# Patient Record
Sex: MALE | Race: WHITE | NOT HISPANIC OR LATINO | Employment: FULL TIME | URBAN - METROPOLITAN AREA
[De-identification: names, ages, dates, MRNs, and addresses within clinical notes are randomized per-mention and may not be internally consistent; named-entity substitution may affect disease eponyms.]

---

## 2017-01-26 ENCOUNTER — HISTORICAL (OUTPATIENT)
Dept: ADMINISTRATIVE | Facility: HOSPITAL | Age: 67
End: 2017-01-26

## 2017-01-27 ENCOUNTER — OFFICE VISIT (OUTPATIENT)
Dept: INTERNAL MEDICINE CLINIC | Age: 67
End: 2017-01-27

## 2017-01-27 VITALS
WEIGHT: 244 LBS | HEART RATE: 60 BPM | HEIGHT: 71 IN | DIASTOLIC BLOOD PRESSURE: 82 MMHG | BODY MASS INDEX: 34.16 KG/M2 | SYSTOLIC BLOOD PRESSURE: 124 MMHG

## 2017-01-27 DIAGNOSIS — Z01.818 PREOP EXAMINATION: Primary | ICD-10-CM

## 2017-01-27 DIAGNOSIS — T86.8419 CORNEAL TRANSPLANT FAILURE: ICD-10-CM

## 2017-01-27 PROCEDURE — 1123F ACP DISCUSS/DSCN MKR DOCD: CPT | Performed by: NURSE PRACTITIONER

## 2017-01-27 PROCEDURE — 3017F COLORECTAL CA SCREEN DOC REV: CPT | Performed by: NURSE PRACTITIONER

## 2017-01-27 PROCEDURE — 1036F TOBACCO NON-USER: CPT | Performed by: NURSE PRACTITIONER

## 2017-01-27 PROCEDURE — G8427 DOCREV CUR MEDS BY ELIG CLIN: HCPCS | Performed by: NURSE PRACTITIONER

## 2017-01-27 PROCEDURE — G8484 FLU IMMUNIZE NO ADMIN: HCPCS | Performed by: NURSE PRACTITIONER

## 2017-01-27 PROCEDURE — G8599 NO ASA/ANTIPLAT THER USE RNG: HCPCS | Performed by: NURSE PRACTITIONER

## 2017-01-27 PROCEDURE — G8419 CALC BMI OUT NRM PARAM NOF/U: HCPCS | Performed by: NURSE PRACTITIONER

## 2017-01-27 PROCEDURE — 4040F PNEUMOC VAC/ADMIN/RCVD: CPT | Performed by: NURSE PRACTITIONER

## 2017-01-27 PROCEDURE — 99214 OFFICE O/P EST MOD 30 MIN: CPT | Performed by: NURSE PRACTITIONER

## 2017-06-07 RX ORDER — HYDROCHLOROTHIAZIDE 12.5 MG/1
CAPSULE ORAL
Qty: 30 CAPSULE | Refills: 2 | Status: SHIPPED | OUTPATIENT
Start: 2017-06-07 | End: 2017-07-10 | Stop reason: SDUPTHER

## 2017-06-07 RX ORDER — OMEPRAZOLE 20 MG/1
CAPSULE, DELAYED RELEASE ORAL
Qty: 30 CAPSULE | Refills: 2 | Status: SHIPPED | OUTPATIENT
Start: 2017-06-07 | End: 2017-07-10 | Stop reason: SDUPTHER

## 2017-06-07 RX ORDER — LISINOPRIL 5 MG/1
TABLET ORAL
Qty: 30 TABLET | Refills: 2 | Status: SHIPPED | OUTPATIENT
Start: 2017-06-07 | End: 2017-07-10 | Stop reason: SDUPTHER

## 2017-06-15 DIAGNOSIS — I10 ESSENTIAL HYPERTENSION: Chronic | ICD-10-CM

## 2017-06-15 RX ORDER — DOXAZOSIN 2 MG/1
TABLET ORAL
Qty: 30 TABLET | Refills: 0 | Status: SHIPPED | OUTPATIENT
Start: 2017-06-15 | End: 2017-07-09 | Stop reason: SDUPTHER

## 2017-06-19 RX ORDER — ROSUVASTATIN CALCIUM 10 MG/1
5 TABLET, COATED ORAL DAILY
Qty: 30 TABLET | Refills: 5 | Status: SHIPPED | OUTPATIENT
Start: 2017-06-19 | End: 2018-06-28 | Stop reason: SDUPTHER

## 2017-06-19 RX ORDER — ROSUVASTATIN CALCIUM 10 MG/1
TABLET, COATED ORAL
COMMUNITY
Start: 2017-03-27 | End: 2017-06-19 | Stop reason: SDUPTHER

## 2017-06-22 DIAGNOSIS — R93.89 ABNORMAL CT OF THE CHEST: Primary | ICD-10-CM

## 2017-06-26 ENCOUNTER — TELEPHONE (OUTPATIENT)
Dept: PULMONOLOGY | Facility: CLINIC | Age: 67
End: 2017-06-26

## 2017-06-26 DIAGNOSIS — R93.89 ABNORMAL CXR: Primary | ICD-10-CM

## 2017-07-06 ENCOUNTER — TELEPHONE (OUTPATIENT)
Dept: INTERNAL MEDICINE CLINIC | Age: 67
End: 2017-07-06

## 2017-07-07 RX ORDER — LISINOPRIL 5 MG/1
5 TABLET ORAL DAILY
COMMUNITY

## 2017-07-07 RX ORDER — OMEPRAZOLE 20 MG/1
20 CAPSULE, DELAYED RELEASE ORAL DAILY
COMMUNITY

## 2017-07-07 RX ORDER — ROSUVASTATIN CALCIUM 10 MG/1
5 TABLET, COATED ORAL DAILY
COMMUNITY

## 2017-07-07 RX ORDER — ASPIRIN 81 MG/1
81 TABLET ORAL DAILY
COMMUNITY

## 2017-07-07 RX ORDER — TIOTROPIUM BROMIDE 18 UG/1
18 CAPSULE ORAL; RESPIRATORY (INHALATION) DAILY
COMMUNITY

## 2017-07-07 RX ORDER — FERROUS SULFATE 325(65) MG
325 TABLET ORAL EVERY OTHER DAY
COMMUNITY

## 2017-07-07 RX ORDER — AMLODIPINE BESYLATE 5 MG/1
5 TABLET ORAL DAILY
COMMUNITY

## 2017-07-07 RX ORDER — HYDROCHLOROTHIAZIDE 12.5 MG/1
12.5 TABLET ORAL DAILY
COMMUNITY

## 2017-07-07 RX ORDER — SILDENAFIL 50 MG/1
25 TABLET, FILM COATED ORAL DAILY PRN
COMMUNITY

## 2017-07-10 ENCOUNTER — OFFICE VISIT (OUTPATIENT)
Dept: FAMILY MEDICINE | Facility: CLINIC | Age: 67
End: 2017-07-10
Payer: COMMERCIAL

## 2017-07-10 VITALS
HEIGHT: 70 IN | SYSTOLIC BLOOD PRESSURE: 128 MMHG | BODY MASS INDEX: 23.91 KG/M2 | WEIGHT: 167 LBS | OXYGEN SATURATION: 95 % | HEART RATE: 59 BPM | DIASTOLIC BLOOD PRESSURE: 72 MMHG

## 2017-07-10 DIAGNOSIS — N52.9 ED (ERECTILE DYSFUNCTION) OF ORGANIC ORIGIN: ICD-10-CM

## 2017-07-10 DIAGNOSIS — K21.9 GASTROESOPHAGEAL REFLUX DISEASE WITHOUT ESOPHAGITIS: ICD-10-CM

## 2017-07-10 DIAGNOSIS — R91.8 MULTIPLE PULMONARY NODULES: ICD-10-CM

## 2017-07-10 DIAGNOSIS — R06.02 SOB (SHORTNESS OF BREATH) ON EXERTION: ICD-10-CM

## 2017-07-10 DIAGNOSIS — I10 BENIGN HYPERTENSION: Primary | ICD-10-CM

## 2017-07-10 PROBLEM — E78.5 HYPERLIPIDEMIA: Status: ACTIVE | Noted: 2017-07-10

## 2017-07-10 PROBLEM — M75.122 COMPLETE TEAR OF LEFT ROTATOR CUFF: Status: ACTIVE | Noted: 2017-07-10

## 2017-07-10 PROBLEM — R07.9 CHEST PAIN: Status: ACTIVE | Noted: 2017-07-10

## 2017-07-10 PROBLEM — N40.0 BENIGN PROSTATIC HYPERTROPHY WITHOUT URINARY OBSTRUCTION: Status: ACTIVE | Noted: 2017-07-10

## 2017-07-10 PROBLEM — R79.9 BLOOD UREA ABNORMAL: Status: ACTIVE | Noted: 2017-07-10

## 2017-07-10 PROBLEM — D64.9 ANEMIA: Status: ACTIVE | Noted: 2017-07-10

## 2017-07-10 PROBLEM — K63.5 POLYP OF COLON: Status: ACTIVE | Noted: 2017-07-10

## 2017-07-10 PROBLEM — M25.519 SHOULDER PAIN: Status: ACTIVE | Noted: 2017-07-10

## 2017-07-10 PROBLEM — I34.0 MITRAL VALVE INSUFFICIENCY: Status: ACTIVE | Noted: 2017-07-10

## 2017-07-10 PROBLEM — K20.90 ESOPHAGITIS: Status: ACTIVE | Noted: 2017-07-10

## 2017-07-10 PROCEDURE — 99214 OFFICE O/P EST MOD 30 MIN: CPT | Mod: ,,, | Performed by: FAMILY MEDICINE

## 2017-07-10 PROCEDURE — 1159F MED LIST DOCD IN RCRD: CPT | Mod: ,,, | Performed by: FAMILY MEDICINE

## 2017-07-10 RX ORDER — SILDENAFIL CITRATE 20 MG/1
20 TABLET ORAL ONCE AS NEEDED
Qty: 30 TABLET | Refills: 2 | Status: SHIPPED | OUTPATIENT
Start: 2017-07-10 | End: 2018-03-14

## 2017-07-10 RX ORDER — OMEPRAZOLE 20 MG/1
20 CAPSULE, DELAYED RELEASE ORAL DAILY
Qty: 30 CAPSULE | Refills: 2 | Status: SHIPPED | OUTPATIENT
Start: 2017-07-10 | End: 2017-12-13 | Stop reason: SDUPTHER

## 2017-07-10 RX ORDER — LISINOPRIL 5 MG/1
5 TABLET ORAL DAILY
Qty: 30 TABLET | Refills: 2 | Status: SHIPPED | OUTPATIENT
Start: 2017-07-10 | End: 2017-10-23 | Stop reason: SDUPTHER

## 2017-07-10 RX ORDER — HYDROCHLOROTHIAZIDE 12.5 MG/1
12.5 CAPSULE ORAL DAILY
Qty: 30 CAPSULE | Refills: 2 | Status: SHIPPED | OUTPATIENT
Start: 2017-07-10 | End: 2017-12-13 | Stop reason: SDUPTHER

## 2017-07-10 NOTE — PROGRESS NOTES
Subjective:       Patient ID:  Onesimo Paula is a 66 y.o. male with multiple medical diagnoses as listed in the medical history and problem list that presents for Hypertension (f/u) and Shortness of Breath (f/u)  .      Chief Complaint: Hypertension (f/u) and Shortness of Breath (f/u)    Hypertension   This is a chronic problem. The current episode started more than 1 year ago. The problem has been resolved since onset. The problem is controlled. Associated symptoms include shortness of breath. Pertinent negatives include no anxiety, chest pain, headaches, malaise/fatigue or palpitations. There are no associated agents to hypertension. Risk factors for coronary artery disease include dyslipidemia and male gender. Past treatments include ACE inhibitors and diuretics. The current treatment provides mild improvement. There are no compliance problems.  There is no history of angina, kidney disease, CVA, heart failure or retinopathy.   Shortness of Breath   This is a chronic problem. The current episode started more than 1 year ago. The problem occurs intermittently. The problem has been waxing and waning. Pertinent negatives include no abdominal pain, chest pain, ear pain, fever, headaches, leg swelling, rash, sore throat or wheezing. The symptoms are aggravated by exercise. The patient has no known risk factors for DVT/PE. He has tried ipratropium inhalers for the symptoms. The treatment provided mild relief. There is no history of COPD or a heart failure.     Review of Systems   Constitutional: Negative for activity change, appetite change, chills, fatigue, fever and malaise/fatigue.   HENT: Negative for congestion, ear discharge, ear pain, hearing loss and sore throat.    Eyes: Negative for discharge, redness and itching.   Respiratory: Positive for shortness of breath. Negative for apnea, chest tightness and wheezing.    Cardiovascular: Negative for chest pain, palpitations and leg swelling.   Gastrointestinal:  Negative for abdominal distention and abdominal pain.   Endocrine: Negative for cold intolerance and polydipsia.   Genitourinary: Negative for dysuria.   Musculoskeletal: Negative for arthralgias and gait problem.   Skin: Negative for color change, pallor and rash.   Neurological: Negative for dizziness and headaches.   Hematological: Negative for adenopathy.   Psychiatric/Behavioral: Negative for agitation.       No past medical history on file.  No past surgical history on file.  No family history on file.  Social History     Social History    Marital status:      Spouse name: N/A    Number of children: N/A    Years of education: N/A     Social History Main Topics    Smoking status: None    Smokeless tobacco: None    Alcohol use None    Drug use: Unknown    Sexual activity: Not Asked     Other Topics Concern    None     Social History Narrative    None     Current Outpatient Prescriptions   Medication Sig Dispense Refill    hydrochlorothiazide (MICROZIDE) 12.5 mg capsule Take 1 capsule (12.5 mg total) by mouth once daily. 30 capsule 2    lisinopril (PRINIVIL,ZESTRIL) 5 MG tablet Take 1 tablet (5 mg total) by mouth once daily. 30 tablet 2    omeprazole (PRILOSEC) 20 MG capsule Take 1 capsule (20 mg total) by mouth once daily. 30 capsule 2    rosuvastatin (CRESTOR) 10 MG tablet Take 0.5 tablets (5 mg total) by mouth once daily. 30 tablet 5    sildenafil (REVATIO) 20 mg Tab Take 1 tablet (20 mg total) by mouth once as needed. 30 tablet 2     No current facility-administered medications for this visit.      Review of patient's allergies indicates:  No Known Allergies  Objective:      Vitals:    07/10/17 0955   BP: 128/72   Pulse: (!) 59     Physical Exam   Constitutional: He appears well-developed and well-nourished.   HENT:   Head: Normocephalic and atraumatic.   Right Ear: External ear normal.   Left Ear: External ear normal.   Eyes: EOM are normal. Pupils are equal, round, and reactive to  light.   Neck: Normal range of motion. No tracheal deviation present. No thyromegaly present.   Cardiovascular: Exam reveals no gallop and no friction rub.    No murmur heard.  Pulmonary/Chest: No respiratory distress. He has no wheezes.   Abdominal: Soft. Bowel sounds are normal. He exhibits no distension. There is no tenderness.   Musculoskeletal: He exhibits no edema or tenderness.   Neurological: No cranial nerve deficit.   Skin: No rash noted. No erythema.   Psychiatric: He has a normal mood and affect.       Assessment:       1. Benign hypertension    2. Multiple pulmonary nodules    3. Gastroesophageal reflux disease without esophagitis    4. SOB (shortness of breath) on exertion    5. ED (erectile dysfunction) of organic origin        Plan:       Benign hypertension  -     lisinopril (PRINIVIL,ZESTRIL) 5 MG tablet; Take 1 tablet (5 mg total) by mouth once daily.  Dispense: 30 tablet; Refill: 2  -     hydrochlorothiazide (MICROZIDE) 12.5 mg capsule; Take 1 capsule (12.5 mg total) by mouth once daily.  Dispense: 30 capsule; Refill: 2    Multiple pulmonary nodules    Gastroesophageal reflux disease without esophagitis  -     omeprazole (PRILOSEC) 20 MG capsule; Take 1 capsule (20 mg total) by mouth once daily.  Dispense: 30 capsule; Refill: 2    SOB (shortness of breath) on exertion  Comments:  advise pt followup with pulmonary. tried jinny. repeat CT this am.     ED (erectile dysfunction) of organic origin  -     sildenafil (REVATIO) 20 mg Tab; Take 1 tablet (20 mg total) by mouth once as needed.  Dispense: 30 tablet; Refill: 2      Return in about 3 months (around 10/10/2017) for annual physical.      7/10/2017 Alyssa Nicholson M.D.

## 2017-07-13 ENCOUNTER — OFFICE VISIT (OUTPATIENT)
Dept: PULMONOLOGY | Facility: CLINIC | Age: 67
End: 2017-07-13
Payer: COMMERCIAL

## 2017-07-13 VITALS
OXYGEN SATURATION: 98 % | BODY MASS INDEX: 23.68 KG/M2 | HEART RATE: 74 BPM | SYSTOLIC BLOOD PRESSURE: 120 MMHG | DIASTOLIC BLOOD PRESSURE: 74 MMHG | WEIGHT: 165 LBS

## 2017-07-13 DIAGNOSIS — R91.8 MULTIPLE PULMONARY NODULES: ICD-10-CM

## 2017-07-13 DIAGNOSIS — R06.09 DYSPNEA ON EXERTION: Primary | ICD-10-CM

## 2017-07-13 PROCEDURE — 99214 OFFICE O/P EST MOD 30 MIN: CPT | Mod: ,,, | Performed by: INTERNAL MEDICINE

## 2017-07-13 PROCEDURE — 1159F MED LIST DOCD IN RCRD: CPT | Mod: ,,, | Performed by: INTERNAL MEDICINE

## 2017-07-13 RX ORDER — AMLODIPINE BESYLATE 5 MG/1
TABLET ORAL
COMMUNITY
Start: 2017-07-08 | End: 2017-08-22 | Stop reason: SDUPTHER

## 2017-07-13 RX ORDER — ALBUTEROL SULFATE 90 UG/1
2 AEROSOL, METERED RESPIRATORY (INHALATION) EVERY 6 HOURS PRN
Qty: 1 INHALER | Refills: 5 | Status: SHIPPED | OUTPATIENT
Start: 2017-07-13 | End: 2018-03-14

## 2017-07-13 NOTE — PROGRESS NOTES
HPI:    Here for follow up, had repeat CT chest showing stable nodules (2-4 mm) since 1/2017, will repeat CT in 1 year.  Only complaint is some mild BURT (PFT 9/2016 - mild decrease DLCO o/w ok, methacholine challenge 12/2016 +, will try prn beta agonist.  No other new complaints.    Medications    Current Outpatient Prescriptions:     amlodipine (NORVASC) 5 MG tablet, , Disp: , Rfl:     hydrochlorothiazide (MICROZIDE) 12.5 mg capsule, Take 1 capsule (12.5 mg total) by mouth once daily., Disp: 30 capsule, Rfl: 2    lisinopril (PRINIVIL,ZESTRIL) 5 MG tablet, Take 1 tablet (5 mg total) by mouth once daily., Disp: 30 tablet, Rfl: 2    omeprazole (PRILOSEC) 20 MG capsule, Take 1 capsule (20 mg total) by mouth once daily., Disp: 30 capsule, Rfl: 2    rosuvastatin (CRESTOR) 10 MG tablet, Take 0.5 tablets (5 mg total) by mouth once daily., Disp: 30 tablet, Rfl: 5    albuterol 90 mcg/actuation inhaler, Inhale 2 puffs into the lungs every 6 (six) hours as needed for Shortness of Breath. Rescue, Disp: 1 Inhaler, Rfl: 5    sildenafil (REVATIO) 20 mg Tab, Take 1 tablet (20 mg total) by mouth once as needed., Disp: 30 tablet, Rfl: 2    Allergies  Review of patient's allergies indicates:  No Known Allergies    Social History    History   Smoking Status    Former Smoker    Quit date: 1997   Smokeless Tobacco    Never Used     ETOH - 3 drinks per week.  History   Drug Use No     Occupation - works as a     Family History  Family History   Problem Relation Age of Onset    Cancer Mother      skin    Stroke Mother     Heart failure Father     Kidney disease Father        ROS  Review of Systems   Constitutional: Negative for chills, diaphoresis, fever, malaise/fatigue and weight loss.   HENT: Negative for congestion.    Eyes: Negative for pain.   Respiratory: Positive for shortness of breath. Negative for cough, hemoptysis, sputum production, wheezing and stridor.    Cardiovascular: Negative for chest pain,  palpitations, orthopnea, claudication, leg swelling and PND.   Gastrointestinal: Negative for abdominal pain, constipation, diarrhea, heartburn, nausea and vomiting.   Genitourinary: Negative for dysuria, frequency and urgency.   Musculoskeletal: Negative for falls and myalgias.   Neurological: Negative for sensory change, focal weakness and weakness.   Psychiatric/Behavioral: Negative for depression. The patient is not nervous/anxious.        Physical Exam    Vitals:    07/13/17 0946   BP: 120/74   Pulse: 74   SpO2: 98%   Weight: 74.8 kg (165 lb)       Physical Exam   Constitutional: He is oriented to person, place, and time. He appears well-developed and well-nourished. No distress.   HENT:   Head: Normocephalic and atraumatic.   Right Ear: External ear normal.   Left Ear: External ear normal.   Nose: Nose normal.   Mouth/Throat: Oropharynx is clear and moist.   Eyes: EOM are normal. Pupils are equal, round, and reactive to light.   Neck: Normal range of motion. Neck supple. No JVD present. No tracheal deviation present. No thyromegaly present.   Cardiovascular: Normal rate, regular rhythm, normal heart sounds and intact distal pulses.  Exam reveals no gallop and no friction rub.    No murmur heard.  Pulmonary/Chest: Effort normal and breath sounds normal. No stridor. No respiratory distress. He has no wheezes. He has no rales. He exhibits no tenderness.   Abdominal: Soft. Bowel sounds are normal. He exhibits no distension.   Musculoskeletal: Normal range of motion. He exhibits no edema or tenderness.   Lymphadenopathy:     He has no cervical adenopathy.   Neurological: He is alert and oriented to person, place, and time. He has normal reflexes. No cranial nerve deficit.   Skin: He is not diaphoretic.   Psychiatric: He has a normal mood and affect. His behavior is normal.   Nursing note and vitals reviewed.      Lab        Xray  Imaging Results    None         Impression/Plan  Problem List Items Addressed This  Visit        Pulmonary    Multiple pulmonary nodules  - stable  - needs repeat in 1 year    Dyspnea on exertion - Primary  - trial prn beta agonist  - RTC 6 months    Relevant Medications    albuterol 90 mcg/actuation inhaler      Other Visit Diagnoses    None.

## 2017-08-22 RX ORDER — AMLODIPINE BESYLATE 5 MG/1
TABLET ORAL
Qty: 30 TABLET | Refills: 2 | Status: SHIPPED | OUTPATIENT
Start: 2017-08-22 | End: 2017-12-13 | Stop reason: SDUPTHER

## 2017-08-28 ENCOUNTER — OFFICE VISIT (OUTPATIENT)
Dept: INTERNAL MEDICINE CLINIC | Age: 67
End: 2017-08-28

## 2017-08-28 VITALS
SYSTOLIC BLOOD PRESSURE: 128 MMHG | HEIGHT: 71 IN | DIASTOLIC BLOOD PRESSURE: 78 MMHG | WEIGHT: 218 LBS | HEART RATE: 72 BPM | BODY MASS INDEX: 30.52 KG/M2

## 2017-08-28 DIAGNOSIS — I13.2: Primary | Chronic | ICD-10-CM

## 2017-08-28 DIAGNOSIS — I10 ESSENTIAL HYPERTENSION: Chronic | ICD-10-CM

## 2017-08-28 DIAGNOSIS — Z23 NEED FOR PROPHYLACTIC VACCINATION AGAINST STREPTOCOCCUS PNEUMONIAE (PNEUMOCOCCUS): ICD-10-CM

## 2017-08-28 DIAGNOSIS — Z12.5 PROSTATE CANCER SCREENING: ICD-10-CM

## 2017-08-28 DIAGNOSIS — Z11.59 NEED FOR HEPATITIS C SCREENING TEST: ICD-10-CM

## 2017-08-28 DIAGNOSIS — N18.5: Primary | Chronic | ICD-10-CM

## 2017-08-28 DIAGNOSIS — Z23 NEED FOR PROPHYLACTIC VACCINATION AND INOCULATION AGAINST VARICELLA: ICD-10-CM

## 2017-08-28 DIAGNOSIS — E78.5 OTHER AND UNSPECIFIED HYPERLIPIDEMIA: Chronic | ICD-10-CM

## 2017-08-28 DIAGNOSIS — I50.40: Primary | Chronic | ICD-10-CM

## 2017-08-28 DIAGNOSIS — N18.3 CKD (CHRONIC KIDNEY DISEASE), STAGE 3 (MODERATE): ICD-10-CM

## 2017-08-28 DIAGNOSIS — N40.0 BENIGN NON-NODULAR PROSTATIC HYPERPLASIA WITHOUT LOWER URINARY TRACT SYMPTOMS: Chronic | ICD-10-CM

## 2017-08-28 DIAGNOSIS — Z13.1 ENCOUNTER FOR SCREENING FOR DIABETES MELLITUS: ICD-10-CM

## 2017-08-28 PROCEDURE — G0009 ADMIN PNEUMOCOCCAL VACCINE: HCPCS | Performed by: INTERNAL MEDICINE

## 2017-08-28 PROCEDURE — G8599 NO ASA/ANTIPLAT THER USE RNG: HCPCS | Performed by: INTERNAL MEDICINE

## 2017-08-28 PROCEDURE — 90732 PPSV23 VACC 2 YRS+ SUBQ/IM: CPT | Performed by: INTERNAL MEDICINE

## 2017-08-28 PROCEDURE — G0008 ADMIN INFLUENZA VIRUS VAC: HCPCS | Performed by: INTERNAL MEDICINE

## 2017-08-28 PROCEDURE — 90662 IIV NO PRSV INCREASED AG IM: CPT | Performed by: INTERNAL MEDICINE

## 2017-08-28 PROCEDURE — 99214 OFFICE O/P EST MOD 30 MIN: CPT | Performed by: INTERNAL MEDICINE

## 2017-08-28 ASSESSMENT — PATIENT HEALTH QUESTIONNAIRE - PHQ9
SUM OF ALL RESPONSES TO PHQ QUESTIONS 1-9: 0
2. FEELING DOWN, DEPRESSED OR HOPELESS: 0
1. LITTLE INTEREST OR PLEASURE IN DOING THINGS: 0
SUM OF ALL RESPONSES TO PHQ9 QUESTIONS 1 & 2: 0

## 2017-08-29 LAB
A/G RATIO: 1.5 (ref 1.1–2.2)
ALBUMIN SERPL-MCNC: 4.6 G/DL (ref 3.4–5)
ALP BLD-CCNC: 63 U/L (ref 40–129)
ALT SERPL-CCNC: 52 U/L (ref 10–40)
ANION GAP SERPL CALCULATED.3IONS-SCNC: 17 MMOL/L (ref 3–16)
AST SERPL-CCNC: 37 U/L (ref 15–37)
BASOPHILS ABSOLUTE: 0 K/UL (ref 0–0.2)
BASOPHILS RELATIVE PERCENT: 0.8 %
BILIRUB SERPL-MCNC: 0.5 MG/DL (ref 0–1)
BUN BLDV-MCNC: 29 MG/DL (ref 7–20)
CALCIUM SERPL-MCNC: 10.6 MG/DL (ref 8.3–10.6)
CHLORIDE BLD-SCNC: 103 MMOL/L (ref 99–110)
CHOLESTEROL, TOTAL: 142 MG/DL (ref 0–199)
CO2: 23 MMOL/L (ref 21–32)
CREAT SERPL-MCNC: 1.5 MG/DL (ref 0.8–1.3)
EOSINOPHILS ABSOLUTE: 0.1 K/UL (ref 0–0.6)
EOSINOPHILS RELATIVE PERCENT: 2.5 %
GFR AFRICAN AMERICAN: 56
GFR NON-AFRICAN AMERICAN: 47
GLOBULIN: 3 G/DL
GLUCOSE BLD-MCNC: 86 MG/DL (ref 70–99)
GLUCOSE FASTING: 84 MG/DL (ref 70–99)
HCT VFR BLD CALC: 41.9 % (ref 40.5–52.5)
HDLC SERPL-MCNC: 41 MG/DL (ref 40–60)
HEMOGLOBIN: 14 G/DL (ref 13.5–17.5)
HEPATITIS C ANTIBODY INTERPRETATION: NORMAL
LDL CHOLESTEROL CALCULATED: 82 MG/DL
LYMPHOCYTES ABSOLUTE: 1.5 K/UL (ref 1–5.1)
LYMPHOCYTES RELATIVE PERCENT: 29.9 %
MCH RBC QN AUTO: 31.4 PG (ref 26–34)
MCHC RBC AUTO-ENTMCNC: 33.5 G/DL (ref 31–36)
MCV RBC AUTO: 93.9 FL (ref 80–100)
MONOCYTES ABSOLUTE: 0.4 K/UL (ref 0–1.3)
MONOCYTES RELATIVE PERCENT: 8.4 %
NEUTROPHILS ABSOLUTE: 2.9 K/UL (ref 1.7–7.7)
NEUTROPHILS RELATIVE PERCENT: 58.4 %
PDW BLD-RTO: 13.9 % (ref 12.4–15.4)
PLATELET # BLD: 164 K/UL (ref 135–450)
PMV BLD AUTO: 10.6 FL (ref 5–10.5)
POTASSIUM SERPL-SCNC: 4.5 MMOL/L (ref 3.5–5.1)
PROSTATE SPECIFIC ANTIGEN: 0.35 NG/ML (ref 0–4)
RBC # BLD: 4.46 M/UL (ref 4.2–5.9)
SODIUM BLD-SCNC: 143 MMOL/L (ref 136–145)
T4 FREE: 1.4 NG/DL (ref 0.9–1.8)
TOTAL PROTEIN: 7.6 G/DL (ref 6.4–8.2)
TRIGL SERPL-MCNC: 94 MG/DL (ref 0–150)
TSH SERPL DL<=0.05 MIU/L-ACNC: 1.62 UIU/ML (ref 0.27–4.2)
VLDLC SERPL CALC-MCNC: 19 MG/DL
WBC # BLD: 4.9 K/UL (ref 4–11)

## 2017-09-01 ENCOUNTER — TELEPHONE (OUTPATIENT)
Dept: INTERNAL MEDICINE CLINIC | Age: 67
End: 2017-09-01

## 2017-09-05 ENCOUNTER — TELEPHONE (OUTPATIENT)
Dept: INTERNAL MEDICINE CLINIC | Age: 67
End: 2017-09-05

## 2017-10-23 ENCOUNTER — OFFICE VISIT (OUTPATIENT)
Dept: FAMILY MEDICINE | Facility: CLINIC | Age: 67
End: 2017-10-23
Payer: COMMERCIAL

## 2017-10-23 VITALS
OXYGEN SATURATION: 98 % | HEIGHT: 70 IN | HEART RATE: 54 BPM | DIASTOLIC BLOOD PRESSURE: 80 MMHG | WEIGHT: 162 LBS | BODY MASS INDEX: 23.19 KG/M2 | RESPIRATION RATE: 16 BRPM | SYSTOLIC BLOOD PRESSURE: 134 MMHG

## 2017-10-23 DIAGNOSIS — I10 BENIGN HYPERTENSION: Primary | ICD-10-CM

## 2017-10-23 DIAGNOSIS — M79.602 LEFT ARM PAIN: ICD-10-CM

## 2017-10-23 DIAGNOSIS — I34.0 NON-RHEUMATIC MITRAL REGURGITATION: ICD-10-CM

## 2017-10-23 DIAGNOSIS — N18.2 CHRONIC KIDNEY DISEASE (CKD), ACTIVE MEDICAL MANAGEMENT WITHOUT DIALYSIS, STAGE 2 (MILD): ICD-10-CM

## 2017-10-23 DIAGNOSIS — R06.09 DYSPNEA ON EXERTION: ICD-10-CM

## 2017-10-23 DIAGNOSIS — E78.01 FAMILIAL HYPERCHOLESTEROLEMIA: ICD-10-CM

## 2017-10-23 DIAGNOSIS — R74.01 ELEVATED TRANSAMINASE LEVEL: ICD-10-CM

## 2017-10-23 PROCEDURE — 90732 PPSV23 VACC 2 YRS+ SUBQ/IM: CPT | Mod: ,,, | Performed by: INTERNAL MEDICINE

## 2017-10-23 PROCEDURE — 90471 IMMUNIZATION ADMIN: CPT | Mod: ,,, | Performed by: INTERNAL MEDICINE

## 2017-10-23 PROCEDURE — 99214 OFFICE O/P EST MOD 30 MIN: CPT | Mod: 25,,, | Performed by: INTERNAL MEDICINE

## 2017-10-23 RX ORDER — LISINOPRIL 5 MG/1
5 TABLET ORAL DAILY
Qty: 90 TABLET | Refills: 3 | Status: SHIPPED | OUTPATIENT
Start: 2017-10-23 | End: 2018-02-26

## 2017-10-23 NOTE — PATIENT INSTRUCTIONS
What Is Acute Bronchitis?  Acute bronchitis is when the airways in your lungs (bronchial tubes) become red and swollen (inflamed). It is usually caused by a viral infection. But it can also occur because of a bacteria or allergen. Symptoms include a cough that produces yellow or greenish mucus and can last for days or sometimes weeks.  Inside healthy lungs    What Is Chronic Bronchitis?  Chronic bronchitis is when damaged lungs make more mucus than they should. If you cough up mucus and feel short of breath for at least three months each year, two or more years in a row, without another diagnosis to explain the cough, you may have chronic bronchitis.  Healthy lungs  This is what happens when your lungs are healthy:  · Inside the lungs are branching airways of stretchy tissue. Each airway is wrapped with bands of muscle that help keep it open. Air travels in and out of the lungs through these airways.  · The cells in the lining of the airways produce a sticky fluid called mucus. This traps dust, smoke, and other particles in the air you breathe and helps protect the lungs.  · Tiny hairs, called cilia, then sweep the mucus up the airways to the throat, where it is swallowed or coughed up, again to protect the lungs.         When you have chronic bronchitis  This is what happens when you have chronic bronchitis:  · Cells in the airways make more mucus than normal. The mucus builds up, narrowing the airways. This means less air travels into and out of the lungs.  · The lining of the airways may also become inflamed (swollen). And, the muscle surrounding the airways may constrict (tighten). These problems cause the airways to narrow even more.  · The cilia may also be damaged. This means they cant sweep mucus and particles away. This damage makes the problems described above even worse.         Date Last Reviewed: 5/1/2016  © 3988-5501 [employer]. [address] [place] [address], [place] [address]. All rights  reserved. This information is not intended as a substitute for professional medical care. Always follow your healthcare professional's instructions.      Air travels in and out of the lungs through the airways. The linings of these airways produce sticky mucus. This mucus traps particles that enter the lungs. Tiny structures called cilia then sweep the particles out of the airways.     Healthy airway: Airways are normally open. Air moves in and out easily.      Healthy cilia: Tiny, hairlike cilia sweep mucus and particles up and out of the airways.   Lungs with bronchitis  Bronchitis often occurs with a cold or the flu virus. The airways become inflamed (red and swollen). There is a deep hacking cough from the extra mucus. Other symptoms may include:  · Wheezing  · Chest discomfort  · Shortness of breath  · Mild fever  A second infection, this time due to bacteria, may then occur. And airways irritated by allergens or smoke are more likely to get infected.        Inflamed airway: Inflammation and extra mucus narrow the airway, causing shortness of breath.      Impaired cilia: Extra mucus impairs cilia, causing congestion and wheezing. Smoking makes the problem worse.   Making a diagnosis  A physical exam, health history, and certain tests help your healthcare provider make the diagnosis.  Health history  Your healthcare provider will ask you about your symptoms.  The exam  Your provider listens to your chest for signs of congestion. He or she may also check your ears, nose, and throat.  Possible tests  · A sputum test for bacteria. This requires a sample of mucus from your lungs.  · A nasal or throat swab. This tests to see if you have a bacterial infection.  · A chest X-ray. This is done if your healthcare provider thinks you have pneumonia.  · Tests to check for an underlying condition. Other tests may be done to check for things such as allergies, asthma, or COPD (chronic obstructive pulmonary disease). You may  need to see a specialist for more lung function testing.  Treating a cough  The main treatment for bronchitis is easing symptoms. Avoiding smoke, allergens, and other things that trigger coughing can often help. If the infection is bacterial, you may be given antibiotics. During the illness, it's important to get plenty of sleep. To ease symptoms:  · Dont smoke. Also avoid secondhand smoke.  · Use a humidifier. Or try breathing in steam from a hot shower. This may help loosen mucus.  · Drink a lot of water and juice. They can soothe the throat and may help thin mucus.  · Sit up or use extra pillows when in bed. This helps to lessen coughing and congestion.  · Ask your provider about using medicine. Ask about using cough medicine, pain and fever medicine, or a decongestant.  Antibiotics  Most cases of bronchitis are caused by cold or flu viruses. They dont need antibiotics to treat them, even if your mucus is thick and green or yellow. Antibiotics dont treat viral illness and antibiotics have not been shown to have any benefit in cases of acute bronchitis. Taking antibiotics when they are not needed increases your risk of getting an infection later that is antibiotic-resistant. Antibiotics can also cause severe cases of diarrhea that require other antibiotics to treat.  It is important that you accept your healthcare provider's opinion to not use antibiotics. Your provider will prescribe antibiotics if the infection is caused by bacteria. If they are prescribed:  · Take all of the medicine. Take the medicine until it is used up, even if symptoms have improved. If you dont, the bronchitis may come back.  · Take the medicines as directed. For instance, some medicines should be taken with food.  · Ask about side effects. Ask your provider or pharmacist what side effects are common, and what to do about them.  Follow-up care  You should see your provider again in 2 to 3 weeks. By this time, symptoms should have  improved. An infection that lasts longer may mean you have a more serious problem.  Prevention  · Avoid tobacco smoke. If you smoke, quit. Stay away from smoky places. Ask friends and family not to smoke around you, or in your home or car.  · Get checked for allergies.  · Ask your provider about getting a yearly flu shot. Also ask about pneumococcal or pneumonia shots.  · Wash your hands often. This helps reduce the chance of picking up viruses that cause colds and flu.  Call your healthcare provider if:  · Symptoms worsen, or you have new symptoms  · Breathing problems worsen or  become severe  · Symptoms dont get better within a week, or within 3 days of taking antibiotics   Date Last Reviewed: 2/1/2017  © 1999-1479 [employer]. [address] [place] [address], [place] [address]. All rights reserved. This information is not intended as a substitute for professional medical care. Always follow your healthcare professional's instructions.

## 2017-10-23 NOTE — PROGRESS NOTES
Subjective:       Patient ID: Onesimo Paula is a 66 y.o. male.    Chief Complaint: Establish Care (annual physical)    The patient is a 66-year-old gentleman here to establish with a new physician for chronic hypertension, GERD and mild dyslipidemia. He also has mitral regurgitation quantity of unknown but he does follow with . He also quit smoking in 1997 but does have a significant smoking history and by CT scan has some pulmonary nodules that the pulmonologist is following. He had some pulmonary function tests done and the pulmonologist wanted to start an as needed bronchodilator short acting to see if this helped his shortness of breath while he was at work. 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 He does not hear himself wheeze and does not get any substernal chest pressure. He does not have a chronic cough. He recently had a coronary catheterization by serum was negative.    On review of his last lab work done by his cardiologist as well as his nephrologist for whom he sees for a very minimally elevated creatinine or stage II chronic kidney disease.      Review of Systems   Constitutional: Negative for activity change, diaphoresis, fatigue and fever.   HENT: Negative for congestion, ear pain, postnasal drip, sinus pressure, sore throat and trouble swallowing.    Eyes: Negative for pain.   Respiratory: Positive for shortness of breath. Negative for cough, chest tightness and wheezing.    Cardiovascular: Negative for chest pain, palpitations and leg swelling.   Gastrointestinal: Negative for abdominal distention, abdominal pain, blood in stool, constipation and diarrhea.   Endocrine: Negative for cold intolerance and heat intolerance.   Genitourinary: Negative for decreased urine volume, difficulty urinating, dysuria, flank pain, frequency and hematuria.   Musculoskeletal: Negative for arthralgias, back  pain, joint swelling, myalgias and neck pain.        Left biceps tenderness that comes and goes over the past few weeks   Skin: Negative for pallor, rash and wound.   Neurological: Negative for tremors, syncope, weakness, light-headedness, numbness and headaches.   Hematological: Negative for adenopathy.   Psychiatric/Behavioral: Negative for confusion, decreased concentration, hallucinations, self-injury, sleep disturbance and suicidal ideas. The patient is not nervous/anxious.        Past Medical History:   Diagnosis Date    Acid reflux     Acquired tricuspid valve insufficiency     Anemia     Borderline diabetes     BPH (benign prostatic hyperplasia)     Coronary artery disease     Hypertension     Pulmonary nodules     Skin cancer        Past Surgical History:   Procedure Laterality Date    HERNIA REPAIR      INNER EAR SURGERY      SKIN LESION EXCISION      STAPEDECTOMY         Family History   Problem Relation Age of Onset    Cancer Mother      skin    Stroke Mother     Heart failure Father     Kidney disease Father        Social History     Social History    Marital status:      Spouse name: N/A    Number of children: N/A    Years of education: N/A     Social History Main Topics    Smoking status: Former Smoker     Quit date: 1997    Smokeless tobacco: Never Used    Alcohol use Yes      Comment: occasional    Drug use: No    Sexual activity: Not Asked     Other Topics Concern    None     Social History Narrative    None       Current Outpatient Prescriptions   Medication Sig Dispense Refill    amlodipine (NORVASC) 5 MG tablet TAKE ONE TABLET BY MOUTH ONCE DAILY 30 tablet 2    hydrochlorothiazide (MICROZIDE) 12.5 mg capsule Take 1 capsule (12.5 mg total) by mouth once daily. 30 capsule 2    lisinopril (PRINIVIL,ZESTRIL) 5 MG tablet Take 1 tablet (5 mg total) by mouth once daily. 90 tablet 3    omeprazole (PRILOSEC) 20 MG capsule Take 1 capsule (20 mg total) by mouth once  daily. 30 capsule 2    albuterol 90 mcg/actuation inhaler Inhale 2 puffs into the lungs every 6 (six) hours as needed for Shortness of Breath. Rescue 1 Inhaler 5    rosuvastatin (CRESTOR) 10 MG tablet Take 0.5 tablets (5 mg total) by mouth once daily. 30 tablet 5    sildenafil (REVATIO) 20 mg Tab Take 1 tablet (20 mg total) by mouth once as needed. 30 tablet 2     No current facility-administered medications for this visit.        Review of patient's allergies indicates:  No Known Allergies  Objective:      Physical Exam   Constitutional: He is oriented to person, place, and time. He appears well-developed and well-nourished. No distress.   HENT:   Head: Normocephalic and atraumatic.   Right Ear: External ear normal.   Left Ear: External ear normal.   Nose: Nose normal.   Mouth/Throat: Oropharynx is clear and moist.   Eyes: Conjunctivae and EOM are normal. Right eye exhibits no discharge. Left eye exhibits no discharge. No scleral icterus.   Neck: Normal range of motion. Neck supple. No JVD present. No tracheal deviation present. No thyromegaly present.   Cardiovascular: Normal rate, regular rhythm, normal heart sounds and intact distal pulses.  Exam reveals no gallop.    No murmur heard.  Pulmonary/Chest: Effort normal. No respiratory distress. He has no wheezes. He has no rales.   Prolonged expiration   Abdominal: Soft. Bowel sounds are normal. He exhibits no distension and no mass. There is no tenderness.   Musculoskeletal: Normal range of motion. He exhibits no edema or tenderness.   No pain elicited with passive rom of left shoulder or arm   Lymphadenopathy:     He has no cervical adenopathy.   Neurological: He is alert and oriented to person, place, and time.   Skin: Skin is warm and dry. No rash noted. He is not diaphoretic. No erythema.   Psychiatric: He has a normal mood and affect. His behavior is normal. Judgment and thought content normal.       Assessment:       1. Benign hypertension    2. Familial  hypercholesterolemia    3. Non-rheumatic mitral regurgitation    4. Chronic kidney disease (CKD), active medical management without dialysis, stage 2 (mild)    5. Elevated transaminase level    6. Left arm pain    7. Dyspnea on exertion        Plan:       Benign hypertension  -     TSH; Future; Expected date: 10/23/2017  -     T4, free; Future; Expected date: 10/23/2017  -     lisinopril (PRINIVIL,ZESTRIL) 5 MG tablet; Take 1 tablet (5 mg total) by mouth once daily.  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

## 2017-10-24 RX ORDER — FLUTICASONE FUROATE AND VILANTEROL 100; 25 UG/1; UG/1
1 POWDER RESPIRATORY (INHALATION) DAILY
Qty: 1 EACH | Refills: 4 | Status: SHIPPED | OUTPATIENT
Start: 2017-10-24 | End: 2018-02-26

## 2017-11-13 PROBLEM — Z95.5 S/P PRIMARY ANGIOPLASTY WITH CORONARY STENT: Status: ACTIVE | Noted: 2017-11-13

## 2017-11-13 NOTE — PROGRESS NOTES
[facility]   Cardiac Followup    Referring Provider:  Fabio Yuan MD     Chief Complaint   Patient presents with    Follow-up     No cardiac complaints    Coronary Artery Disease    Hypertension    Hyperlipidemia    Congestive Heart Failure    Chronic Kidney Disease     History of Present Illness:   Mr. Yelitza Ohara presents for follow-up of CAD (PCI 1999 Dr. Kala Favre), HTN, HLD, CHF (Class III); he has stage V CKD. Today, he states he feels well overall. He denies exertional chest pain, MAN/PND, palpitations, light-headedness, edema. Stays active, exercises very regularly almost every day, performs Karate, does not smoke. He also owns a photography studio. Past Medical History:   has a past medical history of Acute appendicitis with localized peritonitis; BPH (benign prostatic hyperplasia); CAD (coronary artery disease); CKD (chronic kidney disease) stage 3, GFR 30-59 ml/min; Coronary artery disease; Fragile X syndrome; Ganglion cyst of left foot; Glaucoma; HTN (hypertension); Hyperlipidemia; Hypogonadism male; Plantar fasciitis; Ruptured globe of right eye; Umbilical hernia without obstruction and without gangrene; and Unspecified sleep apnea. Surgical History:   has a past surgical history that includes eye surgery; Colonoscopy; Colonoscopy (4-222-2013); laparoscopic appendectomy (07/21/2016); Eye surgery (Right); and hernia repair (2016). Social History:   reports that he has never smoked. He has never used smokeless tobacco. He reports that he does not drink alcohol or use drugs. Family History:  family history includes Diabetes in his mother; High Blood Pressure in his father and mother. Home Medications:  Prior to Admission medications    Medication Sig Start Date End Date Taking?  Authorizing Provider   doxazosin (CARDURA) 2 MG tablet TAKE 1 TABLET BY MOUTH DAILY 11/8/17  Yes Fabio Yuan MD   simvastatin (ZOCOR) 40 MG tablet TAKE 1 TABLET BY MOUTH DAILY 8/7/17  Yes Dipika Carlson concentric left ventricular hypertrophy.   -E/e\"= 12.   -Mild mitral regurgitation.   -The left atrium is at the upper limits of normal in size.   -Mild tricuspid regurgitation with RVSP estimated at 22 mmHg.   -Mild pulmonic regurgitation. Assessment:     1. Hypertensive heart and kidney disease with combined systolic and diastolic CHF, NYHA class 3 and CKD stage 5 ([address]    2. Coronary artery disease due to lipid rich plaque    3. S/P primary angioplasty with coronary stent    4. Essential hypertension    5. Dyslipidemia      Plan:  Ivon Colin has a stable cardiac status. 1. No med changes. 2. Needs ECHO to assess cardiac/valvular function. 3. Will continue with risk factor modifications. 4. Return for regular follow up in 12 months if ECHO stable. Thank you for allowing me to participate in the care of this individual.      Mercy Coffman.  Mera Ahmadi M.D., [facility]

## 2017-11-14 ENCOUNTER — OFFICE VISIT (OUTPATIENT)
Dept: CARDIOLOGY CLINIC | Age: 67
End: 2017-11-14

## 2017-11-14 VITALS
HEIGHT: 71 IN | WEIGHT: 202 LBS | BODY MASS INDEX: 28.28 KG/M2 | SYSTOLIC BLOOD PRESSURE: 128 MMHG | OXYGEN SATURATION: 97 % | DIASTOLIC BLOOD PRESSURE: 88 MMHG | HEART RATE: 75 BPM

## 2017-11-14 DIAGNOSIS — I50.40: Primary | Chronic | ICD-10-CM

## 2017-11-14 DIAGNOSIS — I25.83 CORONARY ARTERY DISEASE DUE TO LIPID RICH PLAQUE: ICD-10-CM

## 2017-11-14 DIAGNOSIS — E78.5 DYSLIPIDEMIA: Chronic | ICD-10-CM

## 2017-11-14 DIAGNOSIS — I10 ESSENTIAL HYPERTENSION: Chronic | ICD-10-CM

## 2017-11-14 DIAGNOSIS — I25.10 CORONARY ARTERY DISEASE DUE TO LIPID RICH PLAQUE: ICD-10-CM

## 2017-11-14 DIAGNOSIS — I13.2: Primary | Chronic | ICD-10-CM

## 2017-11-14 DIAGNOSIS — N18.5: Primary | Chronic | ICD-10-CM

## 2017-11-14 DIAGNOSIS — Z95.5 S/P PRIMARY ANGIOPLASTY WITH CORONARY STENT: ICD-10-CM

## 2017-11-14 PROCEDURE — G8417 CALC BMI ABV UP PARAM F/U: HCPCS | Performed by: INTERNAL MEDICINE

## 2017-11-14 PROCEDURE — 1036F TOBACCO NON-USER: CPT | Performed by: INTERNAL MEDICINE

## 2017-11-14 PROCEDURE — 1123F ACP DISCUSS/DSCN MKR DOCD: CPT | Performed by: INTERNAL MEDICINE

## 2017-11-14 PROCEDURE — G8427 DOCREV CUR MEDS BY ELIG CLIN: HCPCS | Performed by: INTERNAL MEDICINE

## 2017-11-14 PROCEDURE — 99214 OFFICE O/P EST MOD 30 MIN: CPT | Performed by: INTERNAL MEDICINE

## 2017-11-14 PROCEDURE — 4040F PNEUMOC VAC/ADMIN/RCVD: CPT | Performed by: INTERNAL MEDICINE

## 2017-11-14 PROCEDURE — G8599 NO ASA/ANTIPLAT THER USE RNG: HCPCS | Performed by: INTERNAL MEDICINE

## 2017-11-14 PROCEDURE — G8484 FLU IMMUNIZE NO ADMIN: HCPCS | Performed by: INTERNAL MEDICINE

## 2017-11-14 PROCEDURE — 3017F COLORECTAL CA SCREEN DOC REV: CPT | Performed by: INTERNAL MEDICINE

## 2017-11-21 ENCOUNTER — HOSPITAL ENCOUNTER (OUTPATIENT)
Dept: NON INVASIVE DIAGNOSTICS | Age: 67
Discharge: HOME | End: 2017-11-21
Attending: INTERNAL MEDICINE | Admitting: INTERNAL MEDICINE

## 2017-11-21 DIAGNOSIS — I25.10 ATHEROSCLEROTIC HEART DISEASE OF NATIVE CORONARY ARTERY WITHOUT ANGINA PECTORIS: ICD-10-CM

## 2017-11-21 LAB
LV EF: 60 %
LVEF MODALITY: NORMAL

## 2017-12-12 DIAGNOSIS — I10 BENIGN HYPERTENSION: ICD-10-CM

## 2017-12-12 DIAGNOSIS — K21.9 GASTROESOPHAGEAL REFLUX DISEASE WITHOUT ESOPHAGITIS: ICD-10-CM

## 2017-12-13 RX ORDER — AMLODIPINE BESYLATE 5 MG/1
TABLET ORAL
Qty: 30 TABLET | Refills: 2 | OUTPATIENT
Start: 2017-12-13

## 2017-12-13 RX ORDER — HYDROCHLOROTHIAZIDE 12.5 MG/1
12.5 CAPSULE ORAL DAILY
Qty: 30 CAPSULE | Refills: 5 | Status: SHIPPED | OUTPATIENT
Start: 2017-12-13 | End: 2018-02-26

## 2017-12-13 RX ORDER — OMEPRAZOLE 20 MG/1
20 CAPSULE, DELAYED RELEASE ORAL DAILY
Qty: 30 CAPSULE | Refills: 5 | Status: SHIPPED | OUTPATIENT
Start: 2017-12-13 | End: 2018-08-05 | Stop reason: SDUPTHER

## 2017-12-14 RX ORDER — AMLODIPINE BESYLATE 5 MG/1
5 TABLET ORAL DAILY
Qty: 30 TABLET | Refills: 2 | Status: SHIPPED | OUTPATIENT
Start: 2017-12-14 | End: 2018-02-26

## 2017-12-26 RX ORDER — AMLODIPINE BESYLATE 5 MG/1
5 TABLET ORAL DAILY
Status: CANCELLED | OUTPATIENT
Start: 2017-12-26

## 2018-01-23 ENCOUNTER — OFFICE VISIT (OUTPATIENT)
Dept: PULMONOLOGY | Facility: CLINIC | Age: 68
End: 2018-01-23
Payer: COMMERCIAL

## 2018-01-23 VITALS
HEART RATE: 62 BPM | BODY MASS INDEX: 22.9 KG/M2 | OXYGEN SATURATION: 98 % | WEIGHT: 160 LBS | SYSTOLIC BLOOD PRESSURE: 144 MMHG | DIASTOLIC BLOOD PRESSURE: 94 MMHG | HEIGHT: 70 IN

## 2018-01-23 DIAGNOSIS — R06.09 DYSPNEA ON EXERTION: Primary | ICD-10-CM

## 2018-01-23 DIAGNOSIS — R91.8 MULTIPLE PULMONARY NODULES: ICD-10-CM

## 2018-01-23 PROCEDURE — 99213 OFFICE O/P EST LOW 20 MIN: CPT | Mod: ,,, | Performed by: INTERNAL MEDICINE

## 2018-01-23 NOTE — PROGRESS NOTES
HPI:    7/13/2017 - Here for follow up, had repeat CT chest showing stable nodules (2-4 mm) since 1/2017, will repeat CT in 1 year.  Only complaint is some mild BURT (PFT 9/2016 - mild decrease DLCO o/w ok, methacholine challenge 12/2016 +, will try prn beta agonist.  No other new complaints.    1/23/2018 - Here for follow up, felt breathing didn't respond to inhalers.  He has stopped all of his BP meds and reports SBP to 190 at times (d/w him). No new respiratory symptoms.    Medications    Current Outpatient Prescriptions:     omeprazole (PRILOSEC) 20 MG capsule, Take 1 capsule (20 mg total) by mouth once daily., Disp: 30 capsule, Rfl: 5    rosuvastatin (CRESTOR) 10 MG tablet, Take 0.5 tablets (5 mg total) by mouth once daily., Disp: 30 tablet, Rfl: 5    albuterol 90 mcg/actuation inhaler, Inhale 2 puffs into the lungs every 6 (six) hours as needed for Shortness of Breath. Rescue, Disp: 1 Inhaler, Rfl: 5    amLODIPine (NORVASC) 5 MG tablet, Take 1 tablet (5 mg total) by mouth once daily., Disp: 30 tablet, Rfl: 2    fluticasone-vilanterol (BREO ELLIPTA) 100-25 mcg/dose diskus inhaler, Inhale 1 puff into the lungs once daily. Controller, Disp: 1 each, Rfl: 4    hydroCHLOROthiazide (MICROZIDE) 12.5 mg capsule, Take 1 capsule (12.5 mg total) by mouth once daily., Disp: 30 capsule, Rfl: 5    lisinopril (PRINIVIL,ZESTRIL) 5 MG tablet, Take 1 tablet (5 mg total) by mouth once daily., Disp: 90 tablet, Rfl: 3    sildenafil (REVATIO) 20 mg Tab, Take 1 tablet (20 mg total) by mouth once as needed., Disp: 30 tablet, Rfl: 2    Allergies  Review of patient's allergies indicates:  No Known Allergies    Social History    History   Smoking Status    Former Smoker    Quit date: 1997   Smokeless Tobacco    Never Used     ETOH - 3 drinks per week.  History   Drug Use No     Occupation - works as a     Family History  Family History   Problem Relation Age of Onset    Cancer Mother      skin    Stroke Mother   "   Heart failure Father     Kidney disease Father        ROS  Review of Systems   Constitutional: Negative for chills, diaphoresis, fever, malaise/fatigue and weight loss.   HENT: Negative for congestion.    Eyes: Negative for pain.   Respiratory: Positive for shortness of breath. Negative for cough, hemoptysis, sputum production, wheezing and stridor.    Cardiovascular: Negative for chest pain, palpitations, orthopnea, claudication, leg swelling and PND.   Gastrointestinal: Negative for abdominal pain, constipation, diarrhea, heartburn, nausea and vomiting.   Genitourinary: Negative for dysuria, frequency and urgency.   Musculoskeletal: Negative for falls and myalgias.   Neurological: Negative for sensory change, focal weakness and weakness.   Psychiatric/Behavioral: Negative for depression. The patient is not nervous/anxious.        Physical Exam    Vitals:    01/23/18 0949   BP: (!) 144/94   Pulse: 62   SpO2: 98%   Weight: 72.6 kg (160 lb)   Height: 5' 10" (1.778 m)       Physical Exam   Constitutional: He is oriented to person, place, and time. He appears well-developed and well-nourished. No distress.   HENT:   Head: Normocephalic and atraumatic.   Right Ear: External ear normal.   Left Ear: External ear normal.   Nose: Nose normal.   Mouth/Throat: Oropharynx is clear and moist.   Eyes: EOM are normal. Pupils are equal, round, and reactive to light.   Neck: Normal range of motion. Neck supple. No JVD present. No tracheal deviation present. No thyromegaly present.   Cardiovascular: Normal rate, regular rhythm, normal heart sounds and intact distal pulses.  Exam reveals no gallop and no friction rub.    No murmur heard.  Pulmonary/Chest: Effort normal and breath sounds normal. No stridor. No respiratory distress. He has no wheezes. He has no rales. He exhibits no tenderness.   Abdominal: Soft. Bowel sounds are normal. He exhibits no distension.   Musculoskeletal: Normal range of motion. He exhibits no edema or " tenderness.   Lymphadenopathy:     He has no cervical adenopathy.   Neurological: He is alert and oriented to person, place, and time. He has normal reflexes. No cranial nerve deficit.   Skin: He is not diaphoretic.   Psychiatric: He has a normal mood and affect. His behavior is normal.   Nursing note and vitals reviewed.      Lab        Xray  Imaging Results    None         Impression/Plan  Problem List Items Addressed This Visit        Pulmonary    Multiple pulmonary nodules  - stable  - needs repeat in 1 year (7/2018)    Dyspnea on exertion - Primary  - continue prn beta agonist  - RTC 6 months  HTN  -  Told pt to restart lisinopril and to contact Dr Merida for follow up              Other Visit Diagnoses    None.

## 2018-02-06 ENCOUNTER — TELEPHONE (OUTPATIENT)
Dept: FAMILY MEDICINE | Facility: CLINIC | Age: 68
End: 2018-02-06

## 2018-02-06 NOTE — TELEPHONE ENCOUNTER
----- Message from Micah Curtis MD sent at 1/23/2018  5:33 PM CST -----  Please see my note he had stopped ALL of his BP meds.  I encouraged him to restart  lisinopril and contact your office.

## 2018-02-06 NOTE — TELEPHONE ENCOUNTER
Vianca, call patient and make sure he is back on his three blood pressure meds, dr fletcher (pul) said he stopped them all when he saw him last month

## 2018-02-16 LAB
ALBUMIN SERPL-MCNC: 4.2 G/DL (ref 3.1–4.7)
ALP SERPL-CCNC: 57 IU/L (ref 40–104)
ALT (SGPT): 38 IU/L (ref 3–33)
AST SERPL-CCNC: 33 IU/L (ref 10–40)
BASOPHILS NFR BLD: 0.1 K/UL (ref 0–0.2)
BASOPHILS NFR BLD: 1.4 %
BILIRUB SERPL-MCNC: 0.9 MG/DL (ref 0.3–1)
BUN SERPL-MCNC: 28 MG/DL (ref 8–20)
CALCIUM SERPL-MCNC: 9.6 MG/DL (ref 7.7–10.4)
CHLORIDE: 108 MMOL/L (ref 98–110)
CO2 SERPL-SCNC: 29.4 MMOL/L (ref 22.8–31.6)
CREATININE: 1.2 MG/DL (ref 0.6–1.4)
EOSINOPHIL NFR BLD: 0.2 K/UL (ref 0–0.7)
EOSINOPHIL NFR BLD: 2.6 %
ERYTHROCYTE [DISTWIDTH] IN BLOOD BY AUTOMATED COUNT: 12.5 % (ref 11.7–14.9)
GLUCOSE: 115 MG/DL (ref 70–99)
GRAN #: 2.9 K/UL (ref 1.4–6.5)
GRAN%: 51.1 %
HCT VFR BLD AUTO: 43.7 % (ref 39–55)
HGB BLD-MCNC: 14.8 G/DL (ref 14–16)
IMMATURE GRANS (ABS): 0 K/UL (ref 0–1)
IMMATURE GRANULOCYTES: 0.2 %
LYMPH #: 1.9 K/UL (ref 1.2–3.4)
LYMPH%: 33.2 %
MCH RBC QN AUTO: 31.9 PG (ref 25–35)
MCHC RBC AUTO-ENTMCNC: 33.9 G/DL (ref 31–36)
MCV RBC AUTO: 94.2 FL (ref 80–100)
MONO #: 0.7 K/UL (ref 0.1–0.6)
MONO%: 11.5 %
NUCLEATED RBCS: 0 %
PLATELET # BLD AUTO: 230 K/UL (ref 140–440)
PMV BLD AUTO: 10.6 FL (ref 8.8–12.7)
POTASSIUM SERPL-SCNC: 4.1 MMOL/L (ref 3.5–5)
PROT SERPL-MCNC: 7.3 G/DL (ref 6–8.2)
RBC # BLD AUTO: 4.64 M/UL (ref 4.3–5.9)
SODIUM: 144 MMOL/L (ref 134–144)
T4 FREE SP9 P CHAL SERPL-SCNC: 0.78 NG/DL (ref 0.45–1.27)
TSH SERPL DL<=0.005 MIU/L-ACNC: 2.15 ULU/ML (ref 0.3–5.6)
WBC # BLD AUTO: 5.8 K/UL (ref 5–10)

## 2018-02-17 ENCOUNTER — TELEPHONE (OUTPATIENT)
Dept: FAMILY MEDICINE | Facility: CLINIC | Age: 68
End: 2018-02-17

## 2018-02-20 ENCOUNTER — TELEPHONE (OUTPATIENT)
Dept: FAMILY MEDICINE | Facility: CLINIC | Age: 68
End: 2018-02-20

## 2018-02-20 NOTE — TELEPHONE ENCOUNTER
----- Message from Stephanie Merida MD sent at 2/17/2018  4:46 PM CST -----  xrays and lab work back - will discuss this week with patient - nothing alarming

## 2018-02-26 ENCOUNTER — OFFICE VISIT (OUTPATIENT)
Dept: FAMILY MEDICINE | Facility: CLINIC | Age: 68
End: 2018-02-26
Payer: COMMERCIAL

## 2018-02-26 ENCOUNTER — TELEPHONE (OUTPATIENT)
Dept: FAMILY MEDICINE | Facility: CLINIC | Age: 68
End: 2018-02-26

## 2018-02-26 VITALS
SYSTOLIC BLOOD PRESSURE: 140 MMHG | HEART RATE: 78 BPM | HEIGHT: 70 IN | WEIGHT: 160.31 LBS | OXYGEN SATURATION: 96 % | BODY MASS INDEX: 22.95 KG/M2 | RESPIRATION RATE: 16 BRPM | DIASTOLIC BLOOD PRESSURE: 88 MMHG

## 2018-02-26 DIAGNOSIS — M25.462 EFFUSION OF BURSA OF LEFT KNEE: ICD-10-CM

## 2018-02-26 DIAGNOSIS — I07.1 ACQUIRED TRICUSPID VALVE INSUFFICIENCY: ICD-10-CM

## 2018-02-26 DIAGNOSIS — R79.89 PRERENAL AZOTEMIA: ICD-10-CM

## 2018-02-26 DIAGNOSIS — Z12.5 SCREENING FOR PROSTATE CANCER: ICD-10-CM

## 2018-02-26 DIAGNOSIS — M25.551 HIP PAIN, ACUTE, RIGHT: ICD-10-CM

## 2018-02-26 DIAGNOSIS — I10 ESSENTIAL HYPERTENSION: Primary | ICD-10-CM

## 2018-02-26 PROBLEM — I25.10 CORONARY ARTERY DISEASE: Status: ACTIVE | Noted: 2018-02-26

## 2018-02-26 PROBLEM — I25.10 CORONARY ARTERY DISEASE: Status: RESOLVED | Noted: 2018-02-26 | Resolved: 2018-02-26

## 2018-02-26 PROCEDURE — 1159F MED LIST DOCD IN RCRD: CPT | Mod: ,,, | Performed by: INTERNAL MEDICINE

## 2018-02-26 PROCEDURE — 99215 OFFICE O/P EST HI 40 MIN: CPT | Mod: ,,, | Performed by: INTERNAL MEDICINE

## 2018-02-26 PROCEDURE — 3008F BODY MASS INDEX DOCD: CPT | Mod: ,,, | Performed by: INTERNAL MEDICINE

## 2018-02-26 PROCEDURE — 1125F AMNT PAIN NOTED PAIN PRSNT: CPT | Mod: ,,, | Performed by: INTERNAL MEDICINE

## 2018-02-26 RX ORDER — MELOXICAM 7.5 MG/1
7.5 TABLET ORAL DAILY
Qty: 30 TABLET | Refills: 2 | Status: SHIPPED | OUTPATIENT
Start: 2018-02-26 | End: 2018-03-28

## 2018-02-26 RX ORDER — LISINOPRIL 40 MG/1
40 TABLET ORAL DAILY
Qty: 90 TABLET | Refills: 3 | Status: SHIPPED | OUTPATIENT
Start: 2018-02-26 | End: 2019-04-04 | Stop reason: SDUPTHER

## 2018-02-26 NOTE — PATIENT INSTRUCTIONS
Water on the Knee    Water on the knee is also known as knee effusion. The knee joint normally has less than 1 ounce of fluid. Injury or inflammation of the knee joint causes extra fluid to collect there. When this happens, the knee joint looks swollen and is usually painful. It may be hard to fully bend the knee.  The most common cause of water on the knee is osteoarthritis due to wear and tear on the joint cartilage. Other causes include injury to the cartilage, inflammatory arthritis such as gout or rheumatoid arthritis, and infection of the joint.  You may need a needle aspiration, if the cause of your water on the knee is not certain. This procedure removes a sample of joint fluid from the knee for testing. This is done with a local anesthetic. Removing excess fluid may also relieve swelling and pain.  Home care  · Limit your activities. Stay off the injured leg as much as possible until pain improves.  · Keep your leg elevated to reduce pain and swelling. When sleeping, place a pillow under the injured leg. When sitting, support the injured leg so it is level with your waist. This is very important during the first 48 hours.  · Apply an ice pack over the injured area for 15 to 20 minutes every 3 to 6 hours. You should do this for the first 24 to 48 hours. You can make an ice pack by filling a plastic bag that seals at the top with ice cubes and then wrapping it with a thin towel. Continue to use ice packs for relief of pain and swelling as needed. As the ice melts, be careful to avoid getting your wrap, splint, or cast wet. After 48 hours, apply heat(warm shower or warm bath) for 15 to 20 minutes several times a day, or alternate ice and heat. If you have to wear a hook-and-loop knee brace, you can open it to apply the ice pack, or heat, directly to the knee. Never put ice directly on the skin. Always wrap the ice in a towel or other type of cloth.  · You may use over-the-counter pain medicine to control  pain, unless another pain medicine was prescribed. If you have chronic liver or kidney disease or have ever had a stomach ulcer or GI bleeding, talk with your healthcare provider before using these medicines.  · If crutches or a walker have been recommended, do not put weight on the injured leg until you can do so without pain. Check with your healthcare provider before returning to sports or full work duties.  · If you have a hook-and-loop knee brace, you can remove it to bathe and sleep, unless told otherwise.  Follow-up care  Follow up with your healthcare provider as advised.  If you are overweight, talk to your healthcare provider about a weight loss program. The excess weight puts extra strain on your knees.  When to seek medical advice  Call your healthcare provider right away if any of these occur:  · Increasing pain, redness, or swelling of the knee  · Fever of 100.4°F (38°C) or above lasting for 24 to 48 hours  Date Last Reviewed: 11/23/2015  © 4765-5171 [employer]. [address] [place] [address], [place] [address]. All rights reserved. This information is not intended as a substitute for professional medical care. Always follow your healthcare professional's instructions.

## 2018-02-26 NOTE — PROGRESS NOTES
Subjective:       Patient ID: Onesimo Paula is a 67 y.o. male.    Chief Complaint: Hip Pain (right hip pain)    59-year-old gentleman who came in for 2 relatively acute care complaints. The first being right hip pain that began without any trauma induced and was along the lateral hip not necessarily radiating from the back although he does get low back pain after working all day. The hip did not bother him while laying on it at night. He did not see any visible signs of edema or when he palpated the area and noticed discomfort. This pain is resolving and began about 3 months ago.  Section apply to his left knee pain and fluid. The fluid is gone down and he has taken a few ibuprofen 800 mg a few times a week and this swelling began about 3 weeks ago. Swelling mostly was in the medial aspect of the knee.  His blood pressure is also elevated today and he had stopped both of his blood pressure medications a few weeks ago as he wanted to see how his blood pressure be without. Fortunately he went to see the pulmonologist and his blood pressure was elevated and a message was transferred to us and we started his low-dose ACE inhibitor with fluid pill. He does have mild renal insufficiency and sees a nephrologist as well. Kidney function on recent labs looked good as far as his creatinine but his BUN was slightly elevated. Again appointment with his cardiologist as well does not have coronary artery disease but does have a tricuspid regurgitant valve.      Review of Systems   Constitutional: Negative for activity change, diaphoresis, fatigue and fever.   HENT: Negative for congestion, ear pain, postnasal drip, sinus pressure, sore throat and trouble swallowing.    Eyes: Negative for pain.   Respiratory: Negative for cough, chest tightness, shortness of breath and wheezing.    Cardiovascular: Negative for chest pain, palpitations and leg swelling.   Gastrointestinal: Negative for abdominal distention, abdominal pain, blood  in stool, constipation and diarrhea.   Endocrine: Negative for cold intolerance and heat intolerance.   Genitourinary: Negative for decreased urine volume, difficulty urinating, dysuria, flank pain, frequency and hematuria.   Musculoskeletal: Positive for arthralgias, back pain and joint swelling. Negative for myalgias and neck pain.        As per history of present illness   Skin: Negative for pallor, rash and wound.   Neurological: Negative for tremors, syncope, weakness, light-headedness, numbness and headaches.   Hematological: Negative for adenopathy.   Psychiatric/Behavioral: Negative for confusion, decreased concentration, hallucinations, self-injury, sleep disturbance and suicidal ideas. The patient is not nervous/anxious.        Past Medical History:   Diagnosis Date    Acid reflux     Acquired tricuspid valve insufficiency     Anemia     Borderline diabetes     BPH (benign prostatic hyperplasia)     Coronary artery disease     Hypertension     Pulmonary nodules     Skin cancer        Past Surgical History:   Procedure Laterality Date    HERNIA REPAIR      INNER EAR SURGERY      SKIN LESION EXCISION      STAPEDECTOMY         Family History   Problem Relation Age of Onset    Cancer Mother      skin    Stroke Mother     Heart failure Father     Kidney disease Father        Social History     Social History    Marital status:      Spouse name: N/A    Number of children: N/A    Years of education: N/A     Social History Main Topics    Smoking status: Former Smoker     Quit date: 1997    Smokeless tobacco: Never Used    Alcohol use Yes      Comment: occasional    Drug use: No    Sexual activity: Not Asked     Other Topics Concern    None     Social History Narrative    None       Current Outpatient Prescriptions   Medication Sig Dispense Refill    omeprazole (PRILOSEC) 20 MG capsule Take 1 capsule (20 mg total) by mouth once daily. 30 capsule 5    rosuvastatin (CRESTOR) 10 MG  tablet Take 0.5 tablets (5 mg total) by mouth once daily. 30 tablet 5    albuterol 90 mcg/actuation inhaler Inhale 2 puffs into the lungs every 6 (six) hours as needed for Shortness of Breath. Rescue 1 Inhaler 5    lisinopril (PRINIVIL,ZESTRIL) 40 MG tablet Take 1 tablet (40 mg total) by mouth once daily. 90 tablet 3    meloxicam (MOBIC) 7.5 MG tablet Take 1 tablet (7.5 mg total) by mouth once daily. 30 tablet 2    sildenafil (REVATIO) 20 mg Tab Take 1 tablet (20 mg total) by mouth once as needed. 30 tablet 2     No current facility-administered medications for this visit.        Review of patient's allergies indicates:  No Known Allergies  Objective:      Physical Exam   Constitutional: He is oriented to person, place, and time. He appears well-developed and well-nourished. No distress.   HENT:   Head: Normocephalic and atraumatic.   Right Ear: External ear normal.   Left Ear: External ear normal.   Nose: Nose normal.   Mouth/Throat: Oropharynx is clear and moist.   Eyes: Conjunctivae and EOM are normal. Right eye exhibits no discharge. Left eye exhibits no discharge. No scleral icterus.   Neck: Normal range of motion. Neck supple. No JVD present. No tracheal deviation present. No thyromegaly present.   Cardiovascular: Normal rate, regular rhythm, normal heart sounds and intact distal pulses.  Exam reveals no gallop.    No murmur heard.  Pulmonary/Chest: Effort normal and breath sounds normal. No respiratory distress. He has no wheezes. He has no rales.   Abdominal: Soft. Bowel sounds are normal. He exhibits no distension and no mass. There is no tenderness.   Musculoskeletal: Normal range of motion. He exhibits no edema or tenderness.   No real discomfort elicited on palpation of the trochanteric bursa and some mild discomfort with extension at the hip level at an angle of 45°.    Left knee still has an effusion medially and he does have crepitus but there is no erythema surrounding effusion. He is tender to  palpation and there is mild increase in warmth of the knee.   Lymphadenopathy:     He has no cervical adenopathy.   Neurological: He is alert and oriented to person, place, and time.   Skin: Skin is warm and dry. No rash noted. He is not diaphoretic. No erythema.   Psychiatric: He has a normal mood and affect. His behavior is normal. Judgment and thought content normal.       Assessment:       1. Essential hypertension    2. Hip pain, acute, right    3. Effusion of bursa of left knee    4. Prerenal azotemia    5. Acquired tricuspid valve insufficiency    6. Screening for prostate cancer        Plan:       Essential hypertension-We'll discontinue amlodipine and HCTZ and recheck kidney function in a few weeks but will start lisinopril at 40 mg and replacement of 5 mg  -     lisinopril (PRINIVIL,ZESTRIL) 40 MG tablet; Take 1 tablet (40 mg total) by mouth once daily.  Dispense: 90 tablet; Refill: 3    Hip pain, acute, right-Mostly resolved.  -     X-Ray Hip 2 or 3 views Right    Effusion of bursa of left knee  -     X-Ray Knee Complete 4 or More Views Left  -     Uric acid; Future; Expected date: 02/26/2018  -     Ambulatory referral to Orthopedics  -     meloxicam (MOBIC) 7.5 MG tablet; Take 1 tablet (7.5 mg total) by mouth once daily.  Dispense: 30 tablet; Refill: 2    Prerenal azotemia  -     Basic metabolic panel; Future; Expected date: 02/26/2018    Acquired tricuspid valve insufficiency-Lian cardiologist and I would like to get a copy of the echo as well.    Screening for prostate cancer  -     PSA, Screening; Future; Expected date: 02/26/2018  Time spent with the patient was 40 min and 50 percent of that was in face to face contact

## 2018-02-28 ENCOUNTER — OFFICE VISIT (OUTPATIENT)
Dept: INTERNAL MEDICINE CLINIC | Age: 68
End: 2018-02-28

## 2018-02-28 VITALS
BODY MASS INDEX: 28.84 KG/M2 | HEART RATE: 64 BPM | WEIGHT: 206 LBS | HEIGHT: 71 IN | DIASTOLIC BLOOD PRESSURE: 64 MMHG | SYSTOLIC BLOOD PRESSURE: 132 MMHG

## 2018-02-28 DIAGNOSIS — N18.5: Chronic | ICD-10-CM

## 2018-02-28 DIAGNOSIS — E78.5 DYSLIPIDEMIA: Chronic | ICD-10-CM

## 2018-02-28 DIAGNOSIS — I10 ESSENTIAL HYPERTENSION: Primary | Chronic | ICD-10-CM

## 2018-02-28 DIAGNOSIS — I13.2: Chronic | ICD-10-CM

## 2018-02-28 DIAGNOSIS — I50.40: Chronic | ICD-10-CM

## 2018-02-28 PROBLEM — Z95.5 S/P PRIMARY ANGIOPLASTY WITH CORONARY STENT: Status: RESOLVED | Noted: 2017-11-13 | Resolved: 2018-02-28

## 2018-02-28 PROCEDURE — 4040F PNEUMOC VAC/ADMIN/RCVD: CPT | Performed by: INTERNAL MEDICINE

## 2018-02-28 PROCEDURE — 3017F COLORECTAL CA SCREEN DOC REV: CPT | Performed by: INTERNAL MEDICINE

## 2018-02-28 PROCEDURE — 1036F TOBACCO NON-USER: CPT | Performed by: INTERNAL MEDICINE

## 2018-02-28 PROCEDURE — 99214 OFFICE O/P EST MOD 30 MIN: CPT | Performed by: INTERNAL MEDICINE

## 2018-02-28 PROCEDURE — G8484 FLU IMMUNIZE NO ADMIN: HCPCS | Performed by: INTERNAL MEDICINE

## 2018-02-28 PROCEDURE — G8417 CALC BMI ABV UP PARAM F/U: HCPCS | Performed by: INTERNAL MEDICINE

## 2018-02-28 PROCEDURE — G8599 NO ASA/ANTIPLAT THER USE RNG: HCPCS | Performed by: INTERNAL MEDICINE

## 2018-02-28 PROCEDURE — G8427 DOCREV CUR MEDS BY ELIG CLIN: HCPCS | Performed by: INTERNAL MEDICINE

## 2018-02-28 PROCEDURE — 1123F ACP DISCUSS/DSCN MKR DOCD: CPT | Performed by: INTERNAL MEDICINE

## 2018-02-28 RX ORDER — TIMOLOL MALEATE 5 MG/ML
SOLUTION/ DROPS OPHTHALMIC
COMMUNITY
Start: 2018-02-10

## 2018-02-28 RX ORDER — VALACYCLOVIR HYDROCHLORIDE 500 MG/1
TABLET, FILM COATED ORAL
COMMUNITY
Start: 2018-02-04

## 2018-02-28 RX ORDER — TRIPROLIDINE/PSEUDOEPHEDRINE 2.5MG-60MG
TABLET ORAL
COMMUNITY
Start: 2018-02-08

## 2018-02-28 ASSESSMENT — ENCOUNTER SYMPTOMS: SHORTNESS OF BREATH: 0
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
## 2018-03-01 ENCOUNTER — TELEPHONE (OUTPATIENT)
Dept: FAMILY MEDICINE | Facility: CLINIC | Age: 68
End: 2018-03-01

## 2018-03-01 NOTE — TELEPHONE ENCOUNTER
----- Message from Stephanie Merida MD sent at 2/28/2018 10:00 AM CST -----  No arthritis in the hip (or knee) -get ov with ortho if swelling does not improve on mobic in two weeks or so

## 2018-03-14 ENCOUNTER — OFFICE VISIT (OUTPATIENT)
Dept: ORTHOPEDICS | Facility: CLINIC | Age: 68
End: 2018-03-14
Payer: COMMERCIAL

## 2018-03-14 VITALS — WEIGHT: 160 LBS | HEIGHT: 70 IN | BODY MASS INDEX: 22.9 KG/M2

## 2018-03-14 DIAGNOSIS — M25.562 CHRONIC PAIN OF LEFT KNEE: Primary | ICD-10-CM

## 2018-03-14 DIAGNOSIS — G89.29 CHRONIC PAIN OF LEFT KNEE: Primary | ICD-10-CM

## 2018-03-14 LAB
BUN SERPL-MCNC: 28 MG/DL (ref 8–20)
CALCIUM SERPL-MCNC: 9.1 MG/DL (ref 7.7–10.4)
CHLORIDE: 105 MMOL/L (ref 98–110)
CO2 SERPL-SCNC: 27.3 MMOL/L (ref 22.8–31.6)
COMPLEXED PSA SERPL-MCNC: 1 NG/ML (ref 0–3)
CREATININE: 1.29 MG/DL (ref 0.6–1.4)
GLUCOSE: 121 MG/DL (ref 70–99)
POTASSIUM SERPL-SCNC: 4.1 MMOL/L (ref 3.5–5)
SODIUM: 139 MMOL/L (ref 134–144)
URATE SERPL-MCNC: 6.2 MG/DL (ref 2.6–7.8)

## 2018-03-14 PROCEDURE — 99203 OFFICE O/P NEW LOW 30 MIN: CPT | Mod: ,,, | Performed by: ORTHOPAEDIC SURGERY

## 2018-03-14 NOTE — PROGRESS NOTES
Past Medical History:   Diagnosis Date    Acid reflux     Acquired tricuspid valve insufficiency     Anemia     Borderline diabetes     BPH (benign prostatic hyperplasia)     Coronary artery disease     Hypertension     Pulmonary nodules     Skin cancer        Past Surgical History:   Procedure Laterality Date    HERNIA REPAIR      INNER EAR SURGERY      SKIN LESION EXCISION      STAPEDECTOMY         Current Outpatient Prescriptions   Medication Sig    lisinopril (PRINIVIL,ZESTRIL) 40 MG tablet Take 1 tablet (40 mg total) by mouth once daily.    meloxicam (MOBIC) 7.5 MG tablet Take 1 tablet (7.5 mg total) by mouth once daily.    omeprazole (PRILOSEC) 20 MG capsule Take 1 capsule (20 mg total) by mouth once daily.    rosuvastatin (CRESTOR) 10 MG tablet Take 0.5 tablets (5 mg total) by mouth once daily.     No current facility-administered medications for this visit.        Review of patient's allergies indicates:  No Known Allergies    Family History   Problem Relation Age of Onset    Cancer Mother      skin    Stroke Mother     Heart failure Father     Kidney disease Father        Social History     Social History    Marital status:      Spouse name: N/A    Number of children: N/A    Years of education: N/A     Occupational History    Not on file.     Social History Main Topics    Smoking status: Former Smoker     Quit date: 1997    Smokeless tobacco: Never Used    Alcohol use Yes      Comment: occasional    Drug use: No    Sexual activity: Not on file     Other Topics Concern    Not on file     Social History Narrative    No narrative on file       Chief Complaint:   Chief Complaint   Patient presents with    Knee Pain     Patient has had left knee pain and swelling for about 1 month. He states it seems to be going away. Referred by Dr. Merida. Patient had xrays done on 02/26/2018 and Labs for Uric Acid today showed Uric Acid of 6.2       Consulting Physician: Fuad  "Stephanie BRICENO MD    History of Present Illness:    This is a 67 y.o. year old male who complains of patient has a history of one month of left knee pain is not given any history of any definite injury      ROS    Examination:    Vital Signs:    Vitals:    03/14/18 1357   Weight: 72.6 kg (160 lb)   Height: 5' 10" (1.778 m)       This a well-developed, well nourished patient in no acute distress.    Alert and oriented and cooperative to examination.       Physical Exam: Left Knee Exam    Gait   antalgic    Skin  Rash:   None  Scars:   None    Inspection  Erythema:  None  Bruising:  None  Effusion:  ild to moderate  Masses:  None  Lymphadenopathy: None    Range of Motion: 0 to 130°    Medial Joint : positive  Lateral Joint : None    Patellofemoral Tenderness: None  Patellofemoral Crepitus: None    Lachman:  Normal  Anterior Drawer: Normal  Posterior Drawer: Normal    Donte's:  ositive  Apley's:  Negative    Varus Stress:  Stable  Valgus Stress:  Stable    Strength:  5/5    Pulses:  Palpable distal    Sensation:  Intact          Imaging: X-rays ordered and reviewed today x-ray examination of the left knee does not show any evidence of any arthritis or any acute lesions     Assessment: effusion left knee with possible medial meniscal tear    Plan:  e'll schedule an MRI of the left knee      DISCLAIMER: This note may have been dictated using voice recognition software and may contain grammatical errors.     NOTE: Consult report sent to referring provider via EPIC EMR.  "

## 2018-03-14 NOTE — LETTER
March 14, 2018      Stephanie Merida MD  [address]  [address]  [place] [address]           [facility]  [address]  [address]  [place] [address]  Phone: [phone]  Fax: [phone]          Patient: Onesimo Paula   MR Number: [id number]   YOB: 1950   Date of Visit: 3/14/2018       Dear Dr. Stephanie Merida:    Thank you for referring Onesimo Paula to me for evaluation. Attached you will find relevant portions of my assessment and plan of care.    If you have questions, please do not hesitate to call me. I look forward to following Onesimo Paula along with you.    Sincerely,    Last Cha MD    Enclosure  CC:  No Recipients    If you would like to receive this communication electronically, please contact [email or web address] or [phone] to request more information on [employer] access.    For providers and/or their staff who would like to refer a patient to [facility], please contact us through our one-stop-shop provider referral line, [facility], at [phone].    If you feel you have received this communication in error or would no longer like to receive these types of communications, please e-mail [email or web address]

## 2018-03-26 ENCOUNTER — TELEPHONE (OUTPATIENT)
Dept: FAMILY MEDICINE | Facility: CLINIC | Age: 68
End: 2018-03-26

## 2018-03-26 ENCOUNTER — OFFICE VISIT (OUTPATIENT)
Dept: ORTHOPEDICS | Facility: CLINIC | Age: 68
End: 2018-03-26
Payer: COMMERCIAL

## 2018-03-26 VITALS — WEIGHT: 160 LBS | HEIGHT: 70 IN | BODY MASS INDEX: 22.9 KG/M2

## 2018-03-26 DIAGNOSIS — S83.242A TEAR OF MEDIAL MENISCUS OF LEFT KNEE, CURRENT, INITIAL ENCOUNTER: Primary | ICD-10-CM

## 2018-03-26 DIAGNOSIS — M25.562 CHRONIC PAIN OF LEFT KNEE: ICD-10-CM

## 2018-03-26 DIAGNOSIS — G89.29 CHRONIC PAIN OF LEFT KNEE: ICD-10-CM

## 2018-03-26 PROCEDURE — 99213 OFFICE O/P EST LOW 20 MIN: CPT | Mod: ,,, | Performed by: ORTHOPAEDIC SURGERY

## 2018-03-26 RX ORDER — HYDROCHLOROTHIAZIDE 12.5 MG/1
1 TABLET ORAL DAILY
COMMUNITY
Start: 2018-03-14 | End: 2018-07-18

## 2018-03-26 NOTE — PROGRESS NOTES
Past Medical History:   Diagnosis Date    Acid reflux     Acquired tricuspid valve insufficiency     Anemia     Borderline diabetes     BPH (benign prostatic hyperplasia)     Coronary artery disease     Hypertension     Pulmonary nodules     Skin cancer        Past Surgical History:   Procedure Laterality Date    HERNIA REPAIR      INNER EAR SURGERY      SKIN LESION EXCISION      STAPEDECTOMY         Current Outpatient Prescriptions   Medication Sig    hydroCHLOROthiazide (HYDRODIURIL) 12.5 MG Tab Take 1 tablet by mouth once daily.    lisinopril (PRINIVIL,ZESTRIL) 40 MG tablet Take 1 tablet (40 mg total) by mouth once daily.    meloxicam (MOBIC) 7.5 MG tablet Take 1 tablet (7.5 mg total) by mouth once daily.    omeprazole (PRILOSEC) 20 MG capsule Take 1 capsule (20 mg total) by mouth once daily.    rosuvastatin (CRESTOR) 10 MG tablet Take 0.5 tablets (5 mg total) by mouth once daily.     No current facility-administered medications for this visit.        Review of patient's allergies indicates:  No Known Allergies    Family History   Problem Relation Age of Onset    Cancer Mother      skin    Stroke Mother     Heart failure Father     Kidney disease Father        Social History     Social History    Marital status:      Spouse name: N/A    Number of children: N/A    Years of education: N/A     Occupational History    Not on file.     Social History Main Topics    Smoking status: Former Smoker     Quit date: 1997    Smokeless tobacco: Never Used    Alcohol use Yes      Comment: occasional    Drug use: No    Sexual activity: Not on file     Other Topics Concern    Not on file     Social History Narrative    No narrative on file       Chief Complaint:   Chief Complaint   Patient presents with    Results     Patient is here to review his MRI of the left knee w/o contrast performed on 3/22/18 @ [facility].       Consulting Physician: No ref. provider found    History of Present  "Illness:    This is a 67 y.o. year old male who complains of patient returns today with an MRI of eft knee he continues to have chronic left knee pain      ROS    Examination:    Vital Signs:    Vitals:    03/26/18 1530   Weight: 72.6 kg (160 lb)   Height: 5' 10" (1.778 m)       This a well-developed, well nourished patient in no acute distress.    Alert and oriented and cooperative to examination.       Physical Exam: left knee-patient has a moderate effusions the leftknee patient's symptoms are mostly along the medial joint line    Imaging: X-rays ordered and reviewed today MRI of the left knee shows a medial meniscal tear with some mild degenerative changes in the l lateral compartment     Assessment: medial meniscal tear left knee    Plan:  Recommend arthroscopy of the left knee      DISCLAIMER: This note may have been dictated using voice recognition software and may contain grammatical errors.     NOTE: Consult report sent to referring provider via EPIC EMR.  "

## 2018-03-26 NOTE — TELEPHONE ENCOUNTER
----- Message from Stephanie Merida MD sent at 3/25/2018  8:00 PM CDT -----  Prostate antigen and uric acid normal - kidney function stable - no nsaids - ibuprofen/naprosyn etc

## 2018-04-09 ENCOUNTER — OFFICE VISIT (OUTPATIENT)
Dept: ORTHOPEDICS | Facility: CLINIC | Age: 68
End: 2018-04-09
Payer: COMMERCIAL

## 2018-04-09 LAB
ALBUMIN SERPL-MCNC: 4.1 G/DL (ref 3.1–4.7)
ALP SERPL-CCNC: 56 IU/L (ref 40–104)
ALT (SGPT): 34 IU/L (ref 3–33)
AST SERPL-CCNC: 31 IU/L (ref 10–40)
BILIRUB SERPL-MCNC: 0.6 MG/DL (ref 0.3–1)
BUN SERPL-MCNC: 26 MG/DL (ref 8–20)
CALCIUM SERPL-MCNC: 9.2 MG/DL (ref 7.7–10.4)
CHLORIDE: 102 MMOL/L (ref 98–110)
CO2 SERPL-SCNC: 28 MMOL/L (ref 22.8–31.6)
CREATININE: 1.24 MG/DL (ref 0.6–1.4)
GLUCOSE: 121 MG/DL (ref 70–99)
HCT VFR BLD AUTO: 44 % (ref 39–55)
HGB BLD-MCNC: 15 G/DL (ref 14–16)
MCH RBC QN AUTO: 32.3 PG (ref 25–35)
MCHC RBC AUTO-ENTMCNC: 34.1 G/DL (ref 31–36)
MCV RBC AUTO: 94.6 FL (ref 80–100)
NUCLEATED RBCS: 0 %
PLATELET # BLD AUTO: 235 K/UL (ref 140–440)
POTASSIUM SERPL-SCNC: 3.6 MMOL/L (ref 3.5–5)
PROT SERPL-MCNC: 7 G/DL (ref 6–8.2)
RBC # BLD AUTO: 4.65 M/UL (ref 4.3–5.9)
SODIUM: 139 MMOL/L (ref 134–144)
WBC # BLD AUTO: 6.1 K/UL (ref 5–10)

## 2018-04-18 ENCOUNTER — OFFICE VISIT (OUTPATIENT)
Dept: ORTHOPEDICS | Facility: CLINIC | Age: 68
End: 2018-04-18
Payer: COMMERCIAL

## 2018-04-18 VITALS — HEIGHT: 70 IN | BODY MASS INDEX: 22.9 KG/M2 | WEIGHT: 160 LBS

## 2018-04-18 DIAGNOSIS — S83.242D ACUTE MEDIAL MENISCUS TEAR OF LEFT KNEE, SUBSEQUENT ENCOUNTER: Primary | ICD-10-CM

## 2018-04-18 PROCEDURE — 99024 POSTOP FOLLOW-UP VISIT: CPT | Mod: ,,, | Performed by: ORTHOPAEDIC SURGERY

## 2018-04-18 RX ORDER — OXYCODONE AND ACETAMINOPHEN 10; 325 MG/1; MG/1
TABLET ORAL
COMMUNITY
Start: 2018-04-12 | End: 2018-07-18

## 2018-04-18 RX ORDER — MELOXICAM 7.5 MG/1
TABLET ORAL
COMMUNITY
Start: 2018-04-07 | End: 2018-07-26

## 2018-04-18 NOTE — PROGRESS NOTES
Past Medical History:   Diagnosis Date    Acid reflux     Acquired tricuspid valve insufficiency     Anemia     Borderline diabetes     BPH (benign prostatic hyperplasia)     Coronary artery disease     Hypertension     Pulmonary nodules     Skin cancer        Past Surgical History:   Procedure Laterality Date    HERNIA REPAIR      INNER EAR SURGERY      KNEE ARTHROSCOPY Right 04/12/2018    SKIN LESION EXCISION      STAPEDECTOMY         Current Outpatient Prescriptions   Medication Sig    hydroCHLOROthiazide (HYDRODIURIL) 12.5 MG Tab Take 1 tablet by mouth once daily.    lisinopril (PRINIVIL,ZESTRIL) 40 MG tablet Take 1 tablet (40 mg total) by mouth once daily.    meloxicam (MOBIC) 7.5 MG tablet     omeprazole (PRILOSEC) 20 MG capsule Take 1 capsule (20 mg total) by mouth once daily.    oxyCODONE-acetaminophen (PERCOCET)  mg per tablet     rosuvastatin (CRESTOR) 10 MG tablet Take 0.5 tablets (5 mg total) by mouth once daily.     No current facility-administered medications for this visit.        Review of patient's allergies indicates:  No Known Allergies    Family History   Problem Relation Age of Onset    Cancer Mother      skin    Stroke Mother     Heart failure Father     Kidney disease Father        Social History     Social History    Marital status:      Spouse name: N/A    Number of children: N/A    Years of education: N/A     Occupational History    Not on file.     Social History Main Topics    Smoking status: Former Smoker     Quit date: 1997    Smokeless tobacco: Never Used    Alcohol use Yes      Comment: occasional    Drug use: No    Sexual activity: Not on file     Other Topics Concern    Not on file     Social History Narrative    No narrative on file       Chief Complaint:   Chief Complaint   Patient presents with    Post-op Evaluation     DOS: 4-12-18, 6 days S/P Right Knee Arthroscopy. Patient is still in bandage and has sutures in place. He is  "doing well. Ambulates without assistance.        Consulting Physician: No ref. provider found    History of Present Illness:    This is a 67 y.o. year old male who complains of Patient's 1 week status post arthroscopy left knee was found have some arthritic changes in the knee and a medial meniscal tear he underwent a partial medial meniscectomy and chondroplasty of the medial compartment and patellofemoral compartment      ROS    Examination:    Vital Signs:    Vitals:    04/18/18 0933   Weight: 72.6 kg (160 lb)   Height: 5' 10" (1.778 m)   PainSc:   1   PainLoc: Knee       This a well-developed, well nourished patient in no acute distress.    Alert and oriented and cooperative to examination.       Physical Exam: left knee-his incisions are dry is a moderate effusion    Imaging: X-rays ordered and reviewed today o x-rays done today     Assessment: status post arthroscopy to the left knee oing doing well    Plan:   Patient is managed correct on straight leg raises and quadriceps exercise he is to stay off of any work activitiesand see us back in 1 weekfor suture removal       DISCLAIMER: This note may have been dictated using voice recognition software and may contain grammatical errors.     NOTE: Consult report sent to referring provider via EPIC EMR.  "

## 2018-04-25 ENCOUNTER — OFFICE VISIT (OUTPATIENT)
Dept: ORTHOPEDICS | Facility: CLINIC | Age: 68
End: 2018-04-25
Payer: COMMERCIAL

## 2018-04-25 VITALS — WEIGHT: 160 LBS | BODY MASS INDEX: 22.9 KG/M2 | HEIGHT: 70 IN

## 2018-04-25 DIAGNOSIS — S83.242D ACUTE MEDIAL MENISCUS TEAR OF LEFT KNEE, SUBSEQUENT ENCOUNTER: Primary | ICD-10-CM

## 2018-04-25 PROCEDURE — 99024 POSTOP FOLLOW-UP VISIT: CPT | Mod: ,,, | Performed by: ORTHOPAEDIC SURGERY

## 2018-04-25 NOTE — PROGRESS NOTES
Past Medical History:   Diagnosis Date    Acid reflux     Acquired tricuspid valve insufficiency     Anemia     Borderline diabetes     BPH (benign prostatic hyperplasia)     Coronary artery disease     Hypertension     Pulmonary nodules     Skin cancer        Past Surgical History:   Procedure Laterality Date    HERNIA REPAIR  12/1999    HERNIA REPAIR  05/2004    KNEE ARTHROSCOPY Left 04/12/2018    SKIN LESION EXCISION  10/2009    Neck    SKIN LESION EXCISION  2015    STAPEDECTOMY Right 03/2001       Current Outpatient Prescriptions   Medication Sig    hydroCHLOROthiazide (HYDRODIURIL) 12.5 MG Tab Take 1 tablet by mouth once daily.    lisinopril (PRINIVIL,ZESTRIL) 40 MG tablet Take 1 tablet (40 mg total) by mouth once daily.    meloxicam (MOBIC) 7.5 MG tablet     omeprazole (PRILOSEC) 20 MG capsule Take 1 capsule (20 mg total) by mouth once daily.    oxyCODONE-acetaminophen (PERCOCET)  mg per tablet     rosuvastatin (CRESTOR) 10 MG tablet Take 0.5 tablets (5 mg total) by mouth once daily.     No current facility-administered medications for this visit.        Review of patient's allergies indicates:  No Known Allergies    Family History   Problem Relation Age of Onset    Cancer Mother      skin    Stroke Mother     Heart failure Father     Kidney disease Father        Social History     Social History    Marital status:      Spouse name: N/A    Number of children: N/A    Years of education: N/A     Occupational History    Not on file.     Social History Main Topics    Smoking status: Former Smoker     Quit date: 1997    Smokeless tobacco: Never Used    Alcohol use Yes      Comment: occasional    Drug use: No    Sexual activity: Not on file     Other Topics Concern    Not on file     Social History Narrative    No narrative on file       Chief Complaint:   Chief Complaint   Patient presents with    Post-op Evaluation     DOS: 4-12-18, 13 days S/P Left Knee  "Arthroscopy. Patient is doing well. Sutures removed today.        Consulting Physician: No ref. provider found    History of Present Illness:    This is a 67 y.o. year old male who complains of Patient is two-week status post arthroscopy to the left knee in a large medial meniscal tear along with some arthritis in the knee he states he is doing      ROS    Examination:    Vital Signs:    Vitals:    04/25/18 1017   Weight: 72.6 kg (160 lb)   Height: 5' 10" (1.778 m)       This a well-developed, well nourished patient in no acute distress.    Alert and oriented and cooperative to examination.       Physical Exam: left knee-mild effusion present good range of motion    Imaging: X-rays ordered and reviewed today o x-rays done today     Assessment:  Weeks status post arthroscopy to left kneepatient doing well    Plan:  Patient wants he has been instructed on straight leg raises and quadriceps exercises      DISCLAIMER: This note may have been dictated using voice recognition software and may contain grammatical errors.     NOTE: Consult report sent to referring provider via EPIC EMR.  "

## 2018-05-21 ENCOUNTER — OFFICE VISIT (OUTPATIENT)
Dept: ORTHOPEDICS | Facility: CLINIC | Age: 68
End: 2018-05-21
Payer: COMMERCIAL

## 2018-05-21 VITALS — BODY MASS INDEX: 22.9 KG/M2 | HEIGHT: 70 IN | WEIGHT: 160 LBS

## 2018-05-21 DIAGNOSIS — S83.242D ACUTE MEDIAL MENISCUS TEAR OF LEFT KNEE, SUBSEQUENT ENCOUNTER: Primary | ICD-10-CM

## 2018-05-21 PROCEDURE — 99024 POSTOP FOLLOW-UP VISIT: CPT | Mod: ,,, | Performed by: ORTHOPAEDIC SURGERY

## 2018-05-21 PROCEDURE — 20610 DRAIN/INJ JOINT/BURSA W/O US: CPT | Mod: 58,LT,, | Performed by: ORTHOPAEDIC SURGERY

## 2018-05-21 RX ORDER — TRIAMCINOLONE ACETONIDE 40 MG/ML
40 INJECTION, SUSPENSION INTRA-ARTICULAR; INTRAMUSCULAR
Status: DISCONTINUED | OUTPATIENT
Start: 2018-05-21 | End: 2018-05-21 | Stop reason: HOSPADM

## 2018-05-21 RX ADMIN — TRIAMCINOLONE ACETONIDE 40 MG: 40 INJECTION, SUSPENSION INTRA-ARTICULAR; INTRAMUSCULAR at 09:05

## 2018-05-21 NOTE — PROGRESS NOTES
Past Medical History:   Diagnosis Date    Acid reflux     Acquired tricuspid valve insufficiency     Anemia     Borderline diabetes     BPH (benign prostatic hyperplasia)     Coronary artery disease     Hypertension     Pulmonary nodules     Skin cancer        Past Surgical History:   Procedure Laterality Date    HERNIA REPAIR  12/1999    HERNIA REPAIR  05/2004    KNEE ARTHROSCOPY Left 04/12/2018    SKIN LESION EXCISION  10/2009    Neck    SKIN LESION EXCISION  2015    STAPEDECTOMY Right 03/2001       Current Outpatient Prescriptions   Medication Sig    hydroCHLOROthiazide (HYDRODIURIL) 12.5 MG Tab Take 1 tablet by mouth once daily.    lisinopril (PRINIVIL,ZESTRIL) 40 MG tablet Take 1 tablet (40 mg total) by mouth once daily.    meloxicam (MOBIC) 7.5 MG tablet     omeprazole (PRILOSEC) 20 MG capsule Take 1 capsule (20 mg total) by mouth once daily.    rosuvastatin (CRESTOR) 10 MG tablet Take 0.5 tablets (5 mg total) by mouth once daily.    oxyCODONE-acetaminophen (PERCOCET)  mg per tablet      No current facility-administered medications for this visit.        Review of patient's allergies indicates:  No Known Allergies    Family History   Problem Relation Age of Onset    Cancer Mother         skin    Stroke Mother     Heart failure Father     Kidney disease Father        Social History     Social History    Marital status:      Spouse name: N/A    Number of children: N/A    Years of education: N/A     Occupational History    Not on file.     Social History Main Topics    Smoking status: Former Smoker     Quit date: 1997    Smokeless tobacco: Never Used    Alcohol use Yes      Comment: occasional    Drug use: No    Sexual activity: Not on file     Other Topics Concern    Not on file     Social History Narrative    No narrative on file       Chief Complaint:   Chief Complaint   Patient presents with    Post-op Evaluation     DOS: 4-12-18, 5 weeks and 4 days S/P Left  "Knee Arthroscopy. Sometimes he still has a limp and other days he is fine. 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 Pain is minimal.        Consulting Physician: No ref. provider found    History of Present Illness:    This is a 67 y.o. year old male who complains of patient is 5-1/2 weeks status post arthroscopy to the left knee he underwent a partial medial meniscectomy he was found to have bucket-handle tear of the medial meniscus moderate arthritic changes in the medial compartment early arthritic changes in the lateral compartment and moderate changes in the patellofemoral compartment      ROS    Examination:    Vital Signs:    Vitals:    05/21/18 0846   Weight: 72.6 kg (160 lb)   Height: 5' 10" (1.778 m)   PainSc:   1   PainLoc: Knee       This a well-developed, well nourished patient in no acute distress.    Alert and oriented and cooperative to examination.       Physical Exam: left knee-slight effusion to the left knee good range of motion minimal discomfort on range of motion    Imaging: X-rays ordered and reviewed today o x-rays done today     Assessment: status post arthroscopy of the left knee with partial medial meniscectomy patient has moderate arthritic changes in the medial and patellofemoral compartment    Plan:  Aspirate the left knee and inject with Kenalog      DISCLAIMER: This note may have been dictated using voice recognition software and may contain grammatical errors.     NOTE: Consult report sent to referring provider via EPIC EMR.  "

## 2018-05-21 NOTE — PROCEDURES
Large Joint Aspiration/Injection  Date/Time: 5/21/2018 9:02 AM  Performed by: HAYDEN MEDRANO  Authorized by: HAYDEN MEDRANO     Consent Done?:  Yes (Verbal)  Indications:  Pain and joint swelling  Procedure site marked: Yes    Timeout: Prior to procedure the correct patient, procedure, and site was verified      Location:  Knee  Site:  L knee  Prep: Patient was prepped and draped in usual sterile fashion    Needle size:  22 G  Approach:  Lateral  Medications:  40 mg triamcinolone acetonide 40 mg/mL  Patient tolerance:  Patient tolerated the procedure well with no immediate complications

## 2018-07-02 RX ORDER — ROSUVASTATIN CALCIUM 10 MG/1
5 TABLET, COATED ORAL DAILY
Qty: 30 TABLET | Refills: 5 | Status: SHIPPED | OUTPATIENT
Start: 2018-07-02 | End: 2019-04-23 | Stop reason: SDUPTHER

## 2018-07-18 ENCOUNTER — OFFICE VISIT (OUTPATIENT)
Dept: ORTHOPEDICS | Facility: CLINIC | Age: 68
End: 2018-07-18
Payer: COMMERCIAL

## 2018-07-18 VITALS — WEIGHT: 160 LBS | HEIGHT: 70 IN | BODY MASS INDEX: 22.9 KG/M2

## 2018-07-18 DIAGNOSIS — S83.242D ACUTE MEDIAL MENISCUS TEAR OF LEFT KNEE, SUBSEQUENT ENCOUNTER: Primary | ICD-10-CM

## 2018-07-18 PROCEDURE — 99213 OFFICE O/P EST LOW 20 MIN: CPT | Mod: ,,, | Performed by: ORTHOPAEDIC SURGERY

## 2018-07-18 RX ORDER — ASPIRIN 81 MG/1
81 TABLET ORAL DAILY
COMMUNITY

## 2018-07-18 NOTE — PROGRESS NOTES
Past Medical History:   Diagnosis Date    Acid reflux     Acquired tricuspid valve insufficiency     Anemia     Borderline diabetes     BPH (benign prostatic hyperplasia)     Coronary artery disease     Hypertension     Pulmonary nodules     Skin cancer        Past Surgical History:   Procedure Laterality Date    HERNIA REPAIR  12/1999    HERNIA REPAIR  05/2004    KNEE ARTHROSCOPY Left 04/12/2018    SKIN LESION EXCISION  10/2009    Neck    SKIN LESION EXCISION  2015    STAPEDECTOMY Right 03/2001       Current Outpatient Prescriptions   Medication Sig    aspirin (ECOTRIN) 81 MG EC tablet aspirin 81 mg tablet,delayed release   Take 1 tablet every day by oral route.    hydrochlorothiazide (MICROZIDE ORAL) Take 12.5 mg by mouth once daily.    lisinopril (PRINIVIL,ZESTRIL) 40 MG tablet Take 1 tablet (40 mg total) by mouth once daily.    omeprazole (PRILOSEC) 20 MG capsule Take 1 capsule (20 mg total) by mouth once daily.    rosuvastatin (CRESTOR) 10 MG tablet Take 0.5 tablets (5 mg total) by mouth once daily.    meloxicam (MOBIC) 7.5 MG tablet      No current facility-administered medications for this visit.        Review of patient's allergies indicates:  No Known Allergies    Family History   Problem Relation Age of Onset    Cancer Mother         skin    Stroke Mother     Heart failure Father     Kidney disease Father        Social History     Social History    Marital status:      Spouse name: N/A    Number of children: N/A    Years of education: N/A     Occupational History    Not on file.     Social History Main Topics    Smoking status: Former Smoker     Quit date: 1997    Smokeless tobacco: Never Used    Alcohol use Yes      Comment: occasional    Drug use: No    Sexual activity: Not on file     Other Topics Concern    Not on file     Social History Narrative    No narrative on file       Chief Complaint:   Chief Complaint   Patient presents with    Left Knee - Follow-up  "    DOS: 4-12-18, 3 months S/P Left Knee Arthroscopy. Sometimes he still has some occaisional Medial swelling and stiffness.  For the most part he is doing well.        Consulting Physician: No ref. provider found    History of Present Illness:    This is a 67 y.o. year old male who complains of patient is 3 months status post arthroscopy to the left knee patient complaint some intermittent pain overall is doing well      ROS    Examination:    Vital Signs:    Vitals:    07/18/18 0843   Weight: 72.6 kg (160 lb)   Height: 5' 10" (1.778 m)   PainSc: 0-No pain   PainLoc: Knee       This a well-developed, well nourished patient in no acute distress.    Alert and oriented and cooperative to examination.       Physical Exam: left knee-patient has a mild effusion to the left knee ambulates well with very little discomfort    Imaging:  No x-rays today     Assessment: moderate arthritisto the left knee    Plan:  Patient is taking Aleve and Tylenol as needed the patientwill see him back in 3 months and reevaluate him we mnsider injecting with Supartz of his pcreases      DISCLAIMER: This note may have been dictated using voice recognition software and may contain grammatical errors.     NOTE: Consult report sent to referring provider via EPIC EMR.  "

## 2018-07-26 ENCOUNTER — OFFICE VISIT (OUTPATIENT)
Dept: PULMONOLOGY | Facility: CLINIC | Age: 68
End: 2018-07-26
Payer: COMMERCIAL

## 2018-07-26 VITALS
WEIGHT: 164 LBS | OXYGEN SATURATION: 97 % | DIASTOLIC BLOOD PRESSURE: 70 MMHG | HEIGHT: 70 IN | HEART RATE: 63 BPM | BODY MASS INDEX: 23.48 KG/M2 | SYSTOLIC BLOOD PRESSURE: 120 MMHG

## 2018-07-26 DIAGNOSIS — R06.09 DYSPNEA ON EXERTION: ICD-10-CM

## 2018-07-26 DIAGNOSIS — R91.8 MULTIPLE PULMONARY NODULES: Primary | ICD-10-CM

## 2018-07-26 PROCEDURE — 99214 OFFICE O/P EST MOD 30 MIN: CPT | Mod: ,,, | Performed by: INTERNAL MEDICINE

## 2018-07-26 PROCEDURE — 3078F DIAST BP <80 MM HG: CPT | Mod: ,,, | Performed by: INTERNAL MEDICINE

## 2018-07-26 PROCEDURE — 3074F SYST BP LT 130 MM HG: CPT | Mod: ,,, | Performed by: INTERNAL MEDICINE

## 2018-07-26 RX ORDER — NAPROXEN SODIUM 220 MG
220 TABLET ORAL 2 TIMES DAILY WITH MEALS
COMMUNITY
End: 2018-08-29

## 2018-07-26 NOTE — PROGRESS NOTES
HPI:    7/13/2017 - Here for follow up, had repeat CT chest showing stable nodules (2-4 mm) since 1/2017, will repeat CT in 1 year.  Only complaint is some mild BURT (PFT 9/2016 - mild decrease DLCO o/w ok, methacholine challenge 12/2016 +, will try prn beta agonist.  No other new complaints.    1/23/2018 - Here for follow up, felt breathing didn't respond to inhalers.  He has stopped all of his BP meds and reports SBP to 190 at times (d/w him). No new respiratory symptoms.    7/26/2018 - Here for follow up, no new issues still with some dyspnea with exertion but not any worse (has not really responded to inhalers ( had previous PFT and methacholine challenge were ok).  BP remains an issues (has anywhere from elevated to low BP).  No chest pain or CHF symptoms.    Abnormal Chest CT scan    Date of last scan - 6/2017    Findings - stable multiple nodules    Followup - 7/2018      Medications    Current Outpatient Prescriptions:     aspirin (ECOTRIN) 81 MG EC tablet, aspirin 81 mg tablet,delayed release  Take 1 tablet every day by oral route., Disp: , Rfl:     hydrochlorothiazide (MICROZIDE ORAL), Take 12.5 mg by mouth once daily., Disp: , Rfl:     lisinopril (PRINIVIL,ZESTRIL) 40 MG tablet, Take 1 tablet (40 mg total) by mouth once daily., Disp: 90 tablet, Rfl: 3    naproxen sodium (ALEVE) 220 MG tablet, Take 220 mg by mouth 2 (two) times daily with meals., Disp: , Rfl:     omeprazole (PRILOSEC) 20 MG capsule, Take 1 capsule (20 mg total) by mouth once daily., Disp: 30 capsule, Rfl: 5    rosuvastatin (CRESTOR) 10 MG tablet, Take 0.5 tablets (5 mg total) by mouth once daily., Disp: 30 tablet, Rfl: 5    Allergies  Review of patient's allergies indicates:  No Known Allergies    Social History    History   Smoking Status    Former Smoker    Quit date: 1997   Smokeless Tobacco    Never Used     ETOH - 3 drinks per week.  History   Drug Use No     Occupation - works as a     Family History  Family  "History   Problem Relation Age of Onset    Cancer Mother         skin    Stroke Mother     Heart failure Father     Kidney disease Father        ROS  Review of Systems   Constitutional: Negative for chills, diaphoresis, fever, malaise/fatigue and weight loss.   HENT: Negative for congestion.    Eyes: Negative for pain.   Respiratory: Positive for shortness of breath. Negative for cough, hemoptysis, sputum production, wheezing and stridor.    Cardiovascular: Negative for chest pain, palpitations, orthopnea, claudication, leg swelling and PND.   Gastrointestinal: Negative for abdominal pain, constipation, diarrhea, heartburn, nausea and vomiting.   Genitourinary: Negative for dysuria, frequency and urgency.   Musculoskeletal: Negative for falls and myalgias.   Neurological: Negative for sensory change, focal weakness and weakness.   Psychiatric/Behavioral: Negative for depression. The patient is not nervous/anxious.        Physical Exam    Vitals:    07/26/18 0829   BP: 120/70   Pulse: 63   SpO2: 97%   Weight: 74.4 kg (164 lb)   Height: 5' 10" (1.778 m)       Physical Exam   Constitutional: He is oriented to person, place, and time. He appears well-developed and well-nourished. No distress.   HENT:   Head: Normocephalic and atraumatic.   Right Ear: External ear normal.   Left Ear: External ear normal.   Nose: Nose normal.   Mouth/Throat: Oropharynx is clear and moist.   Eyes: EOM are normal. Pupils are equal, round, and reactive to light.   Neck: Normal range of motion. Neck supple. No JVD present. No tracheal deviation present. No thyromegaly present.   Cardiovascular: Normal rate, regular rhythm, normal heart sounds and intact distal pulses.  Exam reveals no gallop and no friction rub.    No murmur heard.  Pulmonary/Chest: Effort normal and breath sounds normal. No stridor. No respiratory distress. He has no wheezes. He has no rales. He exhibits no tenderness.   Abdominal: Soft. Bowel sounds are normal. He " exhibits no distension.   Musculoskeletal: Normal range of motion. He exhibits no edema or tenderness.   Lymphadenopathy:     He has no cervical adenopathy.   Neurological: He is alert and oriented to person, place, and time. He has normal reflexes. No cranial nerve deficit.   Skin: He is not diaphoretic.   Psychiatric: He has a normal mood and affect. His behavior is normal.   Nursing note and vitals reviewed.      Lab        Xray  Imaging Results    None         Impression/Plan  Problem List Items Addressed This Visit        Pulmonary    Multiple pulmonary nodules  - stable  - needs repeat now      Dyspnea on exertion - Primary  - stable  - continue prn beta agonist  - RTC 12 months    HTN  -  Much better than last visit              Other Visit Diagnoses    None.

## 2018-07-30 ENCOUNTER — TELEPHONE (OUTPATIENT)
Dept: NEUROLOGY | Facility: CLINIC | Age: 68
End: 2018-07-30

## 2018-07-30 NOTE — TELEPHONE ENCOUNTER
----- Message from Tana Banerjee LPN sent at 7/30/2018  1:11 PM CDT -----    Same day appt request sent to the staff of Dr. Cook:  [id number]- Anand Olivia- ambar wife  Mary Coriwn called to schedule appointment with Dr. Rito Cook for the same day of her appointment on 09/18/1/ for 2pm  Please contact and schedule [phone]

## 2018-08-05 DIAGNOSIS — K21.9 GASTROESOPHAGEAL REFLUX DISEASE WITHOUT ESOPHAGITIS: ICD-10-CM

## 2018-08-05 RX ORDER — OMEPRAZOLE 20 MG/1
CAPSULE, DELAYED RELEASE ORAL
Qty: 30 CAPSULE | Refills: 5 | Status: SHIPPED | OUTPATIENT
Start: 2018-08-05 | End: 2019-01-24 | Stop reason: SDUPTHER

## 2018-08-23 ENCOUNTER — INITIAL CONSULT (OUTPATIENT)
Dept: SURGERY | Age: 68
End: 2018-08-23

## 2018-08-23 VITALS — SYSTOLIC BLOOD PRESSURE: 110 MMHG | WEIGHT: 204 LBS | BODY MASS INDEX: 28.45 KG/M2 | DIASTOLIC BLOOD PRESSURE: 72 MMHG

## 2018-08-23 DIAGNOSIS — K40.90 NON-RECURRENT UNILATERAL INGUINAL HERNIA WITHOUT OBSTRUCTION OR GANGRENE: Primary | ICD-10-CM

## 2018-08-23 PROCEDURE — 1101F PT FALLS ASSESS-DOCD LE1/YR: CPT | Performed by: SURGERY

## 2018-08-23 PROCEDURE — 1123F ACP DISCUSS/DSCN MKR DOCD: CPT | Performed by: SURGERY

## 2018-08-23 PROCEDURE — 3017F COLORECTAL CA SCREEN DOC REV: CPT | Performed by: SURGERY

## 2018-08-23 PROCEDURE — G8599 NO ASA/ANTIPLAT THER USE RNG: HCPCS | Performed by: SURGERY

## 2018-08-23 PROCEDURE — 4040F PNEUMOC VAC/ADMIN/RCVD: CPT | Performed by: SURGERY

## 2018-08-23 PROCEDURE — G8417 CALC BMI ABV UP PARAM F/U: HCPCS | Performed by: SURGERY

## 2018-08-23 PROCEDURE — 99213 OFFICE O/P EST LOW 20 MIN: CPT | Performed by: SURGERY

## 2018-08-23 PROCEDURE — G8427 DOCREV CUR MEDS BY ELIG CLIN: HCPCS | Performed by: SURGERY

## 2018-08-23 PROCEDURE — 1036F TOBACCO NON-USER: CPT | Performed by: SURGERY

## 2018-08-23 NOTE — COMMUNICATION BODY
Assessment:     79-year-old male known to me from a laparoscopic appendectomy approximately 2 years ago who presents for evaluation of a bulge in the right groin which was noted about 6 weeks ago. He reports localized discomfort with physical exertion. Physical examination reveals a small to moderate sized reducible right inguinal hernia. There is no evidence of a left inguinal hernia. Plan:     Laparoscopic right inguinal hernia repair with mesh. Patient explained risks, benefits and complications of hernia repair including hernia recurrence, infection requiring mesh removal, bowel injury or erosion of mesh into bowel and nerve entrapment.

## 2018-08-23 NOTE — LETTER
[address]  [address] [address]  Phone: [phone]  Fax: [phone]    Donell Brantley MD        August 23, 2018     Abad Cox, [address] [place] [id number]    Patient: Tammi Soto  MR Number: [id number]  YOB: 1950  Date of Visit: 8/23/2018    Dear Dr. Abad Cox: Thank you for the request for consultation for Sonja Leal. Below are the relevant portions of my assessment and plan of care. Assessment:     67-year-old male known to me from a laparoscopic appendectomy approximately 2 years ago who presents for evaluation of a bulge in the right groin which was noted about 6 weeks ago. He reports localized discomfort with physical exertion. Physical examination reveals a small to moderate sized reducible right inguinal hernia. There is no evidence of a left inguinal hernia. Plan:     Laparoscopic right inguinal hernia repair with mesh. If you have questions, please do not hesitate to call me. I look forward to following Damon Butts along with you.     Sincerely,        Donell Brantley MD

## 2018-08-23 NOTE — PROGRESS NOTES
Subjective:      Chief complaintbulge in the right groin region    Patient ID: Dee Dee Tucker is a 75 y.o. male who presents for evaluation of a bulge in the right groin region which was first noted about 6 weeks ago. HPI    Review of Systems    Objective:   Physical Exam   Constitutional: He is oriented to person, place, and time. He appears well-developed and well-nourished. HENT:   Head: Normocephalic and atraumatic. Right Ear: External ear normal.   Left Ear: External ear normal.   Eyes: Conjunctivae and EOM are normal.   Neck: Normal range of motion. Neck supple. Cardiovascular: Normal rate and regular rhythm. Pulmonary/Chest: Effort normal and breath sounds normal.   Abdominal: Soft. Small to moderate sized reducible right inguinal hernia. Is no evidence of left inguinal hernia. Musculoskeletal: Normal range of motion. He exhibits no edema. Neurological: He is alert and oriented to person, place, and time. Skin: Skin is warm and dry. Psychiatric: He has a normal mood and affect. His behavior is normal.       Assessment:      70-year-old male known to me from a laparoscopic appendectomy approximately 2 years ago who presents for evaluation of a bulge in the right groin which was noted about 6 weeks ago. He reports localized discomfort with physical exertion. Physical examination reveals a small to moderate sized reducible right inguinal hernia. There is no evidence of a left inguinal hernia. Plan:      Laparoscopic right inguinal hernia repair with mesh. Patient explained risks, benefits and complications of hernia repair including hernia recurrence, infection requiring mesh removal, bowel injury or erosion of mesh into bowel and nerve entrapment.           Cas Felder MD

## 2018-08-29 ENCOUNTER — OFFICE VISIT (OUTPATIENT)
Dept: FAMILY MEDICINE | Facility: CLINIC | Age: 68
End: 2018-08-29
Payer: COMMERCIAL

## 2018-08-29 VITALS
WEIGHT: 161.63 LBS | OXYGEN SATURATION: 96 % | BODY MASS INDEX: 23.14 KG/M2 | RESPIRATION RATE: 16 BRPM | HEART RATE: 72 BPM | HEIGHT: 70 IN

## 2018-08-29 DIAGNOSIS — I34.0 NON-RHEUMATIC MITRAL REGURGITATION: ICD-10-CM

## 2018-08-29 DIAGNOSIS — N18.2 CHRONIC KIDNEY DISEASE (CKD), ACTIVE MEDICAL MANAGEMENT WITHOUT DIALYSIS, STAGE 2 (MILD): ICD-10-CM

## 2018-08-29 DIAGNOSIS — I49.8 BRADYARRHYTHMIA: ICD-10-CM

## 2018-08-29 DIAGNOSIS — I10 ESSENTIAL HYPERTENSION: Primary | ICD-10-CM

## 2018-08-29 DIAGNOSIS — E78.01 FAMILIAL HYPERCHOLESTEROLEMIA: ICD-10-CM

## 2018-08-29 DIAGNOSIS — K21.9 GASTROESOPHAGEAL REFLUX DISEASE WITHOUT ESOPHAGITIS: ICD-10-CM

## 2018-08-29 DIAGNOSIS — M23.204 DEGENERATIVE TEAR OF LEFT MEDIAL MENISCUS: ICD-10-CM

## 2018-08-29 DIAGNOSIS — Z85.820 HISTORY OF MELANOMA: ICD-10-CM

## 2018-08-29 DIAGNOSIS — Z85.828 HISTORY OF SKIN CANCER: ICD-10-CM

## 2018-08-29 PROCEDURE — 99215 OFFICE O/P EST HI 40 MIN: CPT | Mod: 25,,, | Performed by: INTERNAL MEDICINE

## 2018-08-29 PROCEDURE — 93000 ELECTROCARDIOGRAM COMPLETE: CPT | Mod: ,,, | Performed by: INTERNAL MEDICINE

## 2018-08-29 RX ORDER — TRAMADOL HYDROCHLORIDE 50 MG/1
50 TABLET ORAL EVERY 12 HOURS PRN
Qty: 45 TABLET | Refills: 4 | Status: SHIPPED | OUTPATIENT
Start: 2018-08-29 | End: 2018-09-08

## 2018-08-29 NOTE — PROGRESS NOTES
Subjective:       Patient ID: Onesimo Paula is a 67 y.o. male.    Chief Complaint: Follow-up (6 month f/u) and Hypertension    67-year-old gentleman here for a six-month follow-up for hypertension, dyslipidemia, chronic kidney disease very early stage II, GERD and he also has a history of mild BPH, colon polyps and meniscal tear in the left knee. In reviewing patient's medications his orthopedic surgeon months ago had recommended he take Aleve 2 twice a day and we discussed that with his chronic kidney disease albeit an early stage she needs to avoid NSAIDs in their entirety. The etiology for his chronic kidney disease is likely from NSAID use in the past. Patient has no complaints. He had complained of shortness of breath exertion fatigue to his primary care doctor a few years ago and that prompted an echocardiogram, stress test and EKG which showed sinus bradycardia, mild mitral and tricuspid regurgitation with some LVH. Patient's blood pressure has been under good control. He had some lab work done this morning by both his nephrologist and cardiologist but is not yet charted. Otherwise he is up-to-date on his health maintenance block. She does still have knee pain and reviewing the x-rays again it is a complete tear of the meniscus dating back in March 2018 without any osteoarthritic changes. He occasionally gets effusion that needs to be drained      Review of Systems   Constitutional: Positive for fatigue. Negative for activity change, diaphoresis and fever.   HENT: Negative for congestion, ear pain, postnasal drip, sinus pressure, sore throat and trouble swallowing.    Eyes: Negative for pain.   Respiratory: Negative for cough, chest tightness and wheezing.    Cardiovascular: Negative for leg swelling.   Gastrointestinal: Negative for abdominal distention, abdominal pain, blood in stool, constipation and diarrhea.   Endocrine: Negative for cold intolerance and heat intolerance.   Genitourinary: Negative for  decreased urine volume, difficulty urinating, dysuria, flank pain, frequency and hematuria.   Musculoskeletal: Negative for arthralgias, back pain, joint swelling and myalgias.        Per HPI   Skin: Negative for pallor, rash and wound.   Neurological: Negative for tremors, syncope, weakness, light-headedness and numbness.   Hematological: Negative for adenopathy.   Psychiatric/Behavioral: Negative for confusion, decreased concentration, hallucinations, self-injury, sleep disturbance and suicidal ideas. The patient is not nervous/anxious.        Past Medical History:   Diagnosis Date    Acid reflux     Acquired tricuspid valve insufficiency     Anemia     Borderline diabetes     BPH (benign prostatic hyperplasia)     Coronary artery disease     Hypertension     Pulmonary nodules     Skin cancer        Past Surgical History:   Procedure Laterality Date    HERNIA REPAIR  1999    HERNIA REPAIR  2004    KNEE ARTHROSCOPY Left 2018    SKIN LESION EXCISION  10/2009    Neck    SKIN LESION EXCISION  2015    STAPEDECTOMY Right 2001       Family History   Problem Relation Age of Onset    Cancer Mother         skin    Stroke Mother     Heart failure Father     Kidney disease Father        Social History     Socioeconomic History    Marital status:      Spouse name: None    Number of children: None    Years of education: None    Highest education level: None   Social Needs    Financial resource strain: None    Food insecurity - worry: None    Food insecurity - inability: None    Transportation needs - medical: None    Transportation needs - non-medical: None   Occupational History    None   Tobacco Use    Smoking status: Former Smoker     Last attempt to quit:      Years since quittin.6    Smokeless tobacco: Never Used   Substance and Sexual Activity    Alcohol use: Yes     Comment: occasional    Drug use: No    Sexual activity: None   Other Topics Concern    None    Social History Narrative    None       Current Outpatient Medications   Medication Sig Dispense Refill    aspirin (ECOTRIN) 81 MG EC tablet aspirin 81 mg tablet,delayed release   Take 1 tablet every day by oral route.      hydrochlorothiazide (MICROZIDE ORAL) Take 12.5 mg by mouth once daily.      lisinopril (PRINIVIL,ZESTRIL) 40 MG tablet Take 1 tablet (40 mg total) by mouth once daily. 90 tablet 3    omeprazole (PRILOSEC) 20 MG capsule TAKE ONE CAPSULE BY MOUTH ONCE DAILY 30 capsule 5    rosuvastatin (CRESTOR) 10 MG tablet Take 0.5 tablets (5 mg total) by mouth once daily. 30 tablet 5    traMADol (ULTRAM) 50 mg tablet Take 1 tablet (50 mg total) by mouth every 12 (twelve) hours as needed for Pain. 45 tablet 4     No current facility-administered medications for this visit.        Review of patient's allergies indicates:  No Known Allergies  Objective:      Physical Exam   Constitutional: He is oriented to person, place, and time. He appears well-developed and well-nourished. No distress.   HENT:   Head: Normocephalic and atraumatic.   Right Ear: External ear normal.   Left Ear: External ear normal.   Nose: Nose normal.   Mouth/Throat: Oropharynx is clear and moist.   Eyes: Conjunctivae and EOM are normal. Right eye exhibits no discharge. Left eye exhibits no discharge. No scleral icterus.   Neck: Normal range of motion. Neck supple. No JVD present. No tracheal deviation present. No thyromegaly present.   Cardiovascular: Intact distal pulses. An irregular rhythm present. Bradycardia present. Exam reveals distant heart sounds. Exam reveals no gallop.   No murmur heard.  Pulmonary/Chest: Effort normal and breath sounds normal. No respiratory distress. He has no wheezes. He has no rales.   Decreased heart sounds   Abdominal: Soft. Bowel sounds are normal. He exhibits no distension and no mass. There is no tenderness.   Musculoskeletal: Normal range of motion. He exhibits no edema or tenderness.    Lymphadenopathy:     He has no cervical adenopathy.   Neurological: He is alert and oriented to person, place, and time.   Skin: Skin is warm and dry. No rash noted. He is not diaphoretic. No erythema.   Psychiatric: He has a normal mood and affect. His behavior is normal. Judgment and thought content normal.       EKG - sinus bradycardia - rate of 53-55 ; rare PVC, borderline first degree AV block   Lab Results   Component Value Date    WBC 6.1 04/09/2018    HGB 15.0 04/09/2018    HCT 44.0 04/09/2018     04/09/2018    AST 31 04/09/2018     04/09/2018    K 3.6 04/09/2018     04/09/2018    CREATININE 1.24 04/09/2018    BUN 26 (H) 04/09/2018    CO2 28.0 04/09/2018    TSH 2.15 02/16/2018    PSA 1.0 03/14/2018     Assessment:       1. Essential hypertension    2. Non-rheumatic mitral regurgitation-mild 2017 echo - mild LVH    3. Chronic kidney disease (CKD), active medical management without dialysis, stage 2 (mild)    4. Degenerative tear of left medial meniscus    5. Gastroesophageal reflux disease without esophagitis    6. History of melanoma- approx 2015 right forearm - resection only    7. History of skin cancer- dr rose - yearly checks - [place], SCC    8. Familial hypercholesterolemia    9. Bradyarrhythmia        Plan:       Essential hypertension-Controlled on above med regimen to include an ARB/ACE    Non-rheumatic mitral regurgitation-mild 2017 echo - mild LVH-tightly control blood pressure- ov with cardiology in two weeks  -     Holter monitor - 48 hour; Future    Chronic kidney disease (CKD), active medical management without dialysis, stage 2 (mild)-stable and follows with nephrology. AVOID NSAIDs   -     traMADol (ULTRAM) 50 mg tablet; Take 1 tablet (50 mg total) by mouth every 12 (twelve) hours as needed for Pain.  Dispense: 45 tablet; Refill: 4    Degenerative tear of left medial meniscus  -     traMADol (ULTRAM) 50 mg tablet; Take 1 tablet (50 mg total) by mouth every 12 (twelve)  hours as needed for Pain.  Dispense: 45 tablet; Refill: 4    Gastroesophageal reflux disease without esophagitis-continue PPI    History of melanoma- approx 2015 right forearm - resection only- yearly follow up with jordy with skin checks  And   History of skin cancer- dr rose - yearly checks - [place], SCC    Familial hypercholesterolemia-continue statin    Bradyarrhythmia  -     IN OFFICE EKG 12-LEAD (to Muse)  -     Holter monitor - 48 hour; Future

## 2018-08-29 NOTE — PATIENT INSTRUCTIONS
Chronic Kidney Disease (CKD)     The role of the kidneys is to remove waste products and extra water from the blood.  When the kidneys do not work as they should, waste products begin to build up in the blood. This is called chronic kidney disease (CKD). CKD means that you have kidney damage or a decrease in kidney function lasting at least 3 months. CKD allows extra water, waste, and toxins to build up in the body. This can eventually become life-threatening. You might need dialysis or a kidney transplant to stay alive. This most severe form is called end stage renal disease.  Diabetes is the leading causes of chronic renal failure. Other causes include high blood pressure, hardening of the arteries (atherosclerosis), lupus, inflammation of the blood vessels (vasculitis), and past viral or bacterial infections. Certain over-the-counter pain medicines can cause renal failure when taken often over a long period of time. These include aspirin, ibuprofen, and related anti-inflammatory medicines called NSAIDs (nonsteroidal anti-inflammatory drugs).  Home care  The following guidelines will help you care for yourself at home:  · If you have diabetes, talk with your healthcare provider about keeping your blood sugar under control. Ask if you need to make and changes to your diet, lifestyle, or medicines.  · If you have high blood pressure:  ¨ Take prescribed medicine to lower your blood pressure to the recommended goal of less than 130/80.  ¨ Start a regular exercise program that you enjoy. Check with your healthcare provider to be sure your planned exercise program is right for you.  ¨ Eat less salt (sodium). Your healthcare provider can tell you how much salt per day is safe for you.  · If you are overweight, talk with your healthcare provider about a weight loss plan.  · If you smoke, you must quit. Smoking makes kidney disease worse. Talk with your healthcare provider about ways to help you quit.  For more  information, visit the following links:  ¨ www.smokefree.gov/sites/default/files/pdf/clearing-the-air-accessible.pdf  ¨ www.smokefree.gov  ¨ www.cancer.org/healthy/stayawayfromtobacco/guidetoquittingsmoking/  · Most people with CKD need to follow a special diet.  Be sure you understand yours. In general, you will need to limit protein, salt, potassium, and phosphorus. You also need to limit how much fluid you drink.   · CKD is a risk factor for heart disease. Talk with your healthcare provider about any other risk factors you might have and what you can do to lessen them.  · Talk with your healthcare provider about any medicines you are taking to find out if they need to be reduced or stopped.  · Don't use the following over-the-counter medicines, or consult your healthcare provider before using:  ¨ Aspirin and NSAIDs such as ibuprofen or naproxen. Using acetaminophen for fever or pain is OK.  ¨ Laxatives and antacids containing magnesium or aluminum  ¨ Fleet or phospho soda enemas containing phosphorus  ¨ Certain stomach acid-blocking medicine such as cimetidine or ranitidine   ¨ Decongestants containing pseudoephedrine   ¨ Herbal supplements  Follow-up care  Follow up with your healthcare provider, or as advised. Contact one of the following for more information:  · American Association of Kidney Patients 527-805-8190 www.aakp.org  · National Kidney Foundation 418-508-4188 www.kidney.org  · American Kid[employer] Fund 009-845-5906 www.kidneyfund.org  · National Kidney Disease Education Program 866-4KIDNEY www.nkdep.nih.gov  If an X-ray, ECG (cardiogram), or other diagnostic test was taken, you will be told of any new findings that may affect your care.  Call 911  Call 911 if you have any of the following:  · Severe weakness, dizziness, fainting, drowsiness, or confusion  · Chest pain or shortness of breath  · Heart beating fast, slow, or irregularly  When to seek medical advice  Call your healthcare provider right away  if any of these occur:  · Nausea or vomiting  · Fever of 100.4°F (38°C) or higher, or as directed by your healthcare provider  · Unexpected weight gain or swelling in the legs, ankles, or around the eyes  · Decrease or absent urine output  Date Last Reviewed: 9/1/2016  © 3551-9978 [employer]. [address] [place] [address], [place] [address]. All rights reserved. This information is not intended as a substitute for professional medical care. Always follow your healthcare professional's instructions.

## 2018-09-11 ENCOUNTER — OFFICE VISIT (OUTPATIENT)
Dept: INTERNAL MEDICINE CLINIC | Age: 68
End: 2018-09-11

## 2018-09-11 VITALS
SYSTOLIC BLOOD PRESSURE: 136 MMHG | BODY MASS INDEX: 28.98 KG/M2 | WEIGHT: 207 LBS | HEART RATE: 56 BPM | DIASTOLIC BLOOD PRESSURE: 80 MMHG | RESPIRATION RATE: 16 BRPM | HEIGHT: 71 IN

## 2018-09-11 DIAGNOSIS — I13.2: Chronic | ICD-10-CM

## 2018-09-11 DIAGNOSIS — E78.5 DYSLIPIDEMIA: Chronic | ICD-10-CM

## 2018-09-11 DIAGNOSIS — I10 ESSENTIAL HYPERTENSION: Chronic | ICD-10-CM

## 2018-09-11 DIAGNOSIS — I50.40: Chronic | ICD-10-CM

## 2018-09-11 DIAGNOSIS — N18.5: Chronic | ICD-10-CM

## 2018-09-11 DIAGNOSIS — Z01.818 PRE-OP EXAMINATION: Primary | ICD-10-CM

## 2018-09-11 DIAGNOSIS — G47.33 OBSTRUCTIVE SLEEP APNEA: Chronic | ICD-10-CM

## 2018-09-11 LAB
A/G RATIO: 2 (ref 1.1–2.2)
ALBUMIN SERPL-MCNC: 4.8 G/DL (ref 3.4–5)
ALP BLD-CCNC: 66 U/L (ref 40–129)
ALT SERPL-CCNC: 45 U/L (ref 10–40)
ANION GAP SERPL CALCULATED.3IONS-SCNC: 14 MMOL/L (ref 3–16)
AST SERPL-CCNC: 28 U/L (ref 15–37)
BASOPHILS ABSOLUTE: 0.1 K/UL (ref 0–0.2)
BASOPHILS RELATIVE PERCENT: 1.3 %
BILIRUB SERPL-MCNC: 0.3 MG/DL (ref 0–1)
BUN BLDV-MCNC: 23 MG/DL (ref 7–20)
CALCIUM SERPL-MCNC: 10.7 MG/DL (ref 8.3–10.6)
CHLORIDE BLD-SCNC: 103 MMOL/L (ref 99–110)
CHOLESTEROL, TOTAL: 147 MG/DL (ref 0–199)
CO2: 26 MMOL/L (ref 21–32)
CREAT SERPL-MCNC: 1.4 MG/DL (ref 0.8–1.3)
EOSINOPHILS ABSOLUTE: 0.2 K/UL (ref 0–0.6)
EOSINOPHILS RELATIVE PERCENT: 4.8 %
GFR AFRICAN AMERICAN: >60
GFR NON-AFRICAN AMERICAN: 50
GLOBULIN: 2.4 G/DL
GLUCOSE BLD-MCNC: 87 MG/DL (ref 70–99)
HCT VFR BLD CALC: 40.4 % (ref 40.5–52.5)
HDLC SERPL-MCNC: 47 MG/DL (ref 40–60)
HEMOGLOBIN: 13.5 G/DL (ref 13.5–17.5)
LDL CHOLESTEROL CALCULATED: 87 MG/DL
LYMPHOCYTES ABSOLUTE: 1.5 K/UL (ref 1–5.1)
LYMPHOCYTES RELATIVE PERCENT: 31.4 %
MCH RBC QN AUTO: 31.3 PG (ref 26–34)
MCHC RBC AUTO-ENTMCNC: 33.5 G/DL (ref 31–36)
MCV RBC AUTO: 93.6 FL (ref 80–100)
MONOCYTES ABSOLUTE: 0.5 K/UL (ref 0–1.3)
MONOCYTES RELATIVE PERCENT: 9.9 %
NEUTROPHILS ABSOLUTE: 2.6 K/UL (ref 1.7–7.7)
NEUTROPHILS RELATIVE PERCENT: 52.6 %
PDW BLD-RTO: 14.1 % (ref 12.4–15.4)
PLATELET # BLD: 175 K/UL (ref 135–450)
PMV BLD AUTO: 9.9 FL (ref 5–10.5)
POTASSIUM SERPL-SCNC: 4.7 MMOL/L (ref 3.5–5.1)
RBC # BLD: 4.32 M/UL (ref 4.2–5.9)
SODIUM BLD-SCNC: 143 MMOL/L (ref 136–145)
T4 FREE: 1.2 NG/DL (ref 0.9–1.8)
TOTAL PROTEIN: 7.2 G/DL (ref 6.4–8.2)
TRIGL SERPL-MCNC: 67 MG/DL (ref 0–150)
TSH SERPL DL<=0.05 MIU/L-ACNC: 2.04 UIU/ML (ref 0.27–4.2)
VLDLC SERPL CALC-MCNC: 13 MG/DL
WBC # BLD: 4.9 K/UL (ref 4–11)

## 2018-09-11 PROCEDURE — 3017F COLORECTAL CA SCREEN DOC REV: CPT | Performed by: INTERNAL MEDICINE

## 2018-09-11 PROCEDURE — 1101F PT FALLS ASSESS-DOCD LE1/YR: CPT | Performed by: INTERNAL MEDICINE

## 2018-09-11 PROCEDURE — 1123F ACP DISCUSS/DSCN MKR DOCD: CPT | Performed by: INTERNAL MEDICINE

## 2018-09-11 PROCEDURE — G8599 NO ASA/ANTIPLAT THER USE RNG: HCPCS | Performed by: INTERNAL MEDICINE

## 2018-09-11 PROCEDURE — 99214 OFFICE O/P EST MOD 30 MIN: CPT | Performed by: INTERNAL MEDICINE

## 2018-09-11 PROCEDURE — 4040F PNEUMOC VAC/ADMIN/RCVD: CPT | Performed by: INTERNAL MEDICINE

## 2018-09-11 PROCEDURE — 1036F TOBACCO NON-USER: CPT | Performed by: INTERNAL MEDICINE

## 2018-09-11 PROCEDURE — 93000 ELECTROCARDIOGRAM COMPLETE: CPT | Performed by: INTERNAL MEDICINE

## 2018-09-11 PROCEDURE — G8427 DOCREV CUR MEDS BY ELIG CLIN: HCPCS | Performed by: INTERNAL MEDICINE

## 2018-09-11 PROCEDURE — G8417 CALC BMI ABV UP PARAM F/U: HCPCS | Performed by: INTERNAL MEDICINE

## 2018-09-11 ASSESSMENT — ENCOUNTER SYMPTOMS
ORTHOPNEA: 0
DIARRHEA: 0
BLOOD IN STOOL: 0
VOMITING: 0
HEARTBURN: 0
WHEEZING: 0
COUGH: 0
ABDOMINAL PAIN: 0
SHORTNESS OF BREATH: 0
CONSTIPATION: 0
NAUSEA: 0

## 2018-09-11 NOTE — PROGRESS NOTES
Smoking status: Never Smoker    Smokeless tobacco: Never Used    Alcohol use No    Drug use: No    Sexual activity: Yes     Partners: Female     Other Topics Concern    Not on file     Social History Narrative    No narrative on file       Current Outpatient Prescriptions:     simvastatin (ZOCOR) 40 MG tablet, TAKE 1 TABLET BY MOUTH DAILY, Disp: 30 tablet, Rfl: 5    metoprolol tartrate (LOPRESSOR) 25 MG tablet, TAKE 1 TABLET BY MOUTH TWICE DAILY, Disp: 60 tablet, Rfl: 5    doxazosin (CARDURA) 2 MG tablet, TAKE 1 TABLET BY MOUTH DAILY, Disp: 30 tablet, Rfl: 2    fenofibrate (TRICOR) 48 MG tablet, TAKE 1 TABLET BY MOUTH DAILY, Disp: 30 tablet, Rfl: 5    valACYclovir (VALTREX) 500 MG tablet, , Disp: , Rfl:     timolol (TIMOPTIC) 0.5 % ophthalmic solution, , Disp: , Rfl:     DUREZOL 0.05 % EMUL, , Disp: , Rfl:     aspirin EC 81 MG EC tablet, Take 1 tablet by mouth daily. , Disp: 30 tablet, Rfl: 3    multivitamin (THERAGRAN) per tablet, Take 1 tablet by mouth daily. , Disp: , Rfl:     PrednisoLONE Acetate (PRED FORTE OP), Apply 1 drop to eye 3 times daily. RIGHT EYE, Disp: , Rfl:     cetirizine (ZYRTEC) 10 MG tablet, Take 10 mg by mouth daily. , Disp: , Rfl:   Allergies   Allergen Reactions    Diamox [Acetazolamide] Other (See Comments)     Review of Systems   Constitutional: Negative for chills, diaphoresis, fever, malaise/fatigue and weight loss. Respiratory: Negative for cough, shortness of breath and wheezing. Cardiovascular: Negative for chest pain, palpitations, orthopnea and leg swelling. Gastrointestinal: Negative for abdominal pain, blood in stool, constipation, diarrhea, heartburn, melena, nausea and vomiting. Genitourinary: Negative for dysuria, frequency and hematuria. Skin: Negative for itching and rash. Neurological: Negative for dizziness, seizures, loss of consciousness and weakness. Psychiatric/Behavioral: Negative for hallucinations, memory loss and suicidal ideas.  The patient does not have insomnia. Physical Exam   Constitutional: He is oriented to person, place, and time. He appears well-developed and well-nourished. No distress. HENT:   Head: Normocephalic and atraumatic. Right Ear: External ear normal.   Left Ear: External ear normal.   Nose: Nose normal. No rhinorrhea or nasal deformity. Mouth/Throat: Oropharynx is clear and moist. He does not have dentures. No oral lesions. No uvula swelling or lacerations. No oropharyngeal exudate or posterior oropharyngeal edema. Eyes: Pupils are equal, round, and reactive to light. Conjunctivae and EOM are normal. Right eye exhibits no discharge. Left eye exhibits no discharge. No scleral icterus. Neck: Trachea normal, normal range of motion and full passive range of motion without pain. Neck supple. Normal carotid pulses, no hepatojugular reflux and no JVD present. No tracheal tenderness present. Carotid bruit is not present. No neck rigidity. No tracheal deviation and no edema present. No thyroid mass and no thyromegaly present. Cardiovascular: Normal rate, regular rhythm, S1 normal, S2 normal, normal heart sounds and normal pulses. PMI is not displaced. Exam reveals no gallop, no S3, no S4, no distant heart sounds and no friction rub. No murmur heard. No systolic murmur is present    No diastolic murmur is present   Pulmonary/Chest: Effort normal and breath sounds normal. No stridor. No respiratory distress. He has no wheezes. He has no rales. He exhibits no tenderness. Abdominal: Soft. Normal appearance and bowel sounds are normal. He exhibits no shifting dullness, no distension, no abdominal bruit, no ascites and no mass. There is no tenderness. There is no rebound and no guarding. No hernia. Musculoskeletal: He exhibits no edema, tenderness or deformity. Lymphadenopathy:     He has no cervical adenopathy. He has no axillary adenopathy.    Neurological: He is alert and oriented to person, place, and time. He has normal strength. No cranial nerve deficit. Coordination normal.   Skin: Skin is warm, dry and intact. No rash noted. He is not diaphoretic. No pallor. Psychiatric: He has a normal mood and affect. His speech is normal and behavior is normal. Judgment and thought content normal. His mood appears not anxious. He is not agitated. Vitals reviewed. Assessment/Plan:  Rxoi Hernandez was seen today for pre-op exam.    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 [address]  Comments:  His blood pressure is well-controlled on current medications. Dyslipidemia  -     Lipid Panel; Future    Obstructive sleep apnea  Comments:  He uses a CPAP machine      Perioperative risk related to the patient's upcoming surgery is considered moderate. His risk is primarily related to the patient's sleep apnea and coronary artery disease. Both of these disease states are stable however and he is not having any symptoms related to them. Pre-op exam was completed on September 11, 2018 at 1000.           Yanna Palacios MD

## 2018-09-18 ENCOUNTER — OFFICE VISIT (OUTPATIENT)
Dept: NEUROLOGY | Facility: CLINIC | Age: 68
End: 2018-09-18
Payer: MEDICARE

## 2018-09-18 ENCOUNTER — HOSPITAL ENCOUNTER (OUTPATIENT)
Dept: SURGERY | Age: 68
Discharge: HOME | End: 2018-09-18
Attending: SURGERY | Admitting: SURGERY

## 2018-09-18 VITALS
HEART RATE: 65 BPM | HEIGHT: 71 IN | WEIGHT: 202.8 LBS | DIASTOLIC BLOOD PRESSURE: 84 MMHG | SYSTOLIC BLOOD PRESSURE: 143 MMHG | RESPIRATION RATE: 20 BRPM | OXYGEN SATURATION: 94 % | TEMPERATURE: 97 F | BODY MASS INDEX: 28.39 KG/M2

## 2018-09-18 VITALS
WEIGHT: 255.06 LBS | DIASTOLIC BLOOD PRESSURE: 76 MMHG | HEART RATE: 60 BPM | BODY MASS INDEX: 36.51 KG/M2 | SYSTOLIC BLOOD PRESSURE: 120 MMHG | HEIGHT: 70 IN

## 2018-09-18 DIAGNOSIS — R41.3 MEMORY LOSS: ICD-10-CM

## 2018-09-18 DIAGNOSIS — R25.1 TREMOR OF BOTH HANDS: ICD-10-CM

## 2018-09-18 DIAGNOSIS — K40.90 NON-RECURRENT UNILATERAL INGUINAL HERNIA WITHOUT OBSTRUCTION OR GANGRENE: Primary | ICD-10-CM

## 2018-09-18 DIAGNOSIS — M51.36 DEGENERATIVE DISC DISEASE, LUMBAR: ICD-10-CM

## 2018-09-18 PROCEDURE — 99204 OFFICE O/P NEW MOD 45 MIN: CPT | Mod: S$PBB,,, | Performed by: PSYCHIATRY & NEUROLOGY

## 2018-09-18 PROCEDURE — 49650 LAP ING HERNIA REPAIR INIT: CPT | Performed by: SURGERY

## 2018-09-18 PROCEDURE — 99213 OFFICE O/P EST LOW 20 MIN: CPT | Mod: PBBFAC | Performed by: PSYCHIATRY & NEUROLOGY

## 2018-09-18 PROCEDURE — 99999 PR PBB SHADOW E&M-EST. PATIENT-LVL III: CPT | Mod: PBBFAC,,, | Performed by: PSYCHIATRY & NEUROLOGY

## 2018-09-18 RX ORDER — LIDOCAINE HYDROCHLORIDE 10 MG/ML
1 INJECTION, SOLUTION EPIDURAL; INFILTRATION; INTRACAUDAL; PERINEURAL
Status: CANCELLED | OUTPATIENT
Start: 2018-09-18 | End: 2018-09-18

## 2018-09-18 RX ORDER — SODIUM CHLORIDE 0.9 % (FLUSH) 0.9 %
10 SYRINGE (ML) INJECTION EVERY 12 HOURS SCHEDULED
Status: CANCELLED | OUTPATIENT
Start: 2018-09-18

## 2018-09-18 RX ORDER — TESTOSTERONE CYPIONATE 1000 MG/10ML
200 INJECTION, SOLUTION INTRAMUSCULAR
COMMUNITY

## 2018-09-18 RX ORDER — METOPROLOL TARTRATE 25 MG/1
25 TABLET, FILM COATED ORAL 2 TIMES DAILY
COMMUNITY

## 2018-09-18 RX ORDER — HYDROCODONE BITARTRATE AND ACETAMINOPHEN 5; 325 MG/1; MG/1
1-2 TABLET ORAL
Qty: 30 TABLET | Refills: 0 | Status: SHIPPED | OUTPATIENT
Start: 2018-09-18 | End: 2018-09-21

## 2018-09-18 RX ORDER — FLUTICASONE PROPIONATE 50 MCG
SPRAY, SUSPENSION (ML) NASAL
COMMUNITY
Start: 2018-06-05

## 2018-09-18 RX ORDER — SODIUM CHLORIDE, SODIUM LACTATE, POTASSIUM CHLORIDE, CALCIUM CHLORIDE 600; 310; 30; 20 MG/100ML; MG/100ML; MG/100ML; MG/100ML
INJECTION, SOLUTION INTRAVENOUS CONTINUOUS
Status: CANCELLED | OUTPATIENT
Start: 2018-09-18

## 2018-09-18 RX ORDER — ATORVASTATIN CALCIUM 80 MG/1
80 TABLET, FILM COATED ORAL
COMMUNITY

## 2018-09-18 RX ORDER — CLOPIDOGREL BISULFATE 75 MG/1
TABLET ORAL
COMMUNITY
Start: 2016-10-31

## 2018-09-18 RX ORDER — LORAZEPAM 0.5 MG/1
0.5 TABLET ORAL EVERY 6 HOURS PRN
COMMUNITY

## 2018-09-18 RX ORDER — LABETALOL HYDROCHLORIDE 5 MG/ML
5 INJECTION, SOLUTION INTRAVENOUS EVERY 10 MIN PRN
Status: DISCONTINUED | OUTPATIENT
Start: 2018-09-18 | End: 2018-09-19 | Stop reason: HOSPADM

## 2018-09-18 RX ORDER — MEPERIDINE HYDROCHLORIDE 25 MG/ML
12.5 INJECTION INTRAMUSCULAR; INTRAVENOUS; SUBCUTANEOUS EVERY 5 MIN PRN
Status: DISCONTINUED | OUTPATIENT
Start: 2018-09-18 | End: 2018-09-19 | Stop reason: HOSPADM

## 2018-09-18 RX ORDER — NITROGLYCERIN 0.4 MG/1
0.4 TABLET SUBLINGUAL EVERY 5 MIN PRN
COMMUNITY

## 2018-09-18 RX ORDER — DIAZEPAM 2 MG/1
2 TABLET ORAL
COMMUNITY

## 2018-09-18 RX ORDER — OXYCODONE HYDROCHLORIDE AND ACETAMINOPHEN 5; 325 MG/1; MG/1
1 TABLET ORAL
Status: ACTIVE | OUTPATIENT
Start: 2018-09-18 | End: 2018-09-18

## 2018-09-18 RX ORDER — CEFAZOLIN SODIUM 2 G/100ML
2 INJECTION, SOLUTION INTRAVENOUS ONCE
Status: COMPLETED | OUTPATIENT
Start: 2018-09-18 | End: 2018-09-18

## 2018-09-18 RX ORDER — SODIUM CHLORIDE 0.9 % (FLUSH) 0.9 %
10 SYRINGE (ML) INJECTION PRN
Status: CANCELLED | OUTPATIENT
Start: 2018-09-18

## 2018-09-18 RX ORDER — FOLIC ACID 0.4 MG
400 TABLET ORAL
COMMUNITY

## 2018-09-18 RX ORDER — PANTOPRAZOLE SODIUM 40 MG/1
40 TABLET, DELAYED RELEASE ORAL DAILY
COMMUNITY

## 2018-09-18 RX ORDER — HYDRALAZINE HYDROCHLORIDE 20 MG/ML
5 INJECTION INTRAMUSCULAR; INTRAVENOUS EVERY 10 MIN PRN
Status: DISCONTINUED | OUTPATIENT
Start: 2018-09-18 | End: 2018-09-19 | Stop reason: HOSPADM

## 2018-09-18 RX ORDER — BACLOFEN 10 MG/1
TABLET ORAL
COMMUNITY
Start: 2017-05-03

## 2018-09-18 RX ORDER — HYDROMORPHONE HCL 110MG/55ML
0.5 PATIENT CONTROLLED ANALGESIA SYRINGE INTRAVENOUS EVERY 5 MIN PRN
Status: DISCONTINUED | OUTPATIENT
Start: 2018-09-18 | End: 2018-09-19 | Stop reason: HOSPADM

## 2018-09-18 RX ORDER — ONDANSETRON 2 MG/ML
4 INJECTION INTRAMUSCULAR; INTRAVENOUS
Status: ACTIVE | OUTPATIENT
Start: 2018-09-18 | End: 2018-09-18

## 2018-09-18 RX ADMIN — CEFAZOLIN SODIUM 2 G: 2 INJECTION, SOLUTION INTRAVENOUS at 12:50

## 2018-09-18 ASSESSMENT — PAIN - FUNCTIONAL ASSESSMENT: PAIN_FUNCTIONAL_ASSESSMENT: 0-10

## 2018-09-18 NOTE — PROGRESS NOTES
Subjective:      Patient ID: Anand Olivia is a 67 y.o. male.    Chief Complaint: I have tremor in my hands and have difficulty with my memory    This 67-year-old right-handed patient indicates that he has noted the development of tremor that he feels in both hands with the tremor being most noticeable when he is attempting to perform fine coordinated movement.  As an example, he states that while working on engines or other activities, he notices tremor when handling his tools and utensils.  Occasionally this does interfere with eating utensils.  The tremor is not present at rest.  So far, the tremor has not had significant impact upon his day-to-day life however.  He denies any tremor in his voice or head.  The patient's wife indicates that the tremor is noticeable to her when he is attempting fine coordinated movement also.  The patient does not know of any family history of tremor but does indicate that he is constantly drinking either Coca Cola or Dr. Pepper.  He estimates that he drinks at least a 6 pack of 1 or the other of those beverages every day.    A 2nd complaint offered by the patient is that of short-term memory loss.  He states that he will occasionally forget words when attempting conversation also.  The patient however states that this does not interfere with his activities at home.  Although he may forget briefly that he is cooking, he has never burned any food.  The patient states that he can remember locations throughout his home as well as in [place] without any difficulty.  He does not seem to have difficulty with names of family members.  The patient states that occasionally, he will forget what he is going after as he moves for 1 room to the next.  However after thinking briefly, he is able to complete his task.  Word-finding is also difficult problem for the patient intermittently.  He states that this will sometimes interfere with conversation.    ROS:  GENERAL: NO FEVER, CHILLS,  FATIGABILITY OR WEIGHT LOSS.  SKIN: NO RASHES, ITCHING OR CHANGES IN COLOR OR TEXTURE OF SKIN.  HEAD: NO HEADACHES OR RECENT HEAD TRAUMA.  EYES: VISUAL ACUITY FINE. NO PHOTOPHOBIA, OCULAR PAIN OR DIPLOPIA.  EARS: DENIES EAR PAIN, DISCHARGE OR VERTIGO.  NOSE: NO LOSS OF SMELL, NO EPISTAXIS OR POSTNASAL DRIP.  MOUTH & THROAT: NO HOARSENESS OR CHANGE IN VOICE. NO EXCESSIVE GUM BLEEDING.  NODES: DENIES SWOLLEN GLANDS.  CHEST: DENIES RAMIREZ, CYANOSIS, WHEEZING, COUGH AND SPUTUM PRODUCTION.  CARDIOVASCULAR: DENIES CHEST PAIN, PND, ORTHOPNEA OR REDUCED EXERCISE TOLERANCE.  ABDOMEN: APPETITE FINE. NO WEIGHT LOSS. DENIES DIARRHEA, ABDOMINAL PAIN, HEMATEMESIS OR BLOOD IN STOOL.  URINARY: NO FLANK PAIN, DYSURIA OR HEMATURIA.  PERIPHERAL VASCULAR: NO CLAUDICATION OR CYANOSIS.  MUSCULOSKELETAL: NO JOINT STIFFNESS OR SWELLING.   THE PATIENT HAS CHRONIC LOW BACK PAIN.  HE IS MANAGED WITH A PAIN PUMP AND BY PAIN MANAGEMENT.  NEUROLOGIC: NO HISTORY OF SEIZURES, PARALYSIS,   OR UNEXPLAINED LOSS OF CONSCIOUSNESS    Past Medical History:   Diagnosis Date    Anemia     BPH w/o urinary obs/LUTS     Erectile dysfunction     GERD (gastroesophageal reflux disease)     Hyperlipidemia     Hypertension      Past Surgical History:   Procedure Laterality Date    HERNIA REPAIR  2004, 2001, 1999    SKIN LESION EXCISION      skin CA removed from back    STAPEDECTOMY  03/2001     Family History   Problem Relation Age of Onset    Stroke Mother     Cancer Mother         Skin CA    Heart attack Father     Kidney disease Father     Heart disease Father      Social History     Socioeconomic History    Marital status:      Spouse name: Not on file    Number of children: Not on file    Years of education: Not on file    Highest education level: Not on file   Social Needs    Financial resource strain: Not on file    Food insecurity - worry: Not on file    Food insecurity - inability: Not on file    Transportation needs - medical:  Not on file    Transportation needs - non-medical: Not on file   Occupational History    Occupation:    Tobacco Use    Smoking status: Former Smoker     Last attempt to quit: 1997     Years since quittin.7    Smokeless tobacco: Never Used   Substance and Sexual Activity    Alcohol use: Yes     Comment: occasional    Drug use: No    Sexual activity: Yes     Partners: Female   Other Topics Concern    Not on file   Social History Narrative    Not on file         Objective:   PE:   VITAL SIGNS:   Vitals:    18 1303   BP: 120/76   Pulse: 60     APPEARANCE: WELL NOURISHED, WELL DEVELOPED, IN NO ACUTE DISTRESS.    HEAD: NORMOCEPHALIC, ATRAUMATIC.  EYES: PERRL. EOMI.  NON-ICTERIC SCLERAE.    EARS: TM'S INTACT. LIGHT REFLEX NORMAL. NO RETRACTION OR PERFORATION.    NOSE: MUCOSA PINK. AIRWAY CLEAR.  MOUTH & THROAT: NO TONSILLAR ENLARGEMENT. NO PHARYNGEAL ERYTHEMA OR EXUDATE. NO STRIDOR.  NECK: SUPPLE. NO BRUITS.  CHEST: LUNGS CLEAR TO AUSCULTATION.  CARDIOVASCULAR: REGULAR RHYTHM WITHOUT SIGNIFICANT MURMURS.  ABDOMEN: BOWEL SOUNDS NORMAL. NOT DISTENDED. SOFT. NO TENDERNESS OR MASSES.  MUSCULOSKELETAL:  NO BONY DEFORMITY SEEN.  MUSCLE TONE IS NORMAL.  MUSCLE MASS IS NORMAL.    NEUROLOGIC:   MENTAL STATUS:  THE PATIENT IS WELL ORIENTED TO PERSON, TIME, PLACE, AND SITUATION.  THE PATIENT SCORED 28/30 ON THE BENTON COGNITIVE ASSESSMENT.  HE LOST 1 POINT IN ATTENTION ATTEMPTING TO DO SERIAL 7 SUBTRACTIONS AND 1 POINT ON DELAYED RECALL.  IF THE PATIENT IS ATTENTIVE TO THE ENVIRONMENT AND COOPERATIVE FOR THE EXAM.  CRANIAL NERVES: II-XII GROSSLY INTACT. FUNDOSCOPIC EXAM IS NORMAL.  NO HEMORRHAGE, EXUDATE OR PAPILLEDEMA IS PRESENT. THE EXTRAOCULAR MUSCLES ARE INTACT IN THE CARDINAL DIRECTIONS OF GAZE.  NO PTOSIS IS PRESENT. FACIAL FEATURES ARE SYMMETRICAL.  SPEECH IS NORMAL IN FLUENCY, DICTION, AND PHRASING.  TONGUE PROTRUDES IN THE MIDLINE.    GAIT AND STATION:  ROMBERG IS NEGATIVE.  GOOD  ALTERNATE ARMSWING WITH NORMAL GAIT.  MOTOR:  NO DOWNDRIFT OF EITHER ARM WHEN HELD AT SHOULDER LEVEL.  MANUAL MUSCLE TESTING OF PROXIMAL AND DISTAL MUSCLES OF BOTH UPPER AND LOWER EXTREMITIES IS NORMAL. MUSCLE MASS IS NORMAL.  MUSCLE TONE IS NORMAL.  SENSORY:  INTACT BOTH UPPER AND LOWER EXTREMITIES TO PIN PRICK, TOUCH, AND VIBRATION.  CEREBELLAR:  FINGER TO NOSE DONE WELL.  ALTERNATING MOVEMENTS INTACT.  NO INVOLUNTARY MOVEMENTS OR TREMOR SEEN.  REFLEXES:  STRETCH REFLEXES ARE  1+ BOTH UPPER AND LOWER EXTREMITIES.  PLANTAR STIMULATION IS FLEXOR BILATERALLY AND NO PATHOLOGICAL REFLEXES ARE SEEN              Assessment:     Encounter Diagnoses   Name Primary?    Memory loss     Degenerative disc disease, lumbar                    Plan:    1.  The patient was reassured that at this point in time, he does not exhibit any overt signs of a dementia.  He does exhibit benign age related memory loss without cognitive impairment otherwise.  2. The benign tremor that he has is not necessary to be treated.  I suspect this may be related to excessive caffeine intake and he was advised to reduce caffeine intake as much as possible.  3. Return to Neurology in 1 year.    This was a 55 min visit with the patient with over 50% time spent counseling the patient regarding the significance of memory loss and discussion of etiology of his tremor.
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
## 2018-09-18 NOTE — H&P
[facility] and Laparoscopic Surgery      I have reviewed the history and physical and examined the patient and find no relevant changes. I have reviewed with the patient and/or family the risks, benefits, and alternatives to the procedure.     Rena Escobar MD  9/18/2018

## 2018-09-18 NOTE — BRIEF OP NOTE
Brief Postoperative Note    Manoj Phan  YOB: 1950  [id number]    Pre-operative Diagnosis: RIH    Post-operative Diagnosis: Same    Procedure: lap RIH repair with mesh    Anesthesia: General and Local    Surgeons/Assistants: Delma Yepez    Estimated Blood Loss: less than 50     Complications: None    Specimens: Was Not Obtained    Findings: indirect RIH    Electronically signed by Asha Garvin MD on 9/18/2018 at 2:36 PM

## 2018-09-18 NOTE — ANESTHESIA POST-OP
Anesthesia Post-op Note    Patient: Kiersten Gutiérrez  MRN: [id number]  YOB: 1950  Date of evaluation: 9/18/2018  Time:  6:25 PM     Procedure(s) Performed:     Last Vitals: BP (!) 143/84   Pulse 65   Temp 97 °F (36.1 °C) (Temporal)   Resp 20   Ht 5' 11\" (1.803 m)   Wt 202 lb 12.8 oz (92 kg)   SpO2 94%   BMI 28.28 kg/m²     Dawn Phase I: Dawn Score: 10    Dawn Phase II: Dawn Score: 10    Anesthesia Post Evaluation    Final anesthesia type: general  Patient location during evaluation: PACU  Patient participation: complete - patient participated  Level of consciousness: awake and alert  Airway patency: patent  Nausea & Vomiting: no nausea and no vomiting  Complications: no  Cardiovascular status: hemodynamically stable  Respiratory status: acceptable  Hydration status: stable        Prashant Rodriguez MD  6:25 PM

## 2018-09-18 NOTE — PROGRESS NOTES
Patient transferred from OR to PACU, responds to voice, VSS, abdomen soft, flat, 3 laparoscopic incisions well approximated, surgical glue CDI, will monitor.

## 2018-09-19 NOTE — OP NOTE
[facility] [address]                      [address] [facility], [address] [facility]                                 OPERATIVE REPORT    PATIENT NAME: Roberto Galvan                    :        1950  MED REC NO:   [id number]                          ROOM:  ACCOUNT NO:   [de-identified]                          ADMIT DATE: 2018  PROVIDER:     Sheri Rios MD    DATE OF PROCEDURE:  2018    PREOPERATIVE DIAGNOSIS:  Right inguinal hernia. POSTOPERATIVE DIAGNOSIS:  Right inguinal hernia. PROCEDURE:  Laparoscopic right inguinal hernia repair with mesh. SURGEON:  Sheri Rios MD    ANESTHESIA:  General endotracheal.    ESTIMATED BLOOD LOSS:  Minimal.    COMPLICATIONS:  None. SPECIMEN:  None. OPERATIVE INDICATION AND CONSENT:  A 76-year-old male with a symptomatic  right inguinal hernia. He is brought in today for repair. He was  explained the risks, benefits and possible complications including risk of  hernia recurrence, infection requiring mesh removal or nerve entrapment. DETAILS OF THE PROCEDURE:  The patient was brought to the operative suite  and placed in a supine position on the operative table. After general  endotracheal anesthesia, he was prepped and draped in the usual sterile  fashion. He had a curvilinear incision below the umbilicus from a previous  appendectomy. We made an incision at the same incision. There was some  fibrotic scar tissue, which was dissected sharply. We then cleared the  anterior surface of the external abdominal oblique overlying the right  rectus sheath. This was opened vertically. The rectus muscles was then  reflected laterally and we created a space posterior to the rectus muscle. An insufflating balloon was directed down toward the pubic symphysis and  insufflated under direct visualization. The insufflating balloon was then  removed and a 12-mm trocar was placed at this site.   Two 5-mm trocars were  placed in the midline just below the 12-mm trocar site. The lateral space  was cleared with blunt dissection as well as cautery. The pubic tubercle  as well as Jose's ligament was cleared laterally to the level of the  obturator vein. The patient had a moderately large indirect inguinal  hernia. The hernia sac was dissected down to its distal end. It was then  dissected proximally to below the anticipated inferior margin of the mesh. We selected a large right-sided 3DMax mesh. The mesh was positioned. One  tack was placed to the pubic tubercle. Two tacks were placed in the  Jose's ligament. We then placed tacks medial and superior to the direct  inguinal hernia space. We did place one tack lateral to the inferior  epigastric vessels. This was not placed in the region of the lateral  femoral cutaneous nerve. At this point in time, we did have excellent  overlap of all hernia spaces and also an excellent hemostasis. The trocars  were removed under direct visualization. There was some intra-abdominal  gas. We did place a 5-mm trocar through the posterior fascia and  peritoneum of the periumbilical incision. This allowed us to vent the gas. Trocars were then removed. The fascia of the periumbilical trocar site was  closed with figure-of-eight 0 Vicryl suture. All areas were injected with  0.5% Marcaine with epinephrine for the skin and the incisions were closed  with running 4-0 subcuticular sutures. Dermabond was then applied. The  patient tolerated the procedure without difficulty and was transferred to  recovery room in stable condition.         Carroll Pickard MD    D: 09/18/2018 14:53:02       T: 09/18/2018 14:55:34     [email or web address]  Job#: [id number]     Doc#: [id number]    CC:  Musa Verdugo MD

## 2018-10-03 ENCOUNTER — OFFICE VISIT (OUTPATIENT)
Dept: SURGERY | Age: 68
End: 2018-10-03

## 2018-10-03 VITALS — SYSTOLIC BLOOD PRESSURE: 122 MMHG | DIASTOLIC BLOOD PRESSURE: 82 MMHG

## 2018-10-03 DIAGNOSIS — K40.90 NON-RECURRENT UNILATERAL INGUINAL HERNIA WITHOUT OBSTRUCTION OR GANGRENE: Primary | ICD-10-CM

## 2018-10-03 PROCEDURE — 99024 POSTOP FOLLOW-UP VISIT: CPT | Performed by: SURGERY

## 2018-10-10 ENCOUNTER — OFFICE VISIT (OUTPATIENT)
Dept: ORTHOPEDICS | Facility: CLINIC | Age: 68
End: 2018-10-10
Payer: COMMERCIAL

## 2018-10-10 VITALS — HEIGHT: 70 IN | BODY MASS INDEX: 23.05 KG/M2 | WEIGHT: 161 LBS

## 2018-10-10 DIAGNOSIS — M17.12 PRIMARY OSTEOARTHRITIS OF LEFT KNEE: ICD-10-CM

## 2018-10-10 DIAGNOSIS — S83.242D ACUTE MEDIAL MENISCUS TEAR OF LEFT KNEE, SUBSEQUENT ENCOUNTER: Primary | ICD-10-CM

## 2018-10-10 PROCEDURE — 99213 OFFICE O/P EST LOW 20 MIN: CPT | Mod: ,,, | Performed by: ORTHOPAEDIC SURGERY

## 2018-10-10 PROCEDURE — 1101F PT FALLS ASSESS-DOCD LE1/YR: CPT | Mod: ,,, | Performed by: ORTHOPAEDIC SURGERY

## 2018-10-10 RX ORDER — HYDROCHLOROTHIAZIDE 12.5 MG/1
12.5 TABLET ORAL DAILY
COMMUNITY
Start: 2018-10-05 | End: 2020-08-12 | Stop reason: SDUPTHER

## 2018-10-10 NOTE — PROGRESS NOTES
Past Medical History:   Diagnosis Date    Acid reflux     Acquired tricuspid valve insufficiency     Anemia     Borderline diabetes     BPH (benign prostatic hyperplasia)     Coronary artery disease     Hypertension     Pulmonary nodules     Skin cancer        Past Surgical History:   Procedure Laterality Date    HERNIA REPAIR  12/1999    HERNIA REPAIR  05/2004    KNEE ARTHROSCOPY Left 04/12/2018    SKIN LESION EXCISION  10/2009    Neck    SKIN LESION EXCISION  2015    STAPEDECTOMY Right 03/2001       Current Outpatient Medications   Medication Sig    aspirin (ECOTRIN) 81 MG EC tablet aspirin 81 mg tablet,delayed release   Take 1 tablet every day by oral route.    FLUZONE HIGH-DOSE 2018-19, PF, 180 mcg/0.5 mL vaccine ADM 0.5ML IM UTD    hydroCHLOROthiazide (HYDRODIURIL) 12.5 MG Tab     lisinopril (PRINIVIL,ZESTRIL) 40 MG tablet Take 1 tablet (40 mg total) by mouth once daily.    omeprazole (PRILOSEC) 20 MG capsule TAKE ONE CAPSULE BY MOUTH ONCE DAILY    rosuvastatin (CRESTOR) 10 MG tablet Take 0.5 tablets (5 mg total) by mouth once daily.     No current facility-administered medications for this visit.        Review of patient's allergies indicates:  No Known Allergies    Family History   Problem Relation Age of Onset    Cancer Mother         skin    Stroke Mother     Heart failure Father     Kidney disease Father        Social History     Socioeconomic History    Marital status:      Spouse name: Not on file    Number of children: Not on file    Years of education: Not on file    Highest education level: Not on file   Social Needs    Financial resource strain: Not on file    Food insecurity - worry: Not on file    Food insecurity - inability: Not on file    Transportation needs - medical: Not on file    Transportation needs - non-medical: Not on file   Occupational History    Not on file   Tobacco Use    Smoking status: Former Smoker     Last attempt to quit: 1997      "Years since quittin.7    Smokeless tobacco: Never Used   Substance and Sexual Activity    Alcohol use: Yes     Comment: occasional    Drug use: No    Sexual activity: Not on file   Other Topics Concern    Not on file   Social History Narrative    Not on file       Chief Complaint:   Chief Complaint   Patient presents with    Left Knee - Pain, Follow-up     DOS: 17, 6 months S/P Left Knee Arthroscopy. Sometimes he still has some occaisional stiffness. Patient has trouble going down steps at times. Pl is  0/10 at the moment. He does experience increased sharp pains on pivots that go away quickly.        Consulting Physician: No ref. provider found    History of Present Illness:    This is a 67 y.o. year old male who complains of Patient is 6 months status post arthroscopy to the left knee      ROS    Examination:    Vital Signs:    Vitals:    10/10/18 1446   Weight: 73 kg (161 lb)   Height: 5' 10" (1.778 m)   PainSc: 0-No pain   PainLoc: Knee       This a well-developed, well nourished patient in no acute distress.    Alert and oriented and cooperative to examination.       Physical Exam: eft knee-patient has a slight effusion is good range of motion all little bit of crepitance or range of motion with minimal complaints of any pain    Imaging:  o x-rays done today     Assessment: status post arthroscopy to the left knee for patient was found have a bucket-handle tear of the medial meniscus and moderate arthritic changes of the knee and the medial compartment and moderate arthritic changes of the patellofemoral compartment    Plan:  Patient increase his activity as tolerated and return as needed      DISCLAIMER: This note may have been dictated using voice recognition software and may contain grammatical errors.     NOTE: Consult report sent to referring provider via [facility] EMR.  "

## 2018-10-15 ENCOUNTER — TELEPHONE (OUTPATIENT)
Dept: ADMINISTRATIVE | Facility: CLINIC | Age: 68
End: 2018-10-15

## 2018-10-18 ENCOUNTER — OFFICE VISIT (OUTPATIENT)
Dept: FAMILY MEDICINE | Facility: CLINIC | Age: 68
End: 2018-10-18
Payer: COMMERCIAL

## 2018-10-18 VITALS
BODY MASS INDEX: 22.79 KG/M2 | WEIGHT: 159.19 LBS | HEIGHT: 70 IN | HEART RATE: 48 BPM | SYSTOLIC BLOOD PRESSURE: 132 MMHG | DIASTOLIC BLOOD PRESSURE: 86 MMHG | OXYGEN SATURATION: 98 %

## 2018-10-18 DIAGNOSIS — I10 ESSENTIAL HYPERTENSION: Primary | ICD-10-CM

## 2018-10-18 DIAGNOSIS — K21.9 GASTROESOPHAGEAL REFLUX DISEASE WITHOUT ESOPHAGITIS: ICD-10-CM

## 2018-10-18 DIAGNOSIS — E78.01 FAMILIAL HYPERCHOLESTEROLEMIA: ICD-10-CM

## 2018-10-18 PROCEDURE — 99213 OFFICE O/P EST LOW 20 MIN: CPT | Mod: ,,, | Performed by: NURSE PRACTITIONER

## 2018-10-18 PROCEDURE — 1101F PT FALLS ASSESS-DOCD LE1/YR: CPT | Mod: ,,, | Performed by: NURSE PRACTITIONER

## 2018-10-18 PROCEDURE — 3079F DIAST BP 80-89 MM HG: CPT | Mod: ,,, | Performed by: NURSE PRACTITIONER

## 2018-10-18 PROCEDURE — 3075F SYST BP GE 130 - 139MM HG: CPT | Mod: ,,, | Performed by: NURSE PRACTITIONER

## 2018-10-18 NOTE — PATIENT INSTRUCTIONS
Eating Heart-Healthy Foods  Eating has a big impact on your heart health. In fact, eating healthier can improve several of your heart risks at once. For instance, it helps you manage weight, cholesterol, and blood pressure. Here are ideas to help you make heart-healthy changes without giving up all the foods and flavors you love.  Getting started  · Talk with your health care provider about eating plans, such as the DASH or Mediterranean diet. You may also be referred to a dietitian.  · Change a few things at a time. Give yourself time to get used to a few eating changes before adding more.  · Work to create a tasty, healthy eating plan that you can stick to for the rest of your life.    Goals for healthy eating  Below are some tips to improve your eating habits:  · Limit saturated fats and trans fats. Saturated fats raise your levels of cholesterol, so keep these fats to a minimum. They are found in foods such as fatty meats, whole milk, cheese, and palm and coconut oils. Avoid trans fats because they lower good cholesterol as well as raise bad cholesterol. Trans fats are most often found in processed foods.  · Reduce sodium (salt) intake. Eating too much salt may increase your blood pressure. Limit your sodium intake to 2,300 milligrams (mg) per day, or less if your health care provider recommends it. Dining out less often and eating fewer processed foods are two great ways to decrease the amount of salt you consume.  · Managing calories. A calorie is a unit of energy. Your body burns calories for fuel, but if you eat more calories than your body burns, the extras are stored as fat. Your health care provider can help you create a diet plan to manage your calories. This will likely include eating healthier foods as well as exercising regularly. To help you track your progress, keep a diary to record what you eat and how often you exercise.  Choose the right foods  Aim to make these foods staples of your diet. If  you have diabetes, you may have different recommendations than what is listed here:  · Fruits and vegetable provide plenty of nutrients without a lot of calories. At meals, fill half your plate with these foods. Split the other half of your plate between whole grains and lean protein.  · Whole grains are high in fiber and rich in vitamins and nutrients. Good choices include whole-wheat bread, pasta, and brown rice.  · Lean proteins give you nutrition with less fat. Good choices include fish, skinless chicken, and beans.  · Low-fat or nonfat dairy provides nutrients without a lot of fat. Try low-fat or nonfat milk, cheese, or yogurt.  · Healthy fats can be good for you in small amounts. These are unsaturated fats, such as olive oil, nuts, and fish. Try to have at least 2 servings per week of fatty fish such as salmon, sardines, mackerel, rainbow trout, and albacore tuna. These contain omega-3 fatty acids, which are good for your heart. Flaxseed is another source of a heart-healthy fat.  More on heart healthy eating    Read food labels  Healthy eating starts at the grocery store. Be sure to pay attention to food labels on packaged foods. Look for products that are high in fiber and protein, and low in saturated fat, cholesterol, and sodium. Avoid products that contain trans fat. And pay close attention to serving size. For instance, if you plan to eat two servings, double all the numbers on the label.  Prepare food right  A key part of healthy cooking is cutting down on added fat and salt. Look on the internet for lower-fat, lower-sodium recipes. Also, try these tips:  · Remove fat from meat and skin from poultry before cooking.  · Skim fat from the surface of soups and sauces.  · Broil, boil, bake, steam, grill, and microwave food without added fats.  · Choose ingredients that spice up your food without adding calories, fat, or sodium. Try these items: horseradish, hot sauce, lemon, mustard, nonfat salad dressings,  and vinegar. For salt-free herbs and spices, try basil, cilantro, cinnamon, pepper, and rosemary.  Date Last Reviewed: 6/25/2015  © 3807-1458 [employer]. [address] [place] [address], [place] [address]. All rights reserved. This information is not intended as a substitute for professional medical care. Always follow your healthcare professional's instructions.        Controlling High Blood Pressure  High blood pressure (hypertension) is often called the silent killer. This is because many people who have it dont know it. High blood pressure is defined as 140/90 mm Hg or higher. Know your blood pressure and remember to check it regularly. Doing so can save your life. Here are some things you can do to help control your blood pressure.    Choose heart-healthy foods  · Select low-salt, low-fat foods. Limit sodium intake to 2,400 mg per day or the amount suggested by your healthcare provider.  · Limit canned, dried, cured, packaged, and fast foods. These can contain a lot of salt.  · Eat 8 to 10 servings of fruits and vegetables every day.  · Choose lean meats, fish, or chicken.  · Eat whole-grain pasta, brown rice, and beans.  · Eat 2 to 3 servings of low-fat or fat-free dairy products.  · Ask your doctor about the DASH eating plan. This plan helps reduce blood pressure.  · When you go to a restaurant, ask that your meal be prepared with no added salt.  Maintain a healthy weight  · Ask your healthcare provider how many calories to eat a day. Then stick to that number.  · Ask your healthcare provider what weight range is healthiest for you. If you are overweight, a weight loss of only 3% to 5% of your body weight can help lower blood pressure. Generally, a good weight loss goal is to lose 10% of your body weight in a year.  · Limit snacks and sweets.  · Get regular exercise.  Get up and get active  · Choose activities you enjoy. Find ones you can do with friends or family. This includes bicycling, dancing,  walking, and jogging.  · Park farther away from building entrances.  · Use stairs instead of the elevator.  · When you can, walk or bike instead of driving.  · [place] leaves, garden, or do household repairs.  · Be active at a moderate to vigorous level of physical activity for at least 40 minutes for a minimum of 3 to 4 days a week.   Manage stress  · Make time to relax and enjoy life. Find time to laugh.  · Communicate your concerns with your loved ones and your healthcare provider.  · Visit with family and friends, and keep up with hobbies.  Limit alcohol and quit smoking  · Men should have no more than 2 drinks per day.  · Women should have no more than 1 drink per day.  · Talk with your healthcare provider about quitting smoking. Smoking significantly increases your risk for heart disease and stroke. Ask your healthcare provider about community smoking cessation programs and other options.  Medicines  If lifestyle changes arent enough, your healthcare provider may prescribe high blood pressure medicine. Take all medicines as prescribed. If you have any questions about your medicines, ask your healthcare provider before stopping or changing them.   Date Last Reviewed: 4/27/2016  © 7946-0749 [employer]. [address] [place] [address], [place] [address]. All rights reserved. This information is not intended as a substitute for professional medical care. Always follow your healthcare professional's instructions.

## 2018-10-18 NOTE — PROGRESS NOTES
Subjective:       Patient ID: Onesimo Paula is a 67 y.o. male.    Chief Complaint: Hypertension (QBPC f/u)    Patient presents today per [facility] hypertension evaluation. Patient also has chronic GERD and chronic hyperlipidemia. Patient states that he normally does not check his blood pressure very often at home unless he is feeling bad.      Hypertension   This is a chronic problem. The current episode started more than 1 year ago. The problem has been waxing and waning since onset. The problem is controlled. Associated symptoms include malaise/fatigue. Pertinent negatives include no anxiety, blurred vision, chest pain, headaches, neck pain, orthopnea, palpitations, peripheral edema, PND, shortness of breath or sweats. There are no associated agents to hypertension. Risk factors for coronary artery disease include male gender, dyslipidemia and stress. Past treatments include diuretics and ACE inhibitors. The current treatment provides significant improvement. There are no compliance problems.  There is no history of angina. There is no history of a hypertension causing med.     Review of Systems   Constitutional: Positive for malaise/fatigue. Negative for activity change, appetite change, chills, diaphoresis, fatigue, fever and unexpected weight change.   HENT: Negative for congestion, ear discharge, ear pain, hearing loss, rhinorrhea, sore throat, trouble swallowing and voice change.    Eyes: Negative for blurred vision, photophobia, pain, discharge and visual disturbance.   Respiratory: Negative for cough, chest tightness, shortness of breath and wheezing.    Cardiovascular: Negative for chest pain, palpitations, orthopnea, leg swelling and PND.        Hypertension, hyperlipidemia   Gastrointestinal: Negative for abdominal pain, blood in stool, constipation, diarrhea, nausea and vomiting.        Gerd   Endocrine: Negative for cold intolerance, heat intolerance, polydipsia and polyuria.   Genitourinary: Negative for  difficulty urinating, dysuria, flank pain, hematuria and urgency.   Musculoskeletal: Positive for arthralgias. Negative for gait problem, joint swelling, myalgias and neck pain.   Skin: Negative for rash.   Allergic/Immunologic: Negative for immunocompromised state.   Neurological: Negative for dizziness, weakness and headaches.   Hematological: Negative for adenopathy.   Psychiatric/Behavioral: Negative for agitation, confusion, dysphoric mood, self-injury and suicidal ideas.       Past Medical History:   Diagnosis Date    Acid reflux     Acquired tricuspid valve insufficiency     Anemia     Borderline diabetes     BPH (benign prostatic hyperplasia)     Coronary artery disease     Hypertension     Pulmonary nodules     Skin cancer       Past Surgical History:   Procedure Laterality Date    HERNIA REPAIR  1999    HERNIA REPAIR  2004    KNEE ARTHROSCOPY Left 2018    SKIN LESION EXCISION  10/2009    Neck    SKIN LESION EXCISION  2015    STAPEDECTOMY Right 2001       Family History   Problem Relation Age of Onset    Cancer Mother         skin    Stroke Mother     Heart failure Father     Kidney disease Father        Social History     Socioeconomic History    Marital status:      Spouse name: None    Number of children: None    Years of education: None    Highest education level: None   Social Needs    Financial resource strain: None    Food insecurity - worry: None    Food insecurity - inability: None    Transportation needs - medical: None    Transportation needs - non-medical: None   Occupational History    None   Tobacco Use    Smoking status: Former Smoker     Last attempt to quit:      Years since quittin.8    Smokeless tobacco: Never Used   Substance and Sexual Activity    Alcohol use: Yes     Comment: occasional    Drug use: No    Sexual activity: None   Other Topics Concern    None   Social History Narrative    None       Current Outpatient  "Medications   Medication Sig Dispense Refill    aspirin (ECOTRIN) 81 MG EC tablet aspirin 81 mg tablet,delayed release   Take 1 tablet every day by oral route.      hydroCHLOROthiazide (HYDRODIURIL) 12.5 MG Tab Take 12.5 mg by mouth once daily.       lisinopril (PRINIVIL,ZESTRIL) 40 MG tablet Take 1 tablet (40 mg total) by mouth once daily. 90 tablet 3    omeprazole (PRILOSEC) 20 MG capsule TAKE ONE CAPSULE BY MOUTH ONCE DAILY 30 capsule 5    rosuvastatin (CRESTOR) 10 MG tablet Take 0.5 tablets (5 mg total) by mouth once daily. 30 tablet 5     No current facility-administered medications for this visit.        Review of patient's allergies indicates:  No Known Allergies  Objective:    HPI     Hypertension      Additional comments: QBPC f/u          Last edited by Bessie Mae LPN on 10/18/2018  8:24 AM. (History)      Blood pressure 132/86, pulse (!) 48, height 5' 10" (1.778 m), weight 72.2 kg (159 lb 3.2 oz), SpO2 98 %. Body mass index is 22.84 kg/m².   Physical Exam   Constitutional: He is oriented to person, place, and time. He appears well-developed and well-nourished.   HENT:   Head: Normocephalic and atraumatic.   Nose: Nose normal.   Mouth/Throat: Uvula is midline and oropharynx is clear and moist.   Eyes: Conjunctivae, EOM and lids are normal. Pupils are equal, round, and reactive to light.   Neck: Normal range of motion. Neck supple.   Cardiovascular: Normal rate, regular rhythm, S1 normal, S2 normal, normal heart sounds and normal pulses.   No murmur heard.  Pulmonary/Chest: Effort normal and breath sounds normal. No respiratory distress. He has no wheezes.   Abdominal: Soft. Bowel sounds are normal. There is no tenderness.   Musculoskeletal: Normal range of motion.   Lymphadenopathy:     He has no cervical adenopathy.   Neurological: He is alert and oriented to person, place, and time.   Skin: Skin is warm and dry. No rash noted.   Psychiatric: He has a normal mood and affect. His speech is normal " and behavior is normal. Judgment and thought content normal.   Nursing note and vitals reviewed.          Assessment:       1. Essential hypertension    2. Familial hypercholesterolemia    3. Gastroesophageal reflux disease without esophagitis        Plan:       Onesimo was seen today for hypertension.    Diagnoses and all orders for this visit:    Essential hypertension  -Lifestyle changes: Reduce the amount of salt in your diet; Lose weight; Avoid drinking too much alcohol; Exercise at least 30 minutes per day most days of the week.  Continue current medications and home BP monitoring.   -Patient's blood pressure is controlled at today's visit.    Familial hypercholesterolemia  -Limit red meat, butter, fried foods, cheese, and other foods that have a lot of saturated fat. Consume more: lean meats, fish, fruits, vegetables, whole grains, beans, lentils, and nuts.  Weight loss, and 30-45 min of cardiovascular exercise daily.     Gastroesophageal reflux disease without esophagitis  -Stable.   Continue current medication regimen.         Continue current medication.  Follow up as scheduled with Dr. Merida.

## 2018-10-28 RX ORDER — SIMVASTATIN 40 MG
40 TABLET ORAL NIGHTLY
Qty: 30 TABLET | Refills: 5 | Status: SHIPPED | OUTPATIENT
Start: 2018-10-28 | End: 2019-04-12 | Stop reason: SDUPTHER

## 2018-10-29 NOTE — TELEPHONE ENCOUNTER
From: Larry Henao  To: Terra Garibay MD  Sent: 10/28/2018 1:51 PM EDT  Subject: Medication Renewal Request    Tylerkevin Schlatter.  Kay Thomas would like a refill of the following medications:     simvastatin (ZOCOR) 40 MG tablet [Abad Hernandez MD]     metoprolol tartrate (LOPRESSOR) 25 MG tablet Terra Garibay MD]    Preferred pharmacy: [facility] [address] [place] [address] [phone]

## 2018-11-11 DIAGNOSIS — I10 ESSENTIAL HYPERTENSION: Chronic | ICD-10-CM

## 2018-11-12 RX ORDER — DOXAZOSIN 2 MG/1
TABLET ORAL
Qty: 30 TABLET | Refills: 5 | Status: SHIPPED | OUTPATIENT
Start: 2018-11-12 | End: 2019-01-13 | Stop reason: SDUPTHER

## 2018-12-12 ENCOUNTER — OFFICE VISIT (OUTPATIENT)
Dept: INTERNAL MEDICINE CLINIC | Age: 68
End: 2018-12-12
Payer: MEDICARE

## 2018-12-12 VITALS
DIASTOLIC BLOOD PRESSURE: 80 MMHG | HEIGHT: 71 IN | WEIGHT: 217 LBS | SYSTOLIC BLOOD PRESSURE: 138 MMHG | HEART RATE: 60 BPM | BODY MASS INDEX: 30.38 KG/M2

## 2018-12-12 DIAGNOSIS — G47.33 OBSTRUCTIVE SLEEP APNEA: Chronic | ICD-10-CM

## 2018-12-12 DIAGNOSIS — E78.5 DYSLIPIDEMIA: Chronic | ICD-10-CM

## 2018-12-12 DIAGNOSIS — I10 ESSENTIAL HYPERTENSION: Primary | Chronic | ICD-10-CM

## 2018-12-12 DIAGNOSIS — I13.0 HYPERTENSIVE HEART AND KIDNEY DISEASE WITH HF AND CKD (HCC): ICD-10-CM

## 2018-12-12 PROCEDURE — 1101F PT FALLS ASSESS-DOCD LE1/YR: CPT | Performed by: INTERNAL MEDICINE

## 2018-12-12 PROCEDURE — G8599 NO ASA/ANTIPLAT THER USE RNG: HCPCS | Performed by: INTERNAL MEDICINE

## 2018-12-12 PROCEDURE — 3017F COLORECTAL CA SCREEN DOC REV: CPT | Performed by: INTERNAL MEDICINE

## 2018-12-12 PROCEDURE — 1123F ACP DISCUSS/DSCN MKR DOCD: CPT | Performed by: INTERNAL MEDICINE

## 2018-12-12 PROCEDURE — G8427 DOCREV CUR MEDS BY ELIG CLIN: HCPCS | Performed by: INTERNAL MEDICINE

## 2018-12-12 PROCEDURE — G8482 FLU IMMUNIZE ORDER/ADMIN: HCPCS | Performed by: INTERNAL MEDICINE

## 2018-12-12 PROCEDURE — 99214 OFFICE O/P EST MOD 30 MIN: CPT | Performed by: INTERNAL MEDICINE

## 2018-12-12 PROCEDURE — 1036F TOBACCO NON-USER: CPT | Performed by: INTERNAL MEDICINE

## 2018-12-12 PROCEDURE — 4040F PNEUMOC VAC/ADMIN/RCVD: CPT | Performed by: INTERNAL MEDICINE

## 2018-12-12 PROCEDURE — G8417 CALC BMI ABV UP PARAM F/U: HCPCS | Performed by: INTERNAL MEDICINE

## 2018-12-12 ASSESSMENT — PATIENT HEALTH QUESTIONNAIRE - PHQ9
SUM OF ALL RESPONSES TO PHQ QUESTIONS 1-9: 0
2. FEELING DOWN, DEPRESSED OR HOPELESS: 0
SUM OF ALL RESPONSES TO PHQ QUESTIONS 1-9: 0
1. LITTLE INTEREST OR PLEASURE IN DOING THINGS: 0
SUM OF ALL RESPONSES TO PHQ9 QUESTIONS 1 & 2: 0

## 2018-12-12 NOTE — PROGRESS NOTES
SUBJECTIVE:  Patient Honey Rodrigez is a 70 y.o. y.o. male     HPI    Peg Longest returns for follow up of hypertension. Patient has been taking His medications as prescribed. Patient's blood pressureis  controlled. Side effects related to taking the medications include no medication side effects noted    Patient returns for follow up of hyperlipidemia. Patient has been taking His medications as prescribed. Patient's lipids are controlled. Side effects related to taking the medications include none. He has not had clinical consequences related to hyperlipidemia including, but not limited to acute coronary syndrome, stroke or chronic kidney disease. Review of Systems    OBJECTIVE:    /80   Pulse 60   Ht 5' 11\" (1.803 m)   Wt 217 lb (98.4 kg)   BMI 30.27 kg/m²      Physical Exam   Constitutional: He is oriented to person, place, and time. He appears well-developed and well-nourished. No distress. HENT:   Head: Normocephalic and atraumatic. Eyes:   Clouded right eye   Cardiovascular: Normal rate, regular rhythm, normal heart sounds and intact distal pulses. Exam reveals no friction rub. No murmur heard. Pulmonary/Chest: Effort normal and breath sounds normal. No respiratory distress. He has no wheezes. He has no rales. He exhibits no tenderness. Neurological: He is alert and oriented to person, place, and time. No cranial nerve deficit. Skin: He is not diaphoretic. Psychiatric: He has a normal mood and affect. His behavior is normal. Judgment and thought content normal.   Vitals reviewed.        Current Outpatient Prescriptions:     doxazosin (CARDURA) 2 MG tablet, TAKE 1 TABLET BY MOUTH DAILY, Disp: 30 tablet, Rfl: 5    simvastatin (ZOCOR) 40 MG tablet, Take 1 tablet by mouth nightly, Disp: 30 tablet, Rfl: 5    metoprolol tartrate (LOPRESSOR) 25 MG tablet, Take 1 tablet by mouth 2 times daily, Disp: 60 tablet, Rfl: 5    CycloSPORINE (RESTASIS MULTIDOSE OP), Apply to eye

## 2019-01-09 ENCOUNTER — PATIENT MESSAGE (OUTPATIENT)
Dept: NEUROLOGY | Facility: CLINIC | Age: 69
End: 2019-01-09

## 2019-01-09 RX ORDER — FENOFIBRATE 48 MG/1
TABLET, COATED ORAL
Qty: 30 TABLET | Refills: 0 | Status: SHIPPED | OUTPATIENT
Start: 2019-01-09 | End: 2019-02-20 | Stop reason: SDUPTHER

## 2019-01-10 NOTE — TELEPHONE ENCOUNTER
Left message on recorder stating she cannot request refills on her spouses chart, she will need to resubmit a new message with her information and her medications that she is needing refilled.

## 2019-01-13 DIAGNOSIS — I10 ESSENTIAL HYPERTENSION: Chronic | ICD-10-CM

## 2019-01-14 RX ORDER — DOXAZOSIN 2 MG/1
TABLET ORAL
Qty: 90 TABLET | Refills: 0 | Status: SHIPPED | OUTPATIENT
Start: 2019-01-14 | End: 2019-07-05 | Stop reason: SDUPTHER

## 2019-01-24 DIAGNOSIS — K21.9 GASTROESOPHAGEAL REFLUX DISEASE WITHOUT ESOPHAGITIS: ICD-10-CM

## 2019-01-27 RX ORDER — OMEPRAZOLE 20 MG/1
CAPSULE, DELAYED RELEASE ORAL
Qty: 30 CAPSULE | Refills: 5 | Status: SHIPPED | OUTPATIENT
Start: 2019-01-27 | End: 2019-05-20 | Stop reason: SDUPTHER

## 2019-02-18 ENCOUNTER — OFFICE VISIT (OUTPATIENT)
Dept: FAMILY MEDICINE | Facility: CLINIC | Age: 69
End: 2019-02-18
Payer: COMMERCIAL

## 2019-02-18 VITALS
HEIGHT: 70 IN | RESPIRATION RATE: 17 BRPM | SYSTOLIC BLOOD PRESSURE: 130 MMHG | OXYGEN SATURATION: 94 % | DIASTOLIC BLOOD PRESSURE: 84 MMHG | HEART RATE: 66 BPM | BODY MASS INDEX: 22.76 KG/M2 | TEMPERATURE: 98 F | WEIGHT: 159 LBS

## 2019-02-18 DIAGNOSIS — E78.01 FAMILIAL HYPERCHOLESTEROLEMIA: Primary | ICD-10-CM

## 2019-02-18 DIAGNOSIS — K21.9 GASTROESOPHAGEAL REFLUX DISEASE WITHOUT ESOPHAGITIS: ICD-10-CM

## 2019-02-18 DIAGNOSIS — I10 ESSENTIAL HYPERTENSION: ICD-10-CM

## 2019-02-18 DIAGNOSIS — R73.9 HYPERGLYCEMIA: ICD-10-CM

## 2019-02-18 DIAGNOSIS — R79.89 PRERENAL AZOTEMIA: ICD-10-CM

## 2019-02-18 DIAGNOSIS — G89.29 CHRONIC HIP PAIN, RIGHT: ICD-10-CM

## 2019-02-18 DIAGNOSIS — M25.551 CHRONIC HIP PAIN, RIGHT: ICD-10-CM

## 2019-02-18 DIAGNOSIS — Z12.5 SCREENING FOR PROSTATE CANCER: ICD-10-CM

## 2019-02-18 PROCEDURE — 1101F PR PT FALLS ASSESS DOC 0-1 FALLS W/OUT INJ PAST YR: ICD-10-PCS | Mod: ,,, | Performed by: INTERNAL MEDICINE

## 2019-02-18 PROCEDURE — 3079F PR MOST RECENT DIASTOLIC BLOOD PRESSURE 80-89 MM HG: ICD-10-PCS | Mod: ,,, | Performed by: INTERNAL MEDICINE

## 2019-02-18 PROCEDURE — 1101F PT FALLS ASSESS-DOCD LE1/YR: CPT | Mod: ,,, | Performed by: INTERNAL MEDICINE

## 2019-02-18 PROCEDURE — 99214 PR OFFICE/OUTPT VISIT, EST, LEVL IV, 30-39 MIN: ICD-10-PCS | Mod: ,,, | Performed by: INTERNAL MEDICINE

## 2019-02-18 PROCEDURE — 3075F PR MOST RECENT SYSTOLIC BLOOD PRESS GE 130-139MM HG: ICD-10-PCS | Mod: ,,, | Performed by: INTERNAL MEDICINE

## 2019-02-18 PROCEDURE — 99214 OFFICE O/P EST MOD 30 MIN: CPT | Mod: ,,, | Performed by: INTERNAL MEDICINE

## 2019-02-18 PROCEDURE — 3075F SYST BP GE 130 - 139MM HG: CPT | Mod: ,,, | Performed by: INTERNAL MEDICINE

## 2019-02-18 PROCEDURE — 3079F DIAST BP 80-89 MM HG: CPT | Mod: ,,, | Performed by: INTERNAL MEDICINE

## 2019-02-18 RX ORDER — TRAMADOL HYDROCHLORIDE 50 MG/1
TABLET ORAL
COMMUNITY
Start: 2019-01-25 | End: 2019-11-18

## 2019-02-18 NOTE — PATIENT INSTRUCTIONS
Understanding Trochanteric Bursitis    A bursa is a thin, slippery, sac-like film. It contains a small amount of fluid. This structure is found between bones and soft tissues in and around joints. A bursa cushions and protects a joint. It keeps parts of a joint from rubbing against each other. If a bursa becomes inflamed and irritated, it is known as bursitis.  The trochanteric bursa is found on the hip joint. It lies on top of the bump at the top of the thighbone called the greater trochanter. Inflammation of this bursa is called trochanteric bursitis.     How to say it  [id number]   Causes of trochanteric bursitis  Causes may include:  · Overuse of the hip during running or other sports, dance, or work  · Falling on or irritation to the side of the hip  This condition may occur along with other problems, such as osteoarthritis of the hip or knee, or low back problems. In rare cases, it may occur after hip surgery.  Symptoms of trochanteric bursitis  · Pain or aching on the side of the hip. The pain may travel down the leg.  · Swelling, tenderness, or warmth on the side of the hip at the bony bump at the top of the thigh  Treatment for trochanteric bursitis  These may include:  · Resting the hip. This allows the bursa to heal.  · Prescription or over-the-counter pain medicines. These help reduce inflammation, swelling, and pain.  · Cold packs and heat packs. These help reduce pain and swelling.  · Stretching and strengthening exercises. These improve flexibility and strength around the hip.  · Physical therapy. This includes exercises or other treatments.  · Injections of medicine into the bursa. This may help reduce inflammation and relieve symptoms.  Possible complications  If you dont give your hip time to heal, the problem may not go away, may return, or may get worse. Rest and treat your hip as directed.     When to call your healthcare provider  Call your healthcare provider right away if you have  any of these:  · Fever of 100.4°F (38°C) or higher, or as directed  · Redness, swelling, or warmth that gets worse  · Symptoms that dont get better with prescribed medicines, or get worse  · New symptoms   Date Last Reviewed: 3/29/2016  © 9457-9932 [employer]. [address] [place] [address], [place] [address]. All rights reserved. This information is not intended as a substitute for professional medical care. Always follow your healthcare professional's instructions.

## 2019-02-18 NOTE — PROGRESS NOTES
Subjective:       Patient ID: Onesimo Paula is a 68 y.o. male.    Chief Complaint: Follow-up; Hypertension; and Hyperlipidemia    68-year-old gentleman who is here for six-month follow-up for hypertension, mild prerenal azotemia in the past, GERD as well as dyslipidemia. He has not done blood work before this office visit. His last BUN and creatinine are as listed below. On last office visit we recommended no NSAIDs. Unfortunately the patient does have some osteoarthritis most recently had arthroscopic knee surgery with fairly good results on the left knee. He had 2 tears. He also was complaining at that time hip pain that bothers him when ambulating for a period of time or even first thing in the morning when he gets up. The pain is deep within the right hip and is made worse with leaning forward. He had a negative hip x-ray and he is not tender on the bursa. He can sleep on that side at night. He works for the [employer] so this discomfort in his hip can be a problem.      Review of Systems   Constitutional: Negative for activity change and unexpected weight change.   HENT: Negative for hearing loss, rhinorrhea and trouble swallowing.    Eyes: Positive for visual disturbance. Negative for discharge.   Respiratory: Negative for chest tightness and wheezing.    Cardiovascular: Negative for chest pain and palpitations.   Gastrointestinal: Negative for blood in stool, constipation, diarrhea and vomiting.   Endocrine: Negative for polydipsia and polyuria.   Genitourinary: Positive for urgency. Negative for difficulty urinating and hematuria.   Musculoskeletal: Positive for arthralgias. Negative for joint swelling and neck pain.   Neurological: Negative for weakness and headaches.   Psychiatric/Behavioral: Negative for confusion and dysphoric mood.       Past Medical History:   Diagnosis Date    Acid reflux     Acquired tricuspid valve insufficiency     Anemia     Borderline diabetes     BPH (benign prostatic  hyperplasia)     Coronary artery disease     Hypertension     Pulmonary nodules     Skin cancer        Past Surgical History:   Procedure Laterality Date    HERNIA REPAIR  1999    HERNIA REPAIR  2004    KNEE ARTHROSCOPY Left 2018    SKIN LESION EXCISION  10/2009    Neck    SKIN LESION EXCISION  2015    STAPEDECTOMY Right 2001       Family History   Problem Relation Age of Onset    Cancer Mother         skin    Stroke Mother     Heart failure Father     Kidney disease Father        Social History     Socioeconomic History    Marital status:      Spouse name: None    Number of children: None    Years of education: None    Highest education level: None   Social Needs    Financial resource strain: None    Food insecurity - worry: None    Food insecurity - inability: None    Transportation needs - medical: None    Transportation needs - non-medical: None   Occupational History    None   Tobacco Use    Smoking status: Former Smoker     Last attempt to quit:      Years since quittin.1    Smokeless tobacco: Never Used   Substance and Sexual Activity    Alcohol use: Yes     Comment: occasional    Drug use: No    Sexual activity: None   Other Topics Concern    None   Social History Narrative    None       Current Outpatient Medications   Medication Sig Dispense Refill    aspirin (ECOTRIN) 81 MG EC tablet aspirin 81 mg tablet,delayed release   Take 1 tablet every day by oral route.      hydroCHLOROthiazide (HYDRODIURIL) 12.5 MG Tab Take 12.5 mg by mouth once daily.       lisinopril (PRINIVIL,ZESTRIL) 40 MG tablet Take 1 tablet (40 mg total) by mouth once daily. 90 tablet 3    omeprazole (PRILOSEC) 20 MG capsule TAKE 1 CAPSULE BY MOUTH ONCE DAILY 30 capsule 5    rosuvastatin (CRESTOR) 10 MG tablet Take 0.5 tablets (5 mg total) by mouth once daily. 30 tablet 5    traMADol (ULTRAM) 50 mg tablet        No current facility-administered medications for this visit.         Review of patient's allergies indicates:  No Known Allergies  Objective:      Physical Exam   Constitutional: He is oriented to person, place, and time. He appears well-developed and well-nourished. No distress.   HENT:   Head: Normocephalic and atraumatic.   Right Ear: External ear normal.   Left Ear: External ear normal.   Nose: Nose normal.   Mouth/Throat: Oropharynx is clear and moist.   Eyes: Conjunctivae and EOM are normal. Right eye exhibits no discharge. Left eye exhibits no discharge. No scleral icterus.   Neck: Normal range of motion. Neck supple. No JVD present. No tracheal deviation present. No thyromegaly present.   Cardiovascular: Normal rate, regular rhythm, normal heart sounds and intact distal pulses. Exam reveals no gallop.   No murmur heard.  Pulmonary/Chest: Effort normal and breath sounds normal. No respiratory distress. He has no wheezes. He has no rales.   Abdominal: Soft. Bowel sounds are normal. He exhibits no distension and no mass. There is no tenderness.   Musculoskeletal: Normal range of motion. He exhibits no edema or tenderness.   Lymphadenopathy:     He has no cervical adenopathy.   Neurological: He is alert and oriented to person, place, and time.   Skin: Skin is warm and dry. No rash noted. He is not diaphoretic. No erythema.   Psychiatric: He has a normal mood and affect. His behavior is normal. Judgment and thought content normal.       Lab Results   Component Value Date    WBC 6.1 04/09/2018    HGB 15.0 04/09/2018    HCT 44.0 04/09/2018     04/09/2018    AST 31 04/09/2018     04/09/2018    K 3.6 04/09/2018     04/09/2018    CREATININE 1.24 04/09/2018    BUN 26 (H) 04/09/2018    CO2 28.0 04/09/2018    TSH 2.15 02/16/2018    PSA 1.0 03/14/2018     Assessment:       1. Familial hypercholesterolemia    2. Essential hypertension    3. Prerenal azotemia    4. Chronic hip pain, right    5. Gastroesophageal reflux disease without esophagitis    6. Screening for  prostate cancer    7. Hyperglycemia- ? FH in mom/dad        Plan:       Familial hypercholesterolemia- on statin  -     Lipid panel; Future; Expected date: 02/18/2019  -     Comprehensive metabolic panel; Future; Expected date: 02/18/2019    Essential hypertension  -     TSH; Future; Expected date: 02/18/2019  -     T4, free; Future; Expected date: 02/18/2019  -     Microalbumin/creatinine urine ratio; Future; Expected date: 02/18/2019  -     Urinalysis w/reflex to Microscopic    Prerenal azotemia-persists and patient on HCTZ 12.5mg ;await next set of labs to determine if it should be discontinued    Chronic hip pain, right- ortho did not think trochanteric bursitis and hip xray without arthritis.   -     Ambulatory referral to Physical Medicine Rehab    Gastroesophageal reflux disease without esophagitis- continue ppi  -     CBC auto differential; Future; Expected date: 02/18/2019    Screening for prostate cancer  -     PSA, Screening; Future; Expected date: 02/18/2019    Hyperglycemia- ? FH in mom/dad  -     Hemoglobin A1c; Future; Expected date: 02/18/2019

## 2019-02-20 RX ORDER — FENOFIBRATE 48 MG/1
48 TABLET, COATED ORAL DAILY
Qty: 90 TABLET | Refills: 1 | Status: SHIPPED | OUTPATIENT
Start: 2019-02-20 | End: 2019-07-05 | Stop reason: SDUPTHER

## 2019-02-20 RX ORDER — FENOFIBRATE 48 MG/1
48 TABLET, COATED ORAL DAILY
Qty: 30 TABLET | Refills: 5 | Status: SHIPPED | OUTPATIENT
Start: 2019-02-20 | End: 2019-02-20 | Stop reason: SDUPTHER

## 2019-02-21 LAB
ALBUMIN SERPL-MCNC: 4.2 G/DL (ref 3.1–4.7)
ALP SERPL-CCNC: 60 IU/L (ref 40–104)
ALT (SGPT): 35 IU/L (ref 3–33)
AST SERPL-CCNC: 34 IU/L (ref 10–40)
BASOPHILS NFR BLD: 0.1 K/UL (ref 0–0.2)
BASOPHILS NFR BLD: 1.3 %
BILIRUB SERPL-MCNC: 0.7 MG/DL (ref 0.3–1)
BUN SERPL-MCNC: 24 MG/DL (ref 8–20)
CALCIUM SERPL-MCNC: 9.5 MG/DL (ref 7.7–10.4)
CHLORIDE: 102 MMOL/L (ref 98–110)
CO2 SERPL-SCNC: 29.4 MMOL/L (ref 22.8–31.6)
COMPLEXED PSA SERPL-MCNC: 1.2 NG/ML (ref 0–3)
CREATININE RANDOM URINE: 156 MG/DL
CREATININE: 1.11 MG/DL (ref 0.6–1.4)
EOSINOPHIL NFR BLD: 0.4 K/UL (ref 0–0.7)
EOSINOPHIL NFR BLD: 5.8 %
ERYTHROCYTE [DISTWIDTH] IN BLOOD BY AUTOMATED COUNT: 13 % (ref 11.7–14.9)
GLUCOSE: 103 MG/DL (ref 70–99)
GRAN #: 3.2 K/UL (ref 1.4–6.5)
GRAN%: 51.4 %
HBA1C MFR BLD: 5.8 % (ref 3.1–6.5)
HCT VFR BLD AUTO: 46.5 % (ref 39–55)
HGB BLD-MCNC: 15.2 G/DL (ref 14–16)
IMMATURE GRANS (ABS): 0 K/UL (ref 0–1)
IMMATURE GRANULOCYTES: 0.2 %
LYMPH #: 1.8 K/UL (ref 1.2–3.4)
LYMPH%: 28.2 %
MCH RBC QN AUTO: 31.1 PG (ref 25–35)
MCHC RBC AUTO-ENTMCNC: 32.7 G/DL (ref 31–36)
MCV RBC AUTO: 95.1 FL (ref 80–100)
MICROALBUM.,U,RANDOM: 21.6 MCG/ML (ref 0–19.9)
MICROALBUMIN/CREATININE RATIO: 14 (ref 0–30)
MONO #: 0.8 K/UL (ref 0.1–0.6)
MONO%: 13.1 %
NUCLEATED RBCS: 0 %
PLATELET # BLD AUTO: 209 K/UL (ref 140–440)
PMV BLD AUTO: 11.6 FL (ref 8.8–12.7)
POTASSIUM SERPL-SCNC: 4.3 MMOL/L (ref 3.5–5)
PROT SERPL-MCNC: 7.1 G/DL (ref 6–8.2)
RBC # BLD AUTO: 4.89 M/UL (ref 4.3–5.9)
SODIUM: 141 MMOL/L (ref 134–144)
T4 FREE SP9 P CHAL SERPL-SCNC: 0.84 NG/DL (ref 0.45–1.27)
TSH SERPL DL<=0.005 MIU/L-ACNC: 1.83 ULU/ML (ref 0.3–5.6)
WBC # BLD AUTO: 6.2 K/UL (ref 5–10)

## 2019-03-27 ENCOUNTER — INITIAL CONSULT (OUTPATIENT)
Dept: PHYSICAL MEDICINE AND REHAB | Facility: CLINIC | Age: 69
End: 2019-03-27
Payer: COMMERCIAL

## 2019-03-27 ENCOUNTER — HOSPITAL ENCOUNTER (OUTPATIENT)
Dept: RADIOLOGY | Facility: HOSPITAL | Age: 69
Discharge: HOME | End: 2019-03-27
Attending: PHYSICAL MEDICINE & REHABILITATION
Payer: COMMERCIAL

## 2019-03-27 VITALS
HEART RATE: 54 BPM | DIASTOLIC BLOOD PRESSURE: 72 MMHG | RESPIRATION RATE: 12 BRPM | BODY MASS INDEX: 23.07 KG/M2 | HEIGHT: 70 IN | SYSTOLIC BLOOD PRESSURE: 144 MMHG | WEIGHT: 161.19 LBS

## 2019-03-27 DIAGNOSIS — M54.16 LUMBAR RADICULITIS: ICD-10-CM

## 2019-03-27 DIAGNOSIS — M54.16 LUMBAR RADICULITIS: Primary | ICD-10-CM

## 2019-03-27 DIAGNOSIS — R89.9 ABNORMAL LABORATORY TEST: ICD-10-CM

## 2019-03-27 PROCEDURE — 99999 PR PBB SHADOW E&M-EST. PATIENT-LVL IV: CPT | Mod: PBBFAC,,, | Performed by: PHYSICAL MEDICINE & REHABILITATION

## 2019-03-27 PROCEDURE — 72100 X-RAY EXAM L-S SPINE 2/3 VWS: CPT | Mod: TC,FY

## 2019-03-27 PROCEDURE — 99204 PR OFFICE/OUTPT VISIT, NEW, LEVL IV, 45-59 MIN: ICD-10-PCS | Mod: S$GLB,,, | Performed by: PHYSICAL MEDICINE & REHABILITATION

## 2019-03-27 PROCEDURE — 1101F PT FALLS ASSESS-DOCD LE1/YR: CPT | Mod: CPTII,S$GLB,, | Performed by: PHYSICAL MEDICINE & REHABILITATION

## 2019-03-27 PROCEDURE — 3078F PR MOST RECENT DIASTOLIC BLOOD PRESSURE < 80 MM HG: ICD-10-PCS | Mod: CPTII,S$GLB,, | Performed by: PHYSICAL MEDICINE & REHABILITATION

## 2019-03-27 PROCEDURE — 3077F PR MOST RECENT SYSTOLIC BLOOD PRESSURE >= 140 MM HG: ICD-10-PCS | Mod: CPTII,S$GLB,, | Performed by: PHYSICAL MEDICINE & REHABILITATION

## 2019-03-27 PROCEDURE — 72100 X-RAY EXAM L-S SPINE 2/3 VWS: CPT | Mod: 26,,, | Performed by: RADIOLOGY

## 2019-03-27 PROCEDURE — 3077F SYST BP >= 140 MM HG: CPT | Mod: CPTII,S$GLB,, | Performed by: PHYSICAL MEDICINE & REHABILITATION

## 2019-03-27 PROCEDURE — 99204 OFFICE O/P NEW MOD 45 MIN: CPT | Mod: S$GLB,,, | Performed by: PHYSICAL MEDICINE & REHABILITATION

## 2019-03-27 PROCEDURE — 72100 XR LUMBAR SPINE AP AND LATERAL: ICD-10-PCS | Mod: 26,,, | Performed by: RADIOLOGY

## 2019-03-27 PROCEDURE — 1101F PR PT FALLS ASSESS DOC 0-1 FALLS W/OUT INJ PAST YR: ICD-10-PCS | Mod: CPTII,S$GLB,, | Performed by: PHYSICAL MEDICINE & REHABILITATION

## 2019-03-27 PROCEDURE — 99999 PR PBB SHADOW E&M-EST. PATIENT-LVL IV: ICD-10-PCS | Mod: PBBFAC,,, | Performed by: PHYSICAL MEDICINE & REHABILITATION

## 2019-03-27 PROCEDURE — 3078F DIAST BP <80 MM HG: CPT | Mod: CPTII,S$GLB,, | Performed by: PHYSICAL MEDICINE & REHABILITATION

## 2019-03-27 NOTE — LETTER
March 27, 2019      Stephanie Merida MD  [address] [facility]  [address]  [place] [address]           [place] [facility]  [address] [place] [address] [address]  [place] [address]  Phone: [phone]  Fax: [phone]          Patient: Onesimo Paula   MR Number: [id number]   YOB: 1950   Date of Visit: 3/27/2019       Dear Dr. Stephanie Merida:    Thank you for referring Onesimo Paula to me for evaluation. Attached you will find relevant portions of my assessment and plan of care.    If you have questions, please do not hesitate to call me. I look forward to following Onesimo Paula along with you.    Sincerely,    Antoinette Rojo,     Enclosure  CC:  No Recipients    If you would like to receive this communication electronically, please contact [email or web address] or [phone] to request more information on "[employer]" Link access.    For providers and/or their staff who would like to refer a patient to [facility], please contact us through our one-stop-shop provider referral line, [facility], at [phone].    If you feel you have received this communication in error or would no longer like to receive these types of communications, please e-mail [email or web address]

## 2019-04-04 DIAGNOSIS — I10 ESSENTIAL HYPERTENSION: ICD-10-CM

## 2019-04-04 RX ORDER — LISINOPRIL 40 MG/1
TABLET ORAL
Qty: 90 TABLET | Refills: 3 | Status: SHIPPED | OUTPATIENT
Start: 2019-04-04 | End: 2020-03-30 | Stop reason: SDUPTHER

## 2019-04-18 ENCOUNTER — PATIENT MESSAGE (OUTPATIENT)
Dept: PHYSICAL MEDICINE AND REHAB | Facility: CLINIC | Age: 69
End: 2019-04-18

## 2019-04-22 ENCOUNTER — PATIENT MESSAGE (OUTPATIENT)
Dept: PHYSICAL MEDICINE AND REHAB | Facility: CLINIC | Age: 69
End: 2019-04-22

## 2019-04-23 ENCOUNTER — PATIENT MESSAGE (OUTPATIENT)
Dept: PHYSICAL MEDICINE AND REHAB | Facility: CLINIC | Age: 69
End: 2019-04-23

## 2019-04-23 RX ORDER — ROSUVASTATIN CALCIUM 10 MG/1
TABLET, COATED ORAL
Qty: 30 TABLET | Refills: 5 | Status: SHIPPED | OUTPATIENT
Start: 2019-04-23 | End: 2019-08-08

## 2019-04-30 ENCOUNTER — LAB VISIT (OUTPATIENT)
Dept: LAB | Facility: HOSPITAL | Age: 69
End: 2019-04-30
Attending: THORACIC SURGERY (CARDIOTHORACIC VASCULAR SURGERY)
Payer: COMMERCIAL

## 2019-04-30 DIAGNOSIS — R89.9 ABNORMAL LABORATORY TEST: ICD-10-CM

## 2019-04-30 LAB
BASOPHILS # BLD AUTO: 0 K/UL (ref 0–0.2)
BASOPHILS NFR BLD: 0.8 % (ref 0–1.9)
DIFFERENTIAL METHOD: ABNORMAL
EOSINOPHIL # BLD AUTO: 0.1 K/UL (ref 0–0.5)
EOSINOPHIL NFR BLD: 2.1 % (ref 0–8)
ERYTHROCYTE [DISTWIDTH] IN BLOOD BY AUTOMATED COUNT: 13.9 % (ref 11.5–14.5)
HCT VFR BLD AUTO: 44.7 % (ref 40–54)
HGB BLD-MCNC: 14.8 G/DL (ref 14–18)
LYMPHOCYTES # BLD AUTO: 1.6 K/UL (ref 1–4.8)
LYMPHOCYTES NFR BLD: 30.1 % (ref 18–48)
MCH RBC QN AUTO: 30.9 PG (ref 27–31)
MCHC RBC AUTO-ENTMCNC: 33 G/DL (ref 32–36)
MCV RBC AUTO: 94 FL (ref 82–98)
MONOCYTES # BLD AUTO: 0.5 K/UL (ref 0.3–1)
MONOCYTES NFR BLD: 9 % (ref 4–15)
NEUTROPHILS # BLD AUTO: 3 K/UL (ref 1.8–7.7)
NEUTROPHILS NFR BLD: 58 % (ref 38–73)
PLATELET # BLD AUTO: 224 K/UL (ref 150–350)
PMV BLD AUTO: 8.9 FL (ref 9.2–12.9)
RBC # BLD AUTO: 4.78 M/UL (ref 4.6–6.2)
WBC # BLD AUTO: 5.2 K/UL (ref 3.9–12.7)

## 2019-04-30 PROCEDURE — 36415 COLL VENOUS BLD VENIPUNCTURE: CPT

## 2019-04-30 PROCEDURE — 85025 COMPLETE CBC W/AUTO DIFF WBC: CPT

## 2019-05-08 ENCOUNTER — CLINICAL SUPPORT (OUTPATIENT)
Dept: PHYSICAL MEDICINE AND REHAB | Facility: CLINIC | Age: 69
End: 2019-05-08
Payer: COMMERCIAL

## 2019-05-08 VITALS
DIASTOLIC BLOOD PRESSURE: 80 MMHG | WEIGHT: 161 LBS | SYSTOLIC BLOOD PRESSURE: 150 MMHG | BODY MASS INDEX: 23.05 KG/M2 | HEIGHT: 70 IN | HEART RATE: 51 BPM

## 2019-05-08 DIAGNOSIS — M54.50 CHRONIC RIGHT-SIDED LOW BACK PAIN WITHOUT SCIATICA: ICD-10-CM

## 2019-05-08 DIAGNOSIS — G89.29 CHRONIC RIGHT-SIDED LOW BACK PAIN WITHOUT SCIATICA: ICD-10-CM

## 2019-05-08 PROCEDURE — 76942 ECHO GUIDE FOR BIOPSY: CPT | Mod: 26,S$GLB,, | Performed by: PHYSICAL MEDICINE & REHABILITATION

## 2019-05-08 PROCEDURE — 64450 NJX AA&/STRD OTHER PN/BRANCH: CPT | Mod: RT,S$GLB,, | Performed by: PHYSICAL MEDICINE & REHABILITATION

## 2019-05-08 PROCEDURE — 64450 PR NERVE BLOCK INJ, ANES/STEROID, OTHER PERIPHERAL: ICD-10-PCS | Mod: RT,S$GLB,, | Performed by: PHYSICAL MEDICINE & REHABILITATION

## 2019-05-08 PROCEDURE — 99999 PR PBB SHADOW E&M-EST. PATIENT-LVL III: CPT | Mod: PBBFAC,,, | Performed by: PHYSICAL MEDICINE & REHABILITATION

## 2019-05-08 PROCEDURE — 99999 PR PBB SHADOW E&M-EST. PATIENT-LVL III: ICD-10-PCS | Mod: PBBFAC,,, | Performed by: PHYSICAL MEDICINE & REHABILITATION

## 2019-05-08 PROCEDURE — 99499 NO LOS: ICD-10-PCS | Mod: S$GLB,,, | Performed by: PHYSICAL MEDICINE & REHABILITATION

## 2019-05-08 PROCEDURE — 76942 PR U/S GUIDANCE FOR NEEDLE GUIDANCE: ICD-10-PCS | Mod: 26,S$GLB,, | Performed by: PHYSICAL MEDICINE & REHABILITATION

## 2019-05-08 PROCEDURE — 99499 UNLISTED E&M SERVICE: CPT | Mod: S$GLB,,, | Performed by: PHYSICAL MEDICINE & REHABILITATION

## 2019-05-08 RX ORDER — LIDOCAINE HYDROCHLORIDE 10 MG/ML
10 INJECTION INFILTRATION; PERINEURAL ONCE
Status: COMPLETED | OUTPATIENT
Start: 2019-05-08 | End: 2019-05-08

## 2019-05-08 RX ADMIN — LIDOCAINE HYDROCHLORIDE 10 ML: 10 INJECTION INFILTRATION; PERINEURAL at 10:05

## 2019-05-08 NOTE — PROGRESS NOTES
PROCEDURE NOTE:   risk and benefit of right CLUNEAL NERVE BLOCK AND HYDRODISECTION injection reviewed with. patient. Verbal consent was obtained. Injection was performed after sterile prep w.chrolorohexedine. Short  and long axis APPROACH UTILIZED. First, VISUALIZATION OF THE PSIS  , superior gluteal artery, fascial line between GMAX and GMED was obtained, subsequently  BLOCK + HYDRODISSECTION OF CLUNEAL NERVE PERFORMED. PT. WAS LAYING PRONE. INJECTION WAS PERFORMED WITH A  22g  spinal needle  needle  After a numbing wheal w. Lidocaine performed.   Ultrasound guidance was utilized for needle visualization. A TOTAL OF 10ML OF LIDOCAINE 1% AND 10ML OF STERILE NORMAL SALINE WAS UTILIZED. No complications. iMMEDIATE IMPROVEMENT OF  PAIN POST PROCEDURE.    Ultrasound interpretation was performed prior to the procedure to identify the target and any adjacent neurovascular structures.  Subsequently, interpretation was performed during real- time needle guidance confirming placement. Post- intervention interpretation was also performed confirming appropriate injectate  flow and hemostasis.  Images indicating needle placement have been saved in [employer].

## 2019-05-20 DIAGNOSIS — K21.9 GASTROESOPHAGEAL REFLUX DISEASE WITHOUT ESOPHAGITIS: ICD-10-CM

## 2019-05-20 RX ORDER — OMEPRAZOLE 20 MG/1
20 CAPSULE, DELAYED RELEASE ORAL DAILY
Qty: 30 CAPSULE | Refills: 5 | Status: SHIPPED | OUTPATIENT
Start: 2019-05-20 | End: 2020-01-06

## 2019-05-24 ENCOUNTER — OFFICE VISIT (OUTPATIENT)
Dept: PHYSICAL MEDICINE AND REHAB | Facility: CLINIC | Age: 69
End: 2019-05-24
Payer: COMMERCIAL

## 2019-05-24 VITALS
SYSTOLIC BLOOD PRESSURE: 135 MMHG | WEIGHT: 160.94 LBS | HEIGHT: 70 IN | HEART RATE: 54 BPM | BODY MASS INDEX: 23.04 KG/M2 | DIASTOLIC BLOOD PRESSURE: 79 MMHG

## 2019-05-24 DIAGNOSIS — M25.551 PAIN OF RIGHT HIP JOINT: Primary | ICD-10-CM

## 2019-05-24 PROCEDURE — 99214 PR OFFICE/OUTPT VISIT, EST, LEVL IV, 30-39 MIN: ICD-10-PCS | Mod: S$GLB,,, | Performed by: PHYSICAL MEDICINE & REHABILITATION

## 2019-05-24 PROCEDURE — 3078F PR MOST RECENT DIASTOLIC BLOOD PRESSURE < 80 MM HG: ICD-10-PCS | Mod: CPTII,S$GLB,, | Performed by: PHYSICAL MEDICINE & REHABILITATION

## 2019-05-24 PROCEDURE — 3075F SYST BP GE 130 - 139MM HG: CPT | Mod: CPTII,S$GLB,, | Performed by: PHYSICAL MEDICINE & REHABILITATION

## 2019-05-24 PROCEDURE — 3078F DIAST BP <80 MM HG: CPT | Mod: CPTII,S$GLB,, | Performed by: PHYSICAL MEDICINE & REHABILITATION

## 2019-05-24 PROCEDURE — 99999 PR PBB SHADOW E&M-EST. PATIENT-LVL III: CPT | Mod: PBBFAC,,, | Performed by: PHYSICAL MEDICINE & REHABILITATION

## 2019-05-24 PROCEDURE — 1101F PT FALLS ASSESS-DOCD LE1/YR: CPT | Mod: CPTII,S$GLB,, | Performed by: PHYSICAL MEDICINE & REHABILITATION

## 2019-05-24 PROCEDURE — 3075F PR MOST RECENT SYSTOLIC BLOOD PRESS GE 130-139MM HG: ICD-10-PCS | Mod: CPTII,S$GLB,, | Performed by: PHYSICAL MEDICINE & REHABILITATION

## 2019-05-24 PROCEDURE — 99214 OFFICE O/P EST MOD 30 MIN: CPT | Mod: S$GLB,,, | Performed by: PHYSICAL MEDICINE & REHABILITATION

## 2019-05-24 PROCEDURE — 99999 PR PBB SHADOW E&M-EST. PATIENT-LVL III: ICD-10-PCS | Mod: PBBFAC,,, | Performed by: PHYSICAL MEDICINE & REHABILITATION

## 2019-05-24 PROCEDURE — 1101F PR PT FALLS ASSESS DOC 0-1 FALLS W/OUT INJ PAST YR: ICD-10-PCS | Mod: CPTII,S$GLB,, | Performed by: PHYSICAL MEDICINE & REHABILITATION

## 2019-05-24 NOTE — PROGRESS NOTES
HPI:  Patient is a 68 y.o. year old male . Right hip pain. It has been going on for a year. It started after he injured his left knee in 2018. He tore his meniscus and had to limp. Since than he developed a dull pain in his hip. He is able to sleep on it. It occurs intermittently. At times it is a stabbing pain. He works a a [employer] man and when he lifts something heavy or does a particular movement he gets the pain. She denies any weakness, falls or trauma.  He is worried as his brother  of lymphoma and it presented the same way, w. Hip pain.  He is s/p right cluneal nerve block+hydrodissection w. About 50% improvement.  Imaging  X-Ray Pelvis 3 view inc Hip 2 view Right   Order: [id number]   Status:  Final result   Visible to patient:  Yes (Patient Portal) Next appt:  Today at 11:00 AM in Radiology ([facility])   Details            Reading Physician Reading Date Result Priority   Nicole Navarro MD 2018         Narrative       AP pelvis and 2 views of the right hip    Clinical history is pain    There are no fractures, dislocations or acute osseous abnormalities. Hip joints  are symmetric in size. There is no soft tissue abnormality.    IMPRESSION: Negative right hip            Labs  egfr cr nl (however has been elevated in past CKD mild)  Alt borderline  ast nl  bordeline elev monocytes, remainder of cbc n        Past Medical History:   Diagnosis Date    Acid reflux     Acquired tricuspid valve insufficiency     Anemia     Borderline diabetes     BPH (benign prostatic hyperplasia)     Coronary artery disease     Hypertension     Pulmonary nodules     Skin cancer      Past Surgical History:   Procedure Laterality Date    HERNIA REPAIR  1999    HERNIA REPAIR  2004    KNEE ARTHROSCOPY Left 2018    SKIN LESION EXCISION  10/2009    Neck    SKIN LESION EXCISION  2015    STAPEDECTOMY Right 2001     Family History   Problem Relation Age of Onset    Cancer Mother         skin     Stroke Mother     Heart failure Father     Kidney disease Father      Social History     Socioeconomic History    Marital status:      Spouse name: Not on file    Number of children: Not on file    Years of education: Not on file    Highest education level: Not on file   Occupational History    Not on file   Social Needs    Financial resource strain: Not on file    Food insecurity:     Worry: Not on file     Inability: Not on file    Transportation needs:     Medical: Not on file     Non-medical: Not on file   Tobacco Use    Smoking status: Former Smoker     Last attempt to quit:      Years since quittin.4    Smokeless tobacco: Never Used   Substance and Sexual Activity    Alcohol use: Yes     Comment: occasional    Drug use: No    Sexual activity: Not on file   Lifestyle    Physical activity:     Days per week: Not on file     Minutes per session: Not on file    Stress: Not on file   Relationships    Social connections:     Talks on phone: Not on file     Gets together: Not on file     Attends Islam service: Not on file     Active member of club or organization: Not on file     Attends meetings of clubs or organizations: Not on file     Relationship status: Not on file   Other Topics Concern    Not on file   Social History Narrative    Not on file       Review of patient's allergies indicates:  No Known Allergies    Current Outpatient Medications:     aspirin (ECOTRIN) 81 MG EC tablet, aspirin 81 mg tablet,delayed release  Take 1 tablet every day by oral route., Disp: , Rfl:     hydroCHLOROthiazide (HYDRODIURIL) 12.5 MG Tab, Take 12.5 mg by mouth once daily. , Disp: , Rfl:     lisinopril (PRINIVIL,ZESTRIL) 40 MG tablet, TAKE 1 TABLET BY MOUTH ONCE DAILY, Disp: 90 tablet, Rfl: 3    omeprazole (PRILOSEC) 20 MG capsule, Take 1 capsule (20 mg total) by mouth once daily., Disp: 30 capsule, Rfl: 5    rosuvastatin (CRESTOR) 10 MG tablet, TAKE 1/2 (ONE-HALF) TABLET BY MOUTH ONCE  DAILY, Disp: 30 tablet, Rfl: 5    traMADol (ULTRAM) 50 mg tablet, , Disp: , Rfl:     Review of Systems  No nausea, vomiting, fevers, Chills , contipation, diarrhea or sweats    Physical Exam:      Vitals:    05/24/19 0903   BP: 135/79   Pulse: (!) 54     alert and oriented ×4 follows commands answers all questions appropriately,affect wnl  Manual muscle test 5 out of 5 sensation to light touch grossly intact  - tenderness right iliac crest  -standing flexion test  Leveled iliac crests  Nl gait  -slR  -DOMENICA  Full hip ROM  babinsky down  No clonus  DTR's symmetric 2+  No C/C/E        Assessment:  Right hip pain  ckd   Brother w. lymphoma  Plan:  Repeat right cluneal nerve block+hydrodissection as the first one helped but did not resolve pain   MRI of right hip

## 2019-06-03 ENCOUNTER — HOSPITAL ENCOUNTER (OUTPATIENT)
Dept: RADIOLOGY | Facility: HOSPITAL | Age: 69
Discharge: HOME | End: 2019-06-03
Attending: PHYSICAL MEDICINE & REHABILITATION
Payer: COMMERCIAL

## 2019-06-03 ENCOUNTER — TELEPHONE (OUTPATIENT)
Dept: PHYSICAL MEDICINE AND REHAB | Facility: CLINIC | Age: 69
End: 2019-06-03

## 2019-06-03 DIAGNOSIS — M25.551 PAIN OF RIGHT HIP JOINT: ICD-10-CM

## 2019-06-03 PROCEDURE — 73721 MRI JNT OF LWR EXTRE W/O DYE: CPT | Mod: TC,RT

## 2019-06-03 PROCEDURE — 73721 MRI JNT OF LWR EXTRE W/O DYE: CPT | Mod: 26,RT,, | Performed by: RADIOLOGY

## 2019-06-03 PROCEDURE — 73721 MRI HIP WITHOUT CONTRAST RIGHT: ICD-10-PCS | Mod: 26,RT,, | Performed by: RADIOLOGY

## 2019-06-04 NOTE — TELEPHONE ENCOUNTER
Patient has been notified of results.  Patient states he has been following up with his pcp regarding the enlarge prostate.

## 2019-06-04 NOTE — TELEPHONE ENCOUNTER
Mri indicated hip OA mild+ Incidental finding of enlarged prostate- should follow up w pcp regarding this-pls notify

## 2019-06-19 ENCOUNTER — OFFICE VISIT (OUTPATIENT)
Dept: PHYSICAL MEDICINE AND REHAB | Facility: CLINIC | Age: 69
End: 2019-06-19
Payer: COMMERCIAL

## 2019-06-19 VITALS
HEART RATE: 52 BPM | BODY MASS INDEX: 22.96 KG/M2 | SYSTOLIC BLOOD PRESSURE: 151 MMHG | WEIGHT: 160 LBS | DIASTOLIC BLOOD PRESSURE: 88 MMHG

## 2019-06-19 DIAGNOSIS — M25.551 PAIN OF RIGHT HIP JOINT: Primary | ICD-10-CM

## 2019-06-19 PROCEDURE — 1101F PR PT FALLS ASSESS DOC 0-1 FALLS W/OUT INJ PAST YR: ICD-10-PCS | Mod: CPTII,S$GLB,, | Performed by: PHYSICAL MEDICINE & REHABILITATION

## 2019-06-19 PROCEDURE — 99999 PR PBB SHADOW E&M-EST. PATIENT-LVL III: CPT | Mod: PBBFAC,,, | Performed by: PHYSICAL MEDICINE & REHABILITATION

## 2019-06-19 PROCEDURE — 99999 PR PBB SHADOW E&M-EST. PATIENT-LVL III: ICD-10-PCS | Mod: PBBFAC,,, | Performed by: PHYSICAL MEDICINE & REHABILITATION

## 2019-06-19 PROCEDURE — 3079F PR MOST RECENT DIASTOLIC BLOOD PRESSURE 80-89 MM HG: ICD-10-PCS | Mod: CPTII,S$GLB,, | Performed by: PHYSICAL MEDICINE & REHABILITATION

## 2019-06-19 PROCEDURE — 3077F SYST BP >= 140 MM HG: CPT | Mod: CPTII,S$GLB,, | Performed by: PHYSICAL MEDICINE & REHABILITATION

## 2019-06-19 PROCEDURE — 3079F DIAST BP 80-89 MM HG: CPT | Mod: CPTII,S$GLB,, | Performed by: PHYSICAL MEDICINE & REHABILITATION

## 2019-06-19 PROCEDURE — 3077F PR MOST RECENT SYSTOLIC BLOOD PRESSURE >= 140 MM HG: ICD-10-PCS | Mod: CPTII,S$GLB,, | Performed by: PHYSICAL MEDICINE & REHABILITATION

## 2019-06-19 PROCEDURE — 99212 PR OFFICE/OUTPT VISIT, EST, LEVL II, 10-19 MIN: ICD-10-PCS | Mod: S$GLB,,, | Performed by: PHYSICAL MEDICINE & REHABILITATION

## 2019-06-19 PROCEDURE — 99212 OFFICE O/P EST SF 10 MIN: CPT | Mod: S$GLB,,, | Performed by: PHYSICAL MEDICINE & REHABILITATION

## 2019-06-19 PROCEDURE — 1101F PT FALLS ASSESS-DOCD LE1/YR: CPT | Mod: CPTII,S$GLB,, | Performed by: PHYSICAL MEDICINE & REHABILITATION

## 2019-06-19 NOTE — PROGRESS NOTES
HPI:  Patient is a 68 y.o. year old male w. Hip pain. He is s/p right cluneal nerve block+hydrodissection. His pain has resolved. I did order an MRI of hip as his brother  from lymphoma and it was discovered after he started having similar pain. Results of mri are below    Imaging    MRI Hip Without Contrast Right   Order: [id number]   Status:  Final result   Visible to patient:  Yes (Patient Portal) Next appt:  2019 at 09:15 AM in Pulmonology (Micah Curtis MD) Dx:  Pain of right hip joint   Details     Reading Physician Reading Date Result Priority   Micah Magana MD 6/3/2019 Routine      Narrative     EXAMINATION:  MRI HIP WITHOUT CONTRAST RIGHT    CLINICAL HISTORY:  Hip pain, xray neg / oa, referred pain suspected;brother  of lymphoma, started same pain;  Pain in right hip    TECHNIQUE:  Multiplanar, multisequence images were performed through the right hip.    COMPARISON:  None    FINDINGS:  Bone: No acute fracture, avascular necrosis or marrow replacement.    Hip Joints:No malalignment.  Small right hip joint effusion.  No full-thickness chondral defect.  Areas of partial thickness chondral loss along the medial right hip joint with small inferomedial femoral head spurs.    Labrum: Assessment limited on this nonarthrogram study.  There is intrasubstance signal within the anterosuperior labrum at the 10:00 o'clock position, best seen series 4 images 12-15.    SI joints: Unremarkable.    Muscles: Normal volume and signal.    Tendons: Unremarkable.    Bursae: Iliopsoas and greater trochanteric bursae are unremarkable.    Soft tissues: No mass or focal fluid.    Intraabdominal/intrapelvic: Small bilateral hydroceles.  Mild diffuse urinary bladder wall thickening.  Prostate is enlarged although exam is not tailored for this purpose.    Miscellaneous:      Impression       1. Suspect a tear in the right hip labrum, anterosuperior quadrant.  Arthrogram could provide additional  characterization.  2. Mild right hip degenerative change with a small effusion.  3.  Diffuse bladder wall thickening with differential to include cystitis and chronic outlet obstruction.  4. Prostatomegaly.  Correlate with PSA.               Past Medical History:   Diagnosis Date    Acid reflux     Acquired tricuspid valve insufficiency     Anemia     Borderline diabetes     BPH (benign prostatic hyperplasia)     Coronary artery disease     Hypertension     Pulmonary nodules     Skin cancer      Past Surgical History:   Procedure Laterality Date    HERNIA REPAIR  1999    HERNIA REPAIR  2004    KNEE ARTHROSCOPY Left 2018    SKIN LESION EXCISION  10/2009    Neck    SKIN LESION EXCISION  2015    STAPEDECTOMY Right 2001     Family History   Problem Relation Age of Onset    Cancer Mother         skin    Stroke Mother     Heart failure Father     Kidney disease Father      Social History     Socioeconomic History    Marital status:      Spouse name: Not on file    Number of children: Not on file    Years of education: Not on file    Highest education level: Not on file   Occupational History    Not on file   Social Needs    Financial resource strain: Not on file    Food insecurity:     Worry: Not on file     Inability: Not on file    Transportation needs:     Medical: Not on file     Non-medical: Not on file   Tobacco Use    Smoking status: Former Smoker     Last attempt to quit:      Years since quittin.4    Smokeless tobacco: Never Used   Substance and Sexual Activity    Alcohol use: Yes     Comment: occasional    Drug use: No    Sexual activity: Not on file   Lifestyle    Physical activity:     Days per week: Not on file     Minutes per session: Not on file    Stress: Not on file   Relationships    Social connections:     Talks on phone: Not on file     Gets together: Not on file     Attends [employer] service: Not on file     Active member of club or  organization: Not on file     Attends meetings of clubs or organizations: Not on file     Relationship status: Not on file   Other Topics Concern    Not on file   Social History Narrative    Not on file       Review of patient's allergies indicates:  No Known Allergies    Current Outpatient Medications:     aspirin (ECOTRIN) 81 MG EC tablet, aspirin 81 mg tablet,delayed release  Take 1 tablet every day by oral route., Disp: , Rfl:     hydroCHLOROthiazide (HYDRODIURIL) 12.5 MG Tab, Take 12.5 mg by mouth once daily. , Disp: , Rfl:     lisinopril (PRINIVIL,ZESTRIL) 40 MG tablet, TAKE 1 TABLET BY MOUTH ONCE DAILY, Disp: 90 tablet, Rfl: 3    omeprazole (PRILOSEC) 20 MG capsule, Take 1 capsule (20 mg total) by mouth once daily., Disp: 30 capsule, Rfl: 5    rosuvastatin (CRESTOR) 10 MG tablet, TAKE 1/2 (ONE-HALF) TABLET BY MOUTH ONCE DAILY, Disp: 30 tablet, Rfl: 5    traMADol (ULTRAM) 50 mg tablet, , Disp: , Rfl:         Review of Systems  No nausea, vomiting, fevers, Chills , contipation, diarrhea or sweats      Physical Exam:      Vitals:    06/19/19 0834   BP: (!) 151/88   Pulse: (!) 52          alert and oriented ×4 follows commands answers all questions appropriately,affect wnl  Manual muscle test 5 out of 5 sensation to light touch grossly intact  - tenderness right iliac crest  Leveled iliac crests  Nl gait  -DOMENICA  Full hip ROM  No C/C/E        Assessment:  Right hip pain - resolved  Mild hip oa  Probable labral tear of hip  ckd   Brother w. lymphoma  Plan:      F/u prn

## 2019-07-05 ENCOUNTER — OFFICE VISIT (OUTPATIENT)
Dept: INTERNAL MEDICINE CLINIC | Age: 69
End: 2019-07-05
Payer: MEDICARE

## 2019-07-05 VITALS
WEIGHT: 217 LBS | DIASTOLIC BLOOD PRESSURE: 76 MMHG | BODY MASS INDEX: 30.27 KG/M2 | HEART RATE: 72 BPM | SYSTOLIC BLOOD PRESSURE: 128 MMHG

## 2019-07-05 DIAGNOSIS — I13.0 HYPERTENSIVE HEART AND KIDNEY DISEASE WITH HF AND CKD (HCC): Primary | ICD-10-CM

## 2019-07-05 DIAGNOSIS — E78.5 DYSLIPIDEMIA: ICD-10-CM

## 2019-07-05 DIAGNOSIS — L12.1 PEMPHIGOID OF GINGIVAL MUCOSA: ICD-10-CM

## 2019-07-05 DIAGNOSIS — I10 ESSENTIAL HYPERTENSION: Chronic | ICD-10-CM

## 2019-07-05 PROCEDURE — 99214 OFFICE O/P EST MOD 30 MIN: CPT | Performed by: INTERNAL MEDICINE

## 2019-07-05 PROCEDURE — 4040F PNEUMOC VAC/ADMIN/RCVD: CPT | Performed by: INTERNAL MEDICINE

## 2019-07-05 PROCEDURE — G8417 CALC BMI ABV UP PARAM F/U: HCPCS | Performed by: INTERNAL MEDICINE

## 2019-07-05 PROCEDURE — 1123F ACP DISCUSS/DSCN MKR DOCD: CPT | Performed by: INTERNAL MEDICINE

## 2019-07-05 PROCEDURE — 1036F TOBACCO NON-USER: CPT | Performed by: INTERNAL MEDICINE

## 2019-07-05 PROCEDURE — G8427 DOCREV CUR MEDS BY ELIG CLIN: HCPCS | Performed by: INTERNAL MEDICINE

## 2019-07-05 PROCEDURE — 3017F COLORECTAL CA SCREEN DOC REV: CPT | Performed by: INTERNAL MEDICINE

## 2019-07-05 RX ORDER — SIMVASTATIN 40 MG
TABLET ORAL
Qty: 90 TABLET | Refills: 3 | Status: SHIPPED | OUTPATIENT
Start: 2019-07-05 | End: 2020-08-24 | Stop reason: SDUPTHER

## 2019-07-05 RX ORDER — FENOFIBRATE 48 MG/1
48 TABLET, COATED ORAL DAILY
Qty: 90 TABLET | Refills: 3 | Status: SHIPPED | OUTPATIENT
Start: 2019-07-05 | End: 2020-08-13 | Stop reason: SDUPTHER

## 2019-07-05 RX ORDER — DOXAZOSIN 2 MG/1
TABLET ORAL
Qty: 90 TABLET | Refills: 3 | Status: SHIPPED | OUTPATIENT
Start: 2019-07-05 | End: 2019-11-08

## 2019-07-05 ASSESSMENT — PATIENT HEALTH QUESTIONNAIRE - PHQ9
2. FEELING DOWN, DEPRESSED OR HOPELESS: 0
SUM OF ALL RESPONSES TO PHQ QUESTIONS 1-9: 0
SUM OF ALL RESPONSES TO PHQ9 QUESTIONS 1 & 2: 0
1. LITTLE INTEREST OR PLEASURE IN DOING THINGS: 0
SUM OF ALL RESPONSES TO PHQ QUESTIONS 1-9: 0

## 2019-07-23 ENCOUNTER — OFFICE VISIT (OUTPATIENT)
Dept: PULMONOLOGY | Facility: CLINIC | Age: 69
End: 2019-07-23
Payer: COMMERCIAL

## 2019-07-23 VITALS
HEART RATE: 67 BPM | BODY MASS INDEX: 23.19 KG/M2 | WEIGHT: 162 LBS | SYSTOLIC BLOOD PRESSURE: 130 MMHG | OXYGEN SATURATION: 97 % | DIASTOLIC BLOOD PRESSURE: 80 MMHG | HEIGHT: 70 IN

## 2019-07-23 DIAGNOSIS — R91.8 PULMONARY NODULES: Primary | ICD-10-CM

## 2019-07-23 DIAGNOSIS — R91.8 MULTIPLE PULMONARY NODULES: ICD-10-CM

## 2019-07-23 DIAGNOSIS — R06.09 DYSPNEA ON EXERTION: ICD-10-CM

## 2019-07-23 PROCEDURE — 99214 PR OFFICE/OUTPT VISIT, EST, LEVL IV, 30-39 MIN: ICD-10-PCS | Mod: ,,, | Performed by: INTERNAL MEDICINE

## 2019-07-23 PROCEDURE — 1101F PT FALLS ASSESS-DOCD LE1/YR: CPT | Mod: ,,, | Performed by: INTERNAL MEDICINE

## 2019-07-23 PROCEDURE — 1101F PR PT FALLS ASSESS DOC 0-1 FALLS W/OUT INJ PAST YR: ICD-10-PCS | Mod: ,,, | Performed by: INTERNAL MEDICINE

## 2019-07-23 PROCEDURE — 3075F PR MOST RECENT SYSTOLIC BLOOD PRESS GE 130-139MM HG: ICD-10-PCS | Mod: ,,, | Performed by: INTERNAL MEDICINE

## 2019-07-23 PROCEDURE — 3079F PR MOST RECENT DIASTOLIC BLOOD PRESSURE 80-89 MM HG: ICD-10-PCS | Mod: ,,, | Performed by: INTERNAL MEDICINE

## 2019-07-23 PROCEDURE — 3079F DIAST BP 80-89 MM HG: CPT | Mod: ,,, | Performed by: INTERNAL MEDICINE

## 2019-07-23 PROCEDURE — 99214 OFFICE O/P EST MOD 30 MIN: CPT | Mod: ,,, | Performed by: INTERNAL MEDICINE

## 2019-07-23 PROCEDURE — 3075F SYST BP GE 130 - 139MM HG: CPT | Mod: ,,, | Performed by: INTERNAL MEDICINE

## 2019-07-23 RX ORDER — KETOCONAZOLE 20 MG/G
CREAM TOPICAL
Refills: 1 | COMMUNITY
Start: 2019-06-07 | End: 2020-01-07

## 2019-07-23 NOTE — PROGRESS NOTES
HPI:    7/13/2017 - Here for follow up, had repeat CT chest showing stable nodules (2-4 mm) since 1/2017, will repeat CT in 1 year.  Only complaint is some mild BURT (PFT 9/2016 - mild decrease DLCO o/w ok, methacholine challenge 12/2016 +, will try prn beta agonist.  No other new complaints.    1/23/2018 - Here for follow up, felt breathing didn't respond to inhalers.  He has stopped all of his BP meds and reports SBP to 190 at times (d/w him). No new respiratory symptoms.    7/26/2018 - Here for follow up, no new issues still with some dyspnea with exertion but not any worse (has not really responded to inhalers ( had previous PFT and methacholine challenge were ok).  BP remains an issues (has anywhere from elevated to low BP).  No chest pain or CHF symptoms.    7/23/2019 - Here for follow up, doing well, still with intermittent dyspnea.  BP remains variable and he feels better when it is lower.  Has some blurred vision at times (to see Ophthalmologist for cataracts)    Abnormal Chest CT scan    Date of last scan - 7/2018    Findings - stable multiple nodules    Followup - 7/2019      Medications    Current Outpatient Medications:     aspirin (ECOTRIN) 81 MG EC tablet, aspirin 81 mg tablet,delayed release  Take 1 tablet every day by oral route., Disp: , Rfl:     hydroCHLOROthiazide (HYDRODIURIL) 12.5 MG Tab, Take 12.5 mg by mouth once daily. , Disp: , Rfl:     ketoconazole (NIZORAL) 2 % cream, APPLY CREAM TOPICALLY ONCE DAILY FOR 14 DAYS, Disp: , Rfl: 1    lisinopril (PRINIVIL,ZESTRIL) 40 MG tablet, TAKE 1 TABLET BY MOUTH ONCE DAILY, Disp: 90 tablet, Rfl: 3    omeprazole (PRILOSEC) 20 MG capsule, Take 1 capsule (20 mg total) by mouth once daily., Disp: 30 capsule, Rfl: 5    rosuvastatin (CRESTOR) 10 MG tablet, TAKE 1/2 (ONE-HALF) TABLET BY MOUTH ONCE DAILY, Disp: 30 tablet, Rfl: 5    traMADol (ULTRAM) 50 mg tablet, , Disp: , Rfl:     Allergies  Review of patient's allergies indicates:  No Known  "Allergies    Social History    Social History     Tobacco Use   Smoking Status Former Smoker    Last attempt to quit:     Years since quittin.5   Smokeless Tobacco Never Used     ETOH - 3 drinks per week.  Social History     Substance and Sexual Activity   Drug Use No     Occupation - works as a     Family History  Family History   Problem Relation Age of Onset    Cancer Mother         skin    Stroke Mother     Heart failure Father     Kidney disease Father        ROS  Review of Systems   Constitutional: Negative for chills, diaphoresis, fever, malaise/fatigue and weight loss.   HENT: Negative for congestion.    Eyes: Negative for pain.   Respiratory: Positive for shortness of breath. Negative for cough, hemoptysis, sputum production, wheezing and stridor.    Cardiovascular: Negative for chest pain, palpitations, orthopnea, claudication, leg swelling and PND.   Gastrointestinal: Negative for abdominal pain, constipation, diarrhea, heartburn, nausea and vomiting.   Genitourinary: Negative for dysuria, frequency and urgency.   Musculoskeletal: Negative for falls and myalgias.   Neurological: Negative for sensory change, focal weakness and weakness.   Psychiatric/Behavioral: Negative for depression. The patient is not nervous/anxious.        Physical Exam    Vitals:    19 0856   BP: 130/80   Pulse: 67   SpO2: 97%   Weight: 73.5 kg (162 lb)   Height: 5' 10" (1.778 m)       Physical Exam   Constitutional: He is oriented to person, place, and time. He appears well-developed and well-nourished. No distress.   HENT:   Head: Normocephalic and atraumatic.   Right Ear: External ear normal.   Left Ear: External ear normal.   Nose: Nose normal.   Mouth/Throat: Oropharynx is clear and moist.   Eyes: Pupils are equal, round, and reactive to light. EOM are normal.   Neck: Normal range of motion. Neck supple. No JVD present. No tracheal deviation present. No thyromegaly present.   Cardiovascular: " Normal rate, regular rhythm, normal heart sounds and intact distal pulses. Exam reveals no gallop and no friction rub.   No murmur heard.  Pulmonary/Chest: Effort normal and breath sounds normal. No stridor. No respiratory distress. He has no wheezes. He has no rales. He exhibits no tenderness.   Abdominal: Soft. Bowel sounds are normal. He exhibits no distension.   Musculoskeletal: Normal range of motion. He exhibits no edema or tenderness.   Lymphadenopathy:     He has no cervical adenopathy.   Neurological: He is alert and oriented to person, place, and time. He has normal reflexes. No cranial nerve deficit.   Skin: He is not diaphoretic.   Psychiatric: He has a normal mood and affect. His behavior is normal.   Nursing note and vitals reviewed.      Lab        Xray   Reason: Follow-up lung nodules    Technique: CT thorax without contrast    Comparison: 07/10/2017, 01/26/2017  CT THORAX:  There are stable bilateral subcentimeter noncalcified pulmonary nodules the  largest in the right lung base measuring 4 mm. There are no new nodules,  infiltrates or pleural effusions. There are mild emphysematous changes. There  is no hilar, mediastinal or axillary adenopathy.    The visualized portion of the upper abdomen demonstrates hepatic cyst. The  adrenal glands are normal. There are degenerative changes of the spine.      IMPRESSION:  Stable bilateral tiny noncalcified pulmonary nodules    Mild emphysematous changes    Read and electronically signed by: Nicole Black MD on 7/31/2018 10:12 AM CDT      NICOLE BLACK MD    Impression/Plan  Problem List Items Addressed This Visit        Pulmonary    Multiple pulmonary nodules  - repeat now      Dyspnea on exertion - Primary  - stable, told to check pulse ox and heart rate during these episodes  - continue prn beta agonist  - RTC 12 months    HTN  - good control              Other Visit Diagnoses    None.         Micah Curtis MD

## 2019-07-31 ENCOUNTER — HOSPITAL ENCOUNTER (OUTPATIENT)
Dept: RADIOLOGY | Facility: HOSPITAL | Age: 69
Discharge: HOME | End: 2019-07-31
Attending: INTERNAL MEDICINE
Payer: COMMERCIAL

## 2019-07-31 DIAGNOSIS — R91.8 PULMONARY NODULES: ICD-10-CM

## 2019-07-31 PROCEDURE — 71250 CT THORAX DX C-: CPT | Mod: TC

## 2019-08-08 ENCOUNTER — LAB VISIT (OUTPATIENT)
Dept: LAB | Facility: HOSPITAL | Age: 69
End: 2019-08-08
Attending: INTERNAL MEDICINE
Payer: COMMERCIAL

## 2019-08-08 ENCOUNTER — OFFICE VISIT (OUTPATIENT)
Dept: FAMILY MEDICINE | Facility: CLINIC | Age: 69
End: 2019-08-08
Payer: COMMERCIAL

## 2019-08-08 VITALS
TEMPERATURE: 98 F | HEART RATE: 59 BPM | OXYGEN SATURATION: 96 % | HEIGHT: 70 IN | SYSTOLIC BLOOD PRESSURE: 110 MMHG | BODY MASS INDEX: 22.48 KG/M2 | WEIGHT: 157 LBS | RESPIRATION RATE: 17 BRPM | DIASTOLIC BLOOD PRESSURE: 60 MMHG

## 2019-08-08 DIAGNOSIS — E78.01 FAMILIAL HYPERCHOLESTEROLEMIA: ICD-10-CM

## 2019-08-08 DIAGNOSIS — I10 ESSENTIAL HYPERTENSION: ICD-10-CM

## 2019-08-08 DIAGNOSIS — R74.01 ELEVATED TRANSAMINASE LEVEL: ICD-10-CM

## 2019-08-08 DIAGNOSIS — N40.0 ENLARGED PROSTATE: ICD-10-CM

## 2019-08-08 DIAGNOSIS — N32.89 BLADDER WALL THICKENING: Primary | ICD-10-CM

## 2019-08-08 DIAGNOSIS — R73.03 PREDIABETES: ICD-10-CM

## 2019-08-08 DIAGNOSIS — N32.89 BLADDER WALL THICKENING: ICD-10-CM

## 2019-08-08 LAB
ALBUMIN SERPL BCP-MCNC: 4.1 G/DL (ref 3.5–5.2)
ALP SERPL-CCNC: 54 U/L (ref 55–135)
ALT SERPL W/O P-5'-P-CCNC: 39 U/L (ref 10–44)
ANION GAP SERPL CALC-SCNC: 7 MMOL/L (ref 8–16)
AST SERPL-CCNC: 33 U/L (ref 10–40)
BACTERIA #/AREA URNS HPF: NEGATIVE /HPF
BILIRUB SERPL-MCNC: 0.5 MG/DL (ref 0.1–1)
BUN SERPL-MCNC: 27 MG/DL (ref 8–23)
CALCIUM SERPL-MCNC: 9.6 MG/DL (ref 8.7–10.5)
CHLORIDE SERPL-SCNC: 107 MMOL/L (ref 95–110)
CO2 SERPL-SCNC: 29 MMOL/L (ref 23–29)
CREAT SERPL-MCNC: 1.2 MG/DL (ref 0.5–1.4)
EST. GFR  (AFRICAN AMERICAN): >60 ML/MIN/1.73 M^2
EST. GFR  (NON AFRICAN AMERICAN): >60 ML/MIN/1.73 M^2
ESTIMATED AVG GLUCOSE: 126 MG/DL (ref 68–131)
GLUCOSE SERPL-MCNC: 111 MG/DL (ref 70–110)
HBA1C MFR BLD HPLC: 6 % (ref 4.5–6.2)
HYALINE CASTS #/AREA URNS LPF: 1 /LPF
MICROSCOPIC COMMENT: NORMAL
POTASSIUM SERPL-SCNC: 3.9 MMOL/L (ref 3.5–5.1)
PROT SERPL-MCNC: 7.3 G/DL (ref 6–8.4)
RBC #/AREA URNS HPF: 0 /HPF (ref 0–4)
SODIUM SERPL-SCNC: 143 MMOL/L (ref 136–145)
SQUAMOUS #/AREA URNS HPF: 0 /HPF
WBC #/AREA URNS HPF: 0 /HPF (ref 0–5)

## 2019-08-08 PROCEDURE — 3078F DIAST BP <80 MM HG: CPT | Mod: S$GLB,,, | Performed by: INTERNAL MEDICINE

## 2019-08-08 PROCEDURE — 99999 PR PBB SHADOW E&M-EST. PATIENT-LVL IV: CPT | Mod: PBBFAC,,, | Performed by: INTERNAL MEDICINE

## 2019-08-08 PROCEDURE — 81001 URINALYSIS AUTO W/SCOPE: CPT

## 2019-08-08 PROCEDURE — 3074F PR MOST RECENT SYSTOLIC BLOOD PRESSURE < 130 MM HG: ICD-10-PCS | Mod: S$GLB,,, | Performed by: INTERNAL MEDICINE

## 2019-08-08 PROCEDURE — 3074F SYST BP LT 130 MM HG: CPT | Mod: S$GLB,,, | Performed by: INTERNAL MEDICINE

## 2019-08-08 PROCEDURE — 1101F PR PT FALLS ASSESS DOC 0-1 FALLS W/OUT INJ PAST YR: ICD-10-PCS | Mod: S$GLB,,, | Performed by: INTERNAL MEDICINE

## 2019-08-08 PROCEDURE — 36415 COLL VENOUS BLD VENIPUNCTURE: CPT

## 2019-08-08 PROCEDURE — 3078F PR MOST RECENT DIASTOLIC BLOOD PRESSURE < 80 MM HG: ICD-10-PCS | Mod: S$GLB,,, | Performed by: INTERNAL MEDICINE

## 2019-08-08 PROCEDURE — 1101F PT FALLS ASSESS-DOCD LE1/YR: CPT | Mod: S$GLB,,, | Performed by: INTERNAL MEDICINE

## 2019-08-08 PROCEDURE — 99999 PR PBB SHADOW E&M-EST. PATIENT-LVL IV: ICD-10-PCS | Mod: PBBFAC,,, | Performed by: INTERNAL MEDICINE

## 2019-08-08 PROCEDURE — 83036 HEMOGLOBIN GLYCOSYLATED A1C: CPT

## 2019-08-08 PROCEDURE — 80053 COMPREHEN METABOLIC PANEL: CPT

## 2019-08-08 PROCEDURE — 99214 PR OFFICE/OUTPT VISIT, EST, LEVL IV, 30-39 MIN: ICD-10-PCS | Mod: S$GLB,,, | Performed by: INTERNAL MEDICINE

## 2019-08-08 PROCEDURE — 99214 OFFICE O/P EST MOD 30 MIN: CPT | Mod: S$GLB,,, | Performed by: INTERNAL MEDICINE

## 2019-08-08 NOTE — PROGRESS NOTES
Subjective:       Patient ID: Onesimo Paula is a 68 y.o. male.    Chief Complaint: Follow-up and Hypertension    68-year-old gentleman 68-year-old gentleman who is here for 6 month follow-up for hypertension, dyslipidemia, GERD, osteoarthritis and prediabetes.  He also has a history of pulmonary nodules that are followed by Pulmonary and had a CT scan a few weeks ago that showed the pulmonary nodules to be stable and they are small.  He also has been seeing Physical Medicine and rehab for hip pain and had an injection as well as an MRI back in June.  The MRI the side showing some hip pathology also showed an enlarged prostate and thickening of the bladder wall possibly due to obstruction.  His last urinalysis in February did show a but will be to red cells but no other real abnormalities.  The patient does not really have any symptoms of BPH except for an occasional weak stream but he is not a big water drinker.  He also has a very minimal elevation in transaminases.  A CT scan of the lungs done for the pulmonary nodule he did have some atherosclerotic plaque and he does have some mild intolerance of statins specifically atorvastatin and now he takes 5 mg of Crestor and his LDL in February was 90.  His A1c in February was 5.8.    Review of Systems   Constitutional: Negative for activity change, diaphoresis, fatigue and fever.   HENT: Negative for congestion, ear pain, postnasal drip, sinus pressure, sore throat and trouble swallowing.    Eyes: Negative for pain.   Respiratory: Negative for cough, chest tightness, shortness of breath and wheezing.    Cardiovascular: Negative for chest pain, palpitations and leg swelling.   Gastrointestinal: Negative for abdominal distention, abdominal pain, blood in stool, constipation and diarrhea.   Endocrine: Negative for cold intolerance and heat intolerance.   Genitourinary: Negative for decreased urine volume, difficulty urinating, dysuria, flank pain, frequency and  hematuria.   Musculoskeletal: Negative for arthralgias, back pain, joint swelling, myalgias and neck pain.   Skin: Negative for pallor, rash and wound.   Neurological: Negative for tremors, syncope, weakness, light-headedness, numbness and headaches.   Hematological: Negative for adenopathy.   Psychiatric/Behavioral: Negative for confusion, decreased concentration, hallucinations, self-injury, sleep disturbance and suicidal ideas. The patient is not nervous/anxious.        Past Medical History:   Diagnosis Date    Acid reflux     Acquired tricuspid valve insufficiency     Anemia     Borderline diabetes     BPH (benign prostatic hyperplasia)     Coronary artery disease     Hypertension     Pulmonary nodules     Skin cancer        Past Surgical History:   Procedure Laterality Date    HERNIA REPAIR  1999    HERNIA REPAIR  2004    KNEE ARTHROSCOPY Left 2018    SKIN LESION EXCISION  10/2009    Neck    SKIN LESION EXCISION  2015    STAPEDECTOMY Right 2001       Family History   Problem Relation Age of Onset    Cancer Mother         skin    Stroke Mother     Heart failure Father     Kidney disease Father        Social History     Socioeconomic History    Marital status:      Spouse name: Not on file    Number of children: Not on file    Years of education: Not on file    Highest education level: Not on file   Occupational History    Not on file   Social Needs    Financial resource strain: Not on file    Food insecurity:     Worry: Not on file     Inability: Not on file    Transportation needs:     Medical: Not on file     Non-medical: Not on file   Tobacco Use    Smoking status: Former Smoker     Last attempt to quit:      Years since quittin.6    Smokeless tobacco: Never Used   Substance and Sexual Activity    Alcohol use: Yes     Comment: occasional    Drug use: No    Sexual activity: Yes     Partners: Female   Lifestyle    Physical activity:     Days per  week: Not on file     Minutes per session: Not on file    Stress: Not at all   Relationships    Social connections:     Talks on phone: Not on file     Gets together: Not on file     Attends [employer] service: Not on file     Active member of club or organization: Not on file     Attends meetings of clubs or organizations: Not on file     Relationship status: Not on file   Other Topics Concern    Not on file   Social History Narrative    Not on file       Current Outpatient Medications   Medication Sig Dispense Refill    aspirin (ECOTRIN) 81 MG EC tablet aspirin 81 mg tablet,delayed release   Take 1 tablet every day by oral route.      hydroCHLOROthiazide (HYDRODIURIL) 12.5 MG Tab Take 12.5 mg by mouth once daily.       ketoconazole (NIZORAL) 2 % cream APPLY CREAM TOPICALLY ONCE DAILY FOR 14 DAYS  1    lisinopril (PRINIVIL,ZESTRIL) 40 MG tablet TAKE 1 TABLET BY MOUTH ONCE DAILY 90 tablet 3    omeprazole (PRILOSEC) 20 MG capsule Take 1 capsule (20 mg total) by mouth once daily. 30 capsule 5    traMADol (ULTRAM) 50 mg tablet        No current facility-administered medications for this visit.        Review of patient's allergies indicates:  No Known Allergies  Objective:      Physical Exam   Constitutional: He is oriented to person, place, and time. He appears well-developed and well-nourished. No distress.   HENT:   Head: Normocephalic and atraumatic.   Right Ear: External ear normal.   Left Ear: External ear normal.   Nose: Nose normal.   Mouth/Throat: Oropharynx is clear and moist.   Eyes: Conjunctivae and EOM are normal. Right eye exhibits no discharge. Left eye exhibits no discharge. No scleral icterus.   Neck: Normal range of motion. Neck supple. No JVD present. No tracheal deviation present. No thyromegaly present.   Cardiovascular: Normal rate, regular rhythm, normal heart sounds and intact distal pulses. Exam reveals no gallop.   No murmur heard.  Pulmonary/Chest: Effort normal and breath sounds  normal. No respiratory distress. He has no wheezes. He has no rales.   Abdominal: Soft. Bowel sounds are normal. He exhibits no distension and no mass. There is no tenderness.   Musculoskeletal: Normal range of motion. He exhibits no edema or tenderness.   Lymphadenopathy:     He has no cervical adenopathy.   Neurological: He is alert and oriented to person, place, and time.   Skin: Skin is warm and dry. No rash noted. He is not diaphoretic. No erythema.   Psychiatric: He has a normal mood and affect. His behavior is normal. Judgment and thought content normal.       Lab Results   Component Value Date    WBC 5.20 04/30/2019    HGB 14.8 04/30/2019    HCT 44.7 04/30/2019     04/30/2019    ALT 39 08/08/2019    AST 33 08/08/2019     08/08/2019    K 3.9 08/08/2019     08/08/2019    CREATININE 1.2 08/08/2019    BUN 27 (H) 08/08/2019    CO2 29 08/08/2019    TSH 1.83 02/21/2019    PSA 1.2 02/21/2019    HGBA1C 6.0 08/08/2019       Assessment:       1. Bladder wall thickening- seen on MRI -6/19    2. Familial hypercholesterolemia    3. Essential hypertension    4. Elevated transaminase level    5. Prediabetes    6. Enlarged prostate        Plan:       Bladder wall thickening- seen on MRI -6/19  -     Urinalysis Microscopic; Future; Expected date: 08/08/2019  -     Ambulatory Referral to Urology    Familial hypercholesterolemia  -     Comprehensive metabolic panel; Future; Expected date: 08/08/2019    Essential hypertension   Controlled on above med regimen to include an ARB/ACE  Elevated transaminase level  -     Comprehensive metabolic panel; Future; Expected date: 08/08/2019    Prediabetes  -     Hemoglobin A1c; Future; Expected date: 08/08/2019    Enlarged prostate  -     Ambulatory Referral to Urology

## 2019-08-29 ENCOUNTER — PATIENT MESSAGE (OUTPATIENT)
Dept: FAMILY MEDICINE | Facility: CLINIC | Age: 69
End: 2019-08-29

## 2019-09-03 ENCOUNTER — OFFICE VISIT (OUTPATIENT)
Dept: UROLOGY | Facility: CLINIC | Age: 69
End: 2019-09-03
Payer: COMMERCIAL

## 2019-09-03 VITALS
HEIGHT: 70 IN | BODY MASS INDEX: 22.65 KG/M2 | WEIGHT: 158.19 LBS | SYSTOLIC BLOOD PRESSURE: 137 MMHG | HEART RATE: 62 BPM | DIASTOLIC BLOOD PRESSURE: 79 MMHG

## 2019-09-03 DIAGNOSIS — N40.0 BENIGN PROSTATIC HYPERPLASIA WITHOUT LOWER URINARY TRACT SYMPTOMS: Primary | ICD-10-CM

## 2019-09-03 DIAGNOSIS — R93.41 ABNORMAL CT SCAN, BLADDER: ICD-10-CM

## 2019-09-03 DIAGNOSIS — N28.1 RENAL CYST: ICD-10-CM

## 2019-09-03 DIAGNOSIS — N52.1 ERECTILE DYSFUNCTION DUE TO DISEASES CLASSIFIED ELSEWHERE: ICD-10-CM

## 2019-09-03 DIAGNOSIS — N48.6 PEYRONIE DISEASE: ICD-10-CM

## 2019-09-03 PROCEDURE — 3078F DIAST BP <80 MM HG: CPT | Mod: CPTII,S$GLB,, | Performed by: UROLOGY

## 2019-09-03 PROCEDURE — 3075F SYST BP GE 130 - 139MM HG: CPT | Mod: CPTII,S$GLB,, | Performed by: UROLOGY

## 2019-09-03 PROCEDURE — 99999 PR PBB SHADOW E&M-EST. PATIENT-LVL III: CPT | Mod: PBBFAC,,, | Performed by: UROLOGY

## 2019-09-03 PROCEDURE — 99205 PR OFFICE/OUTPT VISIT, NEW, LEVL V, 60-74 MIN: ICD-10-PCS | Mod: S$GLB,,, | Performed by: UROLOGY

## 2019-09-03 PROCEDURE — 1101F PT FALLS ASSESS-DOCD LE1/YR: CPT | Mod: CPTII,S$GLB,, | Performed by: UROLOGY

## 2019-09-03 PROCEDURE — 99999 PR PBB SHADOW E&M-EST. PATIENT-LVL III: ICD-10-PCS | Mod: PBBFAC,,, | Performed by: UROLOGY

## 2019-09-03 PROCEDURE — 99205 OFFICE O/P NEW HI 60 MIN: CPT | Mod: S$GLB,,, | Performed by: UROLOGY

## 2019-09-03 PROCEDURE — 3075F PR MOST RECENT SYSTOLIC BLOOD PRESS GE 130-139MM HG: ICD-10-PCS | Mod: CPTII,S$GLB,, | Performed by: UROLOGY

## 2019-09-03 PROCEDURE — 1101F PR PT FALLS ASSESS DOC 0-1 FALLS W/OUT INJ PAST YR: ICD-10-PCS | Mod: CPTII,S$GLB,, | Performed by: UROLOGY

## 2019-09-03 PROCEDURE — 3078F PR MOST RECENT DIASTOLIC BLOOD PRESSURE < 80 MM HG: ICD-10-PCS | Mod: CPTII,S$GLB,, | Performed by: UROLOGY

## 2019-09-03 RX ORDER — ROSUVASTATIN CALCIUM 10 MG/1
10 TABLET, COATED ORAL DAILY
COMMUNITY
End: 2019-12-19 | Stop reason: SDUPTHER

## 2019-09-03 RX ORDER — TADALAFIL 5 MG/1
5 TABLET ORAL DAILY
Qty: 30 TABLET | Refills: 11 | Status: SHIPPED | OUTPATIENT
Start: 2019-09-03 | End: 2020-07-27 | Stop reason: SDUPTHER

## 2019-09-03 NOTE — PATIENT INSTRUCTIONS
[facility]  [facility] [place] [address], [address]  [place] [address]  Call [phone] with credit card and they should ship for free    Tadalafil 5mg, 30 tablets $29.50  Tadalafil 20mg, 10 tablets $23.00  Sidlenafil 25mg/50mg/100mg, 6 tablets for $16.27, 10 tablets for $19.53, 35 tablets for 35.80      · BPH not that bothersome but with thick-walled bladder will start BPH med since he does have occasional urgency.  Went over reasoning    · Tadalafil (like cialis) 5mg once a daily for enlarged prostate and ED - sent to [facility]  · Has Peyronie's disease- if he is able to have erections with the tadalafil and has pain with intercourse then we can discuss this at another time    · Went over bilateral renal cyst- no further follow-up needed    · Thick-walled bladder - with history of smoking drop off another urine and if urine shows no blood no further workup needed.  However if he ever develops microscopic hematuria would need a cystoscopy to ensure no bladder tumors    · So follow-up in 2 months with urology nurse practitione to see how he is doing on BPH med    PSA reviewed, normal, do not recommend checking after 69 or can check every 2 years of healthy

## 2019-09-03 NOTE — PROGRESS NOTES
[facility] Note - [place]  Staff: MD Erika    Referring provider and please cc:   Stephanie Holcomb MD  [address]  [address]  [place] [address]     PCP: Stephanie Holcomb MD    [facility] Utilization: active     Chief Complaint:   Chief Complaint   Patient presents with    Establish Care    Benign Prostatic Hypertrophy    other     thick bladder wall         Subjective:        HPI: Onesimo Paula is a 68 y.o. male     Patient had MRI for right hip pain and was found have a thick-walled bladder with enlarged prostate.  He states that he saw urologist over 40 years ago for burning with urination.  He had a renal bladder ultrasound in 2016 which showed enlarged prostate and bilateral renal cyst done for renal insufficiency whom he sees  for.  Denies any gross hematuria.  Review of for previous urine show no microscopic hematuria.  Twenty-five pack-year history of smoking but quit in 1997.     He also has AUA symptom score 6 and occasional weak stream and urgency but voids every 3-5 hours when he is working and a little more frequent when he is not.  Denies any urge incontinence.  Okay stream with occasional weak stream.  PVR today 09/03/2019 is 15 cc.  He may have tried Flomax a couple years ago but does not recall it helping her changing any was symptoms.  Overall really not that bothered by his urinary symptoms.    Also states he has ED.  Previously tried sildenafil unsure dose and it helped but wife does not want him to take     Urine history  Urinalysis void: will provide at lab   Urine history:   8/8/19  No cx, void: n/a 0 rbc  2/21/19 Ng, void: neg, 0 rbc  4/9/18  No cx, void: neg  9/2017  No blood      PSA history: no family hx of prostate cancer  9/3/19  WINSOME:40+g   Lab Results   Component Value Date    PSA 1.2 02/21/2019    PSA 1.0 03/14/2018       AUA ssx:(1 incomplete emptying, 1 frequency, 1 intermittency, 2 urgency, 1 weak stream, 0 straining, 0 sleeping). 6.  QOL: mostly satisfied      REVIEW OF SYSTEMS:  General ROS: no fevers, no chills  Psychological ROS: no depression  Endocrine ROS: no heat or cold  Respiratory ROS: no SOB  Cardiovascular ROS: no CP  Gastrointestinal ROS: no abdominal pain, no constipation, no diarrhea, noBRBPR  Musculoskeletal ROS: no muscle pain  Neurological ROS: no headaches  Dermatological ROS: no rashes  HEENT: noglasses, no sinus   ROS: per HPI     PMHx:  Past Medical History:   Diagnosis Date    Acid reflux     Acquired tricuspid valve insufficiency     Anemia     Borderline diabetes     BPH (benign prostatic hyperplasia)     Coronary artery disease     Hypertension     Pulmonary nodules     Skin cancer            PSHx:  Past Surgical History:   Procedure Laterality Date    HERNIA REPAIR  1999    HERNIA REPAIR  2004    KNEE ARTHROSCOPY Left 2018    SKIN LESION EXCISION  10/2009    Neck    SKIN LESION EXCISION      STAPEDECTOMY Right 2001       Stents/Valves/Foreign Bodies/Cardiac Evaluation/Cardiologist:   GI: , last colonoscopy:    Family History   Problem Relation Age of Onset    Cancer Mother         skin    Stroke Mother     Heart failure Father     Kidney disease Father       malignancies: none.   kidney stones: none      Soc Hx:  Social History     Tobacco Use    Smoking status: Former Smoker     Last attempt to quit:      Years since quittin.6    Smokeless tobacco: Never Used   Substance Use Topics    Alcohol use: Yes     Comment: occasional    Drug use: No     1 ppd x 26 years  Lives in : [place]  :   3 children  Patient's occupation:      Allergies:  Patient has no known allergies.    Anticoagulation/Aspirin: asa 81mg       Objective:     Vitals:    19 0935   BP: 137/79   Pulse: 62       General:WDWN in NAD  Eyes: PERRLA, normal conjunctiva  Respiratory: no increased work on breathing. No wheezing.   Cardiovascular: No  obvious extremity edema. Warm and well perfused.   GI: no palpation of masses. No tenderness. No hepatosplenomegaly to palpation.  Musculoskeletal: normal range of motion of bilateral upper extremities. Normal muscle strength and tone.  Skin: no obvious rashes or lesions. No tightening of skin noted.  Neurologic: CN grossly normal. Normal sensation.   Psychiatric: awake, alert and oriented x 3. Mood and affect normal. Cooperative.     exam 9/3/19  Inspection of anus normal  No scrotal rashes, cysts or lesions  Epididymis normal in size, no tenderness  Testes normal and size, equal size bilaterally, no masses  Urethral meatus normal without discharge  Penis is circumcised, leans to left. Peyronie's plaque midline on left.    WINSOME: 40+g gland without masses, tenderness. SV not palpable. Normal sphincter tone. ishaan hemhorroids.  No bilateral inguinal hernias noted     LABS REVIEW:  Recent Labs   Lab 04/09/18  1021 02/21/19  0726 04/30/19  0823   WBC 6.1 6.2 5.20   Hemoglobin 15.0 15.2 14.8   Hematocrit 44.0 46.5 44.7   Platelets 235 209 224   ]  Recent Labs   Lab 04/09/18  1021 02/21/19  0726 08/08/19  1023   Sodium 139 141 143   Potassium 3.6 4.3 3.9   Chloride 102 102 107   CO2 28.0 29.4 29   BUN, Bld 26 H 24 H 27 H   Creatinine 1.24 1.11 1.2   Glucose 121 H 103 H 111 H   Calcium 9.2 9.5 9.6   Alkaline Phosphatase 56 60 54 L   Total Protein 7.0 7.1 7.3   Albumin 4.1 4.2 4.1   Total Bilirubin 0.6 0.7 0.5   AST 31 34 33   ALT  --   --  39   ]    Lab Results   Component Value Date    HGBA1C 6.0 08/08/2019         Recent Pertinent urologic PATHOLOGY REVIEW:  none    Recent Pertinent Urologic RADIOGRAPHIC REVIEW: previous relevant images reviewed  Mri prostate 6/3/19  1. Suspect a tear in the right hip labrum, anterosuperior quadrant.  Arthrogram could provide additional characterization.  2. Mild right hip degenerative change with a small effusion.  3.  Diffuse bladder wall thickening with differential to include  cystitis and chronic outlet obstruction.  4. Prostatomegaly.  Correlate with PSA.    rbus 08/10/2016  Left parapelvic cyst  Right 10 mm  No thick-walled bladder  Enlarged prostate        Assessment:       1. Benign prostatic hyperplasia without lower urinary tract symptoms    2. Renal cyst    3. Abnormal CT scan, bladder    4. Erectile dysfunction due to diseases classified elsewhere    5. Peyronie disease          Plan:     Benign prostatic hyperplasia without lower urinary tract symptoms  -     Urinalysis; Future; Expected date: 09/10/2019  -     Urinalysis Microscopic; Future; Expected date: 09/10/2019    Renal cyst    Abnormal CT scan, bladder    Erectile dysfunction due to diseases classified elsewhere    Peyronie disease    Other orders  -     tadalafil (CIALIS) 5 MG tablet; Take 1 tablet (5 mg total) by mouth once daily.  Dispense: 30 tablet; Refill: 11        · BPH not that bothersome but with thick-walled bladder will start BPH med since he does have occasional urgency.  Went over reasoning    · Tadalafil 5mg once a daily for enlarged prostate and ED - sent to [facility]  · Has Peyronie's disease- if he is able to have erections with the tadalafil and has pain with intercourse then we can discuss this at another time    · Went over bilateral renal cyst- no further follow-up needed    · Thick-walled bladder - with history of smoking drop off another urine and if urine shows no blood no further workup needed.  However if he ever develops microscopic hematuria would need a cystoscopy to ensure no bladder tumors    · So follow-up in 2 months with urology nurse practitione to see how he is doing on BPH med    PSA reviewed, normal, do not recommend checking after 69 or can check every 2 years of healthy          La Nena James MD

## 2019-09-03 NOTE — LETTER
September 3, 2019      Stephanie Merida MD  [address] [facility]  [address]  [facility] [address]           [place] [facility]  [address] [place] [address] [address]  [place] [address]  Phone: [phone]  Fax: [phone]          Patient: Onesimo Paula   MR Number: [id number]   YOB: 1950   Date of Visit: 9/3/2019       Dear Dr. Stephanie Merida:    Thank you for referring Onesimo Paula to me for evaluation. Attached you will find relevant portions of my assessment and plan of care.    If you have questions, please do not hesitate to call me. I look forward to following Onesimo Paula along with you.    Sincerely,    La Nena James MD    Enclosure  CC:  No Recipients    If you would like to receive this communication electronically, please contact [email or web address] or [phone] to request more information on [employer] access.    For providers and/or their staff who would like to refer a patient to [facility], please contact us through our one-stop-shop provider referral line, [facility], at [phone].    If you feel you have received this communication in error or would no longer like to receive these types of communications, please e-mail [email or web address]

## 2019-11-08 ENCOUNTER — OFFICE VISIT (OUTPATIENT)
Dept: INTERNAL MEDICINE CLINIC | Age: 69
End: 2019-11-08
Payer: MEDICARE

## 2019-11-08 VITALS
WEIGHT: 224 LBS | SYSTOLIC BLOOD PRESSURE: 150 MMHG | HEART RATE: 72 BPM | BODY MASS INDEX: 31.24 KG/M2 | OXYGEN SATURATION: 99 % | DIASTOLIC BLOOD PRESSURE: 86 MMHG

## 2019-11-08 DIAGNOSIS — Z13.1 DIABETES MELLITUS SCREENING: ICD-10-CM

## 2019-11-08 DIAGNOSIS — Z12.5 PROSTATE CANCER SCREENING: ICD-10-CM

## 2019-11-08 DIAGNOSIS — I10 ESSENTIAL HYPERTENSION: Chronic | ICD-10-CM

## 2019-11-08 DIAGNOSIS — I13.0 HYPERTENSIVE HEART AND KIDNEY DISEASE WITH HF AND CKD (HCC): ICD-10-CM

## 2019-11-08 DIAGNOSIS — Z00.00 ROUTINE GENERAL MEDICAL EXAMINATION AT A HEALTH CARE FACILITY: Primary | ICD-10-CM

## 2019-11-08 DIAGNOSIS — Z00.00 ROUTINE GENERAL MEDICAL EXAMINATION AT A HEALTH CARE FACILITY: ICD-10-CM

## 2019-11-08 LAB
A/G RATIO: 1.7 (ref 1.1–2.2)
ALBUMIN SERPL-MCNC: 4.3 G/DL (ref 3.4–5)
ALP BLD-CCNC: 61 U/L (ref 40–129)
ALT SERPL-CCNC: 35 U/L (ref 10–40)
ANION GAP SERPL CALCULATED.3IONS-SCNC: 12 MMOL/L (ref 3–16)
AST SERPL-CCNC: 25 U/L (ref 15–37)
BASOPHILS ABSOLUTE: 0 K/UL (ref 0–0.2)
BASOPHILS RELATIVE PERCENT: 1 %
BILIRUB SERPL-MCNC: 0.4 MG/DL (ref 0–1)
BUN BLDV-MCNC: 19 MG/DL (ref 7–20)
CALCIUM SERPL-MCNC: 9.9 MG/DL (ref 8.3–10.6)
CHLORIDE BLD-SCNC: 106 MMOL/L (ref 99–110)
CHOLESTEROL, TOTAL: 145 MG/DL (ref 0–199)
CO2: 26 MMOL/L (ref 21–32)
CREAT SERPL-MCNC: 1.4 MG/DL (ref 0.8–1.3)
EOSINOPHILS ABSOLUTE: 0.1 K/UL (ref 0–0.6)
EOSINOPHILS RELATIVE PERCENT: 2.7 %
ESTIMATED AVERAGE GLUCOSE: 122.6 MG/DL
GFR AFRICAN AMERICAN: >60
GFR NON-AFRICAN AMERICAN: 50
GLOBULIN: 2.6 G/DL
GLUCOSE BLD-MCNC: 92 MG/DL (ref 70–99)
HBA1C MFR BLD: 5.9 %
HCT VFR BLD CALC: 39.5 % (ref 40.5–52.5)
HDLC SERPL-MCNC: 50 MG/DL (ref 40–60)
HEMOGLOBIN: 13.4 G/DL (ref 13.5–17.5)
LDL CHOLESTEROL CALCULATED: 80 MG/DL
LYMPHOCYTES ABSOLUTE: 1.5 K/UL (ref 1–5.1)
LYMPHOCYTES RELATIVE PERCENT: 33.9 %
MCH RBC QN AUTO: 32.1 PG (ref 26–34)
MCHC RBC AUTO-ENTMCNC: 34 G/DL (ref 31–36)
MCV RBC AUTO: 94.6 FL (ref 80–100)
MONOCYTES ABSOLUTE: 0.4 K/UL (ref 0–1.3)
MONOCYTES RELATIVE PERCENT: 9.1 %
NEUTROPHILS ABSOLUTE: 2.3 K/UL (ref 1.7–7.7)
NEUTROPHILS RELATIVE PERCENT: 53.3 %
PDW BLD-RTO: 13.1 % (ref 12.4–15.4)
PLATELET # BLD: 183 K/UL (ref 135–450)
PMV BLD AUTO: 9.3 FL (ref 5–10.5)
POTASSIUM SERPL-SCNC: 4.2 MMOL/L (ref 3.5–5.1)
PROSTATE SPECIFIC ANTIGEN: 0.58 NG/ML (ref 0–4)
RBC # BLD: 4.18 M/UL (ref 4.2–5.9)
SODIUM BLD-SCNC: 144 MMOL/L (ref 136–145)
T4 FREE: 1.2 NG/DL (ref 0.9–1.8)
TOTAL PROTEIN: 6.9 G/DL (ref 6.4–8.2)
TRIGL SERPL-MCNC: 75 MG/DL (ref 0–150)
TSH SERPL DL<=0.05 MIU/L-ACNC: 1.86 UIU/ML (ref 0.27–4.2)
VLDLC SERPL CALC-MCNC: 15 MG/DL
WBC # BLD: 4.3 K/UL (ref 4–11)

## 2019-11-08 PROCEDURE — G0438 PPPS, INITIAL VISIT: HCPCS | Performed by: INTERNAL MEDICINE

## 2019-11-08 PROCEDURE — 3017F COLORECTAL CA SCREEN DOC REV: CPT | Performed by: INTERNAL MEDICINE

## 2019-11-08 PROCEDURE — 1123F ACP DISCUSS/DSCN MKR DOCD: CPT | Performed by: INTERNAL MEDICINE

## 2019-11-08 PROCEDURE — G8484 FLU IMMUNIZE NO ADMIN: HCPCS | Performed by: INTERNAL MEDICINE

## 2019-11-08 PROCEDURE — 4040F PNEUMOC VAC/ADMIN/RCVD: CPT | Performed by: INTERNAL MEDICINE

## 2019-11-08 RX ORDER — DOXAZOSIN MESYLATE 4 MG/1
TABLET ORAL
Qty: 90 TABLET | Refills: 3 | Status: SHIPPED | OUTPATIENT
Start: 2019-11-08 | End: 2021-07-05

## 2019-11-08 ASSESSMENT — PATIENT HEALTH QUESTIONNAIRE - PHQ9
SUM OF ALL RESPONSES TO PHQ QUESTIONS 1-9: 0
SUM OF ALL RESPONSES TO PHQ QUESTIONS 1-9: 0

## 2019-11-08 ASSESSMENT — LIFESTYLE VARIABLES: HOW OFTEN DO YOU HAVE A DRINK CONTAINING ALCOHOL: 0

## 2019-11-18 ENCOUNTER — OFFICE VISIT (OUTPATIENT)
Dept: UROLOGY | Facility: CLINIC | Age: 69
End: 2019-11-18
Payer: COMMERCIAL

## 2019-11-18 VITALS
TEMPERATURE: 98 F | RESPIRATION RATE: 18 BRPM | WEIGHT: 159.38 LBS | HEIGHT: 70 IN | DIASTOLIC BLOOD PRESSURE: 80 MMHG | SYSTOLIC BLOOD PRESSURE: 146 MMHG | BODY MASS INDEX: 22.82 KG/M2 | HEART RATE: 60 BPM

## 2019-11-18 DIAGNOSIS — N48.6 PEYRONIE DISEASE: ICD-10-CM

## 2019-11-18 DIAGNOSIS — N40.0 BENIGN PROSTATIC HYPERPLASIA WITHOUT LOWER URINARY TRACT SYMPTOMS: Primary | ICD-10-CM

## 2019-11-18 DIAGNOSIS — N52.1 ERECTILE DYSFUNCTION DUE TO DISEASES CLASSIFIED ELSEWHERE: ICD-10-CM

## 2019-11-18 LAB
BILIRUB SERPL-MCNC: NORMAL MG/DL
BLOOD URINE, POC: NORMAL
COLOR, POC UA: YELLOW
GLUCOSE UR QL STRIP: NORMAL
KETONES UR QL STRIP: NORMAL
LEUKOCYTE ESTERASE URINE, POC: NORMAL
NITRITE, POC UA: NORMAL
PH, POC UA: 7
POC RESIDUAL URINE VOLUME: 18 ML (ref 0–100)
PROTEIN, POC: NORMAL
SPECIFIC GRAVITY, POC UA: 1.01
UROBILINOGEN, POC UA: 0.2

## 2019-11-18 PROCEDURE — 3079F DIAST BP 80-89 MM HG: CPT | Mod: CPTII,S$GLB,, | Performed by: NURSE PRACTITIONER

## 2019-11-18 PROCEDURE — 99214 PR OFFICE/OUTPT VISIT, EST, LEVL IV, 30-39 MIN: ICD-10-PCS | Mod: 25,S$GLB,, | Performed by: NURSE PRACTITIONER

## 2019-11-18 PROCEDURE — 3077F SYST BP >= 140 MM HG: CPT | Mod: CPTII,S$GLB,, | Performed by: NURSE PRACTITIONER

## 2019-11-18 PROCEDURE — 99214 OFFICE O/P EST MOD 30 MIN: CPT | Mod: 25,S$GLB,, | Performed by: NURSE PRACTITIONER

## 2019-11-18 PROCEDURE — 81002 POCT URINE DIPSTICK WITHOUT MICROSCOPE: ICD-10-PCS | Mod: S$GLB,,, | Performed by: NURSE PRACTITIONER

## 2019-11-18 PROCEDURE — 3079F PR MOST RECENT DIASTOLIC BLOOD PRESSURE 80-89 MM HG: ICD-10-PCS | Mod: CPTII,S$GLB,, | Performed by: NURSE PRACTITIONER

## 2019-11-18 PROCEDURE — 99999 PR PBB SHADOW E&M-EST. PATIENT-LVL IV: CPT | Mod: PBBFAC,,, | Performed by: NURSE PRACTITIONER

## 2019-11-18 PROCEDURE — 51798 US URINE CAPACITY MEASURE: CPT | Mod: S$GLB,,, | Performed by: NURSE PRACTITIONER

## 2019-11-18 PROCEDURE — 51798 POCT BLADDER SCAN: ICD-10-PCS | Mod: S$GLB,,, | Performed by: NURSE PRACTITIONER

## 2019-11-18 PROCEDURE — 3077F PR MOST RECENT SYSTOLIC BLOOD PRESSURE >= 140 MM HG: ICD-10-PCS | Mod: CPTII,S$GLB,, | Performed by: NURSE PRACTITIONER

## 2019-11-18 PROCEDURE — 1101F PR PT FALLS ASSESS DOC 0-1 FALLS W/OUT INJ PAST YR: ICD-10-PCS | Mod: CPTII,S$GLB,, | Performed by: NURSE PRACTITIONER

## 2019-11-18 PROCEDURE — 81002 URINALYSIS NONAUTO W/O SCOPE: CPT | Mod: S$GLB,,, | Performed by: NURSE PRACTITIONER

## 2019-11-18 PROCEDURE — 1101F PT FALLS ASSESS-DOCD LE1/YR: CPT | Mod: CPTII,S$GLB,, | Performed by: NURSE PRACTITIONER

## 2019-11-18 PROCEDURE — 99999 PR PBB SHADOW E&M-EST. PATIENT-LVL IV: ICD-10-PCS | Mod: PBBFAC,,, | Performed by: NURSE PRACTITIONER

## 2019-11-18 RX ORDER — OXYBUTYNIN CHLORIDE 10 MG/1
10 TABLET, EXTENDED RELEASE ORAL DAILY
Qty: 30 TABLET | Refills: 12 | Status: SHIPPED | OUTPATIENT
Start: 2019-11-18 | End: 2020-01-06 | Stop reason: ALTCHOICE

## 2019-11-18 NOTE — PATIENT INSTRUCTIONS
Oxybutynin extended-release tablets  What is this medicine?  OXYBUTYNIN (ox i BYOO ti yashira) is used to treat overactive bladder. This medicine reduces the amount of bathroom visits. It may also help to control wetting accidents.  How should I use this medicine?  Take this medicine by mouth with a glass of water. Swallow whole, do not crush, cut, or chew. Follow the directions on the prescription label. You can take this medicine with or without food. Take your doses at regular intervals. Do not take your medicine more often than directed.  Talk to your pediatrician regarding the use of this medicine in children. Special care may be needed. While this drug may be prescribed for children as young as 6 years for selected conditions, precautions do apply.  What side effects may I notice from receiving this medicine?  Side effects that you should report to your doctor or health care professional as soon as possible:  · allergic reactions like skin rash, itching or hives, swelling of the face, lips, or tongue  · agitation  · breathing problems  · confusion  · fever  · flushing (reddening of the skin)  · hallucinations  · memory loss  · pain or difficulty passing urine  · palpitations  · unusually weak or tired  Side effects that usually do not require medical attention (report to your doctor or health care professional if they continue or are bothersome):  · constipation  · headache  · sexual difficulties (impotence)  What may interact with this medicine?  · antihistamines for allergy, cough and cold  · atropine  · certain medicines for bladder problems like oxybutynin, tolterodine  · certain medicines for Parkinson's disease like benztropine, trihexyphenidyl  · certain medicines for stomach problems like dicyclomine, hyoscyamine  · certain medicines for travel sickness like scopolamine  · clarithromycin  · erythromycin  · ipratropium  · medicines for fungal infections, like fluconazole, itraconazole, ketoconazole or  voriconazole  What if I miss a dose?  If you miss a dose, take it as soon as you can. If it is almost time for your next dose, take only that dose. Do not take double or extra doses.  Where should I keep my medicine?  Keep out of the reach of children.  Store at room temperature between 15 and 30 degrees C (59 and 86 degrees F). Protect from moisture and humidity. Throw away any unused medicine after the expiration date.  What should I tell my health care provider before I take this medicine?  They need to know if you have any of these conditions:  · autonomic neuropathy  · dementia  · difficulty passing urine  · glaucoma  · intestinal obstruction  · kidney disease  · liver disease  · myasthenia gravis  · Parkinson's disease  · an unusual or allergic reaction to oxybutynin, other medicines, foods, dyes, or preservatives  · pregnant or trying to get pregnant  · breast-feeding  What should I watch for while using this medicine?  It may take a few weeks to notice the full benefit from this medicine.  You may need to limit your intake tea, coffee, caffeinated sodas, and alcohol. These drinks may make your symptoms worse.  You may get drowsy or dizzy. Do not drive, use machinery, or do anything that needs mental alertness until you know how this medicine affects you. Do not stand or sit up quickly, especially if you are an older patient. This reduces the risk of dizzy or fainting spells. Alcohol may interfere with the effect of this medicine. Avoid alcoholic drinks.  Your mouth may get dry. Chewing sugarless gum or sucking hard candy, and drinking plenty of water may help. Contact your doctor if the problem does not go away or is severe.  This medicine may cause dry eyes and blurred vision. If you wear contact lenses, you may feel some discomfort. Lubricating drops may help. See your eyecare professional if the problem does not go away or is severe.  You may notice the shells of the tablets in your stool from time to  time. This is normal.  Avoid extreme heat. This medicine can cause you to sweat less than normal. Your body temperature could increase to dangerous levels, which may lead to heat stroke.  NOTE:This sheet is a summary. It may not cover all possible information. If you have questions about this medicine, talk to your doctor, pharmacist, or health care provider. Copyright© 2017 [employer]

## 2019-11-18 NOTE — PROGRESS NOTES
[facility]  Staff: LUNA TorresC    PCP: Reynaldo Rahman    Chief Complaint: F/UP:  BPH, ED    Subjective:        HPI: Onesimo Paula is a 68 y.o. male presents today for routine recheck of his lower urinary tract symptoms.  Pt was last seen by Dr. La Nena James on 09/03/2019.  Pt has hx of BPH with LUTS, ED, Peyronie's DX.    On conclusion of last ov with Dr. James pt was started on Cialis 5 mg daily for BPH and ED issues.  Pt states today Cialis has NOT improved any of his lower urinary tract symptoms since starting the medication.  He currently takes this medication before his bedtime.  Pt still c/o urinary urgency, especially during the day.  Nocturia is 1-2x nightly and not bothersome at this time.  He is a , therefore the daytime urgency really bothers him.    PT  HX:  Patient had MRI for right hip pain and was found have a thick-walled bladder with enlarged prostate.  He states that he saw urologist over 40 years ago for burning with urination.  He had a renal bladder ultrasound in 2016 which showed enlarged prostate and bilateral renal cyst done for renal insufficiency whom he sees  for.  Denies any gross hematuria.  Review of for previous urine show no microscopic hematuria.  Twenty-five pack-year history of smoking but quit in 1997.      He also has AUA symptom score 6 and occasional weak stream and urgency but voids every 3-5 hours when he is working and a little more frequent when he is not.  Denies any urge incontinence.  Okay stream with occasional weak stream.  PVR today 09/03/2019 is 15 cc.  He may have tried Flomax a couple years ago but does not recall it helping her changing any was symptoms.  Overall really not that bothered by his urinary symptoms.     Also states he has ED.  Previously tried sildenafil unsure dose and it helped but wife does not want him to take      Urine history  Urinalysis void: will provide at lab    Urine history:   8/8/19              No cx, void: n/a 0 rbc  2/21/19            Ng, void: neg, 0 rbc  4/9/18              No cx, void: neg  9/2017             No blood      PSA history: no family hx of prostate cancer  9/3/19              WINSOME:40+g         Lab Results   Component Value Date     PSA 1.2 02/21/2019     PSA 1.0 03/14/2018      (9/3/19)AUA ssx:(1 incomplete emptying, 1 frequency, 1 intermittency, 2 urgency, 1 weak stream, 0 straining, 0 sleeping). 6. QOL: mostly satisfied      REVIEW OF SYSTEMS:  A comprehensive 10 system review was performed and is negative except as noted above in HPI    PMHx:  Past Medical History:   Diagnosis Date    Acid reflux     Acquired tricuspid valve insufficiency     Anemia     Borderline diabetes     BPH (benign prostatic hyperplasia)     Coronary artery disease     Hypertension     Pulmonary nodules     Skin cancer      PSHx:  Past Surgical History:   Procedure Laterality Date    HERNIA REPAIR  12/1999    HERNIA REPAIR  05/2004    KNEE ARTHROSCOPY Left 04/12/2018    SKIN LESION EXCISION  10/2009    Neck    SKIN LESION EXCISION  2015    STAPEDECTOMY Right 03/2001     Allergies:  Patient has no known allergies.    Medications: reviewed   Anticoagulation: Yes - Ecotrin 81 mg one tablet daily    Objective:     Vitals:    11/18/19 0922   BP: (!) 146/80   Pulse: 60   Resp: 18   Temp: 98.1 °F (36.7 °C)     General:WDWN in NAD  Eyes: PERRLA, normal conjunctiva  Respiratory: no increased work on breathing, clear to auscultation  Cardiovascular: regular rate and rhythm. No obvious extremity edema.  GI: palpation of masses. No tenderness. No hepatosplenomegaly to palpation.  Musculoskeletal: normal range of motion of bilateral upper extremities. Normal muscle strength and tone.  Skin: no obvious rashes or lesions. No tightening of skin noted.  Neurologic: CN grossly normal. Normal sensation.   Psychiatric: awake, alert and oriented x 3. Mood and affect normal.  Cooperative.     exam performed by MD on 9/3/19:  Inspection of anus normal  No scrotal rashes, cysts or lesions  Epididymis normal in size, no tenderness  Testes normal and size, equal size bilaterally, no masses  Urethral meatus normal without discharge  Penis is circumcised, leans to left. Peyronie's plaque midline on left.    WINSOME: 40+g gland without masses, tenderness. SV not palpable. Normal sphincter tone. ishaan hemhorroids.  No bilateral inguinal hernias noted     PVR by bladder scan performed by MA today:  18 mL    LABS REVIEW:  UA today:  Color:Clear, Yellow  Spec. Grav.  1.015  PH  7.0  Negative for leukocytes, nitrates, protein, glucose, ketones, urobili, bili, and blood.    Assessment:       1. Benign prostatic hyperplasia without lower urinary tract symptoms    2. Erectile dysfunction due to diseases classified elsewhere    3. Peyronie disease          Plan:   BPH, urinary urgency, Peyronie's DX:    We will try the following medication regimen for this pt today:  Oxybutynin XL 10 mg daily in the am  Cialis 5 mg daily in the evening.    F/u with me in 4-6 weeks for med check.    [facility]: Active    Brittany Westbrook, KELLY

## 2019-11-19 DIAGNOSIS — K21.9 GASTROESOPHAGEAL REFLUX DISEASE WITHOUT ESOPHAGITIS: ICD-10-CM

## 2019-11-19 RX ORDER — OMEPRAZOLE 20 MG/1
CAPSULE, DELAYED RELEASE ORAL
Qty: 90 CAPSULE | Refills: 1 | Status: SHIPPED | OUTPATIENT
Start: 2019-11-19 | End: 2020-05-12 | Stop reason: SDUPTHER

## 2019-11-27 ENCOUNTER — PATIENT MESSAGE (OUTPATIENT)
Dept: FAMILY MEDICINE | Facility: CLINIC | Age: 69
End: 2019-11-27

## 2019-11-27 DIAGNOSIS — Z00.00 ROUTINE HEALTH MAINTENANCE: Primary | ICD-10-CM

## 2019-12-19 DIAGNOSIS — E78.01 FAMILIAL HYPERCHOLESTEROLEMIA: Primary | ICD-10-CM

## 2019-12-19 RX ORDER — ROSUVASTATIN CALCIUM 10 MG/1
10 TABLET, COATED ORAL DAILY
Qty: 90 TABLET | Refills: 1 | Status: SHIPPED | OUTPATIENT
Start: 2019-12-19 | End: 2020-06-15

## 2019-12-19 NOTE — TELEPHONE ENCOUNTER
Under Dr Merida, pt states was taking .5 of a 10mg tab of Rosuvatatin. Last ov Fuad increased to whole tablet daily at the full 10mg. Ran out of medication. Please refill at [facility] [address] thx    Last ov with Fuad was 8/08/2019    Next ov with you on 2/10/2020

## 2020-01-05 NOTE — PROGRESS NOTES
[facility] Note  Staff: LUNA TorresC    PCP: Reynaldo Rahman    Chief Complaint: Routine recheck-Urinary urgency and frequency symptoms    Subjective:        HPI: Onesimo Paula is a 69 y.o. male presents today for routine recheck of his lower urinary tract symptoms.    The pt was last seen by me for f/up on 11/18/2019 and c/o worsening LUTS especially urinary urgency and frequency during the day.  Therefore we changed his med regimen on conclusion of last ov to the following:  Oxybutynin XL 10 mg daily in the am  Cialis 5 mg daily in the evening.     TODAY, pt states the oxybutynin only improved his LUTS by over 10% at this time.  PVR by bladder scan performed in office today:  11 mL    PT  HX:  Pt was last seen by Dr. La Nena James on 09/03/2019.  Pt has hx of BPH with LUTS, ED, Peyronie's DX.     On conclusion of last ov with Dr. James pt was started on Cialis 5 mg daily for BPH and ED issues.  Pt stated during last ov with me that Cialis had NOT improved any of his lower urinary tract symptoms since starting the medication.  He currently takes this medication before his bedtime.  Pt still c/o urinary urgency, especially during the day.  Nocturia is 1-2x nightly and not bothersome at this time.  He is a , therefore the daytime urgency really bothers him.     Patient had MRI for right hip pain and was found have a thick-walled bladder with enlarged prostate.  He states that he saw urologist over 40 years ago for burning with urination.  He had a renal bladder ultrasound in 2016 which showed enlarged prostate and bilateral renal cyst done for renal insufficiency whom he sees  for.  Denies any gross hematuria.  Review of for previous urine show no microscopic hematuria.  Twenty-five pack-year history of smoking but quit in 1997.      He also has AUA symptom score 6 and occasional weak stream and urgency but voids every 3-5 hours when he is  working and a little more frequent when he is not.  Denies any urge incontinence.  Okay stream with occasional weak stream.  PVR today 09/03/2019 is 15 cc.  He may have tried Flomax a couple years ago but does not recall it helping her changing any was symptoms.  Overall really not that bothered by his urinary symptoms.     Also states he has ED.  Previously tried sildenafil unsure dose and it helped but wife does not want him to take      Urine history:   8/8/19              No cx, void: n/a 0 rbc  2/21/19            Ng, void: neg, 0 rbc  4/9/18              No cx, void: neg  9/2017             No blood      PSA history: no family hx of prostate cancer  9/3/19              WINSOME:40+g     REVIEW OF SYSTEMS:  A comprehensive 10 system review was performed and is negative except as noted above in HPI    PMHx:  Past Medical History:   Diagnosis Date    Acid reflux     Acquired tricuspid valve insufficiency     Anemia     Borderline diabetes     BPH (benign prostatic hyperplasia)     Coronary artery disease     Hypertension     Pulmonary nodules     Skin cancer      PSHx:  Past Surgical History:   Procedure Laterality Date    HERNIA REPAIR  12/1999    HERNIA REPAIR  05/2004    KNEE ARTHROSCOPY Left 04/12/2018    SKIN LESION EXCISION  10/2009    Neck    SKIN LESION EXCISION  2015    STAPEDECTOMY Right 03/2001     Allergies:  Patient has no known allergies.    Medications: reviewed   Objective:     Vitals:    01/06/20 0914   BP: (!) 149/79   Pulse: 60   Resp: 18   Temp: 98.3 °F (36.8 °C)     General:WDWN in NAD  Eyes: PERRLA, normal conjunctiva  Respiratory: no increased work on breathing, clear to auscultation  Cardiovascular: regular rate and rhythm. No obvious extremity edema.  GI: palpation of masses. No tenderness. No hepatosplenomegaly to palpation.  Musculoskeletal: normal range of motion of bilateral upper extremities. Normal muscle strength and tone.  Skin: no obvious rashes or lesions. No tightening  of skin noted.  Neurologic: CN grossly normal. Normal sensation.   Psychiatric: awake, alert and oriented x 3. Mood and affect normal. Cooperative.     Exam performed by MD on 9/3/2019:  Inspection of anus normal  No scrotal rashes, cysts or lesions  Epididymis normal in size, no tenderness  Testes normal and size, equal size bilaterally, no masses  Urethral meatus normal without discharge  Penis is circumcised, leans to left. Peyronie's plaque midline on left.    WINSOME: 40+g gland without masses, tenderness. SV not palpable. Normal sphincter tone. ishaan hemhorroids.  No bilateral inguinal hernias noted      LABS REVIEW:  UA today:  Color:Clear, Yellow  Spec. Grav.  1.015  PH  6.5  Negative for leukocytes, nitrates, protein, glucose, ketones, urobili, bili, and blood.    Assessment:       1. Benign prostatic hyperplasia without lower urinary tract symptoms    2. Urinary urgency    3. Urinary frequency          Plan:   Urinary urgency, frequency symptoms:    Discontinue Oxybutynin XL 10 mg at this time due to tx failure.  Start pt on Myrbetriq 50 mg one tablet daily in am.  Continue Cialis 5 mg daily in evening.    F/u--Pt was encouraged to give at least 4-6 weeks for Myrbetriq to be effective, at that time if no improvement in his LUTS then pt will notify us through [facility] and we will schedule him to see Erika for further evaluation.    Benefits, risks, side affects of new medication were thoroughly discussed with pt during ov today with all questions answered.    [facility]: Active    Brittany Westbrook, KELLY

## 2020-01-06 ENCOUNTER — OFFICE VISIT (OUTPATIENT)
Dept: UROLOGY | Facility: CLINIC | Age: 70
End: 2020-01-06
Payer: COMMERCIAL

## 2020-01-06 VITALS
HEIGHT: 70 IN | SYSTOLIC BLOOD PRESSURE: 149 MMHG | TEMPERATURE: 98 F | HEART RATE: 60 BPM | BODY MASS INDEX: 22.79 KG/M2 | DIASTOLIC BLOOD PRESSURE: 79 MMHG | RESPIRATION RATE: 18 BRPM | WEIGHT: 159.19 LBS

## 2020-01-06 DIAGNOSIS — R35.0 URINARY FREQUENCY: ICD-10-CM

## 2020-01-06 DIAGNOSIS — R39.15 URINARY URGENCY: ICD-10-CM

## 2020-01-06 DIAGNOSIS — N40.0 BENIGN PROSTATIC HYPERPLASIA WITHOUT LOWER URINARY TRACT SYMPTOMS: Primary | ICD-10-CM

## 2020-01-06 LAB
BILIRUB SERPL-MCNC: NORMAL MG/DL
BLOOD URINE, POC: NORMAL
COLOR, POC UA: YELLOW
GLUCOSE UR QL STRIP: NORMAL
KETONES UR QL STRIP: NORMAL
LEUKOCYTE ESTERASE URINE, POC: NORMAL
NITRITE, POC UA: NORMAL
PH, POC UA: 6.5
POC RESIDUAL URINE VOLUME: 11 ML (ref 0–100)
PROTEIN, POC: NORMAL
SPECIFIC GRAVITY, POC UA: 1.01
UROBILINOGEN, POC UA: 0.2

## 2020-01-06 PROCEDURE — 1159F PR MEDICATION LIST DOCUMENTED IN MEDICAL RECORD: ICD-10-PCS | Mod: S$GLB,,, | Performed by: NURSE PRACTITIONER

## 2020-01-06 PROCEDURE — 51798 POCT BLADDER SCAN: ICD-10-PCS | Mod: S$GLB,,, | Performed by: NURSE PRACTITIONER

## 2020-01-06 PROCEDURE — 81002 URINALYSIS NONAUTO W/O SCOPE: CPT | Mod: S$GLB,,, | Performed by: NURSE PRACTITIONER

## 2020-01-06 PROCEDURE — 99213 OFFICE O/P EST LOW 20 MIN: CPT | Mod: 25,S$GLB,, | Performed by: NURSE PRACTITIONER

## 2020-01-06 PROCEDURE — 3077F PR MOST RECENT SYSTOLIC BLOOD PRESSURE >= 140 MM HG: ICD-10-PCS | Mod: CPTII,S$GLB,, | Performed by: NURSE PRACTITIONER

## 2020-01-06 PROCEDURE — 99999 PR PBB SHADOW E&M-EST. PATIENT-LVL IV: CPT | Mod: PBBFAC,,, | Performed by: NURSE PRACTITIONER

## 2020-01-06 PROCEDURE — 1101F PR PT FALLS ASSESS DOC 0-1 FALLS W/OUT INJ PAST YR: ICD-10-PCS | Mod: CPTII,S$GLB,, | Performed by: NURSE PRACTITIONER

## 2020-01-06 PROCEDURE — 1101F PT FALLS ASSESS-DOCD LE1/YR: CPT | Mod: CPTII,S$GLB,, | Performed by: NURSE PRACTITIONER

## 2020-01-06 PROCEDURE — 1126F PR PAIN SEVERITY QUANTIFIED, NO PAIN PRESENT: ICD-10-PCS | Mod: S$GLB,,, | Performed by: NURSE PRACTITIONER

## 2020-01-06 PROCEDURE — 51798 US URINE CAPACITY MEASURE: CPT | Mod: S$GLB,,, | Performed by: NURSE PRACTITIONER

## 2020-01-06 PROCEDURE — 3078F DIAST BP <80 MM HG: CPT | Mod: CPTII,S$GLB,, | Performed by: NURSE PRACTITIONER

## 2020-01-06 PROCEDURE — 99213 PR OFFICE/OUTPT VISIT, EST, LEVL III, 20-29 MIN: ICD-10-PCS | Mod: 25,S$GLB,, | Performed by: NURSE PRACTITIONER

## 2020-01-06 PROCEDURE — 3078F PR MOST RECENT DIASTOLIC BLOOD PRESSURE < 80 MM HG: ICD-10-PCS | Mod: CPTII,S$GLB,, | Performed by: NURSE PRACTITIONER

## 2020-01-06 PROCEDURE — 1159F MED LIST DOCD IN RCRD: CPT | Mod: S$GLB,,, | Performed by: NURSE PRACTITIONER

## 2020-01-06 PROCEDURE — 81002 POCT URINE DIPSTICK WITHOUT MICROSCOPE: ICD-10-PCS | Mod: S$GLB,,, | Performed by: NURSE PRACTITIONER

## 2020-01-06 PROCEDURE — 99999 PR PBB SHADOW E&M-EST. PATIENT-LVL IV: ICD-10-PCS | Mod: PBBFAC,,, | Performed by: NURSE PRACTITIONER

## 2020-01-06 PROCEDURE — 3077F SYST BP >= 140 MM HG: CPT | Mod: CPTII,S$GLB,, | Performed by: NURSE PRACTITIONER

## 2020-01-06 PROCEDURE — 1126F AMNT PAIN NOTED NONE PRSNT: CPT | Mod: S$GLB,,, | Performed by: NURSE PRACTITIONER

## 2020-01-06 NOTE — PATIENT INSTRUCTIONS
Mirabegron extended-release tablets  What is this medicine?  MIRABEGRON (LUL a BEG hien) is used to treat overactive bladder. This medicine reduces the amount of bathroom visits. It may also help to control wetting accidents.  How should I use this medicine?  Take this medicine by mouth with a glass of water. Follow the directions on the prescription label. Do not cut, crush or chew this medicine. You can take it with or without food. If it upsets your stomach, take it with food. Take your medicine at regular intervals. Do not take it more often than directed. Do not stop taking except on your doctor's advice.  Talk to your pediatrician regarding the use of this medicine in children. Special care may be needed.  What side effects may I notice from receiving this medicine?  Side effects that you should report to your doctor or health care professional as soon as possible:  · allergic reactions like skin rash, itching or hives, swelling of the face, lips, or tongue  · chest pain or palpitations  · severe or sudden headache  · high blood pressure  · fast, irregular heartbeat  · redness, blistering, peeling or loosening of the skin, including inside the mouth  · signs of infection like fever or chills; cough; sore throat; pain or difficulty passing urine  · trouble passing urine or change in the amount of urine  Side effects that usually do not require medical attention (Report these to your doctor or health care professional if they continue or are bothersome.):  · constipation  · diarrhea  · dizziness  · dry eyes  · joint pain  · mild headache  · nausea  · runny nose  What may interact with this medicine?  · certain medicines for bladder problems like fesoterodine, oxybutynin, solifenacin, tolterodine  · desipramine  · digoxin  · flecainide  · ketoconazole  · MAOIs like Carbex, Eldepryl, Marplan, Nardil, and Parnate  · metoprolol  · propafenone  · thioridazine  · warfarin  What if I miss a dose?  If you miss a dose,  take it as soon as you can. If it is almost time for your next dose, take only that dose. Do not take double or extra doses.  Where should I keep my medicine?  Keep out of the reach of children.  Store at room temperature between 15 and 30 degrees C (59 and 86 degrees F). Throw away any unused medicine after the expiration date.  What should I tell my health care provider before I take this medicine?  They need to know if you have any of these conditions:  · difficulty passing urine  · high blood pressure  · kidney disease  · liver disease  · an unusual or allergic reaction to mirabegron, other medicines, foods, dyes, or preservatives  · pregnant or trying to get pregnant  · breast-feeding  What should I watch for while using this medicine?  It may take 8 weeks to notice the full benefit from this medicine.  You may need to limit your intake tea, coffee, caffeinated sodas, and alcohol. These drinks may make your symptoms worse.  Visit your doctor or health care professional for regular checks on your progress. Check your blood pressure as directed. Ask your doctor or health care professional what your blood pressure should be and when you should contact him or her.  NOTE:This sheet is a summary. It may not cover all possible information. If you have questions about this medicine, talk to your doctor, pharmacist, or health care provider. Copyright© 2017 [employer]

## 2020-01-07 ENCOUNTER — OFFICE VISIT (OUTPATIENT)
Dept: FAMILY MEDICINE | Facility: CLINIC | Age: 70
End: 2020-01-07
Payer: COMMERCIAL

## 2020-01-07 VITALS
WEIGHT: 159 LBS | HEIGHT: 70 IN | SYSTOLIC BLOOD PRESSURE: 126 MMHG | DIASTOLIC BLOOD PRESSURE: 78 MMHG | OXYGEN SATURATION: 98 % | HEART RATE: 74 BPM | RESPIRATION RATE: 16 BRPM | TEMPERATURE: 98 F | BODY MASS INDEX: 22.76 KG/M2

## 2020-01-07 DIAGNOSIS — Z01.818 PREOPERATIVE CLEARANCE: Primary | ICD-10-CM

## 2020-01-07 PROCEDURE — 1159F PR MEDICATION LIST DOCUMENTED IN MEDICAL RECORD: ICD-10-PCS | Mod: S$GLB,,, | Performed by: NURSE PRACTITIONER

## 2020-01-07 PROCEDURE — 3074F SYST BP LT 130 MM HG: CPT | Mod: S$GLB,,, | Performed by: NURSE PRACTITIONER

## 2020-01-07 PROCEDURE — 1101F PT FALLS ASSESS-DOCD LE1/YR: CPT | Mod: S$GLB,,, | Performed by: NURSE PRACTITIONER

## 2020-01-07 PROCEDURE — 99213 PR OFFICE/OUTPT VISIT, EST, LEVL III, 20-29 MIN: ICD-10-PCS | Mod: S$GLB,,, | Performed by: NURSE PRACTITIONER

## 2020-01-07 PROCEDURE — 1101F PR PT FALLS ASSESS DOC 0-1 FALLS W/OUT INJ PAST YR: ICD-10-PCS | Mod: S$GLB,,, | Performed by: NURSE PRACTITIONER

## 2020-01-07 PROCEDURE — 1170F FXNL STATUS ASSESSED: CPT | Mod: S$GLB,,, | Performed by: NURSE PRACTITIONER

## 2020-01-07 PROCEDURE — 1159F MED LIST DOCD IN RCRD: CPT | Mod: S$GLB,,, | Performed by: NURSE PRACTITIONER

## 2020-01-07 PROCEDURE — 3078F DIAST BP <80 MM HG: CPT | Mod: S$GLB,,, | Performed by: NURSE PRACTITIONER

## 2020-01-07 PROCEDURE — 3078F PR MOST RECENT DIASTOLIC BLOOD PRESSURE < 80 MM HG: ICD-10-PCS | Mod: S$GLB,,, | Performed by: NURSE PRACTITIONER

## 2020-01-07 PROCEDURE — 99213 OFFICE O/P EST LOW 20 MIN: CPT | Mod: S$GLB,,, | Performed by: NURSE PRACTITIONER

## 2020-01-07 PROCEDURE — 1126F AMNT PAIN NOTED NONE PRSNT: CPT | Mod: S$GLB,,, | Performed by: NURSE PRACTITIONER

## 2020-01-07 PROCEDURE — 1170F PR FUNCTIONAL STATUS ASSESSED: ICD-10-PCS | Mod: S$GLB,,, | Performed by: NURSE PRACTITIONER

## 2020-01-07 PROCEDURE — 1126F PR PAIN SEVERITY QUANTIFIED, NO PAIN PRESENT: ICD-10-PCS | Mod: S$GLB,,, | Performed by: NURSE PRACTITIONER

## 2020-01-07 PROCEDURE — 3074F PR MOST RECENT SYSTOLIC BLOOD PRESSURE < 130 MM HG: ICD-10-PCS | Mod: S$GLB,,, | Performed by: NURSE PRACTITIONER

## 2020-01-07 NOTE — LETTER
[facility]      JAMES Rose,KELLY              [facility]  [address] [place] [id number]  Dept: [phone]  Dept Fax: [phone]                                                                                                     Re: Name: Onesimo Paula          : 1950        Onesimo Paula has been examined by me on 2020, and appears to be physically well for       _X__ Cataract surgery      ___ Sports Participation     ___ School /      ___ Camp        Sincerely,      JAMES Maguire,KELLY

## 2020-01-08 RX ORDER — BACLOFEN 10 MG/1
10 TABLET ORAL EVERY 8 HOURS
OUTPATIENT
Start: 2020-01-08

## 2020-01-09 ENCOUNTER — PATIENT MESSAGE (OUTPATIENT)
Dept: FAMILY MEDICINE | Facility: CLINIC | Age: 70
End: 2020-01-09

## 2020-01-09 NOTE — PROGRESS NOTES
SUBJECTIVE:      Patient ID: Onesimo Paula is a 69 y.o. male.    Chief Complaint: Pre-op Exam    Presents for cataract surgical clearance with Dr. Fonseca scheduled  R eye    No physical complaints this visit      Past Surgical History:   Procedure Laterality Date    HERNIA REPAIR  1999    HERNIA REPAIR  2004    KNEE ARTHROSCOPY Left 2018    SKIN LESION EXCISION  10/2009    Neck    SKIN LESION EXCISION  2015    STAPEDECTOMY Right 2001     Family History   Problem Relation Age of Onset    Cancer Mother         skin    Stroke Mother     Heart failure Father     Kidney disease Father       Social History     Socioeconomic History    Marital status:      Spouse name: Not on file    Number of children: Not on file    Years of education: Not on file    Highest education level: Not on file   Occupational History    Not on file   Social Needs    Financial resource strain: Not hard at all    Food insecurity:     Worry: Never true     Inability: Never true    Transportation needs:     Medical: No     Non-medical: No   Tobacco Use    Smoking status: Former Smoker     Last attempt to quit:      Years since quittin.0    Smokeless tobacco: Never Used   Substance and Sexual Activity    Alcohol use: Yes     Frequency: 2-4 times a month     Drinks per session: 1 or 2     Binge frequency: Never     Comment: occasional    Drug use: No    Sexual activity: Yes     Partners: Female   Lifestyle    Physical activity:     Days per week: 0 days     Minutes per session: 0 min    Stress: Only a little   Relationships    Social connections:     Talks on phone: More than three times a week     Gets together: Once a week     Attends Christian service: Not on file     Active member of club or organization: Yes     Attends meetings of clubs or organizations: Never     Relationship status:    Other Topics Concern    Not on file   Social History Narrative    Not on file      Current Outpatient Medications   Medication Sig Dispense Refill    aspirin (ECOTRIN) 81 MG EC tablet aspirin 81 mg tablet,delayed release   Take 1 tablet every day by oral route.      hydroCHLOROthiazide (HYDRODIURIL) 12.5 MG Tab Take 12.5 mg by mouth once daily.       lisinopril (PRINIVIL,ZESTRIL) 40 MG tablet TAKE 1 TABLET BY MOUTH ONCE DAILY 90 tablet 3    mirabegron (MYRBETRIQ) 50 mg Tb24 Take 50 mg by mouth once daily.      omeprazole (PRILOSEC) 20 MG capsule TAKE 1 CAPSULE BY MOUTH ONCE DAILY 90 capsule 1    rosuvastatin (CRESTOR) 10 MG tablet Take 1 tablet (10 mg total) by mouth once daily. 90 tablet 1    tadalafil (CIALIS) 5 MG tablet Take 1 tablet (5 mg total) by mouth once daily. 30 tablet 11     No current facility-administered medications for this visit.      Review of patient's allergies indicates:  No Known Allergies   Past Medical History:   Diagnosis Date    Acid reflux     Acquired tricuspid valve insufficiency     Anemia     Borderline diabetes     BPH (benign prostatic hyperplasia)     Coronary artery disease     Hypertension     Pulmonary nodules     Skin cancer      Past Surgical History:   Procedure Laterality Date    HERNIA REPAIR  12/1999    HERNIA REPAIR  05/2004    KNEE ARTHROSCOPY Left 04/12/2018    SKIN LESION EXCISION  10/2009    Neck    SKIN LESION EXCISION  2015    STAPEDECTOMY Right 03/2001       Review of Systems   Constitutional: Negative for activity change, appetite change, fatigue and fever.   HENT: Negative for congestion, ear pain, hearing loss, postnasal drip, sinus pressure, sinus pain, sneezing and sore throat.    Eyes: Positive for visual disturbance. Negative for photophobia, pain and discharge.   Respiratory: Negative for cough, chest tightness, shortness of breath and wheezing.    Cardiovascular: Negative for chest pain, palpitations and leg swelling.   Gastrointestinal: Negative for abdominal distention, abdominal pain, blood in stool,  "constipation, diarrhea, nausea and vomiting.   Endocrine: Negative for cold intolerance, heat intolerance, polydipsia and polyuria.   Genitourinary: Negative for difficulty urinating, dysuria, flank pain, frequency, hematuria and urgency.   Musculoskeletal: Negative for arthralgias, back pain, joint swelling, myalgias and neck pain.   Skin: Negative for pallor and rash.   Allergic/Immunologic: Negative for environmental allergies and food allergies.   Neurological: Negative for dizziness, weakness, light-headedness and headaches.   Hematological: Does not bruise/bleed easily.   Psychiatric/Behavioral: Negative for confusion, decreased concentration, dysphoric mood and sleep disturbance. The patient is not nervous/anxious.       OBJECTIVE:      Vitals:    01/07/20 1026   BP: 126/78   Pulse: 74   Resp: 16   Temp: 98.1 °F (36.7 °C)   SpO2: 98%   Weight: 72.1 kg (159 lb)   Height: 5' 10" (1.778 m)     Physical Exam   Constitutional: He is oriented to person, place, and time. Vital signs are normal. He appears well-developed and well-nourished. No distress.   HENT:   Head: Normocephalic and atraumatic.   Right Ear: Hearing normal.   Left Ear: Hearing normal.   Nose: Nose normal. No rhinorrhea.   Mouth/Throat: Mucous membranes are normal.   Eyes: Pupils are equal, round, and reactive to light. Conjunctivae and lids are normal. Right eye exhibits no discharge. Left eye exhibits no discharge. Right conjunctiva is not injected. Left conjunctiva is not injected. Right pupil is round and reactive. Left pupil is round and reactive. Pupils are equal.   Neck: Trachea normal and normal range of motion. Neck supple. No JVD present. No tracheal deviation present. No thyromegaly present.   Cardiovascular: Normal rate, regular rhythm, normal heart sounds and intact distal pulses. Exam reveals no gallop and no friction rub.   No murmur heard.  Pulses:       Radial pulses are 2+ on the right side, and 2+ on the left side. "   Pulmonary/Chest: Effort normal and breath sounds normal. No stridor. No respiratory distress. He has no decreased breath sounds. He has no wheezes. He has no rhonchi. He has no rales.   Abdominal: Soft. Bowel sounds are normal. He exhibits no distension. There is no tenderness. There is no rigidity and no guarding.   Musculoskeletal: Normal range of motion. He exhibits no edema.   Lymphadenopathy:     He has no cervical adenopathy.   Neurological: He is alert and oriented to person, place, and time. He has normal strength. He displays no atrophy. He displays a negative Romberg sign. Coordination and gait normal.   Skin: Skin is warm and dry. Capillary refill takes less than 2 seconds. Ecchymosis (BUE) noted. No lesion and no rash noted. No cyanosis. No pallor.   Psychiatric: He has a normal mood and affect. His speech is normal and behavior is normal. Judgment and thought content normal. Cognition and memory are normal. He is attentive.   Nursing note and vitals reviewed.     Assessment:       1. Preoperative clearance        Plan:       Preoperative clearance        - chart note and clearance letter will be faxed to Dr. Fonseca's office        - discussed medications/supplements to avoid before surgery that could increase risk of bleeding        - lab orders reprinted to be drawn (fasting) prior to next visit scheduled 2/10           Follow up if symptoms worsen or fail to improve.      1/9/2020 JAMES Rose, FNP-C

## 2020-01-31 ENCOUNTER — LAB VISIT (OUTPATIENT)
Dept: LAB | Facility: HOSPITAL | Age: 70
End: 2020-01-31
Attending: NURSE PRACTITIONER
Payer: COMMERCIAL

## 2020-01-31 DIAGNOSIS — Z00.00 ROUTINE HEALTH MAINTENANCE: ICD-10-CM

## 2020-01-31 LAB
ALBUMIN SERPL BCP-MCNC: 4.1 G/DL (ref 3.5–5.2)
ALBUMIN/CREAT UR: 14.4 UG/MG (ref 0–30)
ALP SERPL-CCNC: 55 U/L (ref 55–135)
ALT SERPL W/O P-5'-P-CCNC: 36 U/L (ref 10–44)
ANION GAP SERPL CALC-SCNC: 7 MMOL/L (ref 8–16)
AST SERPL-CCNC: 28 U/L (ref 10–40)
BASOPHILS # BLD AUTO: 0.07 K/UL (ref 0–0.2)
BASOPHILS NFR BLD: 1.1 % (ref 0–1.9)
BILIRUB SERPL-MCNC: 0.6 MG/DL (ref 0.1–1)
BUN SERPL-MCNC: 26 MG/DL (ref 8–23)
CALCIUM SERPL-MCNC: 9.3 MG/DL (ref 8.7–10.5)
CHLORIDE SERPL-SCNC: 105 MMOL/L (ref 95–110)
CHOLEST SERPL-MCNC: 156 MG/DL (ref 120–199)
CHOLEST/HDLC SERPL: 2.9 {RATIO} (ref 2–5)
CO2 SERPL-SCNC: 29 MMOL/L (ref 23–29)
COMPLEXED PSA SERPL-MCNC: 2.2 NG/ML (ref 0–4)
CREAT SERPL-MCNC: 1.1 MG/DL (ref 0.5–1.4)
CREAT UR-MCNC: 103 MG/DL (ref 23–375)
DIFFERENTIAL METHOD: NORMAL
EOSINOPHIL # BLD AUTO: 0.2 K/UL (ref 0–0.5)
EOSINOPHIL NFR BLD: 3.1 % (ref 0–8)
ERYTHROCYTE [DISTWIDTH] IN BLOOD BY AUTOMATED COUNT: 13.2 % (ref 11.5–14.5)
EST. GFR  (AFRICAN AMERICAN): >60 ML/MIN/1.73 M^2
EST. GFR  (NON AFRICAN AMERICAN): >60 ML/MIN/1.73 M^2
ESTIMATED AVG GLUCOSE: 126 MG/DL (ref 68–131)
GLUCOSE SERPL-MCNC: 99 MG/DL (ref 70–110)
HBA1C MFR BLD HPLC: 6 % (ref 4.5–6.2)
HCT VFR BLD AUTO: 45.5 % (ref 40–54)
HDLC SERPL-MCNC: 54 MG/DL (ref 40–75)
HDLC SERPL: 34.6 % (ref 20–50)
HGB BLD-MCNC: 15.2 G/DL (ref 14–18)
IMM GRANULOCYTES # BLD AUTO: 0.03 K/UL (ref 0–0.04)
IMM GRANULOCYTES NFR BLD AUTO: 0.5 % (ref 0–0.5)
LDLC SERPL CALC-MCNC: 90.8 MG/DL (ref 63–159)
LYMPHOCYTES # BLD AUTO: 1.6 K/UL (ref 1–4.8)
LYMPHOCYTES NFR BLD: 25.2 % (ref 18–48)
MCH RBC QN AUTO: 31 PG (ref 27–31)
MCHC RBC AUTO-ENTMCNC: 33.4 G/DL (ref 32–36)
MCV RBC AUTO: 93 FL (ref 82–98)
MICROALBUMIN UR DL<=1MG/L-MCNC: 14.8 UG/ML
MONOCYTES # BLD AUTO: 0.6 K/UL (ref 0.3–1)
MONOCYTES NFR BLD: 9.5 % (ref 4–15)
NEUTROPHILS # BLD AUTO: 3.9 K/UL (ref 1.8–7.7)
NEUTROPHILS NFR BLD: 60.6 % (ref 38–73)
NONHDLC SERPL-MCNC: 102 MG/DL
NRBC BLD-RTO: 0 /100 WBC
PLATELET # BLD AUTO: 225 K/UL (ref 150–350)
PMV BLD AUTO: 10.6 FL (ref 9.2–12.9)
POTASSIUM SERPL-SCNC: 3.9 MMOL/L (ref 3.5–5.1)
PROT SERPL-MCNC: 7.2 G/DL (ref 6–8.4)
RBC # BLD AUTO: 4.9 M/UL (ref 4.6–6.2)
SODIUM SERPL-SCNC: 141 MMOL/L (ref 136–145)
T4 FREE SERPL-MCNC: 0.61 NG/DL (ref 0.71–1.51)
TRIGL SERPL-MCNC: 56 MG/DL (ref 30–150)
TSH SERPL DL<=0.005 MIU/L-ACNC: 3.86 UIU/ML (ref 0.34–5.6)
WBC # BLD AUTO: 6.5 K/UL (ref 3.9–12.7)

## 2020-01-31 PROCEDURE — 80061 LIPID PANEL: CPT

## 2020-01-31 PROCEDURE — 80053 COMPREHEN METABOLIC PANEL: CPT

## 2020-01-31 PROCEDURE — 83036 HEMOGLOBIN GLYCOSYLATED A1C: CPT

## 2020-01-31 PROCEDURE — 84443 ASSAY THYROID STIM HORMONE: CPT

## 2020-01-31 PROCEDURE — 36415 COLL VENOUS BLD VENIPUNCTURE: CPT

## 2020-01-31 PROCEDURE — 84439 ASSAY OF FREE THYROXINE: CPT

## 2020-01-31 PROCEDURE — 85025 COMPLETE CBC W/AUTO DIFF WBC: CPT

## 2020-01-31 PROCEDURE — 82043 UR ALBUMIN QUANTITATIVE: CPT

## 2020-01-31 PROCEDURE — 84153 ASSAY OF PSA TOTAL: CPT

## 2020-02-04 ENCOUNTER — PATIENT MESSAGE (OUTPATIENT)
Dept: FAMILY MEDICINE | Facility: CLINIC | Age: 70
End: 2020-02-04

## 2020-02-06 ENCOUNTER — LAB VISIT (OUTPATIENT)
Dept: LAB | Facility: HOSPITAL | Age: 70
End: 2020-02-06
Attending: NURSE PRACTITIONER
Payer: COMMERCIAL

## 2020-02-06 DIAGNOSIS — Z01.812 PRE-OPERATIVE LABORATORY EXAMINATION: Primary | ICD-10-CM

## 2020-02-06 LAB — CK SERPL-CCNC: 164 U/L (ref 20–200)

## 2020-02-06 PROCEDURE — 36415 COLL VENOUS BLD VENIPUNCTURE: CPT

## 2020-02-06 PROCEDURE — 82550 ASSAY OF CK (CPK): CPT

## 2020-02-10 ENCOUNTER — OFFICE VISIT (OUTPATIENT)
Dept: FAMILY MEDICINE | Facility: CLINIC | Age: 70
End: 2020-02-10
Payer: COMMERCIAL

## 2020-02-10 VITALS
WEIGHT: 163.88 LBS | HEART RATE: 55 BPM | HEIGHT: 70 IN | SYSTOLIC BLOOD PRESSURE: 138 MMHG | TEMPERATURE: 98 F | RESPIRATION RATE: 16 BRPM | BODY MASS INDEX: 23.46 KG/M2 | DIASTOLIC BLOOD PRESSURE: 80 MMHG | OXYGEN SATURATION: 98 %

## 2020-02-10 DIAGNOSIS — Z00.00 ROUTINE HEALTH MAINTENANCE: Primary | ICD-10-CM

## 2020-02-10 PROCEDURE — 3079F DIAST BP 80-89 MM HG: CPT | Mod: S$GLB,,, | Performed by: NURSE PRACTITIONER

## 2020-02-10 PROCEDURE — 3075F SYST BP GE 130 - 139MM HG: CPT | Mod: S$GLB,,, | Performed by: NURSE PRACTITIONER

## 2020-02-10 PROCEDURE — 99397 PR PREVENTIVE VISIT,EST,65 & OVER: ICD-10-PCS | Mod: S$GLB,,, | Performed by: NURSE PRACTITIONER

## 2020-02-10 PROCEDURE — 3075F PR MOST RECENT SYSTOLIC BLOOD PRESS GE 130-139MM HG: ICD-10-PCS | Mod: S$GLB,,, | Performed by: NURSE PRACTITIONER

## 2020-02-10 PROCEDURE — 99397 PER PM REEVAL EST PAT 65+ YR: CPT | Mod: S$GLB,,, | Performed by: NURSE PRACTITIONER

## 2020-02-10 PROCEDURE — 3079F PR MOST RECENT DIASTOLIC BLOOD PRESSURE 80-89 MM HG: ICD-10-PCS | Mod: S$GLB,,, | Performed by: NURSE PRACTITIONER

## 2020-02-10 NOTE — PROGRESS NOTES
SUBJECTIVE:      Patient ID: Onesimo Paula is a 69 y.o. male.    Chief Complaint: Establish Care (keila pt) and Annual Exam (wellness)    Presents for well exam & lab review - A1c stable over the past year & indicative of prediabetes, PSA 1 point higher than last year but still well within range, blood counts/CMP/lipids fine, TSH high-normal & free T4 slightly decreased (and asymptomatic, so no plans to start thyroid replacement), no physical complaints this visit, states he has been busy (took off work) taking care of his wife who recently broke her knee      Past Surgical History:   Procedure Laterality Date    EYE SURGERY      for cataracts, rt eye 20, left eye 20    HERNIA REPAIR  1999    HERNIA REPAIR  2004    KNEE ARTHROSCOPY Left 2018    SKIN LESION EXCISION  10/2009    Neck    SKIN LESION EXCISION  2015    STAPEDECTOMY Right 2001     Family History   Problem Relation Age of Onset    Cancer Mother         skin    Stroke Mother     Heart failure Father     Kidney disease Father       Social History     Socioeconomic History    Marital status:      Spouse name: Not on file    Number of children: Not on file    Years of education: Not on file    Highest education level: Not on file   Occupational History    Not on file   Social Needs    Financial resource strain: Not hard at all    Food insecurity:     Worry: Never true     Inability: Never true    Transportation needs:     Medical: No     Non-medical: No   Tobacco Use    Smoking status: Former Smoker     Last attempt to quit:      Years since quittin.1    Smokeless tobacco: Never Used   Substance and Sexual Activity    Alcohol use: Yes     Frequency: 2-4 times a month     Drinks per session: 1 or 2     Binge frequency: Never     Comment: occasional    Drug use: No    Sexual activity: Yes     Partners: Female   Lifestyle    Physical activity:     Days per week: 0 days     Minutes per  session: 0 min    Stress: Not on file   Relationships    Social connections:     Talks on phone: Three times a week     Gets together: Once a week     Attends Spiritism service: Not on file     Active member of club or organization: Yes     Attends meetings of clubs or organizations: Never     Relationship status:    Other Topics Concern    Not on file   Social History Narrative    Not on file     Current Outpatient Medications   Medication Sig Dispense Refill    aspirin (ECOTRIN) 81 MG EC tablet aspirin 81 mg tablet,delayed release   Take 1 tablet every day by oral route.      hydroCHLOROthiazide (HYDRODIURIL) 12.5 MG Tab Take 12.5 mg by mouth once daily.       lisinopril (PRINIVIL,ZESTRIL) 40 MG tablet TAKE 1 TABLET BY MOUTH ONCE DAILY 90 tablet 3    mirabegron (MYRBETRIQ) 50 mg Tb24 Take 50 mg by mouth once daily.      omeprazole (PRILOSEC) 20 MG capsule TAKE 1 CAPSULE BY MOUTH ONCE DAILY 90 capsule 1    rosuvastatin (CRESTOR) 10 MG tablet Take 1 tablet (10 mg total) by mouth once daily. 90 tablet 1    tadalafil (CIALIS) 5 MG tablet Take 1 tablet (5 mg total) by mouth once daily. 30 tablet 11     No current facility-administered medications for this visit.      Review of patient's allergies indicates:  No Known Allergies   Past Medical History:   Diagnosis Date    Acid reflux     Acquired tricuspid valve insufficiency     Anemia     Borderline diabetes     BPH (benign prostatic hyperplasia)     Cataract     1/16/20 rt eye, 1/30/20- Lt eye    Coronary artery disease     Hypertension     Pulmonary nodules     Skin cancer      Past Surgical History:   Procedure Laterality Date    EYE SURGERY      for cataracts, rt eye 1/16/20, left eye 1/30/20    HERNIA REPAIR  12/1999    HERNIA REPAIR  05/2004    KNEE ARTHROSCOPY Left 04/12/2018    SKIN LESION EXCISION  10/2009    Neck    SKIN LESION EXCISION  2015    STAPEDECTOMY Right 03/2001       Review of Systems   Constitutional: Negative  "for activity change, appetite change, fatigue and fever.   HENT: Negative for congestion, ear pain, hearing loss, postnasal drip, sinus pressure, sinus pain, sneezing and sore throat.    Eyes: Negative for photophobia and pain.        Recent mary olu cataract surgery   Respiratory: Negative for cough, chest tightness, shortness of breath and wheezing.    Cardiovascular: Negative for chest pain and leg swelling.   Gastrointestinal: Negative for abdominal distention, abdominal pain, blood in stool, constipation, diarrhea, nausea and vomiting.   Endocrine: Negative for cold intolerance, heat intolerance, polydipsia and polyuria.   Genitourinary: Negative for difficulty urinating, dysuria, flank pain, frequency, hematuria and urgency.   Musculoskeletal: Negative for arthralgias, back pain, joint swelling, myalgias and neck pain.   Skin: Negative for pallor and rash.   Allergic/Immunologic: Negative for environmental allergies and food allergies.   Neurological: Negative for dizziness, weakness, light-headedness and headaches.   Hematological: Does not bruise/bleed easily.   Psychiatric/Behavioral: Negative for confusion, decreased concentration and sleep disturbance. The patient is not nervous/anxious.       OBJECTIVE:      Vitals:    02/10/20 0916   BP: 138/80   Pulse: (!) 55   Resp: 16   Temp: 98 °F (36.7 °C)   SpO2: 98%   Weight: 74.3 kg (163 lb 14.4 oz)   Height: 5' 10" (1.778 m)     Physical Exam   Constitutional: He is oriented to person, place, and time. Vital signs are normal. He appears well-developed and well-nourished. No distress.   HENT:   Head: Normocephalic and atraumatic.   Right Ear: Hearing normal.   Left Ear: Hearing normal.   Nose: Nose normal. No rhinorrhea.   Mouth/Throat: Mucous membranes are normal.   Eyes: Pupils are equal, round, and reactive to light. Conjunctivae and lids are normal. Right eye exhibits no discharge. Left eye exhibits no discharge. Right conjunctiva is not injected. Left " conjunctiva is not injected. Right pupil is round and reactive. Left pupil is round and reactive. Pupils are equal.   Neck: Trachea normal and normal range of motion. Neck supple. No JVD present. No tracheal deviation present. No thyromegaly present.   Cardiovascular: Regular rhythm, normal heart sounds and intact distal pulses. Bradycardia present. Exam reveals no gallop and no friction rub.   No murmur heard.  Pulses:       Radial pulses are 2+ on the right side, and 2+ on the left side.   Pulmonary/Chest: Effort normal and breath sounds normal. No stridor. No respiratory distress. He has no decreased breath sounds. He has no wheezes. He has no rhonchi. He has no rales.   Abdominal: Soft. Bowel sounds are normal. He exhibits no distension. There is no tenderness. There is no rigidity and no guarding.   Musculoskeletal: Normal range of motion. He exhibits no edema.   Lymphadenopathy:     He has no cervical adenopathy.   Neurological: He is alert and oriented to person, place, and time. He has normal strength. He displays no atrophy. He displays a negative Romberg sign. Coordination and gait normal.   Skin: Skin is warm and dry. Capillary refill takes less than 2 seconds. No lesion and no rash noted. No cyanosis. No pallor.   Scab to L temple s/p skin CA removal   Psychiatric: He has a normal mood and affect. His speech is normal and behavior is normal. Judgment and thought content normal. Cognition and memory are normal. He is attentive.   Nursing note and vitals reviewed.     Assessment:       1. Routine health maintenance        Plan:       Routine health maintenance        - stable on current med regimen        - continue healthy diet & lifestyle regimen        - refills for chronic conditions sent to pharmacy via separate encounter      Follow up in about 6 months (around 8/10/2020) for HTN recheck.      2/10/2020 JAMES Rose, FNP-C

## 2020-02-24 ENCOUNTER — PATIENT MESSAGE (OUTPATIENT)
Dept: UROLOGY | Facility: CLINIC | Age: 70
End: 2020-02-24

## 2020-02-27 ENCOUNTER — PATIENT MESSAGE (OUTPATIENT)
Dept: UROLOGY | Facility: CLINIC | Age: 70
End: 2020-02-27

## 2020-02-28 ENCOUNTER — PATIENT MESSAGE (OUTPATIENT)
Dept: UROLOGY | Facility: CLINIC | Age: 70
End: 2020-02-28

## 2020-02-28 NOTE — TELEPHONE ENCOUNTER
Patient needs to be scheduled to follow up with Dr. James. Soonest I'm able to schedule is in May. It's not an emergency but if there is something sooner, can you schedule it please?

## 2020-03-30 DIAGNOSIS — I10 ESSENTIAL HYPERTENSION: ICD-10-CM

## 2020-03-30 RX ORDER — LISINOPRIL 40 MG/1
40 TABLET ORAL DAILY
Qty: 90 TABLET | Refills: 3 | Status: SHIPPED | OUTPATIENT
Start: 2020-03-30 | End: 2021-02-15 | Stop reason: SDUPTHER

## 2020-03-31 ENCOUNTER — LAB VISIT (OUTPATIENT)
Dept: LAB | Facility: HOSPITAL | Age: 70
End: 2020-03-31
Attending: INTERNAL MEDICINE
Payer: COMMERCIAL

## 2020-03-31 DIAGNOSIS — N18.2 CHRONIC KIDNEY DISEASE, STAGE II (MILD): ICD-10-CM

## 2020-03-31 DIAGNOSIS — R94.4 NONSPECIFIC ABNORMAL RESULTS OF KIDNEY FUNCTION STUDY: ICD-10-CM

## 2020-03-31 DIAGNOSIS — Z01.812 PRE-OPERATIVE LABORATORY EXAMINATION: Primary | ICD-10-CM

## 2020-03-31 DIAGNOSIS — R06.00 DYSPNEA: ICD-10-CM

## 2020-03-31 DIAGNOSIS — I12.9 PARENCHYMAL RENAL HYPERTENSION: ICD-10-CM

## 2020-03-31 LAB
ALBUMIN SERPL BCP-MCNC: 4.2 G/DL (ref 3.5–5.2)
ALP SERPL-CCNC: 51 U/L (ref 55–135)
ALT SERPL W/O P-5'-P-CCNC: 32 U/L (ref 10–44)
ANION GAP SERPL CALC-SCNC: 8 MMOL/L (ref 8–16)
AST SERPL-CCNC: 33 U/L (ref 10–40)
BASOPHILS # BLD AUTO: 0.08 K/UL (ref 0–0.2)
BASOPHILS NFR BLD: 1.4 % (ref 0–1.9)
BILIRUB SERPL-MCNC: 0.7 MG/DL (ref 0.1–1)
BILIRUB UR QL STRIP: NEGATIVE
BUN SERPL-MCNC: 28 MG/DL (ref 8–23)
CALCIUM SERPL-MCNC: 9 MG/DL (ref 8.7–10.5)
CHLORIDE SERPL-SCNC: 105 MMOL/L (ref 95–110)
CK SERPL-CCNC: 612 U/L (ref 20–200)
CLARITY UR: CLEAR
CO2 SERPL-SCNC: 27 MMOL/L (ref 23–29)
COLOR UR: YELLOW
CREAT SERPL-MCNC: 1.3 MG/DL (ref 0.5–1.4)
DIFFERENTIAL METHOD: NORMAL
EOSINOPHIL # BLD AUTO: 0.2 K/UL (ref 0–0.5)
EOSINOPHIL NFR BLD: 2.9 % (ref 0–8)
ERYTHROCYTE [DISTWIDTH] IN BLOOD BY AUTOMATED COUNT: 13.3 % (ref 11.5–14.5)
EST. GFR  (AFRICAN AMERICAN): >60 ML/MIN/1.73 M^2
EST. GFR  (NON AFRICAN AMERICAN): 55.3 ML/MIN/1.73 M^2
GLUCOSE SERPL-MCNC: 100 MG/DL (ref 70–110)
GLUCOSE UR QL STRIP: NEGATIVE
HCT VFR BLD AUTO: 43 % (ref 40–54)
HGB BLD-MCNC: 14.1 G/DL (ref 14–18)
HGB UR QL STRIP: NEGATIVE
IMM GRANULOCYTES # BLD AUTO: 0.01 K/UL (ref 0–0.04)
IMM GRANULOCYTES NFR BLD AUTO: 0.2 % (ref 0–0.5)
KETONES UR QL STRIP: NEGATIVE
LEUKOCYTE ESTERASE UR QL STRIP: NEGATIVE
LYMPHOCYTES # BLD AUTO: 1.9 K/UL (ref 1–4.8)
LYMPHOCYTES NFR BLD: 33.5 % (ref 18–48)
MCH RBC QN AUTO: 30.5 PG (ref 27–31)
MCHC RBC AUTO-ENTMCNC: 32.8 G/DL (ref 32–36)
MCV RBC AUTO: 93 FL (ref 82–98)
MONOCYTES # BLD AUTO: 0.8 K/UL (ref 0.3–1)
MONOCYTES NFR BLD: 13.5 % (ref 4–15)
NEUTROPHILS # BLD AUTO: 2.8 K/UL (ref 1.8–7.7)
NEUTROPHILS NFR BLD: 48.5 % (ref 38–73)
NITRITE UR QL STRIP: NEGATIVE
NRBC BLD-RTO: 0 /100 WBC
PH UR STRIP: 7 [PH] (ref 5–8)
PLATELET # BLD AUTO: 202 K/UL (ref 150–350)
PMV BLD AUTO: 10.8 FL (ref 9.2–12.9)
POTASSIUM SERPL-SCNC: 3.8 MMOL/L (ref 3.5–5.1)
PROT SERPL-MCNC: 7.2 G/DL (ref 6–8.4)
PROT UR QL STRIP: NEGATIVE
RBC # BLD AUTO: 4.62 M/UL (ref 4.6–6.2)
SODIUM SERPL-SCNC: 140 MMOL/L (ref 136–145)
SP GR UR STRIP: 1.02 (ref 1–1.03)
URN SPEC COLLECT METH UR: NORMAL
UROBILINOGEN UR STRIP-ACNC: NEGATIVE EU/DL
WBC # BLD AUTO: 5.79 K/UL (ref 3.9–12.7)

## 2020-03-31 PROCEDURE — 80053 COMPREHEN METABOLIC PANEL: CPT

## 2020-03-31 PROCEDURE — 82550 ASSAY OF CK (CPK): CPT

## 2020-03-31 PROCEDURE — 36415 COLL VENOUS BLD VENIPUNCTURE: CPT

## 2020-03-31 PROCEDURE — 81003 URINALYSIS AUTO W/O SCOPE: CPT

## 2020-03-31 PROCEDURE — 85025 COMPLETE CBC W/AUTO DIFF WBC: CPT

## 2020-05-12 DIAGNOSIS — K21.9 GASTROESOPHAGEAL REFLUX DISEASE WITHOUT ESOPHAGITIS: ICD-10-CM

## 2020-05-12 RX ORDER — OMEPRAZOLE 20 MG/1
20 CAPSULE, DELAYED RELEASE ORAL DAILY
Qty: 90 CAPSULE | Refills: 1 | Status: SHIPPED | OUTPATIENT
Start: 2020-05-12 | End: 2020-11-11

## 2020-07-27 ENCOUNTER — OFFICE VISIT (OUTPATIENT)
Dept: UROLOGY | Facility: CLINIC | Age: 70
End: 2020-07-27
Payer: COMMERCIAL

## 2020-07-27 VITALS
HEIGHT: 70 IN | DIASTOLIC BLOOD PRESSURE: 84 MMHG | SYSTOLIC BLOOD PRESSURE: 146 MMHG | BODY MASS INDEX: 24.03 KG/M2 | WEIGHT: 167.88 LBS | HEART RATE: 69 BPM

## 2020-07-27 DIAGNOSIS — N52.1 ERECTILE DYSFUNCTION DUE TO DISEASES CLASSIFIED ELSEWHERE: ICD-10-CM

## 2020-07-27 DIAGNOSIS — N13.8 BPH WITH OBSTRUCTION/LOWER URINARY TRACT SYMPTOMS: Primary | ICD-10-CM

## 2020-07-27 DIAGNOSIS — N40.1 BPH WITH OBSTRUCTION/LOWER URINARY TRACT SYMPTOMS: Primary | ICD-10-CM

## 2020-07-27 DIAGNOSIS — N32.81 OAB (OVERACTIVE BLADDER): ICD-10-CM

## 2020-07-27 LAB
BILIRUB SERPL-MCNC: NORMAL MG/DL
BLOOD URINE, POC: NORMAL
CLARITY, POC UA: CLEAR
COLOR, POC UA: YELLOW
GLUCOSE UR QL STRIP: NORMAL
KETONES UR QL STRIP: NORMAL
LEUKOCYTE ESTERASE URINE, POC: NORMAL
NITRITE, POC UA: NORMAL
PH, POC UA: 7
PROTEIN, POC: NORMAL
SPECIFIC GRAVITY, POC UA: 1.03
UROBILINOGEN, POC UA: NORMAL

## 2020-07-27 PROCEDURE — 99999 PR PBB SHADOW E&M-EST. PATIENT-LVL IV: ICD-10-PCS | Mod: PBBFAC,,, | Performed by: UROLOGY

## 2020-07-27 PROCEDURE — 3079F DIAST BP 80-89 MM HG: CPT | Mod: CPTII,S$GLB,, | Performed by: UROLOGY

## 2020-07-27 PROCEDURE — 81001 URINALYSIS AUTO W/SCOPE: CPT | Mod: S$GLB,,, | Performed by: UROLOGY

## 2020-07-27 PROCEDURE — 1159F MED LIST DOCD IN RCRD: CPT | Mod: S$GLB,,, | Performed by: UROLOGY

## 2020-07-27 PROCEDURE — 3008F PR BODY MASS INDEX (BMI) DOCUMENTED: ICD-10-PCS | Mod: CPTII,S$GLB,, | Performed by: UROLOGY

## 2020-07-27 PROCEDURE — 99999 PR PBB SHADOW E&M-EST. PATIENT-LVL IV: CPT | Mod: PBBFAC,,, | Performed by: UROLOGY

## 2020-07-27 PROCEDURE — 3077F SYST BP >= 140 MM HG: CPT | Mod: CPTII,S$GLB,, | Performed by: UROLOGY

## 2020-07-27 PROCEDURE — 81001 POCT URINE DIP WITH MICROSCOPIC: ICD-10-PCS | Mod: S$GLB,,, | Performed by: UROLOGY

## 2020-07-27 PROCEDURE — 3008F BODY MASS INDEX DOCD: CPT | Mod: CPTII,S$GLB,, | Performed by: UROLOGY

## 2020-07-27 PROCEDURE — 3077F PR MOST RECENT SYSTOLIC BLOOD PRESSURE >= 140 MM HG: ICD-10-PCS | Mod: CPTII,S$GLB,, | Performed by: UROLOGY

## 2020-07-27 PROCEDURE — 81002 POCT URINE DIPSTICK WITHOUT MICROSCOPE: ICD-10-PCS | Mod: S$GLB,,, | Performed by: UROLOGY

## 2020-07-27 PROCEDURE — 99215 OFFICE O/P EST HI 40 MIN: CPT | Mod: 25,S$GLB,, | Performed by: UROLOGY

## 2020-07-27 PROCEDURE — 99215 PR OFFICE/OUTPT VISIT, EST, LEVL V, 40-54 MIN: ICD-10-PCS | Mod: 25,S$GLB,, | Performed by: UROLOGY

## 2020-07-27 PROCEDURE — 1159F PR MEDICATION LIST DOCUMENTED IN MEDICAL RECORD: ICD-10-PCS | Mod: S$GLB,,, | Performed by: UROLOGY

## 2020-07-27 PROCEDURE — 3079F PR MOST RECENT DIASTOLIC BLOOD PRESSURE 80-89 MM HG: ICD-10-PCS | Mod: CPTII,S$GLB,, | Performed by: UROLOGY

## 2020-07-27 PROCEDURE — 1101F PT FALLS ASSESS-DOCD LE1/YR: CPT | Mod: CPTII,S$GLB,, | Performed by: UROLOGY

## 2020-07-27 PROCEDURE — 1101F PR PT FALLS ASSESS DOC 0-1 FALLS W/OUT INJ PAST YR: ICD-10-PCS | Mod: CPTII,S$GLB,, | Performed by: UROLOGY

## 2020-07-27 PROCEDURE — 81002 URINALYSIS NONAUTO W/O SCOPE: CPT | Mod: S$GLB,,, | Performed by: UROLOGY

## 2020-07-27 RX ORDER — TAMSULOSIN HYDROCHLORIDE 0.4 MG/1
0.4 CAPSULE ORAL
Qty: 90 CAPSULE | Refills: 3 | Status: SHIPPED | OUTPATIENT
Start: 2020-07-27 | End: 2021-03-09 | Stop reason: ALTCHOICE

## 2020-07-27 RX ORDER — SOLIFENACIN SUCCINATE 10 MG/1
10 TABLET, FILM COATED ORAL DAILY
Qty: 90 TABLET | Refills: 3 | Status: ON HOLD | OUTPATIENT
Start: 2020-07-27 | End: 2020-08-17 | Stop reason: HOSPADM

## 2020-07-27 RX ORDER — TADALAFIL 5 MG/1
5 TABLET ORAL DAILY
Qty: 90 TABLET | Refills: 3 | Status: SHIPPED | OUTPATIENT
Start: 2020-07-27 | End: 2021-06-15 | Stop reason: SDUPTHER

## 2020-07-27 RX ORDER — LIDOCAINE HYDROCHLORIDE 20 MG/ML
JELLY TOPICAL ONCE
Status: CANCELLED | OUTPATIENT
Start: 2020-07-27 | End: 2020-07-27

## 2020-07-27 RX ORDER — HYDROCHLOROTHIAZIDE 12.5 MG/1
CAPSULE ORAL
COMMUNITY
Start: 2020-07-10 | End: 2020-08-12

## 2020-07-27 NOTE — PATIENT INSTRUCTIONS
Repeat psa (free and total)  due to rising over a point in a year from 1.2 to 2.2. rule out prostate cancer. Court today - see above.     Continue tadalafil 5mg in the morning for enlarged prostate and erectile dysfunction - refilled for a year.     Restart flomax/tamsulosin nightly for enlarged prostate, slow flow and urinary hesistancy.   -Uroflow and pvr to evaluate flow. Come with a full bladder. Nurse visit.   -Cytoscopy and transrectal ultrasound to evaluate prostate anatomy and bladder. See if he has an intravesical median lobe that doesn't respond to flomax. Scheduling for a prostate biopsy but will cancel the biopsy part and only do cystoscopy and trus if psa the same.   -exam today reveals large prostate. No nodules. Next step could consider adding finasteride to flomax to help keep prostate from growing or doing a prostate procedure to shrink prostate     Start vesicare/solfenacin 10mg daily in morning for overactive bladder which may be related to prostate, psychogenic (bc less of an issue while working), caffeine use or combination of all. Takes 4-6 weeks to work. Dry mouth and constipation can worsen. Let us know if symptoms worsen or not covered.   -has never tried stopping caffeine use- cut out coffee, sodas and energy drinks  -beer will worsen sx. Ok to eat chocolate at night since not waking up to urinate.     Return to clinic 2 months to re-eval everything

## 2020-07-27 NOTE — H&P (VIEW-ONLY)
[facility] Note - [place]  Staff: MD Erika  PCP: JAMES Rose FNP-C MyChart Utilization: active     Chief Complaint:   Chief Complaint   Patient presents with    Follow-up         Subjective:        HPI: Onesimo Paula is a 69 y.o. male     Seen by me 9/30/19  Patient had MRI for right hip pain and was found have a thick-walled bladder with enlarged prostate.  He states that he saw urologist over 40 years ago for burning with urination.  He had a renal bladder ultrasound in 2016 which showed enlarged prostate and bilateral renal cyst done for renal insufficiency whom he sees  for.  Denies any gross hematuria.  Review of for previous urine show no microscopic hematuria.  Twenty-five pack-year history of smoking but quit in 1997.     He also has AUA symptom score 6 and occasional weak stream and urgency but voids every 3-5 hours when he is working and a little more frequent when he is not.  Denies any urge incontinence.  Okay stream with occasional weak stream.  PVR today 09/03/2019 is 15 cc.  He may have tried Flomax a couple years ago but does not recall it helping her changing any was symptoms.  Overall really not that bothered by his urinary symptoms.     Also states he has ED.  Previously tried sildenafil unsure dose and it helped but wife does not want him to take, exam revealed peyronie's    Plan: started tadalafil 5mg due to thick walled bladder although essentially asx. No further f/u fr b renal cysts.     Interval history 11/18/19:   Pt states today Cialis has NOT improved any of his lower urinary tract symptoms since starting the medication.  He currently takes this medication before his bedtime.  Pt still c/o urinary urgency, especially during the day.  Nocturia is 1-2x nightly and not bothersome at this time.  He is a , therefore the daytime urgency really bothers him.    Plan: started ditropan 10mg XL and cont'd tadalafil    Interval  history by idalia 1/6/20:   TODAY, pt states the oxybutynin only improved his LUTS by over 10% at this time.  PVR by bladder scan performed in office today:  11 mL     Rec'd: d/c oxybutynin, start myrbetriq, cont tadalafil 5mg daily.     Interval history 7/27/20:     Tried myrbetriq but didn't help. Having urgency, hesistancy and intermittent slow stream but urgency most bothersome and having some UUI with a few drop, but not that bothersome. Drinks 1 c of coffee in am. Soda during day. 1 c of coffee int he evening. Has never tried stopping these to see if they help. Drinks beer and notices increase in frequency following day.     On tadalafil 5mg daily for ED and bph.  pvr by scan: 32cc  Voiding every 30 minutes a day recently more pronounced at home (has been on vacation).  Takes hctz in am. When he's working he only voids about 2x-4x a day. Drinks caffeine while working including energy drinks. No nocturia.     psa 1/31/20 2.2 (up from 1.2 year prior).  WINSOME today: 40g + no nodules.     Urine history  Urinalysis void: neg  Urine history:   3/31/20 Neg  1/6/20  Neg  11/18/19 neg  8/8/19  No cx, void: n/a 0 rbc  2/21/19 Ng, void: neg, 0 rbc  4/9/18  No cx, void: neg  9/2017  No blood       PSA history: no family hx of prostate cancer  7/27/20 WINSOME: 40+g, pvr by scan: 32cc  1/31/20 2.2   1/6/20  pvr by scan: 11cc  9/3/19  WINSOME:40+gm, pvr by scan: 15cc   2/21/19 1.2  3/14/18 1.0      REVIEW OF SYSTEMS:  General ROS: no fevers, no chills  Psychological ROS: no depression  Endocrine ROS: no heat or cold  Respiratory ROS: no SOB  Cardiovascular ROS: no CP  Gastrointestinal ROS: no abdominal pain, no constipation, no diarrhea, noBRBPR  Musculoskeletal ROS: no muscle pain  Neurological ROS: no headaches  Dermatological ROS: no rashes  HEENT: noglasses, no sinus   ROS: per HPI     PMHx:  Past Medical History:   Diagnosis Date    Acid reflux     Acquired tricuspid valve insufficiency     Anemia     Borderline diabetes      BPH (benign prostatic hyperplasia)     Cataract     20 rt eye, 20- Lt eye    Coronary artery disease     Hypertension     Pulmonary nodules     Skin cancer            PSHx:  Past Surgical History:   Procedure Laterality Date    EYE SURGERY      for cataracts, rt eye 20, left eye 20    HERNIA REPAIR  1999    HERNIA REPAIR  2004    KNEE ARTHROSCOPY Left 2018    SKIN LESION EXCISION  10/2009    Neck    SKIN LESION EXCISION  2015    STAPEDECTOMY Right 2001       Stents/Valves/Foreign Bodies/Cardiac Evaluation/Cardiologist:   GI: or, last colonoscopy:    Family History   Problem Relation Age of Onset    Cancer Mother         skin    Stroke Mother     Heart failure Father     Kidney disease Father       malignancies: none.   kidney stones: none      Soc Hx:  Social History     Tobacco Use    Smoking status: Former Smoker     Quit date:      Years since quittin.5    Smokeless tobacco: Never Used   Substance Use Topics    Alcohol use: Yes     Frequency: 2-4 times a month     Drinks per session: 1 or 2     Binge frequency: Never     Comment: occasional    Drug use: No     1 ppd x 26 years  Lives in : [place]  :   3 children  Patient's occupation:      Allergies:  Patient has no known allergies.    Anticoagulation/Aspirin: asa 81mg       Objective:     Vitals:    20 1148   BP: (!) 146/84   Pulse: 69       General:WDWN in NAD  Eyes: PERRLA, normal conjunctiva  Respiratory: no increased work on breathing. No wheezing.   Cardiovascular: No obvious extremity edema. Warm and well perfused.   GI: no palpation of masses. No tenderness. No hepatosplenomegaly to palpation.  Musculoskeletal: normal range of motion of bilateral upper extremities. Normal muscle strength and tone.  Skin: no obvious rashes or lesions. No tightening of skin noted.  Neurologic: CN grossly normal. Normal sensation.   Psychiatric:  awake, alert and oriented x 3. Mood and affect normal. Cooperative.    court-see above    LABS REVIEW:  Recent Labs   Lab 04/30/19  0823 01/31/20  0742 03/31/20  0742   WBC 5.20 6.50 5.79   Hemoglobin 14.8 15.2 14.1   Hematocrit 44.7 45.5 43.0   Platelets 224 225 202   ]  Recent Labs   Lab 08/08/19  1023 01/31/20  0742 03/31/20  0742   Sodium 143 141 140   Potassium 3.9 3.9 3.8   Chloride 107 105 105   CO2 29 29 27   BUN, Bld 27 H 26 H 28 H   Creatinine 1.2 1.1 1.3   Glucose 111 H 99 100   Calcium 9.6 9.3 9.0   Alkaline Phosphatase 54 L 55 51 L   Total Protein 7.3 7.2 7.2   Albumin 4.1 4.1 4.2   Total Bilirubin 0.5 0.6 0.7   AST 33 28 33   ALT 39 36 32   ]    Lab Results   Component Value Date    HGBA1C 6.0 01/31/2020         Recent Pertinent urologic PATHOLOGY REVIEW: See hpi for recent     none    Recent Pertinent Urologic RADIOGRAPHIC REVIEW: previous relevant images reviewed See hpi for recent     Mri prostate 6/3/19  1. Suspect a tear in the right hip labrum, anterosuperior quadrant.  Arthrogram could provide additional characterization.  2. Mild right hip degenerative change with a small effusion.  3.  Diffuse bladder wall thickening with differential to include cystitis and chronic outlet obstruction.  4. Prostatomegaly.  Correlate with PSA.    rbus 08/10/2016  Left parapelvic cyst  Right 10 mm  No thick-walled bladder  Enlarged prostate        Assessment:       1. BPH with obstruction/lower urinary tract symptoms    2. OAB (overactive bladder)    3. Erectile dysfunction due to diseases classified elsewhere          Plan:     Benign prostatic hyperplasia without lower urinary tract symptoms  -     POCT URINE DIPSTICK WITHOUT MICROSCOPE        Repeat psa (free and total)  due to rising over a point in a year from 1.2 to 2.2. rule out prostate cancer. Court today - see above.     Continue tadalafil 5mg in the morning for enlarged prostate and erectile dysfunction - refilled for a year.     Restart flomax/tamsulosin  nightly for enlarged prostate, slow flow and urinary hesistancy.   -Uroflow and pvr to evaluate flow. Come with a full bladder. Nurse visit.   -Cytoscopy and transrectal ultrasound to evaluate prostate anatomy and bladder. See if he has an intravesical median lobe that doesn't respond to flomax. Scheduling for a prostate biopsy but will cancel the biopsy part and only do cystoscopy and trus if psa the same. If psa higher will need gent, ativan, cipro and fleets written for.   -exam today reveals large prostate. No nodules. Next step could consider adding finasteride to flomax to help keep prostate from growing or doing a prostate procedure to shrink prostate      Start vesicare/solfenacin 10mg daily in morning for overactive bladder which may be related to prostate, psychogenic (bc less of an issue while working), caffeine use or combination of all. Takes 4-6 weeks to work. Dry mouth and constipation can worsen. Let us know if symptoms worsen or not covered. Already tried mybetriq and ditropan  -has never tried stopping caffeine use- cut out coffee, sodas and energy drinks  -beer will worsen sx. Ok to eat chocolate at night since not waking up to urinate.     Return to clinic 2 months to re-eval everything - VV  -will keep a close eye on him. Same issues without rsolution with meds            La Nena James MD

## 2020-07-27 NOTE — PROGRESS NOTES
[facility] Note - [place]  Staff: MD Erika  PCP: JAMES Rose FNP-C MyChart Utilization: active     Chief Complaint:   Chief Complaint   Patient presents with    Follow-up         Subjective:        HPI: Onesimo Paula is a 69 y.o. male     Seen by me 9/30/19  Patient had MRI for right hip pain and was found have a thick-walled bladder with enlarged prostate.  He states that he saw urologist over 40 years ago for burning with urination.  He had a renal bladder ultrasound in 2016 which showed enlarged prostate and bilateral renal cyst done for renal insufficiency whom he sees  for.  Denies any gross hematuria.  Review of for previous urine show no microscopic hematuria.  Twenty-five pack-year history of smoking but quit in 1997.     He also has AUA symptom score 6 and occasional weak stream and urgency but voids every 3-5 hours when he is working and a little more frequent when he is not.  Denies any urge incontinence.  Okay stream with occasional weak stream.  PVR today 09/03/2019 is 15 cc.  He may have tried Flomax a couple years ago but does not recall it helping her changing any was symptoms.  Overall really not that bothered by his urinary symptoms.     Also states he has ED.  Previously tried sildenafil unsure dose and it helped but wife does not want him to take, exam revealed peyronie's    Plan: started tadalafil 5mg due to thick walled bladder although essentially asx. No further f/u fr b renal cysts.     Interval history 11/18/19:   Pt states today Cialis has NOT improved any of his lower urinary tract symptoms since starting the medication.  He currently takes this medication before his bedtime.  Pt still c/o urinary urgency, especially during the day.  Nocturia is 1-2x nightly and not bothersome at this time.  He is a , therefore the daytime urgency really bothers him.    Plan: started ditropan 10mg XL and cont'd tadalafil    Interval  history by idalia 1/6/20:   TODAY, pt states the oxybutynin only improved his LUTS by over 10% at this time.  PVR by bladder scan performed in office today:  11 mL     Rec'd: d/c oxybutynin, start myrbetriq, cont tadalafil 5mg daily.     Interval history 7/27/20:     Tried myrbetriq but didn't help. Having urgency, hesistancy and intermittent slow stream but urgency most bothersome and having some UUI with a few drop, but not that bothersome. Drinks 1 c of coffee in am. Soda during day. 1 c of coffee int he evening. Has never tried stopping these to see if they help. Drinks beer and notices increase in frequency following day.     On tadalafil 5mg daily for ED and bph.  pvr by scan: 32cc  Voiding every 30 minutes a day recently more pronounced at home (has been on vacation).  Takes hctz in am. When he's working he only voids about 2x-4x a day. Drinks caffeine while working including energy drinks. No nocturia.     psa 1/31/20 2.2 (up from 1.2 year prior).  WINSOME today: 40g + no nodules.     Urine history  Urinalysis void: neg  Urine history:   3/31/20 Neg  1/6/20  Neg  11/18/19 neg  8/8/19  No cx, void: n/a 0 rbc  2/21/19 Ng, void: neg, 0 rbc  4/9/18  No cx, void: neg  9/2017  No blood       PSA history: no family hx of prostate cancer  7/27/20 WINSOME: 40+g, pvr by scan: 32cc  1/31/20 2.2   1/6/20  pvr by scan: 11cc  9/3/19  WINSOME:40+gm, pvr by scan: 15cc   2/21/19 1.2  3/14/18 1.0      REVIEW OF SYSTEMS:  General ROS: no fevers, no chills  Psychological ROS: no depression  Endocrine ROS: no heat or cold  Respiratory ROS: no SOB  Cardiovascular ROS: no CP  Gastrointestinal ROS: no abdominal pain, no constipation, no diarrhea, noBRBPR  Musculoskeletal ROS: no muscle pain  Neurological ROS: no headaches  Dermatological ROS: no rashes  HEENT: noglasses, no sinus   ROS: per HPI     PMHx:  Past Medical History:   Diagnosis Date    Acid reflux     Acquired tricuspid valve insufficiency     Anemia     Borderline diabetes      BPH (benign prostatic hyperplasia)     Cataract     20 rt eye, 20- Lt eye    Coronary artery disease     Hypertension     Pulmonary nodules     Skin cancer            PSHx:  Past Surgical History:   Procedure Laterality Date    EYE SURGERY      for cataracts, rt eye 20, left eye 20    HERNIA REPAIR  1999    HERNIA REPAIR  2004    KNEE ARTHROSCOPY Left 2018    SKIN LESION EXCISION  10/2009    Neck    SKIN LESION EXCISION  2015    STAPEDECTOMY Right 2001       Stents/Valves/Foreign Bodies/Cardiac Evaluation/Cardiologist:   GI: or, last colonoscopy:    Family History   Problem Relation Age of Onset    Cancer Mother         skin    Stroke Mother     Heart failure Father     Kidney disease Father       malignancies: none.   kidney stones: none      Soc Hx:  Social History     Tobacco Use    Smoking status: Former Smoker     Quit date:      Years since quittin.5    Smokeless tobacco: Never Used   Substance Use Topics    Alcohol use: Yes     Frequency: 2-4 times a month     Drinks per session: 1 or 2     Binge frequency: Never     Comment: occasional    Drug use: No     1 ppd x 26 years  Lives in : [place]  :   3 children  Patient's occupation:      Allergies:  Patient has no known allergies.    Anticoagulation/Aspirin: asa 81mg       Objective:     Vitals:    20 1148   BP: (!) 146/84   Pulse: 69       General:WDWN in NAD  Eyes: PERRLA, normal conjunctiva  Respiratory: no increased work on breathing. No wheezing.   Cardiovascular: No obvious extremity edema. Warm and well perfused.   GI: no palpation of masses. No tenderness. No hepatosplenomegaly to palpation.  Musculoskeletal: normal range of motion of bilateral upper extremities. Normal muscle strength and tone.  Skin: no obvious rashes or lesions. No tightening of skin noted.  Neurologic: CN grossly normal. Normal sensation.   Psychiatric:  awake, alert and oriented x 3. Mood and affect normal. Cooperative.    court-see above    LABS REVIEW:  Recent Labs   Lab 04/30/19  0823 01/31/20  0742 03/31/20  0742   WBC 5.20 6.50 5.79   Hemoglobin 14.8 15.2 14.1   Hematocrit 44.7 45.5 43.0   Platelets 224 225 202   ]  Recent Labs   Lab 08/08/19  1023 01/31/20  0742 03/31/20  0742   Sodium 143 141 140   Potassium 3.9 3.9 3.8   Chloride 107 105 105   CO2 29 29 27   BUN, Bld 27 H 26 H 28 H   Creatinine 1.2 1.1 1.3   Glucose 111 H 99 100   Calcium 9.6 9.3 9.0   Alkaline Phosphatase 54 L 55 51 L   Total Protein 7.3 7.2 7.2   Albumin 4.1 4.1 4.2   Total Bilirubin 0.5 0.6 0.7   AST 33 28 33   ALT 39 36 32   ]    Lab Results   Component Value Date    HGBA1C 6.0 01/31/2020         Recent Pertinent urologic PATHOLOGY REVIEW: See hpi for recent     none    Recent Pertinent Urologic RADIOGRAPHIC REVIEW: previous relevant images reviewed See hpi for recent     Mri prostate 6/3/19  1. Suspect a tear in the right hip labrum, anterosuperior quadrant.  Arthrogram could provide additional characterization.  2. Mild right hip degenerative change with a small effusion.  3.  Diffuse bladder wall thickening with differential to include cystitis and chronic outlet obstruction.  4. Prostatomegaly.  Correlate with PSA.    rbus 08/10/2016  Left parapelvic cyst  Right 10 mm  No thick-walled bladder  Enlarged prostate        Assessment:       1. BPH with obstruction/lower urinary tract symptoms    2. OAB (overactive bladder)    3. Erectile dysfunction due to diseases classified elsewhere          Plan:     Benign prostatic hyperplasia without lower urinary tract symptoms  -     POCT URINE DIPSTICK WITHOUT MICROSCOPE        Repeat psa (free and total)  due to rising over a point in a year from 1.2 to 2.2. rule out prostate cancer. Court today - see above.     Continue tadalafil 5mg in the morning for enlarged prostate and erectile dysfunction - refilled for a year.     Restart flomax/tamsulosin  nightly for enlarged prostate, slow flow and urinary hesistancy.   -Uroflow and pvr to evaluate flow. Come with a full bladder. Nurse visit.   -Cytoscopy and transrectal ultrasound to evaluate prostate anatomy and bladder. See if he has an intravesical median lobe that doesn't respond to flomax. Scheduling for a prostate biopsy but will cancel the biopsy part and only do cystoscopy and trus if psa the same. If psa higher will need gent, ativan, cipro and fleets written for.   -exam today reveals large prostate. No nodules. Next step could consider adding finasteride to flomax to help keep prostate from growing or doing a prostate procedure to shrink prostate      Start vesicare/solfenacin 10mg daily in morning for overactive bladder which may be related to prostate, psychogenic (bc less of an issue while working), caffeine use or combination of all. Takes 4-6 weeks to work. Dry mouth and constipation can worsen. Let us know if symptoms worsen or not covered. Already tried mybetriq and ditropan  -has never tried stopping caffeine use- cut out coffee, sodas and energy drinks  -beer will worsen sx. Ok to eat chocolate at night since not waking up to urinate.     Return to clinic 2 months to re-eval everything - VV  -will keep a close eye on him. Same issues without rsolution with meds            La Nena James MD

## 2020-07-28 ENCOUNTER — OFFICE VISIT (OUTPATIENT)
Dept: PULMONOLOGY | Facility: CLINIC | Age: 70
End: 2020-07-28
Payer: COMMERCIAL

## 2020-07-28 DIAGNOSIS — R91.8 MULTIPLE PULMONARY NODULES: Primary | ICD-10-CM

## 2020-07-28 DIAGNOSIS — R06.09 DYSPNEA ON EXERTION: ICD-10-CM

## 2020-07-28 PROCEDURE — 1159F MED LIST DOCD IN RCRD: CPT | Mod: S$GLB,,, | Performed by: INTERNAL MEDICINE

## 2020-07-28 PROCEDURE — 1101F PR PT FALLS ASSESS DOC 0-1 FALLS W/OUT INJ PAST YR: ICD-10-PCS | Mod: S$GLB,,, | Performed by: INTERNAL MEDICINE

## 2020-07-28 PROCEDURE — 99214 PR OFFICE/OUTPT VISIT, EST, LEVL IV, 30-39 MIN: ICD-10-PCS | Mod: S$GLB,,, | Performed by: INTERNAL MEDICINE

## 2020-07-28 PROCEDURE — 1159F PR MEDICATION LIST DOCUMENTED IN MEDICAL RECORD: ICD-10-PCS | Mod: S$GLB,,, | Performed by: INTERNAL MEDICINE

## 2020-07-28 PROCEDURE — 3074F PR MOST RECENT SYSTOLIC BLOOD PRESSURE < 130 MM HG: ICD-10-PCS | Mod: S$GLB,,, | Performed by: INTERNAL MEDICINE

## 2020-07-28 PROCEDURE — 3078F DIAST BP <80 MM HG: CPT | Mod: S$GLB,,, | Performed by: INTERNAL MEDICINE

## 2020-07-28 PROCEDURE — 99214 OFFICE O/P EST MOD 30 MIN: CPT | Mod: S$GLB,,, | Performed by: INTERNAL MEDICINE

## 2020-07-28 PROCEDURE — 3074F SYST BP LT 130 MM HG: CPT | Mod: S$GLB,,, | Performed by: INTERNAL MEDICINE

## 2020-07-28 PROCEDURE — 1101F PT FALLS ASSESS-DOCD LE1/YR: CPT | Mod: S$GLB,,, | Performed by: INTERNAL MEDICINE

## 2020-07-28 PROCEDURE — 3078F PR MOST RECENT DIASTOLIC BLOOD PRESSURE < 80 MM HG: ICD-10-PCS | Mod: S$GLB,,, | Performed by: INTERNAL MEDICINE

## 2020-07-28 NOTE — PROGRESS NOTES
HPI:    7/13/2017 - Here for follow up, had repeat CT chest showing stable nodules (2-4 mm) since 1/2017, will repeat CT in 1 year.  Only complaint is some mild BURT (PFT 9/2016 - mild decrease DLCO o/w ok, methacholine challenge 12/2016 +, will try prn beta agonist.  No other new complaints.    1/23/2018 - Here for follow up, felt breathing didn't respond to inhalers.  He has stopped all of his BP meds and reports SBP to 190 at times (d/w him). No new respiratory symptoms.    7/26/2018 - Here for follow up, no new issues still with some dyspnea with exertion but not any worse (has not really responded to inhalers ( had previous PFT and methacholine challenge were ok).  BP remains an issues (has anywhere from elevated to low BP).  No chest pain or CHF symptoms.    7/23/2019 - Here for follow up, doing well, still with intermittent dyspnea.  BP remains variable and he feels better when it is lower.  Has some blurred vision at times (to see Ophthalmologist for cataracts)    7/28/2020 -  Here for follow up, doing well still with mild intermittent dyspnea.  Had his cataract surgery done.  No known corona virus exposure and has been practicing social distancing.  Did have skin cancers removed (no melanoma).  Reports that he has had some ankle swelling at times but has resolved.    Abnormal Chest CT scan    Date of last scan - 7/2019    Findings - stable multiple nodules    Followup - stable since 2017 - no CT follow up needed      Medications    Current Outpatient Medications:     aspirin (ECOTRIN) 81 MG EC tablet, aspirin 81 mg tablet,delayed release  Take 1 tablet every day by oral route., Disp: , Rfl:     hydroCHLOROthiazide (HYDRODIURIL) 12.5 MG Tab, Take 12.5 mg by mouth once daily. , Disp: , Rfl:     hydroCHLOROthiazide (MICROZIDE) 12.5 mg capsule, , Disp: , Rfl:     lisinopriL (PRINIVIL,ZESTRIL) 40 MG tablet, Take 1 tablet (40 mg total) by mouth once daily., Disp: 90 tablet, Rfl: 3    omeprazole (PRILOSEC) 20  MG capsule, Take 1 capsule (20 mg total) by mouth once daily., Disp: 90 capsule, Rfl: 1    rosuvastatin (CRESTOR) 10 MG tablet, Take 1 tablet by mouth once daily, Disp: 90 tablet, Rfl: 0    solifenacin (VESICARE) 10 MG tablet, Take 1 tablet (10 mg total) by mouth once daily., Disp: 90 tablet, Rfl: 3    tadalafiL (CIALIS) 5 MG tablet, Take 1 tablet (5 mg total) by mouth once daily., Disp: 90 tablet, Rfl: 3    tamsulosin (FLOMAX) 0.4 mg Cap, Take 1 capsule (0.4 mg total) by mouth after dinner. Take nightly to improve urine flow, Disp: 90 capsule, Rfl: 3    Allergies  Review of patient's allergies indicates:  No Known Allergies    Social History    Social History     Tobacco Use   Smoking Status Former Smoker    Quit date:     Years since quittin.5   Smokeless Tobacco Never Used     ETOH - 3 drinks per week.  Social History     Substance and Sexual Activity   Drug Use No     Occupation - works as a     Family History  Family History   Problem Relation Age of Onset    Cancer Mother         skin    Stroke Mother     Heart failure Father     Kidney disease Father        ROS  Review of Systems   Constitutional: Negative for chills, diaphoresis, fever, malaise/fatigue and weight loss.   HENT: Negative for congestion.    Eyes: Negative for pain.   Respiratory: Positive for shortness of breath. Negative for cough, hemoptysis, sputum production, wheezing and stridor.    Cardiovascular: Negative for chest pain, palpitations, orthopnea, claudication, leg swelling and PND.   Gastrointestinal: Negative for abdominal pain, constipation, diarrhea, heartburn, nausea and vomiting.   Genitourinary: Negative for dysuria, frequency and urgency.   Musculoskeletal: Negative for falls and myalgias.   Neurological: Negative for sensory change, focal weakness and weakness.   Psychiatric/Behavioral: Negative for depression. The patient is not nervous/anxious.        Physical Exam    Vitals:    20 0847    BP: (P) 132/78   Pulse: (P) 63   Temp: (P) 98.1 °F (36.7 °C)   SpO2: (P) 97%   Weight: (P) 76.2 kg (168 lb)       Physical Exam  Vitals signs and nursing note reviewed.   Constitutional:       General: He is not in acute distress.     Appearance: He is well-developed. He is not diaphoretic.   HENT:      Head: Normocephalic and atraumatic.      Right Ear: External ear normal.      Left Ear: External ear normal.      Nose: Nose normal.   Eyes:      Pupils: Pupils are equal, round, and reactive to light.   Neck:      Musculoskeletal: Normal range of motion and neck supple.      Thyroid: No thyromegaly.      Vascular: No JVD.      Trachea: No tracheal deviation.   Cardiovascular:      Rate and Rhythm: Normal rate and regular rhythm.      Heart sounds: Normal heart sounds. No murmur. No friction rub. No gallop.    Pulmonary:      Effort: Pulmonary effort is normal. No respiratory distress.      Breath sounds: Normal breath sounds. No stridor. No wheezing or rales.   Chest:      Chest wall: No tenderness.   Abdominal:      General: Bowel sounds are normal. There is no distension.      Palpations: Abdomen is soft.   Musculoskeletal: Normal range of motion.         General: No tenderness.   Lymphadenopathy:      Cervical: No cervical adenopathy.   Neurological:      Mental Status: He is alert and oriented to person, place, and time.      Cranial Nerves: No cranial nerve deficit.      Deep Tendon Reflexes: Reflexes are normal and symmetric.   Psychiatric:         Behavior: Behavior normal.         Lab        Xray     CT CHEST WITHOUT CONTRAST     CLINICAL HISTORY:  Other nonspecific abnormal finding of lung field pulmonary nodules;     TECHNIQUE:  CMS Mandated Quality Data-CT Radiation Dose-436     All CT scans at this facility dose modulation, iterative reconstruction, and or weight-based dosing when appropriate to reduce radiation dose to as low as reasonably achievable.     COMPARISON:  CT chest 07/31/2018 and CT chest  01/26/2017     FINDINGS:  Bilateral fissural nodules compatible with intrapulmonary lymph nodes are stable compared to prior studies.  Medial right upper lobe nodule measuring 4 mm on image 88 is stable from prior.  Medial right lung base nodule measuring 5 mm on image 196 is also stable.  Additional bilateral subcentimeter pulmonary nodules are stable.  No new or enlarging pulmonary nodule.  No confluent airspace disease.  No pleural effusion or pneumothorax.  Mild biapical pleuroparenchymal scarring.     Atherosclerotic calcification of the aorta.  Normal size mediastinal lymph nodes, stable from prior.  Esophagus is unremarkable.  Central airways are patent.     Bone window images demonstrate no acute or aggressive osseous abnormality.     Limited images through the upper abdomen demonstrate stable hepatic cyst and no acute process.     Impression:     Stable subcentimeter pulmonary nodules within both lungs.        Electronically signed by: Esau Reyes MD  Date:                                            07/31/2019  Time:                                           10:38    Impression/Plan  Problem List Items Addressed This Visit        Pulmonary    Multiple pulmonary nodules  - low risk pt  - stable 2017  - no further scans needed at this time      Dyspnea on exertion - Primary  - better, follow  - RTC 1 year    HTN  - good control              Other Visit Diagnoses    None.         Micah Curtis MD

## 2020-07-29 ENCOUNTER — LAB VISIT (OUTPATIENT)
Dept: LAB | Facility: HOSPITAL | Age: 70
End: 2020-07-29
Attending: UROLOGY
Payer: COMMERCIAL

## 2020-07-29 ENCOUNTER — CLINICAL SUPPORT (OUTPATIENT)
Dept: UROLOGY | Facility: CLINIC | Age: 70
End: 2020-07-29
Payer: COMMERCIAL

## 2020-07-29 DIAGNOSIS — N40.1 BPH WITH OBSTRUCTION/LOWER URINARY TRACT SYMPTOMS: ICD-10-CM

## 2020-07-29 DIAGNOSIS — N13.8 BPH WITH OBSTRUCTION/LOWER URINARY TRACT SYMPTOMS: Primary | ICD-10-CM

## 2020-07-29 DIAGNOSIS — N13.8 BPH WITH OBSTRUCTION/LOWER URINARY TRACT SYMPTOMS: ICD-10-CM

## 2020-07-29 DIAGNOSIS — N40.1 BPH WITH OBSTRUCTION/LOWER URINARY TRACT SYMPTOMS: Primary | ICD-10-CM

## 2020-07-29 LAB
PROSTATE SPECIFIC ANTIGEN, TOTAL: 0.85 NG/ML (ref 0–4)
PSA FREE MFR SERPL: 54.12 %
PSA FREE SERPL-MCNC: 0.46 NG/ML (ref 0.01–1.5)

## 2020-07-29 PROCEDURE — 84154 ASSAY OF PSA FREE: CPT

## 2020-07-29 PROCEDURE — 99499 NO LOS: ICD-10-PCS | Mod: S$GLB,,, | Performed by: UROLOGY

## 2020-07-29 PROCEDURE — 51741 PR UROFLOWMETRY, COMPLEX: ICD-10-PCS | Mod: S$GLB,,, | Performed by: UROLOGY

## 2020-07-29 PROCEDURE — 36415 COLL VENOUS BLD VENIPUNCTURE: CPT

## 2020-07-29 PROCEDURE — 51741 ELECTRO-UROFLOWMETRY FIRST: CPT | Mod: S$GLB,,, | Performed by: UROLOGY

## 2020-07-29 PROCEDURE — 99499 UNLISTED E&M SERVICE: CPT | Mod: S$GLB,,, | Performed by: UROLOGY

## 2020-07-29 NOTE — PROGRESS NOTES
Per  patient arrived in clinic today for uroflow and pvr.    Uroflow results (date: 07/29/2020) on  :   Voiding time: 43.2s,   Flow time: 33.7s,   TTP flow: 6.8s,   Peak flowrate: 12.8 mL/s,   Average flowrate: 5.4mL/s,   Intervals: 8,    Voided volume: 191 mL,    Pvr by bladder scan: 0.   Pattern of curve: to be determined by physician.

## 2020-07-30 ENCOUNTER — LAB VISIT (OUTPATIENT)
Dept: LAB | Facility: HOSPITAL | Age: 70
End: 2020-07-30
Attending: NURSE PRACTITIONER
Payer: COMMERCIAL

## 2020-07-30 DIAGNOSIS — Z79.899 ENCOUNTER FOR LONG-TERM (CURRENT) USE OF OTHER MEDICATIONS: Primary | ICD-10-CM

## 2020-07-30 LAB
ALBUMIN SERPL BCP-MCNC: 4.1 G/DL (ref 3.5–5.2)
ALP SERPL-CCNC: 61 U/L (ref 55–135)
ALT SERPL W/O P-5'-P-CCNC: 31 U/L (ref 10–44)
ANION GAP SERPL CALC-SCNC: 10 MMOL/L (ref 8–16)
AST SERPL-CCNC: 27 U/L (ref 10–40)
BASOPHILS # BLD AUTO: 0.08 K/UL (ref 0–0.2)
BASOPHILS NFR BLD: 1.3 % (ref 0–1.9)
BILIRUB SERPL-MCNC: 0.4 MG/DL (ref 0.1–1)
BUN SERPL-MCNC: 30 MG/DL (ref 8–23)
CALCIUM SERPL-MCNC: 9.4 MG/DL (ref 8.7–10.5)
CHLORIDE SERPL-SCNC: 108 MMOL/L (ref 95–110)
CK SERPL-CCNC: 173 U/L (ref 20–200)
CO2 SERPL-SCNC: 25 MMOL/L (ref 23–29)
CREAT SERPL-MCNC: 1 MG/DL (ref 0.5–1.4)
DIFFERENTIAL METHOD: ABNORMAL
EOSINOPHIL # BLD AUTO: 0.1 K/UL (ref 0–0.5)
EOSINOPHIL NFR BLD: 2.2 % (ref 0–8)
ERYTHROCYTE [DISTWIDTH] IN BLOOD BY AUTOMATED COUNT: 13.8 % (ref 11.5–14.5)
EST. GFR  (AFRICAN AMERICAN): >60 ML/MIN/1.73 M^2
EST. GFR  (NON AFRICAN AMERICAN): >60 ML/MIN/1.73 M^2
GLUCOSE SERPL-MCNC: 88 MG/DL (ref 70–110)
HCT VFR BLD AUTO: 43.5 % (ref 40–54)
HGB BLD-MCNC: 14.5 G/DL (ref 14–18)
IMM GRANULOCYTES # BLD AUTO: 0.01 K/UL (ref 0–0.04)
IMM GRANULOCYTES NFR BLD AUTO: 0.2 % (ref 0–0.5)
LYMPHOCYTES # BLD AUTO: 1.6 K/UL (ref 1–4.8)
LYMPHOCYTES NFR BLD: 25.6 % (ref 18–48)
MCH RBC QN AUTO: 31.4 PG (ref 27–31)
MCHC RBC AUTO-ENTMCNC: 33.3 G/DL (ref 32–36)
MCV RBC AUTO: 94 FL (ref 82–98)
MONOCYTES # BLD AUTO: 0.7 K/UL (ref 0.3–1)
MONOCYTES NFR BLD: 10.8 % (ref 4–15)
NEUTROPHILS # BLD AUTO: 3.8 K/UL (ref 1.8–7.7)
NEUTROPHILS NFR BLD: 59.9 % (ref 38–73)
NRBC BLD-RTO: 0 /100 WBC
PLATELET # BLD AUTO: 241 K/UL (ref 150–350)
PMV BLD AUTO: 10.4 FL (ref 9.2–12.9)
POTASSIUM SERPL-SCNC: 4.1 MMOL/L (ref 3.5–5.1)
PROT SERPL-MCNC: 7.5 G/DL (ref 6–8.4)
RBC # BLD AUTO: 4.62 M/UL (ref 4.6–6.2)
SODIUM SERPL-SCNC: 143 MMOL/L (ref 136–145)
WBC # BLD AUTO: 6.37 K/UL (ref 3.9–12.7)

## 2020-07-30 PROCEDURE — 85025 COMPLETE CBC W/AUTO DIFF WBC: CPT

## 2020-07-30 PROCEDURE — 82550 ASSAY OF CK (CPK): CPT

## 2020-07-30 PROCEDURE — 36415 COLL VENOUS BLD VENIPUNCTURE: CPT

## 2020-07-30 PROCEDURE — 80053 COMPREHEN METABOLIC PANEL: CPT

## 2020-07-30 NOTE — PROGRESS NOTES
Uroflow results (date: 07/29/2020) on  :   Voiding time: 43.2s,   Flow time: 33.7s,   TTP flow: 6.8s,   Peak flowrate: 12.8 mL/s,   Average flowrate: 5.4mL/s,   Intervals: 8,    Voided volume: 191 mL,    Pvr by bladder scan: 0.   Pattern of curve: bell curve but with slow rate       Uroflow results reviewed and interpreted by me ()

## 2020-08-07 ENCOUNTER — OFFICE VISIT (OUTPATIENT)
Dept: SURGERY | Age: 70
End: 2020-08-07
Payer: MEDICARE

## 2020-08-07 VITALS
DIASTOLIC BLOOD PRESSURE: 82 MMHG | TEMPERATURE: 97 F | SYSTOLIC BLOOD PRESSURE: 134 MMHG | BODY MASS INDEX: 33.89 KG/M2 | WEIGHT: 243 LBS

## 2020-08-07 PROCEDURE — 99213 OFFICE O/P EST LOW 20 MIN: CPT | Performed by: SURGERY

## 2020-08-07 PROCEDURE — 4040F PNEUMOC VAC/ADMIN/RCVD: CPT | Performed by: SURGERY

## 2020-08-07 PROCEDURE — 1036F TOBACCO NON-USER: CPT | Performed by: SURGERY

## 2020-08-07 PROCEDURE — G8427 DOCREV CUR MEDS BY ELIG CLIN: HCPCS | Performed by: SURGERY

## 2020-08-07 PROCEDURE — 3017F COLORECTAL CA SCREEN DOC REV: CPT | Performed by: SURGERY

## 2020-08-07 PROCEDURE — 1123F ACP DISCUSS/DSCN MKR DOCD: CPT | Performed by: SURGERY

## 2020-08-07 PROCEDURE — G8417 CALC BMI ABV UP PARAM F/U: HCPCS | Performed by: SURGERY

## 2020-08-07 ASSESSMENT — ENCOUNTER SYMPTOMS
ALLERGIC/IMMUNOLOGIC NEGATIVE: 1
ABDOMINAL PAIN: 1
EYES NEGATIVE: 1
RESPIRATORY NEGATIVE: 1

## 2020-08-07 NOTE — LETTER
[facility]  [address] [address]  Phone: [phone]  Fax: [phone]    August 7, 2020    Patient: Kolby Madison  MRN:  [id number]  YOB: 1950  Date of Visit: 8/7/2020    Dear Cookie Alcantar you for the request for consultation for Kendra Chavez. Below are the relevant portions of my assessment and plan of care. Assessment:  Pleasant 57-year-old male known to me from a laparoscopic appendectomy and a laparoscopic right inguinal hernia repair with mesh who presents for evaluation of a 1 month history of right groin and right lower quadrant abdominal pain. He denies associated abdominal symptoms. On physical examination, there is no evidence of recurrent right inguinal hernia. His pain seems to be musculoskeletal and is most likely related to either a muscle strain or irritation from the mesh. This should continue to improve with time. Plan:  Recommended anti-inflammatories, heat and rest.  He will follow-up with me if his symptoms worsen or do not significantly improve after 6 weeks. If you have questions, please do not hesitate to call me. I look forward to following [facility] along with you.     Sincerely,    Ritesh Decker MD    [facility] providers:    Dulce Dunn, [address] [place] [id number]  VIA In Basket

## 2020-08-07 NOTE — PROGRESS NOTES
Josh Rod MD   doxazosin (CARDURA) 4 MG tablet TAKE 1 TABLET BY MOUTH DAILY Yes Josh Rod MD   simvastatin (ZOCOR) 40 MG tablet TAKE 1 TABLET BY MOUTH EVERY NIGHT AT BEDTIME Yes Josh Rod MD   fenofibrate (TRICOR) 48 MG tablet Take 1 tablet by mouth daily Yes Josh Rod MD   CycloSPORINE (RESTASIS MULTIDOSE OP) Apply to eye daily Yes Historical Provider, MD   NONFORMULARY [facility] eye drops 2-3 x daily Yes Historical Provider, MD   valACYclovir (VALTREX) 500 MG tablet  Yes Historical Provider, MD   timolol (TIMOPTIC) 0.5 % ophthalmic solution  Yes Historical Provider, MD   DUREZOL 0.05 % EMUL  Yes Historical Provider, MD   aspirin EC 81 MG EC tablet Take 1 tablet by mouth daily. Yes Josh Rod MD   multivitamin [facility]) per tablet Take 1 tablet by mouth daily. Yes Historical Provider, MD   PrednisoLONE Acetate (PRED FORTE OP) Apply 1 drop to eye 3 times daily. RIGHT EYE Yes Historical Provider, MD   cetirizine (ZYRTEC) 10 MG tablet Take 10 mg by mouth daily.    Yes Historical Provider, MD       Social History     Socioeconomic History    Marital status:      Spouse name: Not on file    Number of children: Not on file    Years of education: Not on file    Highest education level: Not on file   Occupational History    Not on file   Social Needs    Financial resource strain: Not on file    Food insecurity     Worry: Not on file     Inability: Not on file    Transportation needs     Medical: Not on file     Non-medical: Not on file   Tobacco Use    Smoking status: Never Smoker    Smokeless tobacco: Never Used   Substance and Sexual Activity    Alcohol use: No    Drug use: No    Sexual activity: Yes     Partners: Female   Lifestyle    Physical activity     Days per week: Not on file     Minutes per session: Not on file    Stress: Not on file   Relationships    Social connections     Talks on phone: Not on file     Gets together: Not on file     Attends Judaism service: Not on file     Active member of club or organization: Not on file     Attends meetings of clubs or organizations: Not on file     Relationship status: Not on file    Intimate partner violence     Fear of current or ex partner: Not on file     Emotionally abused: Not on file     Physically abused: Not on file     Forced sexual activity: Not on file   Other Topics Concern    Not on file   Social History Narrative    Not on file       Review of Systems   Constitutional: Negative. HENT: Negative. Eyes: Negative. Respiratory: Negative. Cardiovascular: Negative. Gastrointestinal: Positive for abdominal pain. Endocrine: Negative. Genitourinary: Negative. Musculoskeletal: Negative. Skin: Negative. Allergic/Immunologic: Negative. Neurological: Negative. Hematological: Negative. Psychiatric/Behavioral: Negative.        :   Physical Exam  Constitutional:       Appearance: He is well-developed. HENT:      Head: Normocephalic and atraumatic. Right Ear: External ear normal.      Left Ear: External ear normal.   Eyes:      Conjunctiva/sclera: Conjunctivae normal.   Neck:      Musculoskeletal: Normal range of motion and neck supple. Pulmonary:      Effort: Pulmonary effort is normal.   Chest:      Chest wall: No tenderness. Abdominal:      General: There is no distension. Palpations: Abdomen is soft. Tenderness: There is no abdominal tenderness. Comments: No evidence of recurrent right inguinal hernia   Musculoskeletal: Normal range of motion. Skin:     General: Skin is warm and dry. Neurological:      Mental Status: He is alert and oriented to person, place, and time.    Psychiatric:         Behavior: Behavior normal.       /82   Temp 97 °F (36.1 °C)   Wt 243 lb (110.2 kg)   BMI 33.89 kg/m²     :      Pleasant 24-year-old male known to me from a laparoscopic appendectomy and a laparoscopic right inguinal hernia repair with mesh who presents for evaluation of a 1 month history of right groin and right lower quadrant abdominal pain. He denies associated abdominal symptoms. On physical examination, there is no evidence of recurrent right inguinal hernia. His pain seems to be musculoskeletal and is most likely related to either a muscle strain or irritation from the mesh. This should continue to improve with time. Plan:      Recommended anti-inflammatories, heat and rest.  He will follow-up with me if his symptoms worsen or do not significantly improve after 6 weeks.

## 2020-08-12 ENCOUNTER — OFFICE VISIT (OUTPATIENT)
Dept: FAMILY MEDICINE | Facility: CLINIC | Age: 70
End: 2020-08-12
Payer: COMMERCIAL

## 2020-08-12 VITALS
SYSTOLIC BLOOD PRESSURE: 138 MMHG | DIASTOLIC BLOOD PRESSURE: 80 MMHG | BODY MASS INDEX: 23.22 KG/M2 | WEIGHT: 162.19 LBS | OXYGEN SATURATION: 97 % | HEIGHT: 70 IN | HEART RATE: 55 BPM | TEMPERATURE: 98 F | RESPIRATION RATE: 20 BRPM

## 2020-08-12 DIAGNOSIS — I10 ESSENTIAL HYPERTENSION: Primary | ICD-10-CM

## 2020-08-12 DIAGNOSIS — M79.89 RIGHT LEG SWELLING: ICD-10-CM

## 2020-08-12 DIAGNOSIS — E78.01 FAMILIAL HYPERCHOLESTEROLEMIA: ICD-10-CM

## 2020-08-12 DIAGNOSIS — M79.661 PAIN OF RIGHT CALF: ICD-10-CM

## 2020-08-12 PROCEDURE — 3079F PR MOST RECENT DIASTOLIC BLOOD PRESSURE 80-89 MM HG: ICD-10-PCS | Mod: S$GLB,,, | Performed by: NURSE PRACTITIONER

## 2020-08-12 PROCEDURE — 3008F PR BODY MASS INDEX (BMI) DOCUMENTED: ICD-10-PCS | Mod: S$GLB,,, | Performed by: NURSE PRACTITIONER

## 2020-08-12 PROCEDURE — 1170F PR FUNCTIONAL STATUS ASSESSED: ICD-10-PCS | Mod: S$GLB,,, | Performed by: NURSE PRACTITIONER

## 2020-08-12 PROCEDURE — 1170F FXNL STATUS ASSESSED: CPT | Mod: S$GLB,,, | Performed by: NURSE PRACTITIONER

## 2020-08-12 PROCEDURE — 3075F SYST BP GE 130 - 139MM HG: CPT | Mod: S$GLB,,, | Performed by: NURSE PRACTITIONER

## 2020-08-12 PROCEDURE — 99214 PR OFFICE/OUTPT VISIT, EST, LEVL IV, 30-39 MIN: ICD-10-PCS | Mod: S$GLB,,, | Performed by: NURSE PRACTITIONER

## 2020-08-12 PROCEDURE — 3008F BODY MASS INDEX DOCD: CPT | Mod: S$GLB,,, | Performed by: NURSE PRACTITIONER

## 2020-08-12 PROCEDURE — 1126F PR PAIN SEVERITY QUANTIFIED, NO PAIN PRESENT: ICD-10-PCS | Mod: S$GLB,,, | Performed by: NURSE PRACTITIONER

## 2020-08-12 PROCEDURE — 3079F DIAST BP 80-89 MM HG: CPT | Mod: S$GLB,,, | Performed by: NURSE PRACTITIONER

## 2020-08-12 PROCEDURE — 1126F AMNT PAIN NOTED NONE PRSNT: CPT | Mod: S$GLB,,, | Performed by: NURSE PRACTITIONER

## 2020-08-12 PROCEDURE — 3075F PR MOST RECENT SYSTOLIC BLOOD PRESS GE 130-139MM HG: ICD-10-PCS | Mod: S$GLB,,, | Performed by: NURSE PRACTITIONER

## 2020-08-12 PROCEDURE — 99214 OFFICE O/P EST MOD 30 MIN: CPT | Mod: S$GLB,,, | Performed by: NURSE PRACTITIONER

## 2020-08-12 PROCEDURE — 1159F PR MEDICATION LIST DOCUMENTED IN MEDICAL RECORD: ICD-10-PCS | Mod: S$GLB,,, | Performed by: NURSE PRACTITIONER

## 2020-08-12 PROCEDURE — 1101F PT FALLS ASSESS-DOCD LE1/YR: CPT | Mod: S$GLB,,, | Performed by: NURSE PRACTITIONER

## 2020-08-12 PROCEDURE — 1159F MED LIST DOCD IN RCRD: CPT | Mod: S$GLB,,, | Performed by: NURSE PRACTITIONER

## 2020-08-12 PROCEDURE — 1101F PR PT FALLS ASSESS DOC 0-1 FALLS W/OUT INJ PAST YR: ICD-10-PCS | Mod: S$GLB,,, | Performed by: NURSE PRACTITIONER

## 2020-08-12 RX ORDER — CHOLECALCIFEROL (VITAMIN D3) 50 MCG
1 CAPSULE ORAL DAILY
COMMUNITY
Start: 2011-03-01

## 2020-08-12 RX ORDER — ROSUVASTATIN CALCIUM 10 MG/1
10 TABLET, COATED ORAL DAILY
Qty: 90 TABLET | Refills: 3 | Status: SHIPPED | OUTPATIENT
Start: 2020-08-12 | End: 2021-09-16 | Stop reason: SDUPTHER

## 2020-08-12 RX ORDER — HYDROCHLOROTHIAZIDE 25 MG/1
25 TABLET ORAL DAILY
Qty: 90 TABLET | Refills: 0 | Status: SHIPPED | OUTPATIENT
Start: 2020-08-12 | End: 2021-02-15 | Stop reason: DRUGHIGH

## 2020-08-12 NOTE — PROGRESS NOTES
SUBJECTIVE:      Patient ID: nOesimo Paula is a 69 y.o. male.    Chief Complaint: Follow-up (6 month HTN , swollen left leg x 2-3 days  )    Hypertension  This is a chronic problem. The current episode started more than 1 year ago. The problem is controlled. Associated symptoms include headaches and peripheral edema. Pertinent negatives include no chest pain, neck pain, palpitations or shortness of breath. Risk factors for coronary artery disease include dyslipidemia, male gender, sedentary lifestyle and family history. Past treatments include ACE inhibitors and diuretics. The current treatment provides mild improvement.   Edema  This is a new problem. The current episode started 1 to 4 weeks ago. The problem occurs daily. The problem has been gradually improving. Associated symptoms include arthralgias, headaches and joint swelling (R ankle & knee). Pertinent negatives include no abdominal pain, chest pain, congestion, coughing, fatigue, fever, myalgias, nausea, neck pain, rash, sore throat, vomiting or weakness. The symptoms are aggravated by standing and walking. He has tried position changes and rest for the symptoms. The treatment provided moderate relief.       Past Surgical History:   Procedure Laterality Date    EYE SURGERY      for cataracts, rt eye 1/16/20, left eye 1/30/20    HERNIA REPAIR  12/1999    HERNIA REPAIR  05/2004    KNEE ARTHROSCOPY Left 04/12/2018    SKIN LESION EXCISION  10/2009    Neck    SKIN LESION EXCISION  2015    STAPEDECTOMY Right 03/2001     Family History   Problem Relation Age of Onset    Cancer Mother         skin    Stroke Mother     Heart failure Father     Kidney disease Father       Social History     Socioeconomic History    Marital status:      Spouse name: Not on file    Number of children: Not on file    Years of education: Not on file    Highest education level: Not on file   Occupational History    Not on file   Social Needs    Financial  resource strain: Not hard at all    Food insecurity     Worry: Never true     Inability: Never true    Transportation needs     Medical: No     Non-medical: No   Tobacco Use    Smoking status: Former Smoker     Quit date:      Years since quittin.6    Smokeless tobacco: Never Used   Substance and Sexual Activity    Alcohol use: Yes     Frequency: 2-4 times a month     Drinks per session: 1 or 2     Binge frequency: Never     Comment: occasional    Drug use: No    Sexual activity: Yes     Partners: Female   Lifestyle    Physical activity     Days per week: 0 days     Minutes per session: 0 min    Stress: Not on file   Relationships    Social connections     Talks on phone: Three times a week     Gets together: Once a week     Attends Jainism service: Not on file     Active member of club or organization: Yes     Attends meetings of clubs or organizations: Never     Relationship status:    Other Topics Concern    Not on file   Social History Narrative    Not on file     Current Outpatient Medications   Medication Sig Dispense Refill    aspirin (ECOTRIN) 81 MG EC tablet aspirin 81 mg tablet,delayed release   Take 1 tablet every day by oral route.      flu vac  [id number],PF, (FLUAD 0485-3219, 65 YR UP,,PF,) 45 mcg (15 mcg x 3)/0.5 mL Syrg Fluad  65yr up(PF)45 mcg(15 mcgx3)/0.5 mL intramuscular syringe   ADM 0.5ML IM UTD      hydroCHLOROthiazide (HYDRODIURIL) 25 MG tablet Take 1 tablet (25 mg total) by mouth once daily. 90 tablet 0    lisinopriL (PRINIVIL,ZESTRIL) 40 MG tablet Take 1 tablet (40 mg total) by mouth once daily. 90 tablet 3    multivit with minerals/lutein (MULTIVITAMIN 50 PLUS ORAL) multivitamin      omega 3-dha-epa-fish oil (FISH OIL) 100-160-1,000 mg Cap Fish Oil      omeprazole (PRILOSEC) 20 MG capsule Take 1 capsule (20 mg total) by mouth once daily. 90 capsule 1    rosuvastatin (CRESTOR) 10 MG tablet Take 1 tablet (10 mg total) by mouth once daily.  90 tablet 3    solifenacin (VESICARE) 10 MG tablet Take 1 tablet (10 mg total) by mouth once daily. 90 tablet 3    tadalafiL (CIALIS) 5 MG tablet Take 1 tablet (5 mg total) by mouth once daily. 90 tablet 3    tamsulosin (FLOMAX) 0.4 mg Cap Take 1 capsule (0.4 mg total) by mouth after dinner. Take nightly to improve urine flow 90 capsule 3     No current facility-administered medications for this visit.      Review of patient's allergies indicates:  No Known Allergies   Past Medical History:   Diagnosis Date    Acid reflux     Acquired tricuspid valve insufficiency     Anemia     Borderline diabetes     BPH (benign prostatic hyperplasia)     Cataract     1/16/20 rt eye, 1/30/20- Lt eye    Coronary artery disease     Hypertension     Pulmonary nodules     Skin cancer      Past Surgical History:   Procedure Laterality Date    EYE SURGERY      for cataracts, rt eye 1/16/20, left eye 1/30/20    HERNIA REPAIR  12/1999    HERNIA REPAIR  05/2004    KNEE ARTHROSCOPY Left 04/12/2018    SKIN LESION EXCISION  10/2009    Neck    SKIN LESION EXCISION  2015    STAPEDECTOMY Right 03/2001       Review of Systems   Constitutional: Negative for activity change, appetite change, fatigue, fever and unexpected weight change.   HENT: Negative for congestion, ear pain, hearing loss, postnasal drip, rhinorrhea, sinus pressure, sinus pain, sneezing, sore throat and trouble swallowing.    Eyes: Negative for photophobia, pain, discharge and visual disturbance.   Respiratory: Negative for cough, chest tightness, shortness of breath and wheezing.    Cardiovascular: Negative for chest pain, palpitations and leg swelling.   Gastrointestinal: Negative for abdominal distention, abdominal pain, blood in stool, constipation, diarrhea, nausea and vomiting.   Endocrine: Negative for cold intolerance, heat intolerance, polydipsia and polyuria.   Genitourinary: Negative for difficulty urinating, dysuria, flank pain, frequency,  "hematuria and urgency.        Following with uro   Musculoskeletal: Positive for arthralgias and joint swelling (R ankle & knee). Negative for back pain, myalgias and neck pain.   Skin: Negative for pallor and rash.   Allergic/Immunologic: Negative for environmental allergies and food allergies.   Neurological: Positive for headaches. Negative for dizziness, weakness and light-headedness.   Hematological: Does not bruise/bleed easily.   Psychiatric/Behavioral: Negative for confusion, decreased concentration, dysphoric mood and sleep disturbance. The patient is not nervous/anxious.       OBJECTIVE:      Vitals:    08/12/20 1036   BP: 138/80   BP Location: Right arm   Patient Position: Sitting   BP Method: Large (Manual)   Pulse: (!) 55   Resp: 20   Temp: 98.1 °F (36.7 °C)   TempSrc: Oral   SpO2: 97%   Weight: 73.6 kg (162 lb 3.2 oz)   Height: 5' 10" (1.778 m)     Physical Exam  Vitals signs and nursing note reviewed.   Constitutional:       General: He is not in acute distress.     Appearance: He is well-developed.   HENT:      Head: Normocephalic and atraumatic.      Right Ear: Hearing normal.      Left Ear: Hearing normal.      Nose: Nose normal. No rhinorrhea.   Eyes:      General: Lids are normal.         Right eye: No discharge.         Left eye: No discharge.      Conjunctiva/sclera: Conjunctivae normal.      Right eye: Right conjunctiva is not injected.      Left eye: Left conjunctiva is not injected.      Pupils: Pupils are equal, round, and reactive to light. Pupils are equal.      Right eye: Pupil is round and reactive.      Left eye: Pupil is round and reactive.   Neck:      Musculoskeletal: Normal range of motion and neck supple.      Thyroid: No thyromegaly.      Vascular: No JVD.      Trachea: Trachea normal. No tracheal deviation.   Cardiovascular:      Rate and Rhythm: Regular rhythm. Bradycardia present.      Pulses:           Radial pulses are 2+ on the right side and 2+ on the left side.      Heart " sounds: Normal heart sounds. No murmur. No friction rub. No gallop.    Pulmonary:      Effort: Pulmonary effort is normal. No respiratory distress.      Breath sounds: Normal breath sounds. No stridor. No decreased breath sounds, wheezing, rhonchi or rales.   Abdominal:      General: Bowel sounds are normal. There is no distension.      Palpations: Abdomen is soft. Abdomen is not rigid.      Tenderness: There is no abdominal tenderness. There is no guarding.   Musculoskeletal: Normal range of motion.      Right knee: He exhibits effusion (small patellar effusion).      Right lower leg: Edema (+2/4) present.   Lymphadenopathy:      Cervical: No cervical adenopathy.   Skin:     General: Skin is warm and dry.      Capillary Refill: Capillary refill takes less than 2 seconds.      Coloration: Skin is not pale.      Findings: No lesion or rash.   Neurological:      Mental Status: He is alert and oriented to person, place, and time.      Motor: No atrophy.      Coordination: Coordination normal.      Gait: Gait normal.   Psychiatric:         Attention and Perception: He is attentive.         Speech: Speech normal.         Behavior: Behavior normal.         Thought Content: Thought content normal.         Judgment: Judgment normal.        Assessment:       1. Essential hypertension    2. Familial hypercholesterolemia    3. Right leg swelling    4. Pain of right calf        Plan:       Essential hypertension  -     hydroCHLOROthiazide (HYDRODIURIL) 25 MG tablet; Take 1 tablet (25 mg total) by mouth once daily.  Dispense: 90 tablet; Refill: 0    Familial hypercholesterolemia  -     rosuvastatin (CRESTOR) 10 MG tablet; Take 1 tablet (10 mg total) by mouth once daily.  Dispense: 90 tablet; Refill: 3    Right leg swelling  -     US Lower Extremity Veins Right; Future; Expected date: 08/12/2020    Pain of right calf  -     US Lower Extremity Veins Right; Future; Expected date: 08/12/2020        Follow up in about 6 months  (around 2/12/2021) for well exam. Please call at least 1 month prior for lab orders.      8/12/2020 JAMES Rose, FNP-C

## 2020-08-13 RX ORDER — FENOFIBRATE 48 MG/1
48 TABLET, COATED ORAL DAILY
Qty: 90 TABLET | Refills: 3 | Status: SHIPPED | OUTPATIENT
Start: 2020-08-13 | End: 2021-08-15

## 2020-08-14 ENCOUNTER — LAB VISIT (OUTPATIENT)
Dept: PRIMARY CARE CLINIC | Facility: CLINIC | Age: 70
End: 2020-08-14
Payer: COMMERCIAL

## 2020-08-14 DIAGNOSIS — N32.81 OAB (OVERACTIVE BLADDER): ICD-10-CM

## 2020-08-14 PROCEDURE — U0003 INFECTIOUS AGENT DETECTION BY NUCLEIC ACID (DNA OR RNA); SEVERE ACUTE RESPIRATORY SYNDROME CORONAVIRUS 2 (SARS-COV-2) (CORONAVIRUS DISEASE [COVID-19]), AMPLIFIED PROBE TECHNIQUE, MAKING USE OF HIGH THROUGHPUT TECHNOLOGIES AS DESCRIBED BY CMS-2020-01-R: HCPCS

## 2020-08-15 LAB — SARS-COV-2 RNA RESP QL NAA+PROBE: NOT DETECTED

## 2020-08-17 ENCOUNTER — TELEPHONE (OUTPATIENT)
Dept: UROLOGY | Facility: CLINIC | Age: 70
End: 2020-08-17

## 2020-08-17 ENCOUNTER — HOSPITAL ENCOUNTER (OUTPATIENT)
Facility: AMBULARY SURGERY CENTER | Age: 70
Discharge: HOME | End: 2020-08-17
Attending: UROLOGY | Admitting: UROLOGY
Payer: COMMERCIAL

## 2020-08-17 VITALS
HEART RATE: 54 BPM | SYSTOLIC BLOOD PRESSURE: 154 MMHG | BODY MASS INDEX: 24.02 KG/M2 | TEMPERATURE: 99 F | RESPIRATION RATE: 18 BRPM | HEIGHT: 70 IN | WEIGHT: 167.75 LBS | DIASTOLIC BLOOD PRESSURE: 98 MMHG | OXYGEN SATURATION: 100 %

## 2020-08-17 DIAGNOSIS — N13.8 BPH WITH OBSTRUCTION/LOWER URINARY TRACT SYMPTOMS: ICD-10-CM

## 2020-08-17 DIAGNOSIS — N40.1 BPH WITH OBSTRUCTION/LOWER URINARY TRACT SYMPTOMS: ICD-10-CM

## 2020-08-17 LAB
BACTERIA SPEC CULT: NORMAL
BILIRUB SERPL-MCNC: NORMAL MG/DL
BLOOD URINE, POC: NORMAL
CASTS: NORMAL
COLOR, POC UA: YELLOW
CRYSTALS: NORMAL
GLUCOSE UR QL STRIP: NORMAL
KETONES UR QL STRIP: NORMAL
LEUKOCYTE ESTERASE URINE, POC: NORMAL
NITRITE, POC UA: NORMAL
PH, POC UA: 6
PROTEIN, POC: NORMAL
RBC CELLS COUNTED: NORMAL
SPECIFIC GRAVITY, POC UA: 1.01
UROBILINOGEN, POC UA: NORMAL
WHITE BLOOD CELLS: NORMAL

## 2020-08-17 PROCEDURE — 52000 CYSTOURETHROSCOPY: CPT | Performed by: UROLOGY

## 2020-08-17 PROCEDURE — 52000 CYSTOURETHROSCOPY: CPT | Mod: ,,, | Performed by: UROLOGY

## 2020-08-17 PROCEDURE — 52000 PR CYSTOURETHROSCOPY: ICD-10-PCS | Mod: ,,, | Performed by: UROLOGY

## 2020-08-17 PROCEDURE — 76872 PR US TRANSRECTAL: ICD-10-PCS | Mod: 26,,, | Performed by: UROLOGY

## 2020-08-17 PROCEDURE — 76872 US TRANSRECTAL: CPT | Mod: 26,,, | Performed by: UROLOGY

## 2020-08-17 PROCEDURE — 76872 US TRANSRECTAL: CPT | Performed by: UROLOGY

## 2020-08-17 RX ORDER — WATER 1 ML/ML
IRRIGANT IRRIGATION
Status: DISCONTINUED | OUTPATIENT
Start: 2020-08-17 | End: 2020-08-17 | Stop reason: HOSPADM

## 2020-08-17 RX ORDER — CIPROFLOXACIN 500 MG/1
500 TABLET ORAL 2 TIMES DAILY
Qty: 28 TABLET | Refills: 0 | Status: SHIPPED | OUTPATIENT
Start: 2020-08-17 | End: 2020-08-31

## 2020-08-17 RX ORDER — LIDOCAINE HYDROCHLORIDE 20 MG/ML
JELLY TOPICAL
Status: DISCONTINUED
Start: 2020-08-17 | End: 2020-08-17 | Stop reason: HOSPADM

## 2020-08-17 RX ORDER — LIDOCAINE HYDROCHLORIDE 20 MG/ML
JELLY TOPICAL
Status: DISCONTINUED | OUTPATIENT
Start: 2020-08-17 | End: 2020-08-17 | Stop reason: HOSPADM

## 2020-08-17 NOTE — TELEPHONE ENCOUNTER
----- Message from La Nena James MD sent at 8/17/2020  2:57 PM CDT -----  Follow up in 3 weeks with idalia to see if urgency improves with cipro   Keep 2 month f/u with me in october

## 2020-08-17 NOTE — PLAN OF CARE
Pt  ready to go home , stable, tolerating fluids. Denies burning/bleeding during post procedure void. D/C to . Home.   47

## 2020-08-17 NOTE — DISCHARGE INSTRUCTIONS
Plan:   Start cipro twice a day for 14 days to see if it helps with urgency.    Take a probiotic while taking cipro    Discontinue solfenacin for now to make sure the antiboitics are what is making sure his urgency is improving bc of antibiotics and not the vesicare   Continue flomax. Continue tadalafil.    Follow up in 1month    After the procedure    · Drink plenty of fluids.  · You may have burning or light bleeding when you urinate--this is normal.  · Medications may be prescribed to ease any discomfort or prevent infection. Take these as directed.  · Call your doctor if you have heavy bleeding or blood clots, burning that lasts more than a day, a fever over 100°F  (38° C), or trouble urinating.    After Surgery:  Always be aware that any surgery can cause these symptoms:    Pain- Medication can be prescribed for pain to decrease your pain but may not completely take your pain away.  Over the Counter pain medicine my be enough and you can always use Ice and rest to help ease pain.    Bleeding- a little bleeding after a surgery is usually within normal.  If there is a lot of blood you need to notify your MD.  Emergency treatments of bleeding are cold application, elevation of the bleeding site and compression.    Infection- Infection after surgery is NOT a normal occurrence.  Signs of infection are fever, swelling, hot to touch the incision.  If this occurs notify your MD immediately.    Nausea- this can be common after a surgery especially if you have had anesthesia medicine or are taking pain medicine.  Staying on clear liquids, bland foods, gingerale, or over the counter anti nausea medicines can help.  If you vomit more than once, notify your MD.  Anti Nausea medicines can be prescribed.          Transrectal Ultrasound   A transrectal ultrasound is an imaging test. It uses sound waves to create pictures of a mans prostate gland to determine its size.Your prostate gland is in front of your rectum and if  too large may become inflamed and impede the flow of urine. For this test, a special probe (transducer) is placed directly into your rectum.     Before leaving, you may need to wait for a short time while the images are reviewed. In most cases, you can go back to your normal routine after the test.

## 2020-08-17 NOTE — OP NOTE
[facility] Procedure Note-[facility]  Date: 08/17/2020      TRANSRECTAL PROSTATIC ULTRASOUND and Cystoscopy    Procedures: Flexible cystourethroscopy and Transrectal Ultrasound    Pre Procedure Diagnosis:bph, oab    Post Procedure Diagnosis: same, see below for findings    Surgeon: La Nena James MD    Indications: Onesimo Paula is a 69 y.o. male with BPH. Current PSA is 0.85. Urine from today reviewed. H&P reviewed. The risks and benefits of both procedures were explained and consent was obtained.     Cytoscopic Specimen: none        TRANSRECTAL ULTRASOUND was performed   The transrectal ultrasound probe was placed in the rectum. Measurements were taken and recorded and the probe was removed.   The patient tolerated the procedures well without complication.    Cystoscopy was performed   2% lidocaine urojet was used for local analgesia.  The genitalia was prepped and draped in the sterile fashion with betadine.    The flexible scope was advanced into the urethra and into the bladder.  Bilateral ureteral orifice were evaluated and noted to be normal with clear efflux.  The bladder was completely surveyed in a systematic fashion and the cytoscope was retroflexed.  Cystoscopy findings as listed below.       Findings: (pictures were uploaded into media)  Cysoscopic findings: no urethral stricture. Membranous and distal prostatic urethra with papillary tissue suspicious for prostatitis. No tumors in bladder. No intravesical lboe.   TRUS volume: 42.6g  Prostate Ultrasound findings:  · Seminal vesicles/Ejaculatory Ducts: Normal  · Outline/Symmetry of Prostate: WNL  · Central Gland/Transition Zone: Well demarcated  · Peripheral Zone: WNL    Prostate Measurements:   · Height:  27.1 mm  · Width:  53.7 mm  · Length:  55.9 mm  · Volume: 42.6 cm3  · Intravesical protrusion: none  · PSAD: 0.02    Assessment: Onesimo Paula is a 69 y.o. male with bph/enlarged prostate and oab with urgency on tadalafil, flomax,  vesicare found to have 42g prostate and prostatitis    Plan:   Start cipro twice a day for 14 days to see if it helps with urgency.    Take a probiotic while taking cipro    Discontinue solfenacin for now to make sure the antiboitics are what is making sure his urgency is improving bc of antibiotics and not the vesicare   Continue flomax. Continue tadalafil.    Follow up in 2-3 weeks with idalia to see if oab/urgency improved on cipro with tx of prostatitis. If not may restart vesicare 10mg daily .    Then follow up in 2 months with me. 42g prostate. b lobe hypertorophy. Slow flow. Good candidate for rezum.       La Nena James MD

## 2020-08-17 NOTE — INTERVAL H&P NOTE
The patient has been examined and the H&P has been reviewed:    I concur with the findings and no changes have occurred since H&P was written.    Surgery risks, benefits and alternative options discussed and understood by patient/family.    psa lower at 0.85  Doing cysto trus only    This patient has been cleared for surgery in ambulatory surgical facility.         Active Hospital Problems    Diagnosis  POA    BPH with obstruction/lower urinary tract symptoms [N40.1, N13.8]  Yes      Resolved Hospital Problems   No resolved problems to display.

## 2020-08-17 NOTE — DISCHARGE SUMMARY
[facility]  Urology  Discharge Note - Short Stay      Patient Name: Onesimo Paula  MRN: [id number]  Discharge Date and Time:  08/17/2020 2:55 PM  Attending Physician: La Nena James,*   Discharging Provider: La Nena James MD  Primary Care Physician: Reynaldo Rahman, APRN,FNP-C    Final Active Diagnoses:    Diagnosis Date Noted POA    BPH with obstruction/lower urinary tract symptoms [N40.1, N13.8] 08/17/2020 Yes      Problems Resolved During this Admission:       Final Diagnoses: Same as principal problem.    Hospital Course: Patient was admitted for an outpatient procedure and tolerated the procedure well with no complications.*    Procedure(s) (LRB):  TRANSRECTAL ULTRASOUND (N/A)  CYSTOSCOPY (N/A)     Indwelling Lines/Drains at time of discharge:   Lines/Drains/Airways     None                 Discharged Condition: good    Disposition: home    Follow Up:      Patient Instructions:   No discharge procedures on file.    Medications:  Reconciled Home Medications:      Medication List      START taking these medications    ciprofloxacin HCl 500 MG tablet  Commonly known as: CIPRO  Take 1 tablet (500 mg total) by mouth 2 (two) times daily. for 14 days        CONTINUE taking these medications    aspirin 81 MG EC tablet  Commonly known as: ECOTRIN  aspirin 81 mg tablet,delayed release   Take 1 tablet every day by oral route.     FISH -160-1,000 mg Cap  Generic drug: omega 3-dha-epa-fish oil  Fish Oil     FLUAD 6652-6796 (65 YR UP)(PF) 45 mcg (15 mcg x 3)/0.5 mL Syrg  Generic drug: flu vac 2019 65up-[id number](PF)  Fluad 2019-20 65yr up(PF)45 mcg(15 mcgx3)/0.5 mL intramuscular syringe   ADM 0.5ML IM UTD     hydroCHLOROthiazide 25 MG tablet  Commonly known as: HYDRODIURIL  Take 1 tablet (25 mg total) by mouth once daily.     lisinopriL 40 MG tablet  Commonly known as: PRINIVIL,ZESTRIL  Take 1 tablet (40 mg total) by mouth once daily.     MULTIVITAMIN 50 PLUS  ORAL  multivitamin     omeprazole 20 MG capsule  Commonly known as: PRILOSEC  Take 1 capsule (20 mg total) by mouth once daily.     rosuvastatin 10 MG tablet  Commonly known as: CRESTOR  Take 1 tablet (10 mg total) by mouth once daily.     tadalafiL 5 MG tablet  Commonly known as: CIALIS  Take 1 tablet (5 mg total) by mouth once daily.     tamsulosin 0.4 mg Cap  Commonly known as: FLOMAX  Take 1 capsule (0.4 mg total) by mouth after dinner. Take nightly to improve urine flow        STOP taking these medications    solifenacin 10 MG tablet  Commonly known as: VESICARE            Discharge Procedure Orders (must include Diet, Follow-up, Activity):  No discharge procedures on file.         La Nena James MD  Urology  [facility]

## 2020-08-19 ENCOUNTER — HOSPITAL ENCOUNTER (OUTPATIENT)
Dept: RADIOLOGY | Facility: HOSPITAL | Age: 70
Discharge: HOME | End: 2020-08-19
Attending: NURSE PRACTITIONER
Payer: COMMERCIAL

## 2020-08-19 DIAGNOSIS — M79.661 PAIN OF RIGHT CALF: ICD-10-CM

## 2020-08-19 DIAGNOSIS — M79.89 RIGHT LEG SWELLING: ICD-10-CM

## 2020-08-19 PROCEDURE — 93971 EXTREMITY STUDY: CPT | Mod: TC,PO,RT

## 2020-08-25 RX ORDER — SIMVASTATIN 40 MG
TABLET ORAL
Qty: 90 TABLET | Refills: 3 | Status: SHIPPED | OUTPATIENT
Start: 2020-08-25 | End: 2021-08-20

## 2020-09-09 ENCOUNTER — OFFICE VISIT (OUTPATIENT)
Dept: UROLOGY | Facility: CLINIC | Age: 70
End: 2020-09-09
Payer: COMMERCIAL

## 2020-09-09 VITALS
SYSTOLIC BLOOD PRESSURE: 133 MMHG | HEIGHT: 70 IN | BODY MASS INDEX: 23.26 KG/M2 | WEIGHT: 162.5 LBS | RESPIRATION RATE: 18 BRPM | DIASTOLIC BLOOD PRESSURE: 78 MMHG | HEART RATE: 61 BPM

## 2020-09-09 DIAGNOSIS — R39.15 URINARY URGENCY: Primary | ICD-10-CM

## 2020-09-09 DIAGNOSIS — N13.8 BPH WITH OBSTRUCTION/LOWER URINARY TRACT SYMPTOMS: ICD-10-CM

## 2020-09-09 DIAGNOSIS — R35.0 URINARY FREQUENCY: ICD-10-CM

## 2020-09-09 DIAGNOSIS — N32.81 OAB (OVERACTIVE BLADDER): ICD-10-CM

## 2020-09-09 DIAGNOSIS — N40.1 BPH WITH OBSTRUCTION/LOWER URINARY TRACT SYMPTOMS: ICD-10-CM

## 2020-09-09 LAB
BILIRUB SERPL-MCNC: NORMAL MG/DL
BLOOD URINE, POC: NORMAL
CLARITY, POC UA: CLEAR
COLOR, POC UA: YELLOW
GLUCOSE UR QL STRIP: NORMAL
KETONES UR QL STRIP: NORMAL
LEUKOCYTE ESTERASE URINE, POC: NORMAL
NITRITE, POC UA: NORMAL
PH, POC UA: 6.5
POC RESIDUAL URINE VOLUME: 9 ML (ref 0–100)
PROTEIN, POC: NORMAL
SPECIFIC GRAVITY, POC UA: 1.03
UROBILINOGEN, POC UA: 0.2

## 2020-09-09 PROCEDURE — 1159F PR MEDICATION LIST DOCUMENTED IN MEDICAL RECORD: ICD-10-PCS | Mod: S$GLB,,, | Performed by: NURSE PRACTITIONER

## 2020-09-09 PROCEDURE — 3075F PR MOST RECENT SYSTOLIC BLOOD PRESS GE 130-139MM HG: ICD-10-PCS | Mod: CPTII,S$GLB,, | Performed by: NURSE PRACTITIONER

## 2020-09-09 PROCEDURE — 99214 PR OFFICE/OUTPT VISIT, EST, LEVL IV, 30-39 MIN: ICD-10-PCS | Mod: 25,S$GLB,, | Performed by: NURSE PRACTITIONER

## 2020-09-09 PROCEDURE — 3078F DIAST BP <80 MM HG: CPT | Mod: CPTII,S$GLB,, | Performed by: NURSE PRACTITIONER

## 2020-09-09 PROCEDURE — 3075F SYST BP GE 130 - 139MM HG: CPT | Mod: CPTII,S$GLB,, | Performed by: NURSE PRACTITIONER

## 2020-09-09 PROCEDURE — 1126F PR PAIN SEVERITY QUANTIFIED, NO PAIN PRESENT: ICD-10-PCS | Mod: S$GLB,,, | Performed by: NURSE PRACTITIONER

## 2020-09-09 PROCEDURE — 99999 PR PBB SHADOW E&M-EST. PATIENT-LVL IV: ICD-10-PCS | Mod: PBBFAC,,, | Performed by: NURSE PRACTITIONER

## 2020-09-09 PROCEDURE — 51798 POCT BLADDER SCAN: ICD-10-PCS | Mod: S$GLB,,, | Performed by: NURSE PRACTITIONER

## 2020-09-09 PROCEDURE — 3078F PR MOST RECENT DIASTOLIC BLOOD PRESSURE < 80 MM HG: ICD-10-PCS | Mod: CPTII,S$GLB,, | Performed by: NURSE PRACTITIONER

## 2020-09-09 PROCEDURE — 1101F PR PT FALLS ASSESS DOC 0-1 FALLS W/OUT INJ PAST YR: ICD-10-PCS | Mod: CPTII,S$GLB,, | Performed by: NURSE PRACTITIONER

## 2020-09-09 PROCEDURE — 99999 PR PBB SHADOW E&M-EST. PATIENT-LVL IV: CPT | Mod: PBBFAC,,, | Performed by: NURSE PRACTITIONER

## 2020-09-09 PROCEDURE — 51798 US URINE CAPACITY MEASURE: CPT | Mod: S$GLB,,, | Performed by: NURSE PRACTITIONER

## 2020-09-09 PROCEDURE — 1101F PT FALLS ASSESS-DOCD LE1/YR: CPT | Mod: CPTII,S$GLB,, | Performed by: NURSE PRACTITIONER

## 2020-09-09 PROCEDURE — 81002 POCT URINE DIPSTICK WITHOUT MICROSCOPE: ICD-10-PCS | Mod: S$GLB,,, | Performed by: NURSE PRACTITIONER

## 2020-09-09 PROCEDURE — 3008F BODY MASS INDEX DOCD: CPT | Mod: CPTII,S$GLB,, | Performed by: NURSE PRACTITIONER

## 2020-09-09 PROCEDURE — 3008F PR BODY MASS INDEX (BMI) DOCUMENTED: ICD-10-PCS | Mod: CPTII,S$GLB,, | Performed by: NURSE PRACTITIONER

## 2020-09-09 PROCEDURE — 1159F MED LIST DOCD IN RCRD: CPT | Mod: S$GLB,,, | Performed by: NURSE PRACTITIONER

## 2020-09-09 PROCEDURE — 81002 URINALYSIS NONAUTO W/O SCOPE: CPT | Mod: S$GLB,,, | Performed by: NURSE PRACTITIONER

## 2020-09-09 PROCEDURE — 99214 OFFICE O/P EST MOD 30 MIN: CPT | Mod: 25,S$GLB,, | Performed by: NURSE PRACTITIONER

## 2020-09-09 PROCEDURE — 1126F AMNT PAIN NOTED NONE PRSNT: CPT | Mod: S$GLB,,, | Performed by: NURSE PRACTITIONER

## 2020-09-09 NOTE — PROGRESS NOTES
[facility] Note  Staff: KELLY Trores    PCP: Josiah    Chief Complaint: Follow-up    Subjective:        HPI: Onesimo Paula is a 69 y.o. male presents today for routine recheck of his lower urinary tract symptoms.    The pt was last seen by Dr. La Nena James on 8/17/2020 for the following procedure:  TRANSRECTAL PROSTATIC ULTRASOUND and Cystoscopy procedures.    Findings: (pictures were uploaded into media)  Cysoscopic findings: no urethral stricture. Membranous and distal prostatic urethra with papillary tissue suspicious for prostatitis. No tumors in bladder. No intravesical lboe.   TRUS volume: 42.6g  Prostate Ultrasound findings:  · Seminal vesicles/Ejaculatory Ducts: Normal  · Outline/Symmetry of Prostate: WNL  · Central Gland/Transition Zone: Well demarcated  · Peripheral Zone: WNL     Prostate Measurements:   · Height:  27.1 mm  · Width:  53.7 mm  · Length:  55.9 mm  · Volume: 42.6 cm3  · Intravesical protrusion: none  · PSAD: 0.02     Assessment: Onesimo Paula is a 69 y.o. male with bph/enlarged prostate and oab with urgency on tadalafil, flomax, vesicare found to have 42g prostate and prostatitis during procedure.  Pt was taken off Vesicare by MD last visit.    Current  meds:  Flomax 0.4 mg daily, takes in the evening after dinner.  Tadalafil 5 mg daily, takes in the morning.    TODAY:  Pt states he is still having the urinary urgency even since finishing the Cipro antibiotics at this time.  He denies gross hematuria, dysuria.  UA today shows normal findings.  PVR by bladder scan today:  9 mL    Last PSA Screening:   Lab Results   Component Value Date    PSA 2.2 01/31/2020    PSA 1.2 02/21/2019    PSA 1.0 03/14/2018     REVIEW OF SYSTEMS:  A comprehensive 10 system review was performed and is negative except as noted above in HPI    PMHx:  Past Medical History:   Diagnosis Date    Acid reflux     Acquired tricuspid valve insufficiency     Anemia      Borderline diabetes     BPH (benign prostatic hyperplasia)     Cataract     1/16/20 rt eye, 1/30/20- Lt eye    Coronary artery disease     Hypertension     Pulmonary nodules     Skin cancer      PSHx:  Past Surgical History:   Procedure Laterality Date    EYE SURGERY      for cataracts, rt eye 1/16/20, left eye 1/30/20    HERNIA REPAIR  12/1999    HERNIA REPAIR  05/2004    KNEE ARTHROSCOPY Left 04/12/2018    SKIN LESION EXCISION  10/2009    Neck    SKIN LESION EXCISION  2015    STAPEDECTOMY Right 03/2001    TRANSRECTAL ULTRASOUND EXAMINATION N/A 8/17/2020    Procedure: TRANSRECTAL ULTRASOUND;  Surgeon: La Nena James MD;  Location: [facility] OR;  Service: Urology;  Laterality: N/A;     Allergies:  Patient has no known allergies.    Medications: reviewed   Objective:     Vitals:    09/09/20 0841   BP: 133/78   Pulse: 61   Resp: 18     General:WDWN in NAD  Eyes: PERRLA, normal conjunctiva  Respiratory: no increased work on breathing, clear to auscultation  Cardiovascular: regular rate and rhythm. No obvious extremity edema.  GI: palpation of masses. No tenderness. No hepatosplenomegaly to palpation.  Musculoskeletal: normal range of motion of bilateral upper extremities. Normal muscle strength and tone.  Skin: no obvious rashes or lesions. No tightening of skin noted.  Neurologic: CN grossly normal. Normal sensation.   Psychiatric: awake, alert and oriented x 3. Mood and affect normal. Cooperative.    :  Deferred    LABS REVIEW:  UA today:  Color:Clear, Yellow  Spec. Grav.  1.030  PH  6.5  Negative for leukocytes, nitrates, protein, glucose, ketones, urobili, bili, and blood.    Assessment:       1. Urinary urgency    2. BPH with obstruction/lower urinary tract symptoms    3. OAB (overactive bladder)    4. Urinary frequency          Plan:     1.  Pt was advised to restart Vesicare 10 mg daily.  2.  Pt was also advised to continue the daily Flomax 0.4 mg and Cialis 5 mg daily.    Michelle fletcher was  reviewed by pt during office visit today, with all questions answered at this time.    F/u with Dr. James as scheduled, to discuss possible scheduling of rezum for enlarged prostate with LUTS not relieved by P.O. meds at this time.    [facility]: Active    Brittany Westbrook, SHRADDHAP-C

## 2020-09-16 ENCOUNTER — LAB VISIT (OUTPATIENT)
Dept: LAB | Facility: HOSPITAL | Age: 70
End: 2020-09-16
Attending: INTERNAL MEDICINE
Payer: COMMERCIAL

## 2020-09-16 ENCOUNTER — OFFICE VISIT (OUTPATIENT)
Dept: CARDIOLOGY | Facility: CLINIC | Age: 70
End: 2020-09-16
Payer: COMMERCIAL

## 2020-09-16 VITALS
WEIGHT: 162 LBS | OXYGEN SATURATION: 96 % | SYSTOLIC BLOOD PRESSURE: 140 MMHG | HEART RATE: 101 BPM | DIASTOLIC BLOOD PRESSURE: 90 MMHG | HEIGHT: 70 IN | BODY MASS INDEX: 23.19 KG/M2 | RESPIRATION RATE: 18 BRPM

## 2020-09-16 DIAGNOSIS — I10 ESSENTIAL HYPERTENSION: ICD-10-CM

## 2020-09-16 DIAGNOSIS — I34.0 NONRHEUMATIC MITRAL VALVE REGURGITATION: ICD-10-CM

## 2020-09-16 DIAGNOSIS — E78.2 MIXED HYPERLIPIDEMIA: ICD-10-CM

## 2020-09-16 DIAGNOSIS — R07.9 CHEST PAIN, UNSPECIFIED TYPE: Primary | ICD-10-CM

## 2020-09-16 DIAGNOSIS — N18.2 CHRONIC KIDNEY DISEASE, STAGE II (MILD): Primary | ICD-10-CM

## 2020-09-16 LAB
ALBUMIN SERPL BCP-MCNC: 4.1 G/DL (ref 3.5–5.2)
ANION GAP SERPL CALC-SCNC: 11 MMOL/L (ref 8–16)
BASOPHILS # BLD AUTO: 0.04 K/UL (ref 0–0.2)
BASOPHILS NFR BLD: 0.7 % (ref 0–1.9)
BILIRUB UR QL STRIP: NEGATIVE
BUN SERPL-MCNC: 25 MG/DL (ref 8–23)
CALCIUM SERPL-MCNC: 9.2 MG/DL (ref 8.7–10.5)
CHLORIDE SERPL-SCNC: 104 MMOL/L (ref 95–110)
CLARITY UR: ABNORMAL
CO2 SERPL-SCNC: 27 MMOL/L (ref 23–29)
COLOR UR: YELLOW
CREAT SERPL-MCNC: 1 MG/DL (ref 0.5–1.4)
CREAT UR-MCNC: 148 MG/DL (ref 23–375)
DIFFERENTIAL METHOD: ABNORMAL
EOSINOPHIL # BLD AUTO: 0.2 K/UL (ref 0–0.5)
EOSINOPHIL NFR BLD: 2.6 % (ref 0–8)
ERYTHROCYTE [DISTWIDTH] IN BLOOD BY AUTOMATED COUNT: 12.8 % (ref 11.5–14.5)
EST. GFR  (AFRICAN AMERICAN): >60 ML/MIN/1.73 M^2
EST. GFR  (NON AFRICAN AMERICAN): >60 ML/MIN/1.73 M^2
GLUCOSE SERPL-MCNC: 113 MG/DL (ref 70–110)
GLUCOSE UR QL STRIP: NEGATIVE
HCT VFR BLD AUTO: 42.3 % (ref 40–54)
HGB BLD-MCNC: 13.8 G/DL (ref 14–18)
HGB UR QL STRIP: NEGATIVE
IMM GRANULOCYTES # BLD AUTO: 0.01 K/UL (ref 0–0.04)
IMM GRANULOCYTES NFR BLD AUTO: 0.2 % (ref 0–0.5)
KETONES UR QL STRIP: NEGATIVE
LEUKOCYTE ESTERASE UR QL STRIP: NEGATIVE
LYMPHOCYTES # BLD AUTO: 1.8 K/UL (ref 1–4.8)
LYMPHOCYTES NFR BLD: 30.6 % (ref 18–48)
MCH RBC QN AUTO: 30.9 PG (ref 27–31)
MCHC RBC AUTO-ENTMCNC: 32.6 G/DL (ref 32–36)
MCV RBC AUTO: 95 FL (ref 82–98)
MONOCYTES # BLD AUTO: 0.6 K/UL (ref 0.3–1)
MONOCYTES NFR BLD: 10.5 % (ref 4–15)
NEUTROPHILS # BLD AUTO: 3.2 K/UL (ref 1.8–7.7)
NEUTROPHILS NFR BLD: 55.4 % (ref 38–73)
NITRITE UR QL STRIP: NEGATIVE
NRBC BLD-RTO: 0 /100 WBC
PH UR STRIP: 8 [PH] (ref 5–8)
PHOSPHATE SERPL-MCNC: 3.1 MG/DL (ref 2.7–4.5)
PLATELET # BLD AUTO: 236 K/UL (ref 150–350)
PMV BLD AUTO: 10.8 FL (ref 9.2–12.9)
POTASSIUM SERPL-SCNC: 3.7 MMOL/L (ref 3.5–5.1)
PROT UR QL STRIP: NEGATIVE
PROT UR-MCNC: 19 MG/DL (ref 6–15)
PROT/CREAT UR: 0.13 MG/G{CREAT} (ref 0–0.2)
RBC # BLD AUTO: 4.46 M/UL (ref 4.6–6.2)
SODIUM SERPL-SCNC: 142 MMOL/L (ref 136–145)
SP GR UR STRIP: 1.02 (ref 1–1.03)
URN SPEC COLLECT METH UR: ABNORMAL
UROBILINOGEN UR STRIP-ACNC: NEGATIVE EU/DL
WBC # BLD AUTO: 5.71 K/UL (ref 3.9–12.7)

## 2020-09-16 PROCEDURE — 93000 ELECTROCARDIOGRAM COMPLETE: CPT | Mod: S$GLB,,, | Performed by: INTERNAL MEDICINE

## 2020-09-16 PROCEDURE — 3080F PR MOST RECENT DIASTOLIC BLOOD PRESSURE >= 90 MM HG: ICD-10-PCS | Mod: CPTII,S$GLB,, | Performed by: INTERNAL MEDICINE

## 2020-09-16 PROCEDURE — 99213 PR OFFICE/OUTPT VISIT, EST, LEVL III, 20-29 MIN: ICD-10-PCS | Mod: S$GLB,,, | Performed by: INTERNAL MEDICINE

## 2020-09-16 PROCEDURE — 3077F SYST BP >= 140 MM HG: CPT | Mod: CPTII,S$GLB,, | Performed by: INTERNAL MEDICINE

## 2020-09-16 PROCEDURE — 1101F PR PT FALLS ASSESS DOC 0-1 FALLS W/OUT INJ PAST YR: ICD-10-PCS | Mod: CPTII,S$GLB,, | Performed by: INTERNAL MEDICINE

## 2020-09-16 PROCEDURE — 80069 RENAL FUNCTION PANEL: CPT

## 2020-09-16 PROCEDURE — 1125F AMNT PAIN NOTED PAIN PRSNT: CPT | Mod: S$GLB,,, | Performed by: INTERNAL MEDICINE

## 2020-09-16 PROCEDURE — 84156 ASSAY OF PROTEIN URINE: CPT

## 2020-09-16 PROCEDURE — 1159F PR MEDICATION LIST DOCUMENTED IN MEDICAL RECORD: ICD-10-PCS | Mod: S$GLB,,, | Performed by: INTERNAL MEDICINE

## 2020-09-16 PROCEDURE — 3008F PR BODY MASS INDEX (BMI) DOCUMENTED: ICD-10-PCS | Mod: CPTII,S$GLB,, | Performed by: INTERNAL MEDICINE

## 2020-09-16 PROCEDURE — 3077F PR MOST RECENT SYSTOLIC BLOOD PRESSURE >= 140 MM HG: ICD-10-PCS | Mod: CPTII,S$GLB,, | Performed by: INTERNAL MEDICINE

## 2020-09-16 PROCEDURE — 1125F PR PAIN SEVERITY QUANTIFIED, PAIN PRESENT: ICD-10-PCS | Mod: S$GLB,,, | Performed by: INTERNAL MEDICINE

## 2020-09-16 PROCEDURE — 81003 URINALYSIS AUTO W/O SCOPE: CPT

## 2020-09-16 PROCEDURE — 3008F BODY MASS INDEX DOCD: CPT | Mod: CPTII,S$GLB,, | Performed by: INTERNAL MEDICINE

## 2020-09-16 PROCEDURE — 93000 EKG 12-LEAD: ICD-10-PCS | Mod: S$GLB,,, | Performed by: INTERNAL MEDICINE

## 2020-09-16 PROCEDURE — 3080F DIAST BP >= 90 MM HG: CPT | Mod: CPTII,S$GLB,, | Performed by: INTERNAL MEDICINE

## 2020-09-16 PROCEDURE — 99213 OFFICE O/P EST LOW 20 MIN: CPT | Mod: S$GLB,,, | Performed by: INTERNAL MEDICINE

## 2020-09-16 PROCEDURE — 1159F MED LIST DOCD IN RCRD: CPT | Mod: S$GLB,,, | Performed by: INTERNAL MEDICINE

## 2020-09-16 PROCEDURE — 36415 COLL VENOUS BLD VENIPUNCTURE: CPT

## 2020-09-16 PROCEDURE — 85025 COMPLETE CBC W/AUTO DIFF WBC: CPT

## 2020-09-16 PROCEDURE — 1101F PT FALLS ASSESS-DOCD LE1/YR: CPT | Mod: CPTII,S$GLB,, | Performed by: INTERNAL MEDICINE

## 2020-09-16 NOTE — PROGRESS NOTES
Subjective:    Patient ID:  Onesimo Paula is a 69 y.o. male who presents for   Mitral Valve Prolapse, Hypertension, Hyperlipidemia, and Chest Pain (1 year follow up )    HPI if episode of left upper chest pain that lasted few seconds yesterday.  He reports his blood pressure fluctuates a lot on some days it is as high as 170 systolic.  On the days he works outside in [employer] like [email or web address] systolic can drop to 100.  Three months ago he was noted to have some leg swelling and they increased his hydrochlorothiazide to 25 mg once daily.  He is also seeing urologist for benign prostatic hypertrophy and has been placed on Flomax and Cialis.    Review of patient's allergies indicates:  No Known Allergies    Past Medical History:   Diagnosis Date    Acid reflux     Acquired tricuspid valve insufficiency     Anemia     Borderline diabetes     BPH (benign prostatic hyperplasia)     Cataract     20 rt eye, 20- Lt eye    Coronary artery disease     Hypertension     Pulmonary nodules     Skin cancer      Past Surgical History:   Procedure Laterality Date    EYE SURGERY      for cataracts, rt eye 20, left eye 20    HERNIA REPAIR  1999    HERNIA REPAIR  2004    KNEE ARTHROSCOPY Left 2018    SKIN LESION EXCISION  10/2009    Neck    SKIN LESION EXCISION  2015    STAPEDECTOMY Right 2001    TRANSRECTAL ULTRASOUND EXAMINATION N/A 2020    Procedure: TRANSRECTAL ULTRASOUND;  Surgeon: La Nena James MD;  Location: [facility];  Service: Urology;  Laterality: N/A;     Social History     Tobacco Use    Smoking status: Former Smoker     Quit date:      Years since quittin.7    Smokeless tobacco: Never Used   Substance Use Topics    Alcohol use: Yes     Frequency: 2-4 times a month     Drinks per session: 1 or 2     Binge frequency: Never     Comment: occasional    Drug use: No        Review of Systems     Review of Systems   Constitution: Negative for weight gain  and weight loss.   HENT: Negative for congestion, nosebleeds and sore throat.    Eyes: Negative for visual disturbance.   Cardiovascular: Positive for chest pain. Negative for dyspnea on exertion, palpitations and syncope.   Respiratory: Negative for cough, shortness of breath and wheezing.    Skin: Negative for rash.   Musculoskeletal: Negative for back pain, gout, joint pain and myalgias.   Gastrointestinal: Negative for heartburn, hematochezia and nausea.   Genitourinary: Positive for frequency and nocturia. Negative for hematuria.   Neurological: Negative for dizziness and tremors.   Psychiatric/Behavioral: Negative for memory loss.   Allergic/Immunologic: Negative for environmental allergies (Seasonal Allergies).           Objective:        Vitals:    09/16/20 0920   BP: (!) 140/90   Pulse: 101   Resp: 18       Lab Results   Component Value Date    WBC 5.71 09/16/2020    HGB 13.8 (L) 09/16/2020    HCT 42.3 09/16/2020     09/16/2020    CHOL 156 01/31/2020    TRIG 56 01/31/2020    HDL 54 01/31/2020    ALT 31 07/30/2020    AST 27 07/30/2020     09/16/2020    K 3.7 09/16/2020     09/16/2020    CREATININE 1.0 09/16/2020    BUN 25 (H) 09/16/2020    CO2 27 09/16/2020    TSH 3.860 01/31/2020    PSA 2.2 01/31/2020    HGBA1C 6.0 01/31/2020        ECHOCARDIOGRAM RESULTS  No results found for this or any previous visit.    CURRENT/PREVIOUS VISIT EKG  No results found for this or any previous visit.  No results found for this or any previous visit.  No results found for this or any previous visit.    PHYSICAL EXAM    Physical Exam   Constitutional: He is oriented to person, place, and time. He appears well-developed and well-nourished.   HENT:   Head: Normocephalic and atraumatic.   Neck: Normal range of motion. Neck supple.   Cardiovascular: Normal rate, regular rhythm and intact distal pulses.   Murmur heard.   Low-pitched mid to late systolic murmur is present at the apex radiating to the axilla. Lindsay  murmur heard only after exercise  Pulmonary/Chest: Effort normal and breath sounds normal.   Abdominal: Soft.   Neurological: He is alert and oriented to person, place, and time.   Skin: Skin is warm and dry.   Nursing note and vitals reviewed.       Medication List with Changes/Refills   Current Medications    ASPIRIN (ECOTRIN) 81 MG EC TABLET    aspirin 81 mg tablet,delayed release   Take 1 tablet every day by oral route.    FLU VAC 2019 65UP-[id number],PF, (FLUAD 4365-0607, 65 YR UP,,PF,) 45 MCG (15 MCG X 3)/0.5 ML SYRG    Fluad 2019-20 65yr up(PF)45 mcg(15 mcgx3)/0.5 mL intramuscular syringe   ADM 0.5ML IM UTD    HYDROCHLOROTHIAZIDE (HYDRODIURIL) 25 MG TABLET    Take 1 tablet (25 mg total) by mouth once daily.    LISINOPRIL (PRINIVIL,ZESTRIL) 40 MG TABLET    Take 1 tablet (40 mg total) by mouth once daily.    MULTIVIT WITH MINERALS/LUTEIN (MULTIVITAMIN 50 PLUS ORAL)    multivitamin    OMEGA 3-DHA-EPA-FISH OIL (FISH OIL) 100-160-1,000 MG CAP    Fish Oil    OMEPRAZOLE (PRILOSEC) 20 MG CAPSULE    Take 1 capsule (20 mg total) by mouth once daily.    ROSUVASTATIN (CRESTOR) 10 MG TABLET    Take 1 tablet (10 mg total) by mouth once daily.    TADALAFIL (CIALIS) 5 MG TABLET    Take 1 tablet (5 mg total) by mouth once daily.    TAMSULOSIN (FLOMAX) 0.4 MG CAP    Take 1 capsule (0.4 mg total) by mouth after dinner. Take nightly to improve urine flow           Assessment:       1. Chest pain, unspecified type    2. Essential hypertension    3. Nonrheumatic mitral valve regurgitation    4. Mixed hyperlipidemia         Plan:  Back is blood pressure 3 times a week at different times of the day and bring the log after 1 month.  I also I asked him to decrease hydrochlorothiazide to 12.5 mg a day and report to me if the swelling in the legs comes back.  Echocardiogram was 2017 I have requested an echocardiogram to evaluate progression of mitral valve prolapse and mitral regurgitation.  I reviewed recent lab work including lipids  from January and I felt they were controlled satisfactorily.  Follow-up will be in 6 months       Problem List Items Addressed This Visit        Cardiac/Vascular    Hyperlipidemia    Overview     total cholesterol 226, . HDL 41 in 3/2015. 10 year ASCVD Risk is 15 %.total cholesterol 160, LDL 95, HDL 42 in 7/2015.  on Lipitor since 4/2015. 8/2015:  d/c Lipitor 2/2 muscle cramps. Crestor 10 mg. ( start 5 mg first).   crestor sample was given to pt.9/2015: check lab 2 months later.  mild alternative shoulder pain. check cpk.11/2015: cut crestor to half . 2/2 CK high 308.  LDL 71.2/2015: CK decrease to 145, continue on Crestor 1/2 .5/2016: crestor 5 mg. total cholesterol 160, HDL 42, LDL 97.3/2017: tolerate crestor 5 mg. . declines increasing dosage of Crestor.         Hypertension    Overview     on lisinopril 5 mg since 8/2015 by cardiologist. 11/2015:  increase lisinopril  to 10 mg1/2016: increase lisinopril 20 mg BID by cardiologist in 12/22, pt blood presuue still high. add CCB yesterday by cardiology.  advise pt to decrease lisinopril to 5 mg. add HCTZ 25 mg. keep ccb.2/2016: improved, keep low dosage of ACEI , CCB and HCTZ.8/2016: decrease CCB.Keep ACEI and decreased HCT 2/2 high BUN.         Mitral valve insufficiency - pernickle    Overview     TTE in 3/2015 with EF 60%.         Relevant Orders    Echo Color Flow Doppler? Yes       Other    Chest pain - Primary    Overview     intermittent chest pain  for past several months. Hx of smoking 26 years smoking. quitted in 12/1997.Echo showed: normal left ventricular function with mitral / tricuspid valve regurgitation.         Relevant Orders    IN OFFICE EKG 12-LEAD (to [place])           Follow up in about 6 months (around 3/16/2021) for Routine follow up.

## 2020-09-30 ENCOUNTER — DOCUMENTATION ONLY (OUTPATIENT)
Dept: UROLOGY | Facility: CLINIC | Age: 70
End: 2020-09-30

## 2020-09-30 DIAGNOSIS — M25.561 PAIN IN BOTH KNEES, UNSPECIFIED CHRONICITY: Primary | ICD-10-CM

## 2020-09-30 DIAGNOSIS — M25.562 PAIN IN BOTH KNEES, UNSPECIFIED CHRONICITY: Primary | ICD-10-CM

## 2020-10-01 ENCOUNTER — OFFICE VISIT (OUTPATIENT)
Dept: ORTHOPEDICS | Facility: CLINIC | Age: 70
End: 2020-10-01
Payer: COMMERCIAL

## 2020-10-01 ENCOUNTER — HOSPITAL ENCOUNTER (OUTPATIENT)
Dept: RADIOLOGY | Facility: HOSPITAL | Age: 70
Discharge: HOME | End: 2020-10-01
Attending: NURSE PRACTITIONER
Payer: COMMERCIAL

## 2020-10-01 ENCOUNTER — OFFICE VISIT (OUTPATIENT)
Dept: UROLOGY | Facility: CLINIC | Age: 70
End: 2020-10-01
Payer: COMMERCIAL

## 2020-10-01 VITALS
HEIGHT: 70 IN | DIASTOLIC BLOOD PRESSURE: 76 MMHG | WEIGHT: 162.06 LBS | SYSTOLIC BLOOD PRESSURE: 141 MMHG | BODY MASS INDEX: 23.2 KG/M2 | HEART RATE: 58 BPM

## 2020-10-01 DIAGNOSIS — Z01.818 PREOP EXAMINATION: ICD-10-CM

## 2020-10-01 DIAGNOSIS — M17.12 PRIMARY OSTEOARTHRITIS OF LEFT KNEE: Primary | ICD-10-CM

## 2020-10-01 DIAGNOSIS — N13.8 BPH WITH OBSTRUCTION/LOWER URINARY TRACT SYMPTOMS: Primary | ICD-10-CM

## 2020-10-01 DIAGNOSIS — M17.11 PRIMARY OSTEOARTHRITIS OF RIGHT KNEE: ICD-10-CM

## 2020-10-01 DIAGNOSIS — M25.561 PAIN IN BOTH KNEES, UNSPECIFIED CHRONICITY: ICD-10-CM

## 2020-10-01 DIAGNOSIS — R39.15 URGENCY OF MICTURITION: ICD-10-CM

## 2020-10-01 DIAGNOSIS — M25.562 PAIN IN BOTH KNEES, UNSPECIFIED CHRONICITY: ICD-10-CM

## 2020-10-01 DIAGNOSIS — N52.1 ERECTILE DYSFUNCTION DUE TO DISEASES CLASSIFIED ELSEWHERE: ICD-10-CM

## 2020-10-01 DIAGNOSIS — N40.1 BPH WITH OBSTRUCTION/LOWER URINARY TRACT SYMPTOMS: Primary | ICD-10-CM

## 2020-10-01 PROCEDURE — 3078F PR MOST RECENT DIASTOLIC BLOOD PRESSURE < 80 MM HG: ICD-10-PCS | Mod: CPTII,S$GLB,, | Performed by: ORTHOPAEDIC SURGERY

## 2020-10-01 PROCEDURE — 73562 X-RAY EXAM OF KNEE 3: CPT | Mod: TC,50,PO

## 2020-10-01 PROCEDURE — 20610 LARGE JOINT ASPIRATION/INJECTION: BILATERAL KNEE: ICD-10-PCS | Mod: 50,S$GLB,, | Performed by: ORTHOPAEDIC SURGERY

## 2020-10-01 PROCEDURE — 99214 OFFICE O/P EST MOD 30 MIN: CPT | Mod: 95,,, | Performed by: UROLOGY

## 2020-10-01 PROCEDURE — 1159F MED LIST DOCD IN RCRD: CPT | Mod: S$GLB,,, | Performed by: ORTHOPAEDIC SURGERY

## 2020-10-01 PROCEDURE — 3077F SYST BP >= 140 MM HG: CPT | Mod: CPTII,S$GLB,, | Performed by: ORTHOPAEDIC SURGERY

## 2020-10-01 PROCEDURE — 1125F AMNT PAIN NOTED PAIN PRSNT: CPT | Mod: S$GLB,,, | Performed by: ORTHOPAEDIC SURGERY

## 2020-10-01 PROCEDURE — 99204 PR OFFICE/OUTPT VISIT, NEW, LEVL IV, 45-59 MIN: ICD-10-PCS | Mod: 25,S$GLB,, | Performed by: ORTHOPAEDIC SURGERY

## 2020-10-01 PROCEDURE — 3008F BODY MASS INDEX DOCD: CPT | Mod: CPTII,S$GLB,, | Performed by: ORTHOPAEDIC SURGERY

## 2020-10-01 PROCEDURE — 1159F MED LIST DOCD IN RCRD: CPT | Mod: ,,, | Performed by: UROLOGY

## 2020-10-01 PROCEDURE — 3077F PR MOST RECENT SYSTOLIC BLOOD PRESSURE >= 140 MM HG: ICD-10-PCS | Mod: CPTII,S$GLB,, | Performed by: ORTHOPAEDIC SURGERY

## 2020-10-01 PROCEDURE — 1101F PT FALLS ASSESS-DOCD LE1/YR: CPT | Mod: CPTII,,, | Performed by: UROLOGY

## 2020-10-01 PROCEDURE — 20610 DRAIN/INJ JOINT/BURSA W/O US: CPT | Mod: 50,S$GLB,, | Performed by: ORTHOPAEDIC SURGERY

## 2020-10-01 PROCEDURE — 3078F DIAST BP <80 MM HG: CPT | Mod: CPTII,,, | Performed by: UROLOGY

## 2020-10-01 PROCEDURE — 1101F PR PT FALLS ASSESS DOC 0-1 FALLS W/OUT INJ PAST YR: ICD-10-PCS | Mod: CPTII,,, | Performed by: UROLOGY

## 2020-10-01 PROCEDURE — 3077F PR MOST RECENT SYSTOLIC BLOOD PRESSURE >= 140 MM HG: ICD-10-PCS | Mod: CPTII,,, | Performed by: UROLOGY

## 2020-10-01 PROCEDURE — 1125F PR PAIN SEVERITY QUANTIFIED, PAIN PRESENT: ICD-10-PCS | Mod: S$GLB,,, | Performed by: ORTHOPAEDIC SURGERY

## 2020-10-01 PROCEDURE — 99999 PR PBB SHADOW E&M-EST. PATIENT-LVL III: ICD-10-PCS | Mod: PBBFAC,,, | Performed by: ORTHOPAEDIC SURGERY

## 2020-10-01 PROCEDURE — 3077F SYST BP >= 140 MM HG: CPT | Mod: CPTII,,, | Performed by: UROLOGY

## 2020-10-01 PROCEDURE — 99214 PR OFFICE/OUTPT VISIT, EST, LEVL IV, 30-39 MIN: ICD-10-PCS | Mod: 95,,, | Performed by: UROLOGY

## 2020-10-01 PROCEDURE — 3078F DIAST BP <80 MM HG: CPT | Mod: CPTII,S$GLB,, | Performed by: ORTHOPAEDIC SURGERY

## 2020-10-01 PROCEDURE — 73562 XR KNEE ORTHO BILAT: ICD-10-PCS | Mod: 26,,, | Performed by: RADIOLOGY

## 2020-10-01 PROCEDURE — 3008F PR BODY MASS INDEX (BMI) DOCUMENTED: ICD-10-PCS | Mod: CPTII,S$GLB,, | Performed by: ORTHOPAEDIC SURGERY

## 2020-10-01 PROCEDURE — 3078F PR MOST RECENT DIASTOLIC BLOOD PRESSURE < 80 MM HG: ICD-10-PCS | Mod: CPTII,,, | Performed by: UROLOGY

## 2020-10-01 PROCEDURE — 1101F PT FALLS ASSESS-DOCD LE1/YR: CPT | Mod: CPTII,S$GLB,, | Performed by: ORTHOPAEDIC SURGERY

## 2020-10-01 PROCEDURE — 99204 OFFICE O/P NEW MOD 45 MIN: CPT | Mod: 25,S$GLB,, | Performed by: ORTHOPAEDIC SURGERY

## 2020-10-01 PROCEDURE — 99999 PR PBB SHADOW E&M-EST. PATIENT-LVL III: CPT | Mod: PBBFAC,,, | Performed by: ORTHOPAEDIC SURGERY

## 2020-10-01 PROCEDURE — 73562 X-RAY EXAM OF KNEE 3: CPT | Mod: 26,,, | Performed by: RADIOLOGY

## 2020-10-01 PROCEDURE — 1159F PR MEDICATION LIST DOCUMENTED IN MEDICAL RECORD: ICD-10-PCS | Mod: S$GLB,,, | Performed by: ORTHOPAEDIC SURGERY

## 2020-10-01 PROCEDURE — 1101F PR PT FALLS ASSESS DOC 0-1 FALLS W/OUT INJ PAST YR: ICD-10-PCS | Mod: CPTII,S$GLB,, | Performed by: ORTHOPAEDIC SURGERY

## 2020-10-01 PROCEDURE — 1159F PR MEDICATION LIST DOCUMENTED IN MEDICAL RECORD: ICD-10-PCS | Mod: ,,, | Performed by: UROLOGY

## 2020-10-01 RX ORDER — SOLIFENACIN SUCCINATE 10 MG/1
10 TABLET, FILM COATED ORAL DAILY
COMMUNITY
End: 2020-11-19 | Stop reason: SDUPTHER

## 2020-10-01 RX ADMIN — TRIAMCINOLONE ACETONIDE 40 MG: 40 INJECTION, SUSPENSION INTRA-ARTICULAR; INTRAMUSCULAR at 08:10

## 2020-10-01 NOTE — PROCEDURES
Large Joint Aspiration/Injection: bilateral knee    Date/Time: 10/1/2020 8:30 AM  Performed by: Siva Anaya MD  Authorized by: Siva Anaya MD     Consent Done?:  Yes (Verbal)  Indications:  Pain  Timeout: prior to procedure the correct patient, procedure, and site was verified    Prep: patient was prepped and draped in usual sterile fashion    Local anesthetic:  Lidocaine 1% without epinephrine  Anesthetic total (ml):  5      Details:  Needle Size:  21 G  Approach:  Anterolateral  Location:  Knee  Laterality:  Bilateral  Site:  Bilateral knee  Medications (Right):  40 mg triamcinolone acetonide 40 mg/mL  Medications (Left):  40 mg triamcinolone acetonide 40 mg/mL  Patient tolerance:  Patient tolerated the procedure well with no immediate complications

## 2020-10-01 NOTE — Clinical Note
Will plan to schedule this for wed oct 21st if rezum rep avail  Needs preop clearance from , he just saw him last week and had an ekg and clearance to stop asa 81mg 7 days before procedure  Needs preop appt 1 week prior for cbc, cmp, cxr,  and ua reflex  preop covid 3 days beforehand    Please check with rezum rep to see if she can do oct 21

## 2020-10-01 NOTE — PATIENT INSTRUCTIONS
He is having urgency of mictruition with hesistancy and morning urge incontinence 3-4x a week. I asked him to d/c caffeine and void more frequently. Both of which he has done but continues to have some hesistancy despite urge and despite vesicare and flomax. He is unhappy with the side effect of retrograde ejaculation from flomax and does not want to increase the dose. A cysto/trus showed a 42 g prostate w b lobe hypertrophy and he would benefit from a prostate procedure, either rezum or TURP. Because he does not want the side effect of retrograde ejaculation, will proceed with rezum.     Both can result in temporary increase in overactive bladder and incontinence  Both require a catheter for 5-7 days after the procedure   Both procedures may take 6 months before urgency resolves or improves if it does at all  Flow improvement will be seen at 3 months usually     Will plan to schedule this for wed oct 21st if rezum rep avail  Needs preop clearance from , he just saw him last week and had an ekg and clearance to stop asa 81mg 7 days before procedure  Needs preop appt 1 week prior for cbc, cmp, cxr,  and ua reflex  preop covid 3 days beforehand    rezum info sent to pt online

## 2020-10-01 NOTE — PROGRESS NOTES
Chief Complaint   Patient presents with    Left Knee - Pain    Right Knee - Pain         HPI:   This is a 69 y.o. who presents to clinic today complaining of bilateral knee pain for months after no known trauma. Pain is progressively worsening. No numbness or tingling. No associated signs or symptoms.    Past Medical History:   Diagnosis Date    Acid reflux     Acquired tricuspid valve insufficiency     Anemia     Borderline diabetes     BPH (benign prostatic hyperplasia)     Cataract     1/16/20 rt eye, 1/30/20- Lt eye    Coronary artery disease     Hypertension     Pulmonary nodules     Skin cancer      Past Surgical History:   Procedure Laterality Date    EYE SURGERY      for cataracts, rt eye 1/16/20, left eye 1/30/20    HERNIA REPAIR  12/1999    HERNIA REPAIR  05/2004    KNEE ARTHROSCOPY Left 04/12/2018    SKIN LESION EXCISION  10/2009    Neck    SKIN LESION EXCISION  2015    STAPEDECTOMY Right 03/2001    TRANSRECTAL ULTRASOUND EXAMINATION N/A 8/17/2020    Procedure: TRANSRECTAL ULTRASOUND;  Surgeon: La Nena James MD;  Location: [facility];  Service: Urology;  Laterality: N/A;     Current Outpatient Medications on File Prior to Visit   Medication Sig Dispense Refill    aspirin (ECOTRIN) 81 MG EC tablet aspirin 81 mg tablet,delayed release   Take 1 tablet every day by oral route.      hydroCHLOROthiazide (HYDRODIURIL) 25 MG tablet Take 1 tablet (25 mg total) by mouth once daily. 90 tablet 0    lisinopriL (PRINIVIL,ZESTRIL) 40 MG tablet Take 1 tablet (40 mg total) by mouth once daily. 90 tablet 3    multivit with minerals/lutein (MULTIVITAMIN 50 PLUS ORAL) multivitamin      omega 3-dha-epa-fish oil (FISH OIL) 100-160-1,000 mg Cap Fish Oil      omeprazole (PRILOSEC) 20 MG capsule Take 1 capsule (20 mg total) by mouth once daily. 90 capsule 1    rosuvastatin (CRESTOR) 10 MG tablet Take 1 tablet (10 mg total) by mouth once daily. 90 tablet 3    solifenacin (VESICARE) 10 MG  tablet Take 10 mg by mouth once daily.      tadalafiL (CIALIS) 5 MG tablet Take 1 tablet (5 mg total) by mouth once daily. 90 tablet 3    tamsulosin (FLOMAX) 0.4 mg Cap Take 1 capsule (0.4 mg total) by mouth after dinner. Take nightly to improve urine flow 90 capsule 3    flu vac 2019 65up-[id number],PF, (FLUAD 9437-2056, 65 YR UP,,PF,) 45 mcg (15 mcg x 3)/0.5 mL Syrg Fluad  65yr up(PF)45 mcg(15 mcgx3)/0.5 mL intramuscular syringe   ADM 0.5ML IM UTD       No current facility-administered medications on file prior to visit.      Review of patient's allergies indicates:  No Known Allergies  Family History   Problem Relation Age of Onset    Cancer Mother         skin    Stroke Mother     Heart failure Father     Kidney disease Father      Social History     Socioeconomic History    Marital status:      Spouse name: Not on file    Number of children: Not on file    Years of education: Not on file    Highest education level: Not on file   Occupational History    Not on file   Social Needs    Financial resource strain: Not hard at all    Food insecurity     Worry: Never true     Inability: Never true    Transportation needs     Medical: No     Non-medical: No   Tobacco Use    Smoking status: Former Smoker     Quit date:      Years since quittin.7    Smokeless tobacco: Never Used   Substance and Sexual Activity    Alcohol use: Yes     Frequency: 2-4 times a month     Drinks per session: 1 or 2     Binge frequency: Never     Comment: occasional    Drug use: No    Sexual activity: Yes     Partners: Female   Lifestyle    Physical activity     Days per week: 0 days     Minutes per session: 0 min    Stress: Not on file   Relationships    Social connections     Talks on phone: Three times a week     Gets together: Once a week     Attends [employer]ism service: Not on file     Active member of club or organization: Yes     Attends meetings of clubs or organizations: Never     Relationship  status:    Other Topics Concern    Not on file   Social History Narrative    Not on file       Review of Systems:  Constitutional:  Denies fever or chills   Eyes:  Denies change in visual acuity   HENT:  Denies nasal congestion or sore throat   Respiratory:  Denies cough or shortness of breath   Cardiovascular:  Denies chest pain or edema   GI:  Denies abdominal pain, nausea, vomiting, bloody stools or diarrhea   :  Denies dysuria   Integument:  Denies rash   Neurologic:  Denies headache, focal weakness or sensory changes   Endocrine:  Denies polyuria or polydipsia   Lymphatic:  Denies swollen glands   Psychiatric:  Denies depression or anxiety     Physical Exam:   Constitutional:  Well developed, well nourished, no acute distress, non-toxic appearance   Integument:  Well hydrated, no rash   Lymphatic:  No lymphadenopathy noted   Neurologic:  Alert & oriented x 3  Psychiatric:  Speech and behavior appropriate   Eyes: EOMI  Gi: abdomen soft    Bilateral Knee Exam      Tenderness   The patient is experiencing tenderness in the medial joint line.    Range of Motion   Extension: abnormal   Flexion: abnormal     Muscle Strength     The patient has normal knee strength.    Tests   Donte:  Medial - positive   Lachman:  Anterior - negative      Varus: negative  Valgus: negative  Patellar Apprehension: negative    Other   Erythema: absent  Sensation: normal  Pulse: present  Swelling: mild      X-rays were performed, personally reviewed by me and findings discussed with the patient.  4 views of the bilateral knee show tricompartmental degenerative change most pronounced in the medial compartment      Primary osteoarthritis of left knee  -     Large Joint Aspiration/Injection: bilateral knee    Primary osteoarthritis of right knee  -     Large Joint Aspiration/Injection: bilateral knee            Using an aseptic technique, I injected 5 cc of lidocaine 1% without and 1 cc of kenalog 40mg into the bilateral knee.  The patient tolerated this well. I will have them return to clinic as needed.

## 2020-10-01 NOTE — PROGRESS NOTES
The patient location is: home  Date of Service: 10/01/2020   The chief complaint leading to consultation is: see hpi and visit diagnosis  Visit type: Virtual visit with Real Time synchronous AUDIO and VIDEO     Each patient to whom he or she provides medical services by telemedicine is:    (1) informed of the relationship between the physician and patient and the respective role of any other health care provider with respect to management of the patient; and   (2) notified that he or she may decline to receive medical services by telemedicine and may withdraw from such care at any time.      [facility] Note - [place]  Staff: MD Erika  PCP: JAMES Rose,KELLY Crook Utilization: active     Chief Complaint:   No chief complaint on file.        Subjective:        HPI: Onesimo Paula is a 69 y.o. male     Seen by me 9/30/19  Patient had MRI for right hip pain and was found have a thick-walled bladder with enlarged prostate.  He states that he saw urologist over 40 years ago for burning with urination.  He had a renal bladder ultrasound in 2016 which showed enlarged prostate and bilateral renal cyst done for renal insufficiency whom he sees  for.  Denies any gross hematuria.  Review of for previous urine show no microscopic hematuria.  Twenty-five pack-year history of smoking but quit in 1997.     He also has AUA symptom score 6 and occasional weak stream and urgency but voids every 3-5 hours when he is working and a little more frequent when he is not.  Denies any urge incontinence.  Okay stream with occasional weak stream.  PVR today 09/03/2019 is 15 cc.  He may have tried Flomax a couple years ago but does not recall it helping her changing any was symptoms.  Overall really not that bothered by his urinary symptoms.     Also states he has ED.  Previously tried sildenafil unsure dose and it helped but wife does not want him to take, exam revealed  [email or web address]'s    Plan: started tadalafil 5mg due to thick walled bladder although essentially asx. No further f/u fr b renal cysts.     Interval history 11/18/19:   Pt states today Cialis has NOT improved any of his lower urinary tract symptoms since starting the medication.  He currently takes this medication before his bedtime.  Pt still c/o urinary urgency, especially during the day.  Nocturia is 1-2x nightly and not bothersome at this time.  He is a , therefore the daytime urgency really bothers him.    Plan: started ditropan 10mg XL and cont'd tadalafil    Interval history by idalia 1/6/20:   TODAY, pt states the oxybutynin only improved his LUTS by over 10% at this time.  PVR by bladder scan performed in office today:  11 mL     Rec'd: d/c oxybutynin, start myrbetriq, cont tadalafil 5mg daily.     Interval history 7/27/20:     Tried myrbetriq but didn't help. Having urgency, hesistancy and intermittent slow stream but urgency most bothersome and having some UUI with a few drop, but not that bothersome. Drinks 1 c of coffee in am. Soda during day. 1 c of coffee int he evening. Has never tried stopping these to see if they help. Drinks beer and notices increase in frequency following day.     On tadalafil 5mg daily for ED and bph.  pvr by scan: 32cc  Voiding every 30 minutes a day recently more pronounced at home (has been on vacation).  Takes hctz in am. When he's working he only voids about 2x-4x a day. Drinks caffeine while working including energy drinks. No nocturia.     psa 1/31/20 2.2 (up from 1.2 year prior). Repeat psa 0.85 7/29/20  WINSOME today: 40g + no nodules.    Uroflow 7/30/20: peak flow 12.8mL/s,  avg flow 5.4mL/s, voided vol: 191cc, pvr by scan:0     Cysto trus 8/17/20: normal bladder, no stricture. Membranous and distal prostatic urethra with papillary tissue suspicious for prostatitis.trus volume 42.6g. bc of his urgency on tadalafil, flomax, vesicare found rx cipro for 14d to see if  it helped w urgency. D/c'd vesicare. cont'd flomax and tadalafil. rtc to see idalia in 2-3 weeks and me in 2 months to discuss bph procedures    Interval history by idalia 9/9/20:   Pt states he is still having the urinary urgency even since finishing the Cipro antibiotics at this time.  He denies gross hematuria, dysuria.  UA today shows normal findings.  PVR by bladder scan today:  9 mL    Plan: rec'd restarting vesicare    Interval history 10/1/20:   Taking flomax 0.4mg nightly and says flow is intermittent on this.   Also on the vesicare and says it doesn't help with the urgency. Voiding 2-3x a day and none at night. No incontinence. 3-4x a week c/o urge incontinence if he tries to hold it too long, mostly in the am when he wakes up. Denies any difficulty with walking. No weakness or numbness.   Changed to decaf coffee 1x a day. Changed to sprite and caffeine free coke.   He does not want to take any more meds. He is not happy with the retrograde ejaculation.   Sees dr. leger for Mitral Valve leakage, last saw him a week ago, was told he should f/u in March 2021.    Tadalafil helping with erections       Urine history  9/9/20  neg  3/31/20 Neg  1/6/20  Neg  11/18/19 neg  8/8/19  No cx, void: n/a 0 rbc  2/21/19 Ng, void: neg, 0 rbc  4/9/18  No cx, void: neg  9/2017  No blood       PSA history: no family hx of prostate cancer  9/9/20  pvr by scan: 9cc  8/17/20 Cysto trus: 42.6g.   7/30/20 peak flow 12.8mL/s,  avg flow 5.4mL/s, voided vol: 191cc, pvr by scan:0   7/29/20 0.85, %free 54.12  7/27/20 WINSOME: 40+g, pvr by scan: 32cc  1/31/20 2.2   1/6/20  pvr by scan: 11cc  9/3/19  WINSOME:40+gm, pvr by scan: 15cc   2/21/19 1.2  3/14/18 1.0      REVIEW OF SYSTEMS:  General ROS: no fevers, no chills  Psychological ROS: no depression  Endocrine ROS: no heat or cold  Respiratory ROS: no SOB  Cardiovascular ROS: no CP  Gastrointestinal ROS: no abdominal pain, no constipation, no diarrhea, noBRBPR  Musculoskeletal ROS: no  muscle pain  Neurological ROS: no headaches  Dermatological ROS: no rashes  HEENT: noglasses, no sinus   ROS: per HPI     PMHx:  Past Medical History:   Diagnosis Date    Acid reflux     Acquired tricuspid valve insufficiency     Anemia     Borderline diabetes     BPH (benign prostatic hyperplasia)     Cataract     20 rt eye, 20- Lt eye    Coronary artery disease     Hypertension     Pulmonary nodules     Skin cancer            PSHx:  Past Surgical History:   Procedure Laterality Date    EYE SURGERY      for cataracts, rt eye 20, left eye 20    HERNIA REPAIR  1999    HERNIA REPAIR  2004    KNEE ARTHROSCOPY Left 2018    SKIN LESION EXCISION  10/2009    Neck    SKIN LESION EXCISION      STAPEDECTOMY Right 2001    TRANSRECTAL ULTRASOUND EXAMINATION N/A 2020    Procedure: TRANSRECTAL ULTRASOUND;  Surgeon: La Nena James MD;  Location: [facility];  Service: Urology;  Laterality: N/A;       Stents/Valves/Foreign Bodies/Cardiac Evaluation/Cardiologist:   GI: or, last colonoscopy:    Family History   Problem Relation Age of Onset    Cancer Mother         skin    Stroke Mother     Heart failure Father     Kidney disease Father       malignancies: none.   kidney stones: none      Soc Hx:  Social History     Tobacco Use    Smoking status: Former Smoker     Quit date:      Years since quittin.7    Smokeless tobacco: Never Used   Substance Use Topics    Alcohol use: Yes     Frequency: 2-4 times a month     Drinks per session: 1 or 2     Binge frequency: Never     Comment: occasional    Drug use: No     1 ppd x 26 years  Lives in : [place]  :   3 children  Patient's occupation:      Allergies:  Patient has no known allergies.    Anticoagulation/Aspirin: asa 81mg       Objective:     There were no vitals filed for this visit.    No vitals or exam beyond what is listed below performed due to  virtual visit (COVID)  Neuro: awake, alert and oriented  Musculoskeletal: normal range of motion      LABS REVIEW:  Recent Labs   Lab 03/31/20  0742 07/30/20  1119 09/16/20  0752   WBC 5.79 6.37 5.71   Hemoglobin 14.1 14.5 13.8 L   Hematocrit 43.0 43.5 42.3   Platelets 202 241 236   ]  Recent Labs   Lab 01/31/20  0742 03/31/20  0742 07/30/20  1119 09/16/20  0752   Sodium 141 140 143 142   Potassium 3.9 3.8 4.1 3.7   Chloride 105 105 108 104   CO2 29 27 25 27   BUN, Bld 26 H 28 H 30 H 25 H   Creatinine 1.1 1.3 1.0 1.0   Glucose 99 100 88 113 H   Calcium 9.3 9.0 9.4 9.2   Phosphorus  --   --   --  3.1   Alkaline Phosphatase 55 51 L 61  --    Total Protein 7.2 7.2 7.5  --    Albumin 4.1 4.2 4.1 4.1   Total Bilirubin 0.6 0.7 0.4  --    AST 28 33 27  --    ALT 36 32 31  --    ]    Lab Results   Component Value Date    HGBA1C 6.0 01/31/2020         Recent Pertinent urologic PATHOLOGY REVIEW: See hpi for recent     none    Recent Pertinent Urologic RADIOGRAPHIC REVIEW: previous relevant images reviewed See hpi for recent     Mri prostate 6/3/19  1. Suspect a tear in the right hip labrum, anterosuperior quadrant.  Arthrogram could provide additional characterization.  2. Mild right hip degenerative change with a small effusion.  3.  Diffuse bladder wall thickening with differential to include cystitis and chronic outlet obstruction.  4. Prostatomegaly.  Correlate with PSA.    rbus 08/10/2016  Left parapelvic cyst  Right 10 mm  No thick-walled bladder  Enlarged prostate    Assessment:       1. BPH with obstruction/lower urinary tract symptoms    2. Urgency of micturition    3. Erectile dysfunction due to diseases classified elsewhere          Plan:     There are no diagnoses linked to this encounter.    He is having urgency of mictruition with hesistancy and morning urge incontinence 3-4x a week. I asked him to d/c caffeine and void more frequently. Both of which he has done but continues to have some hesistancy despite urge  and despite vesicare and flomax. He is unhappy with the side effect of retrograde ejaculation from flomax and does not want to increase the dose. A cysto/trus showed a 42 g prostate w b lobe hypertrophy and he would benefit from a prostate procedure, either rezum or TURP. Because he does not want the side effect of retrograde ejaculation, will proceed with rezum.     Both can result in temporary increase in overactive bladder and incontinence  Both require a catheter for 5-7 days after the procedure   Both procedures may take 6 months before urgency resolves or improves if it does at all  Flow improvement will be seen at 3 months usually     Will plan to schedule this for wed oct 21st if rezum rep avail  Needs preop clearance from , he just saw him last week and had an ekg and clearance to stop asa 81mg 7 days before procedure  Needs preop appt 1 week prior for cbc, cmp, cxr,  and ua reflex  preop covid 3 days beforehand    rezum info sent to pt online but pt already has been reading up on it    Continue tadalafil 5mg daily- would continue this after this procedure  Continue flomax/tamsulosin 0.4mg nightly for now but will d/c 1 month after procedure   Continue vesicare for 3 more months.          La Nena James MD

## 2020-10-02 ENCOUNTER — TELEPHONE (OUTPATIENT)
Dept: UROLOGY | Facility: CLINIC | Age: 70
End: 2020-10-02

## 2020-10-02 ENCOUNTER — TELEPHONE (OUTPATIENT)
Dept: CARDIOLOGY | Facility: CLINIC | Age: 70
End: 2020-10-02

## 2020-10-02 NOTE — LETTER
2020    Onesimo Paula  [address]  [place] [address]             [facility] [place]  [address], [address]  [place] [address]  Phone: [phone]  Fax: [phone] Patient: Onesimo Paula  : 1950  Referring Doctor:  Dr James  Type of Procedure:  Rezum procedure     Current Outpatient Medications   Medication Sig    aspirin (ECOTRIN) 81 MG EC tablet aspirin 81 mg tablet,delayed release   Take 1 tablet every day by oral route.    flu vac 2019 65up-cfxSC78G,PF, (FLUAD 9488-7687, 65 YR UP,,PF,) 45 mcg (15 mcg x 3)/0.5 mL Syrg Fluad  65yr up(PF)45 mcg(15 mcgx3)/0.5 mL intramuscular syringe   ADM 0.5ML IM UTD    hydroCHLOROthiazide (HYDRODIURIL) 25 MG tablet Take 1 tablet (25 mg total) by mouth once daily.    lisinopriL (PRINIVIL,ZESTRIL) 40 MG tablet Take 1 tablet (40 mg total) by mouth once daily.    multivit with minerals/lutein (MULTIVITAMIN 50 PLUS ORAL) multivitamin    omega 3-dha-epa-fish oil (FISH OIL) 100-160-1,000 mg Cap Fish Oil    omeprazole (PRILOSEC) 20 MG capsule Take 1 capsule (20 mg total) by mouth once daily.    rosuvastatin (CRESTOR) 10 MG tablet Take 1 tablet (10 mg total) by mouth once daily.    solifenacin (VESICARE) 10 MG tablet Take 10 mg by mouth once daily.    tadalafiL (CIALIS) 5 MG tablet Take 1 tablet (5 mg total) by mouth once daily.    tamsulosin (FLOMAX) 0.4 mg Cap Take 1 capsule (0.4 mg total) by mouth after dinner. Take nightly to improve urine flow     No current facility-administered medications for this visit.        This patient has been assessed for risk factors for clearance of surgery with the following stipulations:    ___ No contraindications    _X__ Recommendations for antiplatelet/anticoagulant medications: OK TO STOP ASA FOR 7 DAYS    __X_ Cleared for surgery with the following contraindications/precautions:    ___ Not cleared for surgery due to the following reasons:      If you have any  questions regarding the above, please contact my office at [phone].    Sincerely,

## 2020-10-02 NOTE — TELEPHONE ENCOUNTER
Patient scheduled for Rezum procedure on 10/21/2020 with . Needs clearance to stop Aspirin 81mg 7 days prior to procedure. Please advise

## 2020-10-07 ENCOUNTER — TELEPHONE (OUTPATIENT)
Dept: UROLOGY | Facility: CLINIC | Age: 70
End: 2020-10-07

## 2020-10-14 ENCOUNTER — HOSPITAL ENCOUNTER (OUTPATIENT)
Dept: PREADMISSION TESTING | Facility: HOSPITAL | Age: 70
Discharge: HOME | End: 2020-10-14
Attending: UROLOGY
Payer: COMMERCIAL

## 2020-10-14 ENCOUNTER — HOSPITAL ENCOUNTER (OUTPATIENT)
Dept: RADIOLOGY | Facility: HOSPITAL | Age: 70
Discharge: HOME | End: 2020-10-14
Attending: UROLOGY
Payer: COMMERCIAL

## 2020-10-14 ENCOUNTER — PATIENT MESSAGE (OUTPATIENT)
Dept: SURGERY | Facility: HOSPITAL | Age: 70
End: 2020-10-14

## 2020-10-14 VITALS — BODY MASS INDEX: 23.19 KG/M2 | HEIGHT: 70 IN | WEIGHT: 162 LBS

## 2020-10-14 DIAGNOSIS — N40.1 BPH WITH OBSTRUCTION/LOWER URINARY TRACT SYMPTOMS: ICD-10-CM

## 2020-10-14 DIAGNOSIS — N13.8 BPH WITH OBSTRUCTION/LOWER URINARY TRACT SYMPTOMS: ICD-10-CM

## 2020-10-14 PROCEDURE — 71046 X-RAY EXAM CHEST 2 VIEWS: CPT | Mod: TC,FY

## 2020-10-14 PROCEDURE — 71046 X-RAY EXAM CHEST 2 VIEWS: CPT | Mod: 26,,, | Performed by: RADIOLOGY

## 2020-10-14 PROCEDURE — 99900104 DSU ONLY-NO CHARGE-EA ADD'L HR (STAT)

## 2020-10-14 PROCEDURE — 99900103 DSU ONLY-NO CHARGE-INITIAL HR (STAT)

## 2020-10-14 PROCEDURE — 71046 XR CHEST PA AND LATERAL: ICD-10-PCS | Mod: 26,,, | Performed by: RADIOLOGY

## 2020-10-14 NOTE — DISCHARGE INSTRUCTIONS
To confirm, Your doctor has instructed you that surgery is scheduled for:  101/21/20   Jessica  [phone]    Covid test - 10/18/20    Please report to [facility] the morning of surgery. You must check-in and receive a wristband before going to your procedure.    Pre-Op will call the afternoon prior to surgery between 1:00 and 6:00 PM with the final arrival time.  Phone number: [phone]    PLEASE NOTE:  The surgery schedule has many variables which may affect the time of your surgery case.  Family members should be available if your surgery time changes.  Plan to be here the day of your procedure between 4-6 hours.    MEDICATIONS:  TAKE ONLY THESE MEDICATIONS WITH A SMALL SIP OF WATER THE MORNING OF YOUR PROCEDURE:    PRILOSEC, VESICARE    DO NOT TAKE THESE MEDICATIONS 5-7 DAYS PRIOR to your procedure or per your surgeon's request: ASPIRIN, ALEVE, ADVIL, IBUPROFEN, FISH OIL VITAMIN E, HERBALS  (May take Tylenol)                                         NO HCTZ - AM OF SURGERY    ONLY if you are prescribed any types of blood thinners such as:  Aspirin, Coumadin, Plavix, Pradaxa, Xarelto, Aggrenox, Effient, Eliquis, Savasya, Brilinta, or any other, ask your surgeon whether you should stop taking them and how long before surgery you should stop.  You may also need to verify with the prescribing physician if it is ok to stop your medication.      INSTRUCTIONS IMPORTANT!!  · Do not eat or drink anything between midnight and the time of your procedure- this includes gum, mints, and candy.  · Do not smoke or drink alcoholic beverages 24 hours prior to your procedure.  · Shower the night before AND the morning of your procedure with a Chlorhexidine wash such as Hibiclens or Dial antibacterial soap from the neck down.  Do not get it on your face or in your eyes.  You may use your own shampoo and face wash. This helps your skin to be as bacteria free as possible.    · If you wear  contact lenses, dentures, hearing aids or glasses, bring a container to put them in during surgery and give to a family member for safe keeping.  Please leave all jewelry, piercing's and valuables at home.   · DO NOT remove hair from the surgery site.  Do not shave the incision site unless you are given specific instructions to do so.    · ONLY if you have been diagnosed with sleep apnea please bring your C-PAP machine.  · ONLY if you wear home oxygen please bring your portable oxygen tank the day of your procedure.  · ONLY if you have a history of OPEN HEART SURGERY you will need a clearance from your Cardiologist per Anesthesia.      · ONLY for patients requiring bowel prep, written instructions will be given by your doctor's office.  · ONLY if you have a neuro stimulator, please bring the controller with you the morning of surgery  · ONLY if a type and screen test is needed before surgery, please return:  · If your doctor has scheduled you for an overnight stay, bring a small overnight bag with any personal items you need.  · Make arrangements in advance for transportation home by a responsible adult.  It is not safe to drive a vehicle during the 24 hours after anesthesia.     · ONLY ONE VISITOR PER PATIENT IS ALLOWED TO COME IN THE HOSPITAL THE DAY OF PROCEDURE.   · Visiting hours are 8:00AM-6:00PM. For the safety of all patients, visitors under the age of 12 are not allowed above the first floor of the hospital.    · All [facility] facilities and properties are tobacco free.  Smoking is NOT allowed.       If you have any questions about these instructions, call Pre-Op Admit  Nursing at [phone] or the Pre-Op Day Surgery Unit at [phone].

## 2020-10-18 ENCOUNTER — LAB VISIT (OUTPATIENT)
Dept: PRIMARY CARE CLINIC | Facility: CLINIC | Age: 70
End: 2020-10-18
Payer: COMMERCIAL

## 2020-10-18 DIAGNOSIS — Z01.818 PREOP EXAMINATION: ICD-10-CM

## 2020-10-18 LAB — SARS-COV-2 RNA RESP QL NAA+PROBE: NOT DETECTED

## 2020-10-18 PROCEDURE — U0003 INFECTIOUS AGENT DETECTION BY NUCLEIC ACID (DNA OR RNA); SEVERE ACUTE RESPIRATORY SYNDROME CORONAVIRUS 2 (SARS-COV-2) (CORONAVIRUS DISEASE [COVID-19]), AMPLIFIED PROBE TECHNIQUE, MAKING USE OF HIGH THROUGHPUT TECHNOLOGIES AS DESCRIBED BY CMS-2020-01-R: HCPCS

## 2020-10-20 ENCOUNTER — ANESTHESIA EVENT (OUTPATIENT)
Dept: SURGERY | Facility: HOSPITAL | Age: 70
End: 2020-10-20
Payer: COMMERCIAL

## 2020-10-20 RX ORDER — TRIAMCINOLONE ACETONIDE 40 MG/ML
40 INJECTION, SUSPENSION INTRA-ARTICULAR; INTRAMUSCULAR
Status: DISCONTINUED | OUTPATIENT
Start: 2020-10-01 | End: 2020-10-20 | Stop reason: HOSPADM

## 2020-10-20 NOTE — H&P
[facility] H&P Note - [place]  Staff: MD Erika  PCP: JAMES Rose,KELLY Crook Utilization: active     Chief Complaint:   No chief complaint on file.        Subjective:        HPI: Onesimo Paula is a 69 y.o. male     Seen by me 9/30/19  Patient had MRI for right hip pain and was found have a thick-walled bladder with enlarged prostate.  He states that he saw urologist over 40 years ago for burning with urination.  He had a renal bladder ultrasound in 2016 which showed enlarged prostate and bilateral renal cyst done for renal insufficiency whom he sees  for.  Denies any gross hematuria.  Review of for previous urine show no microscopic hematuria.  Twenty-five pack-year history of smoking but quit in 1997.     He also has AUA symptom score 6 and occasional weak stream and urgency but voids every 3-5 hours when he is working and a little more frequent when he is not.  Denies any urge incontinence.  Okay stream with occasional weak stream.  PVR today 09/03/2019 is 15 cc.  He may have tried Flomax a couple years ago but does not recall it helping her changing any was symptoms.  Overall really not that bothered by his urinary symptoms.     Also states he has ED.  Previously tried sildenafil unsure dose and it helped but wife does not want him to take, exam revealed peyronie's    Plan: started tadalafil 5mg due to thick walled bladder although essentially asx. No further f/u fr b renal cysts.     Interval history 11/18/19:   Pt states today Cialis has NOT improved any of his lower urinary tract symptoms since starting the medication.  He currently takes this medication before his bedtime.  Pt still c/o urinary urgency, especially during the day.  Nocturia is 1-2x nightly and not bothersome at this time.  He is a , therefore the daytime urgency really bothers him.    Plan: started ditropan 10mg XL and cont'd tadalafil    Interval history by idalia 1/6/20:    TODAY, pt states the oxybutynin only improved his LUTS by over 10% at this time.  PVR by bladder scan performed in office today:  11 mL     Rec'd: d/c oxybutynin, start myrbetriq, cont tadalafil 5mg daily.     Interval history 7/27/20:     Tried myrbetriq but didn't help. Having urgency, hesistancy and intermittent slow stream but urgency most bothersome and having some UUI with a few drop, but not that bothersome. Drinks 1 c of coffee in am. Soda during day. 1 c of coffee int he evening. Has never tried stopping these to see if they help. Drinks beer and notices increase in frequency following day.     On tadalafil 5mg daily for ED and bph.  pvr by scan: 32cc  Voiding every 30 minutes a day recently more pronounced at home (has been on vacation).  Takes hctz in am. When he's working he only voids about 2x-4x a day. Drinks caffeine while working including energy drinks. No nocturia.     psa 1/31/20 2.2 (up from 1.2 year prior). Repeat psa 0.85 7/29/20  WINSOME today: 40g + no nodules.    Uroflow 7/30/20: peak flow 12.8mL/s,  avg flow 5.4mL/s, voided vol: 191cc, pvr by scan:0     Cysto trus 8/17/20: normal bladder, no stricture. Membranous and distal prostatic urethra with papillary tissue suspicious for prostatitis.trus volume 42.6g. bc of his urgency on tadalafil, flomax, vesicare found rx cipro for 14d to see if it helped w urgency. D/c'd vesicare. cont'd flomax and tadalafil. rtc to see idalia in 2-3 weeks and me in 2 months to discuss bph procedures    Interval history by idalia 9/9/20:   Pt states he is still having the urinary urgency even since finishing the Cipro antibiotics at this time.  He denies gross hematuria, dysuria.  UA today shows normal findings.  PVR by bladder scan today:  9 mL    Plan: rec'd restarting vesicare    Interval history 10/1/20:   Taking flomax 0.4mg nightly and says flow is intermittent on this.   Also on the vesicare and says it doesn't help with the urgency. Voiding 2-3x a day and  none at night. No incontinence. 3-4x a week c/o urge incontinence if he tries to hold it too long, mostly in the am when he wakes up. Denies any difficulty with walking. No weakness or numbness.   Changed to decaf coffee 1x a day. Changed to sprite and caffeine free coke.   He does not want to take any more meds. He is not happy with the retrograde ejaculation.   Sees dr. leger for Mitral Valve leakage, last saw him a week ago, was told he should f/u in March 2021.    Tadalafil helping with erections     Interval history 10/21/20:   Here today for Rezum      Urine history  10/14/20 Neg  9/16/20 neg  9/9/20  neg  3/31/20 Neg  1/6/20  Neg  11/18/19 neg  8/8/19  No cx, void: n/a 0 rbc  2/21/19 Ng, void: neg, 0 rbc  4/9/18  No cx, void: neg  9/2017  No blood       PSA history: no family hx of prostate cancer  9/9/20  pvr by scan: 9cc  8/17/20 Cysto trus: 42.6g.   7/30/20 peak flow 12.8mL/s,  avg flow 5.4mL/s, voided vol: 191cc, pvr by scan:0   7/29/20 0.85, %free 54.12  7/27/20 WINSOME: 40+g, pvr by scan: 32cc  1/31/20 2.2   1/6/20  pvr by scan: 11cc  9/3/19  WINSOME:40+gm, pvr by scan: 15cc   2/21/19 1.2  3/14/18 1.0      REVIEW OF SYSTEMS:  General ROS: no fevers, no chills  Psychological ROS: no depression  Endocrine ROS: no heat or cold  Respiratory ROS: no SOB  Cardiovascular ROS: no CP  Gastrointestinal ROS: no abdominal pain, no constipation, no diarrhea, noBRBPR  Musculoskeletal ROS: no muscle pain  Neurological ROS: no headaches  Dermatological ROS: no rashes  HEENT: noglasses, no sinus   ROS: per HPI     PMHx:  Past Medical History:   Diagnosis Date    Acid reflux     Acquired tricuspid valve insufficiency     Anemia     Borderline diabetes     BPH (benign prostatic hyperplasia)     Cataract     1/16/20 rt eye, 1/30/20- Lt eye    Coronary artery disease     Hypertension     Pulmonary nodules     Skin cancer            PSHx:  Past Surgical History:   Procedure Laterality Date    EYE SURGERY      for  cataracts, rt eye 20, left eye 20    HERNIA REPAIR  1999    HERNIA REPAIR  2004    KNEE ARTHROSCOPY Left 2018    SKIN LESION EXCISION  10/2009    Neck    SKIN LESION EXCISION  2015    STAPEDECTOMY Right 2001    TRANSRECTAL ULTRASOUND EXAMINATION N/A 2020    Procedure: TRANSRECTAL ULTRASOUND;  Surgeon: La Nena James MD;  Location: [facility];  Service: Urology;  Laterality: N/A;       Stents/Valves/Foreign Bodies/Cardiac Evaluation/Cardiologist:   GI: or, last colonoscopy:    Family History   Problem Relation Age of Onset    Cancer Mother         skin    Stroke Mother     Heart failure Father     Kidney disease Father       malignancies: none.   kidney stones: none      Soc Hx:  Social History     Tobacco Use    Smoking status: Former Smoker     Quit date:      Years since quittin.8    Smokeless tobacco: Never Used   Substance Use Topics    Alcohol use: Yes     Frequency: 2-4 times a month     Drinks per session: 1 or 2     Binge frequency: Never     Comment: occasional    Drug use: No     1 ppd x 26 years  Lives in : [place]  :   3 children  Patient's occupation:      Allergies:  Patient has no known allergies.    Anticoagulation/Aspirin: asa 81mg       Objective:     Vitals:    10/21/20 0623   BP: (!) 182/91   Pulse: (!) 58   Resp: 16   Temp: 97.7 °F (36.5 °C)       Physical Exam:  General:WDWN in NAD  Neurologic: CN grossly normal. Normal sensation.   Psychiatric: awake, alert and oriented x 3. Mood and affect normal. Cooperative.  Eyes: PERRLA, normal conjunctiva  Respiratory: no increased work on breathing. No wheezing.   Cardiovascular: No obvious extremity edema. Warm and well perfused.  GI: no obvious stomach distension  Musculoskeletal: normal range of motion of bilateral upper extremities. Normal muscle strength and tone.  Skin: no obvious rashes or lesions. No tightening of skin  noted.          LABS REVIEW:  Recent Labs   Lab 07/30/20  1119 09/16/20  0752 10/14/20  0729   WBC 6.37 5.71 8.23   Hemoglobin 14.5 13.8 L 14.7   Hematocrit 43.5 42.3 44.7   Platelets 241 236 221   ]  Recent Labs   Lab 01/31/20  0742 03/31/20  0742 07/30/20  1119 09/16/20  0752 10/14/20  0729   Sodium 141 140 143 142 141   Potassium 3.9 3.8 4.1 3.7 3.9   Chloride 105 105 108 104 104   CO2 29 27 25 27 26   BUN, Bld 26 H 28 H 30 H 25 H 25 H   Creatinine 1.1 1.3 1.0 1.0 1.1   Glucose 99 100 88 113 H 117 H   Calcium 9.3 9.0 9.4 9.2 9.5   Phosphorus  --   --   --  3.1  --    Alkaline Phosphatase 55 51 L 61  --   --    Total Protein 7.2 7.2 7.5  --   --    Albumin 4.1 4.2 4.1 4.1  --    Total Bilirubin 0.6 0.7 0.4  --   --    AST 28 33 27  --   --    ALT 36 32 31  --   --    ]    Lab Results   Component Value Date    HGBA1C 6.0 01/31/2020         Recent Pertinent urologic PATHOLOGY REVIEW: See hpi for recent     none    Recent Pertinent Urologic RADIOGRAPHIC REVIEW: previous relevant images reviewed See hpi for recent     Mri prostate 6/3/19  1. Suspect a tear in the right hip labrum, anterosuperior quadrant.  Arthrogram could provide additional characterization.  2. Mild right hip degenerative change with a small effusion.  3.  Diffuse bladder wall thickening with differential to include cystitis and chronic outlet obstruction.  4. Prostatomegaly.  Correlate with PSA.    rbus 08/10/2016  Left parapelvic cyst  Right 10 mm  No thick-walled bladder  Enlarged prostate    Assessment:       1. BPH with obstruction/lower urinary tract symptoms          Plan:     There are no diagnoses linked to this encounter.    He is having urgency of mictruition with hesistancy and morning urge incontinence 3-4x a week. I asked him to d/c caffeine and void more frequently. Both of which he has done but continues to have some hesistancy and urge and despite vesicare and flomax. He is unhappy with the side effect of retrograde ejaculation from  flomax and does not want to increase the dose. A cysto/trus showed a 42 g prostate w b lobe hypertrophy and he could benefit (improved hesistancy and possibly improved urge) from a prostate procedure, either rezum or TURP. Because he does not want the side effect of retrograde ejaculation, will proceed with rezum.     Both can result in temporary increase in overactive bladder and incontinence  Both require a catheter for 5-7 days after the procedure   Both procedures may take 6 months before urgency resolves or improves if it does at all  Flow improvement will be seen at 3 months usually     Scheduled for rezum today   clearance from  received on 10/7  Stopped asa 7d ago     Continue tadalafil 5mg daily- would continue this after this procedure  Continue flomax/tamsulosin 0.4mg nightly for now but will d/c 3 month after procedure   Continue vesicare for 3 more months.        Postop risks and benefits of the rezum procedure discussed with patient as described below:     The pt knows they will have a catheter for 1 week after procedure     Short term risks of rezum  Rezûm has been proven to reduce urinary symptoms for many men who have been diagnosed with an enlarged prostate, or benign prostatic hyperplasia (BPH).  As a minimally invasive procedure, Rezûm has demonstrated fewer side effects compared to those typically seen with surgical therapies, but as with any interventional procedure, some of the following side effects may temporarily occur:   Painful urination   Blood in urine   Blood in semen   Frequent urination   Inability to urinate or completely empty the bladder   Need for short-term catheterization    Most of these events resolve within 3 weeks of the procedure, but there is a possibility some of these effects may be prolonged. Please talk with your physician about ways to potentially minimize the effects of these risks. Patients have found that the following options may help relieve  discomfort during the short-term healing process:   Take a mild pain medication such as Tylenol.   Try a warm bath or sitting on a hot water bottle.   Eliminate caffeine, chocolate, and alcohol from your diet.      Can Rezûm cause (new) erectile dysfunction?     In the pivotal study, there were no reports of erectile dysfunction as a result of Rezûm.      Can Rezûm cause ejaculatory dysfunction?     Based on our pivotal study, ejaculatory function was preserved in 97% of patients; however, men with BPH are at higher risk for other potential urinary/ejaculatory challenges and there is a possibility that Rezûm may uncover an underlying condition. Consult with your urologist about your individual clinical situation.    Can Rezûm cause urinary incontinence?     In the pivotal study, there were no reports of urinary incontinence as a result of Rezûm.                La Nena James MD

## 2020-10-21 ENCOUNTER — TELEPHONE (OUTPATIENT)
Dept: UROLOGY | Facility: CLINIC | Age: 70
End: 2020-10-21

## 2020-10-21 ENCOUNTER — ANESTHESIA (OUTPATIENT)
Dept: SURGERY | Facility: HOSPITAL | Age: 70
End: 2020-10-21
Payer: COMMERCIAL

## 2020-10-21 ENCOUNTER — HOSPITAL ENCOUNTER (OUTPATIENT)
Facility: HOSPITAL | Age: 70
Discharge: HOME | End: 2020-10-21
Attending: UROLOGY | Admitting: UROLOGY
Payer: COMMERCIAL

## 2020-10-21 DIAGNOSIS — N13.8 BPH WITH OBSTRUCTION/LOWER URINARY TRACT SYMPTOMS: ICD-10-CM

## 2020-10-21 DIAGNOSIS — N40.1 BPH WITH OBSTRUCTION/LOWER URINARY TRACT SYMPTOMS: ICD-10-CM

## 2020-10-21 PROCEDURE — 37000008 HC ANESTHESIA 1ST 15 MINUTES: Performed by: UROLOGY

## 2020-10-21 PROCEDURE — 25000003 PHARM REV CODE 250: Performed by: ANESTHESIOLOGY

## 2020-10-21 PROCEDURE — 53854 TRURL DSTRJ PRST8 TISS RF WV: CPT | Mod: ,,, | Performed by: UROLOGY

## 2020-10-21 PROCEDURE — D9220A PRA ANESTHESIA: Mod: CRNA,,, | Performed by: NURSE ANESTHETIST, CERTIFIED REGISTERED

## 2020-10-21 PROCEDURE — 63600175 PHARM REV CODE 636 W HCPCS: Performed by: UROLOGY

## 2020-10-21 PROCEDURE — 27201423 OPTIME MED/SURG SUP & DEVICES STERILE SUPPLY: Performed by: UROLOGY

## 2020-10-21 PROCEDURE — 27200651 HC AIRWAY, LMA: Performed by: ANESTHESIOLOGY

## 2020-10-21 PROCEDURE — D9220A PRA ANESTHESIA: Mod: ANES,,, | Performed by: ANESTHESIOLOGY

## 2020-10-21 PROCEDURE — 71000016 HC POSTOP RECOV ADDL HR: Performed by: UROLOGY

## 2020-10-21 PROCEDURE — 36000706: Performed by: UROLOGY

## 2020-10-21 PROCEDURE — 71000033 HC RECOVERY, INTIAL HOUR: Performed by: UROLOGY

## 2020-10-21 PROCEDURE — 71000015 HC POSTOP RECOV 1ST HR: Performed by: UROLOGY

## 2020-10-21 PROCEDURE — 37000009 HC ANESTHESIA EA ADD 15 MINS: Performed by: UROLOGY

## 2020-10-21 PROCEDURE — 99900104 DSU ONLY-NO CHARGE-EA ADD'L HR (STAT): Performed by: UROLOGY

## 2020-10-21 PROCEDURE — D9220A PRA ANESTHESIA: ICD-10-PCS | Mod: ANES,,, | Performed by: ANESTHESIOLOGY

## 2020-10-21 PROCEDURE — 99900103 DSU ONLY-NO CHARGE-INITIAL HR (STAT): Performed by: UROLOGY

## 2020-10-21 PROCEDURE — D9220A PRA ANESTHESIA: ICD-10-PCS | Mod: CRNA,,, | Performed by: NURSE ANESTHETIST, CERTIFIED REGISTERED

## 2020-10-21 PROCEDURE — 53854 PR TRANSURETH DESTRUCTION, PROSTATE TISSUE, RF WV THERMOTHERAPY: ICD-10-PCS | Mod: ,,, | Performed by: UROLOGY

## 2020-10-21 PROCEDURE — 36000707: Performed by: UROLOGY

## 2020-10-21 PROCEDURE — 25000003 PHARM REV CODE 250: Performed by: NURSE ANESTHETIST, CERTIFIED REGISTERED

## 2020-10-21 PROCEDURE — 71000039 HC RECOVERY, EACH ADD'L HOUR: Performed by: UROLOGY

## 2020-10-21 PROCEDURE — 63600175 PHARM REV CODE 636 W HCPCS: Performed by: NURSE ANESTHETIST, CERTIFIED REGISTERED

## 2020-10-21 RX ORDER — LIDOCAINE HYDROCHLORIDE 20 MG/ML
INJECTION INTRAVENOUS
Status: DISCONTINUED | OUTPATIENT
Start: 2020-10-21 | End: 2020-10-21

## 2020-10-21 RX ORDER — LIDOCAINE HYDROCHLORIDE 10 MG/ML
1 INJECTION, SOLUTION EPIDURAL; INFILTRATION; INTRACAUDAL; PERINEURAL ONCE
Status: DISCONTINUED | OUTPATIENT
Start: 2020-10-21 | End: 2020-10-21 | Stop reason: HOSPADM

## 2020-10-21 RX ORDER — FENTANYL CITRATE 50 UG/ML
25 INJECTION, SOLUTION INTRAMUSCULAR; INTRAVENOUS EVERY 5 MIN PRN
Status: DISCONTINUED | OUTPATIENT
Start: 2020-10-21 | End: 2020-10-21 | Stop reason: HOSPADM

## 2020-10-21 RX ORDER — CIPROFLOXACIN 500 MG/1
500 TABLET ORAL 2 TIMES DAILY
Qty: 6 TABLET | Refills: 0 | Status: SHIPPED | OUTPATIENT
Start: 2020-10-21 | End: 2020-10-24

## 2020-10-21 RX ORDER — ONDANSETRON 2 MG/ML
INJECTION INTRAMUSCULAR; INTRAVENOUS
Status: DISCONTINUED | OUTPATIENT
Start: 2020-10-21 | End: 2020-10-21

## 2020-10-21 RX ORDER — SODIUM CHLORIDE, SODIUM LACTATE, POTASSIUM CHLORIDE, CALCIUM CHLORIDE 600; 310; 30; 20 MG/100ML; MG/100ML; MG/100ML; MG/100ML
INJECTION, SOLUTION INTRAVENOUS CONTINUOUS
Status: DISCONTINUED | OUTPATIENT
Start: 2020-10-21 | End: 2020-10-21 | Stop reason: HOSPADM

## 2020-10-21 RX ORDER — OXYCODONE HYDROCHLORIDE 5 MG/1
5 TABLET ORAL ONCE AS NEEDED
Status: COMPLETED | OUTPATIENT
Start: 2020-10-21 | End: 2020-10-21

## 2020-10-21 RX ORDER — FENTANYL CITRATE 50 UG/ML
INJECTION, SOLUTION INTRAMUSCULAR; INTRAVENOUS
Status: DISCONTINUED | OUTPATIENT
Start: 2020-10-21 | End: 2020-10-21

## 2020-10-21 RX ORDER — ONDANSETRON 2 MG/ML
4 INJECTION INTRAMUSCULAR; INTRAVENOUS ONCE AS NEEDED
Status: DISCONTINUED | OUTPATIENT
Start: 2020-10-21 | End: 2020-10-21 | Stop reason: HOSPADM

## 2020-10-21 RX ORDER — HYDROCODONE BITARTRATE AND ACETAMINOPHEN 5; 325 MG/1; MG/1
1 TABLET ORAL EVERY 6 HOURS PRN
Qty: 15 TABLET | Refills: 0 | Status: SHIPPED | OUTPATIENT
Start: 2020-10-21 | End: 2020-10-31

## 2020-10-21 RX ORDER — MIDAZOLAM HYDROCHLORIDE 1 MG/ML
INJECTION, SOLUTION INTRAMUSCULAR; INTRAVENOUS
Status: DISCONTINUED | OUTPATIENT
Start: 2020-10-21 | End: 2020-10-21

## 2020-10-21 RX ORDER — PROPOFOL 10 MG/ML
VIAL (ML) INTRAVENOUS
Status: DISCONTINUED | OUTPATIENT
Start: 2020-10-21 | End: 2020-10-21

## 2020-10-21 RX ADMIN — FENTANYL CITRATE 50 MCG: 50 INJECTION, SOLUTION INTRAMUSCULAR; INTRAVENOUS at 08:10

## 2020-10-21 RX ADMIN — FENTANYL CITRATE 25 MCG: 50 INJECTION, SOLUTION INTRAMUSCULAR; INTRAVENOUS at 08:10

## 2020-10-21 RX ADMIN — LIDOCAINE HYDROCHLORIDE 100 MG: 20 INJECTION, SOLUTION INTRAVENOUS at 08:10

## 2020-10-21 RX ADMIN — MIDAZOLAM 2 MG: 1 INJECTION INTRAMUSCULAR; INTRAVENOUS at 07:10

## 2020-10-21 RX ADMIN — SODIUM CHLORIDE, SODIUM GLUCONATE, SODIUM ACETATE, POTASSIUM CHLORIDE, MAGNESIUM CHLORIDE, SODIUM PHOSPHATE, DIBASIC, AND POTASSIUM PHOSPHATE: .53; .5; .37; .037; .03; .012; .00082 INJECTION, SOLUTION INTRAVENOUS at 06:10

## 2020-10-21 RX ADMIN — CEFTRIAXONE 1 G: 1 INJECTION, SOLUTION INTRAVENOUS at 07:10

## 2020-10-21 RX ADMIN — PROPOFOL 150 MG: 10 INJECTION, EMULSION INTRAVENOUS at 08:10

## 2020-10-21 RX ADMIN — ONDANSETRON 4 MG: 2 INJECTION, SOLUTION INTRAMUSCULAR; INTRAVENOUS at 08:10

## 2020-10-21 RX ADMIN — OXYCODONE HYDROCHLORIDE 5 MG: 5 TABLET ORAL at 09:10

## 2020-10-21 NOTE — ANESTHESIA PREPROCEDURE EVALUATION
10/21/2020  Onesimo Paula is a 69 y.o., male.    Anesthesia Evaluation    I have reviewed the Patient Summary Reports.    I have reviewed the Nursing Notes. I have reviewed the NPO Status.   I have reviewed the Medications.     Review of Systems  Social:  Former Smoker    Hematology/Oncology:         -- Anemia:   Cardiovascular:   Hypertension CAD   hyperlipidemia    Pulmonary:   Shortness of breath    Renal/:   Chronic Renal Disease BPH    Hepatic/GI:   GERD    Musculoskeletal:   Arthritis     Neurological:  Neurology Normal    Endocrine:  Endocrine Normal    Dermatological:  Skin Normal    Psych:  Psychiatric Normal           Physical Exam  General:  Well nourished    Airway/Jaw/Neck:  Airway Findings: Mouth Opening: Normal Tongue: Normal  General Airway Assessment: Adult  Mallampati: II        Eyes/Ears/Nose:  EYES/EARS/NOSE FINDINGS: Normal   Dental:  Dental Findings: Upper Dentures, Lower Dentures   Chest/Lungs:  Chest/Lungs Findings: Clear to auscultation, Normal Respiratory Rate     Heart/Vascular:  Heart Findings: Rate: Normal  Rhythm: Regular Rhythm        Mental Status:  Mental Status Findings:  Cooperative, Alert and Oriented         Anesthesia Plan  Type of Anesthesia, risks & benefits discussed:  Anesthesia Type:  general  Patient's Preference:   Intra-op Monitoring Plan: standard ASA monitors  Intra-op Monitoring Plan Comments:   Post Op Pain Control Plan: multimodal analgesia and IV/PO Opioids PRN  Post Op Pain Control Plan Comments:   Induction:   IV  Beta Blocker:  Patient is not currently on a Beta-Blocker (No further documentation required).       Informed Consent: Patient understands risks and agrees with Anesthesia plan.  Questions answered. Anesthesia consent signed with patient.  ASA Score: 3     Day of Surgery Review of History & Physical: I have interviewed and examined the  patient. I have reviewed the patient's H&P dated:    H&P update referred to the surgeon.         Ready For Surgery From Anesthesia Perspective.

## 2020-10-21 NOTE — TELEPHONE ENCOUNTER
----- Message from La Nena James MD sent at 10/21/2020  8:59 AM CDT -----  Disposition:    Return in 5 days (Monday morning) for catheter removal, nurse visit. Fill and pull in am. Return in pm if necessary (incomplete emptying, voiding small amounts, straining to urinate)    Return in 4 weeks with me for post-op check. Check pvr. Fill out AUA symptom score.   If no appointments are made or given, make sure pt calls the clinic and asks for a one week nurse visit to have catheter removed and 4 week postop visit with me.     Home today with:   1. Continue vesicare 10mg once daily for 3 more months  2. norco 5/hydrocodone every 4 hours as needed for pain, 15 tablets  3. cipro 500 mg, take twice daily for 3 days (antibiotics), 6 tablets  4. Lidocaine jelly or gel (for pain around tip of penis where catheter exits)- apply up to 3x a day, 1 tube  5. AZO over the counter for burning with urination, 30 tablets- take 1 every 8 hours as needed for bladder spasms and pain, if too expensive then  azo over the counter.  6. Continue flomax nightly for 3 more months  7. Continue tadalafil 5mg daily for 3 more months    He can take ibuprofen 600mg up to 2x a day for 3 days to help with inflammation.  He can resume his blood thinners including aspirin in 2 days, but if urine is bloody, call clinic first for further instructions.

## 2020-10-21 NOTE — OP NOTE
[facility]  Operative Note     Date: 10/21/2020     Pre-Op Diagnosis: BPH with obstructive urinary symptoms     Post-Op Diagnosis: same     Procedure(s) Performed:   1. Transurethral destruction of prostate; radiofrequency thermotherapy      Specimen(s): none     Staff Surgeon: La Nena James MD     Assistant Surgeon:      Anesthesia:  General endotracheal anesthesia     Indications: Onesimo Paula is a 69 y.o. male with BPH presenting for surgical intervention.         Findings:    Trilobar hypertrophy with subservical median lobe.  1.5cm prostatic length  3 treatments delivered-1 in each lateral lobe and 1 on median (delivered from patient's left)     Estimated Blood Loss: minimal     Drains:   1.  16 Fr jean catheter     Procedure in detail: After informed consent was obtained and all questions were answered, the patient was brought to the operating suite and placed in the lithotomy position. General anesthesia was administered. SCDs were applied and working prior to induction of anesthesia. That patient was then prepped and draped in the usual sterile fashion. Time out was performed and preoperative antibiotics were confirmed.       We began the case by inserting the Rezum scope into the bladder. No urethral strictures were seen and scope entered easily.     Next, we examined the prostate and found it was 1.5cm. RF was then used to create thermal energy to selectively ablate prostate tissue 1 cm from the bladder neck to the proximal end of the veru spaced 1 cm apart.      3 total treatments were given. Specifically 1 treatments were given in each to 3 and 9 o'clock positions, and 1 at the median lobe from the patients left side.     At the completion of the procedure the device was removed. There was a careful check that there were no clots in the bladder or injury to the bladder, prostate or urethra.      16 fr jean catheter was placed with 10 mL of sterile water in the balloon      The patient tolerated the procedure well and was transferred to the PACU in stable condition.       Disposition:    · Return in 5 days (Monday morning) for catheter removal, nurse visit. Fill and pull in am. Return in pm if necessary (incomplete emptying, voiding small amounts, straining to urinate)  · Return in 4 weeks with me for post-op check. Check pvr. Fill out AUA symptom score.   · If no appointments are made or given, make sure pt calls the clinic and asks for a one week nurse visit to have catheter removed and 4 week postop visit with me.   ·   Home today with:   1. Continue vesicare 10mg once daily for 3 more months  2. norco 5/hydrocodone every 4 hours as needed for pain, 15 tablets  3. cipro 500 mg, take twice daily for 3 days (antibiotics), 6 tablets  4. Lidocaine jelly or gel (for pain around tip of penis where catheter exits)- apply up to 3x a day, 1 tube  5. AZO over the counter for burning with urination once catheter removed, 30 tablets- take 1 every 8 hours as needed for bladder spasms and pain, if too expensive then  azo over the counter. Bring to catheter appointment monday  6. Continue flomax nightly for 3 more months  7. Continue tadalafil 5mg daily for 3 more months    He can take ibuprofen 600mg up to 2x a day for 3 days to help with inflammation.  He can resume his blood thinners including aspirin in 2 days, but if urine is bloody, call clinic first for further instructions.   BMP  Lab Results   Component Value Date     10/14/2020    K 3.9 10/14/2020     10/14/2020    CO2 26 10/14/2020    BUN 25 (H) 10/14/2020    CREATININE 1.1 10/14/2020    CALCIUM 9.5 10/14/2020    ANIONGAP 11 10/14/2020    ESTGFRAFRICA >60 10/14/2020    EGFRNONAA >60 10/14/2020

## 2020-10-21 NOTE — PLAN OF CARE
Discharge instructions given to pt and spouse, verbalized understanding and questions answered.  Teach back method on jean catcher care. Supplies given to pt. Handouts provided. Urine clear pink. Belongings given back to pt. IV removed. Discharge via wheelchair.

## 2020-10-21 NOTE — TRANSFER OF CARE
Anesthesia Transfer of Care Note    Patient: Onesimo Paula    Procedure(s) Performed: Procedure(s) (LRB):  CYSTOSCOPY WITH TRANSURETHRAL DESTRUCTION OF PROSTATE,RFA (N/A)    Patient location: PACU    Anesthesia Type: general    Transport from OR: Transported from OR on 2-3 L/min O2 by NC with adequate spontaneous ventilation    Post pain: adequate analgesia    Post assessment: no apparent anesthetic complications and tolerated procedure well    Post vital signs: stable    Level of consciousness: awake and alert    Nausea/Vomiting: no nausea/vomiting    Complications: none    Transfer of care protocol was followed      Last vitals:   Visit Vitals  BP (!) 182/91   Pulse (!) 58   Temp 36.5 °C (97.7 °F)   Resp 16   SpO2 100%

## 2020-10-21 NOTE — ANESTHESIA POSTPROCEDURE EVALUATION
Anesthesia Post Evaluation    Patient: Onesimo Paula    Procedure(s) Performed: Procedure(s) (LRB):  CYSTOSCOPY WITH TRANSURETHRAL DESTRUCTION OF PROSTATE,RFA (N/A)    Final Anesthesia Type: general    Patient location during evaluation: PACU  Patient participation: Yes- Able to Participate  Level of consciousness: awake and alert  Post-procedure vital signs: reviewed and stable  Pain management: adequate  Airway patency: patent    PONV status at discharge: No PONV  Anesthetic complications: no      Cardiovascular status: hemodynamically stable  Respiratory status: unassisted and room air  Hydration status: euvolemic  Follow-up not needed.          Vitals Value Taken Time   /83 10/21/20 0951   Temp 36.3 °C (97.4 °F) 10/21/20 0930   Pulse 48 10/21/20 0951   Resp 18 10/21/20 0949   SpO2 98 % 10/21/20 0951   Vitals shown include unvalidated device data.      Event Time   Out of Recovery 09:50:00         Pain/Violette Score: Pain Rating Prior to Med Admin: 0 (10/21/2020  9:45 AM)  Pain Rating Post Med Admin: 3 (10/21/2020  9:09 AM)  Violette Score: 10 (10/21/2020  9:51 AM)

## 2020-10-21 NOTE — TELEPHONE ENCOUNTER
----- Message from Adeline Casas MA sent at 10/21/2020  9:19 AM CDT -----  Contact: patient  Pharmacy need to speak to nurse regarding script sebastián need a diagnosis code         [id number] - [place] - [address] [address] [facility]  [address]  [place] [address]  Phone: [phone] Fax: [phone]

## 2020-10-21 NOTE — PLAN OF CARE
Report to Nasra. Patient denies pain, no nausea, resting comfortably, catheter with jean bag to gravity. Wife in attendance

## 2020-10-21 NOTE — ANESTHESIA PROCEDURE NOTES
Intubation  Performed by: Sriram Chan CRNA  Authorized by: Bennett Javier MD     Intubation:     Induction:  Intravenous    Intubated:  Postinduction    Mask Ventilation:  Easy mask    Attempts:  1    Attempted By:  CRNA    Difficult Airway Encountered?: No      Complications:  None    Airway Device:  Supraglottic airway/LMA    Airway Device Size:  4.0    Secured at:  The lips    Placement Verified By:  Capnometry    Complicating Factors:  None

## 2020-10-21 NOTE — DISCHARGE INSTRUCTIONS
Your urologist ist is: Dr.Jennifer James  Office number: [phone]    You underwent a rezum procedure for enlarged prostate today. You will have a catheter for 5 to 7 days.     Your will have a nurse visit in 1 week to have the catheter removed this monday  -Please call the office to confirm this appointment.   -At that appointment the nurse will fill your bladder and remove the catheter.   -The nurse will then check to make sure you are emptying your bladder completely with a little bladder scan in the clinic.  -If you cannot void, are voiding only small amounts or feel like you are not emptying, then return that afternoon to have the nurses do another bladder scan to make sure you are emptying. If you are not, you may need a catheter replaced for a few days.     You will see  in about 4 weeks  -Please call to confirm appointment   -over the next few days, and before your catheter is removed,you may see blood in your urine, please return to ER if you have any fever, chills, your catheter stops draining, or you have increasing bloody output.   -over the next week please clean the tip of the penis where the catheter is coming out with a clean cloth once daily. You can apply a lidocaine jelly around this area up to 3x daily if it is irritating you.   -between now and your follow-up with , with the catheter in and once the catheter is out,  you may experience the urge to urinate often and frequently. You may even leak urine on the way to the bathroom. You may have some burning when you urinate.      Home today with:   1. Continue vesicare 10mg once daily for 3 more months  2. norco 5/hydrocodone every 4 hours as needed for pain, 15 tablets  3. cipro 500 mg, take twice daily for 3 days (antibiotics), 6 tablets  4. Lidocaine jelly or gel (for pain around tip of penis where catheter exits)- apply up to 3x a day, 1 tube  5. AZO over the counter for burning with urination once  catheter removed, 30 tablets- take 1 every 8 hours as needed for bladder spasms and pain, if too expensive then  azo over the counter. Bring to catheter appointment monday  6. Continue flomax nightly for 3 more months  7. Continue tadalafil 5mg daily for 3 more months    He can take ibuprofen 600mg up to 2x a day for 3 days to help with inflammation.  He can resume his blood thinners including aspirin in 2 days, but if urine is bloody, call clinic first for further instructions.       When will I notice results from the treatment?    Symptom improvement following the procedure is gradual.  During the clinical study significant symptom improvement was seen in as soon as two weeks.1 Responses to the treatment can and do vary.      How soon can I return to regular activities?    Most patients return to regular activities within a few days.1 Your urologist will discuss this during your consultation.    How long do the results last?    We currently have clinical studies that demonstrate sustained symptom improvement out to 3 years and studies are ongoing to determine longer term patient outcomes.1      What are some of the short-term side effects that may occur with Rezûm?    Rezûm has been proven to reduce urinary symptoms for many men who have been diagnosed with an enlarged prostate, or benign prostatic hyperplasia (BPH).  As a minimally invasive procedure, Rezûm has demonstrated fewer side effects compared to those typically seen with surgical therapies, but as with any interventional procedure, some of the following side effects may temporarily occur:   Painful urination   Blood in urine   Blood in semen   Frequent urination   Inability to urinate or completely empty the bladder   Need for short-term catheterization  Most of these events resolve within 3 weeks of the procedure, but there is a possibility some of these effects may be prolonged. Please talk with your physician about ways to potentially  minimize the effects of these risks. Patients have found that the following options may help relieve discomfort during the short-term healing process:   Take a mild pain medication such as Tylenol.   Try a warm bath or sitting on a hot water bottle.   Eliminate caffeine, chocolate, and alcohol from your diet.      Can Rezûm cause (new) erectile dysfunction?     In the pivotal study, there were no reports of erectile dysfunction as a result of Rezûm.      Can Rezûm cause ejaculatory dysfunction?     Based on our pivotal study, ejaculatory function was preserved in 97% of patients; however, men with BPH are at higher risk for other potential urinary/ejaculatory challenges and there is a possibility that Rezûm may uncover an underlying condition. Consult with your urologist about your individual clinical situation.    Can Rezûm cause urinary incontinence?     In the pivotal study, there were no reports of urinary incontinence as a result of Rezûm.    Discharge Instructions: After Your Surgery/Procedure  Youve just had surgery. During surgery you were given medicine called anesthesia to keep you relaxed and free of pain. After surgery you may have some pain or nausea. This is common. Here are some tips for feeling better and getting well after surgery.     Stay on schedule with your medication.   Going home  Your doctor or nurse will show you how to take care of yourself when you go home. He or she will also answer your questions. Have an adult family member or friend drive you home.      For your safety we recommend these precaution for the first 24 hours after your procedure:  · Do not drive or use heavy equipment.  · Do not make important decisions or sign legal papers.  · Do not drink alcohol.  · Have someone stay with you, if needed. He or she can watch for problems and help keep you safe.  · Your concentration, balance, coordination, and judgement may be impaired for many hours after anesthesia.  Use  "caution when ambulating or standing up.     · You may feel weak and "washed out" after anesthesia and surgery.      Subtle residual effects of general anesthesia or sedation with regional / local anesthesia can last more than 24 hours.  Rest for the remainder of the day or longer if your Doctor/Surgeon has advised you to do so.  Although you may feel normal within the first 24 hours, your reflexes and mental ability may be impaired without you realizing it.  You may feel dizzy, lightheaded or sleepy for 24 hours or longer.      Be sure to go to all follow-up visits with your doctor. And rest after your surgery for as long as your doctor tells you to.  Coping with pain  If you have pain after surgery, pain medicine will help you feel better. Take it as told, before pain becomes severe. Also, ask your doctor or pharmacist about other ways to control pain. This might be with heat, ice, or relaxation. And follow any other instructions your surgeon or nurse gives you.  Tips for taking pain medicine  To get the best relief possible, remember these points:  · Pain medicines can upset your stomach. Taking them with a little food may help.  · Most pain relievers taken by mouth need at least 20 to 30 minutes to start to work.  · Taking medicine on a schedule can help you remember to take it. Try to time your medicine so that you can take it before starting an activity. This might be before you get dressed, go for a walk, or sit down for dinner.  · Constipation is a common side effect of pain medicines. Call your doctor before taking any medicines such as laxatives or stool softeners to help ease constipation. Also ask if you should skip any foods. Drinking lots of fluids and eating foods such as fruits and vegetables that are high in fiber can also help. Remember, do not take laxatives unless your surgeon has prescribed them.  · Drinking alcohol and taking pain medicine can cause dizziness and slow your breathing. It can even " be deadly. Do not drink alcohol while taking pain medicine.  · Pain medicine can make you react more slowly to things. Do not drive or run machinery while taking pain medicine.  Your health care provider may tell you to take acetaminophen to help ease your pain. Ask him or her how much you are supposed to take each day. Acetaminophen or other pain relievers may interact with your prescription medicines or other over-the-counter (OTC) drugs. Some prescription medicines have acetaminophen and other ingredients. Using both prescription and OTC acetaminophen for pain can cause you to overdose. Read the labels on your OTC medicines with care. This will help you to clearly know the list of ingredients, how much to take, and any warnings. It may also help you not take too much acetaminophen. If you have questions or do not understand the information, ask your pharmacist or health care provider to explain it to you before you take the OTC medicine.  Managing nausea  Some people have an upset stomach after surgery. This is often because of anesthesia, pain, or pain medicine, or the stress of surgery. These tips will help you handle nausea and eat healthy foods as you get better. If you were on a special food plan before surgery, ask your doctor if you should follow it while you get better. These tips may help:  · Do not push yourself to eat. Your body will tell you when to eat and how much.  · Start off with clear liquids and soup. They are easier to digest.  · Next try semi-solid foods, such as mashed potatoes, applesauce, and gelatin, as you feel ready.  · Slowly move to solid foods. Dont eat fatty, rich, or spicy foods at first.  · Do not force yourself to have 3 large meals a day. Instead eat smaller amounts more often.  · Take pain medicines with a small amount of solid food, such as crackers or toast, to avoid nausea.     Call your surgeon if  · You still have pain an hour after taking medicine. The medicine may not  be strong enough.  · You feel too sleepy, dizzy, or groggy. The medicine may be too strong.  · You have side effects like nausea, vomiting, or skin changes, such as rash, itching, or hives.       If you have obstructive sleep apnea  You were given anesthesia medicine during surgery to keep you comfortable and free of pain. After surgery, you may have more apnea spells because of this medicine and other medicines you were given. The spells may last longer than usual.   At home:  · Keep using the continuous positive airway pressure (CPAP) device when you sleep. Unless your health care provider tells you not to, use it when you sleep, day or night. CPAP is a common device used to treat obstructive sleep apnea.  · Talk with your provider before taking any pain medicine, muscle relaxants, or sedatives. Your provider will tell you about the possible dangers of taking these medicines.  © 3777-9051 [employer]. [address] [place] [address]. All rights reserved. This information is not intended as a substitute for professional medical care. Always follow your healthcare professional's instructions.    Post op instructions for prevention of DVT  What is deep vein thrombosis?  Deep vein thrombosis (DVT) is the medical term for blood clots in the deep veins of the leg.  These blood clots can be dangerous.  A DVT can block a blood vessel and keep blood from getting where it needs to go.  Another problem is that the clot can travel to other parts of the body such as the lungs.  A clot that travels to the lungs is called a pulmonary embolus (PE) and can cause serious problems with breathing which can lead to death.  Am I at risk for DVT/PE?  If you are not very active, you are at risk of DVT.  Anyone confined to bed, sitting for long periods of time, recovering from surgery, etc. increases the risk of DVT.  Other risk factors are cancer diagnosis, certain medications, estrogen replacement in any  form,older age, obesity, pregnancy, smoking, history of clotting disorders, and dehydration.  How will I know if I have a DVT?   Swelling in the lower leg   Pain   Warmth, redness, hardness or bulging of the vein  If you have any of these symptoms, call your doctors office right away.  Some people will not have any symptoms until the clot moves to the lungs.  What are the symptoms of a PE?   Panting, shortness of breath, or trouble breathing   Sharp, knife-like chest pain when you breathe   Coughing or coughing up blood   Rapid heartbeat  If you have any of these symptoms or get worse quickly, call 911 for emergency treatment.  How can I prevent a DVT?   Avoid long periods of inactivity and dont cross your legs--get up and walk around every hour or so.   Stay active--walking after surgery is highly encouraged.  This means you should get out of the house and walk in the neighborhood.  Going up and down stairs will not impair healing (unless advised against such activity by your doctor).     Drink plenty of noncaffeinated, nonalcoholic fluids each day to prevent dehydration.   Wear special support stockings, if they have been advised by your doctor.   If you travel, stop at least once an hour and walk around.   Avoid smoking (assistance with stopping is available through your healthcare provider)  Always notify your doctor if you are not able to follow the post operative instructions that are given to you at the time of discharge.  It may be necessary to prescribe one of the medications available to prevent DVT.    We hope your stay was comfortable as you heal now, mend and rest.    For we have enjoyed taking care of you by giving your our best.    And as you get better, by regaining your health and strength;   We count it as a privilege to have served you and hope your time at [facility] was well spent.      Thank  You!!!

## 2020-10-22 VITALS
SYSTOLIC BLOOD PRESSURE: 167 MMHG | OXYGEN SATURATION: 96 % | DIASTOLIC BLOOD PRESSURE: 84 MMHG | HEART RATE: 50 BPM | RESPIRATION RATE: 16 BRPM | TEMPERATURE: 97 F

## 2020-10-26 ENCOUNTER — OFFICE VISIT (OUTPATIENT)
Dept: INTERNAL MEDICINE CLINIC | Age: 70
End: 2020-10-26
Payer: MEDICARE

## 2020-10-26 ENCOUNTER — TELEPHONE (OUTPATIENT)
Dept: CARDIOLOGY | Facility: CLINIC | Age: 70
End: 2020-10-26

## 2020-10-26 ENCOUNTER — CLINICAL SUPPORT (OUTPATIENT)
Dept: UROLOGY | Facility: CLINIC | Age: 70
End: 2020-10-26
Payer: COMMERCIAL

## 2020-10-26 VITALS
SYSTOLIC BLOOD PRESSURE: 134 MMHG | TEMPERATURE: 96 F | DIASTOLIC BLOOD PRESSURE: 84 MMHG | WEIGHT: 238 LBS | OXYGEN SATURATION: 98 % | HEART RATE: 62 BPM | HEIGHT: 71 IN | BODY MASS INDEX: 33.32 KG/M2

## 2020-10-26 DIAGNOSIS — N40.1 BENIGN PROSTATIC HYPERPLASIA WITH URINARY OBSTRUCTION: Primary | ICD-10-CM

## 2020-10-26 DIAGNOSIS — N13.8 BENIGN PROSTATIC HYPERPLASIA WITH URINARY OBSTRUCTION: Primary | ICD-10-CM

## 2020-10-26 DIAGNOSIS — I10 ESSENTIAL HYPERTENSION: Chronic | ICD-10-CM

## 2020-10-26 DIAGNOSIS — G47.33 OBSTRUCTIVE SLEEP APNEA: Chronic | ICD-10-CM

## 2020-10-26 DIAGNOSIS — E78.5 DYSLIPIDEMIA: Chronic | ICD-10-CM

## 2020-10-26 DIAGNOSIS — I13.0 HYPERTENSIVE HEART AND KIDNEY DISEASE WITH HF AND CKD (HCC): ICD-10-CM

## 2020-10-26 LAB
A/G RATIO: 1.6 (ref 1.1–2.2)
ALBUMIN SERPL-MCNC: 4.2 G/DL (ref 3.4–5)
ALP BLD-CCNC: 60 U/L (ref 40–129)
ALT SERPL-CCNC: 43 U/L (ref 10–40)
ANION GAP SERPL CALCULATED.3IONS-SCNC: 10 MMOL/L (ref 3–16)
AST SERPL-CCNC: 28 U/L (ref 15–37)
BASOPHILS ABSOLUTE: 0.1 K/UL (ref 0–0.2)
BASOPHILS RELATIVE PERCENT: 1 %
BILIRUB SERPL-MCNC: 0.4 MG/DL (ref 0–1)
BUN BLDV-MCNC: 24 MG/DL (ref 7–20)
CALCIUM SERPL-MCNC: 9.7 MG/DL (ref 8.3–10.6)
CHLORIDE BLD-SCNC: 105 MMOL/L (ref 99–110)
CHOLESTEROL, TOTAL: 161 MG/DL (ref 0–199)
CO2: 26 MMOL/L (ref 21–32)
CREAT SERPL-MCNC: 1.5 MG/DL (ref 0.8–1.3)
EOSINOPHILS ABSOLUTE: 0.2 K/UL (ref 0–0.6)
EOSINOPHILS RELATIVE PERCENT: 3.8 %
GFR AFRICAN AMERICAN: 56
GFR NON-AFRICAN AMERICAN: 46
GLOBULIN: 2.6 G/DL
GLUCOSE BLD-MCNC: 100 MG/DL (ref 70–99)
HCT VFR BLD CALC: 40.9 % (ref 40.5–52.5)
HDLC SERPL-MCNC: 40 MG/DL (ref 40–60)
HEMOGLOBIN: 14.1 G/DL (ref 13.5–17.5)
LDL CHOLESTEROL CALCULATED: 99 MG/DL
LYMPHOCYTES ABSOLUTE: 1.9 K/UL (ref 1–5.1)
LYMPHOCYTES RELATIVE PERCENT: 29.5 %
MCH RBC QN AUTO: 31.5 PG (ref 26–34)
MCHC RBC AUTO-ENTMCNC: 34.4 G/DL (ref 31–36)
MCV RBC AUTO: 91.5 FL (ref 80–100)
MONOCYTES ABSOLUTE: 0.6 K/UL (ref 0–1.3)
MONOCYTES RELATIVE PERCENT: 9 %
NEUTROPHILS ABSOLUTE: 3.6 K/UL (ref 1.7–7.7)
NEUTROPHILS RELATIVE PERCENT: 56.7 %
PDW BLD-RTO: 13.3 % (ref 12.4–15.4)
PLATELET # BLD: 201 K/UL (ref 135–450)
PMV BLD AUTO: 9.4 FL (ref 5–10.5)
POTASSIUM SERPL-SCNC: 4.4 MMOL/L (ref 3.5–5.1)
RBC # BLD: 4.47 M/UL (ref 4.2–5.9)
SODIUM BLD-SCNC: 141 MMOL/L (ref 136–145)
T4 FREE: 1.2 NG/DL (ref 0.9–1.8)
TOTAL PROTEIN: 6.8 G/DL (ref 6.4–8.2)
TRIGL SERPL-MCNC: 109 MG/DL (ref 0–150)
TSH SERPL DL<=0.05 MIU/L-ACNC: 1.43 UIU/ML (ref 0.27–4.2)
VLDLC SERPL CALC-MCNC: 22 MG/DL
WBC # BLD: 6.3 K/UL (ref 4–11)

## 2020-10-26 PROCEDURE — G8417 CALC BMI ABV UP PARAM F/U: HCPCS | Performed by: INTERNAL MEDICINE

## 2020-10-26 PROCEDURE — 99214 OFFICE O/P EST MOD 30 MIN: CPT | Performed by: INTERNAL MEDICINE

## 2020-10-26 PROCEDURE — 3017F COLORECTAL CA SCREEN DOC REV: CPT | Performed by: INTERNAL MEDICINE

## 2020-10-26 PROCEDURE — G8427 DOCREV CUR MEDS BY ELIG CLIN: HCPCS | Performed by: INTERNAL MEDICINE

## 2020-10-26 PROCEDURE — 1036F TOBACCO NON-USER: CPT | Performed by: INTERNAL MEDICINE

## 2020-10-26 PROCEDURE — G8484 FLU IMMUNIZE NO ADMIN: HCPCS | Performed by: INTERNAL MEDICINE

## 2020-10-26 PROCEDURE — 99499 NO LOS: ICD-10-PCS | Mod: S$GLB,,, | Performed by: UROLOGY

## 2020-10-26 PROCEDURE — 4040F PNEUMOC VAC/ADMIN/RCVD: CPT | Performed by: INTERNAL MEDICINE

## 2020-10-26 PROCEDURE — 99499 UNLISTED E&M SERVICE: CPT | Mod: S$GLB,,, | Performed by: UROLOGY

## 2020-10-26 PROCEDURE — 1123F ACP DISCUSS/DSCN MKR DOCD: CPT | Performed by: INTERNAL MEDICINE

## 2020-10-26 RX ORDER — TADALAFIL 10 MG/1
10 TABLET ORAL PRN
Qty: 15 TABLET | Refills: 3 | Status: SHIPPED | OUTPATIENT
Start: 2020-10-26 | End: 2021-05-31 | Stop reason: SDUPTHER

## 2020-10-26 ASSESSMENT — PATIENT HEALTH QUESTIONNAIRE - PHQ9
1. LITTLE INTEREST OR PLEASURE IN DOING THINGS: 0
SUM OF ALL RESPONSES TO PHQ QUESTIONS 1-9: 0
2. FEELING DOWN, DEPRESSED OR HOPELESS: 0
SUM OF ALL RESPONSES TO PHQ9 QUESTIONS 1 & 2: 0

## 2020-10-26 NOTE — PROGRESS NOTES
SUBJECTIVE:  Patient Myah Leblanc is a 78 y.o. y.o. male     HPI    Manju Tejada returns for follow up of hypertension. Patient has been taking His medications as prescribed. Patient's blood pressureis  controlled. Side effects related to taking the medications include no medication side effects noted    Patient returns for follow up of hyperlipidemia. Patient has been taking His medications as prescribed. Patient's lipids are controlled. Side effects related to taking the medications include none. He has had clinical consequences related to hyperlipidemia including, but not limited to acute coronary syndrome, stroke or chronic kidney disease. PHQ Scores 10/26/2020 5/1/2020 11/8/2019 7/5/2019 12/12/2018 8/28/2017 2/8/2016   PHQ2 Score 0 0 0 0 0 0 0   PHQ9 Score 0 0 0 0 0 0 0     Interpretation of Total Score Depression Severity: 1-4 = Minimal depression, 5-9 = Mild depression, 10-14 = Moderate depression, 15-19 = Moderately severe depression, 20-27 = Severe depression    Review of Systems   Genitourinary:        Erectile dysfunction. OBJECTIVE:    /84   Pulse 62   Temp 96 °F (35.6 °C)   Ht 5' 11\" (1.803 m)   Wt 238 lb (108 kg)   SpO2 98%   BMI 33.19 kg/m²    Physical Exam  Vitals signs reviewed. Constitutional:       General: He is not in acute distress. Appearance: He is well-developed. He is not diaphoretic. HENT:      Head: Normocephalic and atraumatic. Pulmonary:      Effort: Pulmonary effort is normal.   Neurological:      Mental Status: He is alert and oriented to person, place, and time. Cranial Nerves: No cranial nerve deficit. Psychiatric:         Behavior: Behavior normal.         Thought Content:  Thought content normal.         Judgment: Judgment normal.          Current Outpatient Medications:     metoprolol tartrate (LOPRESSOR) 25 MG tablet, TAKE 1 TABLET BY MOUTH TWICE DAILY, Disp: 180 tablet, Rfl: 0    simvastatin (ZOCOR) 40 MG tablet, TAKE 1 TABLET BY MOUTH EVERY NIGHT AT BEDTIME, Disp: 90 tablet, Rfl: 3    fenofibrate (TRICOR) 48 MG tablet, Take 1 tablet by mouth daily, Disp: 90 tablet, Rfl: 3    doxazosin (CARDURA) 2 MG tablet, TAKE 1 TABLET BY MOUTH ONCE EVERY DAY, Disp: 90 tablet, Rfl: 0    CycloSPORINE (RESTASIS MULTIDOSE OP), Apply to eye daily, Disp: , Rfl:     NONFORMULARY, Oasis eye drops 2-3 x daily, Disp: , Rfl:     valACYclovir (VALTREX) 500 MG tablet, , Disp: , Rfl:     timolol (TIMOPTIC) 0.5 % ophthalmic solution, , Disp: , Rfl:     DUREZOL 0.05 % EMUL, , Disp: , Rfl:     aspirin EC 81 MG EC tablet, Take 1 tablet by mouth daily. , Disp: 30 tablet, Rfl: 3    multivitamin (THERAGRAN) per tablet, Take 1 tablet by mouth daily. , Disp: , Rfl:     PrednisoLONE Acetate (PRED FORTE OP), Apply 1 drop to eye 3 times daily. RIGHT EYE, Disp: , Rfl:     cetirizine (ZYRTEC) 10 MG tablet, Take 10 mg by mouth daily. , Disp: , Rfl:     doxazosin (CARDURA) 4 MG tablet, TAKE 1 TABLET BY MOUTH DAILY (Patient not taking: Reported on 10/26/2020), Disp: 90 tablet, Rfl: 3    Assessment/Plan:  Judith Arana was seen today for hypertension. 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 Dyslipidemia  -     Lipid Panel; Future    Erectile dysfunction, unspecified erectile dysfunction type  Comments:  He wants to be ready 30 days out of 30 and not 15. Orders:  -     tadalafil (CIALIS) 10 MG tablet;  Take 1 tablet by mouth as needed for Erectile Dysfunction        Abad Otero MD

## 2020-10-26 NOTE — PROGRESS NOTES
Per  patient arrived in clinic today for a fill and pull.  Patient filled with 210ml of sterile water until patient felt full.   16F jean coude catheter removed without difficulty   10ml of sterile water removed from balloon.  Catheter bag removed draining blood tinged urine   Patient was able to urinate 210ml of urine.  Patient advised to increase fluid intake and urinate normally  Advised patient to return to office by 3pm if unable to urinate.

## 2020-10-26 NOTE — TELEPHONE ENCOUNTER
----- Message from Glory Reyes sent at 10/26/2020  9:27 AM CDT -----  Regarding: bp readings  Pt dropped off his bp reading for the month it was put in dr p box      Thanks     [phone]

## 2020-11-19 ENCOUNTER — OFFICE VISIT (OUTPATIENT)
Dept: UROLOGY | Facility: CLINIC | Age: 70
End: 2020-11-19
Payer: COMMERCIAL

## 2020-11-19 ENCOUNTER — APPOINTMENT (OUTPATIENT)
Dept: LAB | Facility: HOSPITAL | Age: 70
End: 2020-11-19
Attending: UROLOGY
Payer: COMMERCIAL

## 2020-11-19 VITALS
SYSTOLIC BLOOD PRESSURE: 134 MMHG | HEIGHT: 70 IN | WEIGHT: 161.38 LBS | HEART RATE: 65 BPM | BODY MASS INDEX: 23.1 KG/M2 | DIASTOLIC BLOOD PRESSURE: 72 MMHG

## 2020-11-19 DIAGNOSIS — N40.1 BENIGN PROSTATIC HYPERPLASIA WITH URINARY OBSTRUCTION: Primary | ICD-10-CM

## 2020-11-19 DIAGNOSIS — N13.8 BENIGN PROSTATIC HYPERPLASIA WITH URINARY OBSTRUCTION: Primary | ICD-10-CM

## 2020-11-19 LAB
BACTERIA #/AREA URNS HPF: ABNORMAL /HPF
BILIRUB SERPL-MCNC: ABNORMAL MG/DL
BLOOD URINE, POC: ABNORMAL
CLARITY, POC UA: CLEAR
COLOR, POC UA: YELLOW
GLUCOSE UR QL STRIP: ABNORMAL
KETONES UR QL STRIP: ABNORMAL
LEUKOCYTE ESTERASE URINE, POC: ABNORMAL
MICROSCOPIC COMMENT: ABNORMAL
NITRITE, POC UA: ABNORMAL
PH, POC UA: 7
PROTEIN, POC: ABNORMAL
RBC #/AREA URNS HPF: 50 /HPF (ref 0–4)
SPECIFIC GRAVITY, POC UA: 1.02
UROBILINOGEN, POC UA: ABNORMAL
WBC #/AREA URNS HPF: 40 /HPF (ref 0–5)

## 2020-11-19 PROCEDURE — 99213 PR OFFICE/OUTPT VISIT, EST, LEVL III, 20-29 MIN: ICD-10-PCS | Mod: 25,S$GLB,, | Performed by: UROLOGY

## 2020-11-19 PROCEDURE — 3078F DIAST BP <80 MM HG: CPT | Mod: CPTII,S$GLB,, | Performed by: UROLOGY

## 2020-11-19 PROCEDURE — 87086 URINE CULTURE/COLONY COUNT: CPT

## 2020-11-19 PROCEDURE — 3008F PR BODY MASS INDEX (BMI) DOCUMENTED: ICD-10-PCS | Mod: CPTII,S$GLB,, | Performed by: UROLOGY

## 2020-11-19 PROCEDURE — 99213 OFFICE O/P EST LOW 20 MIN: CPT | Mod: 25,S$GLB,, | Performed by: UROLOGY

## 2020-11-19 PROCEDURE — 3075F SYST BP GE 130 - 139MM HG: CPT | Mod: CPTII,S$GLB,, | Performed by: UROLOGY

## 2020-11-19 PROCEDURE — 3008F BODY MASS INDEX DOCD: CPT | Mod: CPTII,S$GLB,, | Performed by: UROLOGY

## 2020-11-19 PROCEDURE — 1159F MED LIST DOCD IN RCRD: CPT | Mod: S$GLB,,, | Performed by: UROLOGY

## 2020-11-19 PROCEDURE — 3075F PR MOST RECENT SYSTOLIC BLOOD PRESS GE 130-139MM HG: ICD-10-PCS | Mod: CPTII,S$GLB,, | Performed by: UROLOGY

## 2020-11-19 PROCEDURE — 3078F PR MOST RECENT DIASTOLIC BLOOD PRESSURE < 80 MM HG: ICD-10-PCS | Mod: CPTII,S$GLB,, | Performed by: UROLOGY

## 2020-11-19 PROCEDURE — 81002 POCT URINE DIPSTICK WITHOUT MICROSCOPE: ICD-10-PCS | Mod: S$GLB,,, | Performed by: UROLOGY

## 2020-11-19 PROCEDURE — 81000 URINALYSIS NONAUTO W/SCOPE: CPT

## 2020-11-19 PROCEDURE — 99999 PR PBB SHADOW E&M-EST. PATIENT-LVL III: ICD-10-PCS | Mod: PBBFAC,,, | Performed by: UROLOGY

## 2020-11-19 PROCEDURE — 1159F PR MEDICATION LIST DOCUMENTED IN MEDICAL RECORD: ICD-10-PCS | Mod: S$GLB,,, | Performed by: UROLOGY

## 2020-11-19 PROCEDURE — 81002 URINALYSIS NONAUTO W/O SCOPE: CPT | Mod: S$GLB,,, | Performed by: UROLOGY

## 2020-11-19 PROCEDURE — 99999 PR PBB SHADOW E&M-EST. PATIENT-LVL III: CPT | Mod: PBBFAC,,, | Performed by: UROLOGY

## 2020-11-19 RX ORDER — SOLIFENACIN SUCCINATE 10 MG/1
10 TABLET, FILM COATED ORAL DAILY
Qty: 90 TABLET | Refills: 0 | Status: SHIPPED | OUTPATIENT
Start: 2020-11-19 | End: 2021-03-09 | Stop reason: ALTCHOICE

## 2020-11-19 RX ORDER — HYDROCHLOROTHIAZIDE 12.5 MG/1
12.5 CAPSULE ORAL DAILY
COMMUNITY
Start: 2020-10-12 | End: 2021-08-18 | Stop reason: DRUGHIGH

## 2020-11-19 NOTE — PROGRESS NOTES
[facility] Note - [place]  Staff: MD Erika  PCP: JAMES Rose FNP-C MyChart Utilization: active     Chief Complaint:   Chief Complaint   Patient presents with    Post-op Evaluation         Subjective:        HPI: Onesimo Paula is a 69 y.o. male     Seen by me 9/30/19  Patient had MRI for right hip pain and was found have a thick-walled bladder with enlarged prostate.  He states that he saw urologist over 40 yea  rs ago for burning with urination.  He had a renal bladder ultrasound in 2016 which showed enlarged prostate and bilateral renal cyst done for renal insufficiency whom he sees  for.  Denies any gross hematuria.  Review of for previous urine show no microscopic hematuria.  Twenty-five pack-year history of smoking but quit in 1997.     He also has AUA symptom score 6 and occasional weak stream and urgency but voids every 3-5 hours when he is working and a little more frequent when he is not.  Denies any urge incontinence.  Okay stream with occasional weak stream.  PVR today 09/03/2019 is 15 cc.  He may have tried Flomax a couple years ago but does not recall it helping her changing any was symptoms.  Overall really not that bothered by his urinary symptoms.     Also states he has ED.  Previously tried sildenafil unsure dose and it helped but wife does not want him to take, exam revealed peyronie's    Plan: started tadalafil 5mg due to thick walled bladder although essentially asx. No further f/u fr b renal cysts.     Interval history 11/18/19:   Pt states today Cialis has NOT improved any of his lower urinary tract symptoms since starting the medication.  He currently takes this medication before his bedtime.  Pt still c/o urinary urgency, especially during the day.  Nocturia is 1-2x nightly and not bothersome at this time.  He is a , therefore the daytime urgency really bothers him.    Plan: started ditropan 10mg XL and cont'd  tadalafil    Interval history by idalia 1/6/20:   TODAY, pt states the oxybutynin only improved his LUTS by over 10% at this time.  PVR by bladder scan performed in office today:  11 mL     Rec'd: d/c oxybutynin, start myrbetriq, cont tadalafil 5mg daily.     Interval history 7/27/20:     Tried myrbetriq but didn't help. Having urgency, hesistancy and intermittent slow stream but urgency most bothersome and having some UUI with a few drop, but not that bothersome. Drinks 1 c of coffee in am. Soda during day. 1 c of coffee int he evening. Has never tried stopping these to see if they help. Drinks beer and notices increase in frequency following day.     On tadalafil 5mg daily for ED and bph.  pvr by scan: 32cc  Voiding every 30 minutes a day recently more pronounced at home (has been on vacation).  Takes hctz in am. When he's working he only voids about 2x-4x a day. Drinks caffeine while working including energy drinks. No nocturia.     psa 1/31/20 2.2 (up from 1.2 year prior). Repeat psa 0.85 7/29/20  WINSOME today: 40g + no nodules.    Uroflow 7/30/20: peak flow 12.8mL/s,  avg flow 5.4mL/s, voided vol: 191cc, pvr by scan:0     Cysto trus 8/17/20: normal bladder, no stricture. Membranous and distal prostatic urethra with papillary tissue suspicious for prostatitis.trus volume 42.6g. bc of his urgency on tadalafil, flomax, vesicare found rx cipro for 14d to see if it helped w urgency. D/c'd vesicare. cont'd flomax and tadalafil. rtc to see idalia in 2-3 weeks and me in 2 months to discuss bph procedures    Interval history by idalia 9/9/20:   Pt states he is still having the urinary urgency even since finishing the Cipro antibiotics at this time.  He denies gross hematuria, dysuria.  UA today shows normal findings.  PVR by bladder scan today:  9 mL    Plan: rec'd restarting vesicare    Interval history 10/1/20:   Taking flomax 0.4mg nightly and says flow is intermittent on this.   Also on the vesicare and says it  doesn't help with the urgency. Voiding 2-3x a day and none at night. No incontinence. 3-4x a week c/o urge incontinence if he tries to hold it too long, mostly in the am when he wakes up. Denies any difficulty with walking. No weakness or numbness.   Changed to decaf coffee 1x a day. Changed to sprite and caffeine free coke.   He does not want to take any more meds. He is not happy with the retrograde ejaculation.   Sees dr. leger for Mitral Valve leakage, last saw him a week ago, was told he should f/u in March 2021.    Tadalafil helping with erections     Interval history 11/19/20:   Underwent rezum on 10/21/20. Font flomax, vesicare and tadafil daily for 3 more months.     Returned for fill and pull on 10/26/20. Filled with 210, voided 210  For 2 weeks postop would have blood when he had a BM, improving.     Today (1 month later) he states he has some increased urgency. UUI 2-3x a day (increased from 3-4x a week).  Still taking vesicare but ran out. freq q1 hour (worse in cold weather). Without cold weather freq q 3 hour.   ua today with small wbc and mod blood - some dysuria  No significant change in stream yet  pvr by scan today: 134  AUA ssx:(2 incomplete emptying, 3 frequency, 3 intermittency, 4 urgency, 2 weak stream, 2 straining, 0 sleeping). 16. QOL: 4         Urine history  11/19/20 Small wbc/mod blood-send for ua patricio and culture pvr by scan: 134  10/14/20          Neg  9/16/20            neg  9/9/20              neg  3/31/20            Neg  1/6/20              Neg  11/18/19          neg  8/8/19              No cx, void: n/a 0 rbc  2/21/19            Ng, void: neg, 0 rbc  4/9/18              No cx, void: neg  9/2017             No blood       PSA history: no family hx of prostate cancer  11/19/20  pvr by scan: 134 (couldn't start after he stopped)  10/26/20 Fill and pull: 0cc pvr  11/19/20 rezum  9/9/20              pvr by scan: 9cc  8/17/20            Cysto trus: 42.6g.   7/30/20            peak  flow 12.8mL/s,  avg flow 5.4mL/s, voided vol: 191cc, pvr by scan:0   7/29/20            0.85, %free 54.12  7/27/20            WINSOME: 40+g, pvr by scan: 32cc  1/31/20            2.2   1/6/20              pvr by scan: 11cc  9/3/19              WINSOME:40+gm, pvr by scan: 15cc   2/21/19            1.2  3/14/18            1.0           REVIEW OF SYSTEMS:  General ROS: no fevers, no chills  Psychological ROS: no depression  Endocrine ROS: no heat or cold  Respiratory ROS: no SOB  Cardiovascular ROS: no CP  Gastrointestinal ROS: no abdominal pain, no constipation, no diarrhea, noBRBPR  Musculoskeletal ROS: no muscle pain  Neurological ROS: no headaches  Dermatological ROS: no rashes  HEENT: noglasses, no sinus   ROS: per HPI     PMHx:  Past Medical History:   Diagnosis Date    Acid reflux     Acquired tricuspid valve insufficiency     Anemia     Borderline diabetes     BPH (benign prostatic hyperplasia)     Cataract     1/16/20 rt eye, 1/30/20- Lt eye    Coronary artery disease     Hypertension     Pulmonary nodules     Skin cancer            PSHx:  Past Surgical History:   Procedure Laterality Date    CYSTOSCOPY WITH TRANSURETHRAL DESTRUCTION OF PROSTATE,RFA N/A 10/21/2020    Procedure: CYSTOSCOPY WITH TRANSURETHRAL DESTRUCTION OF PROSTATE,RFA;  Surgeon: La Nena James MD;  Location: [facility] OR;  Service: Urology;  Laterality: N/A;  please make sure [facility] rep available [phone]    EYE SURGERY      for cataracts, rt eye 1/16/20, left eye 1/30/20    HERNIA REPAIR  12/1999    HERNIA REPAIR  05/2004    KNEE ARTHROSCOPY Left 04/12/2018    SKIN LESION EXCISION  10/2009    Neck    SKIN LESION EXCISION  2015    STAPEDECTOMY Right 03/2001    TRANSRECTAL ULTRASOUND EXAMINATION N/A 8/17/2020    Procedure: TRANSRECTAL ULTRASOUND;  Surgeon: La Nena James MD;  Location: [facility] OR;  Service: Urology;  Laterality: N/A;       Stents/Valves/Foreign Bodies/Cardiac Evaluation/Cardiologist:    GI: , last colonoscopy:    Family History   Problem Relation Age of Onset    Cancer Mother         skin    Stroke Mother     Heart failure Father     Kidney disease Father       malignancies: none.   kidney stones: none      Soc Hx:  Social History     Tobacco Use    Smoking status: Former Smoker     Quit date:      Years since quittin.8    Smokeless tobacco: Never Used   Substance Use Topics    Alcohol use: Yes     Frequency: 2-4 times a month     Drinks per session: 1 or 2     Binge frequency: Never     Comment: occasional    Drug use: No     1 ppd x 26 years  Lives in : [place]  :   3 children  Patient's occupation:      Allergies:  Patient has no known allergies.    Anticoagulation/Aspirin: asa 81mg       Objective:     Vitals:    20 0848   BP: 134/72   Pulse: 65       No vitals or exam beyond what is listed below performed due to virtual visit (COVID)  Neuro: awake, alert and oriented  Musculoskeletal: normal range of motion      LABS REVIEW:  Recent Labs   Lab 20  1119 20  0752 10/14/20  0729   WBC 6.37 5.71 8.23   Hemoglobin 14.5 13.8 L 14.7   Hematocrit 43.5 42.3 44.7   Platelets 241 236 221   ]  Recent Labs   Lab 20  0742 20  0742 20  1119 20  0752 10/14/20  0729   Sodium 141 140 143 142 141   Potassium 3.9 3.8 4.1 3.7 3.9   Chloride 105 105 108 104 104   CO2 29 27 25 27 26   BUN 26 H 28 H 30 H 25 H 25 H   Creatinine 1.1 1.3 1.0 1.0 1.1   Glucose 99 100 88 113 H 117 H   Calcium 9.3 9.0 9.4 9.2 9.5   Phosphorus  --   --   --  3.1  --    Alkaline Phosphatase 55 51 L 61  --   --    Total Protein 7.2 7.2 7.5  --   --    Albumin 4.1 4.2 4.1 4.1  --    Total Bilirubin 0.6 0.7 0.4  --   --    AST 28 33 27  --   --    ALT 36 32 31  --   --    ]    Lab Results   Component Value Date    HGBA1C 6.0 2020         Recent Pertinent urologic PATHOLOGY REVIEW: See hpi for recent     none    Recent  Pertinent Urologic RADIOGRAPHIC REVIEW: previous relevant images reviewed See hpi for recent     Mri prostate 6/3/19  1. Suspect a tear in the right hip labrum, anterosuperior quadrant.  Arthrogram could provide additional characterization.  2. Mild right hip degenerative change with a small effusion.  3.  Diffuse bladder wall thickening with differential to include cystitis and chronic outlet obstruction.  4. Prostatomegaly.  Correlate with PSA.    rbus 08/10/2016  Left parapelvic cyst  Right 10 mm  No thick-walled bladder  Enlarged prostate    Assessment:       1. Benign prostatic hyperplasia with urinary obstruction          Plan:     Benign prostatic hyperplasia with urinary obstruction  -     POCT URINE DIPSTICK WITHOUT MICROSCOPE        Increased urgency postop- Continue flomax 0.4mg nightly and vesicare 10mg daily or 3 more months.     Needed a refill of vesicare today    Receiving tadalafil 5mg daily ($39/3 month supply) from [facility] with good rx. Helping with ED. So will plan to continue this.    pvr 134cc today but likely could have emptied more. aua ssx: 16, 4    Send urine today for ua patricio and culture and if +will treat and see if it helps UUI    F/u in 3 months, expect max improvement of urinary sx 3 months post-rezum  -fill out aua ssx at f/u  -check pvr at f/u     At the end of the visit the pt asked again why he needed anesthesia. I reviewed our message from 10/14 where he asked that prior to the procedure where I let him know at this hopsital we do not do them awake due to limitiations at the ASC and in the office currently and he is welcome to find a urologist that might do them awake in the office. He wrote back stating he was ok with me doing the rezum.                 La Nena James MD

## 2020-11-19 NOTE — PATIENT INSTRUCTIONS
Increased urgency postop- Continue flomax 0.4mg nightly and vesicare 10mg daily or 3 more months.     Needed a refill of vesicare today    Receiving tadalafil 5mg daily ($39/3 month supply) from [facility] with good rx. Helping with ED. So will plan to continue this.    pvr 134cc today but likely could have emptied more. aua ssx: 16, 4    Send urine today for ua patricio and culture and if +will treat and see if it helps UUI    F/u in 3 months, expect max improvement of urinary sx 3 months post-rezum  -fill out aua ssx at f/u  -check pvr at f/u

## 2020-11-20 LAB — BACTERIA UR CULT: NO GROWTH

## 2020-12-17 ENCOUNTER — TELEPHONE (OUTPATIENT)
Dept: FAMILY MEDICINE | Facility: CLINIC | Age: 70
End: 2020-12-17

## 2021-01-18 ENCOUNTER — PATIENT MESSAGE (OUTPATIENT)
Dept: FAMILY MEDICINE | Facility: CLINIC | Age: 71
End: 2021-01-18

## 2021-01-18 DIAGNOSIS — R73.9 HYPERGLYCEMIA: ICD-10-CM

## 2021-01-18 DIAGNOSIS — N40.0 BENIGN PROSTATIC HYPERPLASIA WITHOUT URINARY OBSTRUCTION: ICD-10-CM

## 2021-01-18 DIAGNOSIS — E78.2 MIXED HYPERLIPIDEMIA: Primary | ICD-10-CM

## 2021-01-18 DIAGNOSIS — I10 ESSENTIAL HYPERTENSION: ICD-10-CM

## 2021-02-04 ENCOUNTER — PATIENT MESSAGE (OUTPATIENT)
Dept: UROLOGY | Facility: CLINIC | Age: 71
End: 2021-02-04

## 2021-02-04 ENCOUNTER — LAB VISIT (OUTPATIENT)
Dept: LAB | Facility: HOSPITAL | Age: 71
End: 2021-02-04
Attending: NURSE PRACTITIONER
Payer: COMMERCIAL

## 2021-02-04 ENCOUNTER — TELEPHONE (OUTPATIENT)
Dept: UROLOGY | Facility: CLINIC | Age: 71
End: 2021-02-04

## 2021-02-04 DIAGNOSIS — N32.81 OAB (OVERACTIVE BLADDER): Primary | ICD-10-CM

## 2021-02-04 DIAGNOSIS — E78.2 MIXED HYPERLIPIDEMIA: ICD-10-CM

## 2021-02-04 DIAGNOSIS — R73.9 HYPERGLYCEMIA: ICD-10-CM

## 2021-02-04 DIAGNOSIS — R97.20 ELEVATED PSA: ICD-10-CM

## 2021-02-04 DIAGNOSIS — N40.0 BENIGN PROSTATIC HYPERPLASIA WITHOUT URINARY OBSTRUCTION: ICD-10-CM

## 2021-02-04 DIAGNOSIS — I10 ESSENTIAL HYPERTENSION: ICD-10-CM

## 2021-02-04 LAB
ALBUMIN SERPL BCP-MCNC: 4.2 G/DL (ref 3.5–5.2)
ALBUMIN/CREAT UR: 9.6 UG/MG (ref 0–30)
ALP SERPL-CCNC: 58 U/L (ref 55–135)
ALT SERPL W/O P-5'-P-CCNC: 31 U/L (ref 10–44)
ANION GAP SERPL CALC-SCNC: 11 MMOL/L (ref 8–16)
AST SERPL-CCNC: 27 U/L (ref 10–40)
BASOPHILS # BLD AUTO: 0.07 K/UL (ref 0–0.2)
BASOPHILS NFR BLD: 1.1 % (ref 0–1.9)
BILIRUB SERPL-MCNC: 0.9 MG/DL (ref 0.1–1)
BUN SERPL-MCNC: 29 MG/DL (ref 8–23)
CALCIUM SERPL-MCNC: 9.5 MG/DL (ref 8.7–10.5)
CHLORIDE SERPL-SCNC: 102 MMOL/L (ref 95–110)
CHOLEST SERPL-MCNC: 164 MG/DL (ref 120–199)
CHOLEST/HDLC SERPL: 3.6 {RATIO} (ref 2–5)
CO2 SERPL-SCNC: 27 MMOL/L (ref 23–29)
COMPLEXED PSA SERPL-MCNC: 4.6 NG/ML (ref 0–4)
CREAT SERPL-MCNC: 1.3 MG/DL (ref 0.5–1.4)
CREAT UR-MCNC: 121 MG/DL (ref 23–375)
DIFFERENTIAL METHOD: NORMAL
EOSINOPHIL # BLD AUTO: 0.2 K/UL (ref 0–0.5)
EOSINOPHIL NFR BLD: 2.4 % (ref 0–8)
ERYTHROCYTE [DISTWIDTH] IN BLOOD BY AUTOMATED COUNT: 13 % (ref 11.5–14.5)
EST. GFR  (AFRICAN AMERICAN): >60 ML/MIN/1.73 M^2
EST. GFR  (NON AFRICAN AMERICAN): 54.9 ML/MIN/1.73 M^2
ESTIMATED AVG GLUCOSE: 120 MG/DL (ref 68–131)
GLUCOSE SERPL-MCNC: 115 MG/DL (ref 70–110)
HBA1C MFR BLD: 5.8 % (ref 4.5–6.2)
HCT VFR BLD AUTO: 45.4 % (ref 40–54)
HDLC SERPL-MCNC: 45 MG/DL (ref 40–75)
HDLC SERPL: 27.4 % (ref 20–50)
HGB BLD-MCNC: 15 G/DL (ref 14–18)
IMM GRANULOCYTES # BLD AUTO: 0.03 K/UL (ref 0–0.04)
IMM GRANULOCYTES NFR BLD AUTO: 0.5 % (ref 0–0.5)
LDLC SERPL CALC-MCNC: 107.4 MG/DL (ref 63–159)
LYMPHOCYTES # BLD AUTO: 1.6 K/UL (ref 1–4.8)
LYMPHOCYTES NFR BLD: 24.4 % (ref 18–48)
MCH RBC QN AUTO: 30.7 PG (ref 27–31)
MCHC RBC AUTO-ENTMCNC: 33 G/DL (ref 32–36)
MCV RBC AUTO: 93 FL (ref 82–98)
MICROALBUMIN UR DL<=1MG/L-MCNC: 11.6 UG/ML
MONOCYTES # BLD AUTO: 0.7 K/UL (ref 0.3–1)
MONOCYTES NFR BLD: 10.1 % (ref 4–15)
NEUTROPHILS # BLD AUTO: 4.1 K/UL (ref 1.8–7.7)
NEUTROPHILS NFR BLD: 61.5 % (ref 38–73)
NONHDLC SERPL-MCNC: 119 MG/DL
NRBC BLD-RTO: 0 /100 WBC
PLATELET # BLD AUTO: 216 K/UL (ref 150–350)
PMV BLD AUTO: 10.6 FL (ref 9.2–12.9)
POTASSIUM SERPL-SCNC: 3.6 MMOL/L (ref 3.5–5.1)
PROT SERPL-MCNC: 7.2 G/DL (ref 6–8.4)
RBC # BLD AUTO: 4.89 M/UL (ref 4.6–6.2)
SODIUM SERPL-SCNC: 140 MMOL/L (ref 136–145)
T4 FREE SERPL-MCNC: 0.75 NG/DL (ref 0.71–1.51)
TRIGL SERPL-MCNC: 58 MG/DL (ref 30–150)
TSH SERPL DL<=0.005 MIU/L-ACNC: 2.65 UIU/ML (ref 0.34–5.6)
WBC # BLD AUTO: 6.61 K/UL (ref 3.9–12.7)

## 2021-02-04 PROCEDURE — 84439 ASSAY OF FREE THYROXINE: CPT

## 2021-02-04 PROCEDURE — 82570 ASSAY OF URINE CREATININE: CPT

## 2021-02-04 PROCEDURE — 84443 ASSAY THYROID STIM HORMONE: CPT

## 2021-02-04 PROCEDURE — 36415 COLL VENOUS BLD VENIPUNCTURE: CPT

## 2021-02-04 PROCEDURE — 84153 ASSAY OF PSA TOTAL: CPT

## 2021-02-04 PROCEDURE — 80061 LIPID PANEL: CPT

## 2021-02-04 PROCEDURE — 80053 COMPREHEN METABOLIC PANEL: CPT

## 2021-02-04 PROCEDURE — 83036 HEMOGLOBIN GLYCOSYLATED A1C: CPT

## 2021-02-04 PROCEDURE — 85025 COMPLETE CBC W/AUTO DIFF WBC: CPT

## 2021-02-04 PROCEDURE — 82043 UR ALBUMIN QUANTITATIVE: CPT

## 2021-02-05 ENCOUNTER — PATIENT MESSAGE (OUTPATIENT)
Dept: UROLOGY | Facility: CLINIC | Age: 71
End: 2021-02-05

## 2021-02-11 ENCOUNTER — TELEPHONE (OUTPATIENT)
Dept: FAMILY MEDICINE | Facility: CLINIC | Age: 71
End: 2021-02-11

## 2021-02-11 DIAGNOSIS — E78.2 MIXED HYPERLIPIDEMIA: ICD-10-CM

## 2021-02-11 DIAGNOSIS — R73.9 HYPERGLYCEMIA: ICD-10-CM

## 2021-02-11 DIAGNOSIS — I10 ESSENTIAL HYPERTENSION: Primary | ICD-10-CM

## 2021-02-15 ENCOUNTER — OFFICE VISIT (OUTPATIENT)
Dept: FAMILY MEDICINE | Facility: CLINIC | Age: 71
End: 2021-02-15
Payer: COMMERCIAL

## 2021-02-15 ENCOUNTER — PATIENT MESSAGE (OUTPATIENT)
Dept: FAMILY MEDICINE | Facility: CLINIC | Age: 71
End: 2021-02-15

## 2021-02-15 VITALS
WEIGHT: 167.19 LBS | SYSTOLIC BLOOD PRESSURE: 134 MMHG | TEMPERATURE: 97 F | HEIGHT: 70 IN | DIASTOLIC BLOOD PRESSURE: 78 MMHG | BODY MASS INDEX: 23.94 KG/M2 | HEART RATE: 58 BPM | OXYGEN SATURATION: 97 %

## 2021-02-15 DIAGNOSIS — K21.9 GASTROESOPHAGEAL REFLUX DISEASE WITHOUT ESOPHAGITIS: ICD-10-CM

## 2021-02-15 DIAGNOSIS — Z00.00 ROUTINE HEALTH MAINTENANCE: Primary | ICD-10-CM

## 2021-02-15 DIAGNOSIS — I10 ESSENTIAL HYPERTENSION: ICD-10-CM

## 2021-02-15 PROCEDURE — 99397 PR PREVENTIVE VISIT,EST,65 & OVER: ICD-10-PCS | Mod: S$GLB,,, | Performed by: NURSE PRACTITIONER

## 2021-02-15 PROCEDURE — 3075F SYST BP GE 130 - 139MM HG: CPT | Mod: S$GLB,,, | Performed by: NURSE PRACTITIONER

## 2021-02-15 PROCEDURE — 3288F FALL RISK ASSESSMENT DOCD: CPT | Mod: S$GLB,,, | Performed by: NURSE PRACTITIONER

## 2021-02-15 PROCEDURE — 3078F PR MOST RECENT DIASTOLIC BLOOD PRESSURE < 80 MM HG: ICD-10-PCS | Mod: S$GLB,,, | Performed by: NURSE PRACTITIONER

## 2021-02-15 PROCEDURE — 1101F PR PT FALLS ASSESS DOC 0-1 FALLS W/OUT INJ PAST YR: ICD-10-PCS | Mod: S$GLB,,, | Performed by: NURSE PRACTITIONER

## 2021-02-15 PROCEDURE — 3288F PR FALLS RISK ASSESSMENT DOCUMENTED: ICD-10-PCS | Mod: S$GLB,,, | Performed by: NURSE PRACTITIONER

## 2021-02-15 PROCEDURE — 1101F PT FALLS ASSESS-DOCD LE1/YR: CPT | Mod: S$GLB,,, | Performed by: NURSE PRACTITIONER

## 2021-02-15 PROCEDURE — 3008F PR BODY MASS INDEX (BMI) DOCUMENTED: ICD-10-PCS | Mod: S$GLB,,, | Performed by: NURSE PRACTITIONER

## 2021-02-15 PROCEDURE — 3008F BODY MASS INDEX DOCD: CPT | Mod: S$GLB,,, | Performed by: NURSE PRACTITIONER

## 2021-02-15 PROCEDURE — 3075F PR MOST RECENT SYSTOLIC BLOOD PRESS GE 130-139MM HG: ICD-10-PCS | Mod: S$GLB,,, | Performed by: NURSE PRACTITIONER

## 2021-02-15 PROCEDURE — 99397 PER PM REEVAL EST PAT 65+ YR: CPT | Mod: S$GLB,,, | Performed by: NURSE PRACTITIONER

## 2021-02-15 PROCEDURE — 3078F DIAST BP <80 MM HG: CPT | Mod: S$GLB,,, | Performed by: NURSE PRACTITIONER

## 2021-02-15 RX ORDER — OMEPRAZOLE 20 MG/1
20 CAPSULE, DELAYED RELEASE ORAL DAILY
Qty: 90 CAPSULE | Refills: 1 | Status: SHIPPED | OUTPATIENT
Start: 2021-02-15 | End: 2021-08-02

## 2021-02-15 RX ORDER — LISINOPRIL 40 MG/1
40 TABLET ORAL DAILY
Qty: 90 TABLET | Refills: 3 | Status: SHIPPED | OUTPATIENT
Start: 2021-02-15 | End: 2021-03-23

## 2021-03-02 ENCOUNTER — PATIENT MESSAGE (OUTPATIENT)
Dept: UROLOGY | Facility: CLINIC | Age: 71
End: 2021-03-02

## 2021-03-02 ENCOUNTER — LAB VISIT (OUTPATIENT)
Dept: LAB | Facility: HOSPITAL | Age: 71
End: 2021-03-02
Attending: UROLOGY
Payer: COMMERCIAL

## 2021-03-02 DIAGNOSIS — R97.20 ELEVATED PSA: ICD-10-CM

## 2021-03-02 LAB
PROSTATE SPECIFIC ANTIGEN, TOTAL: 2.3 NG/ML (ref 0–4)
PSA FREE MFR SERPL: 20.87 %
PSA FREE SERPL-MCNC: 0.48 NG/ML (ref 0.01–1.5)

## 2021-03-02 PROCEDURE — 84153 ASSAY OF PSA TOTAL: CPT

## 2021-03-02 PROCEDURE — 36415 COLL VENOUS BLD VENIPUNCTURE: CPT

## 2021-03-02 PROCEDURE — 84154 ASSAY OF PSA FREE: CPT

## 2021-03-03 ENCOUNTER — PATIENT MESSAGE (OUTPATIENT)
Dept: UROLOGY | Facility: CLINIC | Age: 71
End: 2021-03-03

## 2021-03-09 ENCOUNTER — OFFICE VISIT (OUTPATIENT)
Dept: UROLOGY | Facility: CLINIC | Age: 71
End: 2021-03-09
Payer: COMMERCIAL

## 2021-03-09 VITALS
HEIGHT: 70 IN | HEART RATE: 56 BPM | BODY MASS INDEX: 24.03 KG/M2 | DIASTOLIC BLOOD PRESSURE: 74 MMHG | SYSTOLIC BLOOD PRESSURE: 162 MMHG | WEIGHT: 167.88 LBS

## 2021-03-09 DIAGNOSIS — N40.1 BENIGN PROSTATIC HYPERPLASIA WITH URINARY OBSTRUCTION: Primary | ICD-10-CM

## 2021-03-09 DIAGNOSIS — N52.9 ERECTILE DYSFUNCTION, UNSPECIFIED ERECTILE DYSFUNCTION TYPE: ICD-10-CM

## 2021-03-09 DIAGNOSIS — N39.41 URGE INCONTINENCE: ICD-10-CM

## 2021-03-09 DIAGNOSIS — N13.8 BENIGN PROSTATIC HYPERPLASIA WITH URINARY OBSTRUCTION: Primary | ICD-10-CM

## 2021-03-09 LAB
BILIRUB SERPL-MCNC: NORMAL MG/DL
BLOOD URINE, POC: NORMAL
CLARITY, POC UA: NORMAL
COLOR, POC UA: YELLOW
GLUCOSE UR QL STRIP: NORMAL
KETONES UR QL STRIP: NORMAL
LEUKOCYTE ESTERASE URINE, POC: NORMAL
NITRITE, POC UA: NORMAL
PH, POC UA: 7.5
POC RESIDUAL URINE VOLUME: 35 ML (ref 0–100)
PROTEIN, POC: NORMAL
SPECIFIC GRAVITY, POC UA: 1.03
UROBILINOGEN, POC UA: NORMAL

## 2021-03-09 PROCEDURE — 51798 POCT BLADDER SCAN: ICD-10-PCS | Mod: S$GLB,,, | Performed by: UROLOGY

## 2021-03-09 PROCEDURE — 81002 POCT URINE DIPSTICK WITHOUT MICROSCOPE: ICD-10-PCS | Mod: S$GLB,,, | Performed by: UROLOGY

## 2021-03-09 PROCEDURE — 3078F DIAST BP <80 MM HG: CPT | Mod: CPTII,S$GLB,, | Performed by: UROLOGY

## 2021-03-09 PROCEDURE — 3078F PR MOST RECENT DIASTOLIC BLOOD PRESSURE < 80 MM HG: ICD-10-PCS | Mod: CPTII,S$GLB,, | Performed by: UROLOGY

## 2021-03-09 PROCEDURE — 3077F SYST BP >= 140 MM HG: CPT | Mod: CPTII,S$GLB,, | Performed by: UROLOGY

## 2021-03-09 PROCEDURE — 1159F PR MEDICATION LIST DOCUMENTED IN MEDICAL RECORD: ICD-10-PCS | Mod: S$GLB,,, | Performed by: UROLOGY

## 2021-03-09 PROCEDURE — 81002 URINALYSIS NONAUTO W/O SCOPE: CPT | Mod: S$GLB,,, | Performed by: UROLOGY

## 2021-03-09 PROCEDURE — 99214 PR OFFICE/OUTPT VISIT, EST, LEVL IV, 30-39 MIN: ICD-10-PCS | Mod: 25,S$GLB,, | Performed by: UROLOGY

## 2021-03-09 PROCEDURE — 1159F MED LIST DOCD IN RCRD: CPT | Mod: S$GLB,,, | Performed by: UROLOGY

## 2021-03-09 PROCEDURE — 51798 US URINE CAPACITY MEASURE: CPT | Mod: S$GLB,,, | Performed by: UROLOGY

## 2021-03-09 PROCEDURE — 3008F PR BODY MASS INDEX (BMI) DOCUMENTED: ICD-10-PCS | Mod: CPTII,S$GLB,, | Performed by: UROLOGY

## 2021-03-09 PROCEDURE — 3077F PR MOST RECENT SYSTOLIC BLOOD PRESSURE >= 140 MM HG: ICD-10-PCS | Mod: CPTII,S$GLB,, | Performed by: UROLOGY

## 2021-03-09 PROCEDURE — 99214 OFFICE O/P EST MOD 30 MIN: CPT | Mod: 25,S$GLB,, | Performed by: UROLOGY

## 2021-03-09 PROCEDURE — 99999 PR PBB SHADOW E&M-EST. PATIENT-LVL III: ICD-10-PCS | Mod: PBBFAC,,, | Performed by: UROLOGY

## 2021-03-09 PROCEDURE — 3008F BODY MASS INDEX DOCD: CPT | Mod: CPTII,S$GLB,, | Performed by: UROLOGY

## 2021-03-09 PROCEDURE — 99999 PR PBB SHADOW E&M-EST. PATIENT-LVL III: CPT | Mod: PBBFAC,,, | Performed by: UROLOGY

## 2021-03-16 ENCOUNTER — HOSPITAL ENCOUNTER (OUTPATIENT)
Dept: CARDIOLOGY | Facility: CLINIC | Age: 71
Discharge: HOME | End: 2021-03-16
Attending: INTERNAL MEDICINE
Payer: COMMERCIAL

## 2021-03-16 VITALS — HEIGHT: 70 IN | WEIGHT: 167 LBS | BODY MASS INDEX: 23.91 KG/M2

## 2021-03-16 DIAGNOSIS — I34.0 NONRHEUMATIC MITRAL VALVE REGURGITATION: ICD-10-CM

## 2021-03-16 PROCEDURE — 93306 TTE W/DOPPLER COMPLETE: CPT | Mod: S$GLB,,, | Performed by: INTERNAL MEDICINE

## 2021-03-16 PROCEDURE — 93306 ECHO (CUPID ONLY): ICD-10-PCS | Mod: S$GLB,,, | Performed by: INTERNAL MEDICINE

## 2021-03-17 ENCOUNTER — PATIENT MESSAGE (OUTPATIENT)
Dept: CARDIOLOGY | Facility: CLINIC | Age: 71
End: 2021-03-17

## 2021-03-18 ENCOUNTER — LAB VISIT (OUTPATIENT)
Dept: LAB | Facility: HOSPITAL | Age: 71
End: 2021-03-18
Attending: NURSE PRACTITIONER
Payer: COMMERCIAL

## 2021-03-18 ENCOUNTER — TELEPHONE (OUTPATIENT)
Dept: FAMILY MEDICINE | Facility: CLINIC | Age: 71
End: 2021-03-18

## 2021-03-18 ENCOUNTER — CLINICAL SUPPORT (OUTPATIENT)
Dept: UROLOGY | Facility: CLINIC | Age: 71
End: 2021-03-18
Payer: COMMERCIAL

## 2021-03-18 DIAGNOSIS — N40.1 BPH WITH URINARY OBSTRUCTION: Primary | ICD-10-CM

## 2021-03-18 DIAGNOSIS — Z01.812 PRE-OPERATIVE LABORATORY EXAMINATION: Primary | ICD-10-CM

## 2021-03-18 DIAGNOSIS — N13.8 BPH WITH URINARY OBSTRUCTION: Primary | ICD-10-CM

## 2021-03-18 LAB
ALBUMIN SERPL BCP-MCNC: 4.5 G/DL (ref 3.5–5.2)
ALP SERPL-CCNC: 63 U/L (ref 55–135)
ALT SERPL W/O P-5'-P-CCNC: 30 U/L (ref 10–44)
ANION GAP SERPL CALC-SCNC: 9 MMOL/L (ref 8–16)
AST SERPL-CCNC: 26 U/L (ref 10–40)
BASOPHILS # BLD AUTO: 0.04 K/UL (ref 0–0.2)
BASOPHILS NFR BLD: 0.6 % (ref 0–1.9)
BILIRUB SERPL-MCNC: 1.1 MG/DL (ref 0.1–1)
BUN SERPL-MCNC: 24 MG/DL (ref 8–23)
CALCIUM SERPL-MCNC: 9.3 MG/DL (ref 8.7–10.5)
CHLORIDE SERPL-SCNC: 101 MMOL/L (ref 95–110)
CO2 SERPL-SCNC: 29 MMOL/L (ref 23–29)
CREAT SERPL-MCNC: 1.1 MG/DL (ref 0.5–1.4)
DIFFERENTIAL METHOD: ABNORMAL
EOSINOPHIL # BLD AUTO: 0 K/UL (ref 0–0.5)
EOSINOPHIL NFR BLD: 0.6 % (ref 0–8)
ERYTHROCYTE [DISTWIDTH] IN BLOOD BY AUTOMATED COUNT: 13.2 % (ref 11.5–14.5)
EST. GFR  (AFRICAN AMERICAN): >60 ML/MIN/1.73 M^2
EST. GFR  (NON AFRICAN AMERICAN): >60 ML/MIN/1.73 M^2
GLUCOSE SERPL-MCNC: 97 MG/DL (ref 70–110)
HCT VFR BLD AUTO: 48.1 % (ref 40–54)
HGB BLD-MCNC: 15.8 G/DL (ref 14–18)
IMM GRANULOCYTES # BLD AUTO: 0.01 K/UL (ref 0–0.04)
IMM GRANULOCYTES NFR BLD AUTO: 0.1 % (ref 0–0.5)
LYMPHOCYTES # BLD AUTO: 1.1 K/UL (ref 1–4.8)
LYMPHOCYTES NFR BLD: 15 % (ref 18–48)
MCH RBC QN AUTO: 30.4 PG (ref 27–31)
MCHC RBC AUTO-ENTMCNC: 32.8 G/DL (ref 32–36)
MCV RBC AUTO: 93 FL (ref 82–98)
MONOCYTES # BLD AUTO: 0.6 K/UL (ref 0.3–1)
MONOCYTES NFR BLD: 8.9 % (ref 4–15)
NEUTROPHILS # BLD AUTO: 5.4 K/UL (ref 1.8–7.7)
NEUTROPHILS NFR BLD: 74.8 % (ref 38–73)
NRBC BLD-RTO: 0 /100 WBC
PLATELET # BLD AUTO: 221 K/UL (ref 150–350)
PMV BLD AUTO: 10.3 FL (ref 9.2–12.9)
POTASSIUM SERPL-SCNC: 3.6 MMOL/L (ref 3.5–5.1)
PROT SERPL-MCNC: 8.1 G/DL (ref 6–8.4)
RBC # BLD AUTO: 5.2 M/UL (ref 4.6–6.2)
SODIUM SERPL-SCNC: 139 MMOL/L (ref 136–145)
WBC # BLD AUTO: 7.22 K/UL (ref 3.9–12.7)

## 2021-03-18 PROCEDURE — 51741 PR UROFLOWMETRY, COMPLEX: ICD-10-PCS | Mod: S$GLB,,, | Performed by: UROLOGY

## 2021-03-18 PROCEDURE — 87389 HIV-1 AG W/HIV-1&-2 AB AG IA: CPT | Performed by: NURSE PRACTITIONER

## 2021-03-18 PROCEDURE — 86769 SARS-COV-2 COVID-19 ANTIBODY: CPT | Performed by: NURSE PRACTITIONER

## 2021-03-18 PROCEDURE — 99499 UNLISTED E&M SERVICE: CPT | Mod: S$GLB,,, | Performed by: UROLOGY

## 2021-03-18 PROCEDURE — 80074 ACUTE HEPATITIS PANEL: CPT | Performed by: NURSE PRACTITIONER

## 2021-03-18 PROCEDURE — 51741 ELECTRO-UROFLOWMETRY FIRST: CPT | Mod: S$GLB,,, | Performed by: UROLOGY

## 2021-03-18 PROCEDURE — 99499 NO LOS: ICD-10-PCS | Mod: S$GLB,,, | Performed by: UROLOGY

## 2021-03-18 PROCEDURE — 36415 COLL VENOUS BLD VENIPUNCTURE: CPT | Performed by: NURSE PRACTITIONER

## 2021-03-18 PROCEDURE — 80053 COMPREHEN METABOLIC PANEL: CPT | Performed by: NURSE PRACTITIONER

## 2021-03-18 PROCEDURE — 85025 COMPLETE CBC W/AUTO DIFF WBC: CPT | Performed by: NURSE PRACTITIONER

## 2021-03-19 LAB
HAV IGM SERPL QL IA: NEGATIVE
HBV CORE IGM SERPL QL IA: NEGATIVE
HBV SURFACE AG SERPL QL IA: NEGATIVE
HCV AB S/CO SERPL IA: <0.1 S/CO RATIO (ref 0–0.9)
HIV 1+2 AB+HIV1 P24 AG SERPL QL IA: NON REACTIVE
SARS-COV-2 IGG SERPLBLD QL IA.RAPID: NEGATIVE

## 2021-03-31 ENCOUNTER — LAB VISIT (OUTPATIENT)
Dept: LAB | Facility: HOSPITAL | Age: 71
End: 2021-03-31
Attending: INTERNAL MEDICINE
Payer: COMMERCIAL

## 2021-03-31 DIAGNOSIS — N18.2 CHRONIC KIDNEY DISEASE, STAGE II (MILD): Primary | ICD-10-CM

## 2021-03-31 DIAGNOSIS — D64.9 ANEMIA, UNSPECIFIED: ICD-10-CM

## 2021-03-31 LAB
ALBUMIN SERPL BCP-MCNC: 4 G/DL (ref 3.5–5.2)
ANION GAP SERPL CALC-SCNC: 10 MMOL/L (ref 8–16)
BASOPHILS # BLD AUTO: 0.08 K/UL (ref 0–0.2)
BASOPHILS NFR BLD: 1.2 % (ref 0–1.9)
BUN SERPL-MCNC: 30 MG/DL (ref 8–23)
CALCIUM SERPL-MCNC: 9.1 MG/DL (ref 8.7–10.5)
CHLORIDE SERPL-SCNC: 103 MMOL/L (ref 95–110)
CO2 SERPL-SCNC: 27 MMOL/L (ref 23–29)
CREAT SERPL-MCNC: 1.1 MG/DL (ref 0.5–1.4)
CREAT UR-MCNC: 129 MG/DL (ref 23–375)
DIFFERENTIAL METHOD: NORMAL
EOSINOPHIL # BLD AUTO: 0.2 K/UL (ref 0–0.5)
EOSINOPHIL NFR BLD: 3 % (ref 0–8)
ERYTHROCYTE [DISTWIDTH] IN BLOOD BY AUTOMATED COUNT: 13.2 % (ref 11.5–14.5)
EST. GFR  (AFRICAN AMERICAN): >60 ML/MIN/1.73 M^2
EST. GFR  (NON AFRICAN AMERICAN): >60 ML/MIN/1.73 M^2
FERRITIN SERPL-MCNC: 125 NG/ML (ref 20–300)
GLUCOSE SERPL-MCNC: 104 MG/DL (ref 70–110)
HCT VFR BLD AUTO: 43.7 % (ref 40–54)
HGB BLD-MCNC: 14.4 G/DL (ref 14–18)
IMM GRANULOCYTES # BLD AUTO: 0.03 K/UL (ref 0–0.04)
IMM GRANULOCYTES NFR BLD AUTO: 0.5 % (ref 0–0.5)
IRON SERPL-MCNC: 53 UG/DL (ref 45–160)
LYMPHOCYTES # BLD AUTO: 1.9 K/UL (ref 1–4.8)
LYMPHOCYTES NFR BLD: 28.7 % (ref 18–48)
MCH RBC QN AUTO: 30.6 PG (ref 27–31)
MCHC RBC AUTO-ENTMCNC: 33 G/DL (ref 32–36)
MCV RBC AUTO: 93 FL (ref 82–98)
MONOCYTES # BLD AUTO: 0.7 K/UL (ref 0.3–1)
MONOCYTES NFR BLD: 10.9 % (ref 4–15)
NEUTROPHILS # BLD AUTO: 3.7 K/UL (ref 1.8–7.7)
NEUTROPHILS NFR BLD: 55.7 % (ref 38–73)
NRBC BLD-RTO: 0 /100 WBC
PHOSPHATE SERPL-MCNC: 3.1 MG/DL (ref 2.7–4.5)
PLATELET # BLD AUTO: 221 K/UL (ref 150–450)
PMV BLD AUTO: 10.7 FL (ref 9.2–12.9)
POTASSIUM SERPL-SCNC: 3.7 MMOL/L (ref 3.5–5.1)
PROT UR-MCNC: 12 MG/DL (ref 6–15)
PROT/CREAT UR: 0.09 MG/G{CREAT} (ref 0–0.2)
RBC # BLD AUTO: 4.7 M/UL (ref 4.6–6.2)
SATURATED IRON: 17 % (ref 20–50)
SODIUM SERPL-SCNC: 140 MMOL/L (ref 136–145)
TOTAL IRON BINDING CAPACITY: 318 UG/DL (ref 250–450)
TRANSFERRIN SERPL-MCNC: 227 MG/DL (ref 200–375)
WBC # BLD AUTO: 6.59 K/UL (ref 3.9–12.7)

## 2021-03-31 PROCEDURE — 84156 ASSAY OF PROTEIN URINE: CPT | Performed by: INTERNAL MEDICINE

## 2021-03-31 PROCEDURE — 36415 COLL VENOUS BLD VENIPUNCTURE: CPT | Performed by: INTERNAL MEDICINE

## 2021-03-31 PROCEDURE — 83540 ASSAY OF IRON: CPT | Performed by: INTERNAL MEDICINE

## 2021-03-31 PROCEDURE — 82728 ASSAY OF FERRITIN: CPT | Performed by: INTERNAL MEDICINE

## 2021-03-31 PROCEDURE — 85025 COMPLETE CBC W/AUTO DIFF WBC: CPT | Performed by: INTERNAL MEDICINE

## 2021-03-31 PROCEDURE — 80069 RENAL FUNCTION PANEL: CPT | Performed by: INTERNAL MEDICINE

## 2021-04-09 ENCOUNTER — OFFICE VISIT (OUTPATIENT)
Dept: UROLOGY | Facility: CLINIC | Age: 71
End: 2021-04-09
Payer: COMMERCIAL

## 2021-04-09 VITALS — SYSTOLIC BLOOD PRESSURE: 160 MMHG | TEMPERATURE: 98 F | HEART RATE: 57 BPM | DIASTOLIC BLOOD PRESSURE: 75 MMHG

## 2021-04-09 DIAGNOSIS — N40.1 BPH WITH URINARY OBSTRUCTION: ICD-10-CM

## 2021-04-09 DIAGNOSIS — N32.81 OAB (OVERACTIVE BLADDER): ICD-10-CM

## 2021-04-09 DIAGNOSIS — N39.41 URGE INCONTINENCE: Primary | ICD-10-CM

## 2021-04-09 DIAGNOSIS — N13.8 BPH WITH URINARY OBSTRUCTION: ICD-10-CM

## 2021-04-09 LAB
BILIRUB SERPL-MCNC: NEGATIVE MG/DL
BLOOD URINE, POC: NEGATIVE
CLARITY, POC UA: CLEAR
COLOR, POC UA: YELLOW
GLUCOSE UR QL STRIP: NEGATIVE
KETONES UR QL STRIP: NEGATIVE
LEUKOCYTE ESTERASE URINE, POC: NEGATIVE
NITRITE, POC UA: NEGATIVE
PH, POC UA: 7
POC RESIDUAL URINE VOLUME: 17 ML (ref 0–100)
PROTEIN, POC: NEGATIVE
SPECIFIC GRAVITY, POC UA: 1.02
UROBILINOGEN, POC UA: 0.2

## 2021-04-09 PROCEDURE — 1126F AMNT PAIN NOTED NONE PRSNT: CPT | Mod: S$GLB,,, | Performed by: NURSE PRACTITIONER

## 2021-04-09 PROCEDURE — 51798 US URINE CAPACITY MEASURE: CPT | Mod: S$GLB,,, | Performed by: NURSE PRACTITIONER

## 2021-04-09 PROCEDURE — 1126F PR PAIN SEVERITY QUANTIFIED, NO PAIN PRESENT: ICD-10-PCS | Mod: S$GLB,,, | Performed by: NURSE PRACTITIONER

## 2021-04-09 PROCEDURE — 1159F MED LIST DOCD IN RCRD: CPT | Mod: S$GLB,,, | Performed by: NURSE PRACTITIONER

## 2021-04-09 PROCEDURE — 81002 URINALYSIS NONAUTO W/O SCOPE: CPT | Mod: S$GLB,,, | Performed by: NURSE PRACTITIONER

## 2021-04-09 PROCEDURE — 99999 PR PBB SHADOW E&M-EST. PATIENT-LVL IV: CPT | Mod: PBBFAC,,, | Performed by: NURSE PRACTITIONER

## 2021-04-09 PROCEDURE — 99999 PR PBB SHADOW E&M-EST. PATIENT-LVL IV: ICD-10-PCS | Mod: PBBFAC,,, | Performed by: NURSE PRACTITIONER

## 2021-04-09 PROCEDURE — 99214 PR OFFICE/OUTPT VISIT, EST, LEVL IV, 30-39 MIN: ICD-10-PCS | Mod: 25,S$GLB,, | Performed by: NURSE PRACTITIONER

## 2021-04-09 PROCEDURE — 99214 OFFICE O/P EST MOD 30 MIN: CPT | Mod: 25,S$GLB,, | Performed by: NURSE PRACTITIONER

## 2021-04-09 PROCEDURE — 1159F PR MEDICATION LIST DOCUMENTED IN MEDICAL RECORD: ICD-10-PCS | Mod: S$GLB,,, | Performed by: NURSE PRACTITIONER

## 2021-04-09 PROCEDURE — 81002 POCT URINE DIPSTICK WITHOUT MICROSCOPE: ICD-10-PCS | Mod: S$GLB,,, | Performed by: NURSE PRACTITIONER

## 2021-04-09 PROCEDURE — 51798 POCT BLADDER SCAN: ICD-10-PCS | Mod: S$GLB,,, | Performed by: NURSE PRACTITIONER

## 2021-04-09 RX ORDER — MIRABEGRON 50 MG/1
50 TABLET, FILM COATED, EXTENDED RELEASE ORAL DAILY
Qty: 30 TABLET | Refills: 11 | Status: SHIPPED | OUTPATIENT
Start: 2021-04-09 | End: 2021-04-09 | Stop reason: ALTCHOICE

## 2021-04-09 RX ORDER — MIRABEGRON 50 MG/1
50 TABLET, FILM COATED, EXTENDED RELEASE ORAL DAILY
Qty: 90 TABLET | Refills: 3 | Status: SHIPPED | OUTPATIENT
Start: 2021-04-09 | End: 2022-05-17

## 2021-04-19 LAB
AORTIC ROOT ANNULUS: 3.8 CM
AORTIC VALVE CUSP SEPERATION: 2.2 CM
AV INDEX (PROSTH): 0.81
AV MEAN GRADIENT: 3 MMHG
AV PEAK GRADIENT: 5 MMHG
AV VALVE AREA: 3.08 CM2
AV VELOCITY RATIO: 0.85
BSA FOR ECHO PROCEDURE: 1.93 M2
CV ECHO LV RWT: 0.56 CM
DOP CALC AO PEAK VEL: 1.17 M/S
DOP CALC AO VTI: 28.1 CM
DOP CALC LVOT AREA: 3.8 CM2
DOP CALC LVOT DIAMETER: 2.2 CM
DOP CALC LVOT PEAK VEL: 1 M/S
DOP CALC LVOT STROKE VOLUME: 86.63 CM3
DOP CALCLVOT PEAK VEL VTI: 22.8 CM
E WAVE DECELERATION TIME: 174 MS
E/A RATIO: 0.94
E/E' RATIO: 10 M/S
ECHO LV POSTERIOR WALL: 1.27 CM (ref 0.6–1.1)
FRACTIONAL SHORTENING: 37 % (ref 28–44)
INTERVENTRICULAR SEPTUM: 1.64 CM (ref 0.6–1.1)
IVRT: 111 MS
LA MAJOR: 4 CM
LEFT ATRIUM SIZE: 3.5 CM
LEFT INTERNAL DIMENSION IN SYSTOLE: 2.82 CM (ref 2.1–4)
LEFT VENTRICLE MASS INDEX: 137 G/M2
LEFT VENTRICULAR INTERNAL DIMENSION IN DIASTOLE: 4.51 CM (ref 3.5–6)
LEFT VENTRICULAR MASS: 264.16 G
LV LATERAL E/E' RATIO: 7.5 M/S
LV SEPTAL E/E' RATIO: 15 M/S
MV PEAK A VEL: 0.8 M/S
MV PEAK E VEL: 0.75 M/S
RA PRESSURE: 3 MMHG
RIGHT VENTRICULAR END-DIASTOLIC DIMENSION: 2.49 CM
TDI LATERAL: 0.1 M/S
TDI SEPTAL: 0.05 M/S
TDI: 0.08 M/S

## 2021-04-26 ENCOUNTER — OFFICE VISIT (OUTPATIENT)
Dept: INTERNAL MEDICINE CLINIC | Age: 71
End: 2021-04-26
Payer: MEDICARE

## 2021-04-26 VITALS
BODY MASS INDEX: 34.58 KG/M2 | DIASTOLIC BLOOD PRESSURE: 74 MMHG | HEART RATE: 65 BPM | WEIGHT: 247 LBS | TEMPERATURE: 97.4 F | HEIGHT: 71 IN | SYSTOLIC BLOOD PRESSURE: 136 MMHG | OXYGEN SATURATION: 99 %

## 2021-04-26 DIAGNOSIS — H54.7 ACQUIRED BLINDNESS: ICD-10-CM

## 2021-04-26 DIAGNOSIS — I10 ESSENTIAL HYPERTENSION: Chronic | ICD-10-CM

## 2021-04-26 DIAGNOSIS — I13.0 HYPERTENSIVE HEART AND KIDNEY DISEASE WITH HF AND CKD (HCC): Primary | ICD-10-CM

## 2021-04-26 DIAGNOSIS — E66.01 CLASS 2 SEVERE OBESITY DUE TO EXCESS CALORIES WITH SERIOUS COMORBIDITY AND BODY MASS INDEX (BMI) OF 35.0 TO 35.9 IN ADULT (HCC): ICD-10-CM

## 2021-04-26 DIAGNOSIS — E78.5 DYSLIPIDEMIA: Chronic | ICD-10-CM

## 2021-04-26 PROCEDURE — 3017F COLORECTAL CA SCREEN DOC REV: CPT | Performed by: INTERNAL MEDICINE

## 2021-04-26 PROCEDURE — G8417 CALC BMI ABV UP PARAM F/U: HCPCS | Performed by: INTERNAL MEDICINE

## 2021-04-26 PROCEDURE — 1123F ACP DISCUSS/DSCN MKR DOCD: CPT | Performed by: INTERNAL MEDICINE

## 2021-04-26 PROCEDURE — G8427 DOCREV CUR MEDS BY ELIG CLIN: HCPCS | Performed by: INTERNAL MEDICINE

## 2021-04-26 PROCEDURE — 99214 OFFICE O/P EST MOD 30 MIN: CPT | Performed by: INTERNAL MEDICINE

## 2021-04-26 PROCEDURE — 1036F TOBACCO NON-USER: CPT | Performed by: INTERNAL MEDICINE

## 2021-04-26 PROCEDURE — 4040F PNEUMOC VAC/ADMIN/RCVD: CPT | Performed by: INTERNAL MEDICINE

## 2021-04-26 SDOH — ECONOMIC STABILITY: INCOME INSECURITY: HOW HARD IS IT FOR YOU TO PAY FOR THE VERY BASICS LIKE FOOD, HOUSING, MEDICAL CARE, AND HEATING?: NOT HARD AT ALL

## 2021-04-26 SDOH — ECONOMIC STABILITY: FOOD INSECURITY: WITHIN THE PAST 12 MONTHS, YOU WORRIED THAT YOUR FOOD WOULD RUN OUT BEFORE YOU GOT MONEY TO BUY MORE.: NEVER TRUE

## 2021-04-26 SDOH — ECONOMIC STABILITY: FOOD INSECURITY: WITHIN THE PAST 12 MONTHS, THE FOOD YOU BOUGHT JUST DIDN'T LAST AND YOU DIDN'T HAVE MONEY TO GET MORE.: NEVER TRUE

## 2021-04-26 ASSESSMENT — PATIENT HEALTH QUESTIONNAIRE - PHQ9
2. FEELING DOWN, DEPRESSED OR HOPELESS: 0
SUM OF ALL RESPONSES TO PHQ QUESTIONS 1-9: 0
SUM OF ALL RESPONSES TO PHQ9 QUESTIONS 1 & 2: 0

## 2021-04-26 NOTE — PROGRESS NOTES
Chief Complaint   Patient presents with    Hypertension    Hyperlipidemia     Vitals:    04/26/21 0807   BP: 136/74   Pulse: 65   Temp: 97.4 °F (36.3 °C)   SpO2: 99%       PHQ Scores 4/26/2021 10/26/2020 5/1/2020 11/8/2019 7/5/2019 12/12/2018 8/28/2017   PHQ2 Score 0 0 0 0 0 0 0   PHQ9 Score 0 0 0 0 0 0 0     Interpretation of Total Score Depression Severity: 1-4 = Minimal depression, 5-9 = Mild depression, 10-14 = Moderate depression, 15-19 = Moderately severe depression, 20-27 = Severe depression    Physical Exam  Vitals signs reviewed. Constitutional:       General: He is not in acute distress. Appearance: He is well-developed. He is not diaphoretic. HENT:      Head: Normocephalic and atraumatic. Pulmonary:      Effort: Pulmonary effort is normal.   Neurological:      Mental Status: He is alert and oriented to person, place, and time. Cranial Nerves: No cranial nerve deficit. Psychiatric:         Behavior: Behavior normal.         Thought Content: Thought content normal.         Judgment: Judgment normal.     Assessment/Plan:  Katrin Santiago was seen today for hypertension and hyperlipidemia. Diagnoses and all orders for this visit:    Hypertensive heart and kidney disease with HF and CKD ([facility] [address]    Essential hypertension  Comments:  Chronic, controlled. Dyslipidemia  Comments:  Chronic, controlled. Taking Simvastatin    Acquired blindness  Comments:  He sees his ophthalmologist regularly. Class 2 severe obesity due to excess calories with serious comorbidity and body mass index (BMI) of 35.0 to 35.9 in adult [facility])  Comments:  He has recently returned to regular exercise. He has had the Covid-19 vaccine.     Larry Dutton MD  FACP

## 2021-04-29 ENCOUNTER — OFFICE VISIT (OUTPATIENT)
Dept: CARDIOLOGY | Facility: CLINIC | Age: 71
End: 2021-04-29
Payer: COMMERCIAL

## 2021-04-29 VITALS
WEIGHT: 163 LBS | SYSTOLIC BLOOD PRESSURE: 124 MMHG | HEART RATE: 60 BPM | HEIGHT: 70 IN | OXYGEN SATURATION: 97 % | DIASTOLIC BLOOD PRESSURE: 80 MMHG | RESPIRATION RATE: 16 BRPM | BODY MASS INDEX: 23.34 KG/M2

## 2021-04-29 DIAGNOSIS — E78.2 MIXED HYPERLIPIDEMIA: Primary | ICD-10-CM

## 2021-04-29 DIAGNOSIS — I10 ESSENTIAL HYPERTENSION: ICD-10-CM

## 2021-04-29 DIAGNOSIS — I34.0 MITRAL VALVE INSUFFICIENCY, UNSPECIFIED ETIOLOGY: ICD-10-CM

## 2021-04-29 PROCEDURE — 3288F PR FALLS RISK ASSESSMENT DOCUMENTED: ICD-10-PCS | Mod: CPTII,S$GLB,, | Performed by: NURSE PRACTITIONER

## 2021-04-29 PROCEDURE — 1159F MED LIST DOCD IN RCRD: CPT | Mod: S$GLB,,, | Performed by: NURSE PRACTITIONER

## 2021-04-29 PROCEDURE — 99214 PR OFFICE/OUTPT VISIT, EST, LEVL IV, 30-39 MIN: ICD-10-PCS | Mod: S$GLB,,, | Performed by: NURSE PRACTITIONER

## 2021-04-29 PROCEDURE — 1126F AMNT PAIN NOTED NONE PRSNT: CPT | Mod: S$GLB,,, | Performed by: NURSE PRACTITIONER

## 2021-04-29 PROCEDURE — 1101F PR PT FALLS ASSESS DOC 0-1 FALLS W/OUT INJ PAST YR: ICD-10-PCS | Mod: CPTII,S$GLB,, | Performed by: NURSE PRACTITIONER

## 2021-04-29 PROCEDURE — 3288F FALL RISK ASSESSMENT DOCD: CPT | Mod: CPTII,S$GLB,, | Performed by: NURSE PRACTITIONER

## 2021-04-29 PROCEDURE — 3008F BODY MASS INDEX DOCD: CPT | Mod: CPTII,S$GLB,, | Performed by: NURSE PRACTITIONER

## 2021-04-29 PROCEDURE — 1159F PR MEDICATION LIST DOCUMENTED IN MEDICAL RECORD: ICD-10-PCS | Mod: S$GLB,,, | Performed by: NURSE PRACTITIONER

## 2021-04-29 PROCEDURE — 1126F PR PAIN SEVERITY QUANTIFIED, NO PAIN PRESENT: ICD-10-PCS | Mod: S$GLB,,, | Performed by: NURSE PRACTITIONER

## 2021-04-29 PROCEDURE — 1101F PT FALLS ASSESS-DOCD LE1/YR: CPT | Mod: CPTII,S$GLB,, | Performed by: NURSE PRACTITIONER

## 2021-04-29 PROCEDURE — 99214 OFFICE O/P EST MOD 30 MIN: CPT | Mod: S$GLB,,, | Performed by: NURSE PRACTITIONER

## 2021-04-29 PROCEDURE — 3008F PR BODY MASS INDEX (BMI) DOCUMENTED: ICD-10-PCS | Mod: CPTII,S$GLB,, | Performed by: NURSE PRACTITIONER

## 2021-05-10 ENCOUNTER — PATIENT MESSAGE (OUTPATIENT)
Dept: RESEARCH | Facility: HOSPITAL | Age: 71
End: 2021-05-10

## 2021-05-31 ENCOUNTER — OFFICE VISIT (OUTPATIENT)
Dept: PHYSICAL MEDICINE AND REHAB | Facility: CLINIC | Age: 71
End: 2021-05-31
Payer: COMMERCIAL

## 2021-05-31 VITALS
HEIGHT: 70 IN | SYSTOLIC BLOOD PRESSURE: 160 MMHG | HEART RATE: 57 BPM | BODY MASS INDEX: 23.34 KG/M2 | DIASTOLIC BLOOD PRESSURE: 76 MMHG | WEIGHT: 163 LBS

## 2021-05-31 DIAGNOSIS — M17.0 PRIMARY OSTEOARTHRITIS OF BOTH KNEES: Primary | ICD-10-CM

## 2021-05-31 DIAGNOSIS — N52.9 ERECTILE DYSFUNCTION, UNSPECIFIED ERECTILE DYSFUNCTION TYPE: ICD-10-CM

## 2021-05-31 PROCEDURE — 3288F FALL RISK ASSESSMENT DOCD: CPT | Mod: CPTII,S$GLB,, | Performed by: PHYSICAL MEDICINE & REHABILITATION

## 2021-05-31 PROCEDURE — 99214 PR OFFICE/OUTPT VISIT, EST, LEVL IV, 30-39 MIN: ICD-10-PCS | Mod: S$GLB,,, | Performed by: PHYSICAL MEDICINE & REHABILITATION

## 2021-05-31 PROCEDURE — 99214 OFFICE O/P EST MOD 30 MIN: CPT | Mod: S$GLB,,, | Performed by: PHYSICAL MEDICINE & REHABILITATION

## 2021-05-31 PROCEDURE — 3008F PR BODY MASS INDEX (BMI) DOCUMENTED: ICD-10-PCS | Mod: CPTII,S$GLB,, | Performed by: PHYSICAL MEDICINE & REHABILITATION

## 2021-05-31 PROCEDURE — 1101F PT FALLS ASSESS-DOCD LE1/YR: CPT | Mod: CPTII,S$GLB,, | Performed by: PHYSICAL MEDICINE & REHABILITATION

## 2021-05-31 PROCEDURE — 1101F PR PT FALLS ASSESS DOC 0-1 FALLS W/OUT INJ PAST YR: ICD-10-PCS | Mod: CPTII,S$GLB,, | Performed by: PHYSICAL MEDICINE & REHABILITATION

## 2021-05-31 PROCEDURE — 1125F AMNT PAIN NOTED PAIN PRSNT: CPT | Mod: S$GLB,,, | Performed by: PHYSICAL MEDICINE & REHABILITATION

## 2021-05-31 PROCEDURE — 1159F MED LIST DOCD IN RCRD: CPT | Mod: S$GLB,,, | Performed by: PHYSICAL MEDICINE & REHABILITATION

## 2021-05-31 PROCEDURE — 99999 PR PBB SHADOW E&M-EST. PATIENT-LVL III: ICD-10-PCS | Mod: PBBFAC,,, | Performed by: PHYSICAL MEDICINE & REHABILITATION

## 2021-05-31 PROCEDURE — 1159F PR MEDICATION LIST DOCUMENTED IN MEDICAL RECORD: ICD-10-PCS | Mod: S$GLB,,, | Performed by: PHYSICAL MEDICINE & REHABILITATION

## 2021-05-31 PROCEDURE — 1125F PR PAIN SEVERITY QUANTIFIED, PAIN PRESENT: ICD-10-PCS | Mod: S$GLB,,, | Performed by: PHYSICAL MEDICINE & REHABILITATION

## 2021-05-31 PROCEDURE — 99999 PR PBB SHADOW E&M-EST. PATIENT-LVL III: CPT | Mod: PBBFAC,,, | Performed by: PHYSICAL MEDICINE & REHABILITATION

## 2021-05-31 PROCEDURE — 3288F PR FALLS RISK ASSESSMENT DOCUMENTED: ICD-10-PCS | Mod: CPTII,S$GLB,, | Performed by: PHYSICAL MEDICINE & REHABILITATION

## 2021-05-31 PROCEDURE — 3008F BODY MASS INDEX DOCD: CPT | Mod: CPTII,S$GLB,, | Performed by: PHYSICAL MEDICINE & REHABILITATION

## 2021-05-31 RX ORDER — TADALAFIL 10 MG/1
10 TABLET ORAL PRN
Qty: 15 TABLET | Refills: 3 | Status: SHIPPED | OUTPATIENT
Start: 2021-05-31

## 2021-06-03 ENCOUNTER — PATIENT MESSAGE (OUTPATIENT)
Dept: PHYSICAL MEDICINE AND REHAB | Facility: CLINIC | Age: 71
End: 2021-06-03

## 2021-06-15 ENCOUNTER — OFFICE VISIT (OUTPATIENT)
Dept: UROLOGY | Facility: CLINIC | Age: 71
End: 2021-06-15
Payer: COMMERCIAL

## 2021-06-15 VITALS
HEIGHT: 70 IN | DIASTOLIC BLOOD PRESSURE: 80 MMHG | WEIGHT: 167.56 LBS | HEART RATE: 60 BPM | SYSTOLIC BLOOD PRESSURE: 169 MMHG | BODY MASS INDEX: 23.99 KG/M2

## 2021-06-15 DIAGNOSIS — N52.9 ERECTILE DYSFUNCTION, UNSPECIFIED ERECTILE DYSFUNCTION TYPE: ICD-10-CM

## 2021-06-15 DIAGNOSIS — N13.8 BPH WITH URINARY OBSTRUCTION: Primary | ICD-10-CM

## 2021-06-15 DIAGNOSIS — N40.1 BPH WITH URINARY OBSTRUCTION: Primary | ICD-10-CM

## 2021-06-15 DIAGNOSIS — N32.81 OAB (OVERACTIVE BLADDER): ICD-10-CM

## 2021-06-15 LAB
BILIRUB SERPL-MCNC: NORMAL MG/DL
BLOOD URINE, POC: NORMAL
CLARITY, POC UA: CLEAR
COLOR, POC UA: YELLOW
GLUCOSE UR QL STRIP: NORMAL
KETONES UR QL STRIP: NORMAL
LEUKOCYTE ESTERASE URINE, POC: NORMAL
NITRITE, POC UA: NORMAL
PH, POC UA: 7
POC RESIDUAL URINE VOLUME: 0 ML (ref 0–100)
PROTEIN, POC: NORMAL
SPECIFIC GRAVITY, POC UA: 1.02
UROBILINOGEN, POC UA: NORMAL

## 2021-06-15 PROCEDURE — 99214 OFFICE O/P EST MOD 30 MIN: CPT | Mod: 25,S$GLB,, | Performed by: UROLOGY

## 2021-06-15 PROCEDURE — 3008F PR BODY MASS INDEX (BMI) DOCUMENTED: ICD-10-PCS | Mod: CPTII,S$GLB,, | Performed by: UROLOGY

## 2021-06-15 PROCEDURE — 99999 PR PBB SHADOW E&M-EST. PATIENT-LVL III: ICD-10-PCS | Mod: PBBFAC,,, | Performed by: UROLOGY

## 2021-06-15 PROCEDURE — 51798 US URINE CAPACITY MEASURE: CPT | Mod: S$GLB,,, | Performed by: UROLOGY

## 2021-06-15 PROCEDURE — 51798 POCT BLADDER SCAN: ICD-10-PCS | Mod: S$GLB,,, | Performed by: UROLOGY

## 2021-06-15 PROCEDURE — 99999 PR PBB SHADOW E&M-EST. PATIENT-LVL III: CPT | Mod: PBBFAC,,, | Performed by: UROLOGY

## 2021-06-15 PROCEDURE — 81002 URINALYSIS NONAUTO W/O SCOPE: CPT | Mod: S$GLB,,, | Performed by: UROLOGY

## 2021-06-15 PROCEDURE — 99214 PR OFFICE/OUTPT VISIT, EST, LEVL IV, 30-39 MIN: ICD-10-PCS | Mod: 25,S$GLB,, | Performed by: UROLOGY

## 2021-06-15 PROCEDURE — 81002 POCT URINE DIPSTICK WITHOUT MICROSCOPE: ICD-10-PCS | Mod: S$GLB,,, | Performed by: UROLOGY

## 2021-06-15 PROCEDURE — 3008F BODY MASS INDEX DOCD: CPT | Mod: CPTII,S$GLB,, | Performed by: UROLOGY

## 2021-06-15 PROCEDURE — 1159F MED LIST DOCD IN RCRD: CPT | Mod: S$GLB,,, | Performed by: UROLOGY

## 2021-06-15 PROCEDURE — 1159F PR MEDICATION LIST DOCUMENTED IN MEDICAL RECORD: ICD-10-PCS | Mod: S$GLB,,, | Performed by: UROLOGY

## 2021-06-15 RX ORDER — TADALAFIL 5 MG/1
5 TABLET ORAL DAILY
Qty: 90 TABLET | Refills: 3 | Status: SHIPPED | OUTPATIENT
Start: 2021-06-15 | End: 2022-06-07 | Stop reason: SDUPTHER

## 2021-06-23 ENCOUNTER — TELEPHONE (OUTPATIENT)
Dept: PHYSICAL MEDICINE AND REHAB | Facility: CLINIC | Age: 71
End: 2021-06-23

## 2021-06-23 DIAGNOSIS — M17.0 PRIMARY OSTEOARTHRITIS OF BOTH KNEES: Primary | ICD-10-CM

## 2021-06-24 ENCOUNTER — TELEPHONE (OUTPATIENT)
Dept: INTERNAL MEDICINE CLINIC | Age: 71
End: 2021-06-24

## 2021-06-24 ENCOUNTER — TELEPHONE (OUTPATIENT)
Dept: PHYSICAL MEDICINE AND REHAB | Facility: CLINIC | Age: 71
End: 2021-06-24

## 2021-06-24 DIAGNOSIS — I10 ESSENTIAL HYPERTENSION: Chronic | ICD-10-CM

## 2021-06-24 DIAGNOSIS — M17.0 PRIMARY OSTEOARTHRITIS OF BOTH KNEES: Primary | ICD-10-CM

## 2021-06-24 DIAGNOSIS — I13.0 HYPERTENSIVE HEART AND KIDNEY DISEASE WITH HF AND CKD (HCC): ICD-10-CM

## 2021-06-25 NOTE — TELEPHONE ENCOUNTER
Submitted PA for Tadalafil 10MG tablets Key: [id number] Via [facility] Medication has been DENIED. This medication is an EXCLUSION on the patient's plan. Please notify patient. Thank you.

## 2021-07-01 ENCOUNTER — OFFICE VISIT (OUTPATIENT)
Dept: PHYSICAL MEDICINE AND REHAB | Facility: CLINIC | Age: 71
End: 2021-07-01
Payer: COMMERCIAL

## 2021-07-01 VITALS
HEART RATE: 55 BPM | BODY MASS INDEX: 23.91 KG/M2 | DIASTOLIC BLOOD PRESSURE: 81 MMHG | WEIGHT: 167 LBS | SYSTOLIC BLOOD PRESSURE: 157 MMHG | HEIGHT: 70 IN

## 2021-07-01 DIAGNOSIS — M17.11 PRIMARY OSTEOARTHRITIS OF RIGHT KNEE: Primary | ICD-10-CM

## 2021-07-01 PROCEDURE — 99999 PR PBB SHADOW E&M-EST. PATIENT-LVL III: CPT | Mod: PBBFAC,,, | Performed by: PHYSICAL MEDICINE & REHABILITATION

## 2021-07-01 PROCEDURE — 99213 OFFICE O/P EST LOW 20 MIN: CPT | Mod: S$GLB,,, | Performed by: PHYSICAL MEDICINE & REHABILITATION

## 2021-07-01 PROCEDURE — 1159F PR MEDICATION LIST DOCUMENTED IN MEDICAL RECORD: ICD-10-PCS | Mod: S$GLB,,, | Performed by: PHYSICAL MEDICINE & REHABILITATION

## 2021-07-01 PROCEDURE — 1101F PT FALLS ASSESS-DOCD LE1/YR: CPT | Mod: CPTII,S$GLB,, | Performed by: PHYSICAL MEDICINE & REHABILITATION

## 2021-07-01 PROCEDURE — 3008F PR BODY MASS INDEX (BMI) DOCUMENTED: ICD-10-PCS | Mod: CPTII,S$GLB,, | Performed by: PHYSICAL MEDICINE & REHABILITATION

## 2021-07-01 PROCEDURE — 3288F FALL RISK ASSESSMENT DOCD: CPT | Mod: CPTII,S$GLB,, | Performed by: PHYSICAL MEDICINE & REHABILITATION

## 2021-07-01 PROCEDURE — 99213 PR OFFICE/OUTPT VISIT, EST, LEVL III, 20-29 MIN: ICD-10-PCS | Mod: S$GLB,,, | Performed by: PHYSICAL MEDICINE & REHABILITATION

## 2021-07-01 PROCEDURE — 1125F AMNT PAIN NOTED PAIN PRSNT: CPT | Mod: S$GLB,,, | Performed by: PHYSICAL MEDICINE & REHABILITATION

## 2021-07-01 PROCEDURE — 3288F PR FALLS RISK ASSESSMENT DOCUMENTED: ICD-10-PCS | Mod: CPTII,S$GLB,, | Performed by: PHYSICAL MEDICINE & REHABILITATION

## 2021-07-01 PROCEDURE — 3008F BODY MASS INDEX DOCD: CPT | Mod: CPTII,S$GLB,, | Performed by: PHYSICAL MEDICINE & REHABILITATION

## 2021-07-01 PROCEDURE — 1159F MED LIST DOCD IN RCRD: CPT | Mod: S$GLB,,, | Performed by: PHYSICAL MEDICINE & REHABILITATION

## 2021-07-01 PROCEDURE — 1101F PR PT FALLS ASSESS DOC 0-1 FALLS W/OUT INJ PAST YR: ICD-10-PCS | Mod: CPTII,S$GLB,, | Performed by: PHYSICAL MEDICINE & REHABILITATION

## 2021-07-01 PROCEDURE — 1125F PR PAIN SEVERITY QUANTIFIED, PAIN PRESENT: ICD-10-PCS | Mod: S$GLB,,, | Performed by: PHYSICAL MEDICINE & REHABILITATION

## 2021-07-01 PROCEDURE — 99999 PR PBB SHADOW E&M-EST. PATIENT-LVL III: ICD-10-PCS | Mod: PBBFAC,,, | Performed by: PHYSICAL MEDICINE & REHABILITATION

## 2021-07-04 DIAGNOSIS — I10 ESSENTIAL HYPERTENSION: Chronic | ICD-10-CM

## 2021-07-05 RX ORDER — DOXAZOSIN MESYLATE 4 MG/1
TABLET ORAL
Qty: 90 TABLET | Refills: 0 | Status: SHIPPED | OUTPATIENT
Start: 2021-07-05 | End: 2021-10-11

## 2021-08-09 ENCOUNTER — PATIENT MESSAGE (OUTPATIENT)
Dept: FAMILY MEDICINE | Facility: CLINIC | Age: 71
End: 2021-08-09

## 2021-08-09 DIAGNOSIS — R73.9 HYPERGLYCEMIA: Primary | ICD-10-CM

## 2021-08-10 ENCOUNTER — LAB VISIT (OUTPATIENT)
Dept: LAB | Facility: HOSPITAL | Age: 71
End: 2021-08-10
Attending: NURSE PRACTITIONER
Payer: COMMERCIAL

## 2021-08-10 DIAGNOSIS — R73.9 HYPERGLYCEMIA: ICD-10-CM

## 2021-08-10 LAB
ESTIMATED AVG GLUCOSE: 126 MG/DL (ref 68–131)
HBA1C MFR BLD: 6 % (ref 4.5–6.2)
TSH SERPL DL<=0.005 MIU/L-ACNC: 3.27 UIU/ML (ref 0.34–5.6)

## 2021-08-10 PROCEDURE — 84443 ASSAY THYROID STIM HORMONE: CPT | Performed by: NURSE PRACTITIONER

## 2021-08-10 PROCEDURE — 83036 HEMOGLOBIN GLYCOSYLATED A1C: CPT | Performed by: NURSE PRACTITIONER

## 2021-08-10 PROCEDURE — 36415 COLL VENOUS BLD VENIPUNCTURE: CPT | Performed by: NURSE PRACTITIONER

## 2021-08-15 DIAGNOSIS — E78.5 DYSLIPIDEMIA: ICD-10-CM

## 2021-08-15 RX ORDER — FENOFIBRATE 48 MG/1
48 TABLET, COATED ORAL DAILY
Qty: 90 TABLET | Refills: 3 | Status: SHIPPED | OUTPATIENT
Start: 2021-08-15

## 2021-08-18 ENCOUNTER — OFFICE VISIT (OUTPATIENT)
Dept: FAMILY MEDICINE | Facility: CLINIC | Age: 71
End: 2021-08-18
Payer: COMMERCIAL

## 2021-08-18 VITALS
DIASTOLIC BLOOD PRESSURE: 80 MMHG | OXYGEN SATURATION: 96 % | BODY MASS INDEX: 24.41 KG/M2 | WEIGHT: 170.13 LBS | TEMPERATURE: 99 F | SYSTOLIC BLOOD PRESSURE: 140 MMHG | HEART RATE: 64 BPM

## 2021-08-18 DIAGNOSIS — I10 ESSENTIAL HYPERTENSION: Primary | ICD-10-CM

## 2021-08-18 DIAGNOSIS — K21.9 GASTROESOPHAGEAL REFLUX DISEASE WITHOUT ESOPHAGITIS: ICD-10-CM

## 2021-08-18 DIAGNOSIS — R73.03 PREDIABETES: ICD-10-CM

## 2021-08-18 PROCEDURE — 3288F FALL RISK ASSESSMENT DOCD: CPT | Mod: S$GLB,,, | Performed by: NURSE PRACTITIONER

## 2021-08-18 PROCEDURE — 1160F PR REVIEW ALL MEDS BY PRESCRIBER/CLIN PHARMACIST DOCUMENTED: ICD-10-PCS | Mod: S$GLB,,, | Performed by: NURSE PRACTITIONER

## 2021-08-18 PROCEDURE — 3079F DIAST BP 80-89 MM HG: CPT | Mod: S$GLB,,, | Performed by: NURSE PRACTITIONER

## 2021-08-18 PROCEDURE — 1159F PR MEDICATION LIST DOCUMENTED IN MEDICAL RECORD: ICD-10-PCS | Mod: S$GLB,,, | Performed by: NURSE PRACTITIONER

## 2021-08-18 PROCEDURE — 1159F MED LIST DOCD IN RCRD: CPT | Mod: S$GLB,,, | Performed by: NURSE PRACTITIONER

## 2021-08-18 PROCEDURE — 1101F PT FALLS ASSESS-DOCD LE1/YR: CPT | Mod: S$GLB,,, | Performed by: NURSE PRACTITIONER

## 2021-08-18 PROCEDURE — 3044F HG A1C LEVEL LT 7.0%: CPT | Mod: S$GLB,,, | Performed by: NURSE PRACTITIONER

## 2021-08-18 PROCEDURE — 3288F PR FALLS RISK ASSESSMENT DOCUMENTED: ICD-10-PCS | Mod: S$GLB,,, | Performed by: NURSE PRACTITIONER

## 2021-08-18 PROCEDURE — 3008F BODY MASS INDEX DOCD: CPT | Mod: S$GLB,,, | Performed by: NURSE PRACTITIONER

## 2021-08-18 PROCEDURE — 3044F PR MOST RECENT HEMOGLOBIN A1C LEVEL <7.0%: ICD-10-PCS | Mod: S$GLB,,, | Performed by: NURSE PRACTITIONER

## 2021-08-18 PROCEDURE — 1170F PR FUNCTIONAL STATUS ASSESSED: ICD-10-PCS | Mod: S$GLB,,, | Performed by: NURSE PRACTITIONER

## 2021-08-18 PROCEDURE — 1126F PR PAIN SEVERITY QUANTIFIED, NO PAIN PRESENT: ICD-10-PCS | Mod: S$GLB,,, | Performed by: NURSE PRACTITIONER

## 2021-08-18 PROCEDURE — 3008F PR BODY MASS INDEX (BMI) DOCUMENTED: ICD-10-PCS | Mod: S$GLB,,, | Performed by: NURSE PRACTITIONER

## 2021-08-18 PROCEDURE — 1170F FXNL STATUS ASSESSED: CPT | Mod: S$GLB,,, | Performed by: NURSE PRACTITIONER

## 2021-08-18 PROCEDURE — 1126F AMNT PAIN NOTED NONE PRSNT: CPT | Mod: S$GLB,,, | Performed by: NURSE PRACTITIONER

## 2021-08-18 PROCEDURE — 1101F PR PT FALLS ASSESS DOC 0-1 FALLS W/OUT INJ PAST YR: ICD-10-PCS | Mod: S$GLB,,, | Performed by: NURSE PRACTITIONER

## 2021-08-18 PROCEDURE — 3077F SYST BP >= 140 MM HG: CPT | Mod: S$GLB,,, | Performed by: NURSE PRACTITIONER

## 2021-08-18 PROCEDURE — 99214 PR OFFICE/OUTPT VISIT, EST, LEVL IV, 30-39 MIN: ICD-10-PCS | Mod: S$GLB,,, | Performed by: NURSE PRACTITIONER

## 2021-08-18 PROCEDURE — 3077F PR MOST RECENT SYSTOLIC BLOOD PRESSURE >= 140 MM HG: ICD-10-PCS | Mod: S$GLB,,, | Performed by: NURSE PRACTITIONER

## 2021-08-18 PROCEDURE — 1160F RVW MEDS BY RX/DR IN RCRD: CPT | Mod: S$GLB,,, | Performed by: NURSE PRACTITIONER

## 2021-08-18 PROCEDURE — 3079F PR MOST RECENT DIASTOLIC BLOOD PRESSURE 80-89 MM HG: ICD-10-PCS | Mod: S$GLB,,, | Performed by: NURSE PRACTITIONER

## 2021-08-18 PROCEDURE — 99214 OFFICE O/P EST MOD 30 MIN: CPT | Mod: S$GLB,,, | Performed by: NURSE PRACTITIONER

## 2021-08-18 RX ORDER — OMEPRAZOLE 20 MG/1
20 CAPSULE, DELAYED RELEASE ORAL DAILY
Qty: 90 CAPSULE | Refills: 1 | Status: SHIPPED | OUTPATIENT
Start: 2021-08-18 | End: 2021-11-02

## 2021-08-18 RX ORDER — HYDROCHLOROTHIAZIDE 25 MG/1
25 TABLET ORAL DAILY
Qty: 90 TABLET | Refills: 0 | Status: SHIPPED | OUTPATIENT
Start: 2021-08-18 | End: 2021-09-16 | Stop reason: SDUPTHER

## 2021-08-18 RX ORDER — LISINOPRIL 40 MG/1
40 TABLET ORAL DAILY
Qty: 90 TABLET | Refills: 3 | Status: SHIPPED | OUTPATIENT
Start: 2021-08-18 | End: 2021-09-15

## 2021-08-20 DIAGNOSIS — E78.5 DYSLIPIDEMIA: ICD-10-CM

## 2021-08-20 RX ORDER — SIMVASTATIN 40 MG
TABLET ORAL
Qty: 90 TABLET | Refills: 3 | Status: SHIPPED | OUTPATIENT
Start: 2021-08-20

## 2021-08-24 ENCOUNTER — OFFICE VISIT (OUTPATIENT)
Dept: PULMONOLOGY | Facility: CLINIC | Age: 71
End: 2021-08-24
Payer: COMMERCIAL

## 2021-08-24 VITALS
DIASTOLIC BLOOD PRESSURE: 92 MMHG | SYSTOLIC BLOOD PRESSURE: 148 MMHG | OXYGEN SATURATION: 97 % | BODY MASS INDEX: 24.11 KG/M2 | HEART RATE: 77 BPM | WEIGHT: 168 LBS

## 2021-08-24 DIAGNOSIS — R91.8 MULTIPLE PULMONARY NODULES: Primary | ICD-10-CM

## 2021-08-24 DIAGNOSIS — R06.09 DYSPNEA ON EXERTION: ICD-10-CM

## 2021-08-24 PROCEDURE — 99214 OFFICE O/P EST MOD 30 MIN: CPT | Mod: S$GLB,,, | Performed by: INTERNAL MEDICINE

## 2021-08-24 PROCEDURE — 3288F FALL RISK ASSESSMENT DOCD: CPT | Mod: S$GLB,,, | Performed by: INTERNAL MEDICINE

## 2021-08-24 PROCEDURE — 3080F PR MOST RECENT DIASTOLIC BLOOD PRESSURE >= 90 MM HG: ICD-10-PCS | Mod: S$GLB,,, | Performed by: INTERNAL MEDICINE

## 2021-08-24 PROCEDURE — 3077F SYST BP >= 140 MM HG: CPT | Mod: S$GLB,,, | Performed by: INTERNAL MEDICINE

## 2021-08-24 PROCEDURE — 99214 PR OFFICE/OUTPT VISIT, EST, LEVL IV, 30-39 MIN: ICD-10-PCS | Mod: S$GLB,,, | Performed by: INTERNAL MEDICINE

## 2021-08-24 PROCEDURE — 1101F PT FALLS ASSESS-DOCD LE1/YR: CPT | Mod: S$GLB,,, | Performed by: INTERNAL MEDICINE

## 2021-08-24 PROCEDURE — 3288F PR FALLS RISK ASSESSMENT DOCUMENTED: ICD-10-PCS | Mod: S$GLB,,, | Performed by: INTERNAL MEDICINE

## 2021-08-24 PROCEDURE — 1160F PR REVIEW ALL MEDS BY PRESCRIBER/CLIN PHARMACIST DOCUMENTED: ICD-10-PCS | Mod: S$GLB,,, | Performed by: INTERNAL MEDICINE

## 2021-08-24 PROCEDURE — 3044F PR MOST RECENT HEMOGLOBIN A1C LEVEL <7.0%: ICD-10-PCS | Mod: S$GLB,,, | Performed by: INTERNAL MEDICINE

## 2021-08-24 PROCEDURE — 1159F MED LIST DOCD IN RCRD: CPT | Mod: S$GLB,,, | Performed by: INTERNAL MEDICINE

## 2021-08-24 PROCEDURE — 3044F HG A1C LEVEL LT 7.0%: CPT | Mod: S$GLB,,, | Performed by: INTERNAL MEDICINE

## 2021-08-24 PROCEDURE — 3080F DIAST BP >= 90 MM HG: CPT | Mod: S$GLB,,, | Performed by: INTERNAL MEDICINE

## 2021-08-24 PROCEDURE — 3077F PR MOST RECENT SYSTOLIC BLOOD PRESSURE >= 140 MM HG: ICD-10-PCS | Mod: S$GLB,,, | Performed by: INTERNAL MEDICINE

## 2021-08-24 PROCEDURE — 3008F PR BODY MASS INDEX (BMI) DOCUMENTED: ICD-10-PCS | Mod: S$GLB,,, | Performed by: INTERNAL MEDICINE

## 2021-08-24 PROCEDURE — 1159F PR MEDICATION LIST DOCUMENTED IN MEDICAL RECORD: ICD-10-PCS | Mod: S$GLB,,, | Performed by: INTERNAL MEDICINE

## 2021-08-24 PROCEDURE — 1101F PR PT FALLS ASSESS DOC 0-1 FALLS W/OUT INJ PAST YR: ICD-10-PCS | Mod: S$GLB,,, | Performed by: INTERNAL MEDICINE

## 2021-08-24 PROCEDURE — 3008F BODY MASS INDEX DOCD: CPT | Mod: S$GLB,,, | Performed by: INTERNAL MEDICINE

## 2021-08-24 PROCEDURE — 1160F RVW MEDS BY RX/DR IN RCRD: CPT | Mod: S$GLB,,, | Performed by: INTERNAL MEDICINE

## 2021-08-27 ENCOUNTER — CLINICAL SUPPORT (OUTPATIENT)
Dept: PHYSICAL MEDICINE AND REHAB | Facility: CLINIC | Age: 71
End: 2021-08-27

## 2021-08-27 VITALS
HEIGHT: 70 IN | SYSTOLIC BLOOD PRESSURE: 155 MMHG | WEIGHT: 168 LBS | DIASTOLIC BLOOD PRESSURE: 79 MMHG | BODY MASS INDEX: 24.05 KG/M2 | HEART RATE: 56 BPM

## 2021-08-27 DIAGNOSIS — M17.11 PRIMARY OSTEOARTHRITIS OF RIGHT KNEE: ICD-10-CM

## 2021-08-27 PROCEDURE — 99999 PR PBB SHADOW E&M-EST. PATIENT-LVL III: CPT | Mod: PBBFAC,,, | Performed by: PHYSICAL MEDICINE & REHABILITATION

## 2021-08-27 PROCEDURE — 0232T PR INJECT PLATELET PLASMA W/IMG HARVEST/PREPARATOIN: ICD-10-PCS | Mod: CSM,S$GLB,, | Performed by: PHYSICAL MEDICINE & REHABILITATION

## 2021-08-27 PROCEDURE — 99499 UNLISTED E&M SERVICE: CPT | Mod: S$GLB,,, | Performed by: PHYSICAL MEDICINE & REHABILITATION

## 2021-08-27 PROCEDURE — 99499 NO LOS: ICD-10-PCS | Mod: S$GLB,,, | Performed by: PHYSICAL MEDICINE & REHABILITATION

## 2021-08-27 PROCEDURE — 99999 PR PBB SHADOW E&M-EST. PATIENT-LVL III: ICD-10-PCS | Mod: PBBFAC,,, | Performed by: PHYSICAL MEDICINE & REHABILITATION

## 2021-08-27 PROCEDURE — 0232T NJX PLATELET PLASMA: CPT | Mod: CSM,S$GLB,, | Performed by: PHYSICAL MEDICINE & REHABILITATION

## 2021-09-15 ENCOUNTER — OFFICE VISIT (OUTPATIENT)
Dept: FAMILY MEDICINE | Facility: CLINIC | Age: 71
End: 2021-09-15
Payer: COMMERCIAL

## 2021-09-15 VITALS
DIASTOLIC BLOOD PRESSURE: 82 MMHG | WEIGHT: 166.19 LBS | HEART RATE: 85 BPM | TEMPERATURE: 98 F | SYSTOLIC BLOOD PRESSURE: 130 MMHG | BODY MASS INDEX: 23.79 KG/M2 | HEIGHT: 70 IN | OXYGEN SATURATION: 98 %

## 2021-09-15 DIAGNOSIS — R73.03 PREDIABETES: ICD-10-CM

## 2021-09-15 DIAGNOSIS — E78.01 FAMILIAL HYPERCHOLESTEROLEMIA: ICD-10-CM

## 2021-09-15 DIAGNOSIS — I10 ESSENTIAL HYPERTENSION: Primary | ICD-10-CM

## 2021-09-15 DIAGNOSIS — Z23 NEED FOR INFLUENZA VACCINATION: ICD-10-CM

## 2021-09-15 PROCEDURE — 1125F PR PAIN SEVERITY QUANTIFIED, PAIN PRESENT: ICD-10-PCS | Mod: S$GLB,,, | Performed by: NURSE PRACTITIONER

## 2021-09-15 PROCEDURE — 90471 FLU VACCINE - QUADRIVALENT - HIGH DOSE (65+) PRESERVATIVE FREE IM: ICD-10-PCS | Mod: S$GLB,,, | Performed by: NURSE PRACTITIONER

## 2021-09-15 PROCEDURE — 1160F RVW MEDS BY RX/DR IN RCRD: CPT | Mod: S$GLB,,, | Performed by: NURSE PRACTITIONER

## 2021-09-15 PROCEDURE — 1170F PR FUNCTIONAL STATUS ASSESSED: ICD-10-PCS | Mod: S$GLB,,, | Performed by: NURSE PRACTITIONER

## 2021-09-15 PROCEDURE — 90662 IIV NO PRSV INCREASED AG IM: CPT | Mod: S$GLB,,, | Performed by: NURSE PRACTITIONER

## 2021-09-15 PROCEDURE — 3061F PR NEG MICROALBUMINURIA RESULT DOCUMENTED/REVIEW: ICD-10-PCS | Mod: S$GLB,,, | Performed by: NURSE PRACTITIONER

## 2021-09-15 PROCEDURE — 99214 PR OFFICE/OUTPT VISIT, EST, LEVL IV, 30-39 MIN: ICD-10-PCS | Mod: 25,S$GLB,, | Performed by: NURSE PRACTITIONER

## 2021-09-15 PROCEDURE — 1160F PR REVIEW ALL MEDS BY PRESCRIBER/CLIN PHARMACIST DOCUMENTED: ICD-10-PCS | Mod: S$GLB,,, | Performed by: NURSE PRACTITIONER

## 2021-09-15 PROCEDURE — 3008F BODY MASS INDEX DOCD: CPT | Mod: S$GLB,,, | Performed by: NURSE PRACTITIONER

## 2021-09-15 PROCEDURE — 3066F NEPHROPATHY DOC TX: CPT | Mod: S$GLB,,, | Performed by: NURSE PRACTITIONER

## 2021-09-15 PROCEDURE — 3066F PR DOCUMENTATION OF TREATMENT FOR NEPHROPATHY: ICD-10-PCS | Mod: S$GLB,,, | Performed by: NURSE PRACTITIONER

## 2021-09-15 PROCEDURE — 90662 FLU VACCINE - QUADRIVALENT - HIGH DOSE (65+) PRESERVATIVE FREE IM: ICD-10-PCS | Mod: S$GLB,,, | Performed by: NURSE PRACTITIONER

## 2021-09-15 PROCEDURE — 1159F MED LIST DOCD IN RCRD: CPT | Mod: S$GLB,,, | Performed by: NURSE PRACTITIONER

## 2021-09-15 PROCEDURE — 3079F PR MOST RECENT DIASTOLIC BLOOD PRESSURE 80-89 MM HG: ICD-10-PCS | Mod: S$GLB,,, | Performed by: NURSE PRACTITIONER

## 2021-09-15 PROCEDURE — 3075F PR MOST RECENT SYSTOLIC BLOOD PRESS GE 130-139MM HG: ICD-10-PCS | Mod: S$GLB,,, | Performed by: NURSE PRACTITIONER

## 2021-09-15 PROCEDURE — 3061F NEG MICROALBUMINURIA REV: CPT | Mod: S$GLB,,, | Performed by: NURSE PRACTITIONER

## 2021-09-15 PROCEDURE — 3075F SYST BP GE 130 - 139MM HG: CPT | Mod: S$GLB,,, | Performed by: NURSE PRACTITIONER

## 2021-09-15 PROCEDURE — 4010F ACE/ARB THERAPY RXD/TAKEN: CPT | Mod: S$GLB,,, | Performed by: NURSE PRACTITIONER

## 2021-09-15 PROCEDURE — 1159F PR MEDICATION LIST DOCUMENTED IN MEDICAL RECORD: ICD-10-PCS | Mod: S$GLB,,, | Performed by: NURSE PRACTITIONER

## 2021-09-15 PROCEDURE — 3079F DIAST BP 80-89 MM HG: CPT | Mod: S$GLB,,, | Performed by: NURSE PRACTITIONER

## 2021-09-15 PROCEDURE — 3008F PR BODY MASS INDEX (BMI) DOCUMENTED: ICD-10-PCS | Mod: S$GLB,,, | Performed by: NURSE PRACTITIONER

## 2021-09-15 PROCEDURE — 3044F HG A1C LEVEL LT 7.0%: CPT | Mod: S$GLB,,, | Performed by: NURSE PRACTITIONER

## 2021-09-15 PROCEDURE — 90471 IMMUNIZATION ADMIN: CPT | Mod: S$GLB,,, | Performed by: NURSE PRACTITIONER

## 2021-09-15 PROCEDURE — 4010F PR ACE/ARB THEARPY RXD/TAKEN: ICD-10-PCS | Mod: S$GLB,,, | Performed by: NURSE PRACTITIONER

## 2021-09-15 PROCEDURE — 3044F PR MOST RECENT HEMOGLOBIN A1C LEVEL <7.0%: ICD-10-PCS | Mod: S$GLB,,, | Performed by: NURSE PRACTITIONER

## 2021-09-15 PROCEDURE — 1170F FXNL STATUS ASSESSED: CPT | Mod: S$GLB,,, | Performed by: NURSE PRACTITIONER

## 2021-09-15 PROCEDURE — 99214 OFFICE O/P EST MOD 30 MIN: CPT | Mod: 25,S$GLB,, | Performed by: NURSE PRACTITIONER

## 2021-09-15 PROCEDURE — 1125F AMNT PAIN NOTED PAIN PRSNT: CPT | Mod: S$GLB,,, | Performed by: NURSE PRACTITIONER

## 2021-09-15 RX ORDER — TELMISARTAN 80 MG/1
80 TABLET ORAL DAILY
Qty: 90 TABLET | Refills: 0 | Status: SHIPPED | OUTPATIENT
Start: 2021-09-15 | End: 2021-12-13

## 2021-09-16 RX ORDER — ROSUVASTATIN CALCIUM 10 MG/1
10 TABLET, COATED ORAL DAILY
Qty: 90 TABLET | Refills: 3 | Status: SHIPPED | OUTPATIENT
Start: 2021-09-16 | End: 2022-09-06

## 2021-09-16 RX ORDER — HYDROCHLOROTHIAZIDE 25 MG/1
25 TABLET ORAL DAILY
Qty: 90 TABLET | Refills: 0 | Status: SHIPPED | OUTPATIENT
Start: 2021-09-16 | End: 2022-01-18 | Stop reason: SDUPTHER

## 2021-09-23 DIAGNOSIS — I10 ESSENTIAL HYPERTENSION: Chronic | ICD-10-CM

## 2021-09-23 DIAGNOSIS — I13.0 HYPERTENSIVE HEART AND KIDNEY DISEASE WITH HF AND CKD (HCC): ICD-10-CM

## 2021-09-24 ENCOUNTER — OFFICE VISIT (OUTPATIENT)
Dept: PHYSICAL MEDICINE AND REHAB | Facility: CLINIC | Age: 71
End: 2021-09-24
Payer: COMMERCIAL

## 2021-09-24 VITALS
WEIGHT: 166 LBS | DIASTOLIC BLOOD PRESSURE: 86 MMHG | HEIGHT: 70 IN | HEART RATE: 48 BPM | BODY MASS INDEX: 23.77 KG/M2 | SYSTOLIC BLOOD PRESSURE: 177 MMHG

## 2021-09-24 DIAGNOSIS — M17.12 PRIMARY OSTEOARTHRITIS OF LEFT KNEE: Primary | ICD-10-CM

## 2021-09-24 PROCEDURE — 3066F PR DOCUMENTATION OF TREATMENT FOR NEPHROPATHY: ICD-10-PCS | Mod: CPTII,S$GLB,, | Performed by: PHYSICAL MEDICINE & REHABILITATION

## 2021-09-24 PROCEDURE — 99213 PR OFFICE/OUTPT VISIT, EST, LEVL III, 20-29 MIN: ICD-10-PCS | Mod: S$GLB,,, | Performed by: PHYSICAL MEDICINE & REHABILITATION

## 2021-09-24 PROCEDURE — 3044F PR MOST RECENT HEMOGLOBIN A1C LEVEL <7.0%: ICD-10-PCS | Mod: CPTII,S$GLB,, | Performed by: PHYSICAL MEDICINE & REHABILITATION

## 2021-09-24 PROCEDURE — 3008F BODY MASS INDEX DOCD: CPT | Mod: CPTII,S$GLB,, | Performed by: PHYSICAL MEDICINE & REHABILITATION

## 2021-09-24 PROCEDURE — 3008F PR BODY MASS INDEX (BMI) DOCUMENTED: ICD-10-PCS | Mod: CPTII,S$GLB,, | Performed by: PHYSICAL MEDICINE & REHABILITATION

## 2021-09-24 PROCEDURE — 3079F DIAST BP 80-89 MM HG: CPT | Mod: CPTII,S$GLB,, | Performed by: PHYSICAL MEDICINE & REHABILITATION

## 2021-09-24 PROCEDURE — 99999 PR PBB SHADOW E&M-EST. PATIENT-LVL III: CPT | Mod: PBBFAC,,, | Performed by: PHYSICAL MEDICINE & REHABILITATION

## 2021-09-24 PROCEDURE — 3077F PR MOST RECENT SYSTOLIC BLOOD PRESSURE >= 140 MM HG: ICD-10-PCS | Mod: CPTII,S$GLB,, | Performed by: PHYSICAL MEDICINE & REHABILITATION

## 2021-09-24 PROCEDURE — 1101F PT FALLS ASSESS-DOCD LE1/YR: CPT | Mod: CPTII,S$GLB,, | Performed by: PHYSICAL MEDICINE & REHABILITATION

## 2021-09-24 PROCEDURE — 1159F PR MEDICATION LIST DOCUMENTED IN MEDICAL RECORD: ICD-10-PCS | Mod: CPTII,S$GLB,, | Performed by: PHYSICAL MEDICINE & REHABILITATION

## 2021-09-24 PROCEDURE — 1159F MED LIST DOCD IN RCRD: CPT | Mod: CPTII,S$GLB,, | Performed by: PHYSICAL MEDICINE & REHABILITATION

## 2021-09-24 PROCEDURE — 4010F PR ACE/ARB THEARPY RXD/TAKEN: ICD-10-PCS | Mod: CPTII,S$GLB,, | Performed by: PHYSICAL MEDICINE & REHABILITATION

## 2021-09-24 PROCEDURE — 1125F PR PAIN SEVERITY QUANTIFIED, PAIN PRESENT: ICD-10-PCS | Mod: CPTII,S$GLB,, | Performed by: PHYSICAL MEDICINE & REHABILITATION

## 2021-09-24 PROCEDURE — 3061F NEG MICROALBUMINURIA REV: CPT | Mod: CPTII,S$GLB,, | Performed by: PHYSICAL MEDICINE & REHABILITATION

## 2021-09-24 PROCEDURE — 3061F PR NEG MICROALBUMINURIA RESULT DOCUMENTED/REVIEW: ICD-10-PCS | Mod: CPTII,S$GLB,, | Performed by: PHYSICAL MEDICINE & REHABILITATION

## 2021-09-24 PROCEDURE — 3288F FALL RISK ASSESSMENT DOCD: CPT | Mod: CPTII,S$GLB,, | Performed by: PHYSICAL MEDICINE & REHABILITATION

## 2021-09-24 PROCEDURE — 99213 OFFICE O/P EST LOW 20 MIN: CPT | Mod: S$GLB,,, | Performed by: PHYSICAL MEDICINE & REHABILITATION

## 2021-09-24 PROCEDURE — 4010F ACE/ARB THERAPY RXD/TAKEN: CPT | Mod: CPTII,S$GLB,, | Performed by: PHYSICAL MEDICINE & REHABILITATION

## 2021-09-24 PROCEDURE — 3077F SYST BP >= 140 MM HG: CPT | Mod: CPTII,S$GLB,, | Performed by: PHYSICAL MEDICINE & REHABILITATION

## 2021-09-24 PROCEDURE — 3066F NEPHROPATHY DOC TX: CPT | Mod: CPTII,S$GLB,, | Performed by: PHYSICAL MEDICINE & REHABILITATION

## 2021-09-24 PROCEDURE — 99999 PR PBB SHADOW E&M-EST. PATIENT-LVL III: ICD-10-PCS | Mod: PBBFAC,,, | Performed by: PHYSICAL MEDICINE & REHABILITATION

## 2021-09-24 PROCEDURE — 3044F HG A1C LEVEL LT 7.0%: CPT | Mod: CPTII,S$GLB,, | Performed by: PHYSICAL MEDICINE & REHABILITATION

## 2021-09-24 PROCEDURE — 1101F PR PT FALLS ASSESS DOC 0-1 FALLS W/OUT INJ PAST YR: ICD-10-PCS | Mod: CPTII,S$GLB,, | Performed by: PHYSICAL MEDICINE & REHABILITATION

## 2021-09-24 PROCEDURE — 3079F PR MOST RECENT DIASTOLIC BLOOD PRESSURE 80-89 MM HG: ICD-10-PCS | Mod: CPTII,S$GLB,, | Performed by: PHYSICAL MEDICINE & REHABILITATION

## 2021-09-24 PROCEDURE — 1125F AMNT PAIN NOTED PAIN PRSNT: CPT | Mod: CPTII,S$GLB,, | Performed by: PHYSICAL MEDICINE & REHABILITATION

## 2021-09-24 PROCEDURE — 3288F PR FALLS RISK ASSESSMENT DOCUMENTED: ICD-10-PCS | Mod: CPTII,S$GLB,, | Performed by: PHYSICAL MEDICINE & REHABILITATION

## 2021-10-11 ENCOUNTER — CLINICAL SUPPORT (OUTPATIENT)
Dept: PHYSICAL MEDICINE AND REHAB | Facility: CLINIC | Age: 71
End: 2021-10-11

## 2021-10-11 ENCOUNTER — OFFICE VISIT (OUTPATIENT)
Dept: FAMILY MEDICINE | Facility: CLINIC | Age: 71
End: 2021-10-11
Payer: COMMERCIAL

## 2021-10-11 VITALS
DIASTOLIC BLOOD PRESSURE: 81 MMHG | WEIGHT: 166 LBS | SYSTOLIC BLOOD PRESSURE: 159 MMHG | HEIGHT: 70 IN | HEART RATE: 56 BPM | BODY MASS INDEX: 23.77 KG/M2

## 2021-10-11 VITALS
DIASTOLIC BLOOD PRESSURE: 68 MMHG | HEART RATE: 60 BPM | TEMPERATURE: 98 F | RESPIRATION RATE: 12 BRPM | OXYGEN SATURATION: 97 % | HEIGHT: 70 IN | WEIGHT: 166 LBS | SYSTOLIC BLOOD PRESSURE: 122 MMHG | BODY MASS INDEX: 23.77 KG/M2

## 2021-10-11 DIAGNOSIS — I10 ESSENTIAL HYPERTENSION: Primary | ICD-10-CM

## 2021-10-11 DIAGNOSIS — M17.12 PRIMARY OSTEOARTHRITIS OF LEFT KNEE: ICD-10-CM

## 2021-10-11 PROCEDURE — 1160F RVW MEDS BY RX/DR IN RCRD: CPT | Mod: S$GLB,,, | Performed by: NURSE PRACTITIONER

## 2021-10-11 PROCEDURE — 1159F PR MEDICATION LIST DOCUMENTED IN MEDICAL RECORD: ICD-10-PCS | Mod: S$GLB,,, | Performed by: NURSE PRACTITIONER

## 2021-10-11 PROCEDURE — 3008F BODY MASS INDEX DOCD: CPT | Mod: S$GLB,,, | Performed by: NURSE PRACTITIONER

## 2021-10-11 PROCEDURE — 4010F ACE/ARB THERAPY RXD/TAKEN: CPT | Mod: S$GLB,,, | Performed by: NURSE PRACTITIONER

## 2021-10-11 PROCEDURE — 1126F PR PAIN SEVERITY QUANTIFIED, NO PAIN PRESENT: ICD-10-PCS | Mod: S$GLB,,, | Performed by: NURSE PRACTITIONER

## 2021-10-11 PROCEDURE — 3074F PR MOST RECENT SYSTOLIC BLOOD PRESSURE < 130 MM HG: ICD-10-PCS | Mod: S$GLB,,, | Performed by: NURSE PRACTITIONER

## 2021-10-11 PROCEDURE — 3066F PR DOCUMENTATION OF TREATMENT FOR NEPHROPATHY: ICD-10-PCS | Mod: S$GLB,,, | Performed by: NURSE PRACTITIONER

## 2021-10-11 PROCEDURE — 99999 PR PBB SHADOW E&M-EST. PATIENT-LVL III: ICD-10-PCS | Mod: PBBFAC,,, | Performed by: PHYSICAL MEDICINE & REHABILITATION

## 2021-10-11 PROCEDURE — 0232T PR INJECT PLATELET PLASMA W/IMG HARVEST/PREPARATOIN: ICD-10-PCS | Mod: CSM,S$GLB,, | Performed by: PHYSICAL MEDICINE & REHABILITATION

## 2021-10-11 PROCEDURE — 4010F PR ACE/ARB THEARPY RXD/TAKEN: ICD-10-PCS | Mod: S$GLB,,, | Performed by: NURSE PRACTITIONER

## 2021-10-11 PROCEDURE — 1126F AMNT PAIN NOTED NONE PRSNT: CPT | Mod: S$GLB,,, | Performed by: NURSE PRACTITIONER

## 2021-10-11 PROCEDURE — 99999 PR PBB SHADOW E&M-EST. PATIENT-LVL III: CPT | Mod: PBBFAC,,, | Performed by: PHYSICAL MEDICINE & REHABILITATION

## 2021-10-11 PROCEDURE — 99213 PR OFFICE/OUTPT VISIT, EST, LEVL III, 20-29 MIN: ICD-10-PCS | Mod: S$GLB,,, | Performed by: NURSE PRACTITIONER

## 2021-10-11 PROCEDURE — 3066F NEPHROPATHY DOC TX: CPT | Mod: S$GLB,,, | Performed by: NURSE PRACTITIONER

## 2021-10-11 PROCEDURE — 1160F PR REVIEW ALL MEDS BY PRESCRIBER/CLIN PHARMACIST DOCUMENTED: ICD-10-PCS | Mod: S$GLB,,, | Performed by: NURSE PRACTITIONER

## 2021-10-11 PROCEDURE — 3044F PR MOST RECENT HEMOGLOBIN A1C LEVEL <7.0%: ICD-10-PCS | Mod: S$GLB,,, | Performed by: NURSE PRACTITIONER

## 2021-10-11 PROCEDURE — 3061F NEG MICROALBUMINURIA REV: CPT | Mod: S$GLB,,, | Performed by: NURSE PRACTITIONER

## 2021-10-11 PROCEDURE — 3078F DIAST BP <80 MM HG: CPT | Mod: S$GLB,,, | Performed by: NURSE PRACTITIONER

## 2021-10-11 PROCEDURE — 3074F SYST BP LT 130 MM HG: CPT | Mod: S$GLB,,, | Performed by: NURSE PRACTITIONER

## 2021-10-11 PROCEDURE — 3078F PR MOST RECENT DIASTOLIC BLOOD PRESSURE < 80 MM HG: ICD-10-PCS | Mod: S$GLB,,, | Performed by: NURSE PRACTITIONER

## 2021-10-11 PROCEDURE — 99213 OFFICE O/P EST LOW 20 MIN: CPT | Mod: S$GLB,,, | Performed by: NURSE PRACTITIONER

## 2021-10-11 PROCEDURE — 3044F HG A1C LEVEL LT 7.0%: CPT | Mod: S$GLB,,, | Performed by: NURSE PRACTITIONER

## 2021-10-11 PROCEDURE — 99499 NO LOS: ICD-10-PCS | Mod: S$GLB,,, | Performed by: PHYSICAL MEDICINE & REHABILITATION

## 2021-10-11 PROCEDURE — 3008F PR BODY MASS INDEX (BMI) DOCUMENTED: ICD-10-PCS | Mod: S$GLB,,, | Performed by: NURSE PRACTITIONER

## 2021-10-11 PROCEDURE — 1159F MED LIST DOCD IN RCRD: CPT | Mod: S$GLB,,, | Performed by: NURSE PRACTITIONER

## 2021-10-11 PROCEDURE — 0232T NJX PLATELET PLASMA: CPT | Mod: CSM,S$GLB,, | Performed by: PHYSICAL MEDICINE & REHABILITATION

## 2021-10-11 PROCEDURE — 99499 UNLISTED E&M SERVICE: CPT | Mod: S$GLB,,, | Performed by: PHYSICAL MEDICINE & REHABILITATION

## 2021-10-11 PROCEDURE — 3061F PR NEG MICROALBUMINURIA RESULT DOCUMENTED/REVIEW: ICD-10-PCS | Mod: S$GLB,,, | Performed by: NURSE PRACTITIONER

## 2021-10-11 RX ORDER — MIRABEGRON 50 MG/1
50 TABLET, FILM COATED, EXTENDED RELEASE ORAL DAILY
Qty: 90 TABLET | Refills: 3 | Status: CANCELLED | OUTPATIENT
Start: 2021-10-11 | End: 2022-01-09

## 2021-10-28 ENCOUNTER — PATIENT MESSAGE (OUTPATIENT)
Dept: FAMILY MEDICINE | Facility: CLINIC | Age: 71
End: 2021-10-28
Payer: COMMERCIAL

## 2021-10-28 ENCOUNTER — TELEPHONE (OUTPATIENT)
Dept: FAMILY MEDICINE | Facility: CLINIC | Age: 71
End: 2021-10-28
Payer: COMMERCIAL

## 2021-11-11 ENCOUNTER — OFFICE VISIT (OUTPATIENT)
Dept: PHYSICAL MEDICINE AND REHAB | Facility: CLINIC | Age: 71
End: 2021-11-11
Payer: COMMERCIAL

## 2021-11-11 VITALS
HEIGHT: 70 IN | WEIGHT: 166 LBS | BODY MASS INDEX: 23.77 KG/M2 | DIASTOLIC BLOOD PRESSURE: 77 MMHG | HEART RATE: 58 BPM | SYSTOLIC BLOOD PRESSURE: 134 MMHG

## 2021-11-11 DIAGNOSIS — M17.0 PRIMARY OSTEOARTHRITIS OF BOTH KNEES: Primary | ICD-10-CM

## 2021-11-11 DIAGNOSIS — M54.16 LUMBAR RADICULOPATHY, CHRONIC: ICD-10-CM

## 2021-11-11 PROCEDURE — 1126F AMNT PAIN NOTED NONE PRSNT: CPT | Mod: CPTII,S$GLB,, | Performed by: PHYSICAL MEDICINE & REHABILITATION

## 2021-11-11 PROCEDURE — 3078F PR MOST RECENT DIASTOLIC BLOOD PRESSURE < 80 MM HG: ICD-10-PCS | Mod: CPTII,S$GLB,, | Performed by: PHYSICAL MEDICINE & REHABILITATION

## 2021-11-11 PROCEDURE — 3008F PR BODY MASS INDEX (BMI) DOCUMENTED: ICD-10-PCS | Mod: CPTII,S$GLB,, | Performed by: PHYSICAL MEDICINE & REHABILITATION

## 2021-11-11 PROCEDURE — 3075F PR MOST RECENT SYSTOLIC BLOOD PRESS GE 130-139MM HG: ICD-10-PCS | Mod: CPTII,S$GLB,, | Performed by: PHYSICAL MEDICINE & REHABILITATION

## 2021-11-11 PROCEDURE — 3066F NEPHROPATHY DOC TX: CPT | Mod: CPTII,S$GLB,, | Performed by: PHYSICAL MEDICINE & REHABILITATION

## 2021-11-11 PROCEDURE — 3008F BODY MASS INDEX DOCD: CPT | Mod: CPTII,S$GLB,, | Performed by: PHYSICAL MEDICINE & REHABILITATION

## 2021-11-11 PROCEDURE — 99999 PR PBB SHADOW E&M-EST. PATIENT-LVL III: ICD-10-PCS | Mod: PBBFAC,,, | Performed by: PHYSICAL MEDICINE & REHABILITATION

## 2021-11-11 PROCEDURE — 4010F ACE/ARB THERAPY RXD/TAKEN: CPT | Mod: CPTII,S$GLB,, | Performed by: PHYSICAL MEDICINE & REHABILITATION

## 2021-11-11 PROCEDURE — 3078F DIAST BP <80 MM HG: CPT | Mod: CPTII,S$GLB,, | Performed by: PHYSICAL MEDICINE & REHABILITATION

## 2021-11-11 PROCEDURE — 99999 PR PBB SHADOW E&M-EST. PATIENT-LVL III: CPT | Mod: PBBFAC,,, | Performed by: PHYSICAL MEDICINE & REHABILITATION

## 2021-11-11 PROCEDURE — 99214 PR OFFICE/OUTPT VISIT, EST, LEVL IV, 30-39 MIN: ICD-10-PCS | Mod: S$GLB,,, | Performed by: PHYSICAL MEDICINE & REHABILITATION

## 2021-11-11 PROCEDURE — 99214 OFFICE O/P EST MOD 30 MIN: CPT | Mod: S$GLB,,, | Performed by: PHYSICAL MEDICINE & REHABILITATION

## 2021-11-11 PROCEDURE — 3066F PR DOCUMENTATION OF TREATMENT FOR NEPHROPATHY: ICD-10-PCS | Mod: CPTII,S$GLB,, | Performed by: PHYSICAL MEDICINE & REHABILITATION

## 2021-11-11 PROCEDURE — 4010F PR ACE/ARB THEARPY RXD/TAKEN: ICD-10-PCS | Mod: CPTII,S$GLB,, | Performed by: PHYSICAL MEDICINE & REHABILITATION

## 2021-11-11 PROCEDURE — 3061F NEG MICROALBUMINURIA REV: CPT | Mod: CPTII,S$GLB,, | Performed by: PHYSICAL MEDICINE & REHABILITATION

## 2021-11-11 PROCEDURE — 3044F PR MOST RECENT HEMOGLOBIN A1C LEVEL <7.0%: ICD-10-PCS | Mod: CPTII,S$GLB,, | Performed by: PHYSICAL MEDICINE & REHABILITATION

## 2021-11-11 PROCEDURE — 1159F PR MEDICATION LIST DOCUMENTED IN MEDICAL RECORD: ICD-10-PCS | Mod: CPTII,S$GLB,, | Performed by: PHYSICAL MEDICINE & REHABILITATION

## 2021-11-11 PROCEDURE — 3061F PR NEG MICROALBUMINURIA RESULT DOCUMENTED/REVIEW: ICD-10-PCS | Mod: CPTII,S$GLB,, | Performed by: PHYSICAL MEDICINE & REHABILITATION

## 2021-11-11 PROCEDURE — 3288F PR FALLS RISK ASSESSMENT DOCUMENTED: ICD-10-PCS | Mod: CPTII,S$GLB,, | Performed by: PHYSICAL MEDICINE & REHABILITATION

## 2021-11-11 PROCEDURE — 3288F FALL RISK ASSESSMENT DOCD: CPT | Mod: CPTII,S$GLB,, | Performed by: PHYSICAL MEDICINE & REHABILITATION

## 2021-11-11 PROCEDURE — 3044F HG A1C LEVEL LT 7.0%: CPT | Mod: CPTII,S$GLB,, | Performed by: PHYSICAL MEDICINE & REHABILITATION

## 2021-11-11 PROCEDURE — 1159F MED LIST DOCD IN RCRD: CPT | Mod: CPTII,S$GLB,, | Performed by: PHYSICAL MEDICINE & REHABILITATION

## 2021-11-11 PROCEDURE — 1101F PT FALLS ASSESS-DOCD LE1/YR: CPT | Mod: CPTII,S$GLB,, | Performed by: PHYSICAL MEDICINE & REHABILITATION

## 2021-11-11 PROCEDURE — 1101F PR PT FALLS ASSESS DOC 0-1 FALLS W/OUT INJ PAST YR: ICD-10-PCS | Mod: CPTII,S$GLB,, | Performed by: PHYSICAL MEDICINE & REHABILITATION

## 2021-11-11 PROCEDURE — 3075F SYST BP GE 130 - 139MM HG: CPT | Mod: CPTII,S$GLB,, | Performed by: PHYSICAL MEDICINE & REHABILITATION

## 2021-11-11 PROCEDURE — 1126F PR PAIN SEVERITY QUANTIFIED, NO PAIN PRESENT: ICD-10-PCS | Mod: CPTII,S$GLB,, | Performed by: PHYSICAL MEDICINE & REHABILITATION

## 2021-11-22 ENCOUNTER — TELEPHONE (OUTPATIENT)
Dept: PHYSICAL MEDICINE AND REHAB | Facility: CLINIC | Age: 71
End: 2021-11-22
Payer: COMMERCIAL

## 2021-11-22 ENCOUNTER — HOSPITAL ENCOUNTER (OUTPATIENT)
Dept: RADIOLOGY | Facility: HOSPITAL | Age: 71
Discharge: HOME | End: 2021-11-22
Attending: PHYSICAL MEDICINE & REHABILITATION
Payer: COMMERCIAL

## 2021-11-22 DIAGNOSIS — R19.00 INTRA-ABDOMINAL AND PELVIC SWELLING, MASS AND LUMP, UNSPECIFIED SITE: ICD-10-CM

## 2021-11-22 DIAGNOSIS — M17.0 PRIMARY OSTEOARTHRITIS OF BOTH KNEES: Primary | ICD-10-CM

## 2021-11-22 DIAGNOSIS — M54.16 LUMBAR RADICULITIS: ICD-10-CM

## 2021-11-22 DIAGNOSIS — M54.16 LUMBAR RADICULOPATHY, CHRONIC: ICD-10-CM

## 2021-11-22 PROCEDURE — 72148 MRI LUMBAR SPINE W/O DYE: CPT | Mod: 26,,, | Performed by: RADIOLOGY

## 2021-11-22 PROCEDURE — 72148 MRI LUMBAR SPINE W/O DYE: CPT | Mod: TC

## 2021-11-22 PROCEDURE — 72148 MRI LUMBAR SPINE WITHOUT CONTRAST: ICD-10-PCS | Mod: 26,,, | Performed by: RADIOLOGY

## 2021-11-23 ENCOUNTER — LAB VISIT (OUTPATIENT)
Dept: LAB | Facility: HOSPITAL | Age: 71
End: 2021-11-23
Attending: PHYSICAL MEDICINE & REHABILITATION
Payer: COMMERCIAL

## 2021-11-23 DIAGNOSIS — R19.00 INTRA-ABDOMINAL AND PELVIC SWELLING, MASS AND LUMP, UNSPECIFIED SITE: ICD-10-CM

## 2021-11-23 LAB
CREAT SERPL-MCNC: 1 MG/DL (ref 0.5–1.4)
EST. GFR  (AFRICAN AMERICAN): >60 ML/MIN/1.73 M^2
EST. GFR  (NON AFRICAN AMERICAN): >60 ML/MIN/1.73 M^2

## 2021-11-23 PROCEDURE — 82565 ASSAY OF CREATININE: CPT | Performed by: PHYSICAL MEDICINE & REHABILITATION

## 2021-11-23 PROCEDURE — 36415 COLL VENOUS BLD VENIPUNCTURE: CPT | Performed by: PHYSICAL MEDICINE & REHABILITATION

## 2021-11-24 ENCOUNTER — PATIENT MESSAGE (OUTPATIENT)
Dept: NEUROSURGERY | Facility: CLINIC | Age: 71
End: 2021-11-24
Payer: COMMERCIAL

## 2021-11-30 ENCOUNTER — OFFICE VISIT (OUTPATIENT)
Dept: NEUROSURGERY | Facility: CLINIC | Age: 71
End: 2021-11-30
Payer: COMMERCIAL

## 2021-11-30 VITALS
SYSTOLIC BLOOD PRESSURE: 127 MMHG | HEIGHT: 70 IN | RESPIRATION RATE: 18 BRPM | DIASTOLIC BLOOD PRESSURE: 63 MMHG | BODY MASS INDEX: 23.77 KG/M2 | WEIGHT: 166 LBS | HEART RATE: 63 BPM

## 2021-11-30 DIAGNOSIS — M54.16 LUMBAR RADICULITIS: ICD-10-CM

## 2021-11-30 DIAGNOSIS — M54.16 LUMBAR RADICULOPATHY: ICD-10-CM

## 2021-11-30 DIAGNOSIS — M47.812 CERVICAL SPONDYLOSIS: Primary | ICD-10-CM

## 2021-11-30 DIAGNOSIS — M17.10 KNEE ARTHROPATHY: ICD-10-CM

## 2021-11-30 PROCEDURE — 99999 PR PBB SHADOW E&M-EST. PATIENT-LVL III: CPT | Mod: PBBFAC,,, | Performed by: NEUROLOGICAL SURGERY

## 2021-11-30 PROCEDURE — 99204 PR OFFICE/OUTPT VISIT, NEW, LEVL IV, 45-59 MIN: ICD-10-PCS | Mod: S$GLB,,, | Performed by: NEUROLOGICAL SURGERY

## 2021-11-30 PROCEDURE — 4010F PR ACE/ARB THEARPY RXD/TAKEN: ICD-10-PCS | Mod: CPTII,S$GLB,, | Performed by: NEUROLOGICAL SURGERY

## 2021-11-30 PROCEDURE — 3061F NEG MICROALBUMINURIA REV: CPT | Mod: CPTII,S$GLB,, | Performed by: NEUROLOGICAL SURGERY

## 2021-11-30 PROCEDURE — 99999 PR PBB SHADOW E&M-EST. PATIENT-LVL III: ICD-10-PCS | Mod: PBBFAC,,, | Performed by: NEUROLOGICAL SURGERY

## 2021-11-30 PROCEDURE — 3061F PR NEG MICROALBUMINURIA RESULT DOCUMENTED/REVIEW: ICD-10-PCS | Mod: CPTII,S$GLB,, | Performed by: NEUROLOGICAL SURGERY

## 2021-11-30 PROCEDURE — 3066F PR DOCUMENTATION OF TREATMENT FOR NEPHROPATHY: ICD-10-PCS | Mod: CPTII,S$GLB,, | Performed by: NEUROLOGICAL SURGERY

## 2021-11-30 PROCEDURE — 4010F ACE/ARB THERAPY RXD/TAKEN: CPT | Mod: CPTII,S$GLB,, | Performed by: NEUROLOGICAL SURGERY

## 2021-11-30 PROCEDURE — 99204 OFFICE O/P NEW MOD 45 MIN: CPT | Mod: S$GLB,,, | Performed by: NEUROLOGICAL SURGERY

## 2021-11-30 PROCEDURE — 3066F NEPHROPATHY DOC TX: CPT | Mod: CPTII,S$GLB,, | Performed by: NEUROLOGICAL SURGERY

## 2021-12-01 ENCOUNTER — PATIENT MESSAGE (OUTPATIENT)
Dept: FAMILY MEDICINE | Facility: CLINIC | Age: 71
End: 2021-12-01
Payer: COMMERCIAL

## 2021-12-01 ENCOUNTER — TELEPHONE (OUTPATIENT)
Dept: PHYSICAL MEDICINE AND REHAB | Facility: CLINIC | Age: 71
End: 2021-12-01
Payer: COMMERCIAL

## 2021-12-01 DIAGNOSIS — I10 HYPERTENSION, UNSPECIFIED TYPE: Primary | ICD-10-CM

## 2021-12-01 DIAGNOSIS — M17.0 PRIMARY OSTEOARTHRITIS OF BOTH KNEES: Primary | ICD-10-CM

## 2021-12-02 ENCOUNTER — TELEPHONE (OUTPATIENT)
Dept: PHYSICAL MEDICINE AND REHAB | Facility: CLINIC | Age: 71
End: 2021-12-02
Payer: COMMERCIAL

## 2021-12-02 DIAGNOSIS — M17.0 PRIMARY OSTEOARTHRITIS OF BOTH KNEES: Primary | ICD-10-CM

## 2021-12-02 RX ORDER — AMLODIPINE BESYLATE 10 MG/1
10 TABLET ORAL DAILY
Qty: 90 TABLET | Refills: 1 | Status: ON HOLD | OUTPATIENT
Start: 2021-12-02 | End: 2022-01-13 | Stop reason: SDUPTHER

## 2021-12-07 ENCOUNTER — HOSPITAL ENCOUNTER (OUTPATIENT)
Dept: RADIOLOGY | Facility: HOSPITAL | Age: 71
Discharge: HOME | End: 2021-12-07
Attending: PHYSICAL MEDICINE & REHABILITATION
Payer: COMMERCIAL

## 2021-12-07 ENCOUNTER — OFFICE VISIT (OUTPATIENT)
Dept: CARDIOLOGY | Facility: CLINIC | Age: 71
End: 2021-12-07
Payer: COMMERCIAL

## 2021-12-07 ENCOUNTER — OFFICE VISIT (OUTPATIENT)
Dept: PHYSICAL MEDICINE AND REHAB | Facility: CLINIC | Age: 71
End: 2021-12-07
Payer: COMMERCIAL

## 2021-12-07 VITALS
HEART RATE: 76 BPM | BODY MASS INDEX: 23.77 KG/M2 | DIASTOLIC BLOOD PRESSURE: 80 MMHG | HEIGHT: 70 IN | SYSTOLIC BLOOD PRESSURE: 110 MMHG | WEIGHT: 166 LBS

## 2021-12-07 VITALS
DIASTOLIC BLOOD PRESSURE: 77 MMHG | HEIGHT: 70 IN | BODY MASS INDEX: 23.77 KG/M2 | HEART RATE: 55 BPM | WEIGHT: 166 LBS | SYSTOLIC BLOOD PRESSURE: 138 MMHG

## 2021-12-07 DIAGNOSIS — M17.0 BILATERAL PRIMARY OSTEOARTHRITIS OF KNEE: Primary | ICD-10-CM

## 2021-12-07 DIAGNOSIS — E78.2 MIXED HYPERLIPIDEMIA: ICD-10-CM

## 2021-12-07 DIAGNOSIS — R19.00 INTRA-ABDOMINAL AND PELVIC SWELLING, MASS AND LUMP, UNSPECIFIED SITE: ICD-10-CM

## 2021-12-07 DIAGNOSIS — R06.09 DYSPNEA ON EXERTION: ICD-10-CM

## 2021-12-07 DIAGNOSIS — I10 ESSENTIAL HYPERTENSION: Primary | ICD-10-CM

## 2021-12-07 DIAGNOSIS — M17.0 PRIMARY OSTEOARTHRITIS OF BOTH KNEES: ICD-10-CM

## 2021-12-07 DIAGNOSIS — R91.8 MULTIPLE PULMONARY NODULES: ICD-10-CM

## 2021-12-07 DIAGNOSIS — I34.0 NONRHEUMATIC MITRAL VALVE REGURGITATION: ICD-10-CM

## 2021-12-07 PROCEDURE — 99499 NO LOS: ICD-10-PCS | Mod: S$GLB,,, | Performed by: PHYSICAL MEDICINE & REHABILITATION

## 2021-12-07 PROCEDURE — 99999 PR PBB SHADOW E&M-EST. PATIENT-LVL III: ICD-10-PCS | Mod: PBBFAC,,, | Performed by: PHYSICAL MEDICINE & REHABILITATION

## 2021-12-07 PROCEDURE — 99499 UNLISTED E&M SERVICE: CPT | Mod: S$GLB,,, | Performed by: PHYSICAL MEDICINE & REHABILITATION

## 2021-12-07 PROCEDURE — 20611 PR DRAIN/ASP/INJECT MAJOR JOINT/BURSA W/US GUIDANCE: ICD-10-PCS | Mod: 50,S$GLB,, | Performed by: PHYSICAL MEDICINE & REHABILITATION

## 2021-12-07 PROCEDURE — 74183 MRI ABD W/O CNTR FLWD CNTR: CPT | Mod: TC

## 2021-12-07 PROCEDURE — 3061F NEG MICROALBUMINURIA REV: CPT | Mod: CPTII,S$GLB,, | Performed by: PHYSICAL MEDICINE & REHABILITATION

## 2021-12-07 PROCEDURE — 74183 MRI ABDOMEN W WO CONTRAST: ICD-10-PCS | Mod: 26,,, | Performed by: RADIOLOGY

## 2021-12-07 PROCEDURE — 4010F ACE/ARB THERAPY RXD/TAKEN: CPT | Mod: CPTII,S$GLB,, | Performed by: PHYSICAL MEDICINE & REHABILITATION

## 2021-12-07 PROCEDURE — A9585 GADOBUTROL INJECTION: HCPCS | Performed by: PHYSICAL MEDICINE & REHABILITATION

## 2021-12-07 PROCEDURE — 99999 PR PBB SHADOW E&M-EST. PATIENT-LVL III: CPT | Mod: PBBFAC,,, | Performed by: PHYSICAL MEDICINE & REHABILITATION

## 2021-12-07 PROCEDURE — 74183 MRI ABD W/O CNTR FLWD CNTR: CPT | Mod: 26,,, | Performed by: RADIOLOGY

## 2021-12-07 PROCEDURE — 99214 PR OFFICE/OUTPT VISIT, EST, LEVL IV, 30-39 MIN: ICD-10-PCS | Mod: S$GLB,,, | Performed by: NURSE PRACTITIONER

## 2021-12-07 PROCEDURE — 3066F PR DOCUMENTATION OF TREATMENT FOR NEPHROPATHY: ICD-10-PCS | Mod: CPTII,S$GLB,, | Performed by: PHYSICAL MEDICINE & REHABILITATION

## 2021-12-07 PROCEDURE — 3066F PR DOCUMENTATION OF TREATMENT FOR NEPHROPATHY: ICD-10-PCS | Mod: CPTII,S$GLB,, | Performed by: NURSE PRACTITIONER

## 2021-12-07 PROCEDURE — 3061F PR NEG MICROALBUMINURIA RESULT DOCUMENTED/REVIEW: ICD-10-PCS | Mod: CPTII,S$GLB,, | Performed by: NURSE PRACTITIONER

## 2021-12-07 PROCEDURE — 4010F ACE/ARB THERAPY RXD/TAKEN: CPT | Mod: CPTII,S$GLB,, | Performed by: NURSE PRACTITIONER

## 2021-12-07 PROCEDURE — 3066F NEPHROPATHY DOC TX: CPT | Mod: CPTII,S$GLB,, | Performed by: NURSE PRACTITIONER

## 2021-12-07 PROCEDURE — 3061F PR NEG MICROALBUMINURIA RESULT DOCUMENTED/REVIEW: ICD-10-PCS | Mod: CPTII,S$GLB,, | Performed by: PHYSICAL MEDICINE & REHABILITATION

## 2021-12-07 PROCEDURE — 4010F PR ACE/ARB THEARPY RXD/TAKEN: ICD-10-PCS | Mod: CPTII,S$GLB,, | Performed by: NURSE PRACTITIONER

## 2021-12-07 PROCEDURE — 20611 DRAIN/INJ JOINT/BURSA W/US: CPT | Mod: 50,S$GLB,, | Performed by: PHYSICAL MEDICINE & REHABILITATION

## 2021-12-07 PROCEDURE — 4010F PR ACE/ARB THEARPY RXD/TAKEN: ICD-10-PCS | Mod: CPTII,S$GLB,, | Performed by: PHYSICAL MEDICINE & REHABILITATION

## 2021-12-07 PROCEDURE — 3066F NEPHROPATHY DOC TX: CPT | Mod: CPTII,S$GLB,, | Performed by: PHYSICAL MEDICINE & REHABILITATION

## 2021-12-07 PROCEDURE — 3061F NEG MICROALBUMINURIA REV: CPT | Mod: CPTII,S$GLB,, | Performed by: NURSE PRACTITIONER

## 2021-12-07 PROCEDURE — 25500020 PHARM REV CODE 255: Performed by: PHYSICAL MEDICINE & REHABILITATION

## 2021-12-07 PROCEDURE — 99214 OFFICE O/P EST MOD 30 MIN: CPT | Mod: S$GLB,,, | Performed by: NURSE PRACTITIONER

## 2021-12-07 RX ORDER — IBUPROFEN 800 MG/1
TABLET ORAL
Status: ON HOLD | COMMUNITY
Start: 2021-10-26 | End: 2022-01-13 | Stop reason: HOSPADM

## 2021-12-07 RX ORDER — GADOBUTROL 604.72 MG/ML
7 INJECTION INTRAVENOUS
Status: COMPLETED | OUTPATIENT
Start: 2021-12-07 | End: 2021-12-07

## 2021-12-07 RX ORDER — DOXYCYCLINE 100 MG/1
TABLET ORAL
COMMUNITY
Start: 2021-10-26 | End: 2022-01-02

## 2021-12-07 RX ADMIN — GADOBUTROL 7 ML: 604.72 INJECTION INTRAVENOUS at 06:12

## 2021-12-10 ENCOUNTER — HOSPITAL ENCOUNTER (OUTPATIENT)
Dept: RADIOLOGY | Facility: HOSPITAL | Age: 71
Discharge: HOME | End: 2021-12-10
Attending: PHYSICIAN ASSISTANT
Payer: COMMERCIAL

## 2021-12-10 DIAGNOSIS — M47.812 CERVICAL SPONDYLOSIS: ICD-10-CM

## 2021-12-10 PROCEDURE — 72141 MRI NECK SPINE W/O DYE: CPT | Mod: TC,PO

## 2021-12-13 ENCOUNTER — TELEPHONE (OUTPATIENT)
Dept: NEUROSURGERY | Facility: CLINIC | Age: 71
End: 2021-12-13
Payer: COMMERCIAL

## 2021-12-14 ENCOUNTER — PATIENT MESSAGE (OUTPATIENT)
Dept: NEUROSURGERY | Facility: CLINIC | Age: 71
End: 2021-12-14
Payer: COMMERCIAL

## 2021-12-26 ENCOUNTER — PATIENT MESSAGE (OUTPATIENT)
Dept: HEPATOLOGY | Facility: CLINIC | Age: 71
End: 2021-12-26
Payer: COMMERCIAL

## 2022-01-02 ENCOUNTER — HOSPITAL ENCOUNTER (INPATIENT)
Facility: HOSPITAL | Age: 72
LOS: 10 days | Discharge: HOME | DRG: 330 | End: 2022-01-13
Attending: EMERGENCY MEDICINE | Admitting: HOSPITALIST
Payer: COMMERCIAL

## 2022-01-02 ENCOUNTER — ANESTHESIA EVENT (OUTPATIENT)
Dept: SURGERY | Facility: HOSPITAL | Age: 72
DRG: 330 | End: 2022-01-02
Payer: COMMERCIAL

## 2022-01-02 ENCOUNTER — ANESTHESIA (OUTPATIENT)
Dept: SURGERY | Facility: HOSPITAL | Age: 72
DRG: 330 | End: 2022-01-02
Payer: COMMERCIAL

## 2022-01-02 DIAGNOSIS — T81.43XA POSTPROCEDURAL INTRAABDOMINAL ABSCESS: ICD-10-CM

## 2022-01-02 DIAGNOSIS — R07.9 CHEST PAIN: ICD-10-CM

## 2022-01-02 DIAGNOSIS — I10 HYPERTENSION, UNSPECIFIED TYPE: ICD-10-CM

## 2022-01-02 DIAGNOSIS — I10 ESSENTIAL HYPERTENSION: ICD-10-CM

## 2022-01-02 DIAGNOSIS — K37 APPENDICITIS, UNSPECIFIED APPENDICITIS TYPE: Primary | ICD-10-CM

## 2022-01-02 PROBLEM — K35.33 ACUTE APPENDICITIS WITH PERFORATION, LOCALIZED PERITONITIS, AND ABSCESS, WITHOUT GANGRENE: Status: ACTIVE | Noted: 2022-01-02

## 2022-01-02 PROBLEM — K35.30 ACUTE APPENDICITIS WITH LOCALIZED PERITONITIS, WITHOUT PERFORATION, ABSCESS, OR GANGRENE: Status: ACTIVE | Noted: 2022-01-02

## 2022-01-02 LAB
ALBUMIN SERPL BCP-MCNC: 4 G/DL (ref 3.5–5.2)
ALP SERPL-CCNC: 65 U/L (ref 55–135)
ALT SERPL W/O P-5'-P-CCNC: 33 U/L (ref 10–44)
ANION GAP SERPL CALC-SCNC: 9 MMOL/L (ref 8–16)
AST SERPL-CCNC: 26 U/L (ref 10–40)
BASOPHILS # BLD AUTO: 0.05 K/UL (ref 0–0.2)
BASOPHILS NFR BLD: 0.4 % (ref 0–1.9)
BILIRUB SERPL-MCNC: 0.8 MG/DL (ref 0.1–1)
BILIRUB UR QL STRIP: NEGATIVE
BUN SERPL-MCNC: 31 MG/DL (ref 8–23)
CALCIUM SERPL-MCNC: 9 MG/DL (ref 8.7–10.5)
CHLORIDE SERPL-SCNC: 102 MMOL/L (ref 95–110)
CLARITY UR: CLEAR
CO2 SERPL-SCNC: 27 MMOL/L (ref 23–29)
COLOR UR: YELLOW
CREAT SERPL-MCNC: 1 MG/DL (ref 0.5–1.4)
DIFFERENTIAL METHOD: ABNORMAL
EOSINOPHIL # BLD AUTO: 0 K/UL (ref 0–0.5)
EOSINOPHIL NFR BLD: 0.3 % (ref 0–8)
ERYTHROCYTE [DISTWIDTH] IN BLOOD BY AUTOMATED COUNT: 13.2 % (ref 11.5–14.5)
EST. GFR  (AFRICAN AMERICAN): >60 ML/MIN/1.73 M^2
EST. GFR  (NON AFRICAN AMERICAN): >60 ML/MIN/1.73 M^2
GLUCOSE SERPL-MCNC: 142 MG/DL (ref 70–110)
GLUCOSE SERPL-MCNC: 181 MG/DL (ref 70–110)
GLUCOSE UR QL STRIP: NEGATIVE
HCT VFR BLD AUTO: 41.3 % (ref 40–54)
HGB BLD-MCNC: 14.1 G/DL (ref 14–18)
HGB UR QL STRIP: NEGATIVE
IMM GRANULOCYTES # BLD AUTO: 0.06 K/UL (ref 0–0.04)
IMM GRANULOCYTES NFR BLD AUTO: 0.5 % (ref 0–0.5)
KETONES UR QL STRIP: ABNORMAL
LEUKOCYTE ESTERASE UR QL STRIP: NEGATIVE
LIPASE SERPL-CCNC: 25 U/L (ref 4–60)
LYMPHOCYTES # BLD AUTO: 0.8 K/UL (ref 1–4.8)
LYMPHOCYTES NFR BLD: 6.8 % (ref 18–48)
MAGNESIUM SERPL-MCNC: 2.1 MG/DL (ref 1.6–2.6)
MCH RBC QN AUTO: 31.1 PG (ref 27–31)
MCHC RBC AUTO-ENTMCNC: 34.1 G/DL (ref 32–36)
MCV RBC AUTO: 91 FL (ref 82–98)
MONOCYTES # BLD AUTO: 0.6 K/UL (ref 0.3–1)
MONOCYTES NFR BLD: 5.1 % (ref 4–15)
NEUTROPHILS # BLD AUTO: 10.7 K/UL (ref 1.8–7.7)
NEUTROPHILS NFR BLD: 86.9 % (ref 38–73)
NITRITE UR QL STRIP: NEGATIVE
NRBC BLD-RTO: 0 /100 WBC
PH UR STRIP: 7 [PH] (ref 5–8)
PLATELET # BLD AUTO: 206 K/UL (ref 150–450)
PMV BLD AUTO: 10.5 FL (ref 9.2–12.9)
POTASSIUM SERPL-SCNC: 3.4 MMOL/L (ref 3.5–5.1)
PROT SERPL-MCNC: 7.6 G/DL (ref 6–8.4)
PROT UR QL STRIP: ABNORMAL
RBC # BLD AUTO: 4.53 M/UL (ref 4.6–6.2)
SARS-COV-2 RDRP RESP QL NAA+PROBE: NEGATIVE
SODIUM SERPL-SCNC: 138 MMOL/L (ref 136–145)
SP GR UR STRIP: 1.01 (ref 1–1.03)
URN SPEC COLLECT METH UR: ABNORMAL
UROBILINOGEN UR STRIP-ACNC: NEGATIVE EU/DL
WBC # BLD AUTO: 12.27 K/UL (ref 3.9–12.7)

## 2022-01-02 PROCEDURE — 99223 PR INITIAL HOSPITAL CARE,LEVL III: ICD-10-PCS | Mod: 57,,, | Performed by: SURGERY

## 2022-01-02 PROCEDURE — G0378 HOSPITAL OBSERVATION PER HR: HCPCS

## 2022-01-02 PROCEDURE — 44970 PR LAP,APPENDECTOMY: ICD-10-PCS | Mod: ,,, | Performed by: SURGERY

## 2022-01-02 PROCEDURE — 27201423 OPTIME MED/SURG SUP & DEVICES STERILE SUPPLY: Performed by: SURGERY

## 2022-01-02 PROCEDURE — 96376 TX/PRO/DX INJ SAME DRUG ADON: CPT

## 2022-01-02 PROCEDURE — 80053 COMPREHEN METABOLIC PANEL: CPT | Performed by: EMERGENCY MEDICINE

## 2022-01-02 PROCEDURE — 37000009 HC ANESTHESIA EA ADD 15 MINS: Performed by: SURGERY

## 2022-01-02 PROCEDURE — 25000003 PHARM REV CODE 250: Performed by: NURSE ANESTHETIST, CERTIFIED REGISTERED

## 2022-01-02 PROCEDURE — 37000008 HC ANESTHESIA 1ST 15 MINUTES: Performed by: SURGERY

## 2022-01-02 PROCEDURE — 27000671 HC TUBING MICROBORE EXT: Performed by: STUDENT IN AN ORGANIZED HEALTH CARE EDUCATION/TRAINING PROGRAM

## 2022-01-02 PROCEDURE — 44970 LAPAROSCOPY APPENDECTOMY: CPT | Mod: ,,, | Performed by: SURGERY

## 2022-01-02 PROCEDURE — U0002 COVID-19 LAB TEST NON-CDC: HCPCS | Performed by: NURSE PRACTITIONER

## 2022-01-02 PROCEDURE — 99285 EMERGENCY DEPT VISIT HI MDM: CPT | Mod: 25

## 2022-01-02 PROCEDURE — 83735 ASSAY OF MAGNESIUM: CPT | Performed by: EMERGENCY MEDICINE

## 2022-01-02 PROCEDURE — 82962 GLUCOSE BLOOD TEST: CPT | Performed by: SURGERY

## 2022-01-02 PROCEDURE — 25000003 PHARM REV CODE 250: Performed by: SURGERY

## 2022-01-02 PROCEDURE — 83690 ASSAY OF LIPASE: CPT | Performed by: EMERGENCY MEDICINE

## 2022-01-02 PROCEDURE — 25000003 PHARM REV CODE 250: Performed by: HOSPITALIST

## 2022-01-02 PROCEDURE — 96365 THER/PROPH/DIAG IV INF INIT: CPT

## 2022-01-02 PROCEDURE — 63600175 PHARM REV CODE 636 W HCPCS: Performed by: EMERGENCY MEDICINE

## 2022-01-02 PROCEDURE — 27201107 HC STYLET, STANDARD: Performed by: STUDENT IN AN ORGANIZED HEALTH CARE EDUCATION/TRAINING PROGRAM

## 2022-01-02 PROCEDURE — 85025 COMPLETE CBC W/AUTO DIFF WBC: CPT | Performed by: EMERGENCY MEDICINE

## 2022-01-02 PROCEDURE — 81003 URINALYSIS AUTO W/O SCOPE: CPT | Performed by: EMERGENCY MEDICINE

## 2022-01-02 PROCEDURE — 36000709 HC OR TIME LEV III EA ADD 15 MIN: Performed by: SURGERY

## 2022-01-02 PROCEDURE — 25000003 PHARM REV CODE 250: Performed by: EMERGENCY MEDICINE

## 2022-01-02 PROCEDURE — 63600175 PHARM REV CODE 636 W HCPCS: Performed by: HOSPITALIST

## 2022-01-02 PROCEDURE — 36415 COLL VENOUS BLD VENIPUNCTURE: CPT | Performed by: EMERGENCY MEDICINE

## 2022-01-02 PROCEDURE — 27000673 HC TUBING BLOOD Y: Performed by: STUDENT IN AN ORGANIZED HEALTH CARE EDUCATION/TRAINING PROGRAM

## 2022-01-02 PROCEDURE — 96375 TX/PRO/DX INJ NEW DRUG ADDON: CPT

## 2022-01-02 PROCEDURE — 63600175 PHARM REV CODE 636 W HCPCS: Performed by: NURSE ANESTHETIST, CERTIFIED REGISTERED

## 2022-01-02 PROCEDURE — 36000708 HC OR TIME LEV III 1ST 15 MIN: Performed by: SURGERY

## 2022-01-02 PROCEDURE — 25500020 PHARM REV CODE 255: Performed by: EMERGENCY MEDICINE

## 2022-01-02 PROCEDURE — 71000033 HC RECOVERY, INTIAL HOUR: Performed by: SURGERY

## 2022-01-02 PROCEDURE — 27202107 HC XP QUATRO SENSOR: Performed by: STUDENT IN AN ORGANIZED HEALTH CARE EDUCATION/TRAINING PROGRAM

## 2022-01-02 PROCEDURE — 71000039 HC RECOVERY, EACH ADD'L HOUR: Performed by: SURGERY

## 2022-01-02 PROCEDURE — 99223 1ST HOSP IP/OBS HIGH 75: CPT | Mod: 57,,, | Performed by: SURGERY

## 2022-01-02 PROCEDURE — 25000003 PHARM REV CODE 250: Performed by: STUDENT IN AN ORGANIZED HEALTH CARE EDUCATION/TRAINING PROGRAM

## 2022-01-02 RX ORDER — LIDOCAINE HYDROCHLORIDE 20 MG/ML
INJECTION, SOLUTION EPIDURAL; INFILTRATION; INTRACAUDAL; PERINEURAL
Status: DISCONTINUED | OUTPATIENT
Start: 2022-01-02 | End: 2022-01-02

## 2022-01-02 RX ORDER — TALC
6 POWDER (GRAM) TOPICAL NIGHTLY PRN
Status: DISCONTINUED | OUTPATIENT
Start: 2022-01-02 | End: 2022-01-13 | Stop reason: HOSPADM

## 2022-01-02 RX ORDER — BUPIVACAINE HCL/EPINEPHRINE 0.25-.0005
VIAL (ML) INJECTION
Status: DISCONTINUED | OUTPATIENT
Start: 2022-01-02 | End: 2022-01-02 | Stop reason: HOSPADM

## 2022-01-02 RX ORDER — LOSARTAN POTASSIUM 50 MG/1
100 TABLET ORAL DAILY
Refills: 1 | Status: DISCONTINUED | OUTPATIENT
Start: 2022-01-02 | End: 2022-01-02

## 2022-01-02 RX ORDER — POLYETHYLENE GLYCOL 3350 17 G/17G
17 POWDER, FOR SOLUTION ORAL DAILY
Status: DISCONTINUED | OUTPATIENT
Start: 2022-01-02 | End: 2022-01-11

## 2022-01-02 RX ORDER — MEPERIDINE HYDROCHLORIDE 50 MG/ML
12.5 INJECTION INTRAMUSCULAR; INTRAVENOUS; SUBCUTANEOUS EVERY 10 MIN PRN
Status: DISCONTINUED | OUTPATIENT
Start: 2022-01-02 | End: 2022-01-02 | Stop reason: HOSPADM

## 2022-01-02 RX ORDER — MORPHINE SULFATE 4 MG/ML
4 INJECTION, SOLUTION INTRAMUSCULAR; INTRAVENOUS
Status: COMPLETED | OUTPATIENT
Start: 2022-01-02 | End: 2022-01-02

## 2022-01-02 RX ORDER — ACETAMINOPHEN 10 MG/ML
INJECTION, SOLUTION INTRAVENOUS
Status: DISCONTINUED | OUTPATIENT
Start: 2022-01-02 | End: 2022-01-02

## 2022-01-02 RX ORDER — DIPHENHYDRAMINE HYDROCHLORIDE 50 MG/ML
12.5 INJECTION INTRAMUSCULAR; INTRAVENOUS
Status: DISCONTINUED | OUTPATIENT
Start: 2022-01-02 | End: 2022-01-02 | Stop reason: HOSPADM

## 2022-01-02 RX ORDER — PHENYLEPHRINE HYDROCHLORIDE 10 MG/ML
INJECTION INTRAVENOUS
Status: DISCONTINUED | OUTPATIENT
Start: 2022-01-02 | End: 2022-01-02

## 2022-01-02 RX ORDER — FENTANYL CITRATE 50 UG/ML
INJECTION, SOLUTION INTRAMUSCULAR; INTRAVENOUS
Status: DISCONTINUED | OUTPATIENT
Start: 2022-01-02 | End: 2022-01-02

## 2022-01-02 RX ORDER — CEFAZOLIN SODIUM 1 G/3ML
INJECTION, POWDER, FOR SOLUTION INTRAMUSCULAR; INTRAVENOUS
Status: DISCONTINUED | OUTPATIENT
Start: 2022-01-02 | End: 2022-01-02

## 2022-01-02 RX ORDER — OXYCODONE HYDROCHLORIDE 5 MG/1
5 TABLET ORAL
Status: DISCONTINUED | OUTPATIENT
Start: 2022-01-02 | End: 2022-01-02 | Stop reason: HOSPADM

## 2022-01-02 RX ORDER — AMLODIPINE BESYLATE 5 MG/1
10 TABLET ORAL DAILY
Status: DISCONTINUED | OUTPATIENT
Start: 2022-01-02 | End: 2022-01-02

## 2022-01-02 RX ORDER — AMOXICILLIN 250 MG
1 CAPSULE ORAL 2 TIMES DAILY
Status: DISCONTINUED | OUTPATIENT
Start: 2022-01-02 | End: 2022-01-11

## 2022-01-02 RX ORDER — PROPOFOL 10 MG/ML
VIAL (ML) INTRAVENOUS
Status: DISCONTINUED | OUTPATIENT
Start: 2022-01-02 | End: 2022-01-02

## 2022-01-02 RX ORDER — HYDROMORPHONE HYDROCHLORIDE 1 MG/ML
0.2 INJECTION, SOLUTION INTRAMUSCULAR; INTRAVENOUS; SUBCUTANEOUS EVERY 5 MIN PRN
Status: DISCONTINUED | OUTPATIENT
Start: 2022-01-02 | End: 2022-01-02 | Stop reason: HOSPADM

## 2022-01-02 RX ORDER — MORPHINE SULFATE 4 MG/ML
4 INJECTION, SOLUTION INTRAMUSCULAR; INTRAVENOUS EVERY 4 HOURS PRN
Status: DISCONTINUED | OUTPATIENT
Start: 2022-01-02 | End: 2022-01-13 | Stop reason: HOSPADM

## 2022-01-02 RX ORDER — ONDANSETRON HYDROCHLORIDE 2 MG/ML
INJECTION, SOLUTION INTRAMUSCULAR; INTRAVENOUS
Status: DISCONTINUED | OUTPATIENT
Start: 2022-01-02 | End: 2022-01-02

## 2022-01-02 RX ORDER — SODIUM CHLORIDE 0.9 % (FLUSH) 0.9 %
10 SYRINGE (ML) INJECTION
Status: DISCONTINUED | OUTPATIENT
Start: 2022-01-02 | End: 2022-01-13 | Stop reason: HOSPADM

## 2022-01-02 RX ORDER — FAMOTIDINE 10 MG/ML
INJECTION INTRAVENOUS
Status: DISCONTINUED | OUTPATIENT
Start: 2022-01-02 | End: 2022-01-02

## 2022-01-02 RX ORDER — HYDROCODONE BITARTRATE AND ACETAMINOPHEN 5; 325 MG/1; MG/1
2 TABLET ORAL EVERY 6 HOURS PRN
Status: DISCONTINUED | OUTPATIENT
Start: 2022-01-02 | End: 2022-01-13 | Stop reason: HOSPADM

## 2022-01-02 RX ORDER — ACETAMINOPHEN 325 MG/1
650 TABLET ORAL EVERY 4 HOURS PRN
Status: DISCONTINUED | OUTPATIENT
Start: 2022-01-02 | End: 2022-01-13 | Stop reason: HOSPADM

## 2022-01-02 RX ORDER — ROCURONIUM BROMIDE 10 MG/ML
INJECTION, SOLUTION INTRAVENOUS
Status: DISCONTINUED | OUTPATIENT
Start: 2022-01-02 | End: 2022-01-02

## 2022-01-02 RX ORDER — LIDOCAINE HYDROCHLORIDE 20 MG/ML
JELLY TOPICAL
Status: DISCONTINUED | OUTPATIENT
Start: 2022-01-02 | End: 2022-01-02

## 2022-01-02 RX ORDER — KETOROLAC TROMETHAMINE 30 MG/ML
15 INJECTION, SOLUTION INTRAMUSCULAR; INTRAVENOUS ONCE
Status: DISCONTINUED | OUTPATIENT
Start: 2022-01-02 | End: 2022-01-02

## 2022-01-02 RX ORDER — SIMETHICONE 80 MG
1 TABLET,CHEWABLE ORAL 4 TIMES DAILY PRN
Status: DISCONTINUED | OUTPATIENT
Start: 2022-01-02 | End: 2022-01-13 | Stop reason: HOSPADM

## 2022-01-02 RX ORDER — OXYBUTYNIN CHLORIDE 5 MG/1
5 TABLET, EXTENDED RELEASE ORAL DAILY
Refills: 3 | Status: DISCONTINUED | OUTPATIENT
Start: 2022-01-02 | End: 2022-01-13 | Stop reason: HOSPADM

## 2022-01-02 RX ORDER — ONDANSETRON 2 MG/ML
4 INJECTION INTRAMUSCULAR; INTRAVENOUS
Status: COMPLETED | OUTPATIENT
Start: 2022-01-02 | End: 2022-01-02

## 2022-01-02 RX ORDER — ONDANSETRON 2 MG/ML
4 INJECTION INTRAMUSCULAR; INTRAVENOUS EVERY 8 HOURS PRN
Status: DISCONTINUED | OUTPATIENT
Start: 2022-01-02 | End: 2022-01-13 | Stop reason: HOSPADM

## 2022-01-02 RX ORDER — SUCCINYLCHOLINE CHLORIDE 20 MG/ML
INJECTION INTRAMUSCULAR; INTRAVENOUS
Status: DISCONTINUED | OUTPATIENT
Start: 2022-01-02 | End: 2022-01-02

## 2022-01-02 RX ORDER — ONDANSETRON 2 MG/ML
4 INJECTION INTRAMUSCULAR; INTRAVENOUS DAILY PRN
Status: DISCONTINUED | OUTPATIENT
Start: 2022-01-02 | End: 2022-01-02 | Stop reason: HOSPADM

## 2022-01-02 RX ORDER — ENOXAPARIN SODIUM 100 MG/ML
40 INJECTION SUBCUTANEOUS EVERY 24 HOURS
Status: DISCONTINUED | OUTPATIENT
Start: 2022-01-03 | End: 2022-01-13 | Stop reason: HOSPADM

## 2022-01-02 RX ORDER — SODIUM CHLORIDE, SODIUM LACTATE, POTASSIUM CHLORIDE, CALCIUM CHLORIDE 600; 310; 30; 20 MG/100ML; MG/100ML; MG/100ML; MG/100ML
INJECTION, SOLUTION INTRAVENOUS CONTINUOUS
Status: DISCONTINUED | OUTPATIENT
Start: 2022-01-02 | End: 2022-01-04

## 2022-01-02 RX ORDER — SODIUM CHLORIDE 0.9 % (FLUSH) 0.9 %
10 SYRINGE (ML) INJECTION EVERY 12 HOURS PRN
Status: DISCONTINUED | OUTPATIENT
Start: 2022-01-02 | End: 2022-01-13 | Stop reason: HOSPADM

## 2022-01-02 RX ORDER — HYDROCODONE BITARTRATE AND ACETAMINOPHEN 5; 325 MG/1; MG/1
1 TABLET ORAL EVERY 6 HOURS PRN
Status: DISCONTINUED | OUTPATIENT
Start: 2022-01-02 | End: 2022-01-02

## 2022-01-02 RX ORDER — UBIDECARENONE 30 MG
30 CAPSULE ORAL 3 TIMES DAILY
COMMUNITY

## 2022-01-02 RX ORDER — NALOXONE HCL 0.4 MG/ML
0.02 VIAL (ML) INJECTION
Status: DISCONTINUED | OUTPATIENT
Start: 2022-01-02 | End: 2022-01-13 | Stop reason: HOSPADM

## 2022-01-02 RX ADMIN — SODIUM CHLORIDE, SODIUM LACTATE, POTASSIUM CHLORIDE, AND CALCIUM CHLORIDE: .6; .31; .03; .02 INJECTION, SOLUTION INTRAVENOUS at 06:01

## 2022-01-02 RX ADMIN — PHENYLEPHRINE HYDROCHLORIDE 200 MCG: 10 INJECTION INTRAVENOUS at 06:01

## 2022-01-02 RX ADMIN — SUGAMMADEX 200 MG: 100 INJECTION, SOLUTION INTRAVENOUS at 06:01

## 2022-01-02 RX ADMIN — SODIUM CHLORIDE, SODIUM LACTATE, POTASSIUM CHLORIDE, AND CALCIUM CHLORIDE: .6; .31; .03; .02 INJECTION, SOLUTION INTRAVENOUS at 03:01

## 2022-01-02 RX ADMIN — ACETAMINOPHEN 1000 MG: 10 INJECTION, SOLUTION INTRAVENOUS at 05:01

## 2022-01-02 RX ADMIN — LIDOCAINE HYDROCHLORIDE 5 ML: 20 JELLY TOPICAL at 05:01

## 2022-01-02 RX ADMIN — CEFAZOLIN 2 G: 330 INJECTION, POWDER, FOR SOLUTION INTRAMUSCULAR; INTRAVENOUS at 05:01

## 2022-01-02 RX ADMIN — LIDOCAINE HYDROCHLORIDE 100 MG: 20 INJECTION, SOLUTION EPIDURAL; INFILTRATION; INTRACAUDAL; PERINEURAL at 05:01

## 2022-01-02 RX ADMIN — ROCURONIUM BROMIDE 40 MG: 10 INJECTION, SOLUTION INTRAVENOUS at 05:01

## 2022-01-02 RX ADMIN — SODIUM CHLORIDE 1000 ML: 0.9 INJECTION, SOLUTION INTRAVENOUS at 09:01

## 2022-01-02 RX ADMIN — ONDANSETRON 4 MG: 2 INJECTION INTRAMUSCULAR; INTRAVENOUS at 08:01

## 2022-01-02 RX ADMIN — OXYBUTYNIN CHLORIDE 5 MG: 5 TABLET, EXTENDED RELEASE ORAL at 03:01

## 2022-01-02 RX ADMIN — PIPERACILLIN AND TAZOBACTAM 4.5 G: 4; .5 INJECTION, POWDER, LYOPHILIZED, FOR SOLUTION INTRAVENOUS; PARENTERAL at 09:01

## 2022-01-02 RX ADMIN — MORPHINE SULFATE 4 MG: 4 INJECTION, SOLUTION INTRAMUSCULAR; INTRAVENOUS at 08:01

## 2022-01-02 RX ADMIN — MORPHINE SULFATE 4 MG: 4 INJECTION, SOLUTION INTRAMUSCULAR; INTRAVENOUS at 10:01

## 2022-01-02 RX ADMIN — ROCURONIUM BROMIDE 10 MG: 10 INJECTION, SOLUTION INTRAVENOUS at 05:01

## 2022-01-02 RX ADMIN — IOHEXOL 100 ML: 350 INJECTION, SOLUTION INTRAVENOUS at 08:01

## 2022-01-02 RX ADMIN — MORPHINE SULFATE 2 MG: 4 INJECTION, SOLUTION INTRAMUSCULAR; INTRAVENOUS at 04:01

## 2022-01-02 RX ADMIN — OXYCODONE HYDROCHLORIDE 5 MG: 5 TABLET ORAL at 07:01

## 2022-01-02 RX ADMIN — AMLODIPINE BESYLATE 10 MG: 5 TABLET ORAL at 03:01

## 2022-01-02 RX ADMIN — PROPOFOL 150 MG: 10 INJECTION, EMULSION INTRAVENOUS at 05:01

## 2022-01-02 RX ADMIN — LOSARTAN POTASSIUM 100 MG: 50 TABLET, FILM COATED ORAL at 03:01

## 2022-01-02 RX ADMIN — FAMOTIDINE 20 MG: 10 INJECTION, SOLUTION INTRAVENOUS at 05:01

## 2022-01-02 RX ADMIN — SODIUM CHLORIDE, SODIUM LACTATE, POTASSIUM CHLORIDE, AND CALCIUM CHLORIDE: .6; .31; .03; .02 INJECTION, SOLUTION INTRAVENOUS at 05:01

## 2022-01-02 RX ADMIN — ONDANSETRON 4 MG: 2 INJECTION, SOLUTION INTRAMUSCULAR; INTRAVENOUS at 05:01

## 2022-01-02 RX ADMIN — FENTANYL CITRATE 100 MCG: 50 INJECTION, SOLUTION INTRAMUSCULAR; INTRAVENOUS at 05:01

## 2022-01-02 RX ADMIN — SUCCINYLCHOLINE CHLORIDE 140 MG: 20 INJECTION, SOLUTION INTRAMUSCULAR; INTRAVENOUS at 05:01

## 2022-01-02 NOTE — SUBJECTIVE & OBJECTIVE
No current facility-administered medications on file prior to encounter.     Current Outpatient Medications on File Prior to Encounter   Medication Sig    amLODIPine (NORVASC) 10 MG tablet Take 1 tablet (10 mg total) by mouth once daily.    aspirin (ECOTRIN) 81 MG EC tablet aspirin 81 mg tablet,delayed release   Take 1 tablet every day by oral route.    co-enzyme Q-10 30 mg capsule Take 30 mg by mouth 3 (three) times daily.    hydroCHLOROthiazide (HYDRODIURIL) 25 MG tablet Take 1 tablet (25 mg total) by mouth once daily.    mirabegron (MYRBETRIQ) 50 mg Tb24 Take 1 tablet (50 mg total) by mouth once daily.    multivit with minerals/lutein (MULTIVITAMIN 50 PLUS ORAL) multivitamin    omega 3-dha-epa-fish oil (FISH OIL) 100-160-1,000 mg Cap Fish Oil    omeprazole (PRILOSEC) 20 MG capsule Take 1 capsule by mouth once daily    rosuvastatin (CRESTOR) 10 MG tablet Take 1 tablet (10 mg total) by mouth once daily.    tadalafiL (CIALIS) 5 MG tablet Take 1 tablet (5 mg total) by mouth once daily.    telmisartan (MICARDIS) 80 MG Tab Take 1 tablet by mouth once daily    ibuprofen (ADVIL,MOTRIN) 800 MG tablet     [DISCONTINUED] doxycycline monohydrate 100 mg Tab        Review of patient's allergies indicates:  No Known Allergies    Past Medical History:   Diagnosis Date    Acid reflux     Acquired tricuspid valve insufficiency     Anemia     Borderline diabetes     BPH (benign prostatic hyperplasia)     Cataract     1/16/20 rt eye, 1/30/20- Lt eye    Coronary artery disease     Hypertension     Pulmonary nodules     Skin cancer      Past Surgical History:   Procedure Laterality Date    CYSTOSCOPY WITH TRANSURETHRAL DESTRUCTION OF PROSTATE,RFA N/A 10/21/2020    Procedure: CYSTOSCOPY WITH TRANSURETHRAL DESTRUCTION OF PROSTATE,RFA;  Surgeon: La Nena James MD;  Location[facility];  Service: Urology;  Laterality: N/A;  [facility] [phone]    EYE SURGERY      for  cataracts, rt eye 20, left eye 20    HERNIA REPAIR  1999    HERNIA REPAIR  2004    KNEE ARTHROSCOPY Left 2018    SKIN LESION EXCISION  10/2009    Neck    SKIN LESION EXCISION  2015    STAPEDECTOMY Right 2001    TRANSRECTAL ULTRASOUND EXAMINATION N/A 2020    Procedure: TRANSRECTAL ULTRASOUND;  Surgeon: La Nena James MD;  Location: [facility] OR;  Service: Urology;  Laterality: N/A;     Family History     Problem Relation (Age of Onset)    Cancer Mother    Heart failure Father    Kidney disease Father    Stroke Mother        Tobacco Use    Smoking status: Former Smoker     Quit date:      Years since quittin.0    Smokeless tobacco: Never Used   Substance and Sexual Activity    Alcohol use: Yes     Comment: occasional    Drug use: No    Sexual activity: Yes     Partners: Female     Review of Systems   Constitutional: Positive for fever.   Gastrointestinal: Positive for abdominal pain and nausea. Negative for blood in stool, diarrhea and rectal pain.     Objective:     Vital Signs (Most Recent):  Temp: 98 °F (36.7 °C) (22 0830)  Pulse: 61 (22 1030)  Resp: 16 (22 1030)  BP: 134/67 (22 1030)  SpO2: 96 % (22 1030) Vital Signs (24h Range):  Temp:  [97.7 °F (36.5 °C)-98 °F (36.7 °C)] 98 °F (36.7 °C)  Pulse:  [61-66] 61  Resp:  [16-18] 16  SpO2:  [95 %-99 %] 96 %  BP: (132-153)/(65-73) 134/67     Weight: 75.1 kg (165 lb 9.1 oz)  Body mass index is 23.76 kg/m².    Physical Exam  Abdominal:      General: Abdomen is flat. There is no distension.      Palpations: Abdomen is soft. There is no mass.      Tenderness: There is abdominal tenderness. There is guarding (rlq). There is no rebound.      Hernia: No hernia is present.         Significant Labs:  I have reviewed all pertinent lab results within the past 24 hours.  CBC:   Recent Labs   Lab 22  0726   WBC 12.27   RBC 4.53*   HGB 14.1   HCT 41.3      MCV 91   MCH 31.1*   MCHC 34.1      BMP:   Recent Labs   Lab 01/02/22  0726   *      K 3.4*      CO2 27   BUN 31*   CREATININE 1.0   CALCIUM 9.0   MG 2.1       Significant Diagnostics:  I have reviewed all pertinent imaging results/findings within the past 24 hours.

## 2022-01-02 NOTE — H&P
[facility] Medicine  History & Physical    Patient Name: Onesimo Paula  MRN: [id number]  Patient Class: OP- Observation  Admission Date: 1/2/2022  Attending Physician: Torres Spann MD   Primary Care Provider: JAMES Rose,FNP-C         Patient information was obtained from patient, past medical records and ER records.     Subjective:     Principal Problem:Acute appendicitis with localized peritonitis, without perforation, abscess, or gangrene    Chief Complaint:   Chief Complaint   Patient presents with    Abdominal Pain     RLQ abdominal pain since Thursday night, denies n/v/d         HPI: 71-year-old gentleman with prior history of hypertension, BPH, GERD presented with chief complaint of abdominal pain and nausea.  Upon further asking patient mentioned he was in his usual state of health until 2 days ago when he started experiencing upper abdominal pain which is more localized in right lower abdomen now.  He also endorses nausea but no vomiting, subjective feeling of chills but no fever.  His last bowel movement was 2 days ago.  Otherwise denies any fever, headache, dizziness, chest pain, palpitations, cough, dysuria.  Remote history of hernia surgery      Past Medical History:   Diagnosis Date    Acid reflux     Acquired tricuspid valve insufficiency     Anemia     Borderline diabetes     BPH (benign prostatic hyperplasia)     Cataract     1/16/20 rt eye, 1/30/20- Lt eye    Coronary artery disease     Hypertension     Pulmonary nodules     Skin cancer        Past Surgical History:   Procedure Laterality Date    CYSTOSCOPY WITH TRANSURETHRAL DESTRUCTION OF PROSTATE,RFA N/A 10/21/2020    Procedure: CYSTOSCOPY WITH TRANSURETHRAL DESTRUCTION OF PROSTATE,RFA;  Surgeon: La Nena James MD;  Location: [facility] OR;  Service: Urology;  Laterality: N/A;  please make sure [facility] available [phone]    EYE SURGERY      for cataracts, rt eye 1/16/20, left eye  20    HERNIA REPAIR  1999    HERNIA REPAIR  2004    KNEE ARTHROSCOPY Left 2018    SKIN LESION EXCISION  10/2009    Neck    SKIN LESION EXCISION  2015    STAPEDECTOMY Right 2001    TRANSRECTAL ULTRASOUND EXAMINATION N/A 2020    Procedure: TRANSRECTAL ULTRASOUND;  Surgeon: La Nena James MD;  Location: [facility] OR;  Service: Urology;  Laterality: N/A;       Review of patient's allergies indicates:  No Known Allergies    No current facility-administered medications on file prior to encounter.     Current Outpatient Medications on File Prior to Encounter   Medication Sig    amLODIPine (NORVASC) 10 MG tablet Take 1 tablet (10 mg total) by mouth once daily.    aspirin (ECOTRIN) 81 MG EC tablet aspirin 81 mg tablet,delayed release   Take 1 tablet every day by oral route.    co-enzyme Q-10 30 mg capsule Take 30 mg by mouth 3 (three) times daily.    hydroCHLOROthiazide (HYDRODIURIL) 25 MG tablet Take 1 tablet (25 mg total) by mouth once daily.    mirabegron (MYRBETRIQ) 50 mg Tb24 Take 1 tablet (50 mg total) by mouth once daily.    multivit with minerals/lutein (MULTIVITAMIN 50 PLUS ORAL) multivitamin    omega 3-dha-epa-fish oil (FISH OIL) 100-160-1,000 mg Cap Fish Oil    omeprazole (PRILOSEC) 20 MG capsule Take 1 capsule by mouth once daily    rosuvastatin (CRESTOR) 10 MG tablet Take 1 tablet (10 mg total) by mouth once daily.    tadalafiL (CIALIS) 5 MG tablet Take 1 tablet (5 mg total) by mouth once daily.    telmisartan (MICARDIS) 80 MG Tab Take 1 tablet by mouth once daily    ibuprofen (ADVIL,MOTRIN) 800 MG tablet     [DISCONTINUED] doxycycline monohydrate 100 mg Tab      Family History     Problem Relation (Age of Onset)    Cancer Mother    Heart failure Father    Kidney disease Father    Stroke Mother        Tobacco Use    Smoking status: Former Smoker     Quit date:      Years since quittin.0    Smokeless tobacco: Never Used   Substance and Sexual Activity     Alcohol use: Yes     Comment: occasional    Drug use: No    Sexual activity: Yes     Partners: Female     Review of Systems   Constitutional: Negative for activity change and appetite change.   HENT: Negative for congestion and sore throat.    Eyes: Negative for discharge and visual disturbance.   Respiratory: Negative for cough and chest tightness.    Cardiovascular: Negative for chest pain and leg swelling.   Gastrointestinal: Positive for abdominal pain and nausea.   Genitourinary: Negative for dysuria and hematuria.   Musculoskeletal: Negative for myalgias.   Skin: Negative for pallor and rash.   Neurological: Negative for dizziness and weakness.   Psychiatric/Behavioral: Negative for behavioral problems. The patient is not nervous/anxious.    All other systems reviewed and are negative.    Objective:     Vital Signs (Most Recent):  Temp: 98.8 °F (37.1 °C) (01/02/22 1223)  Pulse: 65 (01/02/22 1223)  Resp: 18 (01/02/22 1223)  BP: (!) 143/81 (01/02/22 1223)  SpO2: 98 % (01/02/22 1223) Vital Signs (24h Range):  Temp:  [97.7 °F (36.5 °C)-98.8 °F (37.1 °C)] 98.8 °F (37.1 °C)  Pulse:  [61-66] 65  Resp:  [16-18] 18  SpO2:  [95 %-99 %] 98 %  BP: (132-153)/(65-81) 143/81     Weight: 75.1 kg (165 lb 9.1 oz)  Body mass index is 23.76 kg/m².    Physical Exam  Vitals and nursing note reviewed.   Constitutional:       Appearance: He is well-developed and well-nourished.   HENT:      Head: Atraumatic.      Mouth/Throat:      Mouth: Oropharynx is clear and moist.   Neck:      Vascular: No JVD.   Cardiovascular:      Rate and Rhythm: Normal rate and regular rhythm.      Heart sounds: Normal heart sounds. No murmur heard.  No gallop.    Pulmonary:      Effort: No respiratory distress.      Breath sounds: Normal breath sounds. No wheezing.   Abdominal:      General: Bowel sounds are normal. There is no distension.      Palpations: Abdomen is soft.      Tenderness: There is abdominal tenderness. There is no guarding or  rebound.      Comments: Tenderness in right lower quadrant, no peritoneal signs   Musculoskeletal:         General: No edema.      Cervical back: Neck supple.   Lymphadenopathy:      Cervical: No cervical adenopathy.   Skin:     General: Skin is warm.      Capillary Refill: Capillary refill takes less than 2 seconds.      Findings: No rash.   Neurological:      Mental Status: He is alert and oriented to person, place, and time.      Cranial Nerves: No cranial nerve deficit.   Psychiatric:         Behavior: Behavior normal.             Significant Labs: All pertinent labs within the past 24 hours have been reviewed.    Significant Imaging: I have reviewed all pertinent imaging results/findings within the past 24 hours.    Assessment/Plan:     71-year-old gentleman with prior history of hypertension, GERD, BPH presented with abdominal pain found to have acute appendicitis.     Assessment:  Active Hospital Problems    Diagnosis  POA    *Acute appendicitis with localized peritonitis, without perforation, abscess, or gangrene [K35.30]  Unknown    BPH with obstruction/lower urinary tract symptoms [N40.1, N13.8]  Yes    ED (erectile dysfunction) of organic origin [N52.9]  Yes    Gastroesophageal reflux disease without esophagitis [K21.9]  Yes    Essential hypertension [I10]  Yes      Resolved Hospital Problems   No resolved problems to display.       Plan:  Admit to med surge-observation  General surgery consulted-plan for laparoscopic appendicectomy later in the day  IV hydration  NPO until surgery  P.r.n. analgesia  Perioperative Zosyn  Resume essential home medications  Further management as per clinical course    VTE Risk Mitigation (From admission, onward)         Ordered     enoxaparin injection 40 mg  Daily         01/02/22 1242     IP VTE HIGH RISK PATIENT  Once         01/02/22 1242     Place sequential compression device  Until discontinued         01/02/22 1242                   Torres Spann  MD  Department of Hospital Medicine   [facility]

## 2022-01-02 NOTE — HPI
71-year-old gentleman with prior history of hypertension, BPH, GERD presented with chief complaint of abdominal pain and nausea.  Upon further asking patient mentioned he was in his usual state of health until 2 days ago when he started experiencing upper abdominal pain which is more localized in right lower abdomen now.  He also endorses nausea but no vomiting, subjective feeling of chills but no fever.  His last bowel movement was 2 days ago.  Otherwise denies any fever, headache, dizziness, chest pain, palpitations, cough, dysuria.  Remote history of hernia surgery. Adm with Acute appendicitis with localized peritonitis

## 2022-01-02 NOTE — ED TRIAGE NOTES
Upper center to right sided abd pains x 3 days.  + nausea.   Denies vomiting, diarrhea or fever.  Denies urinary symptoms.   States last bm x 2 days ago.

## 2022-01-02 NOTE — ED PROVIDER NOTES
Encounter Date: 1/2/2022       History     Chief Complaint   Patient presents with    Abdominal Pain     RLQ abdominal pain since Thursday night, denies n/v/d      71-year-old male presents to the ER for evaluation of abdominal pain that began on December 30th.  He reports pain in been in the upper abdomen is now in the upper and right lower quadrant.  He has had nausea but no vomiting.  He reports last bowel movement was on Friday and he normally has 2 bowel movements per day he is concern for inability to pass stool.  No fevers chills or sweats.  He reports loss of appetite but that food does not make the pain worse.  He had been able to find a position of comfort but now he reports pain is constant and 8/10.        Review of patient's allergies indicates:  No Known Allergies  Past Medical History:   Diagnosis Date    Acid reflux     Acquired tricuspid valve insufficiency     Anemia     Borderline diabetes     BPH (benign prostatic hyperplasia)     Cataract     1/16/20 rt eye, 1/30/20- Lt eye    Coronary artery disease     Hypertension     Pulmonary nodules     Skin cancer      Past Surgical History:   Procedure Laterality Date    CYSTOSCOPY WITH TRANSURETHRAL DESTRUCTION OF PROSTATE,RFA N/A 10/21/2020    Procedure: CYSTOSCOPY WITH TRANSURETHRAL DESTRUCTION OF PROSTATE,RFA;  Surgeon: La Nena James MD;  Location: [facility] OR;  Service: Urology;  Laterality: N/A;  please make sure [facility] rep available [phone]    EYE SURGERY      for cataracts, rt eye 1/16/20, left eye 1/30/20    HERNIA REPAIR  12/1999    HERNIA REPAIR  05/2004    KNEE ARTHROSCOPY Left 04/12/2018    SKIN LESION EXCISION  10/2009    Neck    SKIN LESION EXCISION  2015    STAPEDECTOMY Right 03/2001    TRANSRECTAL ULTRASOUND EXAMINATION N/A 8/17/2020    Procedure: TRANSRECTAL ULTRASOUND;  Surgeon: La Nena James MD;  Location: [facility] OR;  Service: Urology;  Laterality: N/A;     Family History   Problem Relation Age  of Onset    Cancer Mother         skin    Stroke Mother     Heart failure Father     Kidney disease Father      Social History     Tobacco Use    Smoking status: Former Smoker     Quit date:      Years since quittin.0    Smokeless tobacco: Never Used   Substance Use Topics    Alcohol use: Yes     Comment: occasional    Drug use: No     Review of Systems   Constitutional: Positive for appetite change. Negative for activity change, chills, diaphoresis, fatigue and fever.   HENT: Negative for congestion, postnasal drip and rhinorrhea.    Respiratory: Negative for cough, chest tightness, shortness of breath and wheezing.    Cardiovascular: Negative for chest pain and palpitations.   Gastrointestinal: Positive for abdominal pain, constipation and nausea. Negative for abdominal distention, diarrhea and vomiting.   Genitourinary: Negative for dysuria, frequency, hematuria and urgency.   Musculoskeletal: Negative for myalgias, neck pain and neck stiffness.   Neurological: Negative for dizziness, weakness, light-headedness, numbness and headaches.   All other systems reviewed and are negative.      Physical Exam     Initial Vitals [22 0651]   BP Pulse Resp Temp SpO2   132/65 66 18 97.7 °F (36.5 °C) 99 %      MAP       --         Physical Exam    Nursing note and vitals reviewed.  Constitutional: He appears well-developed and well-nourished. He is not diaphoretic. No distress.   HENT:   Head: Normocephalic and atraumatic.   Right Ear: External ear normal.   Left Ear: External ear normal.   Nose: Nose normal.   Mouth/Throat: Oropharynx is clear and moist.   Eyes: Conjunctivae and EOM are normal. Pupils are equal, round, and reactive to light.   Neck: Neck supple. No tracheal deviation present.   Normal range of motion.  Cardiovascular: Normal rate, regular rhythm, normal heart sounds and intact distal pulses. Exam reveals no gallop and no friction rub.    No murmur heard.  Pulmonary/Chest: Breath sounds  normal. No stridor. No respiratory distress. He has no wheezes. He has no rhonchi. He has no rales. He exhibits no tenderness.   Abdominal: Abdomen is soft and flat. Bowel sounds are normal. He exhibits no distension and no mass. There is no splenomegaly or hepatomegaly. There is abdominal tenderness in the right lower quadrant and periumbilical area. No hernia.   No right CVA tenderness.  No left CVA tenderness. There is rebound, guarding and tenderness at McBurney's point. There is negative Dave's sign.   Musculoskeletal:         General: No edema. Normal range of motion.      Cervical back: Normal range of motion and neck supple.     Neurological: He is alert and oriented to person, place, and time. He has normal strength. No cranial nerve deficit or sensory deficit.   Skin: Skin is warm and dry. No rash noted. No erythema. No pallor.   Psychiatric: He has a normal mood and affect. His behavior is normal. Judgment and thought content normal.         ED Course   Procedures  Labs Reviewed   CBC W/ AUTO DIFFERENTIAL - Abnormal; Notable for the following components:       Result Value    RBC 4.53 (*)     MCH 31.1 (*)     Gran # (ANC) 10.7 (*)     Immature Grans (Abs) 0.06 (*)     Lymph # 0.8 (*)     Gran % 86.9 (*)     Lymph % 6.8 (*)     All other components within normal limits    Narrative:     For upper or mid abdominal pain.   COMPREHENSIVE METABOLIC PANEL - Abnormal; Notable for the following components:    Potassium 3.4 (*)     Glucose 181 (*)     BUN 31 (*)     All other components within normal limits    Narrative:     For upper or mid abdominal pain.   URINALYSIS, REFLEX TO URINE CULTURE - Abnormal; Notable for the following components:    Protein, UA Trace (*)     Ketones, UA 1+ (*)     All other components within normal limits    Narrative:     In and Out Cath as needed it patient unable to void  Specimen Source->Urine   LIPASE    Narrative:     For upper or mid abdominal pain.   MAGNESIUM   SARS-COV-2  RNA AMPLIFICATION, QUAL   MAGNESIUM   POCT CREATININE          Imaging Results          CT Abdomen Pelvis With Contrast (Final result)  Result time 01/02/22 09:22:17    Final result by Nicole Navarro MD (01/02/22 09:22:17)                 Narrative:    All CT scans at this facility used dose modulation, iterative reconstruction and/or weight-based dosing when appropriate to reduce radiation doses  as low as reasonably achievable.    HISTORY: Abdominal abscess/infection suspected; RLQ abdominal pain (Age >= 14y)    FINDINGS: Axial postcontrast imaging was performed with 100 mL Omnipaque 350 IV contrast .    CT ABDOMEN: There is atelectasis in the lung bases.    There is a small hiatal hernia. There is no pericardial effusion.    The liver is hypodense compatible with fatty infiltration. There is a 3.5 cm cyst within the right lobe of the liver. There is no biliary duct dilatation.    The spleen, pancreas, gallbladder and adrenal glands are normal.    The kidneys enhance symmetrically without hydronephrosis. There is a 3 mm nonobstructing left renal stone. There is a 1.9 cm right renal cyst. There are left parapelvic cyst.    There is dilatation and wall thickening of the appendix with stranding in the adjacent fat suggestive of appendicitis. The wall thickening is most pronounced at the tip of the cecum and proximal appendix. There is no abscess or perforation.    The remainder of the bowel is normal. There is a moderate amount of fecal material throughout the colon. There is sigmoid diverticulosis without diverticulitis.    There is diffuse vascular calcification of the aorta. There is no mesenteric or retroperitoneal adenopathy. There is mild leftward curvature of the lumbar spine.    CT PELVIS: There is sigmoid diverticulosis without diverticulitis. The prostate gland is enlarged with central calcifications. There is no free fluid.    IMPRESSION: Findings compatible with appendicitis with mild wall thickening  of the tip of the cecum and proximal appendix there is no abscess or perforation    Colonic diverticulosis    Fatty infiltration of the liver    3 mm left renal stone with bilateral renal cysts    Small hiatal hernia    These findings were called to Lesley the patient's nurse in the emergency department at 9:20 AM    Electronically signed by:  Nicole Navarro MD  1/2/2022 9:22 AM [facility] Workstation: [id number]                               Medications   piperacillin-tazobactam 4.5 g in dextrose 5 % 100 mL IVPB (ready to mix system) (4.5 g Intravenous New Bag 1/2/22 2433)   sodium chloride 0.9% bolus 1,000 mL (1,000 mLs Intravenous New Bag 1/2/22 9242)   morphine injection 4 mg (4 mg Intravenous Given 1/2/22 0848)   ondansetron injection 4 mg (4 mg Intravenous Given 1/2/22 0848)   iohexoL (OMNIPAQUE 350) injection 100 mL (100 mLs Intravenous Given 1/2/22 0821)     Medical Decision Making:   Clinical Tests:   Lab Tests: Ordered and Reviewed  The following lab test(s) were unremarkable: CBC and Lipase       <> Summary of Lab: Urine with trace protein 1+ ketones  Potassium 3.4 glucose 181 BUN 31  Radiological Study: Ordered  ED Management:  71-year-old male presents to the ER for evaluation of abdominal pain that began on December 30th.  He reports pain in been in the upper abdomen is now in the upper and right lower quadrant.  He has had nausea but no vomiting.  He reports last bowel movement was on Friday and he normally has 2 bowel movements per day he is concern for inability to pass stool.  No fevers chills or sweats.  He reports loss of appetite but that food does not make the pain worse.  He had been able to find a position of comfort but now he reports pain is constant and 8/10.  On physical exam vital signs are normal patient is in no distress.  He has tenderness to the mid abdomen and right lower quadrant with rebounding guarding.  No CVA pain.  Patient is not on blood thinners.  Does take aspirin 81 mg  daily  He was given 4 mg of morphine and 4 Zofran reports some improvement of pain.  Labs were done patient had normal white count, ANC 10.7 normal platelets.  Urinalysis without signs infection.  Potassium 3.4.  Patient has been kept NPO CT scan revealed acute appendicitis without perforation or abscess formation.  I spoke with Hospital Medicine Dr. Spann and Dr Chang, Surgeon.  Patient will be brought to the OR for appendectomy this afternoon.  He will be kept NPO he was given Zosyn 4.5 g IV and 1 L fluids due to the elevated BUN.  For more morphine to control pain.  Mckenzie Toribio M.D.  9:53 AM 1/2/2022    Other:   I discussed test(s) with the performing physician.       <> Summary of the Findings: Jayy- acute appy   I have discussed this case with another health care provider.                      Clinical Impression:   Final diagnoses:  [K37] Appendicitis, unspecified appendicitis type (Primary)          ED Disposition Condition    Observation               Mckenzie Toribio MD  01/02/22 0954

## 2022-01-02 NOTE — CONSULTS
[facility]  General Surgery  Consult Note    Patient Name: Onesimo Paula  MRN: [id number]  Code Status: No Order  Admission Date: 1/2/2022  Hospital Length of Stay: 0 days  Attending Physician: Torres Spann MD  Primary Care Provider: JAMES Rose,FNP-C    Patient information was obtained from patient and ER records.     Inpatient consult to General surgery  Consult performed by: Sam Goss MD  Consult ordered by: Mckenzie Toribio MD  Reason for consult: appendicitis  Assessment/Recommendations: Elenita kerr        Subjective:     Principal Problem: <principal problem not specified>    History of Present Illness: 70 y/o with 48 hours of abdominal pain mainly on right associated with nausea and subjective fever.      No current facility-administered medications on file prior to encounter.     Current Outpatient Medications on File Prior to Encounter   Medication Sig    amLODIPine (NORVASC) 10 MG tablet Take 1 tablet (10 mg total) by mouth once daily.    aspirin (ECOTRIN) 81 MG EC tablet aspirin 81 mg tablet,delayed release   Take 1 tablet every day by oral route.    co-enzyme Q-10 30 mg capsule Take 30 mg by mouth 3 (three) times daily.    hydroCHLOROthiazide (HYDRODIURIL) 25 MG tablet Take 1 tablet (25 mg total) by mouth once daily.    mirabegron (MYRBETRIQ) 50 mg Tb24 Take 1 tablet (50 mg total) by mouth once daily.    multivit with minerals/lutein (MULTIVITAMIN 50 PLUS ORAL) multivitamin    omega 3-dha-epa-fish oil (FISH OIL) 100-160-1,000 mg Cap Fish Oil    omeprazole (PRILOSEC) 20 MG capsule Take 1 capsule by mouth once daily    rosuvastatin (CRESTOR) 10 MG tablet Take 1 tablet (10 mg total) by mouth once daily.    tadalafiL (CIALIS) 5 MG tablet Take 1 tablet (5 mg total) by mouth once daily.    telmisartan (MICARDIS) 80 MG Tab Take 1 tablet by mouth once daily    ibuprofen (ADVIL,MOTRIN) 800 MG tablet     [DISCONTINUED] doxycycline monohydrate 100 mg Tab         Review of patient's allergies indicates:  No Known Allergies    Past Medical History:   Diagnosis Date    Acid reflux     Acquired tricuspid valve insufficiency     Anemia     Borderline diabetes     BPH (benign prostatic hyperplasia)     Cataract     20 rt eye, 20- Lt eye    Coronary artery disease     Hypertension     Pulmonary nodules     Skin cancer      Past Surgical History:   Procedure Laterality Date    CYSTOSCOPY WITH TRANSURETHRAL DESTRUCTION OF PROSTATE,RFA N/A 10/21/2020    Procedure: CYSTOSCOPY WITH TRANSURETHRAL DESTRUCTION OF PROSTATE,RFA;  Surgeon: La Nena James MD;  Location: [facility] OR;  Service: Urology;  Laterality: N/A;  please make sure [facility] rep available [phone]    EYE SURGERY      for cataracts, rt eye 20, left eye 20    HERNIA REPAIR  1999    HERNIA REPAIR  2004    KNEE ARTHROSCOPY Left 2018    SKIN LESION EXCISION  10/2009    Neck    SKIN LESION EXCISION  2015    STAPEDECTOMY Right 2001    TRANSRECTAL ULTRASOUND EXAMINATION N/A 2020    Procedure: TRANSRECTAL ULTRASOUND;  Surgeon: La Nena James MD;  Location: [facility] OR;  Service: Urology;  Laterality: N/A;     Family History     Problem Relation (Age of Onset)    Cancer Mother    Heart failure Father    Kidney disease Father    Stroke Mother        Tobacco Use    Smoking status: Former Smoker     Quit date:      Years since quittin.0    Smokeless tobacco: Never Used   Substance and Sexual Activity    Alcohol use: Yes     Comment: occasional    Drug use: No    Sexual activity: Yes     Partners: Female     Review of Systems   Constitutional: Positive for fever.   Gastrointestinal: Positive for abdominal pain and nausea. Negative for blood in stool, diarrhea and rectal pain.     Objective:     Vital Signs (Most Recent):  Temp: 98 °F (36.7 °C) (22 0830)  Pulse: 61 (22 1030)  Resp: 16 (22 1030)  BP: 134/67 (22 1030)  SpO2:  96 % (01/02/22 1030) Vital Signs (24h Range):  Temp:  [97.7 °F (36.5 °C)-98 °F (36.7 °C)] 98 °F (36.7 °C)  Pulse:  [61-66] 61  Resp:  [16-18] 16  SpO2:  [95 %-99 %] 96 %  BP: (132-153)/(65-73) 134/67     Weight: 75.1 kg (165 lb 9.1 oz)  Body mass index is 23.76 kg/m².    Physical Exam  Abdominal:      General: Abdomen is flat. There is no distension.      Palpations: Abdomen is soft. There is no mass.      Tenderness: There is abdominal tenderness. There is guarding (rlq). There is no rebound.      Hernia: No hernia is present.         Significant Labs:  I have reviewed all pertinent lab results within the past 24 hours.  CBC:   Recent Labs   Lab 01/02/22  0726   WBC 12.27   RBC 4.53*   HGB 14.1   HCT 41.3      MCV 91   MCH 31.1*   MCHC 34.1     BMP:   Recent Labs   Lab 01/02/22  0726   *      K 3.4*      CO2 27   BUN 31*   CREATININE 1.0   CALCIUM 9.0   MG 2.1       Significant Diagnostics:  I have reviewed all pertinent imaging results/findings within the past 24 hours.    Assessment/Plan:     Acute appendicitis with localized peritonitis, without perforation, abscess, or gangrene  Lap appy today, risks and benefits of procedure discussed with patient, including open surgery, colon resection, and drains.  We have been waiting for operating room to go.      VTE Risk Mitigation (From admission, onward)    None          Thank you for your consult. I will follow-up with patient. Please contact us if you have any additional questions.    Sam Goss MD  General Surgery  [facility]

## 2022-01-02 NOTE — SUBJECTIVE & OBJECTIVE
Past Medical History:   Diagnosis Date    Acid reflux     Acquired tricuspid valve insufficiency     Anemia     Borderline diabetes     BPH (benign prostatic hyperplasia)     Cataract     1/16/20 rt eye, 1/30/20- Lt eye    Coronary artery disease     Hypertension     Pulmonary nodules     Skin cancer        Past Surgical History:   Procedure Laterality Date    CYSTOSCOPY WITH TRANSURETHRAL DESTRUCTION OF PROSTATE,RFA N/A 10/21/2020    Procedure: CYSTOSCOPY WITH TRANSURETHRAL DESTRUCTION OF PROSTATE,RFA;  Surgeon: La Nena James MD;  Location: [facility] OR;  Service: Urology;  Laterality: N/A;  please make sure [facility] rep available [phone]    EYE SURGERY      for cataracts, rt eye 1/16/20, left eye 1/30/20    HERNIA REPAIR  12/1999    HERNIA REPAIR  05/2004    KNEE ARTHROSCOPY Left 04/12/2018    SKIN LESION EXCISION  10/2009    Neck    SKIN LESION EXCISION  2015    STAPEDECTOMY Right 03/2001    TRANSRECTAL ULTRASOUND EXAMINATION N/A 8/17/2020    Procedure: TRANSRECTAL ULTRASOUND;  Surgeon: La Nena James MD;  Location: [facility] OR;  Service: Urology;  Laterality: N/A;       Review of patient's allergies indicates:  No Known Allergies    No current facility-administered medications on file prior to encounter.     Current Outpatient Medications on File Prior to Encounter   Medication Sig    amLODIPine (NORVASC) 10 MG tablet Take 1 tablet (10 mg total) by mouth once daily.    aspirin (ECOTRIN) 81 MG EC tablet aspirin 81 mg tablet,delayed release   Take 1 tablet every day by oral route.    co-enzyme Q-10 30 mg capsule Take 30 mg by mouth 3 (three) times daily.    hydroCHLOROthiazide (HYDRODIURIL) 25 MG tablet Take 1 tablet (25 mg total) by mouth once daily.    mirabegron (MYRBETRIQ) 50 mg Tb24 Take 1 tablet (50 mg total) by mouth once daily.    multivit with minerals/lutein (MULTIVITAMIN 50 PLUS ORAL) multivitamin    omega 3-dha-epa-fish oil (FISH OIL) 100-160-1,000 mg Cap Fish  Oil    omeprazole (PRILOSEC) 20 MG capsule Take 1 capsule by mouth once daily    rosuvastatin (CRESTOR) 10 MG tablet Take 1 tablet (10 mg total) by mouth once daily.    tadalafiL (CIALIS) 5 MG tablet Take 1 tablet (5 mg total) by mouth once daily.    telmisartan (MICARDIS) 80 MG Tab Take 1 tablet by mouth once daily    ibuprofen (ADVIL,MOTRIN) 800 MG tablet     [DISCONTINUED] doxycycline monohydrate 100 mg Tab      Family History     Problem Relation (Age of Onset)    Cancer Mother    Heart failure Father    Kidney disease Father    Stroke Mother        Tobacco Use    Smoking status: Former Smoker     Quit date:      Years since quittin.0    Smokeless tobacco: Never Used   Substance and Sexual Activity    Alcohol use: Yes     Comment: occasional    Drug use: No    Sexual activity: Yes     Partners: Female     Review of Systems   Constitutional: Negative for activity change and appetite change.   HENT: Negative for congestion and sore throat.    Eyes: Negative for discharge and visual disturbance.   Respiratory: Negative for cough and chest tightness.    Cardiovascular: Negative for chest pain and leg swelling.   Gastrointestinal: Positive for abdominal pain and nausea.   Genitourinary: Negative for dysuria and hematuria.   Musculoskeletal: Negative for myalgias.   Skin: Negative for pallor and rash.   Neurological: Negative for dizziness and weakness.   Psychiatric/Behavioral: Negative for behavioral problems. The patient is not nervous/anxious.    All other systems reviewed and are negative.    Objective:     Vital Signs (Most Recent):  Temp: 98.8 °F (37.1 °C) (22 1223)  Pulse: 65 (22 1223)  Resp: 18 (22 1223)  BP: (!) 143/81 (22 1223)  SpO2: 98 % (22 1223) Vital Signs (24h Range):  Temp:  [97.7 °F (36.5 °C)-98.8 °F (37.1 °C)] 98.8 °F (37.1 °C)  Pulse:  [61-66] 65  Resp:  [16-18] 18  SpO2:  [95 %-99 %] 98 %  BP: (132-153)/(65-81) 143/81     Weight: 75.1 kg (165 lb  9.1 oz)  Body mass index is 23.76 kg/m².    Physical Exam  Vitals and nursing note reviewed.   Constitutional:       Appearance: He is well-developed and well-nourished.   HENT:      Head: Atraumatic.      Mouth/Throat:      Mouth: Oropharynx is clear and moist.   Neck:      Vascular: No JVD.   Cardiovascular:      Rate and Rhythm: Normal rate and regular rhythm.      Heart sounds: Normal heart sounds. No murmur heard.  No gallop.    Pulmonary:      Effort: No respiratory distress.      Breath sounds: Normal breath sounds. No wheezing.   Abdominal:      General: Bowel sounds are normal. There is no distension.      Palpations: Abdomen is soft.      Tenderness: There is abdominal tenderness. There is no guarding or rebound.      Comments: Tenderness in right lower quadrant, no peritoneal signs   Musculoskeletal:         General: No edema.      Cervical back: Neck supple.   Lymphadenopathy:      Cervical: No cervical adenopathy.   Skin:     General: Skin is warm.      Capillary Refill: Capillary refill takes less than 2 seconds.      Findings: No rash.   Neurological:      Mental Status: He is alert and oriented to person, place, and time.      Cranial Nerves: No cranial nerve deficit.   Psychiatric:         Behavior: Behavior normal.             Significant Labs: All pertinent labs within the past 24 hours have been reviewed.    Significant Imaging: I have reviewed all pertinent imaging results/findings within the past 24 hours.

## 2022-01-02 NOTE — ANESTHESIA PREPROCEDURE EVALUATION
01/02/2022  Onesimo Paula is a 71 y.o., male.      Patient Active Problem List   Diagnosis    Benign prostatic hypertrophy without urinary obstruction    Chest pain    Shoulder pain    Polyp of colon    Complete tear of left rotator cuff    Mixed hyperlipidemia    Blood urea abnormal    ED (erectile dysfunction) of organic origin    Esophagitis    Gastroesophageal reflux disease without esophagitis    Essential hypertension    Anemia    Nonrheumatic mitral valve regurgitation    Multiple pulmonary nodules    Dyspnea on exertion    Elevated transaminase level    Chronic kidney disease (CKD), active medical management without dialysis, stage 2 (mild)    Left arm pain    Hip pain, acute, right    Effusion of bursa of left knee    Acquired tricuspid valve insufficiency    Prerenal azotemia    Primary osteoarthritis of left knee    History of melanoma- approx 2015 right forearm - resection only    Screening for prostate cancer    Prediabetes    BPH with obstruction/lower urinary tract symptoms    Primary osteoarthritis of right knee    Acute appendicitis with localized peritonitis, without perforation, abscess, or gangrene       Past Surgical History:   Procedure Laterality Date    CYSTOSCOPY WITH TRANSURETHRAL DESTRUCTION OF PROSTATE,RFA N/A 10/21/2020    Procedure: CYSTOSCOPY WITH TRANSURETHRAL DESTRUCTION OF PROSTATE,RFA;  Surgeon: La Nena James MD;  Location: [facility];  Service: Urology;  Laterality: N/A;  please make sure [facility] rep available [phone]    EYE SURGERY      for cataracts, rt eye 1/16/20, left eye 1/30/20    HERNIA REPAIR  12/1999    HERNIA REPAIR  05/2004    KNEE ARTHROSCOPY Left 04/12/2018    SKIN LESION EXCISION  10/2009    Neck    SKIN LESION EXCISION  2015    STAPEDECTOMY Right 03/2001    TRANSRECTAL ULTRASOUND EXAMINATION N/A 8/17/2020     Procedure: TRANSRECTAL ULTRASOUND;  Surgeon: La Nena James MD;  Location: [facility];  Service: Urology;  Laterality: N/A;        Tobacco Use:  The patient  reports that he quit smoking about 25 years ago. He has never used smokeless tobacco.     Results for orders placed or performed in visit on 09/16/20   IN OFFICE EKG 12-LEAD (to [place])    Collection Time: 09/16/20  9:23 AM    Narrative    Test Reason : R07.9,    Vent. Rate : 052 BPM     Atrial Rate : 052 BPM     P-R Int : 196 ms          QRS Dur : 106 ms      QT Int : 424 ms       P-R-T Axes : 060 -13 022 degrees     QTc Int : 394 ms    Sinus bradycardia  Otherwise normal ECG  No previous ECGs available  Confirmed by Qamar Caldwell MD ([id number]) on 9/19/2020 4:41:15 PM    Referred By:  LALO           Confirmed By:Qamar Caldwell MD        Imaging Results          CT Abdomen Pelvis With Contrast (Final result)  Result time 01/02/22 09:22:17    Final result by Nicole Navarro MD (01/02/22 09:22:17)                 Narrative:    All CT scans at this facility used dose modulation, iterative reconstruction and/or weight-based dosing when appropriate to reduce radiation doses  as low as reasonably achievable.    HISTORY: Abdominal abscess/infection suspected; RLQ abdominal pain (Age >= 14y)    FINDINGS: Axial postcontrast imaging was performed with 100 mL Omnipaque 350 IV contrast .    CT ABDOMEN: There is atelectasis in the lung bases.    There is a small hiatal hernia. There is no pericardial effusion.    The liver is hypodense compatible with fatty infiltration. There is a 3.5 cm cyst within the right lobe of the liver. There is no biliary duct dilatation.    The spleen, pancreas, gallbladder and adrenal glands are normal.    The kidneys enhance symmetrically without hydronephrosis. There is a 3 mm nonobstructing left renal stone. There is a 1.9 cm right renal cyst. There are left parapelvic cyst.    There is dilatation and wall thickening of the  appendix with stranding in the adjacent fat suggestive of appendicitis. The wall thickening is most pronounced at the tip of the cecum and proximal appendix. There is no abscess or perforation.    The remainder of the bowel is normal. There is a moderate amount of fecal material throughout the colon. There is sigmoid diverticulosis without diverticulitis.    There is diffuse vascular calcification of the aorta. There is no mesenteric or retroperitoneal adenopathy. There is mild leftward curvature of the lumbar spine.    CT PELVIS: There is sigmoid diverticulosis without diverticulitis. The prostate gland is enlarged with central calcifications. There is no free fluid.    IMPRESSION: Findings compatible with appendicitis with mild wall thickening of the tip of the cecum and proximal appendix there is no abscess or perforation    Colonic diverticulosis    Fatty infiltration of the liver    3 mm left renal stone with bilateral renal cysts    Small hiatal hernia    These findings were called to Lesley the patient's nurse in the emergency department at 9:20 AM    Electronically signed by:  Nicole Navarro MD  1/2/2022 9:22 AM CST Workstation: [id number]                               Lab Results   Component Value Date    WBC 12.27 01/02/2022    HGB 14.1 01/02/2022    HCT 41.3 01/02/2022    MCV 91 01/02/2022     01/02/2022     BMP  Lab Results   Component Value Date     01/02/2022    K 3.4 (L) 01/02/2022     01/02/2022    CO2 27 01/02/2022    BUN 31 (H) 01/02/2022    CREATININE 1.0 01/02/2022    CALCIUM 9.0 01/02/2022    ANIONGAP 9 01/02/2022     (H) 01/02/2022     03/31/2021    GLU 97 03/18/2021       Results for orders placed during the hospital encounter of 03/16/21    Echo Color Flow Doppler? Yes    Interpretation Summary  · The left ventricle is normal in size with moderate predominantly basal septal moderate asymmetric hypertrophy eccentric hypertrophy and normal systolic function.  The estimated ejection fraction is 68%.  · Normal left ventricular diastolic function.  · Normal right ventricular size with normal right ventricular systolic function.  · Mild mitral regurgitation.  · Normal central venous pressure (3 mmHg).            Anesthesia Evaluation    I have reviewed the Patient Summary Reports.    I have reviewed the Nursing Notes. I have reviewed the NPO Status.   I have reviewed the Medications.     Review of Systems  Anesthesia Hx:  No problems with previous Anesthesia  Denies Family Hx of Anesthesia complications.   Denies Personal Hx of Anesthesia complications.   Social:  Non-Smoker    Hematology/Oncology:         -- Anemia:   Cardiovascular:   Exercise tolerance: good Hypertension CAD   hyperlipidemia    Pulmonary:   Shortness of breath    Renal/:   Chronic Renal Disease, CRI BPH    Hepatic/GI:   GERD    Musculoskeletal:   Arthritis     Neurological:  Neurology Normal    Endocrine:  Endocrine Normal    Psych:  Psychiatric Normal              Anesthesia Plan  Type of Anesthesia, risks & benefits discussed:  Anesthesia Type:  general    Patient's Preference:   Plan Factors:          Intra-op Monitoring Plan: standard ASA monitors  Intra-op Monitoring Plan Comments:   Post Op Pain Control Plan: multimodal analgesia, IV/PO Opioids PRN and per primary service following discharge from PACU  Post Op Pain Control Plan Comments:     Induction:   IV  Beta Blocker:  Patient is not currently on a Beta-Blocker (No further documentation required).       Informed Consent: Patient understands risks and agrees with Anesthesia plan.  Questions answered. Anesthesia consent signed with patient.  ASA Score: 3     Day of Surgery Review of History & Physical:        Anesthesia Plan Notes: GETA,   Ofirmev 1000mg  Pepcid 20mg  Zofran, Decadron n/v ppx         Ready For Surgery From Anesthesia Perspective.

## 2022-01-03 LAB
ALBUMIN SERPL BCP-MCNC: 2.9 G/DL (ref 3.5–5.2)
ALP SERPL-CCNC: 42 U/L (ref 55–135)
ALT SERPL W/O P-5'-P-CCNC: 21 U/L (ref 10–44)
ANION GAP SERPL CALC-SCNC: 10 MMOL/L (ref 8–16)
AST SERPL-CCNC: 20 U/L (ref 10–40)
BASOPHILS # BLD AUTO: 0.01 K/UL (ref 0–0.2)
BASOPHILS NFR BLD: 0.2 % (ref 0–1.9)
BILIRUB SERPL-MCNC: 1.3 MG/DL (ref 0.1–1)
BUN SERPL-MCNC: 33 MG/DL (ref 8–23)
CALCIUM SERPL-MCNC: 8.2 MG/DL (ref 8.7–10.5)
CHLORIDE SERPL-SCNC: 103 MMOL/L (ref 95–110)
CO2 SERPL-SCNC: 24 MMOL/L (ref 23–29)
CREAT SERPL-MCNC: 1.5 MG/DL (ref 0.5–1.4)
DIFFERENTIAL METHOD: ABNORMAL
EOSINOPHIL # BLD AUTO: 0 K/UL (ref 0–0.5)
EOSINOPHIL NFR BLD: 0 % (ref 0–8)
ERYTHROCYTE [DISTWIDTH] IN BLOOD BY AUTOMATED COUNT: 13.5 % (ref 11.5–14.5)
EST. GFR  (AFRICAN AMERICAN): 53 ML/MIN/1.73 M^2
EST. GFR  (NON AFRICAN AMERICAN): 45.9 ML/MIN/1.73 M^2
GLUCOSE SERPL-MCNC: 122 MG/DL (ref 70–110)
HCT VFR BLD AUTO: 41.5 % (ref 40–54)
HGB BLD-MCNC: 13.9 G/DL (ref 14–18)
IMM GRANULOCYTES # BLD AUTO: 0.02 K/UL (ref 0–0.04)
IMM GRANULOCYTES NFR BLD AUTO: 0.3 % (ref 0–0.5)
LYMPHOCYTES # BLD AUTO: 0.5 K/UL (ref 1–4.8)
LYMPHOCYTES NFR BLD: 8.3 % (ref 18–48)
MAGNESIUM SERPL-MCNC: 1.8 MG/DL (ref 1.6–2.6)
MCH RBC QN AUTO: 30.5 PG (ref 27–31)
MCHC RBC AUTO-ENTMCNC: 33.5 G/DL (ref 32–36)
MCV RBC AUTO: 91 FL (ref 82–98)
MONOCYTES # BLD AUTO: 0.5 K/UL (ref 0.3–1)
MONOCYTES NFR BLD: 8.5 % (ref 4–15)
NEUTROPHILS # BLD AUTO: 4.8 K/UL (ref 1.8–7.7)
NEUTROPHILS NFR BLD: 82.7 % (ref 38–73)
NRBC BLD-RTO: 0 /100 WBC
PHOSPHATE SERPL-MCNC: 4.1 MG/DL (ref 2.7–4.5)
PLATELET # BLD AUTO: 205 K/UL (ref 150–450)
PMV BLD AUTO: 10.5 FL (ref 9.2–12.9)
POTASSIUM SERPL-SCNC: 4.3 MMOL/L (ref 3.5–5.1)
PROT SERPL-MCNC: 5.5 G/DL (ref 6–8.4)
RBC # BLD AUTO: 4.56 M/UL (ref 4.6–6.2)
SODIUM SERPL-SCNC: 137 MMOL/L (ref 136–145)
WBC # BLD AUTO: 5.76 K/UL (ref 3.9–12.7)

## 2022-01-03 PROCEDURE — 83735 ASSAY OF MAGNESIUM: CPT | Performed by: SURGERY

## 2022-01-03 PROCEDURE — 96366 THER/PROPH/DIAG IV INF ADDON: CPT

## 2022-01-03 PROCEDURE — 63600175 PHARM REV CODE 636 W HCPCS: Performed by: SURGERY

## 2022-01-03 PROCEDURE — 12000002 HC ACUTE/MED SURGE SEMI-PRIVATE ROOM

## 2022-01-03 PROCEDURE — 25000003 PHARM REV CODE 250: Performed by: SURGERY

## 2022-01-03 PROCEDURE — 84100 ASSAY OF PHOSPHORUS: CPT | Performed by: SURGERY

## 2022-01-03 PROCEDURE — 80053 COMPREHEN METABOLIC PANEL: CPT | Performed by: SURGERY

## 2022-01-03 PROCEDURE — 36415 COLL VENOUS BLD VENIPUNCTURE: CPT | Performed by: SURGERY

## 2022-01-03 PROCEDURE — 85025 COMPLETE CBC W/AUTO DIFF WBC: CPT | Performed by: SURGERY

## 2022-01-03 RX ADMIN — HYDROCODONE BITARTRATE AND ACETAMINOPHEN 2 TABLET: 5; 325 TABLET ORAL at 08:01

## 2022-01-03 RX ADMIN — HYDROCODONE BITARTRATE AND ACETAMINOPHEN 2 TABLET: 5; 325 TABLET ORAL at 05:01

## 2022-01-03 RX ADMIN — PIPERACILLIN AND TAZOBACTAM 3.38 G: 3; .375 INJECTION, POWDER, LYOPHILIZED, FOR SOLUTION INTRAVENOUS; PARENTERAL at 01:01

## 2022-01-03 RX ADMIN — SODIUM CHLORIDE, SODIUM LACTATE, POTASSIUM CHLORIDE, AND CALCIUM CHLORIDE: .6; .31; .03; .02 INJECTION, SOLUTION INTRAVENOUS at 01:01

## 2022-01-03 RX ADMIN — PIPERACILLIN AND TAZOBACTAM 3.38 G: 3; .375 INJECTION, POWDER, LYOPHILIZED, FOR SOLUTION INTRAVENOUS; PARENTERAL at 05:01

## 2022-01-03 RX ADMIN — POLYETHYLENE GLYCOL 3350 17 G: 17 POWDER, FOR SOLUTION ORAL at 08:01

## 2022-01-03 RX ADMIN — HYDROCODONE BITARTRATE AND ACETAMINOPHEN 2 TABLET: 5; 325 TABLET ORAL at 01:01

## 2022-01-03 RX ADMIN — SENNOSIDES AND DOCUSATE SODIUM 1 TABLET: 50; 8.6 TABLET ORAL at 09:01

## 2022-01-03 RX ADMIN — OXYBUTYNIN CHLORIDE 5 MG: 5 TABLET, EXTENDED RELEASE ORAL at 08:01

## 2022-01-03 RX ADMIN — SENNOSIDES AND DOCUSATE SODIUM 1 TABLET: 50; 8.6 TABLET ORAL at 08:01

## 2022-01-03 RX ADMIN — ENOXAPARIN SODIUM 40 MG: 40 INJECTION SUBCUTANEOUS at 05:01

## 2022-01-03 RX ADMIN — PIPERACILLIN AND TAZOBACTAM 3.38 G: 3; .375 INJECTION, POWDER, LYOPHILIZED, FOR SOLUTION INTRAVENOUS; PARENTERAL at 10:01

## 2022-01-03 NOTE — ANESTHESIA POSTPROCEDURE EVALUATION
Anesthesia Post Evaluation    Patient: Onesimo Paula    Procedure(s) Performed: Procedure(s) (LRB):  APPENDECTOMY, LAPAROSCOPIC (N/A)    Final Anesthesia Type: general      Patient location during evaluation: PACU  Patient participation: Yes- Able to Participate  Level of consciousness: awake and alert  Post-procedure vital signs: reviewed and stable  Pain management: adequate  Airway patency: patent  FAINA mitigation strategies: Multimodal analgesia, Extubation while patient is awake and Extubation and recovery carried out in lateral, semiupright, or other nonsupine position  PONV status at discharge: No PONV  Anesthetic complications: no      Cardiovascular status: blood pressure returned to baseline  Respiratory status: nasal cannula  Hydration status: euvolemic  Follow-up not needed.          Vitals Value Taken Time   /55 01/02/22 1930   Temp 36.8 °C (98.2 °F) 01/02/22 1930   Pulse 65 01/02/22 1934   Resp 22 01/02/22 1934   SpO2 95 % 01/02/22 1934   Vitals shown include unvalidated device data.      No case tracking events are documented in the log.      Pain/Violette Score: Pain Rating Prior to Med Admin: 10 (1/2/2022  4:20 PM)  Violette Score: 8 (1/2/2022  7:30 PM)

## 2022-01-03 NOTE — PROGRESS NOTES
[facility] Medicine  Progress Note    Patient name: Onesimo Paula  MRN: [id number]  Admit Date: 1/2/2022   LOS: 0 days     SUBJECTIVE:     Principal problem: Acute appendicitis with perforation, localized peritonitis, and abscess, without gangrene    Interval History:  Patient was seen and examined bedside.  Status post laparoscopic appendicectomy .  No immediate postop complications noted, blood pressure remains borderline low without any symptoms.  Still not passing gas, no bowel movement.  Pain is well controlled    Scheduled Meds:   enoxaparin  40 mg Subcutaneous Daily    lactated ringers  1,000 mL Intravenous Once    oxybutynin  5 mg Oral Daily    piperacillin-tazobactam (ZOSYN) IVPB  3.375 g Intravenous Q8H    polyethylene glycol  17 g Oral Daily    senna-docusate 8.6-50 mg  1 tablet Oral BID     Continuous Infusions:   lactated ringers 100 mL/hr at 01/03/22 0142     PRN Meds:acetaminophen, HYDROcodone-acetaminophen, melatonin, morphine, naloxone, ondansetron, simethicone, sodium chloride 0.9%, sodium chloride 0.9%    Review of patient's allergies indicates:  No Known Allergies    Review of Systems: As per interval history    OBJECTIVE:     Vital Signs (Most Recent)  Temp: 98.6 °F (37 °C) (01/03/22 0833)  Pulse: 69 (01/03/22 0833)  Resp: 18 (01/03/22 0833)  BP: (!) 83/60 (01/03/22 0833)  SpO2: (!) 93 % (01/03/22 0833)    Vital Signs Range (Last 24H):  Temp:  [97.1 °F (36.2 °C)-98.8 °F (37.1 °C)]   Pulse:  [61-69]   Resp:  [14-22]   BP: ()/(49-81)   SpO2:  [93 %-99 %]     I & O (Last 24H):    Intake/Output Summary (Last 24 hours) at 1/3/2022 1124  Last data filed at 1/2/2022 2000  Gross per 24 hour   Intake 2045 ml   Output 100 ml   Net 1945 ml       Physical Exam:  General: Patient resting comfortably in no acute distress. Appears as stated age. Calm  Eyes: No conjunctival injection. No scleral icterus.  ENT: Hearing grossly intact. No discharge from ears. No nasal  discharge.   Neck: Supple, trachea midline. No JVD  CVS: RRR. No LE edema BL  Lungs: CTA BL, no wheezing or crackles. Good breath sounds. No accessory muscle use. No acute respiratory distress  Abdomen:  Soft, appropriately tender, VICENTE drain placed, could not appreciate bowel sounds  Neuro: AOx3. Moves all extremities. Follows commands. Responds appropriately   Skin:  No rash or erythema noted    Laboratory:  I have reviewed all pertinent lab results within the past 24 hours.    Diagnostic Results:  Reviewed all imaging    ASSESSMENT/PLAN:     71-year-old gentleman with prior history of hypertension, BPH, GERD presented with abdominal pain found to have acute appendicitis now status post laparoscopic appendicectomy      Active Hospital Problems    Diagnosis  POA    *Acute appendicitis with perforation, localized peritonitis, and abscess, without gangrene [K35.33]  Yes    BPH with obstruction/lower urinary tract symptoms [N40.1, N13.8]  Yes    ED (erectile dysfunction) of organic origin [N52.9]  Yes    Gastroesophageal reflux disease without esophagitis [K21.9]  Yes    Essential hypertension [I10]  Yes      Resolved Hospital Problems   No resolved problems to display.         Plan:   Status post laparoscopic appendicectomy on 01/03/2022.  Bowel functions have not returned yet  P.r.n. analgesia  IV fluids  Hold all antihypertensives  Diet advancement as per General surgery  Out of bed to chair, Encourage activity  Continue Zosyn for 1 more day      VTE Risk Mitigation (From admission, onward)         Ordered     enoxaparin injection 40 mg  Daily         01/02/22 1242     IP VTE HIGH RISK PATIENT  Once         01/02/22 1242     Place sequential compression device  Until discontinued         01/02/22 1242                  Department Hospital Medicine  [facility]  Torres Spann MD  Date of service: 01/03/2022

## 2022-01-03 NOTE — ANESTHESIA PROCEDURE NOTES
Intubation    Date/Time: 1/2/2022 5:54 PM  Performed by: Augie Zapata CRNA  Authorized by: Stevenson Daniels MD     Intubation:     Induction:  Intravenous    Intubated:  Postinduction    Mask Ventilation:  N/a    Attempts:  1    Attempted By:  CRNA    Method of Intubation:  Video laryngoscopy    Blade:  Mullen 3    Laryngeal View Grade: Grade I - full view of cords      Difficult Airway Encountered?: No      Complications:  None    Airway Device:  Oral endotracheal tube    Airway Device Size:  7.5    Style/Cuff Inflation:  Cuffed    Tube secured:  21    Secured at:  The lips    Placement Verified By:  Capnometry    Complicating Factors:  None    Findings Post-Intubation:  BS equal bilateral and atraumatic/condition of teeth unchanged

## 2022-01-03 NOTE — NURSING
PT left floor via stretcher with surgery assistant in stable condition. Called report upstairs to LAURY Rae for transfer of care.

## 2022-01-03 NOTE — PLAN OF CARE
[facility]  Initial Discharge Assessment       Primary Care Provider: JAMES Rose,SHRADDHAP-C    Admission Diagnosis: Appendicitis, unspecified appendicitis type [K37]    Admission Date: 1/2/2022  Expected Discharge Date: 1/3/2022    Discharge Barriers Identified: None    Payor: BLUE CROSS BLUE SHIELD / Plan: BCBS FEDERAL STANDARD / Product Type: PPO /     Extended Emergency Contact Information  Primary Emergency Contact: Kim Paulatza  Address: [address] [place] [address] [place]  Mobile Phone: [phone]  Relation: Spouse   needed? No    Discharge Plan A: Home  Discharge Plan B: Home      [facility] [address] [place], [place] - [address] [employer]Receipts  [address] [facility] [address]  Phone: [phone] Fax: [phone]    [facility] - [place], [place] - [address] AT [facility]  [address]  [facility] [address]  Phone: [phone] Fax: [phone]      Initial Assessment (most recent)     Adult Discharge Assessment - 01/03/22 1056        Discharge Assessment    Assessment Type Discharge Planning Assessment     Confirmed/corrected address, phone number and insurance Yes     Confirmed Demographics Correct on Facesheet     Source of Information patient     When was your last doctors appointment? --   1-2 months ago    Does patient/caregiver understand observation status Yes     Communicated RON with patient/caregiver Date not available/Unable to determine     Reason For Admission acute appendicitis     Lives With spouse     Do you expect to return to your current living situation? Yes     Do you have help at home or someone to help you manage your care at home? No     Prior to hospitilization cognitive status: Alert/Oriented     Current cognitive status: Alert/Oriented     Walking or Climbing Stairs Difficulty none     Dressing/Bathing Difficulty none     Equipment Currently Used at  Home none     Readmission within 30 days? No     Patient currently being followed by outpatient case management? No     Do you currently have service(s) that help you manage your care at home? No     Do you take prescription medications? Yes     Do you have prescription coverage? Yes     Coverage BCBS/Medicare     Do you have any problems affording any of your prescribed medications? TBD     Is the patient taking medications as prescribed? yes     Who is going to help you get home at discharge? Julieta Paula, wife [phone]     How do you get to doctors appointments? family or friend will provide;car, drives self     Are you on dialysis? No     Do you take coumadin? No     Discharge Plan A Home     Discharge Plan B Home     DME Needed Upon Discharge  none     Discharge Plan discussed with: Patient     Discharge Barriers Identified None        Relationship/Environment    Name(s) of Who Lives With Patient Julieta Paula, wife [phone]

## 2022-01-03 NOTE — TRANSFER OF CARE
"Anesthesia Transfer of Care Note    Patient: Onesimo Paula    Procedure(s) Performed: Procedure(s) (LRB):  APPENDECTOMY, LAPAROSCOPIC (N/A)    Patient location: PACU    Anesthesia Type: general    Transport from OR: Transported from OR on room air with adequate spontaneous ventilation    Post pain: adequate analgesia    Post assessment: no apparent anesthetic complications    Post vital signs: stable    Level of consciousness: awake and alert    Nausea/Vomiting: no nausea/vomiting    Complications: none    Transfer of care protocol was followed      Last vitals:   Visit Vitals  BP (!) 87/49   Pulse 64   Temp 36.8 °C (98.3 °F)   Resp 20   Ht 5' 10" (1.778 m)   Wt 75.1 kg (165 lb 9.1 oz)   SpO2 (!) 93%   BMI 23.76 kg/m²     "

## 2022-01-03 NOTE — OP NOTE
Laparoscopic appendectomy Procedure Note    Date of procedure:   01/02/2022    Indications:  A 71-year-old male with acute appendicitis.  This was confirmed with CT scan and appeared to likely involve the cecum.    Pre-operative Diagnosis: Acute appendicitis    Post-operative Diagnosis: Same + perforation + abscess    Surgeon: Sam Goss MD    Assistants: none    Anesthesia: General endotracheal anesthesia    ASA Class: 3    Procedure Details   The patient was seen in the Holding Room. The risks, benefits, complications, treatment options, and expected outcomes were discussed with the patient. The possibilities of reaction to medication, pulmonary aspiration, perforation of viscus, bleeding, recurrent infection, the need for additional procedures, failure to diagnose a condition, and creating a complication requiring transfusion or operation were discussed with the patient. The patient concurred with the proposed plan, giving informed consent. The site of surgery properly noted/marked. The patient was taken to Operating Room identified as Onesimo Paula and the procedure verified as laparoscopic appendectomy. A Time Out was held and the above information confirmed.    Full general anesthesia was induced with orotracheal intubation. The patient was prepped and draped in a supine position. Appropriate antibiotics were given intravenously. Arms were tucked.  Holly catheter placed.    Incision was made within the midline above the umbilicus through which a Veress needle was placed.  CO2 insufflation was started maintain and the patient was placed in appropriate position.  Other trocars were placed under direct visualization.    There was dense adhesions around the appendix.  These were bluntly lysed to expose an obviously inflamed appendix.  Following the appendix to its base the base was frankly necrotic and had stool coming from it.  There was a small surrounding abscess which was all irrigated and cleaned.   The necrotic base of the appendix include a small portion of the cecum.  I had dissected the mesoappendix free and transected a using the LigaSure for hemostasis.  The right colon was slightly freed from its lateral attachments to give it more mobility.  I was able to place a Ethicon blue load stapler below the area of amrik necrosis onto healthy cecum.  A small portion of the cecum was taken along with the appendix using 2 blue loads.  A figure-of-eight suture was also used to help reinforce the thickest portion of the staple line.  The appendix was then removed via an Endo-Catch through the umbilical trocar site.  The right lower quadrant was then irrigated where the abscess had been.  I elected to leave a drain in the right lower quadrant over the cecum because of this.  The drain was brought via the left lower quadrant and secured to the skin with a nylon.  Patient tolerated the procedure well.      Instrument, sponge, and needle counts were correct prior to wound closure and at the conclusion of the case.     Findings:  Necrotic appendix base, abscess, perforation    Estimated Blood Loss: 20.0 cc    Drains: right lower quadrant    Total IV Fluids: see anethesia    Specimens: appendix    Implants: none    Complications:  None; patient tolerated the procedure well.    Disposition: PACU - hemodynamically stable.    Condition: stable    Attending Attestation: I was present and scrubbed for the entire procedure.

## 2022-01-04 PROBLEM — N17.9 AKI (ACUTE KIDNEY INJURY): Status: ACTIVE | Noted: 2022-01-04

## 2022-01-04 LAB
ALBUMIN SERPL BCP-MCNC: 2.9 G/DL (ref 3.5–5.2)
ALP SERPL-CCNC: 50 U/L (ref 55–135)
ALT SERPL W/O P-5'-P-CCNC: 19 U/L (ref 10–44)
ANION GAP SERPL CALC-SCNC: 8 MMOL/L (ref 8–16)
AST SERPL-CCNC: 18 U/L (ref 10–40)
BASOPHILS # BLD AUTO: 0.04 K/UL (ref 0–0.2)
BASOPHILS NFR BLD: 0.3 % (ref 0–1.9)
BILIRUB SERPL-MCNC: 1.1 MG/DL (ref 0.1–1)
BUN SERPL-MCNC: 40 MG/DL (ref 8–23)
CALCIUM SERPL-MCNC: 8.5 MG/DL (ref 8.7–10.5)
CHLORIDE SERPL-SCNC: 99 MMOL/L (ref 95–110)
CO2 SERPL-SCNC: 28 MMOL/L (ref 23–29)
CREAT SERPL-MCNC: 1.5 MG/DL (ref 0.5–1.4)
DIFFERENTIAL METHOD: ABNORMAL
EOSINOPHIL # BLD AUTO: 0 K/UL (ref 0–0.5)
EOSINOPHIL NFR BLD: 0.2 % (ref 0–8)
ERYTHROCYTE [DISTWIDTH] IN BLOOD BY AUTOMATED COUNT: 14 % (ref 11.5–14.5)
EST. GFR  (AFRICAN AMERICAN): 53 ML/MIN/1.73 M^2
EST. GFR  (NON AFRICAN AMERICAN): 45.9 ML/MIN/1.73 M^2
GLUCOSE SERPL-MCNC: 112 MG/DL (ref 70–110)
HCT VFR BLD AUTO: 41.1 % (ref 40–54)
HGB BLD-MCNC: 13.7 G/DL (ref 14–18)
IMM GRANULOCYTES # BLD AUTO: 0.07 K/UL (ref 0–0.04)
IMM GRANULOCYTES NFR BLD AUTO: 0.6 % (ref 0–0.5)
LYMPHOCYTES # BLD AUTO: 0.9 K/UL (ref 1–4.8)
LYMPHOCYTES NFR BLD: 6.9 % (ref 18–48)
MAGNESIUM SERPL-MCNC: 2 MG/DL (ref 1.6–2.6)
MCH RBC QN AUTO: 30.8 PG (ref 27–31)
MCHC RBC AUTO-ENTMCNC: 33.3 G/DL (ref 32–36)
MCV RBC AUTO: 92 FL (ref 82–98)
MONOCYTES # BLD AUTO: 0.8 K/UL (ref 0.3–1)
MONOCYTES NFR BLD: 6 % (ref 4–15)
NEUTROPHILS # BLD AUTO: 10.7 K/UL (ref 1.8–7.7)
NEUTROPHILS NFR BLD: 86 % (ref 38–73)
NRBC BLD-RTO: 0 /100 WBC
PHOSPHATE SERPL-MCNC: 2.9 MG/DL (ref 2.7–4.5)
PLATELET # BLD AUTO: 207 K/UL (ref 150–450)
PMV BLD AUTO: 10.9 FL (ref 9.2–12.9)
POTASSIUM SERPL-SCNC: 4.4 MMOL/L (ref 3.5–5.1)
PROT SERPL-MCNC: 6.2 G/DL (ref 6–8.4)
RBC # BLD AUTO: 4.45 M/UL (ref 4.6–6.2)
SODIUM SERPL-SCNC: 135 MMOL/L (ref 136–145)
WBC # BLD AUTO: 12.4 K/UL (ref 3.9–12.7)

## 2022-01-04 PROCEDURE — 84100 ASSAY OF PHOSPHORUS: CPT | Performed by: SURGERY

## 2022-01-04 PROCEDURE — 25000003 PHARM REV CODE 250: Performed by: SURGERY

## 2022-01-04 PROCEDURE — 25000003 PHARM REV CODE 250: Performed by: HOSPITALIST

## 2022-01-04 PROCEDURE — 63600175 PHARM REV CODE 636 W HCPCS: Performed by: SURGERY

## 2022-01-04 PROCEDURE — 83735 ASSAY OF MAGNESIUM: CPT | Performed by: SURGERY

## 2022-01-04 PROCEDURE — 12000002 HC ACUTE/MED SURGE SEMI-PRIVATE ROOM

## 2022-01-04 PROCEDURE — 80053 COMPREHEN METABOLIC PANEL: CPT | Performed by: SURGERY

## 2022-01-04 PROCEDURE — 63600175 PHARM REV CODE 636 W HCPCS: Performed by: HOSPITALIST

## 2022-01-04 PROCEDURE — 36415 COLL VENOUS BLD VENIPUNCTURE: CPT | Performed by: SURGERY

## 2022-01-04 PROCEDURE — 85025 COMPLETE CBC W/AUTO DIFF WBC: CPT | Performed by: SURGERY

## 2022-01-04 RX ORDER — AMOXICILLIN AND CLAVULANATE POTASSIUM 500; 125 MG/1; MG/1
1 TABLET, FILM COATED ORAL 3 TIMES DAILY
Status: DISCONTINUED | OUTPATIENT
Start: 2022-01-04 | End: 2022-01-04

## 2022-01-04 RX ORDER — SODIUM CHLORIDE, SODIUM LACTATE, POTASSIUM CHLORIDE, CALCIUM CHLORIDE 600; 310; 30; 20 MG/100ML; MG/100ML; MG/100ML; MG/100ML
INJECTION, SOLUTION INTRAVENOUS CONTINUOUS
Status: DISCONTINUED | OUTPATIENT
Start: 2022-01-04 | End: 2022-01-07

## 2022-01-04 RX ORDER — PROCHLORPERAZINE EDISYLATE 5 MG/ML
2.5 INJECTION INTRAMUSCULAR; INTRAVENOUS ONCE
Status: COMPLETED | OUTPATIENT
Start: 2022-01-04 | End: 2022-01-04

## 2022-01-04 RX ADMIN — PIPERACILLIN AND TAZOBACTAM 3.38 G: 3; .375 INJECTION, POWDER, LYOPHILIZED, FOR SOLUTION INTRAVENOUS; PARENTERAL at 02:01

## 2022-01-04 RX ADMIN — PIPERACILLIN AND TAZOBACTAM 3.38 G: 3; .375 INJECTION, POWDER, LYOPHILIZED, FOR SOLUTION INTRAVENOUS; PARENTERAL at 04:01

## 2022-01-04 RX ADMIN — SODIUM CHLORIDE, SODIUM LACTATE, POTASSIUM CHLORIDE, AND CALCIUM CHLORIDE: .6; .31; .03; .02 INJECTION, SOLUTION INTRAVENOUS at 10:01

## 2022-01-04 RX ADMIN — OXYBUTYNIN CHLORIDE 5 MG: 5 TABLET, EXTENDED RELEASE ORAL at 09:01

## 2022-01-04 RX ADMIN — HYDROCODONE BITARTRATE AND ACETAMINOPHEN 2 TABLET: 5; 325 TABLET ORAL at 09:01

## 2022-01-04 RX ADMIN — ENOXAPARIN SODIUM 40 MG: 40 INJECTION SUBCUTANEOUS at 04:01

## 2022-01-04 RX ADMIN — POLYETHYLENE GLYCOL 3350 17 G: 17 POWDER, FOR SOLUTION ORAL at 09:01

## 2022-01-04 RX ADMIN — PROCHLORPERAZINE EDISYLATE 2.5 MG: 5 INJECTION INTRAMUSCULAR; INTRAVENOUS at 01:01

## 2022-01-04 RX ADMIN — SENNOSIDES AND DOCUSATE SODIUM 1 TABLET: 50; 8.6 TABLET ORAL at 09:01

## 2022-01-04 NOTE — NURSING
Notified Dr Spann of patients condition, not eating or drinkling and sleeping a lot. New orders noted

## 2022-01-04 NOTE — PROGRESS NOTES
Doing well   Tolerating diet  Abdomen benign   Johnnie serous  Sp lap appy with abscess   May dc Nils with antibiotics and drain if continues to improve

## 2022-01-04 NOTE — PLAN OF CARE
Problem: Adult Inpatient Plan of Care  Goal: Plan of Care Review  Outcome: Ongoing, Progressing  Goal: Patient-Specific Goal (Individualized)  Outcome: Ongoing, Progressing  Goal: Absence of Hospital-Acquired Illness or Injury  Outcome: Ongoing, Progressing  Goal: Optimal Comfort and Wellbeing  Outcome: Ongoing, Progressing  Goal: Readiness for Transition of Care  Outcome: Ongoing, Progressing     Problem: Infection  Goal: Absence of Infection Signs and Symptoms  Outcome: Ongoing, Progressing     Problem: Renal Function Impairment (Acute Kidney Injury/Impairment)  Goal: Effective Renal Function  Outcome: Ongoing, Progressing

## 2022-01-04 NOTE — PROGRESS NOTES
[facility] Medicine  Progress Note    Patient name: Onesimo Paula  MRN: [id number]  Admit Date: 1/2/2022   LOS: 1 day     SUBJECTIVE:     Principal problem: Acute appendicitis with perforation, localized peritonitis, and abscess, without gangrene    Interval History:  Patient was seen and examined bedside.  Overall he looks and feels worse today, significant burping, hiccups.  Does not want to eat, does not want to ambulate.  Blood pressure is acceptable.   Still not passing gas, no bowel movement.  Pain is well controlled    Scheduled Meds:   enoxaparin  40 mg Subcutaneous Daily    lactated ringers  1,000 mL Intravenous Once    oxybutynin  5 mg Oral Daily    piperacillin-tazobactam (ZOSYN) IVPB  3.375 g Intravenous Q8H    polyethylene glycol  17 g Oral Daily    senna-docusate 8.6-50 mg  1 tablet Oral BID     Continuous Infusions:   lactated ringers 100 mL/hr at 01/04/22 1015     PRN Meds:acetaminophen, HYDROcodone-acetaminophen, melatonin, morphine, naloxone, ondansetron, simethicone, sodium chloride 0.9%, sodium chloride 0.9%    Review of patient's allergies indicates:  No Known Allergies    Review of Systems: As per interval history    OBJECTIVE:     Vital Signs (Most Recent)  Temp: 97.9 °F (36.6 °C) (01/04/22 1209)  Pulse: 69 (01/04/22 1209)  Resp: 18 (01/04/22 1209)  BP: 114/67 (01/04/22 1209)  SpO2: (!) 93 % (01/04/22 1209)    Vital Signs Range (Last 24H):  Temp:  [97.5 °F (36.4 °C)-99.7 °F (37.6 °C)]   Pulse:  [66-81]   Resp:  [18-20]   BP: ()/(53-77)   SpO2:  [91 %-96 %]     I & O (Last 24H):    Intake/Output Summary (Last 24 hours) at 1/4/2022 1623  Last data filed at 1/4/2022 1210  Gross per 24 hour   Intake 1200 ml   Output 1665 ml   Net -465 ml       Physical Exam:  General: Patient resting comfortably in no acute distress. Appears as stated age. Calm  Eyes: No conjunctival injection. No scleral icterus.  ENT: Hearing grossly intact. No discharge from ears. No nasal  discharge.   Neck: Supple, trachea midline. No JVD  CVS: RRR. No LE edema BL  Lungs: CTA BL, no wheezing or crackles. Good breath sounds. No accessory muscle use. No acute respiratory distress  Abdomen:  Soft, appropriately tender, VICENTE drain in place, could not appreciate bowel sounds  Neuro: AOx3. Moves all extremities. Follows commands. Responds appropriately   Skin:  No rash or erythema noted    Laboratory:  I have reviewed all pertinent lab results within the past 24 hours.    Diagnostic Results:  Reviewed all imaging    ASSESSMENT/PLAN:     71-year-old gentleman with prior history of hypertension, BPH, GERD presented with abdominal pain found to have acute appendicitis now status post laparoscopic appendicectomy      Active Hospital Problems    Diagnosis  POA    *Acute appendicitis with perforation, localized peritonitis, and abscess, without gangrene [K35.33]  Yes    CAROL ANN (acute kidney injury) [N17.9]  No    BPH with obstruction/lower urinary tract symptoms [N40.1, N13.8]  Yes    ED (erectile dysfunction) of organic origin [N52.9]  Yes    Gastroesophageal reflux disease without esophagitis [K21.9]  Yes    Essential hypertension [I10]  Yes      Resolved Hospital Problems   No resolved problems to display.         Plan:   Status post laparoscopic appendicectomy on 01/03/2022.  Total overall he looks worse, KUB is concerned of ileusSBO.  Place NG tube and connect it to low intermittent suction  NPO except medications  Continue Zosyn  AKA likely due to blood pressure fluctuations.  Continue IV hydration  P.r.n. analgesia  Hold all antihypertensives  Diet advancement as per General surgery  Out of bed to chair, Encourage activity        VTE Risk Mitigation (From admission, onward)         Ordered     enoxaparin injection 40 mg  Daily         01/02/22 1242     IP VTE HIGH RISK PATIENT  Once         01/02/22 1242     Place sequential compression device  Until discontinued         01/02/22 1242                   Department Hospital Medicine  [facility]  Torres Spann MD  Date of service: 01/04/2022

## 2022-01-04 NOTE — NURSING
#16FR NG tube place and xray varied placement. NG tube connected to low suction. Brownish green drainage noted. Dr Spann on unit and  Notified. NAD

## 2022-01-04 NOTE — PROGRESS NOTES
Progress Note  Gen Surg    Admit Date: 1/2/2022  Attending: Wolfgang  S/P: Procedure(s) (LRB):  APPENDECTOMY, LAPAROSCOPIC (N/A)    Post-operative Day: 2 Days Post-Op    Hospital Day: 3    SUBJECTIVE:     Burping  No nv  No flatus     OBJECTIVE:     Vital Signs (Most Recent)  Temp: 97.9 °F (36.6 °C) (01/04/22 1209)  Pulse: 69 (01/04/22 1209)  Resp: 18 (01/04/22 1209)  BP: 114/67 (01/04/22 1209)  SpO2: (!) 93 % (01/04/22 1209)    Vital Signs Range (Last 24H):  Temp:  [97.5 °F (36.4 °C)-99.7 °F (37.6 °C)]   Pulse:  [66-81]   Resp:  [18-20]   BP: ()/(53-77)   SpO2:  [91 %-96 %]     I & O (Last 24H):    Intake/Output Summary (Last 24 hours) at 1/4/2022 1422  Last data filed at 1/4/2022 1210  Gross per 24 hour   Intake 1200 ml   Output 1665 ml   Net -465 ml       Scheduled medications:   amoxicillin-clavulanate 500-125mg  1 tablet Oral TID    enoxaparin  40 mg Subcutaneous Daily    lactated ringers  1,000 mL Intravenous Once    oxybutynin  5 mg Oral Daily    polyethylene glycol  17 g Oral Daily    senna-docusate 8.6-50 mg  1 tablet Oral BID       Physical Exam:  General: no distress  Lungs:  clear to auscultation bilaterally and normal respiratory effort  Heart: regular rate and rhythm, S1, S2 normal, no murmur, rub or gallop  Abdomen: soft but distended  Non tender  Drain ss    Wound/Incision:  clean, dry, intact    Laboratory:  Labs within the past 24 hours have been reviewed.    ASSESSMENT/PLAN:     Check x ray  Suspect ileus  Continue drain  Might need ngt    Sam Goss MD

## 2022-01-04 NOTE — NURSING
Transferred to radiology via wheel chair. IVF's d/c'ed till return to unit. Tolerated ambulation on unit.

## 2022-01-05 PROBLEM — K56.7 ILEUS: Status: ACTIVE | Noted: 2022-01-05

## 2022-01-05 LAB
ALBUMIN SERPL BCP-MCNC: 2.7 G/DL (ref 3.5–5.2)
ALP SERPL-CCNC: 54 U/L (ref 55–135)
ALT SERPL W/O P-5'-P-CCNC: 17 U/L (ref 10–44)
ANION GAP SERPL CALC-SCNC: 13 MMOL/L (ref 8–16)
AST SERPL-CCNC: 15 U/L (ref 10–40)
BASOPHILS # BLD AUTO: 0.02 K/UL (ref 0–0.2)
BASOPHILS NFR BLD: 0.2 % (ref 0–1.9)
BILIRUB SERPL-MCNC: 1.3 MG/DL (ref 0.1–1)
BUN SERPL-MCNC: 50 MG/DL (ref 8–23)
CALCIUM SERPL-MCNC: 8.7 MG/DL (ref 8.7–10.5)
CHLORIDE SERPL-SCNC: 98 MMOL/L (ref 95–110)
CO2 SERPL-SCNC: 26 MMOL/L (ref 23–29)
CREAT SERPL-MCNC: 1.4 MG/DL (ref 0.5–1.4)
DIFFERENTIAL METHOD: ABNORMAL
EOSINOPHIL # BLD AUTO: 0 K/UL (ref 0–0.5)
EOSINOPHIL NFR BLD: 0 % (ref 0–8)
ERYTHROCYTE [DISTWIDTH] IN BLOOD BY AUTOMATED COUNT: 13.7 % (ref 11.5–14.5)
EST. GFR  (AFRICAN AMERICAN): 57.6 ML/MIN/1.73 M^2
EST. GFR  (NON AFRICAN AMERICAN): 49.8 ML/MIN/1.73 M^2
GLUCOSE SERPL-MCNC: 200 MG/DL (ref 70–110)
HCT VFR BLD AUTO: 40.8 % (ref 40–54)
HGB BLD-MCNC: 13.6 G/DL (ref 14–18)
IMM GRANULOCYTES # BLD AUTO: 0.09 K/UL (ref 0–0.04)
IMM GRANULOCYTES NFR BLD AUTO: 0.8 % (ref 0–0.5)
LYMPHOCYTES # BLD AUTO: 0.4 K/UL (ref 1–4.8)
LYMPHOCYTES NFR BLD: 3.3 % (ref 18–48)
MAGNESIUM SERPL-MCNC: 2.4 MG/DL (ref 1.6–2.6)
MCH RBC QN AUTO: 30.6 PG (ref 27–31)
MCHC RBC AUTO-ENTMCNC: 33.3 G/DL (ref 32–36)
MCV RBC AUTO: 92 FL (ref 82–98)
MONOCYTES # BLD AUTO: 0.5 K/UL (ref 0.3–1)
MONOCYTES NFR BLD: 4.1 % (ref 4–15)
NEUTROPHILS # BLD AUTO: 10.8 K/UL (ref 1.8–7.7)
NEUTROPHILS NFR BLD: 91.6 % (ref 38–73)
NRBC BLD-RTO: 0 /100 WBC
PHOSPHATE SERPL-MCNC: 3.3 MG/DL (ref 2.7–4.5)
PLATELET # BLD AUTO: 250 K/UL (ref 150–450)
PMV BLD AUTO: 10.9 FL (ref 9.2–12.9)
POTASSIUM SERPL-SCNC: 3.5 MMOL/L (ref 3.5–5.1)
PROT SERPL-MCNC: 6.4 G/DL (ref 6–8.4)
RBC # BLD AUTO: 4.44 M/UL (ref 4.6–6.2)
SODIUM SERPL-SCNC: 137 MMOL/L (ref 136–145)
WBC # BLD AUTO: 11.75 K/UL (ref 3.9–12.7)

## 2022-01-05 PROCEDURE — 85025 COMPLETE CBC W/AUTO DIFF WBC: CPT | Performed by: SURGERY

## 2022-01-05 PROCEDURE — 25000003 PHARM REV CODE 250: Performed by: HOSPITALIST

## 2022-01-05 PROCEDURE — 12000002 HC ACUTE/MED SURGE SEMI-PRIVATE ROOM

## 2022-01-05 PROCEDURE — 83735 ASSAY OF MAGNESIUM: CPT | Performed by: SURGERY

## 2022-01-05 PROCEDURE — 25000003 PHARM REV CODE 250: Performed by: SURGERY

## 2022-01-05 PROCEDURE — 36415 COLL VENOUS BLD VENIPUNCTURE: CPT | Performed by: SURGERY

## 2022-01-05 PROCEDURE — 63600175 PHARM REV CODE 636 W HCPCS: Performed by: SURGERY

## 2022-01-05 PROCEDURE — 84100 ASSAY OF PHOSPHORUS: CPT | Performed by: SURGERY

## 2022-01-05 PROCEDURE — 63600175 PHARM REV CODE 636 W HCPCS: Performed by: HOSPITALIST

## 2022-01-05 PROCEDURE — 80053 COMPREHEN METABOLIC PANEL: CPT | Performed by: SURGERY

## 2022-01-05 RX ADMIN — OXYBUTYNIN CHLORIDE 5 MG: 5 TABLET, EXTENDED RELEASE ORAL at 08:01

## 2022-01-05 RX ADMIN — PIPERACILLIN AND TAZOBACTAM 3.38 G: 3; .375 INJECTION, POWDER, LYOPHILIZED, FOR SOLUTION INTRAVENOUS; PARENTERAL at 08:01

## 2022-01-05 RX ADMIN — PIPERACILLIN AND TAZOBACTAM 3.38 G: 3; .375 INJECTION, POWDER, LYOPHILIZED, FOR SOLUTION INTRAVENOUS; PARENTERAL at 01:01

## 2022-01-05 RX ADMIN — PIPERACILLIN AND TAZOBACTAM 3.38 G: 3; .375 INJECTION, POWDER, LYOPHILIZED, FOR SOLUTION INTRAVENOUS; PARENTERAL at 05:01

## 2022-01-05 RX ADMIN — SENNOSIDES AND DOCUSATE SODIUM 1 TABLET: 50; 8.6 TABLET ORAL at 10:01

## 2022-01-05 RX ADMIN — SENNOSIDES AND DOCUSATE SODIUM 1 TABLET: 50; 8.6 TABLET ORAL at 08:01

## 2022-01-05 RX ADMIN — ENOXAPARIN SODIUM 40 MG: 40 INJECTION SUBCUTANEOUS at 05:01

## 2022-01-05 NOTE — PROGRESS NOTES
Progress Note  Gen Surg    Admit Date: 1/2/2022  Attending: Wolfgang  S/P: Procedure(s) (LRB):  APPENDECTOMY, LAPAROSCOPIC (N/A)    Post-operative Day: 3 Days Post-Op    Hospital Day: 4    SUBJECTIVE:     No flatus or bm     OBJECTIVE:     Vital Signs (Most Recent)  Temp: 98.4 °F (36.9 °C) (01/05/22 0824)  Pulse: 81 (01/05/22 0824)  Resp: 18 (01/05/22 0824)  BP: (!) 151/87 (01/05/22 0824)  SpO2: (!) 83 % (01/05/22 0824)    Vital Signs Range (Last 24H):  Temp:  [97.8 °F (36.6 °C)-98.6 °F (37 °C)]   Pulse:  [73-88]   Resp:  [16-19]   BP: (120-151)/(69-87)   SpO2:  [83 %-95 %]     I & O (Last 24H):    Intake/Output Summary (Last 24 hours) at 1/5/2022 1219  Last data filed at 1/5/2022 0430  Gross per 24 hour   Intake --   Output 640 ml   Net -640 ml       Scheduled medications:   enoxaparin  40 mg Subcutaneous Daily    lactated ringers  1,000 mL Intravenous Once    oxybutynin  5 mg Oral Daily    piperacillin-tazobactam (ZOSYN) IVPB  3.375 g Intravenous Q8H    polyethylene glycol  17 g Oral Daily    senna-docusate 8.6-50 mg  1 tablet Oral BID       Physical Exam:  General: no distress  Lungs:  clear to auscultation bilaterally and normal respiratory effort  Heart: rrr  Abdomen: soft distended    Wound/Incision:  clean, dry, intact    Laboratory:  Labs within the past 24 hours have been reviewed.    ASSESSMENT/PLAN:     Drain ss  Post op ileus  Await gi function return    Sam Goss MD

## 2022-01-05 NOTE — PROGRESS NOTES
[facility] Medicine  Progress Note    Patient name: Onesimo Paula  MRN: [id number]  Admit Date: 1/2/2022   LOS: 2 days     SUBJECTIVE:     Principal problem: Acute appendicitis with perforation, localized peritonitis, and abscess, without gangrene    Interval History:  Patient was seen and examined bedside.  Significant NG output.  Not out of bed yet.  No flatus or bowel movement yet.  Overall looks better than yesterday.  Occasional hiccups.     Scheduled Meds:   enoxaparin  40 mg Subcutaneous Daily    lactated ringers  1,000 mL Intravenous Once    oxybutynin  5 mg Oral Daily    piperacillin-tazobactam (ZOSYN) IVPB  3.375 g Intravenous Q8H    polyethylene glycol  17 g Oral Daily    senna-docusate 8.6-50 mg  1 tablet Oral BID     Continuous Infusions:   lactated ringers 100 mL/hr at 01/04/22 1015     PRN Meds:acetaminophen, HYDROcodone-acetaminophen, melatonin, morphine, naloxone, ondansetron, simethicone, sodium chloride 0.9%, sodium chloride 0.9%    Review of patient's allergies indicates:  No Known Allergies    Review of Systems: As per interval history    OBJECTIVE:     Vital Signs (Most Recent)  Temp: 98.2 °F (36.8 °C) (01/05/22 1233)  Pulse: 76 (01/05/22 1233)  Resp: 18 (01/05/22 1233)  BP: (!) 162/95 (01/05/22 1233)  SpO2: (!) 89 % (01/05/22 1233)    Vital Signs Range (Last 24H):  Temp:  [97.8 °F (36.6 °C)-98.6 °F (37 °C)]   Pulse:  [73-88]   Resp:  [16-19]   BP: (120-162)/(69-95)   SpO2:  [83 %-95 %]     I & O (Last 24H):    Intake/Output Summary (Last 24 hours) at 1/5/2022 1437  Last data filed at 1/5/2022 0430  Gross per 24 hour   Intake --   Output 640 ml   Net -640 ml       Physical Exam:  General: Patient resting comfortably in no acute distress. Appears as stated age. Calm  Eyes: No conjunctival injection. No scleral icterus.  ENT: Hearing grossly intact. No discharge from ears. No nasal discharge.   Neck: Supple, trachea midline. No JVD  CVS: RRR. No LE edema BL  Lungs:  CTA BL, no wheezing or crackles. Good breath sounds. No accessory muscle use. No acute respiratory distress  Abdomen:  Soft, appropriately tender, VICENTE drain in place, could not appreciate bowel sounds  Neuro: AOx3. Moves all extremities. Follows commands. Responds appropriately   Skin:  No rash or erythema noted    Laboratory:  I have reviewed all pertinent lab results within the past 24 hours.    Diagnostic Results:  Reviewed all imaging    ASSESSMENT/PLAN:     71-year-old gentleman with prior history of hypertension, BPH, GERD presented with abdominal pain found to have acute appendicitis now status post laparoscopic appendicectomy postoperative course complicated by postop ileus and slow recovery.    Active Hospital Problems    Diagnosis  POA    *Acute appendicitis with perforation, localized peritonitis, and abscess, without gangrene [K35.33]  Yes    Postop Ileus [K56.7]  No    CAROL ANN (acute kidney injury) [N17.9]  No    BPH with obstruction/lower urinary tract symptoms [N40.1, N13.8]  Yes    ED (erectile dysfunction) of organic origin [N52.9]  Yes    Gastroesophageal reflux disease without esophagitis [K21.9]  Yes    Essential hypertension [I10]  Yes      Resolved Hospital Problems   No resolved problems to display.         Plan:   Status post laparoscopic appendicectomy on 01/03/2022.  Postoperative course is complicated by slow recovery, postop ileus, now has NG tube with significant output, bowel functions have not returned yet  Continue NG with low intermittent suction  NPO except medications.  Ice chips are okay  Today repeat KUB is promising  Continue Zosyn  CAROL ANN likely due to blood pressure fluctuations.  Continue IV hydration  P.r.n. analgesia  Hold all antihypertensives  Diet advancement as per General surgery  Out of bed to chair, Encourage activity        VTE Risk Mitigation (From admission, onward)         Ordered     enoxaparin injection 40 mg  Daily         01/02/22 1242     IP VTE HIGH RISK  PATIENT  Once         01/02/22 1242     Place sequential compression device  Until discontinued         01/02/22 1242                  Department Hospital Medicine  [facility]  Torres Spann MD  Date of service: 01/05/2022

## 2022-01-06 LAB
ALBUMIN SERPL BCP-MCNC: 2.7 G/DL (ref 3.5–5.2)
ALP SERPL-CCNC: 56 U/L (ref 55–135)
ALT SERPL W/O P-5'-P-CCNC: 16 U/L (ref 10–44)
ANION GAP SERPL CALC-SCNC: 11 MMOL/L (ref 8–16)
AST SERPL-CCNC: 16 U/L (ref 10–40)
BASOPHILS # BLD AUTO: 0.03 K/UL (ref 0–0.2)
BASOPHILS NFR BLD: 0.3 % (ref 0–1.9)
BILIRUB SERPL-MCNC: 0.8 MG/DL (ref 0.1–1)
BUN SERPL-MCNC: 56 MG/DL (ref 8–23)
CALCIUM SERPL-MCNC: 8.8 MG/DL (ref 8.7–10.5)
CHLORIDE SERPL-SCNC: 99 MMOL/L (ref 95–110)
CO2 SERPL-SCNC: 33 MMOL/L (ref 23–29)
CREAT SERPL-MCNC: 1.4 MG/DL (ref 0.5–1.4)
DIFFERENTIAL METHOD: ABNORMAL
EOSINOPHIL # BLD AUTO: 0.1 K/UL (ref 0–0.5)
EOSINOPHIL NFR BLD: 1 % (ref 0–8)
ERYTHROCYTE [DISTWIDTH] IN BLOOD BY AUTOMATED COUNT: 13.8 % (ref 11.5–14.5)
EST. GFR  (AFRICAN AMERICAN): 57.6 ML/MIN/1.73 M^2
EST. GFR  (NON AFRICAN AMERICAN): 49.8 ML/MIN/1.73 M^2
GLUCOSE SERPL-MCNC: 122 MG/DL (ref 70–110)
HCT VFR BLD AUTO: 40.8 % (ref 40–54)
HGB BLD-MCNC: 13.4 G/DL (ref 14–18)
IMM GRANULOCYTES # BLD AUTO: 0.05 K/UL (ref 0–0.04)
IMM GRANULOCYTES NFR BLD AUTO: 0.5 % (ref 0–0.5)
LYMPHOCYTES # BLD AUTO: 1.2 K/UL (ref 1–4.8)
LYMPHOCYTES NFR BLD: 11.4 % (ref 18–48)
MAGNESIUM SERPL-MCNC: 2.7 MG/DL (ref 1.6–2.6)
MCH RBC QN AUTO: 30.6 PG (ref 27–31)
MCHC RBC AUTO-ENTMCNC: 32.8 G/DL (ref 32–36)
MCV RBC AUTO: 93 FL (ref 82–98)
MONOCYTES # BLD AUTO: 1.2 K/UL (ref 0.3–1)
MONOCYTES NFR BLD: 11.5 % (ref 4–15)
NEUTROPHILS # BLD AUTO: 7.7 K/UL (ref 1.8–7.7)
NEUTROPHILS NFR BLD: 75.3 % (ref 38–73)
NRBC BLD-RTO: 0 /100 WBC
PHOSPHATE SERPL-MCNC: 2.5 MG/DL (ref 2.7–4.5)
PLATELET # BLD AUTO: 264 K/UL (ref 150–450)
PMV BLD AUTO: 10.4 FL (ref 9.2–12.9)
POTASSIUM SERPL-SCNC: 4.1 MMOL/L (ref 3.5–5.1)
PROT SERPL-MCNC: 6.1 G/DL (ref 6–8.4)
RBC # BLD AUTO: 4.38 M/UL (ref 4.6–6.2)
SODIUM SERPL-SCNC: 143 MMOL/L (ref 136–145)
WBC # BLD AUTO: 10.23 K/UL (ref 3.9–12.7)

## 2022-01-06 PROCEDURE — 83735 ASSAY OF MAGNESIUM: CPT | Performed by: SURGERY

## 2022-01-06 PROCEDURE — 80053 COMPREHEN METABOLIC PANEL: CPT | Performed by: SURGERY

## 2022-01-06 PROCEDURE — 25000003 PHARM REV CODE 250: Performed by: SURGERY

## 2022-01-06 PROCEDURE — 25000003 PHARM REV CODE 250: Performed by: HOSPITALIST

## 2022-01-06 PROCEDURE — 12000002 HC ACUTE/MED SURGE SEMI-PRIVATE ROOM

## 2022-01-06 PROCEDURE — 36415 COLL VENOUS BLD VENIPUNCTURE: CPT | Performed by: SURGERY

## 2022-01-06 PROCEDURE — 63600175 PHARM REV CODE 636 W HCPCS: Performed by: HOSPITALIST

## 2022-01-06 PROCEDURE — 85025 COMPLETE CBC W/AUTO DIFF WBC: CPT | Performed by: SURGERY

## 2022-01-06 PROCEDURE — 63600175 PHARM REV CODE 636 W HCPCS: Performed by: SURGERY

## 2022-01-06 PROCEDURE — 84100 ASSAY OF PHOSPHORUS: CPT | Performed by: SURGERY

## 2022-01-06 RX ADMIN — SENNOSIDES AND DOCUSATE SODIUM 1 TABLET: 50; 8.6 TABLET ORAL at 11:01

## 2022-01-06 RX ADMIN — SENNOSIDES AND DOCUSATE SODIUM 1 TABLET: 50; 8.6 TABLET ORAL at 09:01

## 2022-01-06 RX ADMIN — MELATONIN TAB 3 MG 6 MG: 3 TAB at 09:01

## 2022-01-06 RX ADMIN — PIPERACILLIN AND TAZOBACTAM 3.38 G: 3; .375 INJECTION, POWDER, LYOPHILIZED, FOR SOLUTION INTRAVENOUS; PARENTERAL at 04:01

## 2022-01-06 RX ADMIN — POLYETHYLENE GLYCOL 3350 17 G: 17 POWDER, FOR SOLUTION ORAL at 11:01

## 2022-01-06 RX ADMIN — PIPERACILLIN AND TAZOBACTAM 3.38 G: 3; .375 INJECTION, POWDER, LYOPHILIZED, FOR SOLUTION INTRAVENOUS; PARENTERAL at 01:01

## 2022-01-06 RX ADMIN — OXYBUTYNIN CHLORIDE 5 MG: 5 TABLET, EXTENDED RELEASE ORAL at 11:01

## 2022-01-06 RX ADMIN — ENOXAPARIN SODIUM 40 MG: 40 INJECTION SUBCUTANEOUS at 04:01

## 2022-01-06 RX ADMIN — PIPERACILLIN AND TAZOBACTAM 3.38 G: 3; .375 INJECTION, POWDER, LYOPHILIZED, FOR SOLUTION INTRAVENOUS; PARENTERAL at 11:01

## 2022-01-06 NOTE — PROGRESS NOTES
[facility]  Adult Nutrition   Progress Note (Initial Assessment)     SUMMARY     Recommendations  Recommendation/Intervention:   1. RD recommends to advance diet as tolerated to a clear liquid diet, then full liquid diet and then a cardiacc/diabetic as soon as medically possible.   2. Menu  to get dietary preferences daily.  Goals: Ensure that 100% of patients nutritional needs for kcals, protein and fluid are met from 75 to 100% PO intake of meals and supplements as soon as possiblel  Nutrition Goal Status: new    Dietitian Rounds Brief  Assessed per Length of Stay: Patient is s/p laparoscopic appendicectomy on 01/03/2022 and postoperative course is complicated by slow recovery, postop ileus and NG tube with significant output. Bowel functions have still not returned yet  And last bowel movement was on 12/31/2021. Recommendations are as noted above. Will continue to follow prn till discharge and make recommendations accordingly.      Reason for Assessment  Reason For Assessment: length of stay  Diagnosis: cancer diagnosis/related complications,cardiac disease,diabetes diagnosis/complications,gastrointestinal disease,infection/sepsis,pulmonary disease,other (see comments) (Acute appendicitis with perforation, localized peritonitis, and abscess, without gangrene)  Relevant Medical History: Hypertension (HTN), coronary artery disease (CAD), anemia, pulmonary nodules, BPH (benign prostatic hyperplasia), acquired tricuspid valve insufficiency, borderline diabetes, acid reflux, skin cancer  Interdisciplinary Rounds: attended    Nutrition Risk Screen  Nutrition Risk Screen: no indicators present     MST Score: 1  Have you recently lost weight without trying?: No  Weight loss score: 0  Have you been eating poorly because of a decreased appetite?: Yes  Appetite score: 1       Nutrition/Diet History  Spiritual, Cultural Beliefs, Protestant Practices, Values that Affect Care: no  Food Allergies:  "NKFA  Factors Affecting Nutritional Intake: NPO,abdominal pain,altered gastrointestinal function    Anthropometrics  Temp: 97.6 °F (36.4 °C)  Height Method: Stated  Height: 5' 10" (177.8 cm)  Height (inches): 70 in  Weight Method: Bed Scale  Weight: 75.1 kg (165 lb 9.1 oz)  Weight (lb): 165.57 lb  Ideal Body Weight (IBW), Male: 166 lb  % Ideal Body Weight, Male (lb): 99.74 %  BMI (Calculated): 23.8  BMI Grade: 18.5-24.9 - normal       Weight History:  Wt Readings from Last 10 Encounters:   01/06/22 75.1 kg (165 lb 9.1 oz)   12/07/21 75.3 kg (166 lb)   12/07/21 75.3 kg (166 lb)   11/30/21 75.3 kg (166 lb 0.1 oz)   11/11/21 75.3 kg (166 lb)   10/11/21 75.3 kg (166 lb)   10/11/21 75.3 kg (166 lb)   09/24/21 75.3 kg (166 lb)   09/15/21 75.4 kg (166 lb 3.2 oz)   08/27/21 76.2 kg (168 lb)       Lab/Procedures/Meds: Pertinent Labs Reviewed    Clinical Chemistry:  Recent Labs   Lab 01/02/22  0726 01/03/22  0603 01/04/22  0605 01/04/22  0605 01/05/22  0725 01/05/22  0725 01/06/22  0723      < > 135*   < > 137   < > 143   K 3.4*   < > 4.4   < > 3.5   < > 4.1      < > 99   < > 98   < > 99   CO2 27   < > 28   < > 26   < > 33*   *   < > 112*   < > 200*   < > 122*   BUN 31*   < > 40*   < > 50*   < > 56*   CREATININE 1.0   < > 1.5*   < > 1.4   < > 1.4   CALCIUM 9.0   < > 8.5*   < > 8.7   < > 8.8   PROT 7.6   < > 6.2   < > 6.4   < > 6.1   ALBUMIN 4.0   < > 2.9*   < > 2.7*   < > 2.7*   BILITOT 0.8   < > 1.1*   < > 1.3*   < > 0.8   ALKPHOS 65   < > 50*   < > 54*   < > 56   AST 26   < > 18   < > 15   < > 16   ALT 33   < > 19   < > 17   < > 16   ANIONGAP 9   < > 8   < > 13   < > 11   ESTGFRAFRICA >60.0   < > 53.0*   < > 57.6*   < > 57.6*   EGFRNONAA >60.0   < > 45.9*   < > 49.8*   < > 49.8*   MG 2.1   < > 2.0   < > 2.4   < > 2.7*   PHOS  --    < > 2.9  --  3.3  --  2.5*   LIPASE 25  --   --   --   --   --   --     < > = values in this interval not displayed.       CBC:   Recent Labs   Lab 01/06/22  0723   WBC 10.23 "   RBC 4.38*   HGB 13.4*   HCT 40.8      MCV 93   MCH 30.6   MCHC 32.8         Medications: Pertinent Medications reviewed    Scheduled Meds:   enoxaparin  40 mg Subcutaneous Daily    lactated ringers  1,000 mL Intravenous Once    oxybutynin  5 mg Oral Daily    piperacillin-tazobactam (ZOSYN) IVPB  3.375 g Intravenous Q8H    polyethylene glycol  17 g Oral Daily    senna-docusate 8.6-50 mg  1 tablet Oral BID       Continuous Infusions:   lactated ringers 100 mL/hr at 01/04/22 1015       PRN Meds:.acetaminophen, HYDROcodone-acetaminophen, melatonin, morphine, naloxone, ondansetron, simethicone, sodium chloride 0.9%, sodium chloride 0.9%    Estimated/Assessed Needs  Weight Used For Calorie Calculations: 75.1 kg (165 lb 9.1 oz)  Energy Calorie Requirements (kcal): 6107-3299 kcals/day (25-30 kcals/kg ABW)  Energy Need Method: Kcal/kg  Protein Requirements:  g/day (1.2-1.5 g/kg ABW)  Weight Used For Protein Calculations: 75.1 kg (165 lb 9.1 oz)  Fluid Requirements (mL): 1 ml/kcal or per MD  Estimated Fluid Requirement Method: RDA Method  RDA Method (mL): 1878       Nutrition Prescription Ordered  Current Diet Order: NPO    Evaluation of Received Nutrient/Fluid Intake  Energy Calories Required: not meeting needs  Protein Required: not meeting needs  Fluid Required: not meeting needs  Tolerance: other (see comments)     Intake/Output Summary (Last 24 hours) at 1/6/2022 1723  Last data filed at 1/6/2022 1700  Gross per 24 hour   Intake --   Output 2260 ml   Net -2260 ml      % Intake of Estimated Energy Needs: 0%  % Meal Intake: NPO    Nutrition Risk  Level of Risk/Frequency of Follow-up: high     Monitor and Evaluation  Food and Nutrient Intake: energy intake,food and beverage intake  Food and Nutrient Adminstration: diet order  Knowledge/Beliefs/Attitudes: food and nutrition knowledge/skill,beliefs and attitudes  Physical Activity and Function: nutrition-related ADLs and IADLs  Anthropometric  Measurements: weight,weight change,body mass index  Biochemical Data, Medical Tests and Procedures: electrolyte and renal panel,gastrointestinal profile,glucose/endocrine profile,inflammatory profile,lipid profile  Nutrition-Focused Physical Findings: overall appearance     Nutrition Follow-Up  RD Follow-up?: Yes   Alba Blanchard RD 01/06/2022   5:31 PM

## 2022-01-06 NOTE — PROGRESS NOTES
Very little flatus    Abdomen less distended but still distended    Sp lap appy  Post op ileus   Continue ngt for now  Continue drain

## 2022-01-06 NOTE — PROGRESS NOTES
[facility] Medicine  Progress Note    Patient name: Onesimo Paula  MRN: [id number]  Admit Date: 1/2/2022   LOS: 3 days     SUBJECTIVE:     Principal problem: Acute appendicitis with perforation, localized peritonitis, and abscess, without gangrene    Interval History:  Patient was seen and examined bedside.  Significant NG output.  Not out of bed yet.  Overall looks better than yesterday.      Scheduled Meds:   enoxaparin  40 mg Subcutaneous Daily    lactated ringers  1,000 mL Intravenous Once    oxybutynin  5 mg Oral Daily    piperacillin-tazobactam (ZOSYN) IVPB  3.375 g Intravenous Q8H    polyethylene glycol  17 g Oral Daily    senna-docusate 8.6-50 mg  1 tablet Oral BID     Continuous Infusions:   lactated ringers 100 mL/hr at 01/04/22 1015     PRN Meds:acetaminophen, HYDROcodone-acetaminophen, melatonin, morphine, naloxone, ondansetron, simethicone, sodium chloride 0.9%, sodium chloride 0.9%    Review of patient's allergies indicates:  No Known Allergies    Review of Systems: As per interval history    OBJECTIVE:     Vital Signs (Most Recent)  Temp: 97.6 °F (36.4 °C) (01/06/22 0809)  Pulse: 61 (01/06/22 0809)  Resp: 18 (01/06/22 0809)  BP: (!) 143/73 (01/06/22 0809)  SpO2: 98 % (01/06/22 0809)    Vital Signs Range (Last 24H):  Temp:  [97.5 °F (36.4 °C)-98.2 °F (36.8 °C)]   Pulse:  [61-76]   Resp:  [17-18]   BP: (120-162)/(57-95)   SpO2:  [89 %-99 %]     I & O (Last 24H):    Intake/Output Summary (Last 24 hours) at 1/6/2022 0954  Last data filed at 1/6/2022 0506  Gross per 24 hour   Intake --   Output 610 ml   Net -610 ml       Physical Exam:  General: Patient resting comfortably in no acute distress. Appears as stated age. Calm  Eyes: No conjunctival injection. No scleral icterus.  ENT: Hearing grossly intact. No discharge from ears. No nasal discharge.   Neck: Supple, trachea midline. No JVD  CVS: RRR. No LE edema BL  Lungs: CTA BL, no wheezing or crackles. Good breath sounds. No  accessory muscle use. No acute respiratory distress  Abdomen:  NG tube in place, Soft, appropriately tender, VICENTE drain in place, could not appreciate bowel sounds  Neuro: AOx3. Moves all extremities. Follows commands. Responds appropriately   Skin:  No rash or erythema noted    Laboratory:  I have reviewed all pertinent lab results within the past 24 hours.    Diagnostic Results:  Reviewed all imaging    ASSESSMENT/PLAN:     71-year-old gentleman with prior history of hypertension, BPH, GERD presented with abdominal pain found to have acute appendicitis now status post laparoscopic appendicectomy postoperative course complicated by postop ileus and slow recovery.    Active Hospital Problems    Diagnosis  POA    *Acute appendicitis with perforation, localized peritonitis, and abscess, without gangrene [K35.33]  Yes    Postop Ileus [K56.7]  No    CAROL ANN (acute kidney injury) [N17.9]  No    BPH with obstruction/lower urinary tract symptoms [N40.1, N13.8]  Yes    ED (erectile dysfunction) of organic origin [N52.9]  Yes    Gastroesophageal reflux disease without esophagitis [K21.9]  Yes    Essential hypertension [I10]  Yes      Resolved Hospital Problems   No resolved problems to display.         Plan:   Status post laparoscopic appendicectomy on 01/03/2022.  Postoperative course is complicated by slow recovery, postop ileus, now has NG tube with significant output, bowel functions have not returned yet  Continue NG with low intermittent suction  NPO except medications.  Ice chips are okay  Daily KUB  Continue Zosyn  CAROL ANN likely due to blood pressure fluctuations.  Continue IV hydration  P.r.n. analgesia  Hold all antihypertensives  Diet advancement as per General surgery  Out of bed to chair, Encourage activity        VTE Risk Mitigation (From admission, onward)         Ordered     enoxaparin injection 40 mg  Daily         01/02/22 1242     IP VTE HIGH RISK PATIENT  Once         01/02/22 1242     Place sequential  compression device  Until discontinued         01/02/22 1242                  Department Hospital Medicine  [facility]  Torres Spann MD  Date of service: 01/06/2022

## 2022-01-06 NOTE — PLAN OF CARE
Important Message from Medicare was sign, explained and given to patient/caregiver on 01/06/2022 at 10:45am     addressed any questions or concerns.    Important Message from Medicare document will be scanned into patient's medical record

## 2022-01-07 LAB
ALBUMIN SERPL BCP-MCNC: 2.9 G/DL (ref 3.5–5.2)
ALP SERPL-CCNC: 61 U/L (ref 55–135)
ALT SERPL W/O P-5'-P-CCNC: 43 U/L (ref 10–44)
ANION GAP SERPL CALC-SCNC: 14 MMOL/L (ref 8–16)
AST SERPL-CCNC: 51 U/L (ref 10–40)
BASOPHILS # BLD AUTO: 0.04 K/UL (ref 0–0.2)
BASOPHILS NFR BLD: 0.4 % (ref 0–1.9)
BILIRUB SERPL-MCNC: 1.2 MG/DL (ref 0.1–1)
BUN SERPL-MCNC: 47 MG/DL (ref 8–23)
CALCIUM SERPL-MCNC: 8.6 MG/DL (ref 8.7–10.5)
CHLORIDE SERPL-SCNC: 102 MMOL/L (ref 95–110)
CO2 SERPL-SCNC: 30 MMOL/L (ref 23–29)
CREAT SERPL-MCNC: 1.2 MG/DL (ref 0.5–1.4)
DIFFERENTIAL METHOD: ABNORMAL
EOSINOPHIL # BLD AUTO: 0.1 K/UL (ref 0–0.5)
EOSINOPHIL NFR BLD: 0.6 % (ref 0–8)
ERYTHROCYTE [DISTWIDTH] IN BLOOD BY AUTOMATED COUNT: 13.9 % (ref 11.5–14.5)
EST. GFR  (AFRICAN AMERICAN): >60 ML/MIN/1.73 M^2
EST. GFR  (NON AFRICAN AMERICAN): >60 ML/MIN/1.73 M^2
GLUCOSE SERPL-MCNC: 101 MG/DL (ref 70–110)
HCT VFR BLD AUTO: 41.1 % (ref 40–54)
HGB BLD-MCNC: 13.3 G/DL (ref 14–18)
IMM GRANULOCYTES # BLD AUTO: 0.09 K/UL (ref 0–0.04)
IMM GRANULOCYTES NFR BLD AUTO: 0.9 % (ref 0–0.5)
LYMPHOCYTES # BLD AUTO: 1.2 K/UL (ref 1–4.8)
LYMPHOCYTES NFR BLD: 12.1 % (ref 18–48)
MAGNESIUM SERPL-MCNC: 2.8 MG/DL (ref 1.6–2.6)
MCH RBC QN AUTO: 30.1 PG (ref 27–31)
MCHC RBC AUTO-ENTMCNC: 32.4 G/DL (ref 32–36)
MCV RBC AUTO: 93 FL (ref 82–98)
MONOCYTES # BLD AUTO: 1.1 K/UL (ref 0.3–1)
MONOCYTES NFR BLD: 10.5 % (ref 4–15)
NEUTROPHILS # BLD AUTO: 7.5 K/UL (ref 1.8–7.7)
NEUTROPHILS NFR BLD: 75.5 % (ref 38–73)
NRBC BLD-RTO: 0 /100 WBC
PHOSPHATE SERPL-MCNC: 3.9 MG/DL (ref 2.7–4.5)
PLATELET # BLD AUTO: 240 K/UL (ref 150–450)
PLATELET BLD QL SMEAR: ABNORMAL
PMV BLD AUTO: 10.5 FL (ref 9.2–12.9)
POTASSIUM SERPL-SCNC: 3.7 MMOL/L (ref 3.5–5.1)
PROT SERPL-MCNC: 6.6 G/DL (ref 6–8.4)
RBC # BLD AUTO: 4.42 M/UL (ref 4.6–6.2)
SODIUM SERPL-SCNC: 146 MMOL/L (ref 136–145)
WBC # BLD AUTO: 9.96 K/UL (ref 3.9–12.7)

## 2022-01-07 PROCEDURE — 25000003 PHARM REV CODE 250: Performed by: HOSPITALIST

## 2022-01-07 PROCEDURE — 25000003 PHARM REV CODE 250: Performed by: SURGERY

## 2022-01-07 PROCEDURE — 80053 COMPREHEN METABOLIC PANEL: CPT | Performed by: SURGERY

## 2022-01-07 PROCEDURE — 83735 ASSAY OF MAGNESIUM: CPT | Performed by: SURGERY

## 2022-01-07 PROCEDURE — 63600175 PHARM REV CODE 636 W HCPCS: Performed by: HOSPITALIST

## 2022-01-07 PROCEDURE — 12000002 HC ACUTE/MED SURGE SEMI-PRIVATE ROOM

## 2022-01-07 PROCEDURE — 63600175 PHARM REV CODE 636 W HCPCS: Performed by: SURGERY

## 2022-01-07 PROCEDURE — 84100 ASSAY OF PHOSPHORUS: CPT | Performed by: SURGERY

## 2022-01-07 PROCEDURE — 36415 COLL VENOUS BLD VENIPUNCTURE: CPT | Performed by: SURGERY

## 2022-01-07 PROCEDURE — 85025 COMPLETE CBC W/AUTO DIFF WBC: CPT | Performed by: SURGERY

## 2022-01-07 RX ORDER — DEXTROSE MONOHYDRATE, SODIUM CHLORIDE, AND POTASSIUM CHLORIDE 50; 1.49; 4.5 G/1000ML; G/1000ML; G/1000ML
INJECTION, SOLUTION INTRAVENOUS CONTINUOUS
Status: DISCONTINUED | OUTPATIENT
Start: 2022-01-07 | End: 2022-01-07

## 2022-01-07 RX ADMIN — PIPERACILLIN AND TAZOBACTAM 3.38 G: 3; .375 INJECTION, POWDER, LYOPHILIZED, FOR SOLUTION INTRAVENOUS; PARENTERAL at 01:01

## 2022-01-07 RX ADMIN — PIPERACILLIN AND TAZOBACTAM 3.38 G: 3; .375 INJECTION, POWDER, LYOPHILIZED, FOR SOLUTION INTRAVENOUS; PARENTERAL at 09:01

## 2022-01-07 RX ADMIN — ENOXAPARIN SODIUM 40 MG: 40 INJECTION SUBCUTANEOUS at 06:01

## 2022-01-07 RX ADMIN — SENNOSIDES AND DOCUSATE SODIUM 1 TABLET: 50; 8.6 TABLET ORAL at 08:01

## 2022-01-07 RX ADMIN — MELATONIN TAB 3 MG 6 MG: 3 TAB at 08:01

## 2022-01-07 RX ADMIN — OXYBUTYNIN CHLORIDE 5 MG: 5 TABLET, EXTENDED RELEASE ORAL at 08:01

## 2022-01-07 RX ADMIN — POLYETHYLENE GLYCOL 3350 17 G: 17 POWDER, FOR SOLUTION ORAL at 08:01

## 2022-01-07 RX ADMIN — PIPERACILLIN AND TAZOBACTAM 3.38 G: 3; .375 INJECTION, POWDER, LYOPHILIZED, FOR SOLUTION INTRAVENOUS; PARENTERAL at 06:01

## 2022-01-07 RX ADMIN — LEUCINE, PHENYLALANINE, LYSINE, METHIONINE, ISOLEUCINE, VALINE, HISTIDINE, THREONINE, TRYPTOPHAN, ALANINE, GLYCINE, ARGININE, PROLINE, SERINE, TYROSINE, DEXTROSE 2000 ML: 311; 238; 247; 170; 255; 247; 204; 179; 77; 880; 438; 489; 289; 213; 17; 5 INJECTION INTRAVENOUS at 03:01

## 2022-01-07 NOTE — PROGRESS NOTES
[facility]  General Surgery  Progress Note    Subjective:     History of Present Illness:  72 y/o with 48 hours of abdominal pain mainly on right associated with nausea and subjective fever.      Post-Op Info:  Procedure(s) (LRB):  APPENDECTOMY, LAPAROSCOPIC (N/A)   5 Days Post-Op     Interval History: some flatus with walking, no nausea or vomiting    Medications:  Continuous Infusions:   amino acid 4.25 % in D5W 2,000 mL (01/07/22 1510)     Scheduled Meds:   enoxaparin  40 mg Subcutaneous Daily    lactated ringers  1,000 mL Intravenous Once    oxybutynin  5 mg Oral Daily    piperacillin-tazobactam (ZOSYN) IVPB  3.375 g Intravenous Q8H    polyethylene glycol  17 g Oral Daily    senna-docusate 8.6-50 mg  1 tablet Oral BID     PRN Meds:acetaminophen, HYDROcodone-acetaminophen, melatonin, morphine, naloxone, ondansetron, simethicone, sodium chloride 0.9%, sodium chloride 0.9%     Review of patient's allergies indicates:  No Known Allergies  Objective:     Vital Signs (Most Recent):  Temp: 97.6 °F (36.4 °C) (01/07/22 1100)  Pulse: 80 (01/07/22 1100)  Resp: 18 (01/07/22 1100)  BP: 129/72 (01/07/22 1100)  SpO2: 96 % (01/07/22 1100) Vital Signs (24h Range):  Temp:  [97.6 °F (36.4 °C)-98.3 °F (36.8 °C)] 97.6 °F (36.4 °C)  Pulse:  [61-80] 80  Resp:  [16-18] 18  SpO2:  [90 %-96 %] 96 %  BP: (129-156)/(72-90) 129/72     Weight: 75.1 kg (165 lb 9.1 oz)  Body mass index is 23.76 kg/m².    Intake/Output - Last 3 Shifts       01/05 0700  01/06 0659 01/06 0700 01/07 0659 01/07 0700 01/08 0659    Urine (mL/kg/hr) 600 (0.3) 1150 (0.6)     Drains 10 2560     Total Output 610 4260     Net -610 -8956                  Physical Exam  Abdominal:      General: There is distension.      Palpations: Abdomen is soft. There is no mass.      Tenderness: There is no abdominal tenderness. There is no guarding.      Hernia: No hernia is present.      Comments: ngt came out today         Significant Labs:  I have reviewed  all pertinent lab results within the past 24 hours.  CBC:   Recent Labs   Lab 01/07/22  0518   WBC 9.96   RBC 4.42*   HGB 13.3*   HCT 41.1      MCV 93   MCH 30.1   MCHC 32.4     BMP:   Recent Labs   Lab 01/07/22  0517      *   K 3.7      CO2 30*   BUN 47*   CREATININE 1.2   CALCIUM 8.6*   MG 2.8*       Significant Diagnostics:  I have reviewed all pertinent imaging results/findings within the past 24 hours.    Assessment/Plan:     * Acute appendicitis with perforation, localized peritonitis, and abscess, without gangrene  Post op ileus  Some signs it is improving  ngt is out with no nausea  Will plan to replace if he becomes nauseated or vomits  Aspiration precautions for now  Hopefully will continue to resolve        Sam Goss MD  General Surgery  [facility]

## 2022-01-07 NOTE — NURSING
Patient ambulated in hallway this morning. Patient walked up and down remy near nurse's station five times. Dr. Maria Ines troncoso.

## 2022-01-07 NOTE — SUBJECTIVE & OBJECTIVE
Interval History: some flatus with walking, no nausea or vomiting    Medications:  Continuous Infusions:   amino acid 4.25 % in D5W 2,000 mL (01/07/22 1510)     Scheduled Meds:   enoxaparin  40 mg Subcutaneous Daily    lactated ringers  1,000 mL Intravenous Once    oxybutynin  5 mg Oral Daily    piperacillin-tazobactam (ZOSYN) IVPB  3.375 g Intravenous Q8H    polyethylene glycol  17 g Oral Daily    senna-docusate 8.6-50 mg  1 tablet Oral BID     PRN Meds:acetaminophen, HYDROcodone-acetaminophen, melatonin, morphine, naloxone, ondansetron, simethicone, sodium chloride 0.9%, sodium chloride 0.9%     Review of patient's allergies indicates:  No Known Allergies  Objective:     Vital Signs (Most Recent):  Temp: 97.6 °F (36.4 °C) (01/07/22 1100)  Pulse: 80 (01/07/22 1100)  Resp: 18 (01/07/22 1100)  BP: 129/72 (01/07/22 1100)  SpO2: 96 % (01/07/22 1100) Vital Signs (24h Range):  Temp:  [97.6 °F (36.4 °C)-98.3 °F (36.8 °C)] 97.6 °F (36.4 °C)  Pulse:  [61-80] 80  Resp:  [16-18] 18  SpO2:  [90 %-96 %] 96 %  BP: (129-156)/(72-90) 129/72     Weight: 75.1 kg (165 lb 9.1 oz)  Body mass index is 23.76 kg/m².    Intake/Output - Last 3 Shifts       01/05 0700 01/06 0659 01/06 0700 01/07 0659 01/07 0700 01/08 0659    Urine (mL/kg/hr) 600 (0.3) 1150 (0.6)     Drains 10 2560     Total Output 610 3710     Net -610 -3710                  Physical Exam  Abdominal:      General: There is distension.      Palpations: Abdomen is soft. There is no mass.      Tenderness: There is no abdominal tenderness. There is no guarding.      Hernia: No hernia is present.      Comments: ngt came out today         Significant Labs:  I have reviewed all pertinent lab results within the past 24 hours.  CBC:   Recent Labs   Lab 01/07/22  0518   WBC 9.96   RBC 4.42*   HGB 13.3*   HCT 41.1      MCV 93   MCH 30.1   MCHC 32.4     BMP:   Recent Labs   Lab 01/07/22  0517      *   K 3.7      CO2 30*   BUN 47*   CREATININE 1.2    CALCIUM 8.6*   MG 2.8*       Significant Diagnostics:  I have reviewed all pertinent imaging results/findings within the past 24 hours.

## 2022-01-07 NOTE — PROGRESS NOTES
[facility] Medicine  Progress Note    Patient name: Onesimo Paula  MRN: [id number]  Admit Date: 1/2/2022   LOS: 4 days     SUBJECTIVE:     Principal problem: Acute appendicitis with perforation, localized peritonitis, and abscess, without gangrene    Interval History:  Patient was seen and examined bedside.  Significant NG output. Sat in the chair yesterday.  Subjectively feels better.  Still no flatus or bowel movement    Scheduled Meds:   enoxaparin  40 mg Subcutaneous Daily    lactated ringers  1,000 mL Intravenous Once    oxybutynin  5 mg Oral Daily    piperacillin-tazobactam (ZOSYN) IVPB  3.375 g Intravenous Q8H    polyethylene glycol  17 g Oral Daily    senna-docusate 8.6-50 mg  1 tablet Oral BID     Continuous Infusions:   lactated ringers 100 mL/hr at 01/04/22 1015     PRN Meds:acetaminophen, HYDROcodone-acetaminophen, melatonin, morphine, naloxone, ondansetron, simethicone, sodium chloride 0.9%, sodium chloride 0.9%    Review of patient's allergies indicates:  No Known Allergies    Review of Systems: As per interval history    OBJECTIVE:     Vital Signs (Most Recent)  Temp: 97.9 °F (36.6 °C) (01/07/22 0406)  Pulse: 62 (01/07/22 0406)  Resp: 16 (01/07/22 0406)  BP: (!) 148/86 (01/07/22 0406)  SpO2: (!) 90 % (01/07/22 0406)    Vital Signs Range (Last 24H):  Temp:  [97.6 °F (36.4 °C)-98.3 °F (36.8 °C)]   Pulse:  [61-73]   Resp:  [16-18]   BP: (129-161)/(73-90)   SpO2:  [90 %-97 %]     I & O (Last 24H):    Intake/Output Summary (Last 24 hours) at 1/7/2022 0827  Last data filed at 1/7/2022 0600  Gross per 24 hour   Intake --   Output 3710 ml   Net -3710 ml       Physical Exam:  General: Patient resting comfortably in no acute distress. Appears as stated age. Calm  Eyes: No conjunctival injection. No scleral icterus.  ENT: Hearing grossly intact. No discharge from ears. No nasal discharge.   Neck: Supple, trachea midline. No JVD  CVS: RRR. No LE edema BL  Lungs: CTA BL, no wheezing  or crackles. Good breath sounds. No accessory muscle use. No acute respiratory distress  Abdomen:  NG tube in place, Soft, appropriately tender, VICENTE drain in place, could not appreciate bowel sounds  Neuro: AOx3. Moves all extremities. Follows commands. Responds appropriately   Skin:  No rash or erythema noted    Laboratory:  I have reviewed all pertinent lab results within the past 24 hours.    Diagnostic Results:  Reviewed all imaging    ASSESSMENT/PLAN:     71-year-old gentleman with prior history of hypertension, BPH, GERD presented with abdominal pain found to have acute appendicitis now status post laparoscopic appendicectomy postoperative course complicated by postop ileus and slow recovery.    Active Hospital Problems    Diagnosis  POA    *Acute appendicitis with perforation, localized peritonitis, and abscess, without gangrene [K35.33]  Yes    Postop Ileus [K56.7]  No    CAROL ANN (acute kidney injury) [N17.9]  No    BPH with obstruction/lower urinary tract symptoms [N40.1, N13.8]  Yes    ED (erectile dysfunction) of organic origin [N52.9]  Yes    Gastroesophageal reflux disease without esophagitis [K21.9]  Yes    Essential hypertension [I10]  Yes      Resolved Hospital Problems   No resolved problems to display.         Plan:   Status post laparoscopic appendicectomy on 01/03/2022.  Postoperative course is complicated by slow recovery, postop ileus, now has NG tube with significant output, bowel functions have not returned yet  Continue NG with low intermittent suction  NPO except medications.  Ice chips are okay  Daily KUB  Continue Zosyn-deescalate to p.o. Augmentin once able to tolerate p.o.  CAROL ANN likely due to blood pressure fluctuations.  Continue IV hydration  P.r.n. analgesia  Hold all antihypertensives  Diet advancement as per General surgery  Out of bed to chair, Encourage activity        VTE Risk Mitigation (From admission, onward)         Ordered     enoxaparin injection 40 mg  Daily          01/02/22 1242     IP VTE HIGH RISK PATIENT  Once         01/02/22 1242     Place sequential compression device  Until discontinued         01/02/22 1242                  Department Hospital Medicine  [facility]  Torres Spann MD  Date of service: 01/07/2022

## 2022-01-08 PROBLEM — N52.9 ED (ERECTILE DYSFUNCTION) OF ORGANIC ORIGIN: Status: RESOLVED | Noted: 2017-07-10 | Resolved: 2022-01-08

## 2022-01-08 PROBLEM — K21.9 GASTROESOPHAGEAL REFLUX DISEASE WITHOUT ESOPHAGITIS: Status: RESOLVED | Noted: 2017-07-10 | Resolved: 2022-01-08

## 2022-01-08 PROBLEM — N40.1 BPH WITH OBSTRUCTION/LOWER URINARY TRACT SYMPTOMS: Status: RESOLVED | Noted: 2020-08-17 | Resolved: 2022-01-08

## 2022-01-08 PROBLEM — K59.09 OTHER CONSTIPATION: Status: ACTIVE | Noted: 2022-01-08

## 2022-01-08 PROBLEM — N13.8 BPH WITH OBSTRUCTION/LOWER URINARY TRACT SYMPTOMS: Status: RESOLVED | Noted: 2020-08-17 | Resolved: 2022-01-08

## 2022-01-08 LAB
ALBUMIN SERPL BCP-MCNC: 2.8 G/DL (ref 3.5–5.2)
ALP SERPL-CCNC: 57 U/L (ref 55–135)
ALT SERPL W/O P-5'-P-CCNC: 45 U/L (ref 10–44)
ANION GAP SERPL CALC-SCNC: 12 MMOL/L (ref 8–16)
AST SERPL-CCNC: 35 U/L (ref 10–40)
BASOPHILS # BLD AUTO: 0.11 K/UL (ref 0–0.2)
BASOPHILS NFR BLD: 0.8 % (ref 0–1.9)
BILIRUB SERPL-MCNC: 0.9 MG/DL (ref 0.1–1)
BUN SERPL-MCNC: 48 MG/DL (ref 8–23)
CALCIUM SERPL-MCNC: 8.5 MG/DL (ref 8.7–10.5)
CHLORIDE SERPL-SCNC: 97 MMOL/L (ref 95–110)
CO2 SERPL-SCNC: 32 MMOL/L (ref 23–29)
CREAT SERPL-MCNC: 1.2 MG/DL (ref 0.5–1.4)
DIFFERENTIAL METHOD: ABNORMAL
EOSINOPHIL # BLD AUTO: 0.2 K/UL (ref 0–0.5)
EOSINOPHIL NFR BLD: 1.5 % (ref 0–8)
ERYTHROCYTE [DISTWIDTH] IN BLOOD BY AUTOMATED COUNT: 13.7 % (ref 11.5–14.5)
EST. GFR  (AFRICAN AMERICAN): >60 ML/MIN/1.73 M^2
EST. GFR  (NON AFRICAN AMERICAN): >60 ML/MIN/1.73 M^2
GLUCOSE SERPL-MCNC: 128 MG/DL (ref 70–110)
HCT VFR BLD AUTO: 41.9 % (ref 40–54)
HGB BLD-MCNC: 13.6 G/DL (ref 14–18)
IMM GRANULOCYTES # BLD AUTO: 0.31 K/UL (ref 0–0.04)
IMM GRANULOCYTES NFR BLD AUTO: 2.3 % (ref 0–0.5)
LYMPHOCYTES # BLD AUTO: 1.9 K/UL (ref 1–4.8)
LYMPHOCYTES NFR BLD: 14 % (ref 18–48)
MAGNESIUM SERPL-MCNC: 2.6 MG/DL (ref 1.6–2.6)
MCH RBC QN AUTO: 30.2 PG (ref 27–31)
MCHC RBC AUTO-ENTMCNC: 32.5 G/DL (ref 32–36)
MCV RBC AUTO: 93 FL (ref 82–98)
MONOCYTES # BLD AUTO: 1.1 K/UL (ref 0.3–1)
MONOCYTES NFR BLD: 7.6 % (ref 4–15)
NEUTROPHILS # BLD AUTO: 10.2 K/UL (ref 1.8–7.7)
NEUTROPHILS NFR BLD: 73.8 % (ref 38–73)
NRBC BLD-RTO: 0 /100 WBC
PHOSPHATE SERPL-MCNC: 3 MG/DL (ref 2.7–4.5)
PLATELET # BLD AUTO: 306 K/UL (ref 150–450)
PMV BLD AUTO: 10.1 FL (ref 9.2–12.9)
POTASSIUM SERPL-SCNC: 3 MMOL/L (ref 3.5–5.1)
PROT SERPL-MCNC: 6.4 G/DL (ref 6–8.4)
RBC # BLD AUTO: 4.51 M/UL (ref 4.6–6.2)
SODIUM SERPL-SCNC: 141 MMOL/L (ref 136–145)
WBC # BLD AUTO: 13.75 K/UL (ref 3.9–12.7)

## 2022-01-08 PROCEDURE — 97116 GAIT TRAINING THERAPY: CPT

## 2022-01-08 PROCEDURE — 84100 ASSAY OF PHOSPHORUS: CPT | Performed by: SURGERY

## 2022-01-08 PROCEDURE — 97162 PT EVAL MOD COMPLEX 30 MIN: CPT

## 2022-01-08 PROCEDURE — 25000003 PHARM REV CODE 250: Performed by: NURSE PRACTITIONER

## 2022-01-08 PROCEDURE — 25000003 PHARM REV CODE 250: Performed by: INTERNAL MEDICINE

## 2022-01-08 PROCEDURE — 12000002 HC ACUTE/MED SURGE SEMI-PRIVATE ROOM

## 2022-01-08 PROCEDURE — 25000003 PHARM REV CODE 250: Performed by: HOSPITALIST

## 2022-01-08 PROCEDURE — 63600175 PHARM REV CODE 636 W HCPCS: Performed by: HOSPITALIST

## 2022-01-08 PROCEDURE — 80053 COMPREHEN METABOLIC PANEL: CPT | Performed by: SURGERY

## 2022-01-08 PROCEDURE — 83735 ASSAY OF MAGNESIUM: CPT | Performed by: SURGERY

## 2022-01-08 PROCEDURE — 25000003 PHARM REV CODE 250: Performed by: SURGERY

## 2022-01-08 PROCEDURE — 36415 COLL VENOUS BLD VENIPUNCTURE: CPT | Performed by: SURGERY

## 2022-01-08 PROCEDURE — 63600175 PHARM REV CODE 636 W HCPCS: Performed by: NURSE PRACTITIONER

## 2022-01-08 PROCEDURE — 85025 COMPLETE CBC W/AUTO DIFF WBC: CPT | Performed by: SURGERY

## 2022-01-08 PROCEDURE — 63600175 PHARM REV CODE 636 W HCPCS: Performed by: SURGERY

## 2022-01-08 RX ORDER — POTASSIUM CHLORIDE 7.45 MG/ML
40 INJECTION INTRAVENOUS
Status: DISCONTINUED | OUTPATIENT
Start: 2022-01-08 | End: 2022-01-13 | Stop reason: HOSPADM

## 2022-01-08 RX ORDER — LANOLIN ALCOHOL/MO/W.PET/CERES
800 CREAM (GRAM) TOPICAL
Status: DISCONTINUED | OUTPATIENT
Start: 2022-01-08 | End: 2022-01-13 | Stop reason: HOSPADM

## 2022-01-08 RX ORDER — POTASSIUM CHLORIDE 20 MEQ/1
40 TABLET, EXTENDED RELEASE ORAL
Status: DISCONTINUED | OUTPATIENT
Start: 2022-01-08 | End: 2022-01-13 | Stop reason: HOSPADM

## 2022-01-08 RX ORDER — DEXTROSE MONOHYDRATE, SODIUM CHLORIDE, AND POTASSIUM CHLORIDE 50; 2.98; 4.5 G/1000ML; G/1000ML; G/1000ML
INJECTION, SOLUTION INTRAVENOUS CONTINUOUS
Status: DISCONTINUED | OUTPATIENT
Start: 2022-01-08 | End: 2022-01-08

## 2022-01-08 RX ORDER — MAGNESIUM SULFATE 1 G/100ML
1 INJECTION INTRAVENOUS
Status: DISCONTINUED | OUTPATIENT
Start: 2022-01-08 | End: 2022-01-13 | Stop reason: HOSPADM

## 2022-01-08 RX ORDER — POTASSIUM CHLORIDE 14.9 MG/ML
20 INJECTION INTRAVENOUS ONCE
Status: COMPLETED | OUTPATIENT
Start: 2022-01-08 | End: 2022-01-08

## 2022-01-08 RX ORDER — MAGNESIUM SULFATE HEPTAHYDRATE 40 MG/ML
2 INJECTION, SOLUTION INTRAVENOUS
Status: DISCONTINUED | OUTPATIENT
Start: 2022-01-08 | End: 2022-01-13 | Stop reason: HOSPADM

## 2022-01-08 RX ORDER — BISACODYL 10 MG
10 SUPPOSITORY, RECTAL RECTAL ONCE
Status: COMPLETED | OUTPATIENT
Start: 2022-01-08 | End: 2022-01-08

## 2022-01-08 RX ORDER — POTASSIUM CHLORIDE 7.45 MG/ML
20 INJECTION INTRAVENOUS
Status: DISCONTINUED | OUTPATIENT
Start: 2022-01-08 | End: 2022-01-13 | Stop reason: HOSPADM

## 2022-01-08 RX ORDER — POTASSIUM CHLORIDE 20 MEQ/1
20 TABLET, EXTENDED RELEASE ORAL
Status: DISCONTINUED | OUTPATIENT
Start: 2022-01-08 | End: 2022-01-13 | Stop reason: HOSPADM

## 2022-01-08 RX ORDER — MAGNESIUM SULFATE HEPTAHYDRATE 40 MG/ML
4 INJECTION, SOLUTION INTRAVENOUS
Status: DISCONTINUED | OUTPATIENT
Start: 2022-01-08 | End: 2022-01-13 | Stop reason: HOSPADM

## 2022-01-08 RX ADMIN — PIPERACILLIN AND TAZOBACTAM 3.38 G: 3; .375 INJECTION, POWDER, LYOPHILIZED, FOR SOLUTION INTRAVENOUS; PARENTERAL at 10:01

## 2022-01-08 RX ADMIN — POLYETHYLENE GLYCOL 3350 17 G: 17 POWDER, FOR SOLUTION ORAL at 10:01

## 2022-01-08 RX ADMIN — LEUCINE, PHENYLALANINE, LYSINE, METHIONINE, ISOLEUCINE, VALINE, HISTIDINE, THREONINE, TRYPTOPHAN, ALANINE, GLYCINE, ARGININE, PROLINE, SERINE, TYROSINE, SODIUM ACETATE, DIBASIC POTASSIUM PHOSPHATE, MAGNESIUM CHLORIDE, SODIUM CHLORIDE, CALCIUM CHLORIDE, DEXTROSE
311; 238; 247; 170; 255; 247; 204; 179; 77; 880; 438; 489; 289; 213; 17; 297; 261; 51; 77; 33; 5 INJECTION INTRAVENOUS at 03:01

## 2022-01-08 RX ADMIN — OXYBUTYNIN CHLORIDE 5 MG: 5 TABLET, EXTENDED RELEASE ORAL at 10:01

## 2022-01-08 RX ADMIN — POTASSIUM CHLORIDE 20 MEQ: 200 INJECTION, SOLUTION INTRAVENOUS at 03:01

## 2022-01-08 RX ADMIN — PIPERACILLIN AND TAZOBACTAM 3.38 G: 3; .375 INJECTION, POWDER, LYOPHILIZED, FOR SOLUTION INTRAVENOUS; PARENTERAL at 12:01

## 2022-01-08 RX ADMIN — ENOXAPARIN SODIUM 40 MG: 40 INJECTION SUBCUTANEOUS at 07:01

## 2022-01-08 RX ADMIN — BISACODYL 10 MG: 10 SUPPOSITORY RECTAL at 03:01

## 2022-01-08 RX ADMIN — SENNOSIDES AND DOCUSATE SODIUM 1 TABLET: 50; 8.6 TABLET ORAL at 10:01

## 2022-01-08 RX ADMIN — MELATONIN TAB 3 MG 6 MG: 3 TAB at 10:01

## 2022-01-08 NOTE — CONSULTS
[facility]  Adult Nutrition   Consult Note (Initial Assessment)     SUMMARY     Recommendations  Recommendation/Intervention:   1. RD recommends to continue Clinimix for a couple of days to see if patients GI tract will begin to function.   2. RD recommends to change to TPN if post-op ileus does not resolve soon.  Goals: Ensure that 100% of kcal needs are met as well to allow patient to utilize protein for healing needs.  Nutrition Goal Status: progressing towards goal  Communication of RD Recs: reviewed with physician    Dietitian Rounds Brief  Consult per PPN: Observed RN's tending to IV pumps of Clinimix and others for patient. Patient is s/p laparoscopic appendicectomy on 01/03/2022 and postoperative course is complicated by slow recovery, postop ileus and NG tube with significant output. Bowel functions have still not returned yet  And last bowel movement was on 12/31/2021. Recommendations are as noted above. Will continue to follow prn till discharge and make recommendations accordingly.    Reason for Assessment  Reason For Assessment: consult  Diagnosis: cancer diagnosis/related complications,cardiac disease,diabetes diagnosis/complications,gastrointestinal disease,infection/sepsis,pulmonary disease,other (see comments) (Acute appendicitis with perforation, localized peritonitis, and abscess, without gangrene)  Relevant Medical History: Hypertension (HTN), coronary artery disease (CAD), anemia, pulmonary nodules, BPH (benign prostatic hyperplasia), acquired tricuspid valve insufficiency, borderline diabetes, acid reflux, skin cancer  Interdisciplinary Rounds: did not attend    Nutrition Risk Screen  Nutrition Risk Screen: tube feeding or parenteral nutrition     MST Score: 1  Have you recently lost weight without trying?: No  Weight loss score: 0  Have you been eating poorly because of a decreased appetite?: Yes  Appetite score: 1       Nutrition/Diet History  Spiritual, Cultural Beliefs,  "Jewish Practices, Values that Affect Care: no  Food Allergies: NKFA  Factors Affecting Nutritional Intake: NPO,abdominal pain,altered gastrointestinal function    Anthropometrics  Temp: 98.1 °F (36.7 °C)  Height Method: Stated  Height: 5' 10" (177.8 cm)  Height (inches): 70 in  Weight Method: Bed Scale  Weight: 75.1 kg (165 lb 9.1 oz)  Weight (lb): 165.57 lb  Ideal Body Weight (IBW), Male: 166 lb  % Ideal Body Weight, Male (lb): 99.74 %  BMI (Calculated): 23.8  BMI Grade: 18.5-24.9 - normal       Weight History:  Wt Readings from Last 10 Encounters:   01/06/22 75.1 kg (165 lb 9.1 oz)   12/07/21 75.3 kg (166 lb)   12/07/21 75.3 kg (166 lb)   11/30/21 75.3 kg (166 lb 0.1 oz)   11/11/21 75.3 kg (166 lb)   10/11/21 75.3 kg (166 lb)   10/11/21 75.3 kg (166 lb)   09/24/21 75.3 kg (166 lb)   09/15/21 75.4 kg (166 lb 3.2 oz)   08/27/21 76.2 kg (168 lb)       Lab/Procedures/Meds: Pertinent Labs Reviewed    Clinical Chemistry:  Recent Labs   Lab 01/02/22  0726 01/03/22  0603 01/05/22  0725 01/05/22  0725 01/06/22  0723 01/06/22  0723 01/07/22  0517      < > 137   < > 143   < > 146*   K 3.4*   < > 3.5   < > 4.1   < > 3.7      < > 98   < > 99   < > 102   CO2 27   < > 26   < > 33*   < > 30*   *   < > 200*   < > 122*   < > 101   BUN 31*   < > 50*   < > 56*   < > 47*   CREATININE 1.0   < > 1.4   < > 1.4   < > 1.2   CALCIUM 9.0   < > 8.7   < > 8.8   < > 8.6*   PROT 7.6   < > 6.4   < > 6.1   < > 6.6   ALBUMIN 4.0   < > 2.7*   < > 2.7*   < > 2.9*   BILITOT 0.8   < > 1.3*   < > 0.8   < > 1.2*   ALKPHOS 65   < > 54*   < > 56   < > 61   AST 26   < > 15   < > 16   < > 51*   ALT 33   < > 17   < > 16   < > 43   ANIONGAP 9   < > 13   < > 11   < > 14   ESTGFRAFRICA >60.0   < > 57.6*   < > 57.6*   < > >60.0   EGFRNONAA >60.0   < > 49.8*   < > 49.8*   < > >60.0   MG 2.1   < > 2.4   < > 2.7*   < > 2.8*   PHOS  --    < > 3.3  --  2.5*  --  3.9   LIPASE 25  --   --   --   --   --   --     < > = values in this interval not " displayed.       CBC:   Recent Labs   Lab 01/07/22  0518   WBC 9.96   RBC 4.42*   HGB 13.3*   HCT 41.1      MCV 93   MCH 30.1   MCHC 32.4       Medications: Pertinent Medications reviewed    Scheduled Meds:   enoxaparin  40 mg Subcutaneous Daily    lactated ringers  1,000 mL Intravenous Once    oxybutynin  5 mg Oral Daily    piperacillin-tazobactam (ZOSYN) IVPB  3.375 g Intravenous Q8H    polyethylene glycol  17 g Oral Daily    senna-docusate 8.6-50 mg  1 tablet Oral BID       Continuous Infusions:   amino acid 4.25 % in D5W 2,000 mL (01/07/22 1510)       PRN Meds:.acetaminophen, HYDROcodone-acetaminophen, melatonin, morphine, naloxone, ondansetron, simethicone, sodium chloride 0.9%, sodium chloride 0.9%    Estimated/Assessed Needs  Weight Used For Calorie Calculations: 75.1 kg (165 lb 9.1 oz)  Energy Calorie Requirements (kcal): 3336-2668 kcals/day (25-30 kcals/kg ABW)  Energy Need Method: Kcal/kg  Protein Requirements:  g/day (1.2-1.5 g/kg ABW)  Weight Used For Protein Calculations: 75.1 kg (165 lb 9.1 oz)  Fluid Requirements (mL): 1 ml/kcal or per MD  Estimated Fluid Requirement Method: RDA Method  RDA Method (mL): 1878       Nutrition Prescription Ordered  Current Diet Order: NPO  Current Nutrition Support Formula Ordered: Clinimix 4.25/5  Current Nutrition Support Rate Ordered: 100 (ml)    Evaluation of Received Nutrient/Fluid Intake  Parenteral Calories (kcal): 816  Parenteral Protein (gm): 102  Parenteral Fluid (mL): 2400  Total Calories (kcal): 816  Total Calories (kcal/kg): 11  % Kcal Needs: 40  % Protein Needs: 100  Energy Calories Required: not meeting needs  Protein Required: meeting needs  Fluid Required: meeting needs  Tolerance: tolerating     Intake/Output Summary (Last 24 hours) at 1/7/2022 1840  Last data filed at 1/7/2022 0600  Gross per 24 hour   Intake --   Output 1460 ml   Net -1460 ml      % Intake of Estimated Energy Needs: 0%  % Meal Intake: NPO    Nutrition Risk  Level  of Risk/Frequency of Follow-up: high     Monitor and Evaluation  Food and Nutrient Intake: parenteral nutrition intake  Food and Nutrient Adminstration: enteral and parenteral nutrition administration  Knowledge/Beliefs/Attitudes: food and nutrition knowledge/skill,beliefs and attitudes  Physical Activity and Function: nutrition-related ADLs and IADLs  Anthropometric Measurements: weight,weight change,body mass index  Biochemical Data, Medical Tests and Procedures: lipid profile,inflammatory profile,glucose/endocrine profile,gastrointestinal profile,electrolyte and renal panel  Nutrition-Focused Physical Findings: overall appearance     Nutrition Follow-Up  RD Follow-up?: Yes   Alba Blanchard RD 01/07/2022   6:45 PM

## 2022-01-08 NOTE — PT/OT/SLP EVAL
Physical Therapy Evaluation    Patient Name:  Onesimo Paula   MRN:  [id number]    Recommendations:     Discharge Recommendations:  home health PT   Discharge Equipment Recommendations: walker, rolling   Barriers to discharge: None    Assessment:     Onesimo Paula is a 71 y.o. male admitted with a medical diagnosis of Acute appendicitis with perforation, localized peritonitis, and abscess, without gangrene.  He presents with the following impairments/functional limitations:  weakness,impaired endurance,impaired self care skills,impaired functional mobilty,gait instability,impaired cardiopulmonary response to activity .    Rehab Prognosis: Good; patient would benefit from acute skilled PT services to address these deficits and reach maximum level of function.    Recent Surgery: Procedure(s) (LRB):  APPENDECTOMY, LAPAROSCOPIC (N/A) 6 Days Post-Op    Plan:     During this hospitalization, patient to be seen daily to address the identified rehab impairments via gait training,therapeutic activities,therapeutic exercises and progress toward the following goals:    · Plan of Care Expires:  02/08/22    Subjective     Chief Complaint: NPO status  Patient/Family Comments/goals: Return to position of   Pain/Comfort:  ·      Patients cultural, spiritual, Hoahaoism conflicts given the current situation:      Living Environment:  Pt lives with his wife in a one story house with threshold entrance.  Prior to admission, patients level of function was independent.  Equipment used at home: none.  DME owned (not currently used): none.  Upon discharge, patient will have assistance from wife..    Objective:     Communicated with nurse prior to session.  Patient found supine with VICENTE drain,telemetry  upon PT entry to room.    General Precautions: Standard, fall   Orthopedic Precautions:    Braces:    Respiratory Status: Room air    Exams:  · Cognitive Exam:  Patient is oriented to Person, Place, Time and Situation  · RLE  ROM: WFL  · RLE Strength: WFL  · LLE ROM: WFL  · LLE Strength: WFL    Functional Mobility:  · Bed Mobility:     · Supine to Sit: stand by assistance  · Sit to Supine: stand by assistance  · Transfers:     · Sit to Stand:  stand by assistance with rolling walker  · Gait: 120 ft RW and CGA    Therapeutic Activities and Exercises:  Ptwas educated on the role of PT  for assessment, treatment with emphasis on safety and increased mobility and D/C  recommendations.    AM-PAC 6 CLICK MOBILITY  Total Score:      Patient left supine with all lines intact, call button in reach and bed alarm on.    GOALS:   Multidisciplinary Problems     Physical Therapy Goals        Problem: Physical Therapy Goal    Goal Priority Disciplines Outcome Goal Variances Interventions   Physical Therapy Goal     PT, PT/OT      Description: Goals to be met by:d/c    Patient will increase functional independence with mobility by performin. Supine to sit with Modified [place]  2. Sit to stand transfer with Modified [place]  3. Gait  x 200 feet with Supervision using Rolling Walker.                      History:     Past Medical History:   Diagnosis Date    Acid reflux     Acquired tricuspid valve insufficiency     Anemia     Borderline diabetes     BPH (benign prostatic hyperplasia)     Cataract     20 rt eye, 20- Lt eye    Coronary artery disease     Hypertension     Pulmonary nodules     Skin cancer        Past Surgical History:   Procedure Laterality Date    CYSTOSCOPY WITH TRANSURETHRAL DESTRUCTION OF PROSTATE,RFA N/A 10/21/2020    Procedure: CYSTOSCOPY WITH TRANSURETHRAL DESTRUCTION OF PROSTATE,RFA;  Surgeon: La Nena James MD;  Location: [facility];  Service: Urology;  Laterality: N/A;  please make sure [facility] rep available [phone]    EYE SURGERY      for cataracts, rt eye 20, left eye 20    HERNIA REPAIR  1999    HERNIA REPAIR  2004    KNEE ARTHROSCOPY Left 2018     LAPAROSCOPIC APPENDECTOMY N/A 1/2/2022    Procedure: APPENDECTOMY, LAPAROSCOPIC;  Surgeon: Sam Goss MD;  Location: [facility] OR;  Service: General;  Laterality: N/A;    SKIN LESION EXCISION  10/2009    Neck    SKIN LESION EXCISION  2015    STAPEDECTOMY Right 03/2001    TRANSRECTAL ULTRASOUND EXAMINATION N/A 8/17/2020    Procedure: TRANSRECTAL ULTRASOUND;  Surgeon: La Nena James MD;  Location: [facility] OR;  Service: Urology;  Laterality: N/A;       Time Tracking:     PT Received On: 01/08/22  PT Start Time: 1210     PT Stop Time: 1225  PT Total Time (min): 15 min     Billable Minutes: Evaluation 7 minutes and Gait Training 8 minutes      01/08/2022

## 2022-01-08 NOTE — PLAN OF CARE
Problem: Skin Injury Risk Increased  Goal: Skin Health and Integrity  Intervention: Promote and Optimize Oral Intake  Flowsheets (Taken 1/7/2022 1845)  Oral Nutrition Promotion: (Clinimix-E 4.25/5) other (see comments)     Problem: Oral Intake Inadequate  Goal: Improved Oral Intake  Outcome: Ongoing, Progressing  Intervention: Promote and Optimize Oral Intake  Flowsheets (Taken 1/7/2022 1845)  Oral Nutrition Promotion: (Clinimix-E 4.25/5) other (see comments)

## 2022-01-08 NOTE — SUBJECTIVE & OBJECTIVE
Interval History: 70 y/o acute appendicitis s/p lap appendectomy with post op ileus.     Review of Systems   Constitutional: Negative.    HENT: Negative.    Eyes: Negative.    Respiratory: Negative.    Cardiovascular: Negative.    Gastrointestinal: Negative.    Endocrine: Negative.    Genitourinary: Negative.    Musculoskeletal: Negative.    Skin: Negative.    Allergic/Immunologic: Negative.    Neurological: Negative.    Hematological: Negative.    Psychiatric/Behavioral: Negative.      Objective:     Vital Signs (Most Recent):  Temp: 98 °F (36.7 °C) (01/08/22 0835)  Pulse: 62 (01/08/22 0835)  Resp: 18 (01/08/22 0835)  BP: 119/75 (01/08/22 0835)  SpO2: (!) 93 % (01/08/22 0835) Vital Signs (24h Range):  Temp:  [97.7 °F (36.5 °C)-98.9 °F (37.2 °C)] 98 °F (36.7 °C)  Pulse:  [56-63] 62  Resp:  [18-20] 18  SpO2:  [90 %-97 %] 93 %  BP: (113-141)/(67-80) 119/75     Weight: 75.1 kg (165 lb 9.1 oz)  Body mass index is 23.76 kg/m².    Intake/Output Summary (Last 24 hours) at 1/8/2022 1121  Last data filed at 1/8/2022 0501  Gross per 24 hour   Intake --   Output 340 ml   Net -340 ml      Physical Exam  Vitals and nursing note reviewed.   Constitutional:       General: He is not in acute distress.     Appearance: Normal appearance. He is well-developed, normal weight and well-nourished. He is not ill-appearing.   HENT:      Head: Normocephalic and atraumatic.      Mouth/Throat:      Mouth: Oropharynx is clear and moist.   Eyes:      General: No scleral icterus.     Conjunctiva/sclera: Conjunctivae normal.   Neck:      Thyroid: No thyromegaly.   Cardiovascular:      Rate and Rhythm: Normal rate and regular rhythm.      Heart sounds: Normal heart sounds. No murmur heard.      Pulmonary:      Effort: Pulmonary effort is normal. No respiratory distress.      Breath sounds: Normal breath sounds.   Abdominal:      General: There is distension.      Palpations: Abdomen is soft.      Tenderness: There is abdominal tenderness.       Comments: Hypoactive BS   Musculoskeletal:         General: No edema. Normal range of motion.      Cervical back: Normal range of motion and neck supple.   Skin:     General: Skin is warm.      Findings: No rash.      Comments: Laparoscopic incisions covered,  dry/intact without drainage or redness. VICENTE x1 minimal ser/sang op.    Neurological:      Mental Status: He is alert and oriented to person, place, and time.      Sensory: No sensory deficit.      Motor: No abnormal muscle tone.   Psychiatric:         Mood and Affect: Mood and affect and mood normal. Affect is flat.         Behavior: Behavior normal.         Thought Content: Thought content normal.         Judgment: Judgment normal.      Comments: Verbalized being discouraged about LOS         Significant Labs:   All pertinent labs within the past 24 hours have been reviewed.  A1C:   Recent Labs   Lab 08/10/21  0752   HGBA1C 6.0     ABGs: No results for input(s): PH, PCO2, HCO3, POCSATURATED, BE, TOTALHB, COHB, METHB, O2HB, POCFIO2, PO2 in the last 48 hours.  Blood Culture: No results for input(s): LABBLOO in the last 48 hours.  BMP:   Recent Labs   Lab 01/08/22  0714   *      K 3.0*   CL 97   CO2 32*   BUN 48*   CREATININE 1.2   CALCIUM 8.5*   MG 2.6     CBC:   Recent Labs   Lab 01/07/22  0518 01/08/22  0714   WBC 9.96 13.75*   HGB 13.3* 13.6*   HCT 41.1 41.9    306     CMP:   Recent Labs   Lab 01/07/22  0517 01/08/22  0714   * 141   K 3.7 3.0*    97   CO2 30* 32*    128*   BUN 47* 48*   CREATININE 1.2 1.2   CALCIUM 8.6* 8.5*   PROT 6.6 6.4   ALBUMIN 2.9* 2.8*   BILITOT 1.2* 0.9   ALKPHOS 61 57   AST 51* 35   ALT 43 45*   ANIONGAP 14 12   EGFRNONAA >60.0 >60.0     Coagulation: No results for input(s): PT, INR, APTT in the last 48 hours.  Lactic Acid: No results for input(s): LACTATE in the last 48 hours.  Lipase: No results for input(s): LIPASE in the last 48 hours.  Lipid Panel: No results for input(s): CHOL, HDL,  LDLCALC, TRIG, CHOLHDL in the last 48 hours.  Magnesium:   Recent Labs   Lab 01/07/22  0517 01/08/22  0714   MG 2.8* 2.6     Prealbumin: No results for input(s): PREALBUMIN in the last 48 hours.  TSH:   Recent Labs   Lab 08/10/21  0752   TSH 3.270     Urine Culture: No results for input(s): LABURIN in the last 48 hours.  Urine Studies: No results for input(s): COLORU, APPEARANCEUA, PHUR, SPECGRAV, PROTEINUA, GLUCUA, KETONESU, BILIRUBINUA, OCCULTUA, NITRITE, UROBILINOGEN, LEUKOCYTESUR, RBCUA, WBCUA, BACTERIA, SQUAMEPITHEL, HYALINECASTS in the last 48 hours.    Invalid input(s): IVON    Significant Imaging: I have reviewed all pertinent imaging results/findings within the past 24 hours.

## 2022-01-08 NOTE — PROGRESS NOTES
On license of [facility] Medicine  Progress Note    Patient Name: Onesimo Paula  MRN: [id number]  Patient Class: IP- Inpatient   Admission Date: 1/2/2022  Length of Stay: 5 days  Attending Physician: No att. providers found  Primary Care Provider: JAMES Rose,FNP-C        Subjective:     Principal Problem:Acute appendicitis with perforation, localized peritonitis, and abscess, without gangrene        HPI:  71-year-old gentleman with prior history of hypertension, BPH, GERD presented with chief complaint of abdominal pain and nausea.  Upon further asking patient mentioned he was in his usual state of health until 2 days ago when he started experiencing upper abdominal pain which is more localized in right lower abdomen now.  He also endorses nausea but no vomiting, subjective feeling of chills but no fever.  His last bowel movement was 2 days ago.  Otherwise denies any fever, headache, dizziness, chest pain, palpitations, cough, dysuria.  Remote history of hernia surgery. Adm with Acute appendicitis with localized peritonitis      Overview/Hospital Course:  Laperscopic appendectomy 1/2/22- POD #6.     1/8/22- POD #6.Post op ileus  Some signs it is improving. ngt is out 1/7/22 with no nausea. Resting in bed w/o distress, no c/o. No BM since 12/31/22 GI consulted. Will order suppository. Not active enough, will consult PT and stressed to patient importance of activity with ileus.       Interval History: 72 y/o acute appendicitis s/p lap appendectomy with post op ileus.     Review of Systems   Constitutional: Negative.    HENT: Negative.    Eyes: Negative.    Respiratory: Negative.    Cardiovascular: Negative.    Gastrointestinal: Negative.    Endocrine: Negative.    Genitourinary: Negative.    Musculoskeletal: Negative.    Skin: Negative.    Allergic/Immunologic: Negative.    Neurological: Negative.    Hematological: Negative.    Psychiatric/Behavioral: Negative.      Objective:     Vital Signs  (Most Recent):  Temp: 98 °F (36.7 °C) (01/08/22 0835)  Pulse: 62 (01/08/22 0835)  Resp: 18 (01/08/22 0835)  BP: 119/75 (01/08/22 0835)  SpO2: (!) 93 % (01/08/22 0835) Vital Signs (24h Range):  Temp:  [97.7 °F (36.5 °C)-98.9 °F (37.2 °C)] 98 °F (36.7 °C)  Pulse:  [56-63] 62  Resp:  [18-20] 18  SpO2:  [90 %-97 %] 93 %  BP: (113-141)/(67-80) 119/75     Weight: 75.1 kg (165 lb 9.1 oz)  Body mass index is 23.76 kg/m².    Intake/Output Summary (Last 24 hours) at 1/8/2022 1121  Last data filed at 1/8/2022 0501  Gross per 24 hour   Intake --   Output 340 ml   Net -340 ml      Physical Exam  Vitals and nursing note reviewed.   Constitutional:       General: He is not in acute distress.     Appearance: Normal appearance. He is well-developed, normal weight and well-nourished. He is not ill-appearing.   HENT:      Head: Normocephalic and atraumatic.      Mouth/Throat:      Mouth: Oropharynx is clear and moist.   Eyes:      General: No scleral icterus.     Conjunctiva/sclera: Conjunctivae normal.   Neck:      Thyroid: No thyromegaly.   Cardiovascular:      Rate and Rhythm: Normal rate and regular rhythm.      Heart sounds: Normal heart sounds. No murmur heard.      Pulmonary:      Effort: Pulmonary effort is normal. No respiratory distress.      Breath sounds: Normal breath sounds.   Abdominal:      General: There is distension.      Palpations: Abdomen is soft.      Tenderness: There is abdominal tenderness.      Comments: Hypoactive BS   Musculoskeletal:         General: No edema. Normal range of motion.      Cervical back: Normal range of motion and neck supple.   Skin:     General: Skin is warm.      Findings: No rash.      Comments: Laparoscopic incisions covered,  dry/intact without drainage or redness. VICENTE x1 minimal ser/sang op.    Neurological:      Mental Status: He is alert and oriented to person, place, and time.      Sensory: No sensory deficit.      Motor: No abnormal muscle tone.   Psychiatric:         Mood and  Affect: Mood and affect and mood normal. Affect is flat.         Behavior: Behavior normal.         Thought Content: Thought content normal.         Judgment: Judgment normal.      Comments: Verbalized being discouraged about LOS         Significant Labs:   All pertinent labs within the past 24 hours have been reviewed.  A1C:   Recent Labs   Lab 08/10/21  0752   HGBA1C 6.0     ABGs: No results for input(s): PH, PCO2, HCO3, POCSATURATED, BE, TOTALHB, COHB, METHB, O2HB, POCFIO2, PO2 in the last 48 hours.  Blood Culture: No results for input(s): LABBLOO in the last 48 hours.  BMP:   Recent Labs   Lab 01/08/22 0714   *      K 3.0*   CL 97   CO2 32*   BUN 48*   CREATININE 1.2   CALCIUM 8.5*   MG 2.6     CBC:   Recent Labs   Lab 01/07/22  0518 01/08/22 0714   WBC 9.96 13.75*   HGB 13.3* 13.6*   HCT 41.1 41.9    306     CMP:   Recent Labs   Lab 01/07/22  0517 01/08/22  0714   * 141   K 3.7 3.0*    97   CO2 30* 32*    128*   BUN 47* 48*   CREATININE 1.2 1.2   CALCIUM 8.6* 8.5*   PROT 6.6 6.4   ALBUMIN 2.9* 2.8*   BILITOT 1.2* 0.9   ALKPHOS 61 57   AST 51* 35   ALT 43 45*   ANIONGAP 14 12   EGFRNONAA >60.0 >60.0     Coagulation: No results for input(s): PT, INR, APTT in the last 48 hours.  Lactic Acid: No results for input(s): LACTATE in the last 48 hours.  Lipase: No results for input(s): LIPASE in the last 48 hours.  Lipid Panel: No results for input(s): CHOL, HDL, LDLCALC, TRIG, CHOLHDL in the last 48 hours.  Magnesium:   Recent Labs   Lab 01/07/22  0517 01/08/22 0714   MG 2.8* 2.6     Prealbumin: No results for input(s): PREALBUMIN in the last 48 hours.  TSH:   Recent Labs   Lab 08/10/21  0752   TSH 3.270     Urine Culture: No results for input(s): LABURIN in the last 48 hours.  Urine Studies: No results for input(s): COLORU, APPEARANCEUA, PHUR, SPECGRAV, PROTEINUA, GLUCUA, KETONESU, BILIRUBINUA, OCCULTUA, NITRITE, UROBILINOGEN, LEUKOCYTESUR, RBCUA, WBCUA, BACTERIA, SQUAMEPITHEL,  HYALINECASTS in the last 48 hours.    Invalid input(s): IVON    Significant Imaging: I have reviewed all pertinent imaging results/findings within the past 24 hours.      Assessment/Plan:      * Acute appendicitis with perforation, localized peritonitis, and abscess, without gangrene  laparoscopic appendectomy 1/2/22  -POD#6 with ileus  -General Surgery following  -NGT d/c 1/7/22, denies nausea  -cont zosyn  -NPO-Clinimix-E 4.25/5  -JPx1  -mild inc in WBC, will monitor      Postop Ileus  -General Surgery following  -NGT d/c 1/7/22, denies nausea  -Very little flatus  -NPO-Clinimix-E 4.25/5  -PT consulted, long discussion about increasing activity for ileus.      CAROL ANN (acute kidney injury)  Nov 2021-creatine 1.0 baseline  CAROL ANN likely due to blood pressure fluctuations.  Continue IV hydration  -improved  -1.2 today  -following      Essential hypertension  -chronic but controlled, will monitor  -hold all antihypertensives for now      VTE Risk Mitigation (From admission, onward)         Ordered     enoxaparin injection 40 mg  Daily         01/02/22 1242     IP VTE HIGH RISK PATIENT  Once         01/02/22 1242     Place sequential compression device  Until discontinued         01/02/22 1242                Discharge Planning   RON: 1/9/2022     Code Status: Full Code   Is the patient medically ready for discharge?:     Reason for patient still in hospital (select all that apply): Patient trending condition  Discharge Plan A: Home        -K+ 3.0, replace with 40 meq IV x1  -dulcolax DE x1  -PT OOB ambulate          Bessie Mitchell NP  Department of Hospital Medicine   [facility]

## 2022-01-08 NOTE — HOSPITAL COURSE
Laperscopic appendectomy 1/2/22- POD #6.     1/8/22- POD #6.Post op ileus  Some signs it is improving. ngt is out 1/7/22 with no nausea. Resting in bed w/o distress, no c/o. No BM since 12/31/22 GI consulted. Will order suppository. Not active enough, will consult PT and stressed to patient importance of activity with ileus.     1/9/22- POD #7 Post op ileus  Looks better today. Reports some flatus, no BM (suppository given yesterday) or nausea, pain controlled. No significant change in KUB. PT walked him yesterday and pt reported walking 4 more times with his spouse in the halls. Johnnie x1 remains with minimal op.     Assumed care of this patient on 01/11/22. Chart reviewed. Patient with prolonged hospital admission, admitted 1/2/22 with 2 day history of abdominal pain associated with nausea and subjective fever, CT with concern for acute appendicitis, he was taken to the OR on 1/2 by Dr. Goss, intraop findings of acute appendicitis with perforation and abscess, status post laparoscopic appendectomy, he was placed on empiric antibiotics.  Complicated postop course with ileus requiring NG tube placement on 01/04, removed 1/7, acute kidney injury and slow post operative recovery. Repeat CT 1/9 with multiple intra-abdominal rim enhancing fluid collection, largest 6.8 x 4 0.6 cm, distended gallbladder, he did have return of GI function.  On 1/11, awaiting IR drainage of abscess, cultures ordered, on empiric Zosyn.  On 01/12 obtaining a right anterior and posterior chest discomfort, worse with deep breathing, increased shortness of breath with exertion, CTA ordered and negative for PE, again reinforced use of incentive spirometer with patient.  Preliminary culture of from a drain placed by IR with Pseudomonas and GNR.  Tolerating oral diet.  On 01/13, respiratory symptoms are improved, tolerating cardiac diet without any GI issues, appears medically stable for discharge and is eager for discharge.  Cleared by surgery for  discharge.  Drain to remain in place, patient will follow-up with general surgery on Friday for assessment and removal.  He will take incentive spirometer of home and continue to use.  Given instructions about his blood pressure medications.  Discharge plan including medications, follow-up as well as strict return precautions were reviewed, no objection to discharge, all questions were addressed.  Discussed with RN on day of discharge.    Discharge examination  Sitting side of bed, alert and oriented, on room air with good air entry, abdomen soft and nontender, right posterior drain in place

## 2022-01-08 NOTE — PROGRESS NOTES
Pt s/p lap appy complicated by postop ileus  Pt resting comfortably.  Ng fell out yesterday  NO significant flatus or Bm    Wt Readings from Last 3 Encounters:   01/06/22 75.1 kg (165 lb 9.1 oz)   12/07/21 75.3 kg (166 lb)   12/07/21 75.3 kg (166 lb)     Temp Readings from Last 3 Encounters:   01/08/22 98.1 °F (36.7 °C) (Oral)   10/11/21 98.3 °F (36.8 °C)   09/15/21 98.2 °F (36.8 °C) (Oral)     BP Readings from Last 3 Encounters:   01/08/22 123/73   12/07/21 110/80   12/07/21 138/77     Pulse Readings from Last 3 Encounters:   01/08/22 (!) 59   12/07/21 76   12/07/21 (!) 55     AAox3  CTA B  Soft/nt/ mild dist.  Hypoactive BS    Lab Results   Component Value Date    WBC 13.75 (H) 01/08/2022    HGB 13.6 (L) 01/08/2022    HCT 41.9 01/08/2022    MCV 93 01/08/2022     01/08/2022       BMP  Lab Results   Component Value Date     01/08/2022    K 3.0 (L) 01/08/2022    CL 97 01/08/2022    CO2 32 (H) 01/08/2022    BUN 48 (H) 01/08/2022    CREATININE 1.2 01/08/2022    CALCIUM 8.5 (L) 01/08/2022    ANIONGAP 12 01/08/2022    ESTGFRAFRICA >60.0 01/08/2022    EGFRNONAA >60.0 01/08/2022     A/P: s/p lap appy  Check abd xray tomorrow  Ambulate

## 2022-01-09 LAB
ALBUMIN SERPL BCP-MCNC: 2.5 G/DL (ref 3.5–5.2)
ALP SERPL-CCNC: 50 U/L (ref 55–135)
ALT SERPL W/O P-5'-P-CCNC: 37 U/L (ref 10–44)
ANION GAP SERPL CALC-SCNC: 10 MMOL/L (ref 8–16)
AST SERPL-CCNC: 30 U/L (ref 10–40)
BASOPHILS # BLD AUTO: 0.12 K/UL (ref 0–0.2)
BASOPHILS NFR BLD: 0.8 % (ref 0–1.9)
BILIRUB SERPL-MCNC: 0.8 MG/DL (ref 0.1–1)
BUN SERPL-MCNC: 47 MG/DL (ref 8–23)
CALCIUM SERPL-MCNC: 8.3 MG/DL (ref 8.7–10.5)
CHLORIDE SERPL-SCNC: 101 MMOL/L (ref 95–110)
CO2 SERPL-SCNC: 30 MMOL/L (ref 23–29)
CREAT SERPL-MCNC: 1.1 MG/DL (ref 0.5–1.4)
DIFFERENTIAL METHOD: ABNORMAL
EOSINOPHIL # BLD AUTO: 0.3 K/UL (ref 0–0.5)
EOSINOPHIL NFR BLD: 2.1 % (ref 0–8)
ERYTHROCYTE [DISTWIDTH] IN BLOOD BY AUTOMATED COUNT: 13.9 % (ref 11.5–14.5)
EST. GFR  (AFRICAN AMERICAN): >60 ML/MIN/1.73 M^2
EST. GFR  (NON AFRICAN AMERICAN): >60 ML/MIN/1.73 M^2
GLUCOSE SERPL-MCNC: 119 MG/DL (ref 70–110)
HCT VFR BLD AUTO: 38.5 % (ref 40–54)
HGB BLD-MCNC: 12.3 G/DL (ref 14–18)
IMM GRANULOCYTES # BLD AUTO: 0.33 K/UL (ref 0–0.04)
IMM GRANULOCYTES NFR BLD AUTO: 2.2 % (ref 0–0.5)
LYMPHOCYTES # BLD AUTO: 2.5 K/UL (ref 1–4.8)
LYMPHOCYTES NFR BLD: 16.8 % (ref 18–48)
MAGNESIUM SERPL-MCNC: 2.5 MG/DL (ref 1.6–2.6)
MCH RBC QN AUTO: 29.7 PG (ref 27–31)
MCHC RBC AUTO-ENTMCNC: 31.9 G/DL (ref 32–36)
MCV RBC AUTO: 93 FL (ref 82–98)
MONOCYTES # BLD AUTO: 0.9 K/UL (ref 0.3–1)
MONOCYTES NFR BLD: 6 % (ref 4–15)
NEUTROPHILS # BLD AUTO: 10.9 K/UL (ref 1.8–7.7)
NEUTROPHILS NFR BLD: 72.1 % (ref 38–73)
NRBC BLD-RTO: 0 /100 WBC
PHOSPHATE SERPL-MCNC: 3.7 MG/DL (ref 2.7–4.5)
PLATELET # BLD AUTO: 315 K/UL (ref 150–450)
PMV BLD AUTO: 10.1 FL (ref 9.2–12.9)
POTASSIUM SERPL-SCNC: 3.3 MMOL/L (ref 3.5–5.1)
POTASSIUM SERPL-SCNC: 3.6 MMOL/L (ref 3.5–5.1)
PROT SERPL-MCNC: 5.7 G/DL (ref 6–8.4)
RBC # BLD AUTO: 4.14 M/UL (ref 4.6–6.2)
SODIUM SERPL-SCNC: 141 MMOL/L (ref 136–145)
WBC # BLD AUTO: 15.04 K/UL (ref 3.9–12.7)

## 2022-01-09 PROCEDURE — 97116 GAIT TRAINING THERAPY: CPT

## 2022-01-09 PROCEDURE — 84132 ASSAY OF SERUM POTASSIUM: CPT | Performed by: HOSPITALIST

## 2022-01-09 PROCEDURE — 12000002 HC ACUTE/MED SURGE SEMI-PRIVATE ROOM

## 2022-01-09 PROCEDURE — 25000003 PHARM REV CODE 250: Performed by: NURSE PRACTITIONER

## 2022-01-09 PROCEDURE — 85025 COMPLETE CBC W/AUTO DIFF WBC: CPT | Performed by: SURGERY

## 2022-01-09 PROCEDURE — 25000003 PHARM REV CODE 250: Performed by: SURGERY

## 2022-01-09 PROCEDURE — 83735 ASSAY OF MAGNESIUM: CPT | Performed by: SURGERY

## 2022-01-09 PROCEDURE — 36415 COLL VENOUS BLD VENIPUNCTURE: CPT | Performed by: SURGERY

## 2022-01-09 PROCEDURE — 25000003 PHARM REV CODE 250: Performed by: HOSPITALIST

## 2022-01-09 PROCEDURE — 63600175 PHARM REV CODE 636 W HCPCS: Performed by: HOSPITALIST

## 2022-01-09 PROCEDURE — 25500020 PHARM REV CODE 255: Performed by: HOSPITALIST

## 2022-01-09 PROCEDURE — 63600175 PHARM REV CODE 636 W HCPCS: Performed by: SURGERY

## 2022-01-09 PROCEDURE — 36415 COLL VENOUS BLD VENIPUNCTURE: CPT | Performed by: HOSPITALIST

## 2022-01-09 PROCEDURE — 84100 ASSAY OF PHOSPHORUS: CPT | Performed by: SURGERY

## 2022-01-09 PROCEDURE — 80053 COMPREHEN METABOLIC PANEL: CPT | Performed by: SURGERY

## 2022-01-09 RX ADMIN — PIPERACILLIN AND TAZOBACTAM 3.38 G: 3; .375 INJECTION, POWDER, LYOPHILIZED, FOR SOLUTION INTRAVENOUS; PARENTERAL at 05:01

## 2022-01-09 RX ADMIN — PIPERACILLIN AND TAZOBACTAM 3.38 G: 3; .375 INJECTION, POWDER, LYOPHILIZED, FOR SOLUTION INTRAVENOUS; PARENTERAL at 08:01

## 2022-01-09 RX ADMIN — SENNOSIDES AND DOCUSATE SODIUM 1 TABLET: 50; 8.6 TABLET ORAL at 09:01

## 2022-01-09 RX ADMIN — PIPERACILLIN AND TAZOBACTAM 3.38 G: 3; .375 INJECTION, POWDER, LYOPHILIZED, FOR SOLUTION INTRAVENOUS; PARENTERAL at 11:01

## 2022-01-09 RX ADMIN — POTASSIUM CHLORIDE 40 MEQ: 20 TABLET, EXTENDED RELEASE ORAL at 09:01

## 2022-01-09 RX ADMIN — OXYBUTYNIN CHLORIDE 5 MG: 5 TABLET, EXTENDED RELEASE ORAL at 09:01

## 2022-01-09 RX ADMIN — LEUCINE, PHENYLALANINE, LYSINE, METHIONINE, ISOLEUCINE, VALINE, HISTIDINE, THREONINE, TRYPTOPHAN, ALANINE, GLYCINE, ARGININE, PROLINE, SERINE, TYROSINE, SODIUM ACETATE, DIBASIC POTASSIUM PHOSPHATE, MAGNESIUM CHLORIDE, SODIUM CHLORIDE, CALCIUM CHLORIDE, DEXTROSE
311; 238; 247; 170; 255; 247; 204; 179; 77; 880; 438; 489; 289; 213; 17; 297; 261; 51; 77; 33; 5 INJECTION INTRAVENOUS at 03:01

## 2022-01-09 RX ADMIN — POLYETHYLENE GLYCOL 3350 17 G: 17 POWDER, FOR SOLUTION ORAL at 09:01

## 2022-01-09 RX ADMIN — ENOXAPARIN SODIUM 40 MG: 40 INJECTION SUBCUTANEOUS at 05:01

## 2022-01-09 RX ADMIN — IOHEXOL 100 ML: 350 INJECTION, SOLUTION INTRAVENOUS at 05:01

## 2022-01-09 NOTE — SUBJECTIVE & OBJECTIVE
Interval History: 72 y/o s/p appendectomy with post op ileus.     Review of Systems   Constitutional: Negative.    HENT: Negative.    Eyes: Negative.    Respiratory: Negative.    Cardiovascular: Negative.    Gastrointestinal: Negative.    Endocrine: Negative.    Genitourinary: Negative.    Musculoskeletal: Negative.    Skin: Negative.    Allergic/Immunologic: Negative.    Neurological: Negative.    Hematological: Negative.    Psychiatric/Behavioral:        Frustrated with LOS/recovery and ready to go home.      Objective:     Vital Signs (Most Recent):  Temp: 97.7 °F (36.5 °C) (01/09/22 0820)  Pulse: (!) 59 (01/09/22 0820)  Resp: 18 (01/09/22 0820)  BP: (!) 146/83 (01/09/22 0820)  SpO2: (!) 91 % (01/09/22 0820) Vital Signs (24h Range):  Temp:  [97.6 °F (36.4 °C)-98.6 °F (37 °C)] 97.7 °F (36.5 °C)  Pulse:  [58-70] 59  Resp:  [18] 18  SpO2:  [91 %-98 %] 91 %  BP: (118-146)/(65-83) 146/83     Weight: 75.1 kg (165 lb 9.1 oz)  Body mass index is 23.76 kg/m².    Intake/Output Summary (Last 24 hours) at 1/9/2022 0950  Last data filed at 1/9/2022 0422  Gross per 24 hour   Intake --   Output 300 ml   Net -300 ml      Physical Exam  Vitals and nursing note reviewed.   Constitutional:       General: He is not in acute distress.     Appearance: Normal appearance. He is well-developed, normal weight and well-nourished. He is not ill-appearing.   HENT:      Head: Normocephalic and atraumatic.      Mouth/Throat:      Mouth: Oropharynx is clear and moist.   Eyes:      General: No scleral icterus.     Conjunctiva/sclera: Conjunctivae normal.      Pupils: Pupils are equal, round, and reactive to light.   Neck:      Thyroid: No thyromegaly.   Cardiovascular:      Rate and Rhythm: Normal rate and regular rhythm.      Heart sounds: Normal heart sounds. No murmur heard.      Pulmonary:      Effort: Pulmonary effort is normal. No respiratory distress.      Breath sounds: Normal breath sounds.   Abdominal:      General: Bowel sounds are  normal. There is no distension.      Palpations: Abdomen is soft.      Tenderness: There is abdominal tenderness.      Comments: Bowel sounds noted today   Musculoskeletal:         General: No edema. Normal range of motion.      Cervical back: Normal range of motion and neck supple.      Right lower leg: No edema.      Left lower leg: No edema.   Skin:     General: Skin is warm.      Findings: No rash.      Comments: incisions approximated without drainage or redness   Neurological:      Mental Status: He is alert and oriented to person, place, and time. Mental status is at baseline.      Sensory: No sensory deficit.      Motor: No abnormal muscle tone.   Psychiatric:         Mood and Affect: Mood and affect and mood normal.         Behavior: Behavior normal.         Thought Content: Thought content normal.         Judgment: Judgment normal.         Significant Labs:   All pertinent labs within the past 24 hours have been reviewed.  A1C:   Recent Labs   Lab 08/10/21  0752   HGBA1C 6.0     Bilirubin:   Recent Labs   Lab 01/05/22  0725 01/06/22  0723 01/07/22  0517 01/08/22  0714 01/09/22  0555   BILITOT 1.3* 0.8 1.2* 0.9 0.8     Blood Culture: No results for input(s): LABBLOO in the last 48 hours.  BMP:   Recent Labs   Lab 01/09/22  0555   *      K 3.3*      CO2 30*   BUN 47*   CREATININE 1.1   CALCIUM 8.3*   MG 2.5     CBC:   Recent Labs   Lab 01/08/22  0714 01/09/22  0555   WBC 13.75* 15.04*   HGB 13.6* 12.3*   HCT 41.9 38.5*    315     CMP:   Recent Labs   Lab 01/08/22  0714 01/09/22  0555    141   K 3.0* 3.3*   CL 97 101   CO2 32* 30*   * 119*   BUN 48* 47*   CREATININE 1.2 1.1   CALCIUM 8.5* 8.3*   PROT 6.4 5.7*   ALBUMIN 2.8* 2.5*   BILITOT 0.9 0.8   ALKPHOS 57 50*   AST 35 30   ALT 45* 37   ANIONGAP 12 10   EGFRNONAA >60.0 >60.0     Coagulation: No results for input(s): PT, INR, APTT in the last 48 hours.  Lactic Acid: No results for input(s): LACTATE in the last 48  hours.  Lipase: No results for input(s): LIPASE in the last 48 hours.  Lipid Panel: No results for input(s): CHOL, HDL, LDLCALC, TRIG, CHOLHDL in the last 48 hours.  Magnesium:   Recent Labs   Lab 01/08/22  0714 01/09/22  0555   MG 2.6 2.5     Prealbumin: No results for input(s): PREALBUMIN in the last 48 hours.  Troponin: No results for input(s): TROPONINI in the last 48 hours.  TSH:   Recent Labs   Lab 08/10/21  0752   TSH 3.270     Urine Culture: No results for input(s): LABURIN in the last 48 hours.  Urine Studies: No results for input(s): COLORU, APPEARANCEUA, PHUR, SPECGRAV, PROTEINUA, GLUCUA, KETONESU, BILIRUBINUA, OCCULTUA, NITRITE, UROBILINOGEN, LEUKOCYTESUR, RBCUA, WBCUA, BACTERIA, SQUAMEPITHEL, HYALINECASTS in the last 48 hours.    Invalid input(s): IVON    Significant Imaging: I have reviewed all pertinent imaging results/findings within the past 24 hours.

## 2022-01-09 NOTE — PT/OT/SLP PROGRESS
Physical Therapy Treatment    Patient Name:  Onesimo Paula   MRN:  [id number]    Recommendations:     Discharge Recommendations:  home health PT   Discharge Equipment Recommendations: walker, rolling   Barriers to discharge: None    Assessment:     Onesimo Paula is a 71 y.o. male admitted with a medical diagnosis of Acute appendicitis with perforation, localized peritonitis, and abscess, without gangrene.  He presents with the following impairments/functional limitations:  weakness,impaired endurance,impaired functional mobilty,gait instability,impaired balance,decreased lower extremity function,impaired coordination,impaired cardiopulmonary response to activity.  Pt tolerated treatment well today and required min A for balance with t/f and gait due to weakness and mild LOB.  Endurance impoved as noted by increased tolerance to gait and increased distance.    Rehab Prognosis: Good; patient would benefit from acute skilled PT services to address these deficits and reach maximum level of function.    Recent Surgery: Procedure(s) (LRB):  APPENDECTOMY, LAPAROSCOPIC (N/A) 7 Days Post-Op    Plan:     During this hospitalization, patient to be seen daily to address the identified rehab impairments via gait training,therapeutic activities,therapeutic exercises and progress toward the following goals:    · Plan of Care Expires:  02/08/22    Subjective     Chief Complaint: need to have BM/gas pain  Patient/Family Comments/goals: go home  Pain/Comfort:  · Pain Rating 1: 0/10      Objective:     Communicated with RN prior to session.  Patient found left sidelying with telemetry,VICENTE drain upon PT entry to room.     General Precautions: Standard, fall   Orthopedic Precautions:    Braces:    Respiratory Status: Room air     Functional Mobility:  · Bed Mobility:     · Supine to Sit: stand by assistance  · Transfers:     · Sit to Stand:  minimum assistance with no AD  · Gait: x210' pushing IV pole, decreased step length on L with  decreased foot clearance, needs postural cues and VCs for sequence and carlos enrique.      AM-PAC 6 CLICK MOBILITY          Therapeutic Activities and Exercises:   Pt was educated on the following: call light use, importance of OOB activity and functional mobility to negate the negative effects of prolonged bed rest during this hospitalization, safe transfers/ambulation and discharge planning recommendations/options. Gait training x210' without AD pushing IV pole for balance, 3 mild LOB, needs min A to correct.    Patient left sitting EOB with all lines intact, call button in reach, RN notified and wife present..    GOALS:   Multidisciplinary Problems     Physical Therapy Goals        Problem: Physical Therapy Goal    Goal Priority Disciplines Outcome Goal Variances Interventions   Physical Therapy Goal     PT, PT/OT      Description: Goals to be met by:d/c    Patient will increase functional independence with mobility by performin. Supine to sit with Modified [place]  2. Sit to stand transfer with Modified [place]  3. Gait  x 200 feet with Supervision using Rolling Walker.                      Time Tracking:     PT Received On: 22  PT Start Time: 1110     PT Stop Time: 1123  PT Total Time (min): 13 min     Billable Minutes: Gait Training 13    Treatment Type: Treatment  PT/PTA: PT     PTA Visit Number: 0     2022

## 2022-01-09 NOTE — PROGRESS NOTES
Pt seen and examined  POD 7 s/p lap appy  Pt resting comfortably  Denies n/v.  Minimal to no flatus. N O bm    Wt Readings from Last 3 Encounters:   01/06/22 75.1 kg (165 lb 9.1 oz)   12/07/21 75.3 kg (166 lb)   12/07/21 75.3 kg (166 lb)     Temp Readings from Last 3 Encounters:   01/09/22 97.9 °F (36.6 °C) (Oral)   10/11/21 98.3 °F (36.8 °C)   09/15/21 98.2 °F (36.8 °C) (Oral)     BP Readings from Last 3 Encounters:   01/09/22 138/83   12/07/21 110/80   12/07/21 138/77     Pulse Readings from Last 3 Encounters:   01/09/22 62   12/07/21 76   12/07/21 (!) 55     AAOx3  CTA B  Sinus  Soft/nt/mod dist  Hypoactive BS  morgan serosang    Lab Results   Component Value Date    WBC 15.04 (H) 01/09/2022    HGB 12.3 (L) 01/09/2022    HCT 38.5 (L) 01/09/2022    MCV 93 01/09/2022     01/09/2022       BMP  Lab Results   Component Value Date     01/09/2022    K 3.3 (L) 01/09/2022     01/09/2022    CO2 30 (H) 01/09/2022    BUN 47 (H) 01/09/2022    CREATININE 1.1 01/09/2022    CALCIUM 8.3 (L) 01/09/2022    ANIONGAP 10 01/09/2022    ESTGFRAFRICA >60.0 01/09/2022    EGFRNONAA >60.0 01/09/2022       Flat/erect: findings consistent with ileus    A/P s/p lap appy  Pt with delayed Gi function.  Given this and increasing wbc count will obtain a oral/zeke contrast ct scan to ensure no intraabdominal abscess

## 2022-01-09 NOTE — NURSING
0940-Rec'd report from nurse mio. Will assume care of patient at this time. Pt lying in bed, awake and alert, oriented x4. No needs voiced at this time. K 3.3. replaced as ordered, will recheck in 6 hours. Pt efused to get up OOB at this time.

## 2022-01-09 NOTE — PROGRESS NOTES
[facility] Medicine  Progress Note    Patient Name: Onesimo Paula  MRN: [id number]  Patient Class: IP- Inpatient   Admission Date: 1/2/2022  Length of Stay: 6 days  Attending Physician: No att. providers found  Primary Care Provider: JAMES Rose,FNP-C        Subjective:     Principal Problem:Acute appendicitis with perforation, localized peritonitis, and abscess, without gangrene        HPI:  71-year-old gentleman with prior history of hypertension, BPH, GERD presented with chief complaint of abdominal pain and nausea.  Upon further asking patient mentioned he was in his usual state of health until 2 days ago when he started experiencing upper abdominal pain which is more localized in right lower abdomen now.  He also endorses nausea but no vomiting, subjective feeling of chills but no fever.  His last bowel movement was 2 days ago.  Otherwise denies any fever, headache, dizziness, chest pain, palpitations, cough, dysuria.  Remote history of hernia surgery. Adm with Acute appendicitis with localized peritonitis      Overview/Hospital Course:  Laperscopic appendectomy 1/2/22- POD #6.     1/8/22- POD #6.Post op ileus  Some signs it is improving. ngt is out 1/7/22 with no nausea. Resting in bed w/o distress, no c/o. No BM since 12/31/22 GI consulted. Will order suppository. Not active enough, will consult PT and stressed to patient importance of activity with ileus.     1/9/22- POD #7 Post op ileus  Looks better today. Reports some flatus, no BM (suppository given yesterday) or nausea, pain controlled. No significant change in KUB. PT walked him yesterday and pt reported walking 4 more times with his spouse in the halls. Johnnie x1 remains with minimal op.       Interval History: 70 y/o s/p appendectomy with post op ileus.     Review of Systems   Constitutional: Negative.    HENT: Negative.    Eyes: Negative.    Respiratory: Negative.    Cardiovascular: Negative.    Gastrointestinal:  Negative.    Endocrine: Negative.    Genitourinary: Negative.    Musculoskeletal: Negative.    Skin: Negative.    Allergic/Immunologic: Negative.    Neurological: Negative.    Hematological: Negative.    Psychiatric/Behavioral:        Frustrated with LOS/recovery and ready to go home.      Objective:     Vital Signs (Most Recent):  Temp: 97.7 °F (36.5 °C) (01/09/22 0820)  Pulse: (!) 59 (01/09/22 0820)  Resp: 18 (01/09/22 0820)  BP: (!) 146/83 (01/09/22 0820)  SpO2: (!) 91 % (01/09/22 0820) Vital Signs (24h Range):  Temp:  [97.6 °F (36.4 °C)-98.6 °F (37 °C)] 97.7 °F (36.5 °C)  Pulse:  [58-70] 59  Resp:  [18] 18  SpO2:  [91 %-98 %] 91 %  BP: (118-146)/(65-83) 146/83     Weight: 75.1 kg (165 lb 9.1 oz)  Body mass index is 23.76 kg/m².    Intake/Output Summary (Last 24 hours) at 1/9/2022 0950  Last data filed at 1/9/2022 0422  Gross per 24 hour   Intake --   Output 300 ml   Net -300 ml      Physical Exam  Vitals and nursing note reviewed.   Constitutional:       General: He is not in acute distress.     Appearance: Normal appearance. He is well-developed, normal weight and well-nourished. He is not ill-appearing.   HENT:      Head: Normocephalic and atraumatic.      Mouth/Throat:      Mouth: Oropharynx is clear and moist.   Eyes:      General: No scleral icterus.     Conjunctiva/sclera: Conjunctivae normal.      Pupils: Pupils are equal, round, and reactive to light.   Neck:      Thyroid: No thyromegaly.   Cardiovascular:      Rate and Rhythm: Normal rate and regular rhythm.      Heart sounds: Normal heart sounds. No murmur heard.      Pulmonary:      Effort: Pulmonary effort is normal. No respiratory distress.      Breath sounds: Normal breath sounds.   Abdominal:      General: Bowel sounds are normal. There is no distension.      Palpations: Abdomen is soft.      Tenderness: There is abdominal tenderness.      Comments: Bowel sounds noted today   Musculoskeletal:         General: No edema. Normal range of motion.       Cervical back: Normal range of motion and neck supple.      Right lower leg: No edema.      Left lower leg: No edema.   Skin:     General: Skin is warm.      Findings: No rash.      Comments: incisions approximated without drainage or redness   Neurological:      Mental Status: He is alert and oriented to person, place, and time. Mental status is at baseline.      Sensory: No sensory deficit.      Motor: No abnormal muscle tone.   Psychiatric:         Mood and Affect: Mood and affect and mood normal.         Behavior: Behavior normal.         Thought Content: Thought content normal.         Judgment: Judgment normal.         Significant Labs:   All pertinent labs within the past 24 hours have been reviewed.  A1C:   Recent Labs   Lab 08/10/21  0752   HGBA1C 6.0     Bilirubin:   Recent Labs   Lab 01/05/22  0725 01/06/22  0723 01/07/22  0517 01/08/22  0714 01/09/22  0555   BILITOT 1.3* 0.8 1.2* 0.9 0.8     Blood Culture: No results for input(s): LABBLOO in the last 48 hours.  BMP:   Recent Labs   Lab 01/09/22  0555   *      K 3.3*      CO2 30*   BUN 47*   CREATININE 1.1   CALCIUM 8.3*   MG 2.5     CBC:   Recent Labs   Lab 01/08/22  0714 01/09/22  0555   WBC 13.75* 15.04*   HGB 13.6* 12.3*   HCT 41.9 38.5*    315     CMP:   Recent Labs   Lab 01/08/22  0714 01/09/22  0555    141   K 3.0* 3.3*   CL 97 101   CO2 32* 30*   * 119*   BUN 48* 47*   CREATININE 1.2 1.1   CALCIUM 8.5* 8.3*   PROT 6.4 5.7*   ALBUMIN 2.8* 2.5*   BILITOT 0.9 0.8   ALKPHOS 57 50*   AST 35 30   ALT 45* 37   ANIONGAP 12 10   EGFRNONAA >60.0 >60.0     Coagulation: No results for input(s): PT, INR, APTT in the last 48 hours.  Lactic Acid: No results for input(s): LACTATE in the last 48 hours.  Lipase: No results for input(s): LIPASE in the last 48 hours.  Lipid Panel: No results for input(s): CHOL, HDL, LDLCALC, TRIG, CHOLHDL in the last 48 hours.  Magnesium:   Recent Labs   Lab 01/08/22  0714 01/09/22  0555   MG  2.6 2.5     Prealbumin: No results for input(s): PREALBUMIN in the last 48 hours.  Troponin: No results for input(s): TROPONINI in the last 48 hours.  TSH:   Recent Labs   Lab 08/10/21  0752   TSH 3.270     Urine Culture: No results for input(s): LABURIN in the last 48 hours.  Urine Studies: No results for input(s): COLORU, APPEARANCEUA, PHUR, SPECGRAV, PROTEINUA, GLUCUA, KETONESU, BILIRUBINUA, OCCULTUA, NITRITE, UROBILINOGEN, LEUKOCYTESUR, RBCUA, WBCUA, BACTERIA, SQUAMEPITHEL, HYALINECASTS in the last 48 hours.    Invalid input(s): WRIGHTSUR    Significant Imaging: I have reviewed all pertinent imaging results/findings within the past 24 hours.      Assessment/Plan:      * Acute appendicitis with perforation, localized peritonitis, and abscess, without gangrene  laparoscopic appendectomy 1/2/22  -POD#7 with ileus  -General Surgery following  -NGT d/c 1/7/22, denies nausea  -cont zosyn  -NPO-Clinimix-E 4.25/5  -JPx1  -slightly more inc in WBC,no fever, will monitor      Other constipation        Postop Ileus  -General Surgery following  -NGT d/c 1/7/22, denies nausea  -pt reports some flatus over past 24 h  -NPO-Clinimix-E 4.25/5  -ambulated 4-5 times yesterday      CAROL ANN (acute kidney injury)  Nov 2021-creatine 1.0 baseline  CAROL ANN likely due to blood pressure fluctuations.  Continue IV hydration  -improved  -1.1 today  -following      Essential hypertension  -chronic but controlled, will monitor  -hold all antihypertensives for now        VTE Risk Mitigation (From admission, onward)         Ordered     enoxaparin injection 40 mg  Daily         01/02/22 1242     IP VTE HIGH RISK PATIENT  Once         01/02/22 1242     Place sequential compression device  Until discontinued         01/02/22 1242                Discharge Planning   RON: 1/9/2022     Code Status: Full Code   Is the patient medically ready for discharge?:     Reason for patient still in hospital (select all that apply): Patient trending condition  Discharge  Plan A: Home          -clinimix reordered  -potassium to be replaced  -continue with inc TERI Mitchell NP  Department of Hospital Medicine   [facility]

## 2022-01-10 LAB
ALBUMIN SERPL BCP-MCNC: 2.6 G/DL (ref 3.5–5.2)
ALP SERPL-CCNC: 50 U/L (ref 55–135)
ALT SERPL W/O P-5'-P-CCNC: 32 U/L (ref 10–44)
ANION GAP SERPL CALC-SCNC: 10 MMOL/L (ref 8–16)
AST SERPL-CCNC: 24 U/L (ref 10–40)
BASOPHILS # BLD AUTO: 0.04 K/UL (ref 0–0.2)
BASOPHILS NFR BLD: 0.3 % (ref 0–1.9)
BILIRUB SERPL-MCNC: 0.7 MG/DL (ref 0.1–1)
BUN SERPL-MCNC: 33 MG/DL (ref 8–23)
CALCIUM SERPL-MCNC: 8.4 MG/DL (ref 8.7–10.5)
CHLORIDE SERPL-SCNC: 101 MMOL/L (ref 95–110)
CO2 SERPL-SCNC: 27 MMOL/L (ref 23–29)
CREAT SERPL-MCNC: 1 MG/DL (ref 0.5–1.4)
DIFFERENTIAL METHOD: ABNORMAL
EOSINOPHIL # BLD AUTO: 0.2 K/UL (ref 0–0.5)
EOSINOPHIL NFR BLD: 1.8 % (ref 0–8)
ERYTHROCYTE [DISTWIDTH] IN BLOOD BY AUTOMATED COUNT: 13.6 % (ref 11.5–14.5)
EST. GFR  (AFRICAN AMERICAN): >60 ML/MIN/1.73 M^2
EST. GFR  (NON AFRICAN AMERICAN): >60 ML/MIN/1.73 M^2
GLUCOSE SERPL-MCNC: 103 MG/DL (ref 70–110)
HCT VFR BLD AUTO: 37 % (ref 40–54)
HGB BLD-MCNC: 12.1 G/DL (ref 14–18)
IMM GRANULOCYTES # BLD AUTO: 0.22 K/UL (ref 0–0.04)
IMM GRANULOCYTES NFR BLD AUTO: 1.7 % (ref 0–0.5)
LYMPHOCYTES # BLD AUTO: 1.8 K/UL (ref 1–4.8)
LYMPHOCYTES NFR BLD: 13.4 % (ref 18–48)
MAGNESIUM SERPL-MCNC: 2.4 MG/DL (ref 1.6–2.6)
MCH RBC QN AUTO: 30.3 PG (ref 27–31)
MCHC RBC AUTO-ENTMCNC: 32.7 G/DL (ref 32–36)
MCV RBC AUTO: 93 FL (ref 82–98)
MONOCYTES # BLD AUTO: 0.8 K/UL (ref 0.3–1)
MONOCYTES NFR BLD: 5.7 % (ref 4–15)
NEUTROPHILS # BLD AUTO: 10.2 K/UL (ref 1.8–7.7)
NEUTROPHILS NFR BLD: 77.1 % (ref 38–73)
NRBC BLD-RTO: 0 /100 WBC
PHOSPHATE SERPL-MCNC: 3 MG/DL (ref 2.7–4.5)
PLATELET # BLD AUTO: 343 K/UL (ref 150–450)
PLATELET BLD QL SMEAR: ABNORMAL
PMV BLD AUTO: 10 FL (ref 9.2–12.9)
POTASSIUM SERPL-SCNC: 3.7 MMOL/L (ref 3.5–5.1)
PROT SERPL-MCNC: 5.8 G/DL (ref 6–8.4)
RBC # BLD AUTO: 3.99 M/UL (ref 4.6–6.2)
SODIUM SERPL-SCNC: 138 MMOL/L (ref 136–145)
WBC # BLD AUTO: 13.22 K/UL (ref 3.9–12.7)

## 2022-01-10 PROCEDURE — 63600175 PHARM REV CODE 636 W HCPCS: Performed by: HOSPITALIST

## 2022-01-10 PROCEDURE — 12000002 HC ACUTE/MED SURGE SEMI-PRIVATE ROOM

## 2022-01-10 PROCEDURE — 63600175 PHARM REV CODE 636 W HCPCS: Performed by: SURGERY

## 2022-01-10 PROCEDURE — 36415 COLL VENOUS BLD VENIPUNCTURE: CPT | Performed by: SURGERY

## 2022-01-10 PROCEDURE — 85025 COMPLETE CBC W/AUTO DIFF WBC: CPT | Performed by: SURGERY

## 2022-01-10 PROCEDURE — 83735 ASSAY OF MAGNESIUM: CPT | Performed by: SURGERY

## 2022-01-10 PROCEDURE — 25000003 PHARM REV CODE 250: Performed by: NURSE PRACTITIONER

## 2022-01-10 PROCEDURE — 80053 COMPREHEN METABOLIC PANEL: CPT | Performed by: SURGERY

## 2022-01-10 PROCEDURE — 84100 ASSAY OF PHOSPHORUS: CPT | Performed by: SURGERY

## 2022-01-10 PROCEDURE — 25000003 PHARM REV CODE 250: Performed by: HOSPITALIST

## 2022-01-10 PROCEDURE — 25000003 PHARM REV CODE 250: Performed by: SURGERY

## 2022-01-10 RX ADMIN — LEUCINE, PHENYLALANINE, LYSINE, METHIONINE, ISOLEUCINE, VALINE, HISTIDINE, THREONINE, TRYPTOPHAN, ALANINE, GLYCINE, ARGININE, PROLINE, SERINE, TYROSINE, SODIUM ACETATE, DIBASIC POTASSIUM PHOSPHATE, MAGNESIUM CHLORIDE, SODIUM CHLORIDE, CALCIUM CHLORIDE, DEXTROSE
311; 238; 247; 170; 255; 247; 204; 179; 77; 880; 438; 489; 289; 213; 17; 297; 261; 51; 77; 33; 5 INJECTION INTRAVENOUS at 04:01

## 2022-01-10 RX ADMIN — PIPERACILLIN AND TAZOBACTAM 3.38 G: 3; .375 INJECTION, POWDER, LYOPHILIZED, FOR SOLUTION INTRAVENOUS; PARENTERAL at 04:01

## 2022-01-10 RX ADMIN — PIPERACILLIN AND TAZOBACTAM 3.38 G: 3; .375 INJECTION, POWDER, LYOPHILIZED, FOR SOLUTION INTRAVENOUS; PARENTERAL at 01:01

## 2022-01-10 RX ADMIN — SENNOSIDES AND DOCUSATE SODIUM 1 TABLET: 50; 8.6 TABLET ORAL at 08:01

## 2022-01-10 RX ADMIN — PIPERACILLIN AND TAZOBACTAM 3.38 G: 3; .375 INJECTION, POWDER, LYOPHILIZED, FOR SOLUTION INTRAVENOUS; PARENTERAL at 08:01

## 2022-01-10 RX ADMIN — ENOXAPARIN SODIUM 40 MG: 40 INJECTION SUBCUTANEOUS at 04:01

## 2022-01-10 NOTE — PROGRESS NOTES
Former[facility]  General Surgery  Progress Note    Subjective:     History of Present Illness:  72 y/o with 48 hours of abdominal pain mainly on right associated with nausea and subjective fever.      Post-Op Info:  Procedure(s) (LRB):  APPENDECTOMY, LAPAROSCOPIC (N/A)   8 Days Post-Op     Interval History: had some flatus but not much    Medications:  Continuous Infusions:   amino acids 4.25%-qfcnc-Mz-B0L 100 mL/hr at 01/10/22 0433     Scheduled Meds:   enoxaparin  40 mg Subcutaneous Daily    oxybutynin  5 mg Oral Daily    piperacillin-tazobactam (ZOSYN) IVPB  3.375 g Intravenous Q8H    polyethylene glycol  17 g Oral Daily    senna-docusate 8.6-50 mg  1 tablet Oral BID     PRN Meds:acetaminophen, calcium chloride IVPB, calcium chloride IVPB, calcium chloride IVPB, HYDROcodone-acetaminophen, magnesium oxide, magnesium sulfate IVPB, magnesium sulfate IVPB, magnesium sulfate IVPB, magnesium sulfate IVPB, melatonin, morphine, naloxone, ondansetron, potassium chloride in water, potassium chloride in water, potassium chloride in water, potassium chloride in water, potassium chloride, potassium chloride, potassium chloride, potassium chloride, simethicone, sodium chloride 0.9%, sodium chloride 0.9%     Review of patient's allergies indicates:  No Known Allergies  Objective:     Vital Signs (Most Recent):  Temp: 97.9 °F (36.6 °C) (01/10/22 1230)  Pulse: (!) 56 (01/10/22 1230)  Resp: 18 (01/10/22 1230)  BP: 139/74 (01/10/22 1230)  SpO2: (!) 94 % (01/10/22 1230) Vital Signs (24h Range):  Temp:  [97.9 °F (36.6 °C)-98.2 °F (36.8 °C)] 97.9 °F (36.6 °C)  Pulse:  [56-65] 56  Resp:  [18-61] 18  SpO2:  [90 %-96 %] 94 %  BP: (108-164)/(66-82) 139/74     Weight: 75.1 kg (165 lb 9.1 oz)  Body mass index is 23.76 kg/m².    Intake/Output - Last 3 Shifts       01/08 0700  01/09 0659 01/09 0700  01/10 0659 01/10 0700  01/11 0659    I.V. (mL/kg)  1200 (16)     IV Piggyback  100     Total Intake(mL/kg)  1300 (17.3)     Urine  (mL/kg/hr) 300 (0.2) 700 (0.4)     Drains  180 100    Total Output 300 880 100    Net -300 +420 -100           Urine Occurrence  3 x           Physical Exam  Abdominal:      General: There is distension.      Palpations: Abdomen is soft.         Significant Labs:  I have reviewed all pertinent lab results within the past 24 hours.  CBC:   Recent Labs   Lab 01/10/22  0432   WBC 13.22*   RBC 3.99*   HGB 12.1*   HCT 37.0*      MCV 93   MCH 30.3   MCHC 32.7     BMP:   Recent Labs   Lab 01/10/22  0432         K 3.7      CO2 27   BUN 33*   CREATININE 1.0   CALCIUM 8.4*   MG 2.4       Significant Diagnostics:  I have reviewed all pertinent imaging results/findings within the past 24 hours.    Assessment/Plan:     * Acute appendicitis with perforation, localized peritonitis, and abscess, without gangrene  Distension improving  Having some flatus  Can try diet  Has abscess in pelvis on CT scan  Unable to drain today, they will try tomorrow        Sam Goss MD  General Surgery  [facility]

## 2022-01-10 NOTE — PROGRESS NOTES
"[facility]  Adult Nutrition   Progress Note (Follow-Up)    SUMMARY     Recommendations  1. Recommend continuing Clinimix at goal rate as tolerated by pt.   2. Recommend TPN if pt diet unable to be advanced.    Goals:  1. Clinimix to be restarted.  2. TPN to be considered.  Nutrition Goal Status: progressing towards goal  Communication of RD Recs: reviewed with physician    Dietitian Rounds Brief   F/U. Pt Clinimix currently being held 2/2 pt with no IV at this time per pt RN via secure chat.  Will continue to monitor and make recommendations accordingly.    Reason for Assessment  Reason For Assessment: RD follow-up  Diagnosis: cancer diagnosis/related complications,cardiac disease,diabetes diagnosis/complications,gastrointestinal disease,infection/sepsis,pulmonary disease,other (see comments) (Acute appendicitis with perforation, localized peritonitis, and abscess, without gangrene)  Relevant Medical History: Hypertension (HTN), coronary artery disease (CAD), anemia, pulmonary nodules, BPH (benign prostatic hyperplasia), acquired tricuspid valve insufficiency, borderline diabetes, acid reflux, skin cancer  Interdisciplinary Rounds: did not attend    Nutrition Risk Screen  Nutrition Risk Screen: tube feeding or parenteral nutrition     MST Score: 1  Have you recently lost weight without trying?: No  Weight loss score: 0  Have you been eating poorly because of a decreased appetite?: Yes  Appetite score: 1     Nutrition/Diet History  Spiritual, Cultural Beliefs, Yarsanism Practices, Values that Affect Care: no  Food Allergies: NKFA  Factors Affecting Nutritional Intake: NPO,abdominal pain,altered gastrointestinal function    Anthropometrics  Temp: 98 °F (36.7 °C)  Height Method: Stated  Height: 5' 10" (177.8 cm)  Height (inches): 70 in  Weight Method: Bed Scale  Weight: 75.1 kg (165 lb 9.1 oz)  Weight (lb): 165.57 lb  Ideal Body Weight (IBW), Male: 166 lb  % Ideal Body Weight, Male (lb): 99.74 %  BMI " (Calculated): 23.8  BMI Grade: 18.5-24.9 - normal     Weight History:  Wt Readings from Last 10 Encounters:   01/06/22 75.1 kg (165 lb 9.1 oz)   12/07/21 75.3 kg (166 lb)   12/07/21 75.3 kg (166 lb)   11/30/21 75.3 kg (166 lb 0.1 oz)   11/11/21 75.3 kg (166 lb)   10/11/21 75.3 kg (166 lb)   10/11/21 75.3 kg (166 lb)   09/24/21 75.3 kg (166 lb)   09/15/21 75.4 kg (166 lb 3.2 oz)   08/27/21 76.2 kg (168 lb)     Lab/Procedures/Meds: Pertinent Labs Reviewed    Clinical Chemistry:  Recent Labs   Lab 01/08/22  0714 01/08/22  0714 01/09/22  0555 01/09/22  1644 01/10/22  0432      < > 141  --  138   K 3.0*   < > 3.3*   < > 3.7   CL 97   < > 101  --  101   CO2 32*   < > 30*  --  27   *   < > 119*  --  103   BUN 48*   < > 47*  --  33*   CREATININE 1.2   < > 1.1  --  1.0   CALCIUM 8.5*   < > 8.3*  --  8.4*   PROT 6.4   < > 5.7*  --  5.8*   ALBUMIN 2.8*   < > 2.5*  --  2.6*   BILITOT 0.9   < > 0.8  --  0.7   ALKPHOS 57   < > 50*  --  50*   AST 35   < > 30  --  24   ALT 45*   < > 37  --  32   ANIONGAP 12   < > 10  --  10   ESTGFRAFRICA >60.0   < > >60.0  --  >60.0   EGFRNONAA >60.0   < > >60.0  --  >60.0   MG 2.6   < > 2.5  --  2.4   PHOS 3.0  --  3.7  --  3.0    < > = values in this interval not displayed.     CBC:   Recent Labs   Lab 01/10/22  0432   WBC 13.22*   RBC 3.99*   HGB 12.1*   HCT 37.0*      MCV 93   MCH 30.3   MCHC 32.7     Medications: Pertinent Medications reviewed    Scheduled Meds:   enoxaparin  40 mg Subcutaneous Daily    oxybutynin  5 mg Oral Daily    piperacillin-tazobactam (ZOSYN) IVPB  3.375 g Intravenous Q8H    polyethylene glycol  17 g Oral Daily    senna-docusate 8.6-50 mg  1 tablet Oral BID     Continuous Infusions:   amino acids 4.25%-fndci-Dn-D4G 100 mL/hr at 01/10/22 0433     PRN Meds:.acetaminophen, calcium chloride IVPB, calcium chloride IVPB, calcium chloride IVPB, HYDROcodone-acetaminophen, magnesium oxide, magnesium sulfate IVPB, magnesium sulfate IVPB, magnesium  sulfate IVPB, magnesium sulfate IVPB, melatonin, morphine, naloxone, ondansetron, potassium chloride in water, potassium chloride in water, potassium chloride in water, potassium chloride in water, potassium chloride, potassium chloride, potassium chloride, potassium chloride, simethicone, sodium chloride 0.9%, sodium chloride 0.9%    Estimated/Assessed Needs  Weight Used For Calorie Calculations: 75.1 kg (165 lb 9.1 oz)  Energy Calorie Requirements (kcal): 6730-5972 kcals/day (25-30 kcals/kg ABW)  Energy Need Method: Kcal/kg  Protein Requirements:  g/day (1.2-1.5 g/kg ABW)  Weight Used For Protein Calculations: 75.1 kg (165 lb 9.1 oz)  Fluid Requirements (mL): 1 ml/kcal or per MD  Estimated Fluid Requirement Method: RDA Method  RDA Method (mL): 1878     Nutrition Prescription Ordered  Current Diet Order: NPO    Evaluation of Received Nutrient/Fluid Intake  Energy Calories Required: not meeting needs  Protein Required: meeting needs  Fluid Required: meeting needs  Tolerance: tolerating     Intake/Output Summary (Last 24 hours) at 1/10/2022 0924  Last data filed at 1/10/2022 0409  Gross per 24 hour   Intake 1300 ml   Output 880 ml   Net 420 ml      Nutrition Risk  Level of Risk/Frequency of Follow-up: high     Monitor and Evaluation  Food and Nutrient Intake: parenteral nutrition intake  Food and Nutrient Adminstration: enteral and parenteral nutrition administration  Knowledge/Beliefs/Attitudes: food and nutrition knowledge/skill,beliefs and attitudes  Physical Activity and Function: nutrition-related ADLs and IADLs  Anthropometric Measurements: weight,weight change,body mass index  Biochemical Data, Medical Tests and Procedures: lipid profile,inflammatory profile,glucose/endocrine profile,gastrointestinal profile,electrolyte and renal panel  Nutrition-Focused Physical Findings: overall appearance     Nutrition Follow-Up  RD Follow-up?: Yes   Radha Correa, NAS  01/10/2022, 11:32 AM

## 2022-01-10 NOTE — PT/OT/SLP PROGRESS
"Physical Therapy      Patient Name:  Onesimo Paula   MRN:  [id number]    Patient not seen today secondary to Other (Comment) (Pt just getting to eat his lunch after 2 pm and reported "it's my first real meal since surgery."). Will follow-up 1/11/22.        "

## 2022-01-10 NOTE — PLAN OF CARE
Pt discussed this date in MD / CM Rounds, and recommendation for discharge is Home with HH and RW.  Met with Pt at bedside and he denies any current needs.  White board [facility], and [facility]  Will continue to follow.     01/10/22 6575   Discharge Reassessment   Assessment Type Discharge Planning Reassessment   Did the patient's condition or plan change since previous assessment? No   Discharge Plan discussed with: Patient   Communicated RON with patient/caregiver Date not available/Unable to determine   Discharge Plan A Home Health   Discharge Plan B Home   DME Needed Upon Discharge  walker, rolling   Discharge Barriers Identified None   Why the patient remains in the hospital Requires continued medical care   Post-Acute Status   Post-Acute Authorization Home Health;HME   HME Status   (Awaiting Orders)   Home Health Status   (Awaiting Orders)   Discharge Delays None known at this time

## 2022-01-10 NOTE — SUBJECTIVE & OBJECTIVE
Interval History: had some flatus but not much    Medications:  Continuous Infusions:   amino acids 4.25%-clpqe-Uz-V9X 100 mL/hr at 01/10/22 0433     Scheduled Meds:   enoxaparin  40 mg Subcutaneous Daily    oxybutynin  5 mg Oral Daily    piperacillin-tazobactam (ZOSYN) IVPB  3.375 g Intravenous Q8H    polyethylene glycol  17 g Oral Daily    senna-docusate 8.6-50 mg  1 tablet Oral BID     PRN Meds:acetaminophen, calcium chloride IVPB, calcium chloride IVPB, calcium chloride IVPB, HYDROcodone-acetaminophen, magnesium oxide, magnesium sulfate IVPB, magnesium sulfate IVPB, magnesium sulfate IVPB, magnesium sulfate IVPB, melatonin, morphine, naloxone, ondansetron, potassium chloride in water, potassium chloride in water, potassium chloride in water, potassium chloride in water, potassium chloride, potassium chloride, potassium chloride, potassium chloride, simethicone, sodium chloride 0.9%, sodium chloride 0.9%     Review of patient's allergies indicates:  No Known Allergies  Objective:     Vital Signs (Most Recent):  Temp: 97.9 °F (36.6 °C) (01/10/22 1230)  Pulse: (!) 56 (01/10/22 1230)  Resp: 18 (01/10/22 1230)  BP: 139/74 (01/10/22 1230)  SpO2: (!) 94 % (01/10/22 1230) Vital Signs (24h Range):  Temp:  [97.9 °F (36.6 °C)-98.2 °F (36.8 °C)] 97.9 °F (36.6 °C)  Pulse:  [56-65] 56  Resp:  [18-61] 18  SpO2:  [90 %-96 %] 94 %  BP: (108-164)/(66-82) 139/74     Weight: 75.1 kg (165 lb 9.1 oz)  Body mass index is 23.76 kg/m².    Intake/Output - Last 3 Shifts       01/08 0700  01/09 0659 01/09 0700  01/10 0659 01/10 0700  01/11 0659    I.V. (mL/kg)  1200 (16)     IV Piggyback  100     Total Intake(mL/kg)  1300 (17.3)     Urine (mL/kg/hr) 300 (0.2) 700 (0.4)     Drains  180 100    Total Output 300 880 100    Net -300 +420 -100           Urine Occurrence  3 x           Physical Exam  Abdominal:      General: There is distension.      Palpations: Abdomen is soft.         Significant Labs:  I have reviewed all pertinent lab  results within the past 24 hours.  CBC:   Recent Labs   Lab 01/10/22  0432   WBC 13.22*   RBC 3.99*   HGB 12.1*   HCT 37.0*      MCV 93   MCH 30.3   MCHC 32.7     BMP:   Recent Labs   Lab 01/10/22  0432         K 3.7      CO2 27   BUN 33*   CREATININE 1.0   CALCIUM 8.4*   MG 2.4       Significant Diagnostics:  I have reviewed all pertinent imaging results/findings within the past 24 hours.

## 2022-01-10 NOTE — PROGRESS NOTES
[facility] Medicine  Progress Note    Patient Name: Onesimo Paula  MRN: [id number]  Patient Class: IP- Inpatient   Admission Date: 1/2/2022  Length of Stay: 7 days  Attending Physician: Zoey Iglesias MD  Primary Care Provider: Reynaldo Rahman, APRN,FNP-C        Subjective:     Principal Problem:Acute appendicitis with perforation, localized peritonitis, and abscess, without gangrene        HPI:  71-year-old gentleman with prior history of hypertension, BPH, GERD presented with chief complaint of abdominal pain and nausea.  Upon further asking patient mentioned he was in his usual state of health until 2 days ago when he started experiencing upper abdominal pain which is more localized in right lower abdomen now.  He also endorses nausea but no vomiting, subjective feeling of chills but no fever.  His last bowel movement was 2 days ago.  Otherwise denies any fever, headache, dizziness, chest pain, palpitations, cough, dysuria.  Remote history of hernia surgery. Adm with Acute appendicitis with localized peritonitis      Overview/Hospital Course:  Laperscopic appendectomy 1/2/22- POD #6.     1/8/22- POD #6.Post op ileus  Some signs it is improving. ngt is out 1/7/22 with no nausea. Resting in bed w/o distress, no c/o. No BM since 12/31/22 GI consulted. Will order suppository. Not active enough, will consult PT and stressed to patient importance of activity with ileus.     1/9/22- POD #7 Post op ileus  Looks better today. Reports some flatus, no BM (suppository given yesterday) or nausea, pain controlled. No significant change in KUB. PT walked him yesterday and pt reported walking 4 more times with his spouse in the halls. Johnnie x1 remains with minimal op.       1/10  patient sitting comfortable on bed, afebrile denies any abdominal pain, has good bowel sound, will try liquid diet and advance , NPO from [place] for procedure    Physical Exam  Vitals and nursing note reviewed.   Constitutional:        General: He is not in acute distress.     Appearance: Normal appearance. He is well-developed, normal weight and well-nourished. He is not ill-appearing.   HENT:      Head: Normocephalic and atraumatic.      Mouth/Throat:      Mouth: Oropharynx is clear and moist.   Eyes:      General: No scleral icterus.     Conjunctiva/sclera: Conjunctivae normal.      Pupils: Pupils are equal, round, and reactive to light.   Neck:      Thyroid: No thyromegaly.   Cardiovascular:      Rate and Rhythm: Normal rate and regular rhythm.      Heart sounds: Normal heart sounds. No murmur heard.       Pulmonary:      Effort: Pulmonary effort is normal. No respiratory distress.      Breath sounds: Normal breath sounds.   Abdominal:      General: Bowel sounds are normal. There is left sided distension.      Palpations: Abdomen is soft.      Tenderness: There is abdominal tenderness.      Comments: Bowel sounds noted   Musculoskeletal:         General: No edema. Normal range of motion.      Cervical back: Normal range of motion and neck supple.      Right lower leg: No edema.      Left lower leg: No edema.   Skin:     General: Skin is warm.      Findings: No rash.      Comments: incisions approximated without drainage or redness   straw  colour  abdominal drain noted    Neurological:      Mental Status: He is alert and oriented to person, place, and time. Mental status is at baseline.      Sensory: No sensory deficit.      Motor: No abnormal muscle tone.   Psychiatric:         Mood and Affect: Mood and affect and mood normal.         Behavior: Behavior normal.         Thought Content: Thought content normal.         Judgment: Judgment normal.         Assessment/Plan:      * Acute appendicitis with perforation, localized peritonitis, and abscess, without gangrene  laparoscopic appendectomy 1/2/22  -POD#8  Ileus appears improved, will advance diet  -General Surgery following  -NGT d/c 1/7/22, denies nausea  -cont zosyn  Clinimix-E  4.25/5  -JPx1  -slightly more inc in WBC,no fever, will monitor  -CT abdomen showed Multiple rim-enhancing fluid collections identified in the abdomen and pelvis  On 1/9  -unable to drain by IR, scheduled to drain by sx 1/11, NPO from [place]       Other constipation        Postop Ileus-improved  -General Surgery following  -NGT d/c 1/7/22, denies nausea  -pt reports some flatus over past 24 h        CAROL ANN (acute kidney injury)  Resolved      Essential hypertension  -chronic but controlled, will monitor  -hold all antihypertensives for now      VTE Risk Mitigation (From admission, onward)         Ordered     enoxaparin injection 40 mg  Daily         01/02/22 1242     IP VTE HIGH RISK PATIENT  Once         01/02/22 1242     Place sequential compression device  Until discontinued         01/02/22 1242                Discharge Planning   RON: 1/9/2022     Code Status: Full Code   Is the patient medically ready for discharge?:     Reason for patient still in hospital (select all that apply): Patient trending condition  Discharge Plan A: Home          -clinimix reordered  -potassium to be replaced  -continue with inc TERI Iglesias MD  Department of Hospital Medicine   Critical access hospital

## 2022-01-11 PROBLEM — N17.9 AKI (ACUTE KIDNEY INJURY): Status: RESOLVED | Noted: 2022-01-04 | Resolved: 2022-01-11

## 2022-01-11 PROBLEM — K59.09 OTHER CONSTIPATION: Status: RESOLVED | Noted: 2022-01-08 | Resolved: 2022-01-11

## 2022-01-11 PROBLEM — T81.43XA POSTPROCEDURAL INTRAABDOMINAL ABSCESS: Status: ACTIVE | Noted: 2022-01-11

## 2022-01-11 PROBLEM — E87.6 HYPOKALEMIA: Status: ACTIVE | Noted: 2022-01-11

## 2022-01-11 PROBLEM — K65.1 POSTPROCEDURAL INTRAABDOMINAL ABSCESS: Status: ACTIVE | Noted: 2022-01-11

## 2022-01-11 PROBLEM — E44.0 MALNUTRITION OF MODERATE DEGREE: Status: ACTIVE | Noted: 2022-01-11

## 2022-01-11 PROBLEM — K56.7 ILEUS: Status: RESOLVED | Noted: 2022-01-05 | Resolved: 2022-01-11

## 2022-01-11 PROBLEM — E87.6 HYPOKALEMIA: Status: RESOLVED | Noted: 2022-01-11 | Resolved: 2022-01-11

## 2022-01-11 PROBLEM — D72.829 LEUCOCYTOSIS: Status: ACTIVE | Noted: 2022-01-11

## 2022-01-11 LAB
ALBUMIN SERPL BCP-MCNC: 2.4 G/DL (ref 3.5–5.2)
ALP SERPL-CCNC: 50 U/L (ref 55–135)
ALT SERPL W/O P-5'-P-CCNC: 21 U/L (ref 10–44)
ANION GAP SERPL CALC-SCNC: 8 MMOL/L (ref 8–16)
APPEARANCE FLD: NORMAL
APTT PPP: 28.3 SEC (ref 23.3–35.1)
AST SERPL-CCNC: 28 U/L (ref 10–40)
BASOPHILS # BLD AUTO: 0.04 K/UL (ref 0–0.2)
BASOPHILS NFR BLD: 0.3 % (ref 0–1.9)
BILIRUB SERPL-MCNC: 1.3 MG/DL (ref 0.1–1)
BODY FLD TYPE: NORMAL
BUN SERPL-MCNC: 30 MG/DL (ref 8–23)
CALCIUM SERPL-MCNC: 8 MG/DL (ref 8.7–10.5)
CHLORIDE SERPL-SCNC: 106 MMOL/L (ref 95–110)
CO2 SERPL-SCNC: 24 MMOL/L (ref 23–29)
COLOR FLD: YELLOW
CREAT SERPL-MCNC: 1 MG/DL (ref 0.5–1.4)
DIFFERENTIAL METHOD: ABNORMAL
EOSINOPHIL # BLD AUTO: 0.3 K/UL (ref 0–0.5)
EOSINOPHIL NFR BLD: 1.8 % (ref 0–8)
ERYTHROCYTE [DISTWIDTH] IN BLOOD BY AUTOMATED COUNT: 13.6 % (ref 11.5–14.5)
EST. GFR  (AFRICAN AMERICAN): >60 ML/MIN/1.73 M^2
EST. GFR  (NON AFRICAN AMERICAN): >60 ML/MIN/1.73 M^2
GLUCOSE SERPL-MCNC: 108 MG/DL (ref 70–110)
HCT VFR BLD AUTO: 35.7 % (ref 40–54)
HGB BLD-MCNC: 11.7 G/DL (ref 14–18)
IMM GRANULOCYTES # BLD AUTO: 0.17 K/UL (ref 0–0.04)
IMM GRANULOCYTES NFR BLD AUTO: 1.2 % (ref 0–0.5)
INR PPP: 1.1
LYMPHOCYTES # BLD AUTO: 1.7 K/UL (ref 1–4.8)
LYMPHOCYTES NFR BLD: 12 % (ref 18–48)
LYMPHOCYTES NFR FLD MANUAL: 31 %
MAGNESIUM SERPL-MCNC: 2.4 MG/DL (ref 1.6–2.6)
MCH RBC QN AUTO: 30.2 PG (ref 27–31)
MCHC RBC AUTO-ENTMCNC: 32.8 G/DL (ref 32–36)
MCV RBC AUTO: 92 FL (ref 82–98)
MONOCYTES # BLD AUTO: 0.9 K/UL (ref 0.3–1)
MONOCYTES NFR BLD: 6.3 % (ref 4–15)
NEUTROPHILS # BLD AUTO: 11.1 K/UL (ref 1.8–7.7)
NEUTROPHILS NFR BLD: 78.4 % (ref 38–73)
NEUTROPHILS NFR FLD MANUAL: 69 %
NRBC BLD-RTO: 0 /100 WBC
PHOSPHATE SERPL-MCNC: 2.8 MG/DL (ref 2.7–4.5)
PLATELET # BLD AUTO: 373 K/UL (ref 150–450)
PMV BLD AUTO: 9.9 FL (ref 9.2–12.9)
POTASSIUM SERPL-SCNC: 4.5 MMOL/L (ref 3.5–5.1)
PROT SERPL-MCNC: 5.7 G/DL (ref 6–8.4)
PROTHROMBIN TIME: 13.8 SEC (ref 11.4–13.7)
RBC # BLD AUTO: 3.88 M/UL (ref 4.6–6.2)
SODIUM SERPL-SCNC: 138 MMOL/L (ref 136–145)
WBC # BLD AUTO: 14.11 K/UL (ref 3.9–12.7)
WBC # FLD: NORMAL /CU MM

## 2022-01-11 PROCEDURE — 87206 SMEAR FLUORESCENT/ACID STAI: CPT | Performed by: INTERNAL MEDICINE

## 2022-01-11 PROCEDURE — 87075 CULTR BACTERIA EXCEPT BLOOD: CPT | Performed by: INTERNAL MEDICINE

## 2022-01-11 PROCEDURE — 85025 COMPLETE CBC W/AUTO DIFF WBC: CPT | Performed by: SURGERY

## 2022-01-11 PROCEDURE — 25000003 PHARM REV CODE 250: Performed by: INTERNAL MEDICINE

## 2022-01-11 PROCEDURE — 80053 COMPREHEN METABOLIC PANEL: CPT | Performed by: SURGERY

## 2022-01-11 PROCEDURE — 85730 THROMBOPLASTIN TIME PARTIAL: CPT | Performed by: INTERNAL MEDICINE

## 2022-01-11 PROCEDURE — 25000003 PHARM REV CODE 250: Performed by: RADIOLOGY

## 2022-01-11 PROCEDURE — 87118 MYCOBACTERIC IDENTIFICATION: CPT | Performed by: INTERNAL MEDICINE

## 2022-01-11 PROCEDURE — 36415 COLL VENOUS BLD VENIPUNCTURE: CPT | Performed by: SURGERY

## 2022-01-11 PROCEDURE — 83735 ASSAY OF MAGNESIUM: CPT | Performed by: SURGERY

## 2022-01-11 PROCEDURE — 85610 PROTHROMBIN TIME: CPT | Performed by: INTERNAL MEDICINE

## 2022-01-11 PROCEDURE — 89051 BODY FLUID CELL COUNT: CPT | Performed by: INTERNAL MEDICINE

## 2022-01-11 PROCEDURE — 97116 GAIT TRAINING THERAPY: CPT

## 2022-01-11 PROCEDURE — 87077 CULTURE AEROBIC IDENTIFY: CPT | Performed by: INTERNAL MEDICINE

## 2022-01-11 PROCEDURE — 84100 ASSAY OF PHOSPHORUS: CPT | Performed by: SURGERY

## 2022-01-11 PROCEDURE — 63600175 PHARM REV CODE 636 W HCPCS: Performed by: RADIOLOGY

## 2022-01-11 PROCEDURE — 87070 CULTURE OTHR SPECIMN AEROBIC: CPT | Performed by: INTERNAL MEDICINE

## 2022-01-11 PROCEDURE — 87116 MYCOBACTERIA CULTURE: CPT | Performed by: INTERNAL MEDICINE

## 2022-01-11 PROCEDURE — 63600175 PHARM REV CODE 636 W HCPCS: Performed by: HOSPITALIST

## 2022-01-11 PROCEDURE — 87186 SC STD MICRODIL/AGAR DIL: CPT | Performed by: INTERNAL MEDICINE

## 2022-01-11 PROCEDURE — 12000002 HC ACUTE/MED SURGE SEMI-PRIVATE ROOM

## 2022-01-11 PROCEDURE — 36415 COLL VENOUS BLD VENIPUNCTURE: CPT | Performed by: INTERNAL MEDICINE

## 2022-01-11 PROCEDURE — 87205 SMEAR GRAM STAIN: CPT | Performed by: INTERNAL MEDICINE

## 2022-01-11 PROCEDURE — 25000003 PHARM REV CODE 250: Performed by: HOSPITALIST

## 2022-01-11 PROCEDURE — 87102 FUNGUS ISOLATION CULTURE: CPT | Performed by: INTERNAL MEDICINE

## 2022-01-11 PROCEDURE — 63600175 PHARM REV CODE 636 W HCPCS: Performed by: SURGERY

## 2022-01-11 RX ORDER — ASPIRIN 81 MG/1
81 TABLET ORAL DAILY
Status: DISCONTINUED | OUTPATIENT
Start: 2022-01-11 | End: 2022-01-13 | Stop reason: HOSPADM

## 2022-01-11 RX ORDER — SODIUM CHLORIDE 9 MG/ML
INJECTION, SOLUTION INTRAVENOUS
Status: COMPLETED | OUTPATIENT
Start: 2022-01-11 | End: 2022-01-11

## 2022-01-11 RX ORDER — ATORVASTATIN CALCIUM 40 MG/1
40 TABLET, FILM COATED ORAL NIGHTLY
Status: DISCONTINUED | OUTPATIENT
Start: 2022-01-11 | End: 2022-01-13 | Stop reason: HOSPADM

## 2022-01-11 RX ORDER — FENTANYL CITRATE 50 UG/ML
INJECTION, SOLUTION INTRAMUSCULAR; INTRAVENOUS CODE/TRAUMA/SEDATION MEDICATION
Status: COMPLETED | OUTPATIENT
Start: 2022-01-11 | End: 2022-01-11

## 2022-01-11 RX ORDER — PANTOPRAZOLE SODIUM 40 MG/1
40 TABLET, DELAYED RELEASE ORAL DAILY
Refills: 0 | Status: DISCONTINUED | OUTPATIENT
Start: 2022-01-12 | End: 2022-01-13 | Stop reason: HOSPADM

## 2022-01-11 RX ORDER — AMLODIPINE BESYLATE 5 MG/1
10 TABLET ORAL DAILY
Status: DISCONTINUED | OUTPATIENT
Start: 2022-01-11 | End: 2022-01-13

## 2022-01-11 RX ORDER — MIDAZOLAM HYDROCHLORIDE 1 MG/ML
INJECTION INTRAMUSCULAR; INTRAVENOUS CODE/TRAUMA/SEDATION MEDICATION
Status: COMPLETED | OUTPATIENT
Start: 2022-01-11 | End: 2022-01-11

## 2022-01-11 RX ORDER — AMOXICILLIN 250 MG
2 CAPSULE ORAL DAILY
Status: DISCONTINUED | OUTPATIENT
Start: 2022-01-11 | End: 2022-01-13 | Stop reason: HOSPADM

## 2022-01-11 RX ADMIN — PIPERACILLIN AND TAZOBACTAM 3.38 G: 3; .375 INJECTION, POWDER, LYOPHILIZED, FOR SOLUTION INTRAVENOUS; PARENTERAL at 10:01

## 2022-01-11 RX ADMIN — PIPERACILLIN AND TAZOBACTAM 3.38 G: 3; .375 INJECTION, POWDER, LYOPHILIZED, FOR SOLUTION INTRAVENOUS; PARENTERAL at 02:01

## 2022-01-11 RX ADMIN — ENOXAPARIN SODIUM 40 MG: 40 INJECTION SUBCUTANEOUS at 04:01

## 2022-01-11 RX ADMIN — SODIUM CHLORIDE 250 ML/HR: 900 INJECTION, SOLUTION INTRAVENOUS at 12:01

## 2022-01-11 RX ADMIN — ATORVASTATIN CALCIUM 40 MG: 40 TABLET, FILM COATED ORAL at 10:01

## 2022-01-11 RX ADMIN — MIDAZOLAM HYDROCHLORIDE 2 MG: 1 INJECTION, SOLUTION INTRAMUSCULAR; INTRAVENOUS at 12:01

## 2022-01-11 RX ADMIN — FENTANYL CITRATE 50 MCG: 50 INJECTION INTRAMUSCULAR; INTRAVENOUS at 12:01

## 2022-01-11 RX ADMIN — PIPERACILLIN AND TAZOBACTAM 3.38 G: 3; .375 INJECTION, POWDER, LYOPHILIZED, FOR SOLUTION INTRAVENOUS; PARENTERAL at 04:01

## 2022-01-11 RX ADMIN — LEUCINE, PHENYLALANINE, LYSINE, METHIONINE, ISOLEUCINE, VALINE, HISTIDINE, THREONINE, TRYPTOPHAN, ALANINE, GLYCINE, ARGININE, PROLINE, SERINE, TYROSINE, SODIUM ACETATE, DIBASIC POTASSIUM PHOSPHATE, MAGNESIUM CHLORIDE, SODIUM CHLORIDE, CALCIUM CHLORIDE, DEXTROSE
311; 238; 247; 170; 255; 247; 204; 179; 77; 880; 438; 489; 289; 213; 17; 297; 261; 51; 77; 33; 5 INJECTION INTRAVENOUS at 10:01

## 2022-01-11 NOTE — PROGRESS NOTES
[facility]  Progress Note    Patient Name: Onesimo Paula  MRN: [id number]  Patient Class: IP- Inpatient   Admission Date: 1/2/2022  Length of Stay: 8 days  Attending Physician: Rosalinda Coleman MD  Primary Care Provider: Reynaldo Rahman, JAMES,FNP-C        Subjective:     Principal Problem:Postprocedural intraabdominal abscess        HPI:  71-year-old gentleman with prior history of hypertension, BPH, GERD presented with chief complaint of abdominal pain and nausea.  Upon further asking patient mentioned he was in his usual state of health until 2 days ago when he started experiencing upper abdominal pain which is more localized in right lower abdomen now.  He also endorses nausea but no vomiting, subjective feeling of chills but no fever.  His last bowel movement was 2 days ago.  Otherwise denies any fever, headache, dizziness, chest pain, palpitations, cough, dysuria.  Remote history of hernia surgery. Adm with Acute appendicitis with localized peritonitis      Overview/Hospital Course:  Laperscopic appendectomy 1/2/22- POD #6.     1/8/22- POD #6.Post op ileus  Some signs it is improving. ngt is out 1/7/22 with no nausea. Resting in bed w/o distress, no c/o. No BM since 12/31/22 GI consulted. Will order suppository. Not active enough, will consult PT and stressed to patient importance of activity with ileus.     1/9/22- POD #7 Post op ileus  Looks better today. Reports some flatus, no BM (suppository given yesterday) or nausea, pain controlled. No significant change in KUB. PT walked him yesterday and pt reported walking 4 more times with his spouse in the halls. Johnnie x1 remains with minimal op.     Assumed care of this patient on 01/11/22. Chart reviewed. Patient with prolonged hospital admission, admitted 1/2/22 with 2 day history of abdominal pain associated with nausea and subjective fever, CT with concern for acute appendicitis, he was taken to the OR on 1/2 by Dr. Goss,  intraop findings of acute appendicitis with perforation and abscess, status post laparoscopic appendectomy, he was placed on empiric antibiotics.  Complicated postop course with ileus requiring NG tube placement on 01/04, removed 1/7, acute kidney injury and slow recovery. Repeat CT 1/9 with multiple intra-abdominal rim enhancing fluid collection, largest 6.8 x 4 0.6 cm, distended gallbladder, he did have return of GI function.  On 1/11, awaiting IR drainage of abscess, cultures ordered, on empiric Zosyn.      Interval History:  See hospital course.  States yesterday he did have regular food, denies any current abdominal pain , nausea , states he is passing small amount of flatus, had 2 small liquid bowel movements yesterday.  He is currently NPO for procedure.  States he has chronic shortness of breath for years without any recent change.  Not using incentive spirometer currently.  Afebrile, blood pressure is improved.  Labs with BUN/creatinine 30/1, albumin 2.4.  Previous echocardiogram with EF of 68%.  CT from 01/09 reviewed.  Plan of care discussed with patient.  No visitors at bedside.      Review of Systems   Constitutional: Negative for fever.   Respiratory: Positive for shortness of breath (chronic for years as per patient).    Cardiovascular: Negative for chest pain and leg swelling.   Gastrointestinal: Negative for abdominal pain and nausea.   Genitourinary: Negative for difficulty urinating.   Psychiatric/Behavioral: Negative for confusion.     Objective:     Vital Signs (Most Recent):  Temp: 97.6 °F (36.4 °C) (01/11/22 0728)  Pulse: (!) 59 (01/11/22 0728)  Resp: 18 (01/11/22 0728)  BP: (!) 140/73 (01/11/22 0728)  SpO2: (!) 90 % (01/11/22 0728) Vital Signs (24h Range):  Temp:  [97.6 °F (36.4 °C)-98.1 °F (36.7 °C)] 97.6 °F (36.4 °C)  Pulse:  [56-69] 59  Resp:  [17-18] 18  SpO2:  [90 %-98 %] 90 %  BP: (135-149)/(57-86) 140/73     Weight: 75.1 kg (165 lb 9.1 oz)  Body mass index is 23.76  kg/m².    Intake/Output Summary (Last 24 hours) at 1/11/2022 0831  Last data filed at 1/10/2022 1800  Gross per 24 hour   Intake 1070 ml   Output 725 ml   Net 345 ml      Physical Exam  Vitals and nursing note reviewed.   HENT:      Head: Normocephalic and atraumatic.      Mouth/Throat:      Mouth: Mucous membranes are moist.   Cardiovascular:      Rate and Rhythm: Normal rate and regular rhythm.      Comments: No significant LE edema  Pulmonary:      Comments: On room air, inspiratory crackles bibasilarly, left greater than right  Abdominal:      Comments: Slightly distended, tenderness to palpation bilateral lower abdomen, left lower quadrant drain with serous output, bowel sounds appreciated, dressing over laparoscopic incisions   Genitourinary:     Comments: No jean  Skin:     Comments: Right upper extremity PICC line in place   Neurological:      Mental Status: He is alert and oriented to person, place, and time.   Psychiatric:         Mood and Affect: Mood normal.         Significant Labs:   Blood Culture: No results for input(s): LABBLOO in the last 48 hours.  BMP:   Recent Labs   Lab 01/11/22 0438         K 4.5      CO2 24   BUN 30*   CREATININE 1.0   CALCIUM 8.0*   MG 2.4     CBC:   Recent Labs   Lab 01/10/22  0432 01/11/22 0438   WBC 13.22* 14.11*   HGB 12.1* 11.7*   HCT 37.0* 35.7*    373     CMP:   Recent Labs   Lab 01/09/22  1644 01/10/22  0432 01/11/22 0438   NA  --  138 138   K 3.6 3.7 4.5   CL  --  101 106   CO2  --  27 24   GLU  --  103 108   BUN  --  33* 30*   CREATININE  --  1.0 1.0   CALCIUM  --  8.4* 8.0*   PROT  --  5.8* 5.7*   ALBUMIN  --  2.6* 2.4*   BILITOT  --  0.7 1.3*   ALKPHOS  --  50* 50*   AST  --  24 28   ALT  --  32 21   ANIONGAP  --  10 8   EGFRNONAA  --  >60.0 >60.0     Cardiac Markers: No results for input(s): CKMB, MYOGLOBIN, BNP, TROPISTAT in the last 48 hours.  Lactic Acid: No results for input(s): LACTATE in the last 48 hours.  Magnesium:   Recent  Labs   Lab 01/10/22  0432 01/11/22  0438   MG 2.4 2.4     POCT Glucose: No results for input(s): POCTGLUCOSE in the last 48 hours.  Respiratory Culture: No results for input(s): GSRESP, RESPIRATORYC in the last 48 hours.  Troponin: No results for input(s): TROPONINI in the last 48 hours.  TSH:   Recent Labs   Lab 08/10/21  0752   TSH 3.270     Urine Culture: No results for input(s): LABURIN in the last 48 hours.  Urine Studies: No results for input(s): COLORU, APPEARANCEUA, PHUR, SPECGRAV, PROTEINUA, GLUCUA, KETONESU, BILIRUBINUA, OCCULTUA, NITRITE, UROBILINOGEN, LEUKOCYTESUR, RBCUA, WBCUA, BACTERIA, SQUAMEPITHEL, HYALINECASTS in the last 48 hours.    Invalid input(s): WRIGHTSUR    Significant Imaging: I have reviewed and interpreted all pertinent imaging results/findings within the past 24 hours.     X-Ray Abdomen Flat And Erect    Result Date: 1/9/2022  REASON: evaluate bowel gas pattern TECHNIQUE: AP abdominal radiograph with supine and erect views. COMPARISON: Abdominal radiograph January 8, 2022 FINDINGS: Gas dilated loops of small bowel with air-fluid levels again noted and are no significant change from previous exam. Stool in the colon is again identified. Right-sided surgical drain again noted. Lung bases are clear. No acute osseous abnormality. IMPRESSION: 1.  No significant change of multiple gas dilated loops of small bowel. 2.  Right abdomen surgical drain again noted. Electronically signed by:  Rakesh Artis DO  1/9/2022 8:08 AM CST Workstation: [id number]    CT Abdomen Pelvis With Contrast    Result Date: 1/9/2022  CMS MANDATED QUALITY DATA - CT RADIATION - 436 All CT scans at this facility utilize dose modulation, iterative reconstruction, and/or weight based dosing when appropriate to reduce radiation dose to as low as reasonably achievable. Reason: Abdominal pain, post-op TECHNIQUE: CT abdomen and pelvis with 100 mL Omnipaque 350. COMPARISON: CT abdomen pelvis January 2, 2022. FINDINGS: There is  a small right pleural effusion with overlying compressive atelectasis. There is left basilar subsegmental atelectasis. Heart size is normal. Liver is normal size. Hepatic cyst again noted noted and is unchanged. No new liver lesions. The gallbladder is distended. Common bile duct and cystic duct are grossly unremarkable. The pancreas, spleen and adrenal glands are unchanged. The kidneys are unchanged with bilateral renal cyst again observed. No hydronephrosis. The ureters are normal caliber without obstructions. The bladder is fluid-filled. There is gas in the nondependent bladder. There is a rim-enhancing fluid collection the pelvis measuring 6.8 x 4.6 cm. Small rim-enhancing fluid collection noted in the central mesenteric fat measuring 2.6 x 1.3 cm (series 3 image 157). There is also a rim-enhancing fluid collection along the antimesenteric border of the cecum near a surgical drain measuring 1.8 x 1.4 cm (series 3 image 168). Rim-enhancing fluid collection along the anterior margin of right surgical drain measuring 7.5 x 1.5 cm (series 3 image 185). Small focal rim-enhancing fluid collection noted in the anterior mesenteric fat just inferior to the transverse colon measuring approximately 3.6 cm x 1.0 cm (series 3 image 141). Another rim-enhancing fluid collection identified in the abdomen right lower quadrant (series 3 image 201). There is also free fluid in the left and right pericolic gutters. The appendix is been removed and there appears to be suture material in the right lower quadrant.. Large and small bowel are normal caliber. There is fluid in the large bowel. Oral contrast noted in the large bowel. No pneumoperitoneum observed. The stomach is grossly unremarkable. Abdominal aorta is unchanged. Vascular atherosclerosis again noted. There is no intra-abdominal lymphadenopathy. Postsurgical changes of the ventral abdominal wall noted. No acute osseous abnormality. IMPRESSION: 1.  Multiple rim-enhancing  fluid collections identified in the abdomen and pelvis as described, felt to reflect abscesses. Largest fluid collections in the pelvis and measures 6.8 x 4.6 cm. 2.  There is also free fluid in the left and less so right pericolic gutters. 3.  Status post cholecystectomy. No definite pneumoperitoneum observed. 4.  Small right pleural effusion with overlying compressive atelectasis. 5.  Additional observations as described. Electronically signed by:  Rakesh Artis DO  1/9/2022 5:53 PM CST Workstation: [id number]    CT Abdomen Pelvis With Contrast    Result Date: 1/2/2022  All CT scans at this facility used dose modulation, iterative reconstruction and/or weight-based dosing when appropriate to reduce radiation doses  as low as reasonably achievable. HISTORY: Abdominal abscess/infection suspected; RLQ abdominal pain (Age >= 14y) FINDINGS: Axial postcontrast imaging was performed with 100 mL Omnipaque 350 IV contrast . CT ABDOMEN: There is atelectasis in the lung bases. There is a small hiatal hernia. There is no pericardial effusion. The liver is hypodense compatible with fatty infiltration. There is a 3.5 cm cyst within the right lobe of the liver. There is no biliary duct dilatation. The spleen, pancreas, gallbladder and adrenal glands are normal. The kidneys enhance symmetrically without hydronephrosis. There is a 3 mm nonobstructing left renal stone. There is a 1.9 cm right renal cyst. There are left parapelvic cyst. There is dilatation and wall thickening of the appendix with stranding in the adjacent fat suggestive of appendicitis. The wall thickening is most pronounced at the tip of the cecum and proximal appendix. There is no abscess or perforation. The remainder of the bowel is normal. There is a moderate amount of fecal material throughout the colon. There is sigmoid diverticulosis without diverticulitis. There is diffuse vascular calcification of the aorta. There is no mesenteric or retroperitoneal  adenopathy. There is mild leftward curvature of the lumbar spine. CT PELVIS: There is sigmoid diverticulosis without diverticulitis. The prostate gland is enlarged with central calcifications. There is no free fluid. IMPRESSION: Findings compatible with appendicitis with mild wall thickening of the tip of the cecum and proximal appendix there is no abscess or perforation Colonic diverticulosis Fatty infiltration of the liver 3 mm left renal stone with bilateral renal cysts Small hiatal hernia These findings were called to Lesley the patient's nurse in the emergency department at 9:20 AM Electronically signed by:  Nicole Navarro MD  1/2/2022 9:22 AM CST Workstation: [id number]    X-ray Abdomen for NG Tube Placement (Nursing should notify Radiology after placement)    Result Date: 1/4/2022  KUB CLINICAL DATA: Nasogastric tube placement FINDINGS: Supine view is compared to January 4 at 1311 hours. Nasogastric tube is been placed. The tip is located in the proximal stomach approximately 4 cm distal to the GE junction. There is persistent abnormal distention of small bowel loops throughout the abdomen, unchanged. No mass lesions or suspicious calcifications are identified. IMPRESSION: 1. Nasogastric tube extends to the proximal stomach. No other changes when compared to 1311 hours. Electronically signed by:  Kb Abdalla MD  1/4/2022 3:24 PM CST Workstation: [id number]    X-Ray KUB    Result Date: 1/8/2022  CLINICAL HISTORY: 71 years (1950) Male Ileus Ileus TECHNIQUE: XR ABDOMEN 1 VIEW (KUB).  2 image(s) obtained. COMPARISON: Radiograph from January 7, 2022. FINDINGS: Multiple prominent gas filled loops of small bowel are seen in the upper abdomen, the largest of which measures 5.2 cm in diameter, similar to the previous exam. There is a small volume of stool and gas in the colon. There is a drainage catheter along the right lower abdominal quadrant. No gross intra-abdominal free air or pneumatosis is  seen. Osseous structures show levoscoliosis of the lumbar spine. IMPRESSION: Unchanged radiograph of the abdomen, and findings compatible with ileus versus midgrade mechanical small bowel obstruction. . Electronically signed by:  Vel Vigil MD  1/8/2022 8:25 AM CST Workstation: [id number]    X-Ray KUB    Result Date: 1/7/2022  HISTORY: Small bowel obstruction, acute appendicitis with perforation. FINDINGS: Single abdominal radiograph obtained in 2 images at 0844 hours is compared to multiple prior exams, and shows surgical drain overlying the right lower quadrant. Multiple abnormally dilated loops of small bowel measuring up to 57 mm in diameter, with air in the stomach and no supine radiographic evidence of intraperitoneal free air. There are no suspicious calcifications overlying the kidneys or the ureters. No acute fractures or destructive osseous lesions. There are left upper quadrant splenic arterial vascular calcifications. IMPRESSION: Persistent ileus versus small bowel obstruction. Electronically signed by:  Juan Taylor MD  1/7/2022 10:05 AM CST Workstation: [id number]    X-Ray KUB    Result Date: 1/6/2022  KUB is compared to prior study dated 1/5/2022 Clinical history recent appendectomy There is an NG tube which has been advanced and the tip overlies the distal second portion of the duodenum. There is a dilated loop of small bowel in the upper mid abdomen measuring 4.1 cm. The colon is decompressed. There is a VICENTE drain within the right lower quadrant. No soft tissue mass or organomegaly. There are no acute osseous abnormalities. IMPRESSION: Dilated loop of small bowel in the upper midabdomen most likely representing postoperative ileus. The colon is decompressed. VICENTE drain in the right lower quadrant Electronically signed by:  Nicole Navarro MD  1/6/2022 10:51 AM CST Workstation: [id number]    X-Ray KUB    Result Date: 1/5/2022  REASON: Post op Ileus TECHNIQUE: AP abdominal radiograph. COMPARISON:  Abdominal radiograph January 4, 2022. FINDINGS: Multiple loops of gas dilated loops of bowel are again noted, mildly improved when compared with prior exam. Nasogastric tube is in place with tip below the gastroesophageal junction. Side-port are identified. Right lower quadrant surgical drain again noted. IMPRESSION: 1.  Mild improvement of dilated loops of bowel. 2.  Nasogastric tube in place with tip below the gastroesophageal junction. Electronically signed by:  Rakesh Artis DO  1/5/2022 11:52 AM CST Workstation: [id number]    X-Ray KUB    Result Date: 1/4/2022  HISTORY: Small bowel obstruction, acute appendicitis with perforation. FINDINGS: Single abdominal radiograph obtained in 2 images at 1311 hours is compared to CT of 01/02/2022, and shows surgical drain overlying the right lower quadrant. There are multiple abnormally dilated loops of small bowel measuring up to 5 cm in diameter, with air in the stomach and small to moderate volume of stool scattered throughout the colon. No evidence of intraperitoneal free air. There are no suspicious calcifications overlying the kidneys or the ureters. No acute fractures or destructive osseous lesions. There are left upper quadrant splenic arterial vascular calcifications. IMPRESSION: Multiple abnormally dilated loops of small bowel, suggesting postoperative ileus or small bowel obstruction. Electronically signed by:  Juan Taylor MD  1/4/2022 1:25 PM CST Workstation: [id number]      Echocardiogram   Summary    · The left ventricle is normal in size with moderate predominantly basal septal moderate asymmetric hypertrophy eccentric hypertrophy and normal systolic function. The estimated ejection fraction is 68%.  · Normal left ventricular diastolic function.  · Normal right ventricular size with normal right ventricular systolic function.  · Mild mitral regurgitation.  · Normal central venous pressure (3 mmHg).           Assessment/Plan:     Active Hospital Problems     Diagnosis    *Postprocedural intraabdominal abscess    Leucocytosis    Malnutrition of moderate degree    Acute appendicitis with perforation, localized peritonitis, and abscess, without gangrene    Dyspnea on exertion    Gastroesophageal reflux disease without esophagitis    Essential hypertension    Benign prostatic hypertrophy without urinary obstruction     8/2016: per U/s , called pt for treatment. start alpha-blocker. 9/2016: not helps per pt. d.c .  PSA wnl3/2017:    PSA wnl.  Dx updated per 2019 IMO Load      Mixed hyperlipidemia     total cholesterol 226, . HDL 41 in 3/2015. 10 year ASCVD Risk is 15 %.total cholesterol 160, LDL 95, HDL 42 in 7/2015.  on Lipitor since 4/2015. 8/2015:  d/c Lipitor 2/2 muscle cramps. Crestor 10 mg. ( start 5 mg first).   crestor sample was given to pt.9/2015: check lab 2 months later.  mild alternative shoulder pain. check cpk.11/2015: cut crestor to half . 2/2 CK high 308.  LDL 71.2/2015: CK decrease to 145, continue on Crestor 1/2 .5/2016: crestor 5 mg. total cholesterol 160, HDL 42, LDL 97.3/2017: tolerate crestor 5 mg. . declines increasing dosage of Crestor.      Anemia     5/2016: H/H  13.9 / 41. 2 . iron every orther day.8/2016: H/H  13.9/41.5       Plan:  Continue care on medical floor  NPO for IR drainage of intra-abdominal abscess/collection today, resume diet after procedure   Cultures ordered from anticipated fluid drainage/collection  Continue empiric IV Zosyn  Blood pressure up trending, resume home amlodipine, continue to hold hydrochlorothiazide and telmisartan, monitor and adjust as needed  Serial abdominal examination  Continue ordered p.r.n. pain control and antiemetic  Electrolytes sliding scale repletion  Increase activity, out of bed to chair, ambulation, needs to increase use of incentive spirometer  A.m. labs ordered  Appreciate all consultant's input  Further plan as per clinical course    VTE Risk Mitigation (From admission,  onward)         Ordered     enoxaparin injection 40 mg  Daily         01/02/22 1242     IP VTE HIGH RISK PATIENT  Once         01/02/22 1242     Place sequential compression device  Until discontinued         01/02/22 1242                Discharge Planning   RON:      Code Status: Full Code   Is the patient medically ready for discharge?:     Reason for patient still in hospital (select all that apply): Patient new problem  Discharge Plan A: Home Health   Discharge Delays: None known at this time              Rosalinda Coleman MD  Department of Hospital Medicine   [facility]

## 2022-01-11 NOTE — PLAN OF CARE
Pt tolerated drain insertion well 30 ml of cloudy yellow  drainage obtained ,vs stable no co pain no acute distress noted

## 2022-01-11 NOTE — PROGRESS NOTES
[facility]  Adult Nutrition   Progress Note (Nutrition Support Management)    SUMMARY     Recommendations  Recommendation/Intervention:   1) Ordered clinimix E at 75ml/hr since patient is NPO today and clear/full liquid diet do not provide adequate calories.   2) RD will monitor diet advancement, po intake/tolerance, labs, need for nutrition support and will make recs accordingly.    Goals: Ensure that 100% of kcal needs are met as well to allow patient to utilize protein for healing needs.  Nutrition Goal Status: progressing towards goal  Communication of RD Recs: reviewed with physician    Dietitian Rounds Brief  Patient NPO for procedure today. Midline placed yesterday. Diet was advanced to clear liquids yesterday and patient reports tolerating it, no c/o N/V. + flatus and 2 small bowel movements.    Reason for Assessment  Reason For Assessment: RD follow-up  Diagnosis: cancer diagnosis/related complications,cardiac disease,diabetes diagnosis/complications,gastrointestinal disease,infection/sepsis,pulmonary disease,other (see comments) (Acute appendicitis with perforation, localized peritonitis, and abscess, without gangrene)  Relevant Medical History: Hypertension (HTN), coronary artery disease (CAD), anemia, pulmonary nodules, BPH (benign prostatic hyperplasia), acquired tricuspid valve insufficiency, borderline diabetes, acid reflux, skin cancer  Interdisciplinary Rounds: did not attend    Nutrition Risk Screen  Nutrition Risk Screen: tube feeding or parenteral nutrition     MST Score: 1  Have you recently lost weight without trying?: No  Weight loss score: 0  Have you been eating poorly because of a decreased appetite?: Yes  Appetite score: 1       Nutrition/Diet History  Spiritual, Cultural Beliefs, Gnosticism Practices, Values that Affect Care: no  Food Allergies: NKFA  Factors Affecting Nutritional Intake: NPO,abdominal pain,altered gastrointestinal function    Anthropometrics  Temp: 97.6 °F  "(36.4 °C)  Height Method: Stated  Height: 5' 10" (177.8 cm)  Height (inches): 70 in  Weight Method: Bed Scale  Weight: 75.1 kg (165 lb 9.1 oz)  Weight (lb): 165.57 lb  Ideal Body Weight (IBW), Male: 166 lb  % Ideal Body Weight, Male (lb): 99.74 %  BMI (Calculated): 23.8  BMI Grade: 18.5-24.9 - normal       Weight History:  Wt Readings from Last 10 Encounters:   01/06/22 75.1 kg (165 lb 9.1 oz)   12/07/21 75.3 kg (166 lb)   12/07/21 75.3 kg (166 lb)   11/30/21 75.3 kg (166 lb 0.1 oz)   11/11/21 75.3 kg (166 lb)   10/11/21 75.3 kg (166 lb)   10/11/21 75.3 kg (166 lb)   09/24/21 75.3 kg (166 lb)   09/15/21 75.4 kg (166 lb 3.2 oz)   08/27/21 76.2 kg (168 lb)       Lab/Procedures/Meds: Pertinent Labs Reviewed    Clinical Chemistry:  Recent Labs   Lab 01/09/22  0555 01/09/22  1644 01/10/22  0432 01/10/22  0432 01/11/22  0438     --  138   < > 138   K 3.3*   < > 3.7   < > 4.5     --  101   < > 106   CO2 30*  --  27   < > 24   *  --  103   < > 108   BUN 47*  --  33*   < > 30*   CREATININE 1.1  --  1.0   < > 1.0   CALCIUM 8.3*  --  8.4*   < > 8.0*   PROT 5.7*  --  5.8*   < > 5.7*   ALBUMIN 2.5*  --  2.6*   < > 2.4*   BILITOT 0.8  --  0.7   < > 1.3*   ALKPHOS 50*  --  50*   < > 50*   AST 30  --  24   < > 28   ALT 37  --  32   < > 21   ANIONGAP 10  --  10   < > 8   ESTGFRAFRICA >60.0  --  >60.0   < > >60.0   EGFRNONAA >60.0  --  >60.0   < > >60.0   MG 2.5  --  2.4   < > 2.4   PHOS 3.7  --  3.0  --  2.8    < > = values in this interval not displayed.       CBC:   Recent Labs   Lab 01/11/22  0438   WBC 14.11*   RBC 3.88*   HGB 11.7*   HCT 35.7*      MCV 92   MCH 30.2   MCHC 32.8       Medications: Pertinent Medications reviewed    Scheduled Meds:   amLODIPine  10 mg Oral Daily    aspirin  81 mg Oral Daily    atorvastatin  40 mg Oral QHS    enoxaparin  40 mg Subcutaneous Daily    oxybutynin  5 mg Oral Daily    [START ON 1/12/2022] pantoprazole  40 mg Oral Daily    piperacillin-tazobactam (ZOSYN) " IVPB  3.375 g Intravenous Q8H    senna-docusate 8.6-50 mg  2 tablet Oral Daily       Continuous Infusions:   amino acids 4.25%-[id number]         PRN Meds:.acetaminophen, calcium chloride IVPB, calcium chloride IVPB, calcium chloride IVPB, HYDROcodone-acetaminophen, magnesium oxide, magnesium sulfate IVPB, magnesium sulfate IVPB, magnesium sulfate IVPB, magnesium sulfate IVPB, melatonin, morphine, naloxone, ondansetron, potassium chloride in water, potassium chloride in water, potassium chloride in water, potassium chloride in water, potassium chloride, potassium chloride, potassium chloride, potassium chloride, simethicone, sodium chloride 0.9%, sodium chloride 0.9%    Estimated/Assessed Needs  Weight Used For Calorie Calculations: 75.1 kg (165 lb 9.1 oz)  Energy Calorie Requirements (kcal): 5916-9137 kcals/day (25-30 kcals/kg ABW)  Energy Need Method: Kcal/kg  Protein Requirements:  g/day (1.2-1.5 g/kg ABW)  Weight Used For Protein Calculations: 75.1 kg (165 lb 9.1 oz)  Fluid Requirements (mL): 1 ml/kcal or per MD  Estimated Fluid Requirement Method: RDA Method  RDA Method (mL): 1878       Nutrition Prescription Ordered  Current Diet Order: NPO for procedure    Evaluation of Received Nutrient/Fluid Intake  Energy Calories Required: not meeting needs  Protein Required: not meeting needs  Tolerance:  (NPO)     Intake/Output Summary (Last 24 hours) at 1/11/2022 0908  Last data filed at 1/10/2022 1800  Gross per 24 hour   Intake 1070 ml   Output 725 ml   Net 345 ml      % Intake of Estimated Energy Needs: 0%  % Meal Intake: NPO    Nutrition Risk  Level of Risk/Frequency of Follow-up: high     Monitor and Evaluation  Food and Nutrient Intake: parenteral nutrition intake  Food and Nutrient Adminstration: enteral and parenteral nutrition administration  Knowledge/Beliefs/Attitudes: food and nutrition knowledge/skill,beliefs and attitudes  Physical Activity and Function: nutrition-related ADLs and  IADLs  Anthropometric Measurements: weight,weight change,body mass index  Biochemical Data, Medical Tests and Procedures: lipid profile,inflammatory profile,glucose/endocrine profile,gastrointestinal profile,electrolyte and renal panel  Nutrition-Focused Physical Findings: overall appearance     Nutrition Follow-Up  RD Follow-up?: Yes

## 2022-01-11 NOTE — PLAN OF CARE
Problem: Adult Inpatient Plan of Care  Goal: Plan of Care Review  Outcome: Ongoing, Progressing  Goal: Patient-Specific Goal (Individualized)  Outcome: Ongoing, Progressing  Goal: Absence of Hospital-Acquired Illness or Injury  Outcome: Ongoing, Progressing  Goal: Optimal Comfort and Wellbeing  Outcome: Ongoing, Progressing  Goal: Readiness for Transition of Care  Outcome: Ongoing, Progressing     Problem: Infection  Goal: Absence of Infection Signs and Symptoms  Outcome: Ongoing, Progressing     Problem: Fall Injury Risk  Goal: Absence of Fall and Fall-Related Injury  Outcome: Ongoing, Progressing     Problem: Fluid and Electrolyte Imbalance (Acute Kidney Injury/Impairment)  Goal: Fluid and Electrolyte Balance  Outcome: Ongoing, Progressing     Problem: Oral Intake Inadequate (Acute Kidney Injury/Impairment)  Goal: Optimal Nutrition Intake  Outcome: Ongoing, Progressing     Problem: Renal Function Impairment (Acute Kidney Injury/Impairment)  Goal: Effective Renal Function  Outcome: Ongoing, Progressing     Problem: Skin Injury Risk Increased  Goal: Skin Health and Integrity  Outcome: Ongoing, Progressing     Problem: Oral Intake Inadequate  Goal: Improved Oral Intake  Outcome: Ongoing, Progressing

## 2022-01-11 NOTE — PLAN OF CARE
Problem: Physical Therapy Goal  Goal: Physical Therapy Goal  Description: Goals to be met by:d/c    Patient will increase functional independence with mobility by performin. Supine to sit with Modified Mora  2. Sit to stand transfer with Modified Mora  3. Gait  x 200 feet with Supervision using Rolling Walker.     Outcome: Ongoing, Progressing

## 2022-01-11 NOTE — SUBJECTIVE & OBJECTIVE
Interval History:  See hospital course.  States yesterday he did have regular food, denies any current abdominal pain , nausea , states he is passing small amount of flatus, had 2 small liquid bowel movements yesterday.  He is currently NPO for procedure.  States he has chronic shortness of breath for years without any recent change.  Not using incentive spirometer currently.  Afebrile, blood pressure is improved.  Labs with BUN/creatinine 30/1, albumin 2.4.  Previous echocardiogram with EF of 68%.  CT from 01/09 reviewed.  Plan of care discussed with patient.  No visitors at bedside.      Review of Systems   Constitutional: Negative for fever.   Respiratory: Positive for shortness of breath (chronic for years as per patient).    Cardiovascular: Negative for chest pain and leg swelling.   Gastrointestinal: Negative for abdominal pain and nausea.   Genitourinary: Negative for difficulty urinating.   Psychiatric/Behavioral: Negative for confusion.     Objective:     Vital Signs (Most Recent):  Temp: 97.6 °F (36.4 °C) (01/11/22 0728)  Pulse: (!) 59 (01/11/22 0728)  Resp: 18 (01/11/22 0728)  BP: (!) 140/73 (01/11/22 0728)  SpO2: (!) 90 % (01/11/22 0728) Vital Signs (24h Range):  Temp:  [97.6 °F (36.4 °C)-98.1 °F (36.7 °C)] 97.6 °F (36.4 °C)  Pulse:  [56-69] 59  Resp:  [17-18] 18  SpO2:  [90 %-98 %] 90 %  BP: (135-149)/(57-86) 140/73     Weight: 75.1 kg (165 lb 9.1 oz)  Body mass index is 23.76 kg/m².    Intake/Output Summary (Last 24 hours) at 1/11/2022 0831  Last data filed at 1/10/2022 1800  Gross per 24 hour   Intake 1070 ml   Output 725 ml   Net 345 ml      Physical Exam  Vitals and nursing note reviewed.   HENT:      Head: Normocephalic and atraumatic.      Mouth/Throat:      Mouth: Mucous membranes are moist.   Cardiovascular:      Rate and Rhythm: Normal rate and regular rhythm.      Comments: No significant LE edema  Pulmonary:      Comments: On room air, inspiratory crackles bibasilarly, left greater than  right  Abdominal:      Comments: Slightly distended, tenderness to palpation bilateral lower abdomen, left lower quadrant drain with serous output, bowel sounds appreciated, dressing over laparoscopic incisions   Genitourinary:     Comments: No jean  Skin:     Comments: Right upper extremity PICC line in place   Neurological:      Mental Status: He is alert and oriented to person, place, and time.   Psychiatric:         Mood and Affect: Mood normal.         Significant Labs:   Blood Culture: No results for input(s): LABBLOO in the last 48 hours.  BMP:   Recent Labs   Lab 01/11/22 0438         K 4.5      CO2 24   BUN 30*   CREATININE 1.0   CALCIUM 8.0*   MG 2.4     CBC:   Recent Labs   Lab 01/10/22  0432 01/11/22  0438   WBC 13.22* 14.11*   HGB 12.1* 11.7*   HCT 37.0* 35.7*    373     CMP:   Recent Labs   Lab 01/09/22  1644 01/10/22  0432 01/11/22  0438   NA  --  138 138   K 3.6 3.7 4.5   CL  --  101 106   CO2  --  27 24   GLU  --  103 108   BUN  --  33* 30*   CREATININE  --  1.0 1.0   CALCIUM  --  8.4* 8.0*   PROT  --  5.8* 5.7*   ALBUMIN  --  2.6* 2.4*   BILITOT  --  0.7 1.3*   ALKPHOS  --  50* 50*   AST  --  24 28   ALT  --  32 21   ANIONGAP  --  10 8   EGFRNONAA  --  >60.0 >60.0     Cardiac Markers: No results for input(s): CKMB, MYOGLOBIN, BNP, TROPISTAT in the last 48 hours.  Lactic Acid: No results for input(s): LACTATE in the last 48 hours.  Magnesium:   Recent Labs   Lab 01/10/22  0432 01/11/22  0438   MG 2.4 2.4     POCT Glucose: No results for input(s): POCTGLUCOSE in the last 48 hours.  Respiratory Culture: No results for input(s): GSRESP, RESPIRATORYC in the last 48 hours.  Troponin: No results for input(s): TROPONINI in the last 48 hours.  TSH:   Recent Labs   Lab 08/10/21  0752   TSH 3.270     Urine Culture: No results for input(s): LABURIN in the last 48 hours.  Urine Studies: No results for input(s): COLORU, APPEARANCEUA, PHUR, SPECGRAV, PROTEINUA, GLUCUA, KETONESU,  BILIRUBINUA, OCCULTUA, NITRITE, UROBILINOGEN, LEUKOCYTESUR, RBCUA, WBCUA, BACTERIA, SQUAMEPITHEL, HYALINECASTS in the last 48 hours.    Invalid input(s): IVON    Significant Imaging: I have reviewed and interpreted all pertinent imaging results/findings within the past 24 hours.     X-Ray Abdomen Flat And Erect    Result Date: 1/9/2022  REASON: evaluate bowel gas pattern TECHNIQUE: AP abdominal radiograph with supine and erect views. COMPARISON: Abdominal radiograph January 8, 2022 FINDINGS: Gas dilated loops of small bowel with air-fluid levels again noted and are no significant change from previous exam. Stool in the colon is again identified. Right-sided surgical drain again noted. Lung bases are clear. No acute osseous abnormality. IMPRESSION: 1.  No significant change of multiple gas dilated loops of small bowel. 2.  Right abdomen surgical drain again noted. Electronically signed by:  Rakesh Artis DO  1/9/2022 8:08 AM CST Workstation: [id number]    CT Abdomen Pelvis With Contrast    Result Date: 1/9/2022  CMS MANDATED QUALITY DATA - CT RADIATION - 436 All CT scans at this facility utilize dose modulation, iterative reconstruction, and/or weight based dosing when appropriate to reduce radiation dose to as low as reasonably achievable. Reason: Abdominal pain, post-op TECHNIQUE: CT abdomen and pelvis with 100 mL Omnipaque 350. COMPARISON: CT abdomen pelvis January 2, 2022. FINDINGS: There is a small right pleural effusion with overlying compressive atelectasis. There is left basilar subsegmental atelectasis. Heart size is normal. Liver is normal size. Hepatic cyst again noted noted and is unchanged. No new liver lesions. The gallbladder is distended. Common bile duct and cystic duct are grossly unremarkable. The pancreas, spleen and adrenal glands are unchanged. The kidneys are unchanged with bilateral renal cyst again observed. No hydronephrosis. The ureters are normal caliber without obstructions. The  bladder is fluid-filled. There is gas in the nondependent bladder. There is a rim-enhancing fluid collection the pelvis measuring 6.8 x 4.6 cm. Small rim-enhancing fluid collection noted in the central mesenteric fat measuring 2.6 x 1.3 cm (series 3 image 157). There is also a rim-enhancing fluid collection along the antimesenteric border of the cecum near a surgical drain measuring 1.8 x 1.4 cm (series 3 image 168). Rim-enhancing fluid collection along the anterior margin of right surgical drain measuring 7.5 x 1.5 cm (series 3 image 185). Small focal rim-enhancing fluid collection noted in the anterior mesenteric fat just inferior to the transverse colon measuring approximately 3.6 cm x 1.0 cm (series 3 image 141). Another rim-enhancing fluid collection identified in the abdomen right lower quadrant (series 3 image 201). There is also free fluid in the left and right pericolic gutters. The appendix is been removed and there appears to be suture material in the right lower quadrant.. Large and small bowel are normal caliber. There is fluid in the large bowel. Oral contrast noted in the large bowel. No pneumoperitoneum observed. The stomach is grossly unremarkable. Abdominal aorta is unchanged. Vascular atherosclerosis again noted. There is no intra-abdominal lymphadenopathy. Postsurgical changes of the ventral abdominal wall noted. No acute osseous abnormality. IMPRESSION: 1.  Multiple rim-enhancing fluid collections identified in the abdomen and pelvis as described, felt to reflect abscesses. Largest fluid collections in the pelvis and measures 6.8 x 4.6 cm. 2.  There is also free fluid in the left and less so right pericolic gutters. 3.  Status post cholecystectomy. No definite pneumoperitoneum observed. 4.  Small right pleural effusion with overlying compressive atelectasis. 5.  Additional observations as described. Electronically signed by:  Rakesh Artis DO  1/9/2022 5:53 PM CST Workstation: [id number]    CT  Abdomen Pelvis With Contrast    Result Date: 1/2/2022  All CT scans at this facility used dose modulation, iterative reconstruction and/or weight-based dosing when appropriate to reduce radiation doses  as low as reasonably achievable. HISTORY: Abdominal abscess/infection suspected; RLQ abdominal pain (Age >= 14y) FINDINGS: Axial postcontrast imaging was performed with 100 mL Omnipaque 350 IV contrast . CT ABDOMEN: There is atelectasis in the lung bases. There is a small hiatal hernia. There is no pericardial effusion. The liver is hypodense compatible with fatty infiltration. There is a 3.5 cm cyst within the right lobe of the liver. There is no biliary duct dilatation. The spleen, pancreas, gallbladder and adrenal glands are normal. The kidneys enhance symmetrically without hydronephrosis. There is a 3 mm nonobstructing left renal stone. There is a 1.9 cm right renal cyst. There are left parapelvic cyst. There is dilatation and wall thickening of the appendix with stranding in the adjacent fat suggestive of appendicitis. The wall thickening is most pronounced at the tip of the cecum and proximal appendix. There is no abscess or perforation. The remainder of the bowel is normal. There is a moderate amount of fecal material throughout the colon. There is sigmoid diverticulosis without diverticulitis. There is diffuse vascular calcification of the aorta. There is no mesenteric or retroperitoneal adenopathy. There is mild leftward curvature of the lumbar spine. CT PELVIS: There is sigmoid diverticulosis without diverticulitis. The prostate gland is enlarged with central calcifications. There is no free fluid. IMPRESSION: Findings compatible with appendicitis with mild wall thickening of the tip of the cecum and proximal appendix there is no abscess or perforation Colonic diverticulosis Fatty infiltration of the liver 3 mm left renal stone with bilateral renal cysts Small hiatal hernia These findings were called to  Lesley the patient's nurse in the emergency department at 9:20 AM Electronically signed by:  Nicole Navarro MD  1/2/2022 9:22 AM CST Workstation: [id number]    X-ray Abdomen for NG Tube Placement (Nursing should notify Radiology after placement)    Result Date: 1/4/2022  KUB CLINICAL DATA: Nasogastric tube placement FINDINGS: Supine view is compared to January 4 at 1311 hours. Nasogastric tube is been placed. The tip is located in the proximal stomach approximately 4 cm distal to the GE junction. There is persistent abnormal distention of small bowel loops throughout the abdomen, unchanged. No mass lesions or suspicious calcifications are identified. IMPRESSION: 1. Nasogastric tube extends to the proximal stomach. No other changes when compared to 1311 hours. Electronically signed by:  Kb Abdalla MD  1/4/2022 3:24 PM CST Workstation: [id number]    X-Ray KUB    Result Date: 1/8/2022  CLINICAL HISTORY: 71 years (1950) Male Ileus Ileus TECHNIQUE: XR ABDOMEN 1 VIEW (KUB).  2 image(s) obtained. COMPARISON: Radiograph from January 7, 2022. FINDINGS: Multiple prominent gas filled loops of small bowel are seen in the upper abdomen, the largest of which measures 5.2 cm in diameter, similar to the previous exam. There is a small volume of stool and gas in the colon. There is a drainage catheter along the right lower abdominal quadrant. No gross intra-abdominal free air or pneumatosis is seen. Osseous structures show levoscoliosis of the lumbar spine. IMPRESSION: Unchanged radiograph of the abdomen, and findings compatible with ileus versus midgrade mechanical small bowel obstruction. . Electronically signed by:  Vel Vigil MD  1/8/2022 8:25 AM CST Workstation: [id number]    X-Ray KUB    Result Date: 1/7/2022  HISTORY: Small bowel obstruction, acute appendicitis with perforation. FINDINGS: Single abdominal radiograph obtained in 2 images at 0844 hours is compared to multiple prior exams, and shows  surgical drain overlying the right lower quadrant. Multiple abnormally dilated loops of small bowel measuring up to 57 mm in diameter, with air in the stomach and no supine radiographic evidence of intraperitoneal free air. There are no suspicious calcifications overlying the kidneys or the ureters. No acute fractures or destructive osseous lesions. There are left upper quadrant splenic arterial vascular calcifications. IMPRESSION: Persistent ileus versus small bowel obstruction. Electronically signed by:  Juan Taylor MD  1/7/2022 10:05 AM CST Workstation: [id number]    X-Ray KUB    Result Date: 1/6/2022  KUB is compared to prior study dated 1/5/2022 Clinical history recent appendectomy There is an NG tube which has been advanced and the tip overlies the distal second portion of the duodenum. There is a dilated loop of small bowel in the upper mid abdomen measuring 4.1 cm. The colon is decompressed. There is a VICENTE drain within the right lower quadrant. No soft tissue mass or organomegaly. There are no acute osseous abnormalities. IMPRESSION: Dilated loop of small bowel in the upper midabdomen most likely representing postoperative ileus. The colon is decompressed. VICENTE drain in the right lower quadrant Electronically signed by:  Nicole Navarro MD  1/6/2022 10:51 AM CST Workstation: [id number]    X-Ray KUB    Result Date: 1/5/2022  REASON: Post op Ileus TECHNIQUE: AP abdominal radiograph. COMPARISON: Abdominal radiograph January 4, 2022. FINDINGS: Multiple loops of gas dilated loops of bowel are again noted, mildly improved when compared with prior exam. Nasogastric tube is in place with tip below the gastroesophageal junction. Side-port are identified. Right lower quadrant surgical drain again noted. IMPRESSION: 1.  Mild improvement of dilated loops of bowel. 2.  Nasogastric tube in place with tip below the gastroesophageal junction. Electronically signed by:  Rakesh Artis DO  1/5/2022 11:52 AM CST Workstation:  [id number]    X-Ray KUB    Result Date: 1/4/2022  HISTORY: Small bowel obstruction, acute appendicitis with perforation. FINDINGS: Single abdominal radiograph obtained in 2 images at 1311 hours is compared to CT of 01/02/2022, and shows surgical drain overlying the right lower quadrant. There are multiple abnormally dilated loops of small bowel measuring up to 5 cm in diameter, with air in the stomach and small to moderate volume of stool scattered throughout the colon. No evidence of intraperitoneal free air. There are no suspicious calcifications overlying the kidneys or the ureters. No acute fractures or destructive osseous lesions. There are left upper quadrant splenic arterial vascular calcifications. IMPRESSION: Multiple abnormally dilated loops of small bowel, suggesting postoperative ileus or small bowel obstruction. Electronically signed by:  Juan Taylor MD  1/4/2022 1:25 PM CST Workstation: [id number]      Echocardiogram   Summary    · The left ventricle is normal in size with moderate predominantly basal septal moderate asymmetric hypertrophy eccentric hypertrophy and normal systolic function. The estimated ejection fraction is 68%.  · Normal left ventricular diastolic function.  · Normal right ventricular size with normal right ventricular systolic function.  · Mild mitral regurgitation.  · Normal central venous pressure (3 mmHg).

## 2022-01-11 NOTE — PT/OT/SLP PROGRESS
Physical Therapy Treatment    Patient Name:  Onesimo Paula   MRN:  [id number]    Recommendations:     Discharge Recommendations:  home health PT   Discharge Equipment Recommendations: walker, rolling   Barriers to discharge: None    Assessment:     Onesimo Paula is a 71 y.o. male admitted with a medical diagnosis of Postprocedural intraabdominal abscess.  He presents with the following impairments/functional limitations:  weakness,impaired functional mobilty,gait instability,impaired balance,pain.    Pt found in bed resting prior to I & D procedure, but agreeable to working with PT. Pt tolerated session well participating for gait training holding IV pole. PT plans to have pt participate without an AD tomorrow for possible discharge from PT.    Rehab Prognosis: Good; patient would benefit from acute skilled PT services to address these deficits and reach maximum level of function.    Recent Surgery: Procedure(s) (LRB):  APPENDECTOMY, LAPAROSCOPIC (N/A) 9 Days Post-Op    Plan:     During this hospitalization, patient to be seen 3 x/week to address the identified rehab impairments via gait training,therapeutic activities,therapeutic exercises and progress toward the following goals:    · Plan of Care Expires:  02/08/22    Subjective     Chief Complaint: pt having I & D of abscess this afternoon.  Patient/Family Comments/goals: Home with HH PT?  Pain/Comfort:  · Pain Rating 1:  (not rated)  · Location 1: abdomen  · Pain Addressed 1: Reposition,Distraction      Objective:     Communicated with LAURY Cummings prior to session.  Patient found HOB elevated with VICENTE drain,peripheral IV,telemetry upon PT entry to room.     General Precautions: Standard, fall   Orthopedic Precautions:N/A   Braces: N/A  Respiratory Status: Room air     Functional Mobility:  · Bed Mobility:     · Supine to Sit: modified independence  · Transfers:     · Sit to Stand:  stand by assistance with no AD  · Gait: x 250' with pt managing IV pole to steady  himself for CGA > SBA for safety.       AM-PAC 6 CLICK MOBILITY          Therapeutic Activities and Exercises:   Pt was educated on the following: call light use, importance of OOB activity and functional mobility to negate the negative effects of prolonged bed rest during this hospitalization, safe transfers/ambulation and discharge planning recommendations/options.      Patient left sitting up EOB with all lines intact, call button in reach and RN notified..    GOALS:   Multidisciplinary Problems     Physical Therapy Goals        Problem: Physical Therapy Goal    Goal Priority Disciplines Outcome Goal Variances Interventions   Physical Therapy Goal     PT, PT/OT Ongoing, Progressing     Description: Goals to be met by:d/c    Patient will increase functional independence with mobility by performin. Supine to sit with Modified [place]  2. Sit to stand transfer with Modified [place]  3. Gait  x 200 feet with Supervision using Rolling Walker.                      Time Tracking:     PT Received On: 22  PT Start Time: 1051     PT Stop Time: 1103  PT Total Time (min): 12 min     Billable Minutes: Gait Training 12    Treatment Type: Treatment  PT/PTA: PT     PTA Visit Number: 0     2022

## 2022-01-12 LAB
ALBUMIN SERPL BCP-MCNC: 2.5 G/DL (ref 3.5–5.2)
ALP SERPL-CCNC: 52 U/L (ref 55–135)
ALT SERPL W/O P-5'-P-CCNC: 26 U/L (ref 10–44)
ANION GAP SERPL CALC-SCNC: 7 MMOL/L (ref 8–16)
AST SERPL-CCNC: 24 U/L (ref 10–40)
BASOPHILS # BLD AUTO: 0.09 K/UL (ref 0–0.2)
BASOPHILS NFR BLD: 0.6 % (ref 0–1.9)
BILIRUB SERPL-MCNC: 0.7 MG/DL (ref 0.1–1)
BUN SERPL-MCNC: 23 MG/DL (ref 8–23)
CALCIUM SERPL-MCNC: 8.1 MG/DL (ref 8.7–10.5)
CHLORIDE SERPL-SCNC: 109 MMOL/L (ref 95–110)
CO2 SERPL-SCNC: 23 MMOL/L (ref 23–29)
CREAT SERPL-MCNC: 0.9 MG/DL (ref 0.5–1.4)
DIFFERENTIAL METHOD: ABNORMAL
EOSINOPHIL # BLD AUTO: 0.3 K/UL (ref 0–0.5)
EOSINOPHIL NFR BLD: 1.9 % (ref 0–8)
ERYTHROCYTE [DISTWIDTH] IN BLOOD BY AUTOMATED COUNT: 13.9 % (ref 11.5–14.5)
EST. GFR  (AFRICAN AMERICAN): >60 ML/MIN/1.73 M^2
EST. GFR  (NON AFRICAN AMERICAN): >60 ML/MIN/1.73 M^2
GLUCOSE SERPL-MCNC: 98 MG/DL (ref 70–110)
HCT VFR BLD AUTO: 36 % (ref 40–54)
HGB BLD-MCNC: 11.5 G/DL (ref 14–18)
IMM GRANULOCYTES # BLD AUTO: 0.18 K/UL (ref 0–0.04)
IMM GRANULOCYTES NFR BLD AUTO: 1.2 % (ref 0–0.5)
LYMPHOCYTES # BLD AUTO: 1.8 K/UL (ref 1–4.8)
LYMPHOCYTES NFR BLD: 12 % (ref 18–48)
MAGNESIUM SERPL-MCNC: 2.3 MG/DL (ref 1.6–2.6)
MCH RBC QN AUTO: 30.3 PG (ref 27–31)
MCHC RBC AUTO-ENTMCNC: 31.9 G/DL (ref 32–36)
MCV RBC AUTO: 95 FL (ref 82–98)
MONOCYTES # BLD AUTO: 1 K/UL (ref 0.3–1)
MONOCYTES NFR BLD: 6.5 % (ref 4–15)
NEUTROPHILS # BLD AUTO: 11.5 K/UL (ref 1.8–7.7)
NEUTROPHILS NFR BLD: 77.8 % (ref 38–73)
NRBC BLD-RTO: 0 /100 WBC
PHOSPHATE SERPL-MCNC: 2.5 MG/DL (ref 2.7–4.5)
PLATELET # BLD AUTO: 426 K/UL (ref 150–450)
PMV BLD AUTO: 10.3 FL (ref 9.2–12.9)
POTASSIUM SERPL-SCNC: 4.2 MMOL/L (ref 3.5–5.1)
PROT SERPL-MCNC: 6 G/DL (ref 6–8.4)
RBC # BLD AUTO: 3.8 M/UL (ref 4.6–6.2)
SODIUM SERPL-SCNC: 139 MMOL/L (ref 136–145)
WBC # BLD AUTO: 14.79 K/UL (ref 3.9–12.7)

## 2022-01-12 PROCEDURE — 85025 COMPLETE CBC W/AUTO DIFF WBC: CPT | Performed by: SURGERY

## 2022-01-12 PROCEDURE — 12000002 HC ACUTE/MED SURGE SEMI-PRIVATE ROOM

## 2022-01-12 PROCEDURE — 99900031 HC PATIENT EDUCATION (STAT)

## 2022-01-12 PROCEDURE — 97116 GAIT TRAINING THERAPY: CPT

## 2022-01-12 PROCEDURE — 84100 ASSAY OF PHOSPHORUS: CPT | Performed by: SURGERY

## 2022-01-12 PROCEDURE — 25500020 PHARM REV CODE 255: Performed by: INTERNAL MEDICINE

## 2022-01-12 PROCEDURE — 25000003 PHARM REV CODE 250: Performed by: SURGERY

## 2022-01-12 PROCEDURE — 80053 COMPREHEN METABOLIC PANEL: CPT | Performed by: SURGERY

## 2022-01-12 PROCEDURE — 36415 COLL VENOUS BLD VENIPUNCTURE: CPT | Performed by: SURGERY

## 2022-01-12 PROCEDURE — 25000003 PHARM REV CODE 250: Performed by: HOSPITALIST

## 2022-01-12 PROCEDURE — 94761 N-INVAS EAR/PLS OXIMETRY MLT: CPT

## 2022-01-12 PROCEDURE — 25000003 PHARM REV CODE 250: Performed by: INTERNAL MEDICINE

## 2022-01-12 PROCEDURE — 99900035 HC TECH TIME PER 15 MIN (STAT)

## 2022-01-12 PROCEDURE — 63600175 PHARM REV CODE 636 W HCPCS: Performed by: INTERNAL MEDICINE

## 2022-01-12 PROCEDURE — 83735 ASSAY OF MAGNESIUM: CPT | Performed by: SURGERY

## 2022-01-12 PROCEDURE — 94799 UNLISTED PULMONARY SVC/PX: CPT

## 2022-01-12 PROCEDURE — 63600175 PHARM REV CODE 636 W HCPCS: Performed by: SURGERY

## 2022-01-12 PROCEDURE — 63600175 PHARM REV CODE 636 W HCPCS: Performed by: HOSPITALIST

## 2022-01-12 RX ORDER — FUROSEMIDE 10 MG/ML
20 INJECTION INTRAMUSCULAR; INTRAVENOUS ONCE
Status: COMPLETED | OUTPATIENT
Start: 2022-01-12 | End: 2022-01-12

## 2022-01-12 RX ADMIN — FUROSEMIDE 20 MG: 10 INJECTION, SOLUTION INTRAVENOUS at 07:01

## 2022-01-12 RX ADMIN — SENNOSIDES AND DOCUSATE SODIUM 2 TABLET: 8.6; 5 TABLET ORAL at 08:01

## 2022-01-12 RX ADMIN — PIPERACILLIN AND TAZOBACTAM 3.38 G: 3; .375 INJECTION, POWDER, LYOPHILIZED, FOR SOLUTION INTRAVENOUS; PARENTERAL at 06:01

## 2022-01-12 RX ADMIN — ATORVASTATIN CALCIUM 40 MG: 40 TABLET, FILM COATED ORAL at 09:01

## 2022-01-12 RX ADMIN — AMLODIPINE BESYLATE 10 MG: 5 TABLET ORAL at 08:01

## 2022-01-12 RX ADMIN — OXYBUTYNIN CHLORIDE 5 MG: 5 TABLET, EXTENDED RELEASE ORAL at 08:01

## 2022-01-12 RX ADMIN — PIPERACILLIN AND TAZOBACTAM 3.38 G: 3; .375 INJECTION, POWDER, LYOPHILIZED, FOR SOLUTION INTRAVENOUS; PARENTERAL at 11:01

## 2022-01-12 RX ADMIN — ASPIRIN 81 MG: 81 TABLET, COATED ORAL at 08:01

## 2022-01-12 RX ADMIN — IOHEXOL 100 ML: 350 INJECTION, SOLUTION INTRAVENOUS at 02:01

## 2022-01-12 RX ADMIN — MELATONIN TAB 3 MG 6 MG: 3 TAB at 09:01

## 2022-01-12 RX ADMIN — ENOXAPARIN SODIUM 40 MG: 40 INJECTION SUBCUTANEOUS at 04:01

## 2022-01-12 RX ADMIN — PANTOPRAZOLE SODIUM 40 MG: 40 TABLET, DELAYED RELEASE ORAL at 06:01

## 2022-01-12 RX ADMIN — PIPERACILLIN AND TAZOBACTAM 3.38 G: 3; .375 INJECTION, POWDER, LYOPHILIZED, FOR SOLUTION INTRAVENOUS; PARENTERAL at 03:01

## 2022-01-12 NOTE — SUBJECTIVE & OBJECTIVE
Interval History:  Patient states this morning noted lower right anterior and posterior chest discomfort, slightly worse with deep inspiration, shortness of breath with exertion.  Tolerating regular diet without any nausea or vomiting, states he had bowel movement this morning.  Remains on room air with T-max 98.4°.  WBC 14.  Preliminary aerobic culture from drain Pseudomonas.  Discussed with patient.  No visitors at bedside.     Review of Systems   Constitutional: Negative for fever.   Respiratory: Positive for shortness of breath.    Cardiovascular: Positive for chest pain.   Gastrointestinal: Negative for nausea and vomiting.   Genitourinary: Negative for difficulty urinating.     Objective:     Vital Signs (Most Recent):  Temp: 98.2 °F (36.8 °C) (01/12/22 1202)  Pulse: (!) 59 (01/12/22 1202)  Resp: 18 (01/12/22 1202)  BP: 107/64 (01/12/22 1202)  SpO2: 98 % (01/12/22 1202) Vital Signs (24h Range):  Temp:  [98.2 °F (36.8 °C)-98.4 °F (36.9 °C)] 98.2 °F (36.8 °C)  Pulse:  [50-63] 59  Resp:  [16-18] 18  SpO2:  [91 %-98 %] 98 %  BP: (107-144)/(64-82) 107/64     Weight: 75.1 kg (165 lb 9.1 oz)  Body mass index is 23.76 kg/m².    Intake/Output Summary (Last 24 hours) at 1/12/2022 1318  Last data filed at 1/12/2022 1005  Gross per 24 hour   Intake 150 ml   Output 655 ml   Net -505 ml      Physical Exam  Vitals and nursing note reviewed.   Constitutional:       Comments: Sitting side of bed, eating lunch   HENT:      Head: Normocephalic and atraumatic.      Mouth/Throat:      Mouth: Mucous membranes are moist.   Cardiovascular:      Rate and Rhythm: Normal rate and regular rhythm.      Comments: No significant LE edema  Pulmonary:      Comments: On room air, inspiratory crackles bibasilarly, right greater than left  Abdominal:      Comments: Nondistended, soft, posterior drain small amt serous drainage   Genitourinary:     Comments: No jean  Skin:     Comments: Right upper extremity PICC line in place   Neurological:       Mental Status: He is alert and oriented to person, place, and time.   Psychiatric:         Mood and Affect: Mood normal.         Significant Labs:   BMP:   Recent Labs   Lab 01/12/22 0429   GLU 98      K 4.2      CO2 23   BUN 23   CREATININE 0.9   CALCIUM 8.1*   MG 2.3     CBC:   Recent Labs   Lab 01/11/22 0438 01/12/22 0429   WBC 14.11* 14.79*   HGB 11.7* 11.5*   HCT 35.7* 36.0*    426     CMP:   Recent Labs   Lab 01/11/22 0438 01/12/22 0429    139   K 4.5 4.2    109   CO2 24 23    98   BUN 30* 23   CREATININE 1.0 0.9   CALCIUM 8.0* 8.1*   PROT 5.7* 6.0   ALBUMIN 2.4* 2.5*   BILITOT 1.3* 0.7   ALKPHOS 50* 52*   AST 28 24   ALT 21 26   ANIONGAP 8 7*   EGFRNONAA >60.0 >60.0     Lactic Acid: No results for input(s): LACTATE in the last 48 hours.  Magnesium:   Recent Labs   Lab 01/11/22 0438 01/12/22 0429   MG 2.4 2.3     Prealbumin: No results for input(s): PREALBUMIN in the last 48 hours.    Significant Imaging: I have reviewed and interpreted all pertinent imaging results/findings within the past 24 hours.

## 2022-01-12 NOTE — PROGRESS NOTES
[facility]  Progress Note    Patient Name: Onesimo Paula  MRN: [id number]  Patient Class: IP- Inpatient   Admission Date: 1/2/2022  Length of Stay: 9 days  Attending Physician: Rosalinda Coleman MD  Primary Care Provider: Reynaldo Rahman, JAMES,FNP-C        Subjective:     Principal Problem:Postprocedural intraabdominal abscess        HPI:  71-year-old gentleman with prior history of hypertension, BPH, GERD presented with chief complaint of abdominal pain and nausea.  Upon further asking patient mentioned he was in his usual state of health until 2 days ago when he started experiencing upper abdominal pain which is more localized in right lower abdomen now.  He also endorses nausea but no vomiting, subjective feeling of chills but no fever.  His last bowel movement was 2 days ago.  Otherwise denies any fever, headache, dizziness, chest pain, palpitations, cough, dysuria.  Remote history of hernia surgery. Adm with Acute appendicitis with localized peritonitis      Overview/Hospital Course:  Laperscopic appendectomy 1/2/22- POD #6.     1/8/22- POD #6.Post op ileus  Some signs it is improving. ngt is out 1/7/22 with no nausea. Resting in bed w/o distress, no c/o. No BM since 12/31/22 GI consulted. Will order suppository. Not active enough, will consult PT and stressed to patient importance of activity with ileus.     1/9/22- POD #7 Post op ileus  Looks better today. Reports some flatus, no BM (suppository given yesterday) or nausea, pain controlled. No significant change in KUB. PT walked him yesterday and pt reported walking 4 more times with his spouse in the halls. Johnnie x1 remains with minimal op.     Assumed care of this patient on 01/11/22. Chart reviewed. Patient with prolonged hospital admission, admitted 1/2/22 with 2 day history of abdominal pain associated with nausea and subjective fever, CT with concern for acute appendicitis, he was taken to the OR on 1/2 by Dr. Goss,  intraop findings of acute appendicitis with perforation and abscess, status post laparoscopic appendectomy, he was placed on empiric antibiotics.  Complicated postop course with ileus requiring NG tube placement on 01/04, removed 1/7, acute kidney injury and slow recovery. Repeat CT 1/9 with multiple intra-abdominal rim enhancing fluid collection, largest 6.8 x 4 0.6 cm, distended gallbladder, he did have return of GI function.  On 1/11, awaiting IR drainage of abscess, cultures ordered, on empiric Zosyn.  On 01/12 obtaining a right anterior and posterior chest discomfort, worse with deep breathing, increased shortness of breath with exertion, CTA ordered.  Preliminary culture of from a drain placed by IR with Pseudomonas.  Tolerating oral diet.      Interval History:  Patient states this morning noted lower right anterior and posterior chest discomfort, slightly worse with deep inspiration, shortness of breath with exertion.  Tolerating regular diet without any nausea or vomiting, states he had bowel movement this morning.  Remains on room air with T-max 98.4°.  WBC 14.  Preliminary aerobic culture from drain Pseudomonas.  Discussed with patient.  No visitors at bedside.     Review of Systems   Constitutional: Negative for fever.   Respiratory: Positive for shortness of breath.    Cardiovascular: Positive for chest pain.   Gastrointestinal: Negative for nausea and vomiting.   Genitourinary: Negative for difficulty urinating.     Objective:     Vital Signs (Most Recent):  Temp: 98.2 °F (36.8 °C) (01/12/22 1202)  Pulse: (!) 59 (01/12/22 1202)  Resp: 18 (01/12/22 1202)  BP: 107/64 (01/12/22 1202)  SpO2: 98 % (01/12/22 1202) Vital Signs (24h Range):  Temp:  [98.2 °F (36.8 °C)-98.4 °F (36.9 °C)] 98.2 °F (36.8 °C)  Pulse:  [50-63] 59  Resp:  [16-18] 18  SpO2:  [91 %-98 %] 98 %  BP: (107-144)/(64-82) 107/64     Weight: 75.1 kg (165 lb 9.1 oz)  Body mass index is 23.76 kg/m².    Intake/Output Summary (Last 24 hours) at  1/12/2022 1318  Last data filed at 1/12/2022 1005  Gross per 24 hour   Intake 150 ml   Output 655 ml   Net -505 ml      Physical Exam  Vitals and nursing note reviewed.   Constitutional:       Comments: Sitting side of bed, eating lunch   HENT:      Head: Normocephalic and atraumatic.      Mouth/Throat:      Mouth: Mucous membranes are moist.   Cardiovascular:      Rate and Rhythm: Normal rate and regular rhythm.      Comments: No significant LE edema  Pulmonary:      Comments: On room air, inspiratory crackles bibasilarly, right greater than left  Abdominal:      Comments: Nondistended, soft, posterior drain small amt serous drainage   Genitourinary:     Comments: No jean  Skin:     Comments: Right upper extremity PICC line in place   Neurological:      Mental Status: He is alert and oriented to person, place, and time.   Psychiatric:         Mood and Affect: Mood normal.         Significant Labs:   BMP:   Recent Labs   Lab 01/12/22 0429   GLU 98      K 4.2      CO2 23   BUN 23   CREATININE 0.9   CALCIUM 8.1*   MG 2.3     CBC:   Recent Labs   Lab 01/11/22 0438 01/12/22 0429   WBC 14.11* 14.79*   HGB 11.7* 11.5*   HCT 35.7* 36.0*    426     CMP:   Recent Labs   Lab 01/11/22 0438 01/12/22 0429    139   K 4.5 4.2    109   CO2 24 23    98   BUN 30* 23   CREATININE 1.0 0.9   CALCIUM 8.0* 8.1*   PROT 5.7* 6.0   ALBUMIN 2.4* 2.5*   BILITOT 1.3* 0.7   ALKPHOS 50* 52*   AST 28 24   ALT 21 26   ANIONGAP 8 7*   EGFRNONAA >60.0 >60.0     Lactic Acid: No results for input(s): LACTATE in the last 48 hours.  Magnesium:   Recent Labs   Lab 01/11/22 0438 01/12/22 0429   MG 2.4 2.3     Prealbumin: No results for input(s): PREALBUMIN in the last 48 hours.    Significant Imaging: I have reviewed and interpreted all pertinent imaging results/findings within the past 24 hours.      Assessment/Plan:      Active Hospital Problems    Diagnosis    *Postprocedural intraabdominal abscess     Leucocytosis    Malnutrition of moderate degree    Acute appendicitis with perforation, localized peritonitis, and abscess, without gangrene    Dyspnea on exertion    Gastroesophageal reflux disease without esophagitis    Essential hypertension    Benign prostatic hypertrophy without urinary obstruction     8/2016: per U/s , called pt for treatment. start alpha-blocker. 9/2016: not helps per pt. d.c .  PSA wnl3/2017:    PSA wnl.  Dx updated per 2019 IMO Load      Mixed hyperlipidemia     total cholesterol 226, . HDL 41 in 3/2015. 10 year ASCVD Risk is 15 %.total cholesterol 160, LDL 95, HDL 42 in 7/2015.  on Lipitor since 4/2015. 8/2015:  d/c Lipitor 2/2 muscle cramps. Crestor 10 mg. ( start 5 mg first).   crestor sample was given to pt.9/2015: check lab 2 months later.  mild alternative shoulder pain. check cpk.11/2015: cut crestor to half . 2/2 CK high 308.  LDL 71.2/2015: CK decrease to 145, continue on Crestor 1/2 .5/2016: crestor 5 mg. total cholesterol 160, HDL 42, LDL 97.3/2017: tolerate crestor 5 mg. . declines increasing dosage of Crestor.      Anemia     5/2016: H/H  13.9 / 41. 2 . iron every orther day.8/2016: H/H  13.9/41.5       Plan:  Continue care on medical floor  Tolerating cardiac diet well, continue  Continue empiric IV Zosyn, preliminary culture with Pseudomonas, follow-up final and deescalate  CTA given shortness of breath with chest discomfort evaluate PE  Follow-up cultures from aspirated fluid  Continue ordered p.r.n. pain control and antiemetic  Electrolytes sliding scale repletion  Increase activity, out of bed to chair, ambulation, needs to increase use of incentive spirometer--discussed with patient today  A.m. labs ordered  Appreciate all consultant's input  Further plan as per clinical course     VTE Risk Mitigation (From admission, onward)         Ordered     enoxaparin injection 40 mg  Daily         01/02/22 1242     IP VTE HIGH RISK PATIENT  Once          01/02/22 1242     Place sequential compression device  Until discontinued         01/02/22 1242                Discharge Planning   RON:      Code Status: Full Code   Is the patient medically ready for discharge?:     Reason for patient still in hospital (select all that apply): Patient trending condition  Discharge Plan A: Home Health   Discharge Delays: None known at this time              Rosalinda Coleman MD  Department of Hospital Medicine   [facility]

## 2022-01-12 NOTE — PLAN OF CARE
Problem: Malnutrition  Goal: Improved Nutritional Intake  Outcome: Ongoing, Progressing  Intervention: Promote and Optimize Oral Intake  Flowsheets (Taken 1/12/2022 1646)  Oral Nutrition Promotion: calorie-dense liquids provided

## 2022-01-12 NOTE — PT/OT/SLP PROGRESS
Physical Therapy Treatment    Patient Name:  Onesimo Paula   MRN:  [id number]    Recommendations:     Discharge Recommendations:  home   Discharge Equipment Recommendations: walker, rolling   Barriers to discharge: None    Assessment:     Onesimo Paula is a 71 y.o. male admitted with a medical diagnosis of Postprocedural intraabdominal abscess.  He presents with the following impairments/functional limitations:  weakness,gait instability,pain.    Pt found resting in L sidelying, but agreeable to gait training with PT. Pt tolerated session well despite discomfort without an AD today.     Rehab Prognosis: Good; patient would benefit from acute skilled PT services to address these deficits and reach maximum level of function.    Recent Surgery: Procedure(s) (LRB):  APPENDECTOMY, LAPAROSCOPIC (N/A) 10 Days Post-Op    Plan:     During this hospitalization, patient to be seen 3 x/week to address the identified rehab impairments via gait training,therapeutic activities,therapeutic exercises and progress toward the following goals:    · Plan of Care Expires:  02/08/22    Subjective     Chief Complaint: mild pain R flank  Patient/Family Comments/goals: home with wife upon discharge  Pain/Comfort:  · Pain Rating 1:  (not rated)  · Location - Side 1: Right  · Location 1: flank  · Pain Addressed 1: Reposition,Distraction,Cessation of Activity      Objective:     Communicated with LAURY Cummings prior to session.  Patient found left sidelying with VICENTE drain,peripheral IV,telemetry upon PT entry to room.     General Precautions: Standard, fall   Orthopedic Precautions:N/A   Braces: N/A  Respiratory Status: Room air     Functional Mobility:  · Bed Mobility:     · Supine to Sit: modified independence  · Transfers:     · Sit to Stand:  modified independence with no AD  · Gait: x 250' no AD with CGA > SBA for safety.      AM-PAC 6 CLICK MOBILITY          Therapeutic Activities and Exercises:   Pt was educated on the following: call  light use, importance of OOB activity and functional mobility to negate the negative effects of prolonged bed rest during this hospitalization, safe transfers/ambulation and discharge planning recommendations/options.      Patient left sitting up EOB with all lines intact, call button in reach and RN notified..    GOALS:   Multidisciplinary Problems     Physical Therapy Goals        Problem: Physical Therapy Goal    Goal Priority Disciplines Outcome Goal Variances Interventions   Physical Therapy Goal     PT, PT/OT Ongoing, Progressing     Description: Goals to be met by:d/c    Patient will increase functional independence with mobility by performin. Supine to sit with Modified [place]  2. Sit to stand transfer with Modified [place]  3. Gait  x 200 feet with Supervision using Rolling Walker.                      Time Tracking:     PT Received On: 22  PT Start Time: 1143     PT Stop Time: 1152  PT Total Time (min): 9 min     Billable Minutes: Gait Training 9    Treatment Type: Treatment  PT/PTA: PT     PTA Visit Number: 0     2022

## 2022-01-12 NOTE — PROGRESS NOTES
[facility]  Adult Nutrition   Progress Note (Follow-Up)    SUMMARY     Recommendations  Recommendation/Intervention:   1. Continue current cardiac diet due to good tolerance after Clinimix has been dicontinued.   2. RD to add some Ensure TID and LUDMILA BID for wound healing.  Goals: No new goals.  Nutrition Goal Status: progressing towards goal  Communication of RD Recs: other (comment) (patient)    Dietitian Rounds Brief  F/U Nutrition Note: Talked with patient for the first time today. He seemed to be feeling much better. He said he was tolerating the diet and starting to ambulate in the hallway some. MD states per PN's today that he is having GI function and OK to discharge if tolerates diet and can remove other drain in clinic Th or F. Patients attending MD states per PN's...  Continue care on medical floor  Tolerating cardiac diet well, continue  Continue empiric IV Zosyn, preliminary culture with Pseudomonas, follow-up final and deescalate  CTA given shortness of breath with chest discomfort evaluate PE  Follow-up cultures from aspirated fluid  Continue ordered p.r.n. pain control and antiemetic  Electrolytes sliding scale repletion  Increase activity, out of bed to chair, ambulation, needs to increase use of incentive spirometer--discussed with patient today  A.m. labs ordered    Will continue to follow prn till discharged to ensure patient continue to have good PO intake.      Reason for Assessment  Reason For Assessment: RD follow-up  Diagnosis: cancer diagnosis/related complications,cardiac disease,diabetes diagnosis/complications,gastrointestinal disease,infection/sepsis,pulmonary disease,other (see comments) (Acute appendicitis with perforation, localized peritonitis, and abscess, without gangrene)  Relevant Medical History: Hypertension (HTN), coronary artery disease (CAD), anemia, pulmonary nodules, BPH (benign prostatic hyperplasia), acquired tricuspid valve insufficiency, borderline  "diabetes, acid reflux, skin cancer  Interdisciplinary Rounds: attended    Nutrition Risk Screen  Nutrition Risk Screen: large or nonhealing wound, burn or pressure injury     MST Score: 1  Have you recently lost weight without trying?: No  Weight loss score: 0  Have you been eating poorly because of a decreased appetite?: Yes  Appetite score: 1       Nutrition/Diet History  Spiritual, Cultural Beliefs, Latter day Practices, Values that Affect Care: no  Food Allergies: NKFA  Factors Affecting Nutritional Intake: None identified at this time    Anthropometrics  Temp: 97.9 °F (36.6 °C)  Height Method: Stated  Height: 5' 10" (177.8 cm)  Height (inches): 70 in  Weight Method: Bed Scale  Weight: 75.1 kg (165 lb 9.1 oz)  Weight (lb): 165.57 lb  Ideal Body Weight (IBW), Male: 166 lb  % Ideal Body Weight, Male (lb): 99.74 %  BMI (Calculated): 23.8  BMI Grade: 18.5-24.9 - normal       Weight History:  Wt Readings from Last 10 Encounters:   01/06/22 75.1 kg (165 lb 9.1 oz)   12/07/21 75.3 kg (166 lb)   12/07/21 75.3 kg (166 lb)   11/30/21 75.3 kg (166 lb 0.1 oz)   11/11/21 75.3 kg (166 lb)   10/11/21 75.3 kg (166 lb)   10/11/21 75.3 kg (166 lb)   09/24/21 75.3 kg (166 lb)   09/15/21 75.4 kg (166 lb 3.2 oz)   08/27/21 76.2 kg (168 lb)       Lab/Procedures/Meds: Pertinent Labs Reviewed    Clinical Chemistry:  Recent Labs   Lab 01/10/22  0432 01/10/22  0432 01/11/22  0438 01/11/22  0438 01/12/22  0429      < > 138   < > 139   K 3.7   < > 4.5   < > 4.2      < > 106   < > 109   CO2 27   < > 24   < > 23      < > 108   < > 98   BUN 33*   < > 30*   < > 23   CREATININE 1.0   < > 1.0   < > 0.9   CALCIUM 8.4*   < > 8.0*   < > 8.1*   PROT 5.8*   < > 5.7*   < > 6.0   ALBUMIN 2.6*   < > 2.4*   < > 2.5*   BILITOT 0.7   < > 1.3*   < > 0.7   ALKPHOS 50*   < > 50*   < > 52*   AST 24   < > 28   < > 24   ALT 32   < > 21   < > 26   ANIONGAP 10   < > 8   < > 7*   ESTGFRAFRICA >60.0   < > >60.0   < > >60.0   EGFRNONAA >60.0   < > " >60.0   < > >60.0   MG 2.4   < > 2.4   < > 2.3   PHOS 3.0  --  2.8  --  2.5*    < > = values in this interval not displayed.       CBC:   Recent Labs   Lab 01/12/22  0429   WBC 14.79*   RBC 3.80*   HGB 11.5*   HCT 36.0*      MCV 95   MCH 30.3   MCHC 31.9*         Medications: Pertinent Medications reviewed    Scheduled Meds:   amLODIPine  10 mg Oral Daily    aspirin  81 mg Oral Daily    atorvastatin  40 mg Oral QHS    enoxaparin  40 mg Subcutaneous Daily    oxybutynin  5 mg Oral Daily    pantoprazole  40 mg Oral Daily    piperacillin-tazobactam (ZOSYN) IVPB  3.375 g Intravenous Q8H    senna-docusate 8.6-50 mg  2 tablet Oral Daily       Continuous Infusions:    PRN Meds:.acetaminophen, calcium chloride IVPB, calcium chloride IVPB, calcium chloride IVPB, HYDROcodone-acetaminophen, magnesium oxide, magnesium sulfate IVPB, magnesium sulfate IVPB, magnesium sulfate IVPB, magnesium sulfate IVPB, melatonin, morphine, naloxone, ondansetron, potassium chloride in water, potassium chloride in water, potassium chloride in water, potassium chloride in water, potassium chloride, potassium chloride, potassium chloride, potassium chloride, simethicone, sodium chloride 0.9%, sodium chloride 0.9%    Estimated/Assessed Needs  Weight Used For Calorie Calculations: 75.1 kg (165 lb 9.1 oz)  Energy Calorie Requirements (kcal): 9982-3200 kcals/day (25-30 kcals/kg ABW)  Energy Need Method: Kcal/kg  Protein Requirements:  g/day (1.2-1.5 g/kg ABW)  Weight Used For Protein Calculations: 75.1 kg (165 lb 9.1 oz)  Fluid Requirements (mL): 1 ml/kcal or per MD  Estimated Fluid Requirement Method: RDA Method  RDA Method (mL): 1878       Nutrition Prescription Ordered  Current Diet Order: Cardiac  Current Nutrition Support Formula Ordered: Clinimix 4.25/5  Current Nutrition Support Rate Ordered: 100 (ml)    Evaluation of Received Nutrient/Fluid Intake  Parenteral Calories (kcal): 816  Parenteral Protein (gm): 102  Parenteral  Fluid (mL): 2400  Total Calories (kcal): 816  Total Calories (kcal/kg): 11  % Kcal Needs: 40  % Protein Needs: 100  Energy Calories Required: meeting needs  Protein Required: meeting needs  Fluid Required: meeting needs  Tolerance: tolerating     Intake/Output Summary (Last 24 hours) at 1/12/2022 1636  Last data filed at 1/12/2022 1005  Gross per 24 hour   Intake 150 ml   Output 655 ml   Net -505 ml      % Intake of Estimated Energy Needs: 50 - 75 %  % Meal Intake: 50 - 75 %    Nutrition Risk  Level of Risk/Frequency of Follow-up: moderate     Monitor and Evaluation  Food and Nutrient Intake: energy intake,food and beverage intake  Food and Nutrient Adminstration: diet order  Knowledge/Beliefs/Attitudes: food and nutrition knowledge/skill,beliefs and attitudes  Physical Activity and Function: nutrition-related ADLs and IADLs  Anthropometric Measurements: weight,weight change,body mass index  Biochemical Data, Medical Tests and Procedures: electrolyte and renal panel,gastrointestinal profile,glucose/endocrine profile,inflammatory profile,lipid profile  Nutrition-Focused Physical Findings: overall appearance     Nutrition Follow-Up  RD Follow-up?: Yes   Alba Blanchard RD, 01/12/2022   4:45 PM

## 2022-01-12 NOTE — SUBJECTIVE & OBJECTIVE
Interval History: tolerating diet, having flatus    Medications:  Continuous Infusions:  Scheduled Meds:   amLODIPine  10 mg Oral Daily    aspirin  81 mg Oral Daily    atorvastatin  40 mg Oral QHS    enoxaparin  40 mg Subcutaneous Daily    oxybutynin  5 mg Oral Daily    pantoprazole  40 mg Oral Daily    piperacillin-tazobactam (ZOSYN) IVPB  3.375 g Intravenous Q8H    senna-docusate 8.6-50 mg  2 tablet Oral Daily     PRN Meds:acetaminophen, calcium chloride IVPB, calcium chloride IVPB, calcium chloride IVPB, HYDROcodone-acetaminophen, magnesium oxide, magnesium sulfate IVPB, magnesium sulfate IVPB, magnesium sulfate IVPB, magnesium sulfate IVPB, melatonin, morphine, naloxone, ondansetron, potassium chloride in water, potassium chloride in water, potassium chloride in water, potassium chloride in water, potassium chloride, potassium chloride, potassium chloride, potassium chloride, simethicone, sodium chloride 0.9%, sodium chloride 0.9%     Review of patient's allergies indicates:  No Known Allergies  Objective:     Vital Signs (Most Recent):  Temp: 98.3 °F (36.8 °C) (01/12/22 0716)  Pulse: (!) 59 (01/12/22 0756)  Resp: 16 (01/12/22 0756)  BP: 130/82 (01/12/22 0716)  SpO2: 97 % (01/12/22 0756) Vital Signs (24h Range):  Temp:  [98.2 °F (36.8 °C)-98.4 °F (36.9 °C)] 98.3 °F (36.8 °C)  Pulse:  [50-63] 59  Resp:  [15-24] 16  SpO2:  [91 %-100 %] 97 %  BP: (127-149)/(70-84) 130/82     Weight: 75.1 kg (165 lb 9.1 oz)  Body mass index is 23.76 kg/m².    Intake/Output - Last 3 Shifts       01/10 0700  01/11 0659 01/11 0700  01/12 0659 01/12 0700  01/13 0659    P.O. 620      I.V. (mL/kg)  175 (2.3)     IV Piggyback 50 146           Total Intake(mL/kg) 1070 (14.2) 321 (4.3)     Urine (mL/kg/hr) 600 (0.3) 600 (0.3)     Drains 125 55     Stool 0      Total Output 725 655     Net +345 -334            Urine Occurrence 3 x      Stool Occurrence 1 x            Physical Exam  Abdominal:      General: Abdomen is flat.  There is no distension.      Palpations: Abdomen is soft.      Comments: Drain ss- both  Abdominal one removed easily at bedside           Significant Labs:  I have reviewed all pertinent lab results within the past 24 hours.  CBC:   Recent Labs   Lab 01/12/22 0429   WBC 14.79*   RBC 3.80*   HGB 11.5*   HCT 36.0*      MCV 95   MCH 30.3   MCHC 31.9*     BMP:   Recent Labs   Lab 01/12/22 0429   GLU 98      K 4.2      CO2 23   BUN 23   CREATININE 0.9   CALCIUM 8.1*   MG 2.3       Significant Diagnostics:  I have reviewed all pertinent imaging results/findings within the past 24 hours.

## 2022-01-12 NOTE — PROGRESS NOTES
[facility]  General Surgery  Progress Note    Subjective:     History of Present Illness:  70 y/o with 48 hours of abdominal pain mainly on right associated with nausea and subjective fever.      Post-Op Info:  Procedure(s) (LRB):  APPENDECTOMY, LAPAROSCOPIC (N/A)   10 Days Post-Op     Interval History: tolerating diet, having flatus    Medications:  Continuous Infusions:  Scheduled Meds:   amLODIPine  10 mg Oral Daily    aspirin  81 mg Oral Daily    atorvastatin  40 mg Oral QHS    enoxaparin  40 mg Subcutaneous Daily    oxybutynin  5 mg Oral Daily    pantoprazole  40 mg Oral Daily    piperacillin-tazobactam (ZOSYN) IVPB  3.375 g Intravenous Q8H    senna-docusate 8.6-50 mg  2 tablet Oral Daily     PRN Meds:acetaminophen, calcium chloride IVPB, calcium chloride IVPB, calcium chloride IVPB, HYDROcodone-acetaminophen, magnesium oxide, magnesium sulfate IVPB, magnesium sulfate IVPB, magnesium sulfate IVPB, magnesium sulfate IVPB, melatonin, morphine, naloxone, ondansetron, potassium chloride in water, potassium chloride in water, potassium chloride in water, potassium chloride in water, potassium chloride, potassium chloride, potassium chloride, potassium chloride, simethicone, sodium chloride 0.9%, sodium chloride 0.9%     Review of patient's allergies indicates:  No Known Allergies  Objective:     Vital Signs (Most Recent):  Temp: 98.3 °F (36.8 °C) (01/12/22 0716)  Pulse: (!) 59 (01/12/22 0756)  Resp: 16 (01/12/22 0756)  BP: 130/82 (01/12/22 0716)  SpO2: 97 % (01/12/22 0756) Vital Signs (24h Range):  Temp:  [98.2 °F (36.8 °C)-98.4 °F (36.9 °C)] 98.3 °F (36.8 °C)  Pulse:  [50-63] 59  Resp:  [15-24] 16  SpO2:  [91 %-100 %] 97 %  BP: (127-149)/(70-84) 130/82     Weight: 75.1 kg (165 lb 9.1 oz)  Body mass index is 23.76 kg/m².    Intake/Output - Last 3 Shifts       01/10 0700  01/11 0659 01/11 0700  01/12 0659 01/12 0700  01/13 0659    P.O. 620      I.V. (mL/kg)  175 (2.3)     IV Piggyback 50 146            Total Intake(mL/kg) 1070 (14.2) 321 (4.3)     Urine (mL/kg/hr) 600 (0.3) 600 (0.3)     Drains 125 55     Stool 0      Total Output 725 655     Net +345 -334            Urine Occurrence 3 x      Stool Occurrence 1 x            Physical Exam  Abdominal:      General: Abdomen is flat. There is no distension.      Palpations: Abdomen is soft.      Comments: Drain ss- both  Abdominal one removed easily at bedside           Significant Labs:  I have reviewed all pertinent lab results within the past 24 hours.  CBC:   Recent Labs   Lab 01/12/22 0429   WBC 14.79*   RBC 3.80*   HGB 11.5*   HCT 36.0*      MCV 95   MCH 30.3   MCHC 31.9*     BMP:   Recent Labs   Lab 01/12/22 0429   GLU 98      K 4.2      CO2 23   BUN 23   CREATININE 0.9   CALCIUM 8.1*   MG 2.3       Significant Diagnostics:  I have reviewed all pertinent imaging results/findings within the past 24 hours.    Assessment/Plan:     Acute appendicitis with perforation, localized peritonitis, and abscess, without gangrene  Having gi function  Ok to dc if tolerates diet  Plan remove other drain in clinic thur or friday        Sam Goss MD  General Surgery  Formerly [facility], later [facility]

## 2022-01-12 NOTE — PLAN OF CARE
01/12/22 0756   Patient Assessment/Suction   Level of Consciousness (AVPU) alert   Respiratory Effort Unlabored;Normal   Expansion/Accessory Muscles/Retractions no use of accessory muscles   All Lung Fields Breath Sounds clear;equal bilaterally   Rhythm/Pattern, Respiratory unlabored;pattern regular;depth regular   Cough Frequency no cough   PRE-TX-O2   O2 Device (Oxygen Therapy) room air   SpO2 97 %   Pulse Oximetry Type Intermittent   $ Pulse Oximetry - Multiple Charge Pulse Oximetry - Multiple   Pulse (!) 59   Resp 16   Incentive Spirometer   $ Incentive Spirometer Charges done with encouragement   Incentive Spirometer Predicted Level (mL) 2500   Administration (IS) instruction provided, follow-up   Number of Repetitions (IS) 10   Level Incentive Spirometer (mL) 1000   Patient Tolerance (IS) good   Education   $ Education 30 min;15 min   Respiratory Evaluation   $ Care Plan Tech Time 15 min   $ Eval/Re-eval Charges Re-evaluation   Evaluation For Re-Eval 5+ day   Admitting Diagnosis post-op

## 2022-01-12 NOTE — ASSESSMENT & PLAN NOTE
-General Surgery following  -NGT d/c 1/7/22, denies nausea  -Very little flatus  -NPO-Clinimix-E 4.25/5  -PT consulted, long discussion about increasing activity for ileus.    
-General Surgery following  -NGT d/c 1/7/22, denies nausea  -pt reports some flatus over past 24 h  -NPO-Clinimix-E 4.25/5  -ambulated 4-5 times yesterday    
-chronic but controlled, will monitor  -hold all antihypertensives for now    
-chronic but controlled, will monitor  -hold all antihypertensives for now    
Distension improving  Having some flatus  Can try diet  Has abscess in pelvis on CT scan  Unable to drain today, they will try tomorrow  
Having gi function  Ok to dc if tolerates diet  Plan remove other drain in clinic thur or friday  
Lap appy today, risks and benefits of procedure discussed with patient, including open surgery, colon resection, and drains.  We have been waiting for operating room to go.  
Nov 2021-creatine 1.0 baseline  CAROL ANN likely due to blood pressure fluctuations.  Continue IV hydration  -improved  -1.1 today  -following    
Nov 2021-creatine 1.0 baseline  CAROL ANN likely due to blood pressure fluctuations.  Continue IV hydration  -improved  -1.2 today  -following    
Post op ileus  Some signs it is improving  ngt is out with no nausea  Will plan to replace if he becomes nauseated or vomits  Aspiration precautions for now  Hopefully will continue to resolve  
laparoscopic appendectomy 1/2/22  -POD#6 with ileus  -General Surgery following  -NGT d/c 1/7/22, denies nausea  -cont zosyn  -NPO-Clinimix-E 4.25/5  -JPx1  -mild inc in WBC, will monitor    
laparoscopic appendectomy 1/2/22  -POD#7 with ileus  -General Surgery following  -NGT d/c 1/7/22, denies nausea  -cont zosyn  -NPO-Clinimix-E 4.25/5  -JPx1  -slightly more inc in WBC,no fever, will monitor    
80

## 2022-01-13 ENCOUNTER — TELEPHONE (OUTPATIENT)
Dept: BARIATRICS | Facility: CLINIC | Age: 72
End: 2022-01-13
Payer: COMMERCIAL

## 2022-01-13 ENCOUNTER — PATIENT OUTREACH (OUTPATIENT)
Dept: FAMILY MEDICINE | Facility: CLINIC | Age: 72
End: 2022-01-13
Payer: COMMERCIAL

## 2022-01-13 VITALS
RESPIRATION RATE: 18 BRPM | WEIGHT: 165.56 LBS | SYSTOLIC BLOOD PRESSURE: 129 MMHG | HEIGHT: 70 IN | HEART RATE: 71 BPM | TEMPERATURE: 98 F | DIASTOLIC BLOOD PRESSURE: 75 MMHG | OXYGEN SATURATION: 94 % | BODY MASS INDEX: 23.7 KG/M2

## 2022-01-13 PROBLEM — J98.11 ATELECTASIS: Status: ACTIVE | Noted: 2022-01-13

## 2022-01-13 LAB
ALBUMIN SERPL BCP-MCNC: 2.6 G/DL (ref 3.5–5.2)
ALP SERPL-CCNC: 52 U/L (ref 55–135)
ALT SERPL W/O P-5'-P-CCNC: 24 U/L (ref 10–44)
ANION GAP SERPL CALC-SCNC: 8 MMOL/L (ref 8–16)
AST SERPL-CCNC: 19 U/L (ref 10–40)
BASOPHILS # BLD AUTO: 0.09 K/UL (ref 0–0.2)
BASOPHILS NFR BLD: 0.6 % (ref 0–1.9)
BILIRUB SERPL-MCNC: 0.8 MG/DL (ref 0.1–1)
BUN SERPL-MCNC: 20 MG/DL (ref 8–23)
CALCIUM SERPL-MCNC: 8.2 MG/DL (ref 8.7–10.5)
CHLORIDE SERPL-SCNC: 106 MMOL/L (ref 95–110)
CO2 SERPL-SCNC: 24 MMOL/L (ref 23–29)
CREAT SERPL-MCNC: 1.1 MG/DL (ref 0.5–1.4)
DIFFERENTIAL METHOD: ABNORMAL
EOSINOPHIL # BLD AUTO: 0.3 K/UL (ref 0–0.5)
EOSINOPHIL NFR BLD: 2 % (ref 0–8)
ERYTHROCYTE [DISTWIDTH] IN BLOOD BY AUTOMATED COUNT: 14 % (ref 11.5–14.5)
EST. GFR  (AFRICAN AMERICAN): >60 ML/MIN/1.73 M^2
EST. GFR  (NON AFRICAN AMERICAN): >60 ML/MIN/1.73 M^2
GLUCOSE SERPL-MCNC: 101 MG/DL (ref 70–110)
HCT VFR BLD AUTO: 36.2 % (ref 40–54)
HGB BLD-MCNC: 11.7 G/DL (ref 14–18)
IMM GRANULOCYTES # BLD AUTO: 0.11 K/UL (ref 0–0.04)
IMM GRANULOCYTES NFR BLD AUTO: 0.8 % (ref 0–0.5)
LYMPHOCYTES # BLD AUTO: 1.8 K/UL (ref 1–4.8)
LYMPHOCYTES NFR BLD: 12.7 % (ref 18–48)
MAGNESIUM SERPL-MCNC: 2.1 MG/DL (ref 1.6–2.6)
MCH RBC QN AUTO: 29.8 PG (ref 27–31)
MCHC RBC AUTO-ENTMCNC: 32.3 G/DL (ref 32–36)
MCV RBC AUTO: 92 FL (ref 82–98)
MONOCYTES # BLD AUTO: 0.9 K/UL (ref 0.3–1)
MONOCYTES NFR BLD: 6.7 % (ref 4–15)
NEUTROPHILS # BLD AUTO: 10.8 K/UL (ref 1.8–7.7)
NEUTROPHILS NFR BLD: 77.2 % (ref 38–73)
NRBC BLD-RTO: 0 /100 WBC
PHOSPHATE SERPL-MCNC: 2.8 MG/DL (ref 2.7–4.5)
PLATELET # BLD AUTO: 489 K/UL (ref 150–450)
PMV BLD AUTO: 9.8 FL (ref 9.2–12.9)
POTASSIUM SERPL-SCNC: 3.9 MMOL/L (ref 3.5–5.1)
PROT SERPL-MCNC: 6.2 G/DL (ref 6–8.4)
RBC # BLD AUTO: 3.92 M/UL (ref 4.6–6.2)
SODIUM SERPL-SCNC: 138 MMOL/L (ref 136–145)
WBC # BLD AUTO: 13.97 K/UL (ref 3.9–12.7)

## 2022-01-13 PROCEDURE — 25000003 PHARM REV CODE 250: Performed by: INTERNAL MEDICINE

## 2022-01-13 PROCEDURE — 83735 ASSAY OF MAGNESIUM: CPT | Performed by: SURGERY

## 2022-01-13 PROCEDURE — 94799 UNLISTED PULMONARY SVC/PX: CPT

## 2022-01-13 PROCEDURE — 94761 N-INVAS EAR/PLS OXIMETRY MLT: CPT

## 2022-01-13 PROCEDURE — 99900035 HC TECH TIME PER 15 MIN (STAT)

## 2022-01-13 PROCEDURE — 36415 COLL VENOUS BLD VENIPUNCTURE: CPT | Performed by: SURGERY

## 2022-01-13 PROCEDURE — 85025 COMPLETE CBC W/AUTO DIFF WBC: CPT | Performed by: SURGERY

## 2022-01-13 PROCEDURE — 63600175 PHARM REV CODE 636 W HCPCS: Performed by: HOSPITALIST

## 2022-01-13 PROCEDURE — 25000003 PHARM REV CODE 250: Performed by: SURGERY

## 2022-01-13 PROCEDURE — 99900031 HC PATIENT EDUCATION (STAT)

## 2022-01-13 PROCEDURE — 80053 COMPREHEN METABOLIC PANEL: CPT | Performed by: SURGERY

## 2022-01-13 PROCEDURE — 25000003 PHARM REV CODE 250: Performed by: HOSPITALIST

## 2022-01-13 PROCEDURE — 84100 ASSAY OF PHOSPHORUS: CPT | Performed by: SURGERY

## 2022-01-13 RX ORDER — METRONIDAZOLE 500 MG/1
500 TABLET ORAL 3 TIMES DAILY
Qty: 21 TABLET | Refills: 0 | Status: SHIPPED | OUTPATIENT
Start: 2022-01-13 | End: 2022-01-20

## 2022-01-13 RX ORDER — HYDROCODONE BITARTRATE AND ACETAMINOPHEN 5; 325 MG/1; MG/1
1 TABLET ORAL EVERY 8 HOURS PRN
Qty: 15 TABLET | Refills: 0 | Status: SHIPPED | OUTPATIENT
Start: 2022-01-13 | End: 2022-01-18

## 2022-01-13 RX ORDER — CIPROFLOXACIN 500 MG/1
500 TABLET ORAL 2 TIMES DAILY
Qty: 14 TABLET | Refills: 0 | Status: SHIPPED | OUTPATIENT
Start: 2022-01-13 | End: 2022-01-20

## 2022-01-13 RX ORDER — AMLODIPINE BESYLATE 10 MG/1
10 TABLET ORAL DAILY
Qty: 90 TABLET | Refills: 1 | Status: SHIPPED | OUTPATIENT
Start: 2022-01-13 | End: 2022-01-18 | Stop reason: SDUPTHER

## 2022-01-13 RX ADMIN — PANTOPRAZOLE SODIUM 40 MG: 40 TABLET, DELAYED RELEASE ORAL at 06:01

## 2022-01-13 RX ADMIN — ASPIRIN 81 MG: 81 TABLET, COATED ORAL at 08:01

## 2022-01-13 RX ADMIN — OXYBUTYNIN CHLORIDE 5 MG: 5 TABLET, EXTENDED RELEASE ORAL at 08:01

## 2022-01-13 RX ADMIN — PIPERACILLIN AND TAZOBACTAM 3.38 G: 3; .375 INJECTION, POWDER, LYOPHILIZED, FOR SOLUTION INTRAVENOUS; PARENTERAL at 06:01

## 2022-01-13 RX ADMIN — SENNOSIDES AND DOCUSATE SODIUM 2 TABLET: 8.6; 5 TABLET ORAL at 08:01

## 2022-01-13 NOTE — TELEPHONE ENCOUNTER
Appointment to remove drain ws booked on 1-20-22 and not tomorrow.  Rescheduled to 9 am tomorrow, 1-14-22

## 2022-01-13 NOTE — PLAN OF CARE
01/13/22 1030   Final Note   Assessment Type Final Discharge Note   Anticipated Discharge Disposition Home   What phone number can be called within the next 1-3 days to see how you are doing after discharge? [phone]   Post-Acute Status   Discharge Delays None known at this time   Discharge orders reviewed.  No case management needs noted.

## 2022-01-13 NOTE — PLAN OF CARE
Important Message from Medicare was sign, explained and given to patient/caregiver on 01/13/2022 at 8:12am     addressed any questions or concerns.    Important Message from Medicare document will be scanned into patient's medical record

## 2022-01-13 NOTE — TELEPHONE ENCOUNTER
----- Message from Rin Gonzalez sent at 1/13/2022  3:36 PM CST -----  Contact: Patient  Type:  Needs Medical Advice    Who Called:   Patient       Would the patient rather a call back or a response via [facility]?  Call    Best Call Back Number:  [phone] (home) [phone] (work)    Additional Information:  Patient has a post op appt on 01/20 but he states he thought the nurse told him to come in tomorrow to get the drains out     Please call to advise

## 2022-01-13 NOTE — PLAN OF CARE
01/13/22 0700   Patient Assessment/Suction   Level of Consciousness (AVPU) alert   Respiratory Effort Unlabored   Expansion/Accessory Muscles/Retractions no use of accessory muscles;no retractions   All Lung Fields Breath Sounds crackles, coarse   Rhythm/Pattern, Respiratory unlabored   PRE-TX-O2   O2 Device (Oxygen Therapy) room air   SpO2 98 %   Pulse Oximetry Type Intermittent   $ Pulse Oximetry - Multiple Charge Pulse Oximetry - Multiple   Pulse 60   Resp 18   Incentive Spirometer   $ Incentive Spirometer Charges done independently per patient   Education   $ Education 15 min   Respiratory Evaluation   $ Care Plan Tech Time 15 min   $ Eval/Re-eval Charges Evaluation   Evaluation For   (care plan)

## 2022-01-13 NOTE — PROGRESS NOTES
Discharge Information     Discharge Date:   1/13/22    Primary Discharge Diagnosis:  Appendicitis      Discharge Summary:  Reviewed      Medication & Order Review     Were medication changes made or new medications added?   Yes    If so, has the patient filled the prescriptions?  Yes     Was Home Health ordered? No    If so, has Home Health contacted patient and/or initiated services?  N/A    Name of Home Health Agency? N/A    Durable Medical Equipment ordered?  No     If so, has the DME provider contacted patient and delivered equipment?  not applicable    Follow Up               Any problems since discharge? No    How is the patient feeling since returning home?  better    Have you set up recommended follow up appointments?  (cardiology, surgery, etc.) N/A    Schedule Hospital Follow-up appointment within 7-14 days (preferably 7).      Notes:  KELLY Hamilton 1/18/22  10:30 am            Ashley Whiting

## 2022-01-13 NOTE — TELEPHONE ENCOUNTER
----- Message from Izzy Mosley sent at 1/13/2022  1:29 PM CST -----  Type: Needs Medical Advice  Who Called:  Patient  Best Call Back Number:   Additional Information: Calling to schedule nurse visit for drain removal

## 2022-01-13 NOTE — DISCHARGE SUMMARY
[facility] Medicine  Discharge Summary      Patient Name: Onesimo Paula  MRN: [id number]  Patient Class: IP- Inpatient  Admission Date: 1/2/2022  Hospital Length of Stay: 10 days  Discharge Date and Time:  01/13/2022 10:10 AM  Attending Physician: Rosalinda Coleman MD   Discharging Provider: Rosalinda Coleman MD  Primary Care Provider: Reynaldo Rahman, JAMES,FNP-C      HPI:   71-year-old gentleman with prior history of hypertension, BPH, GERD presented with chief complaint of abdominal pain and nausea.  Upon further asking patient mentioned he was in his usual state of health until 2 days ago when he started experiencing upper abdominal pain which is more localized in right lower abdomen now.  He also endorses nausea but no vomiting, subjective feeling of chills but no fever.  His last bowel movement was 2 days ago.  Otherwise denies any fever, headache, dizziness, chest pain, palpitations, cough, dysuria.  Remote history of hernia surgery. Adm with Acute appendicitis with localized peritonitis      Procedure(s) (LRB):  APPENDECTOMY, LAPAROSCOPIC (N/A)      Hospital Course:   Laperscopic appendectomy 1/2/22- POD #6.     1/8/22- POD #6.Post op ileus  Some signs it is improving. ngt is out 1/7/22 with no nausea. Resting in bed w/o distress, no c/o. No BM since 12/31/22 GI consulted. Will order suppository. Not active enough, will consult PT and stressed to patient importance of activity with ileus.     1/9/22- POD #7 Post op ileus  Looks better today. Reports some flatus, no BM (suppository given yesterday) or nausea, pain controlled. No significant change in KUB. PT walked him yesterday and pt reported walking 4 more times with his spouse in the halls. Johnnie x1 remains with minimal op.     Assumed care of this patient on 01/11/22. Chart reviewed. Patient with prolonged hospital admission, admitted 1/2/22 with 2 day history of abdominal pain associated with nausea and subjective fever, CT with  concern for acute appendicitis, he was taken to the OR on 1/2 by Dr. Goss, intraop findings of acute appendicitis with perforation and abscess, status post laparoscopic appendectomy, he was placed on empiric antibiotics.  Complicated postop course with ileus requiring NG tube placement on 01/04, removed 1/7, acute kidney injury and slow post operative recovery. Repeat CT 1/9 with multiple intra-abdominal rim enhancing fluid collection, largest 6.8 x 4 0.6 cm, distended gallbladder, he did have return of GI function.  On 1/11, awaiting IR drainage of abscess, cultures ordered, on empiric Zosyn.  On 01/12 obtaining a right anterior and posterior chest discomfort, worse with deep breathing, increased shortness of breath with exertion, CTA ordered and negative for PE, again reinforced use of incentive spirometer with patient.  Preliminary culture of from a drain placed by IR with Pseudomonas and GNR.  Tolerating oral diet.  On 01/13, respiratory symptoms are improved, tolerating cardiac diet without any GI issues, appears medically stable for discharge and is eager for discharge.  Cleared by surgery for discharge.  Drain to remain in place, patient will follow-up with general surgery on Friday for assessment and removal.  He will take incentive spirometer of home and continue to use.  Given instructions about his blood pressure medications.  Discharge plan including medications, follow-up as well as strict return precautions were reviewed, no objection to discharge, all questions were addressed.  Discussed with RN on day of discharge.    Discharge examination  Sitting side of bed, alert and oriented, on room air with good air entry, abdomen soft and nontender, right posterior drain in place       Goals of Care Treatment Preferences:  Code Status: Full Code      Consults:   Consults (From admission, onward)        Status Ordering Provider     Inpatient consult to Registered Dietitian/Nutritionist  Once         Provider:  (Not yet assigned)    TORRES Rivera     Inpatient consult to General surgery  Once        Provider:  Harry Ramirez MD    Completed DANNY GARCIA     Inpatient consult to Hospitalist  Once        Provider:  Torres Spann MD    Acknowledged HARRY RAMIREZ.          No new Assessment & Plan notes have been filed under this hospital service since the last note was generated.  Service: Hospital Medicine    Final Active Diagnoses:    Diagnosis Date Noted POA    PRINCIPAL PROBLEM:  Postprocedural intraabdominal abscess [T81.43XA] 01/11/2022 Yes    Atelectasis [J98.11] 01/13/2022 No    Leucocytosis [D72.829] 01/11/2022 No    Malnutrition of moderate degree [E44.0] 01/11/2022 No    Acute appendicitis with perforation, localized peritonitis, and abscess, without gangrene [K35.33] 01/02/2022 Yes    Dyspnea on exertion [R06.00] 07/13/2017 Yes    Gastroesophageal reflux disease without esophagitis [K21.9] 07/10/2017 Yes    Essential hypertension [I10] 07/10/2017 Yes    Benign prostatic hypertrophy without urinary obstruction [N40.0] 07/10/2017 Yes    Mixed hyperlipidemia [E78.2] 07/10/2017 Yes    Anemia [D64.9] 07/10/2017 Yes      Problems Resolved During this Admission:    Diagnosis Date Noted Date Resolved POA    Hypokalemia [E87.6] 01/11/2022 01/11/2022 Yes    Other constipation [K59.09] 01/08/2022 01/11/2022 No    Postop Ileus [K56.7] 01/05/2022 01/11/2022 No    CAROL ANN (acute kidney injury) [N17.9] 01/04/2022 01/11/2022 No    BPH with obstruction/lower urinary tract symptoms [N40.1, N13.8] 08/17/2020 01/08/2022 Yes    ED (erectile dysfunction) of organic origin [N52.9] 07/10/2017 01/08/2022 Yes       Discharged Condition: good    Disposition: Home or Self Care    Follow Up:   Follow-up Information     JAMES Rose,FNP-C. Schedule an appointment as soon as possible for a visit in 1 week.    Specialty: Family Medicine  Why: follow up   Contact  information:  [address] [place] [address]  [phone]             Sam Goss MD. Schedule an appointment as soon as possible for a visit in 1 week.    Specialties: General Surgery, Bariatrics, Surgery  Why: follow up  Contact information:  [address]  [address]  [place] [address]  [phone]                       Patient Instructions:      Diet Cardiac     Notify your health care provider if you experience any of the following:  temperature >100.4     Notify your health care provider if you experience any of the following:  difficulty breathing or increased cough     Notify your health care provider if you experience any of the following:  severe uncontrolled pain     Notify your health care provider if you experience any of the following:  redness, tenderness, or signs of infection (pain, swelling, redness, odor or green/yellow discharge around incision site)     Notify your health care provider if you experience any of the following:  persistent nausea and vomiting or diarrhea     Activity as tolerated       Significant Diagnostic Studies: Labs:   BMP:   Recent Labs   Lab 01/12/22 0429 01/13/22  0644   GLU 98 101    138   K 4.2 3.9    106   CO2 23 24   BUN 23 20   CREATININE 0.9 1.1   CALCIUM 8.1* 8.2*   MG 2.3 2.1   , CMP   Recent Labs   Lab 01/12/22 0429 01/13/22  0644    138   K 4.2 3.9    106   CO2 23 24   GLU 98 101   BUN 23 20   CREATININE 0.9 1.1   CALCIUM 8.1* 8.2*   PROT 6.0 6.2   ALBUMIN 2.5* 2.6*   BILITOT 0.7 0.8   ALKPHOS 52* 52*   AST 24 19   ALT 26 24   ANIONGAP 7* 8   ESTGFRAFRICA >60.0 >60.0   EGFRNONAA >60.0 >60.0   , CBC   Recent Labs   Lab 01/12/22 0429 01/12/22 0429 01/13/22  0644   WBC 14.79*  --  13.97*   HGB 11.5*  --  11.7*   HCT 36.0*   < > 36.2*     --  489*    < > = values in this interval not displayed.   , INR   Lab Results   Component Value Date    INR 1.1 01/11/2022   , Lipid Panel   Lab Results   Component Value Date    CHOL 164  02/04/2021    HDL 45 02/04/2021    LDLCALC 107.4 02/04/2021    TRIG 58 02/04/2021    CHOLHDL 27.4 02/04/2021   , Troponin No results for input(s): TROPONINI in the last 168 hours. and A1C:   Recent Labs   Lab 08/10/21  0752   HGBA1C 6.0     X-Ray Abdomen Flat And Erect    Result Date: 1/9/2022  REASON: evaluate bowel gas pattern TECHNIQUE: AP abdominal radiograph with supine and erect views. COMPARISON: Abdominal radiograph January 8, 2022 FINDINGS: Gas dilated loops of small bowel with air-fluid levels again noted and are no significant change from previous exam. Stool in the colon is again identified. Right-sided surgical drain again noted. Lung bases are clear. No acute osseous abnormality. IMPRESSION: 1.  No significant change of multiple gas dilated loops of small bowel. 2.  Right abdomen surgical drain again noted. Electronically signed by:  Rakesh Artis DO  1/9/2022 8:08 AM CST Workstation: [id number]    CTA Chest Non Coronary    Result Date: 1/12/2022  CMS MANDATED QUALITY DATA - CT RADIATION  436 All CT scans at this facility utilize dose modulation, iterative reconstruction, and/or weight based dosing when appropriate to reduce radiation dose to as low as reasonably achievable. CLINICAL HISTORY: 71 years (1950) Male pleuritic pain, post op TECHNIQUE: CT CHEST ANGIOGRAPHY WITH IV CONTRAST.  496 images obtained. Axial CT examination of the chest with attention to the pulmonary arteries was performed using contiguous axial images from the diaphragms to the lung apices following the intravenous administration of non-ionic contrast material. Images were reviewed using lung, mediastinal, and bone windows. The study was performed with thin sections with subsequent 3-D reformats/MIP/reconstructions in multiple planes. CONTRAST: 100 mL of IV Omni 350 was administered uneventfully by IV COMPARISON: Radiograph most recently from October 14, 2020. FINDINGS: CTA PE evaluation: This is a moderate to low quality  study for the evaluation of pulmonary embolism secondary to a combination of contrast bolus timing and motion artifact. The pulmonary arteries are normal in appearance without pulmonary emboli noted up to the segmental level, noting the limitations of CT technique for identifying small or isolated subsegmental emboli. CT Chest: Visualized neck: Within normal limits. Lungs: There is a small airspace opacity in the posterior right lower lobe, and lateral left lower lobe. Airway: The trachea and central bronchial tree appear normal. Pleura: Trace right pleural effusion and adjacent atelectasis/consolidation. There is no pneumothorax. Cardiovascular: The heart is normal in size. Scattered calcified plaques are seen at the aortic arch. Mediastinum: There is no supraclavicular, axillary, mediastinal, or hilar lymphadenopathy. Soft tissues: The peripheral soft tissues appear normal. Musculoskeletal: There are moderate degenerative changes of the thoracic spine.  There are no suspicious osseous lesions. Esophagus: The esophagus is grossly normal.  There is a small hiatal hernia. Upper Abdomen: Simple fluid attenuation cyst in the right lobe of the liver. There is sludge versus contrast seen in the gallbladder. The stomach is full. IMPRESSION: 1. No evidence of pulmonary embolism to the segmental arterial level. If there is continued clinical concern, consider further evaluation with lower extremity doppler. 2. Trace right pleural effusion and adjacent atelectasis/consolidation. 3. Small discoid airspace opacities in the posterior medial right lower lobe and lateral left lower lobe suggestive of atelectasis or possibly small foci of infection. Consider PA and lateral radiographic follow-up to document resolution. . Electronically signed by:  Vel Vigil MD  1/12/2022 2:59 PM [facility] Workstation: [id number]    CT Guided Abscess Drainage With Catheter    Result Date: 1/11/2022  CMS MANDATED QUALITY DATA-CT RADIATION DOSE-436  All CT scans at this facility use dose modulation, iterative reconstruction, and or weight based dosing when appropriate, to reduce radiation dose to as low as reasonably achievable. HISTORY: Deep pelvic abscess. TECHNIQUE:  After obtaining written informed consent, which included a discussion of the risks and benefits of the procedure to include infection and bleeding, bowel injury, bladder injury, bleeding requiring surgery, and allowing the patient the opportunity to ask questions, the patient agreed to the procedure. The patient was placed prone on the CT fluoroscopy table. A suitable skin entrance site overlying the pelvic fluid collection was localized with CT guidance. The skin was marked, and then prepped and draped in sterile fashion, with several milliliters of Lidocaine 1% injected subcutaneously to achieve adequate local anesthesia. Following, a small skin incision was made, and a long coaxial needle was advanced through the right medial gluteal soft tissues and musculature, and into the pelvic fluid collection anterior to the rectum, with care taken to avoid the neurovascular bundles and the rectum. The stylet was removed and a wire was inserted into the collection, after aspirating small volume of cloudy purulent fluid. Using the Seldinger technique, a 10 [place] multi-sidehole locking loop drainage catheter was placed into the collection. The catheter was secured to the skin with Percu-Stay device and placed to gravity drainage. The patient tolerated the procedure well, with no immediate postprocedural complications. There was no significant blood loss. CT fluoroscopy time was less than 1 minute. Total moderate conscious sedation time with supervision by the interventional radiologist and monitoring by the special procedures registered nurse was 25 minutes. The patient received Versed 2 mg and Fentanyl 100 mcg IV during the procedure. IMPRESSION: Successful CT guided deep pelvic abscess drain  placement. Aspirate was sent to the laboratory for analysis. Electronically signed by:  Juan Taylor MD  1/11/2022 1:10 PM CST Workstation: [id number]    CT Abdomen Pelvis With Contrast    Result Date: 1/9/2022  CMS MANDATED QUALITY DATA - CT RADIATION - 436 All CT scans at this facility utilize dose modulation, iterative reconstruction, and/or weight based dosing when appropriate to reduce radiation dose to as low as reasonably achievable. Reason: Abdominal pain, post-op TECHNIQUE: CT abdomen and pelvis with 100 mL Omnipaque 350. COMPARISON: CT abdomen pelvis January 2, 2022. FINDINGS: There is a small right pleural effusion with overlying compressive atelectasis. There is left basilar subsegmental atelectasis. Heart size is normal. Liver is normal size. Hepatic cyst again noted noted and is unchanged. No new liver lesions. The gallbladder is distended. Common bile duct and cystic duct are grossly unremarkable. The pancreas, spleen and adrenal glands are unchanged. The kidneys are unchanged with bilateral renal cyst again observed. No hydronephrosis. The ureters are normal caliber without obstructions. The bladder is fluid-filled. There is gas in the nondependent bladder. There is a rim-enhancing fluid collection the pelvis measuring 6.8 x 4.6 cm. Small rim-enhancing fluid collection noted in the central mesenteric fat measuring 2.6 x 1.3 cm (series 3 image 157). There is also a rim-enhancing fluid collection along the antimesenteric border of the cecum near a surgical drain measuring 1.8 x 1.4 cm (series 3 image 168). Rim-enhancing fluid collection along the anterior margin of right surgical drain measuring 7.5 x 1.5 cm (series 3 image 185). Small focal rim-enhancing fluid collection noted in the anterior mesenteric fat just inferior to the transverse colon measuring approximately 3.6 cm x 1.0 cm (series 3 image 141). Another rim-enhancing fluid collection identified in the abdomen right lower quadrant (series 3  image 201). There is also free fluid in the left and right pericolic gutters. The appendix is been removed and there appears to be suture material in the right lower quadrant.. Large and small bowel are normal caliber. There is fluid in the large bowel. Oral contrast noted in the large bowel. No pneumoperitoneum observed. The stomach is grossly unremarkable. Abdominal aorta is unchanged. Vascular atherosclerosis again noted. There is no intra-abdominal lymphadenopathy. Postsurgical changes of the ventral abdominal wall noted. No acute osseous abnormality. IMPRESSION: 1.  Multiple rim-enhancing fluid collections identified in the abdomen and pelvis as described, felt to reflect abscesses. Largest fluid collections in the pelvis and measures 6.8 x 4.6 cm. 2.  There is also free fluid in the left and less so right pericolic gutters. 3.  Status post cholecystectomy. No definite pneumoperitoneum observed. 4.  Small right pleural effusion with overlying compressive atelectasis. 5.  Additional observations as described. Electronically signed by:  Rakesh Artis DO  1/9/2022 5:53 PM CST Workstation: [id number]    CT Abdomen Pelvis With Contrast    Result Date: 1/2/2022  All CT scans at this facility used dose modulation, iterative reconstruction and/or weight-based dosing when appropriate to reduce radiation doses  as low as reasonably achievable. HISTORY: Abdominal abscess/infection suspected; RLQ abdominal pain (Age >= 14y) FINDINGS: Axial postcontrast imaging was performed with 100 mL Omnipaque 350 IV contrast . CT ABDOMEN: There is atelectasis in the lung bases. There is a small hiatal hernia. There is no pericardial effusion. The liver is hypodense compatible with fatty infiltration. There is a 3.5 cm cyst within the right lobe of the liver. There is no biliary duct dilatation. The spleen, pancreas, gallbladder and adrenal glands are normal. The kidneys enhance symmetrically without hydronephrosis. There is a 3 mm  nonobstructing left renal stone. There is a 1.9 cm right renal cyst. There are left parapelvic cyst. There is dilatation and wall thickening of the appendix with stranding in the adjacent fat suggestive of appendicitis. The wall thickening is most pronounced at the tip of the cecum and proximal appendix. There is no abscess or perforation. The remainder of the bowel is normal. There is a moderate amount of fecal material throughout the colon. There is sigmoid diverticulosis without diverticulitis. There is diffuse vascular calcification of the aorta. There is no mesenteric or retroperitoneal adenopathy. There is mild leftward curvature of the lumbar spine. CT PELVIS: There is sigmoid diverticulosis without diverticulitis. The prostate gland is enlarged with central calcifications. There is no free fluid. IMPRESSION: Findings compatible with appendicitis with mild wall thickening of the tip of the cecum and proximal appendix there is no abscess or perforation Colonic diverticulosis Fatty infiltration of the liver 3 mm left renal stone with bilateral renal cysts Small hiatal hernia These findings were called to Lesley the patient's nurse in the emergency department at 9:20 AM Electronically signed by:  Nicole Navarro MD  1/2/2022 9:22 AM CST Workstation: [id number]    X-ray Abdomen for NG Tube Placement (Nursing should notify Radiology after placement)    Result Date: 1/4/2022  KUB CLINICAL DATA: Nasogastric tube placement FINDINGS: Supine view is compared to January 4 at 1311 hours. Nasogastric tube is been placed. The tip is located in the proximal stomach approximately 4 cm distal to the GE junction. There is persistent abnormal distention of small bowel loops throughout the abdomen, unchanged. No mass lesions or suspicious calcifications are identified. IMPRESSION: 1. Nasogastric tube extends to the proximal stomach. No other changes when compared to 1311 hours. Electronically signed by:  Kb Abdalla MD   1/4/2022 3:24 PM CST Workstation: [id number]    X-Ray KUB    Result Date: 1/8/2022  CLINICAL HISTORY: 71 years (1950) Male Ileus Ileus TECHNIQUE: XR ABDOMEN 1 VIEW (KUB).  2 image(s) obtained. COMPARISON: Radiograph from January 7, 2022. FINDINGS: Multiple prominent gas filled loops of small bowel are seen in the upper abdomen, the largest of which measures 5.2 cm in diameter, similar to the previous exam. There is a small volume of stool and gas in the colon. There is a drainage catheter along the right lower abdominal quadrant. No gross intra-abdominal free air or pneumatosis is seen. Osseous structures show levoscoliosis of the lumbar spine. IMPRESSION: Unchanged radiograph of the abdomen, and findings compatible with ileus versus midgrade mechanical small bowel obstruction. . Electronically signed by:  Vel Vigil MD  1/8/2022 8:25 AM CST Workstation: [id number]    X-Ray KUB    Result Date: 1/7/2022  HISTORY: Small bowel obstruction, acute appendicitis with perforation. FINDINGS: Single abdominal radiograph obtained in 2 images at 0844 hours is compared to multiple prior exams, and shows surgical drain overlying the right lower quadrant. Multiple abnormally dilated loops of small bowel measuring up to 57 mm in diameter, with air in the stomach and no supine radiographic evidence of intraperitoneal free air. There are no suspicious calcifications overlying the kidneys or the ureters. No acute fractures or destructive osseous lesions. There are left upper quadrant splenic arterial vascular calcifications. IMPRESSION: Persistent ileus versus small bowel obstruction. Electronically signed by:  Juan Taylor MD  1/7/2022 10:05 AM CST Workstation: [id number]    X-Ray KUB    Result Date: 1/6/2022  KUB is compared to prior study dated 1/5/2022 Clinical history recent appendectomy There is an NG tube which has been advanced and the tip overlies the distal second portion of the duodenum. There is a dilated loop  of small bowel in the upper mid abdomen measuring 4.1 cm. The colon is decompressed. There is a VICENTE drain within the right lower quadrant. No soft tissue mass or organomegaly. There are no acute osseous abnormalities. IMPRESSION: Dilated loop of small bowel in the upper midabdomen most likely representing postoperative ileus. The colon is decompressed. VICENTE drain in the right lower quadrant Electronically signed by:  Nicole Navarro MD  1/6/2022 10:51 AM CST Workstation: [id number]    X-Ray KUB    Result Date: 1/5/2022  REASON: Post op Ileus TECHNIQUE: AP abdominal radiograph. COMPARISON: Abdominal radiograph January 4, 2022. FINDINGS: Multiple loops of gas dilated loops of bowel are again noted, mildly improved when compared with prior exam. Nasogastric tube is in place with tip below the gastroesophageal junction. Side-port are identified. Right lower quadrant surgical drain again noted. IMPRESSION: 1.  Mild improvement of dilated loops of bowel. 2.  Nasogastric tube in place with tip below the gastroesophageal junction. Electronically signed by:  Rakesh Artis DO  1/5/2022 11:52 AM CST Workstation: [id number]    X-Ray KUB    Result Date: 1/4/2022  HISTORY: Small bowel obstruction, acute appendicitis with perforation. FINDINGS: Single abdominal radiograph obtained in 2 images at 1311 hours is compared to CT of 01/02/2022, and shows surgical drain overlying the right lower quadrant. There are multiple abnormally dilated loops of small bowel measuring up to 5 cm in diameter, with air in the stomach and small to moderate volume of stool scattered throughout the colon. No evidence of intraperitoneal free air. There are no suspicious calcifications overlying the kidneys or the ureters. No acute fractures or destructive osseous lesions. There are left upper quadrant splenic arterial vascular calcifications. IMPRESSION: Multiple abnormally dilated loops of small bowel, suggesting postoperative ileus or small bowel  obstruction. Electronically signed by:  Juan Taylor MD  1/4/2022 1:25 PM [facility] Workstation: [id number]    Pending Diagnostic Studies:     None         Medications:  Reconciled Home Medications:      Medication List      START taking these medications    ciprofloxacin HCl 500 MG tablet  Commonly known as: CIPRO  Take 1 tablet (500 mg total) by mouth 2 (two) times daily. for 7 days     HYDROcodone-acetaminophen 5-325 mg per tablet  Commonly known as: NORCO  Take 1 tablet by mouth every 8 (eight) hours as needed for Pain (pain).     metroNIDAZOLE 500 MG tablet  Commonly known as: FlagyL  Take 1 tablet (500 mg total) by mouth 3 (three) times daily. for 7 days        CONTINUE taking these medications    amLODIPine 10 MG tablet  Commonly known as: NORVASC  Take 1 tablet (10 mg total) by mouth once daily.     aspirin 81 MG EC tablet  Commonly known as: ECOTRIN  aspirin 81 mg tablet,delayed release   Take 1 tablet every day by oral route.     co-enzyme Q-10 30 mg capsule  Take 30 mg by mouth 3 (three) times daily.     Fish OiL 100-160-1,000 mg Cap  Generic drug: omega 3-dha-epa-fish oil  Fish Oil     hydroCHLOROthiazide 25 MG tablet  Commonly known as: HYDRODIURIL  Take 1 tablet (25 mg total) by mouth once daily.     MULTIVITAMIN 50 PLUS ORAL  multivitamin     MYRBETRIQ 50 mg Tb24  Generic drug: mirabegron  Take 1 tablet (50 mg total) by mouth once daily.     omeprazole 20 MG capsule  Commonly known as: PRILOSEC  Take 1 capsule by mouth once daily     rosuvastatin 10 MG tablet  Commonly known as: CRESTOR  Take 1 tablet (10 mg total) by mouth once daily.     tadalafiL 5 MG tablet  Commonly known as: CIALIS  Take 1 tablet (5 mg total) by mouth once daily.     telmisartan 80 MG Tab  Commonly known as: MICARDIS  Take 1 tablet by mouth once daily        STOP taking these medications    ibuprofen 800 MG tablet  Commonly known as: ADVIL,MOTRIN            Indwelling Lines/Drains at time of discharge:   Lines/Drains/Airways     Drain                  Closed/Suction Drain 01/11/22 1238 Right;Posterior Buttock Bulb 10 Fr. 1 day                Time spent on the discharge of patient: 33 minutes         Rosalinda Coleman MD  Department of Hospital Medicine  [facility]

## 2022-01-14 ENCOUNTER — OFFICE VISIT (OUTPATIENT)
Dept: SURGERY | Facility: CLINIC | Age: 72
End: 2022-01-14
Payer: COMMERCIAL

## 2022-01-14 VITALS
DIASTOLIC BLOOD PRESSURE: 75 MMHG | HEIGHT: 70 IN | HEART RATE: 80 BPM | WEIGHT: 159.63 LBS | RESPIRATION RATE: 16 BRPM | SYSTOLIC BLOOD PRESSURE: 129 MMHG | BODY MASS INDEX: 22.85 KG/M2 | TEMPERATURE: 97 F

## 2022-01-14 DIAGNOSIS — K35.33 ACUTE APPENDICITIS WITH PERFORATION, LOCALIZED PERITONITIS, AND ABSCESS, WITHOUT GANGRENE: Primary | ICD-10-CM

## 2022-01-14 LAB
BACTERIA SPEC AEROBE CULT: ABNORMAL
BACTERIA SPEC AEROBE CULT: ABNORMAL
BACTERIA SPEC ANAEROBE CULT: NORMAL

## 2022-01-14 PROCEDURE — 1159F MED LIST DOCD IN RCRD: CPT | Mod: CPTII,S$GLB,, | Performed by: SURGERY

## 2022-01-14 PROCEDURE — 1101F PR PT FALLS ASSESS DOC 0-1 FALLS W/OUT INJ PAST YR: ICD-10-PCS | Mod: CPTII,S$GLB,, | Performed by: SURGERY

## 2022-01-14 PROCEDURE — 1126F AMNT PAIN NOTED NONE PRSNT: CPT | Mod: CPTII,S$GLB,, | Performed by: SURGERY

## 2022-01-14 PROCEDURE — 3288F PR FALLS RISK ASSESSMENT DOCUMENTED: ICD-10-PCS | Mod: CPTII,S$GLB,, | Performed by: SURGERY

## 2022-01-14 PROCEDURE — 1159F PR MEDICATION LIST DOCUMENTED IN MEDICAL RECORD: ICD-10-PCS | Mod: CPTII,S$GLB,, | Performed by: SURGERY

## 2022-01-14 PROCEDURE — 3074F SYST BP LT 130 MM HG: CPT | Mod: CPTII,S$GLB,, | Performed by: SURGERY

## 2022-01-14 PROCEDURE — 99024 POSTOP FOLLOW-UP VISIT: CPT | Mod: S$GLB,,, | Performed by: SURGERY

## 2022-01-14 PROCEDURE — 3008F PR BODY MASS INDEX (BMI) DOCUMENTED: ICD-10-PCS | Mod: CPTII,S$GLB,, | Performed by: SURGERY

## 2022-01-14 PROCEDURE — 99024 PR POST-OP FOLLOW-UP VISIT: ICD-10-PCS | Mod: S$GLB,,, | Performed by: SURGERY

## 2022-01-14 PROCEDURE — 1126F PR PAIN SEVERITY QUANTIFIED, NO PAIN PRESENT: ICD-10-PCS | Mod: CPTII,S$GLB,, | Performed by: SURGERY

## 2022-01-14 PROCEDURE — 3078F PR MOST RECENT DIASTOLIC BLOOD PRESSURE < 80 MM HG: ICD-10-PCS | Mod: CPTII,S$GLB,, | Performed by: SURGERY

## 2022-01-14 PROCEDURE — 99999 PR PBB SHADOW E&M-EST. PATIENT-LVL IV: CPT | Mod: PBBFAC,,, | Performed by: SURGERY

## 2022-01-14 PROCEDURE — 3288F FALL RISK ASSESSMENT DOCD: CPT | Mod: CPTII,S$GLB,, | Performed by: SURGERY

## 2022-01-14 PROCEDURE — 3074F PR MOST RECENT SYSTOLIC BLOOD PRESSURE < 130 MM HG: ICD-10-PCS | Mod: CPTII,S$GLB,, | Performed by: SURGERY

## 2022-01-14 PROCEDURE — 99999 PR PBB SHADOW E&M-EST. PATIENT-LVL IV: ICD-10-PCS | Mod: PBBFAC,,, | Performed by: SURGERY

## 2022-01-14 PROCEDURE — 3078F DIAST BP <80 MM HG: CPT | Mod: CPTII,S$GLB,, | Performed by: SURGERY

## 2022-01-14 PROCEDURE — 1101F PT FALLS ASSESS-DOCD LE1/YR: CPT | Mod: CPTII,S$GLB,, | Performed by: SURGERY

## 2022-01-14 PROCEDURE — 3008F BODY MASS INDEX DOCD: CPT | Mod: CPTII,S$GLB,, | Performed by: SURGERY

## 2022-01-14 NOTE — PROGRESS NOTES
Doing well tolerating diet  Very little drainage from drain    Vitals:    01/14/22 0841   BP: 129/75   Pulse: 80   Resp: 16   Temp: 97.4 °F (36.3 °C)     Drain removed- ss output    Abdomen soft and benign    A/P  Abdomen benign doing well  Path reviewed  No more drain

## 2022-01-15 LAB
GRAM STN SPEC: NORMAL
GRAM STN SPEC: NORMAL

## 2022-01-18 ENCOUNTER — OFFICE VISIT (OUTPATIENT)
Dept: FAMILY MEDICINE | Facility: CLINIC | Age: 72
End: 2022-01-18
Payer: COMMERCIAL

## 2022-01-18 VITALS
HEIGHT: 70 IN | WEIGHT: 162.19 LBS | HEART RATE: 95 BPM | BODY MASS INDEX: 23.22 KG/M2 | DIASTOLIC BLOOD PRESSURE: 78 MMHG | OXYGEN SATURATION: 97 % | SYSTOLIC BLOOD PRESSURE: 124 MMHG | TEMPERATURE: 98 F

## 2022-01-18 DIAGNOSIS — K21.9 GASTROESOPHAGEAL REFLUX DISEASE WITHOUT ESOPHAGITIS: ICD-10-CM

## 2022-01-18 DIAGNOSIS — Z09 HOSPITAL DISCHARGE FOLLOW-UP: Primary | ICD-10-CM

## 2022-01-18 DIAGNOSIS — I10 ESSENTIAL HYPERTENSION: ICD-10-CM

## 2022-01-18 PROCEDURE — 99496 TRANSJ CARE MGMT HIGH F2F 7D: CPT | Mod: S$GLB,,, | Performed by: NURSE PRACTITIONER

## 2022-01-18 PROCEDURE — 3074F PR MOST RECENT SYSTOLIC BLOOD PRESSURE < 130 MM HG: ICD-10-PCS | Mod: S$GLB,,, | Performed by: NURSE PRACTITIONER

## 2022-01-18 PROCEDURE — 1126F PR PAIN SEVERITY QUANTIFIED, NO PAIN PRESENT: ICD-10-PCS | Mod: S$GLB,,, | Performed by: NURSE PRACTITIONER

## 2022-01-18 PROCEDURE — 3288F PR FALLS RISK ASSESSMENT DOCUMENTED: ICD-10-PCS | Mod: S$GLB,,, | Performed by: NURSE PRACTITIONER

## 2022-01-18 PROCEDURE — 1101F PT FALLS ASSESS-DOCD LE1/YR: CPT | Mod: S$GLB,,, | Performed by: NURSE PRACTITIONER

## 2022-01-18 PROCEDURE — 1159F PR MEDICATION LIST DOCUMENTED IN MEDICAL RECORD: ICD-10-PCS | Mod: S$GLB,,, | Performed by: NURSE PRACTITIONER

## 2022-01-18 PROCEDURE — 3078F DIAST BP <80 MM HG: CPT | Mod: S$GLB,,, | Performed by: NURSE PRACTITIONER

## 2022-01-18 PROCEDURE — 1159F MED LIST DOCD IN RCRD: CPT | Mod: S$GLB,,, | Performed by: NURSE PRACTITIONER

## 2022-01-18 PROCEDURE — 1101F PR PT FALLS ASSESS DOC 0-1 FALLS W/OUT INJ PAST YR: ICD-10-PCS | Mod: S$GLB,,, | Performed by: NURSE PRACTITIONER

## 2022-01-18 PROCEDURE — 3008F PR BODY MASS INDEX (BMI) DOCUMENTED: ICD-10-PCS | Mod: S$GLB,,, | Performed by: NURSE PRACTITIONER

## 2022-01-18 PROCEDURE — 1160F PR REVIEW ALL MEDS BY PRESCRIBER/CLIN PHARMACIST DOCUMENTED: ICD-10-PCS | Mod: S$GLB,,, | Performed by: NURSE PRACTITIONER

## 2022-01-18 PROCEDURE — 1160F RVW MEDS BY RX/DR IN RCRD: CPT | Mod: S$GLB,,, | Performed by: NURSE PRACTITIONER

## 2022-01-18 PROCEDURE — 3074F SYST BP LT 130 MM HG: CPT | Mod: S$GLB,,, | Performed by: NURSE PRACTITIONER

## 2022-01-18 PROCEDURE — 3008F BODY MASS INDEX DOCD: CPT | Mod: S$GLB,,, | Performed by: NURSE PRACTITIONER

## 2022-01-18 PROCEDURE — 3288F FALL RISK ASSESSMENT DOCD: CPT | Mod: S$GLB,,, | Performed by: NURSE PRACTITIONER

## 2022-01-18 PROCEDURE — 3078F PR MOST RECENT DIASTOLIC BLOOD PRESSURE < 80 MM HG: ICD-10-PCS | Mod: S$GLB,,, | Performed by: NURSE PRACTITIONER

## 2022-01-18 PROCEDURE — 1126F AMNT PAIN NOTED NONE PRSNT: CPT | Mod: S$GLB,,, | Performed by: NURSE PRACTITIONER

## 2022-01-18 PROCEDURE — 99496 TRANSITIONAL CARE MANAGE SERVICE 7 DAY DISCHARGE: ICD-10-PCS | Mod: S$GLB,,, | Performed by: NURSE PRACTITIONER

## 2022-01-18 RX ORDER — HYDROCHLOROTHIAZIDE 25 MG/1
25 TABLET ORAL DAILY
Qty: 90 TABLET | Refills: 1 | Status: SHIPPED | OUTPATIENT
Start: 2022-01-18 | End: 2022-09-06

## 2022-01-18 RX ORDER — AMLODIPINE BESYLATE 10 MG/1
10 TABLET ORAL DAILY
Qty: 90 TABLET | Refills: 1 | Status: SHIPPED | OUTPATIENT
Start: 2022-01-18 | End: 2022-09-06

## 2022-01-18 RX ORDER — OMEPRAZOLE 20 MG/1
20 CAPSULE, DELAYED RELEASE ORAL DAILY
Qty: 90 CAPSULE | Refills: 1 | Status: SHIPPED | OUTPATIENT
Start: 2022-01-18 | End: 2022-07-12

## 2022-01-18 NOTE — PROGRESS NOTES
SUBJECTIVE:    Patient ID: Onesimo Paula is a 71 y.o. male.    Chief Complaint: Hospital Follow Up ([facility] dc 1/13/22)    Presents for hospital discharge follow-up: spent 11 nights at [facility] for appendicitis with perforation and abscess, developed postop ileus, has already seen Dr. Goss for post-op follow-up & drain removal, states he is feeling better, bowel movements are becoming more regular/consistent, still having fatigue, denies pain, states he will finish his antibiotics in the next couple of days, hospital labs show mild anemia and elevated WBCs, states he doesn't feel ready to return to work just yet      Admit Date: 1/2/22   Discharge Date: 1/13/22  Discharge Facility: Hospital    Medication Reconciliation:  Medications changed/added/deleted. ABX prescribed at discharge  New Prescriptions filled after discharge: yes  Discharge summary reviewed:  yes  Pending test results at discharge reviewed:   not applicable  Follow up appointments scheduled:  yes   with General Surgery  Follow up labs/tests ordered:   yes  Home Health ordered on discharge:   not applicable  Home Health company name: n/a  DME ordered at discharge:   not applicable  How patient is feeling since discharge from the hospital?  Still fatigued, but getting better each day     Patient follow up phone call documented on separate encounter.    No results displayed because visit has over 200 results.      Lab Visit on 11/23/2021   Component Date Value Ref Range Status    Creatinine 11/23/2021 1.0  0.5 - 1.4 mg/dL Final    eGFR if African American 11/23/2021 >60  >60 mL/min/1.73 m^2 Final    eGFR if non African American 11/23/2021 >60  >60 mL/min/1.73 m^2 Final   Lab Visit on 08/10/2021   Component Date Value Ref Range Status    Hemoglobin A1C 08/10/2021 6.0  4.5 - 6.2 % Final    Estimated Avg Glucose 08/10/2021 126  68 - 131 mg/dL Final    TSH 08/10/2021 3.270  0.340 - 5.600 uIU/mL Final       Past Medical History:   Diagnosis Date    Acid  reflux     Acquired tricuspid valve insufficiency     Anemia     Borderline diabetes     BPH (benign prostatic hyperplasia)     Cataract     1/16/20 rt eye, 1/30/20- Lt eye    Coronary artery disease     Hypertension     Pulmonary nodules     Skin cancer      Past Surgical History:   Procedure Laterality Date    CYSTOSCOPY WITH TRANSURETHRAL DESTRUCTION OF PROSTATE,RFA N/A 10/21/2020    Procedure: CYSTOSCOPY WITH TRANSURETHRAL DESTRUCTION OF PROSTATE,RFA;  Surgeon: La Nena James MD;  Location: [facility] OR;  Service: Urology;  Laterality: N/A;  please make sure [facility] rep available [phone]    EYE SURGERY      for cataracts, rt eye 1/16/20, left eye 1/30/20    HERNIA REPAIR  12/1999    HERNIA REPAIR  05/2004    KNEE ARTHROSCOPY Left 04/12/2018    LAPAROSCOPIC APPENDECTOMY N/A 1/2/2022    Procedure: APPENDECTOMY, LAPAROSCOPIC;  Surgeon: Sam Goss MD;  Location: [facility] OR;  Service: General;  Laterality: N/A;    SKIN LESION EXCISION  10/2009    Neck    SKIN LESION EXCISION  2015    STAPEDECTOMY Right 03/2001    TRANSRECTAL ULTRASOUND EXAMINATION N/A 8/17/2020    Procedure: TRANSRECTAL ULTRASOUND;  Surgeon: La Nena James MD;  Location[facility] OR;  Service: Urology;  Laterality: N/A;     Family History   Problem Relation Age of Onset    Cancer Mother         skin    Stroke Mother     Heart failure Father     Kidney disease Father        Marital Status:   Alcohol History:  reports current alcohol use.  Tobacco History:  reports that he quit smoking about 25 years ago. He has never used smokeless tobacco.  Drug History:  reports no history of drug use.    Review of patient's allergies indicates:  No Known Allergies    Current Outpatient Medications:     aspirin (ECOTRIN) 81 MG EC tablet, aspirin 81 mg tablet,delayed release  Take 1 tablet every day by oral route., Disp: , Rfl:     ciprofloxacin HCl (CIPRO) 500 MG tablet, Take 1 tablet (500 mg total) by mouth 2 (two)  times daily. for 7 days, Disp: 14 tablet, Rfl: 0    co-enzyme Q-10 30 mg capsule, Take 30 mg by mouth 3 (three) times daily., Disp: , Rfl:     HYDROcodone-acetaminophen (NORCO) 5-325 mg per tablet, Take 1 tablet by mouth every 8 (eight) hours as needed for Pain (pain)., Disp: 15 tablet, Rfl: 0    metroNIDAZOLE (FLAGYL) 500 MG tablet, Take 1 tablet (500 mg total) by mouth 3 (three) times daily. for 7 days, Disp: 21 tablet, Rfl: 0    mirabegron (MYRBETRIQ) 50 mg Tb24, Take 1 tablet (50 mg total) by mouth once daily., Disp: 90 tablet, Rfl: 3    multivit with minerals/lutein (MULTIVITAMIN 50 PLUS ORAL), multivitamin, Disp: , Rfl:     omega 3-dha-epa-fish oil (FISH OIL) 100-160-1,000 mg Cap, Fish Oil, Disp: , Rfl:     rosuvastatin (CRESTOR) 10 MG tablet, Take 1 tablet (10 mg total) by mouth once daily., Disp: 90 tablet, Rfl: 3    tadalafiL (CIALIS) 5 MG tablet, Take 1 tablet (5 mg total) by mouth once daily., Disp: 90 tablet, Rfl: 3    telmisartan (MICARDIS) 80 MG Tab, Take 1 tablet by mouth once daily, Disp: 90 tablet, Rfl: 1    amLODIPine (NORVASC) 10 MG tablet, Take 1 tablet (10 mg total) by mouth once daily., Disp: 90 tablet, Rfl: 1    hydroCHLOROthiazide (HYDRODIURIL) 25 MG tablet, Take 1 tablet (25 mg total) by mouth once daily., Disp: 90 tablet, Rfl: 1    omeprazole (PRILOSEC) 20 MG capsule, Take 1 capsule (20 mg total) by mouth once daily., Disp: 90 capsule, Rfl: 1    Review of Systems   Constitutional: Positive for activity change and fatigue. Negative for appetite change and fever.   HENT: Negative for congestion, ear pain, hearing loss, postnasal drip, sinus pressure, sinus pain, sneezing and sore throat.    Eyes: Negative for photophobia and pain.   Respiratory: Negative for cough, chest tightness, shortness of breath and wheezing.    Cardiovascular: Negative for chest pain and leg swelling.   Gastrointestinal: Negative for abdominal distention, abdominal pain, blood in stool, constipation,  "diarrhea, nausea and vomiting.   Endocrine: Negative for cold intolerance, heat intolerance, polydipsia and polyuria.   Genitourinary: Negative for difficulty urinating, dysuria, flank pain, frequency, hematuria and urgency.   Musculoskeletal: Positive for arthralgias. Negative for back pain, joint swelling, myalgias and neck pain.   Skin: Negative for pallor and rash.   Allergic/Immunologic: Negative for environmental allergies and food allergies.   Neurological: Negative for dizziness, weakness, light-headedness and headaches.   Hematological: Does not bruise/bleed easily.   Psychiatric/Behavioral: Negative for confusion, decreased concentration and sleep disturbance. The patient is not nervous/anxious.         Objective:      Vitals:    01/18/22 1022   BP: 124/78   Pulse: 95   Temp: 97.5 °F (36.4 °C)   TempSrc: Oral   SpO2: 97%   Weight: 73.6 kg (162 lb 3.2 oz)   Height: 5' 10" (1.778 m)     Physical Exam  Vitals and nursing note reviewed.   Constitutional:       General: He is not in acute distress.Vital signs are normal.      Appearance: Normal appearance. He is well-developed, normal weight and well-nourished.   HENT:      Head: Normocephalic and atraumatic.      Right Ear: Hearing normal.      Left Ear: Hearing normal.      Nose: Nose normal. No rhinorrhea.      Mouth/Throat:      Mouth: Mucous membranes are normal.   Eyes:      General: Lids are normal.         Right eye: No discharge.         Left eye: No discharge.      Conjunctiva/sclera: Conjunctivae normal.      Right eye: Right conjunctiva is not injected.      Left eye: Left conjunctiva is not injected.      Pupils: Pupils are equal, round, and reactive to light. Pupils are equal.      Right eye: Pupil is round and reactive.      Left eye: Pupil is round and reactive.   Neck:      Thyroid: No thyromegaly.      Vascular: No JVD.      Trachea: Trachea normal. No tracheal deviation.   Cardiovascular:      Rate and Rhythm: Normal rate and regular rhythm. "      Pulses: Intact distal pulses.           Radial pulses are 2+ on the right side and 2+ on the left side.      Heart sounds: Normal heart sounds. No murmur heard.  No friction rub. No gallop.    Pulmonary:      Effort: Pulmonary effort is normal. No respiratory distress.      Breath sounds: Normal breath sounds. No stridor. No decreased breath sounds, wheezing, rhonchi or rales.   Abdominal:      General: Bowel sounds are normal. There is no distension.      Palpations: Abdomen is soft. Abdomen is not rigid.      Tenderness: There is no abdominal tenderness. There is no guarding.   Musculoskeletal:         General: No edema. Normal range of motion.      Cervical back: Normal range of motion and neck supple.   Lymphadenopathy:      Cervical: No cervical adenopathy.   Skin:     General: Skin is warm and dry.      Capillary Refill: Capillary refill takes less than 2 seconds.      Coloration: Skin is not pale.      Findings: No lesion or rash.      Nails: There is no cyanosis.   Neurological:      Mental Status: He is alert and oriented to person, place, and time.      Motor: No atrophy.      Coordination: He displays a negative Romberg sign. Coordination normal.      Gait: Gait normal.      Deep Tendon Reflexes: Strength normal.   Psychiatric:         Attention and Perception: He is attentive.         Mood and Affect: Mood and affect normal.         Speech: Speech normal.         Behavior: Behavior normal.         Thought Content: Thought content normal.         Cognition and Memory: Cognition and memory normal.         Judgment: Judgment normal.         Assessment:       1. Hospital discharge follow-up    2. Essential hypertension    3. Gastroesophageal reflux disease without esophagitis         Plan:       Hospital discharge follow-up  -     CBC Auto Differential; Future; Expected date: 02/18/2022  - complete entire course of ABX  - follow-up with specialists as scheduled    Essential hypertension  -      hydroCHLOROthiazide (HYDRODIURIL) 25 MG tablet; Take 1 tablet (25 mg total) by mouth once daily.  Dispense: 90 tablet; Refill: 1  -     amLODIPine (NORVASC) 10 MG tablet; Take 1 tablet (10 mg total) by mouth once daily.  Dispense: 90 tablet; Refill: 1  -     CBC Auto Differential; Future; Expected date: 02/18/2022    Gastroesophageal reflux disease without esophagitis  -     omeprazole (PRILOSEC) 20 MG capsule; Take 1 capsule (20 mg total) by mouth once daily.  Dispense: 90 capsule; Refill: 1      Follow up if symptoms worsen or fail to improve.

## 2022-01-19 NOTE — PHYSICIAN QUERY
PT Name: Onesimo Paula  MR #: [id number]    DOCUMENTATION CLARIFICATION     CDS/: Franny Vu               Contact information: [phone] or through epic messenger    This form is a permanent document in the medical record.     Query Date: January 19, 2022    Dear Provider,  By submitting this query, we are merely seeking further clarification of documentation. Please utilize your independent clinical judgment when addressing the question(s) below.    The medical record contains the following:  Supporting Clinical Findings Location in Medical Record   Acute Appendicitis with perforation and abscess  Following the appendix to its base the base was frankly necrotic and had stool coming from it.  There was a small surrounding abscess which  was all irrigated and cleaned.      Patient with prolonged hospital admission    Repeat CT 1/9 with multiple intra-abdominal rim enhancing fluid collection, largest 6.8 x 4 0.6 cm    CT guided abscess drainage  Successful CT guided deep pelvic abscess drain placement OR note 1/2/22            DC summary    Radiology [facility]      Radiology [facility] 1/11/22       Please clarify if the postprocedural intraabdominal abscess (as it relates to Appendectecomy) is:      [  ] Complication    [ x ] Not a Complication

## 2022-02-03 ENCOUNTER — OFFICE VISIT (OUTPATIENT)
Dept: HEPATOLOGY | Facility: CLINIC | Age: 72
End: 2022-02-03
Payer: COMMERCIAL

## 2022-02-03 VITALS
HEART RATE: 76 BPM | HEIGHT: 70 IN | TEMPERATURE: 98 F | SYSTOLIC BLOOD PRESSURE: 127 MMHG | WEIGHT: 160.69 LBS | BODY MASS INDEX: 23.01 KG/M2 | RESPIRATION RATE: 17 BRPM | DIASTOLIC BLOOD PRESSURE: 65 MMHG | OXYGEN SATURATION: 97 %

## 2022-02-03 DIAGNOSIS — K76.89 LIVER CYST: Primary | ICD-10-CM

## 2022-02-03 PROCEDURE — 1160F RVW MEDS BY RX/DR IN RCRD: CPT | Mod: CPTII,S$GLB,, | Performed by: INTERNAL MEDICINE

## 2022-02-03 PROCEDURE — 3288F FALL RISK ASSESSMENT DOCD: CPT | Mod: CPTII,S$GLB,, | Performed by: INTERNAL MEDICINE

## 2022-02-03 PROCEDURE — 1125F PR PAIN SEVERITY QUANTIFIED, PAIN PRESENT: ICD-10-PCS | Mod: CPTII,S$GLB,, | Performed by: INTERNAL MEDICINE

## 2022-02-03 PROCEDURE — 1111F DSCHRG MED/CURRENT MED MERGE: CPT | Mod: CPTII,S$GLB,, | Performed by: INTERNAL MEDICINE

## 2022-02-03 PROCEDURE — 1101F PT FALLS ASSESS-DOCD LE1/YR: CPT | Mod: CPTII,S$GLB,, | Performed by: INTERNAL MEDICINE

## 2022-02-03 PROCEDURE — 99204 OFFICE O/P NEW MOD 45 MIN: CPT | Mod: S$GLB,,, | Performed by: INTERNAL MEDICINE

## 2022-02-03 PROCEDURE — 3078F PR MOST RECENT DIASTOLIC BLOOD PRESSURE < 80 MM HG: ICD-10-PCS | Mod: CPTII,S$GLB,, | Performed by: INTERNAL MEDICINE

## 2022-02-03 PROCEDURE — 3078F DIAST BP <80 MM HG: CPT | Mod: CPTII,S$GLB,, | Performed by: INTERNAL MEDICINE

## 2022-02-03 PROCEDURE — 3008F BODY MASS INDEX DOCD: CPT | Mod: CPTII,S$GLB,, | Performed by: INTERNAL MEDICINE

## 2022-02-03 PROCEDURE — 3288F PR FALLS RISK ASSESSMENT DOCUMENTED: ICD-10-PCS | Mod: CPTII,S$GLB,, | Performed by: INTERNAL MEDICINE

## 2022-02-03 PROCEDURE — 1159F PR MEDICATION LIST DOCUMENTED IN MEDICAL RECORD: ICD-10-PCS | Mod: CPTII,S$GLB,, | Performed by: INTERNAL MEDICINE

## 2022-02-03 PROCEDURE — 1101F PR PT FALLS ASSESS DOC 0-1 FALLS W/OUT INJ PAST YR: ICD-10-PCS | Mod: CPTII,S$GLB,, | Performed by: INTERNAL MEDICINE

## 2022-02-03 PROCEDURE — 3008F PR BODY MASS INDEX (BMI) DOCUMENTED: ICD-10-PCS | Mod: CPTII,S$GLB,, | Performed by: INTERNAL MEDICINE

## 2022-02-03 PROCEDURE — 1160F PR REVIEW ALL MEDS BY PRESCRIBER/CLIN PHARMACIST DOCUMENTED: ICD-10-PCS | Mod: CPTII,S$GLB,, | Performed by: INTERNAL MEDICINE

## 2022-02-03 PROCEDURE — 99999 PR PBB SHADOW E&M-EST. PATIENT-LVL V: ICD-10-PCS | Mod: PBBFAC,,, | Performed by: INTERNAL MEDICINE

## 2022-02-03 PROCEDURE — 99999 PR PBB SHADOW E&M-EST. PATIENT-LVL V: CPT | Mod: PBBFAC,,, | Performed by: INTERNAL MEDICINE

## 2022-02-03 PROCEDURE — 99204 PR OFFICE/OUTPT VISIT, NEW, LEVL IV, 45-59 MIN: ICD-10-PCS | Mod: S$GLB,,, | Performed by: INTERNAL MEDICINE

## 2022-02-03 PROCEDURE — 3074F SYST BP LT 130 MM HG: CPT | Mod: CPTII,S$GLB,, | Performed by: INTERNAL MEDICINE

## 2022-02-03 PROCEDURE — 1125F AMNT PAIN NOTED PAIN PRSNT: CPT | Mod: CPTII,S$GLB,, | Performed by: INTERNAL MEDICINE

## 2022-02-03 PROCEDURE — 1159F MED LIST DOCD IN RCRD: CPT | Mod: CPTII,S$GLB,, | Performed by: INTERNAL MEDICINE

## 2022-02-03 PROCEDURE — 3074F PR MOST RECENT SYSTOLIC BLOOD PRESSURE < 130 MM HG: ICD-10-PCS | Mod: CPTII,S$GLB,, | Performed by: INTERNAL MEDICINE

## 2022-02-03 PROCEDURE — 1111F PR DISCHARGE MEDS RECONCILED W/ CURRENT OUTPATIENT MED LIST: ICD-10-PCS | Mod: CPTII,S$GLB,, | Performed by: INTERNAL MEDICINE

## 2022-02-03 NOTE — PROGRESS NOTES
HEPATOLOGY CONSULTATION    Referring Physician: JAMES Rose FNP-C  Current Corresponding Physician: JAMES Rose FNP-C    Reason for Consultation: Consultation for evaluation of a liver cyst    History of Present Illness: Onesimo Paula is a 71 y.o. male who had an MRI of the abdomen that revealed a mildly complex liver cyst:    MRI abdo w wo contrast 12/7/21:  segment 5 there is a 4 cm lobular T1 hypointense T2 hyperintense lesion    CT abdo pelvis 1/2/22: The liver is hypodense compatible with fatty infiltration. There is a 3.5 cm cyst within the right lobe of the liver. There is no biliary duct dilatation.  CT abdo pelvis w contrast 1/9/22: Liver is normal size. Hepatic cyst again noted noted and is unchanged. No new liver lesions  CTA abdomen 1/12/22: Simple fluid attenuation     He denies abdo pain, N/V. He did recently have intraabdominal abscesses after an appendectomy but those have resolved and he has not symptoms.    Labs 1/13/22: ALT 24, AST 19, ALKP 52, Tbil 0.8    The patient denies any symptoms of decompensated cirrhosis, including no ascites or edema, cognitive problems that would suggest hepatic encephalopathy, or GI bleeding from varices.    Chief Complaint   Patient presents with    Liver cyst    Elevated Hepatic Enzymes    Fatty Liver       Past Medical History:   Diagnosis Date    Acid reflux     Acquired tricuspid valve insufficiency     Anemia     Borderline diabetes     BPH (benign prostatic hyperplasia)     Cataract     1/16/20 rt eye, 1/30/20- Lt eye    Coronary artery disease     Hypertension     Pulmonary nodules     Skin cancer      Outpatient Encounter Medications as of 2/3/2022   Medication Sig Dispense Refill    amLODIPine (NORVASC) 10 MG tablet Take 1 tablet (10 mg total) by mouth once daily. 90 tablet 1    aspirin (ECOTRIN) 81 MG EC tablet aspirin 81 mg tablet,delayed release   Take 1 tablet every day by oral route.      co-enzyme Q-10 30 mg  capsule Take 30 mg by mouth 3 (three) times daily.      hydroCHLOROthiazide (HYDRODIURIL) 25 MG tablet Take 1 tablet (25 mg total) by mouth once daily. 90 tablet 1    mirabegron (MYRBETRIQ) 50 mg Tb24 Take 1 tablet (50 mg total) by mouth once daily. 90 tablet 3    multivit with minerals/lutein (MULTIVITAMIN 50 PLUS ORAL) multivitamin      omega 3-dha-epa-fish oil (FISH OIL) 100-160-1,000 mg Cap Fish Oil      omeprazole (PRILOSEC) 20 MG capsule Take 1 capsule (20 mg total) by mouth once daily. 90 capsule 1    rosuvastatin (CRESTOR) 10 MG tablet Take 1 tablet (10 mg total) by mouth once daily. 90 tablet 3    tadalafiL (CIALIS) 5 MG tablet Take 1 tablet (5 mg total) by mouth once daily. 90 tablet 3    telmisartan (MICARDIS) 80 MG Tab Take 1 tablet by mouth once daily 90 tablet 1     No facility-administered encounter medications on file as of 2/3/2022.     Review of patient's allergies indicates:  No Known Allergies  Family History   Problem Relation Age of Onset    Cancer Mother         skin    Stroke Mother     Heart failure Father     Kidney disease Father        Social History     Socioeconomic History    Marital status:    Tobacco Use    Smoking status: Former Smoker     Quit date:      Years since quittin.1    Smokeless tobacco: Never Used   Substance and Sexual Activity    Alcohol use: Yes     Comment: occasional    Drug use: No    Sexual activity: Yes     Partners: Female     Social Determinants of Health     Financial Resource Strain: Low Risk     Difficulty of Paying Living Expenses: Not hard at all   Food Insecurity: No Food Insecurity    Worried About Running Out of Food in the Last Year: Never true    Ran Out of Food in the Last Year: Never true   Transportation Needs: No Transportation Needs    Lack of Transportation (Medical): No    Lack of Transportation (Non-Medical): No   Physical Activity: Inactive    Days of Exercise per Week: 0 days    Minutes of Exercise  per Session: 0 min   Stress: No Stress Concern Present    Feeling of Stress : Not at all   Social Connections: Unknown    Frequency of Communication with Friends and Family: Twice a week    Frequency of Social Gatherings with Friends and Family: Once a week    Active Member of Clubs or Organizations: Yes    Attends Club or Organization Meetings: Never    Marital Status:    Housing Stability: Low Risk     Unable to Pay for Housing in the Last Year: No    Number of Places Lived in the Last Year: 1    Unstable Housing in the Last Year: No     Review of Systems  Vitals:    02/03/22 1022   BP: 127/65   Pulse: 76   Resp: 17   Temp: 98.3 °F (36.8 °C)       Physical Exam    MELD-Na score: 8 at 1/13/2022  6:44 AM  MELD score: 8 at 1/13/2022  6:44 AM  Calculated from:  Serum Creatinine: 1.1 mg/dL at 1/13/2022  6:44 AM  Serum Sodium: 138 mmol/L (Using max of 137 mmol/L) at 1/13/2022  6:44 AM  Total Bilirubin: 0.8 mg/dL (Using min of 1 mg/dL) at 1/13/2022  6:44 AM  INR(ratio): 1.1 at 1/11/2022  9:00 AM  Age: 71 years    Lab Results   Component Value Date     01/13/2022     (H) 02/21/2019    BUN 20 01/13/2022    CREATININE 1.1 01/13/2022    CREATININE 1.11 02/21/2019    CALCIUM 8.2 (L) 01/13/2022     01/13/2022     02/21/2019    K 3.9 01/13/2022     01/13/2022     02/21/2019    PROT 6.2 01/13/2022    CO2 24 01/13/2022    ANIONGAP 8 01/13/2022    WBC 13.97 (H) 01/13/2022    RBC 3.92 (L) 01/13/2022    HGB 11.7 (L) 01/13/2022    HCT 36.2 (L) 01/13/2022    MCV 92 01/13/2022    MCH 29.8 01/13/2022    MCHC 32.3 01/13/2022     Lab Results   Component Value Date    RDW 14.0 01/13/2022     (H) 01/13/2022    MPV 9.8 01/13/2022    GRAN 10.8 (H) 01/13/2022    GRAN 77.2 (H) 01/13/2022    LYMPH 1.8 01/13/2022    LYMPH 12.7 (L) 01/13/2022    MONO 0.9 01/13/2022    MONO 6.7 01/13/2022    EOSINOPHIL 2.0 01/13/2022    BASOPHIL 0.6 01/13/2022    EOS 0.3 01/13/2022    BASO 0.09 01/13/2022     APTT 28.3 01/11/2022    CHOL 164 02/04/2021    TRIG 58 02/04/2021    HDL 45 02/04/2021    CHOLHDL 27.4 02/04/2021    TOTALCHOLEST 3.6 02/04/2021    ALBUMIN 2.6 (L) 01/13/2022    ALBUMIN 4.2 02/21/2019    AST 19 01/13/2022    ALT 24 01/13/2022    ALKPHOS 52 (L) 01/13/2022    MG 2.1 01/13/2022    INR 1.1 01/11/2022    PSA 4.6 (H) 02/04/2021       Assessment and Plan:  Patient Active Problem List   Diagnosis    Benign prostatic hypertrophy without urinary obstruction    Chest pain    Shoulder pain    Polyp of colon    Complete tear of left rotator cuff    Mixed hyperlipidemia    Blood urea abnormal    Esophagitis    Gastroesophageal reflux disease without esophagitis    Essential hypertension    Anemia    Nonrheumatic mitral valve regurgitation    Multiple pulmonary nodules    Dyspnea on exertion    Elevated transaminase level    Chronic kidney disease (CKD), active medical management without dialysis, stage 2 (mild)    Left arm pain    Hip pain, acute, right    Effusion of bursa of left knee    Acquired tricuspid valve insufficiency    Prerenal azotemia    Primary osteoarthritis of left knee    History of melanoma- approx 2015 right forearm - resection only    Screening for prostate cancer    Prediabetes    Primary osteoarthritis of right knee    Acute appendicitis with perforation, localized peritonitis, and abscess, without gangrene    Postprocedural intraabdominal abscess    Leucocytosis    Malnutrition of moderate degree    Atelectasis     Onesimo Paula is a 71 y.o. male with a single liver cyst. The ct scan suggests it is simple and he is asymptomatic. I am recommending a limited US to see if it can be easily visualized. And if so, then repeat US in 6 months. Return 6 months

## 2022-02-13 LAB — FUNGUS SPEC CULT: NORMAL

## 2022-02-16 ENCOUNTER — HOSPITAL ENCOUNTER (OUTPATIENT)
Dept: RADIOLOGY | Facility: HOSPITAL | Age: 72
Discharge: HOME | End: 2022-02-16
Attending: INTERNAL MEDICINE
Payer: COMMERCIAL

## 2022-02-16 ENCOUNTER — OFFICE VISIT (OUTPATIENT)
Dept: SURGERY | Facility: CLINIC | Age: 72
End: 2022-02-16
Payer: COMMERCIAL

## 2022-02-16 ENCOUNTER — TELEPHONE (OUTPATIENT)
Dept: HEPATOLOGY | Facility: CLINIC | Age: 72
End: 2022-02-16
Payer: COMMERCIAL

## 2022-02-16 VITALS
DIASTOLIC BLOOD PRESSURE: 60 MMHG | HEIGHT: 70 IN | BODY MASS INDEX: 22.57 KG/M2 | WEIGHT: 157.63 LBS | SYSTOLIC BLOOD PRESSURE: 107 MMHG | RESPIRATION RATE: 16 BRPM | TEMPERATURE: 98 F | HEART RATE: 64 BPM

## 2022-02-16 DIAGNOSIS — T81.43XA POSTPROCEDURAL INTRAABDOMINAL ABSCESS: Primary | ICD-10-CM

## 2022-02-16 DIAGNOSIS — K76.89 LIVER CYST: ICD-10-CM

## 2022-02-16 DIAGNOSIS — K76.89 LIVER CYST: Primary | ICD-10-CM

## 2022-02-16 PROCEDURE — 99024 POSTOP FOLLOW-UP VISIT: CPT | Mod: S$GLB,,, | Performed by: SURGERY

## 2022-02-16 PROCEDURE — 99999 PR PBB SHADOW E&M-EST. PATIENT-LVL IV: CPT | Mod: PBBFAC,,, | Performed by: SURGERY

## 2022-02-16 PROCEDURE — 1126F PR PAIN SEVERITY QUANTIFIED, NO PAIN PRESENT: ICD-10-PCS | Mod: CPTII,S$GLB,, | Performed by: SURGERY

## 2022-02-16 PROCEDURE — 3074F PR MOST RECENT SYSTOLIC BLOOD PRESSURE < 130 MM HG: ICD-10-PCS | Mod: CPTII,S$GLB,, | Performed by: SURGERY

## 2022-02-16 PROCEDURE — 1159F MED LIST DOCD IN RCRD: CPT | Mod: CPTII,S$GLB,, | Performed by: SURGERY

## 2022-02-16 PROCEDURE — 1101F PT FALLS ASSESS-DOCD LE1/YR: CPT | Mod: CPTII,S$GLB,, | Performed by: SURGERY

## 2022-02-16 PROCEDURE — 3288F PR FALLS RISK ASSESSMENT DOCUMENTED: ICD-10-PCS | Mod: CPTII,S$GLB,, | Performed by: SURGERY

## 2022-02-16 PROCEDURE — 3008F PR BODY MASS INDEX (BMI) DOCUMENTED: ICD-10-PCS | Mod: CPTII,S$GLB,, | Performed by: SURGERY

## 2022-02-16 PROCEDURE — 3074F SYST BP LT 130 MM HG: CPT | Mod: CPTII,S$GLB,, | Performed by: SURGERY

## 2022-02-16 PROCEDURE — 3288F FALL RISK ASSESSMENT DOCD: CPT | Mod: CPTII,S$GLB,, | Performed by: SURGERY

## 2022-02-16 PROCEDURE — 76705 ECHO EXAM OF ABDOMEN: CPT | Mod: TC

## 2022-02-16 PROCEDURE — 1159F PR MEDICATION LIST DOCUMENTED IN MEDICAL RECORD: ICD-10-PCS | Mod: CPTII,S$GLB,, | Performed by: SURGERY

## 2022-02-16 PROCEDURE — 99999 PR PBB SHADOW E&M-EST. PATIENT-LVL IV: ICD-10-PCS | Mod: PBBFAC,,, | Performed by: SURGERY

## 2022-02-16 PROCEDURE — 1126F AMNT PAIN NOTED NONE PRSNT: CPT | Mod: CPTII,S$GLB,, | Performed by: SURGERY

## 2022-02-16 PROCEDURE — 99024 PR POST-OP FOLLOW-UP VISIT: ICD-10-PCS | Mod: S$GLB,,, | Performed by: SURGERY

## 2022-02-16 PROCEDURE — 1101F PR PT FALLS ASSESS DOC 0-1 FALLS W/OUT INJ PAST YR: ICD-10-PCS | Mod: CPTII,S$GLB,, | Performed by: SURGERY

## 2022-02-16 PROCEDURE — 3078F PR MOST RECENT DIASTOLIC BLOOD PRESSURE < 80 MM HG: ICD-10-PCS | Mod: CPTII,S$GLB,, | Performed by: SURGERY

## 2022-02-16 PROCEDURE — 3008F BODY MASS INDEX DOCD: CPT | Mod: CPTII,S$GLB,, | Performed by: SURGERY

## 2022-02-16 PROCEDURE — 3078F DIAST BP <80 MM HG: CPT | Mod: CPTII,S$GLB,, | Performed by: SURGERY

## 2022-02-16 NOTE — TELEPHONE ENCOUNTER
Ma contacted pt and got us scheduled in 6 months , pt wanted to know will he need to follow up afterwards

## 2022-02-16 NOTE — PROGRESS NOTES
No complaints, no fever, having regular bowel movements and tolerating regular diet.    Denies any abdominal pain    Drain has been out    Vitals:    02/16/22 0844   BP: 107/60   Pulse: 64   Resp: 16   Temp: 97.9 °F (36.6 °C)         Abdomen is soft and benign with well-healed incisions      Status post appendectomy with postoperative abscess drained via Interventional Radiology.  Drain is out he is doing very well.  No further signs of infection.  I do not see the benefit and getting a CT scan on an asymptomatic patient and have encouraged him to come back to clinic or go to the ER should he develop any abdominal pain, fever, out of the ordinary symptoms.

## 2022-02-25 ENCOUNTER — PATIENT MESSAGE (OUTPATIENT)
Dept: HEPATOLOGY | Facility: CLINIC | Age: 72
End: 2022-02-25
Payer: COMMERCIAL

## 2022-02-25 ENCOUNTER — TELEPHONE (OUTPATIENT)
Dept: HEPATOLOGY | Facility: CLINIC | Age: 72
End: 2022-02-25
Payer: COMMERCIAL

## 2022-02-25 LAB
ACID FAST MOD KINY STN SPEC: NORMAL
MYCOBACTERIUM SPEC QL CULT: NORMAL

## 2022-03-02 ENCOUNTER — LAB VISIT (OUTPATIENT)
Dept: LAB | Facility: HOSPITAL | Age: 72
End: 2022-03-02
Attending: NURSE PRACTITIONER
Payer: COMMERCIAL

## 2022-03-02 DIAGNOSIS — I10 ESSENTIAL HYPERTENSION: ICD-10-CM

## 2022-03-02 DIAGNOSIS — Z09 HOSPITAL DISCHARGE FOLLOW-UP: ICD-10-CM

## 2022-03-02 LAB
BASOPHILS # BLD AUTO: 0.08 K/UL (ref 0–0.2)
BASOPHILS NFR BLD: 1.3 % (ref 0–1.9)
DIFFERENTIAL METHOD: ABNORMAL
EOSINOPHIL # BLD AUTO: 0.3 K/UL (ref 0–0.5)
EOSINOPHIL NFR BLD: 4.3 % (ref 0–8)
ERYTHROCYTE [DISTWIDTH] IN BLOOD BY AUTOMATED COUNT: 14.6 % (ref 11.5–14.5)
HCT VFR BLD AUTO: 38.3 % (ref 40–54)
HGB BLD-MCNC: 11.8 G/DL (ref 14–18)
IMM GRANULOCYTES # BLD AUTO: 0.02 K/UL (ref 0–0.04)
IMM GRANULOCYTES NFR BLD AUTO: 0.3 % (ref 0–0.5)
LYMPHOCYTES # BLD AUTO: 2.1 K/UL (ref 1–4.8)
LYMPHOCYTES NFR BLD: 34.8 % (ref 18–48)
MCH RBC QN AUTO: 29.1 PG (ref 27–31)
MCHC RBC AUTO-ENTMCNC: 30.8 G/DL (ref 32–36)
MCV RBC AUTO: 95 FL (ref 82–98)
MONOCYTES # BLD AUTO: 0.8 K/UL (ref 0.3–1)
MONOCYTES NFR BLD: 12.7 % (ref 4–15)
NEUTROPHILS # BLD AUTO: 2.8 K/UL (ref 1.8–7.7)
NEUTROPHILS NFR BLD: 46.6 % (ref 38–73)
NRBC BLD-RTO: 0 /100 WBC
PLATELET # BLD AUTO: 269 K/UL (ref 150–450)
PMV BLD AUTO: 9.8 FL (ref 9.2–12.9)
RBC # BLD AUTO: 4.05 M/UL (ref 4.6–6.2)
WBC # BLD AUTO: 6.07 K/UL (ref 3.9–12.7)

## 2022-03-02 PROCEDURE — 36415 COLL VENOUS BLD VENIPUNCTURE: CPT | Performed by: NURSE PRACTITIONER

## 2022-03-02 PROCEDURE — 85025 COMPLETE CBC W/AUTO DIFF WBC: CPT | Performed by: NURSE PRACTITIONER

## 2022-03-14 ENCOUNTER — OFFICE VISIT (OUTPATIENT)
Dept: NEUROSURGERY | Facility: CLINIC | Age: 72
End: 2022-03-14
Payer: COMMERCIAL

## 2022-03-14 VITALS
HEART RATE: 65 BPM | WEIGHT: 157.63 LBS | SYSTOLIC BLOOD PRESSURE: 120 MMHG | RESPIRATION RATE: 18 BRPM | DIASTOLIC BLOOD PRESSURE: 71 MMHG | HEIGHT: 70 IN | BODY MASS INDEX: 22.57 KG/M2

## 2022-03-14 DIAGNOSIS — M48.02 CERVICAL SPINAL STENOSIS: Primary | ICD-10-CM

## 2022-03-14 PROCEDURE — 3288F FALL RISK ASSESSMENT DOCD: CPT | Mod: CPTII,S$GLB,, | Performed by: NEUROLOGICAL SURGERY

## 2022-03-14 PROCEDURE — 1159F MED LIST DOCD IN RCRD: CPT | Mod: CPTII,S$GLB,, | Performed by: NEUROLOGICAL SURGERY

## 2022-03-14 PROCEDURE — 99214 PR OFFICE/OUTPT VISIT, EST, LEVL IV, 30-39 MIN: ICD-10-PCS | Mod: S$GLB,,, | Performed by: NEUROLOGICAL SURGERY

## 2022-03-14 PROCEDURE — 3288F PR FALLS RISK ASSESSMENT DOCUMENTED: ICD-10-PCS | Mod: CPTII,S$GLB,, | Performed by: NEUROLOGICAL SURGERY

## 2022-03-14 PROCEDURE — 3008F BODY MASS INDEX DOCD: CPT | Mod: CPTII,S$GLB,, | Performed by: NEUROLOGICAL SURGERY

## 2022-03-14 PROCEDURE — 1101F PT FALLS ASSESS-DOCD LE1/YR: CPT | Mod: CPTII,S$GLB,, | Performed by: NEUROLOGICAL SURGERY

## 2022-03-14 PROCEDURE — 3074F PR MOST RECENT SYSTOLIC BLOOD PRESSURE < 130 MM HG: ICD-10-PCS | Mod: CPTII,S$GLB,, | Performed by: NEUROLOGICAL SURGERY

## 2022-03-14 PROCEDURE — 3074F SYST BP LT 130 MM HG: CPT | Mod: CPTII,S$GLB,, | Performed by: NEUROLOGICAL SURGERY

## 2022-03-14 PROCEDURE — 3078F DIAST BP <80 MM HG: CPT | Mod: CPTII,S$GLB,, | Performed by: NEUROLOGICAL SURGERY

## 2022-03-14 PROCEDURE — 4010F PR ACE/ARB THEARPY RXD/TAKEN: ICD-10-PCS | Mod: CPTII,S$GLB,, | Performed by: NEUROLOGICAL SURGERY

## 2022-03-14 PROCEDURE — 1126F AMNT PAIN NOTED NONE PRSNT: CPT | Mod: CPTII,S$GLB,, | Performed by: NEUROLOGICAL SURGERY

## 2022-03-14 PROCEDURE — 1159F PR MEDICATION LIST DOCUMENTED IN MEDICAL RECORD: ICD-10-PCS | Mod: CPTII,S$GLB,, | Performed by: NEUROLOGICAL SURGERY

## 2022-03-14 PROCEDURE — 99214 OFFICE O/P EST MOD 30 MIN: CPT | Mod: S$GLB,,, | Performed by: NEUROLOGICAL SURGERY

## 2022-03-14 PROCEDURE — 4010F ACE/ARB THERAPY RXD/TAKEN: CPT | Mod: CPTII,S$GLB,, | Performed by: NEUROLOGICAL SURGERY

## 2022-03-14 PROCEDURE — 3008F PR BODY MASS INDEX (BMI) DOCUMENTED: ICD-10-PCS | Mod: CPTII,S$GLB,, | Performed by: NEUROLOGICAL SURGERY

## 2022-03-14 PROCEDURE — 3078F PR MOST RECENT DIASTOLIC BLOOD PRESSURE < 80 MM HG: ICD-10-PCS | Mod: CPTII,S$GLB,, | Performed by: NEUROLOGICAL SURGERY

## 2022-03-14 PROCEDURE — 1101F PR PT FALLS ASSESS DOC 0-1 FALLS W/OUT INJ PAST YR: ICD-10-PCS | Mod: CPTII,S$GLB,, | Performed by: NEUROLOGICAL SURGERY

## 2022-03-14 PROCEDURE — 1126F PR PAIN SEVERITY QUANTIFIED, NO PAIN PRESENT: ICD-10-PCS | Mod: CPTII,S$GLB,, | Performed by: NEUROLOGICAL SURGERY

## 2022-03-14 NOTE — PROGRESS NOTES
Neurosurgery History & Physical    Patient ID: Onesimo Paula is a 71 y.o. male.    Chief Complaint   Patient presents with    Follow-up     3 month follow up; cervical; patient complains that his arm and middle part of back itches, believes it's nerves; numbness in hands occasionally.       Review of Systems   Constitutional: Negative for chills, diaphoresis, fatigue and fever.   HENT: Negative for congestion, hearing loss, rhinorrhea and sore throat.    Eyes: Negative for photophobia, pain, redness and visual disturbance.   Respiratory: Negative for cough, chest tightness, shortness of breath and wheezing.    Cardiovascular: Negative for chest pain, palpitations and leg swelling.   Gastrointestinal: Negative for abdominal distention, abdominal pain, constipation, diarrhea, nausea and vomiting.   Genitourinary: Negative for difficulty urinating, dysuria, frequency, hematuria and urgency.   Musculoskeletal: Positive for arthralgias. Negative for back pain, gait problem, myalgias, neck pain and neck stiffness.   Skin: Negative for pallor and rash.   Neurological: Positive for numbness. Negative for dizziness, seizures, speech difficulty, weakness, light-headedness and headaches.   Psychiatric/Behavioral: Negative for confusion, hallucinations and sleep disturbance.       Past Medical History:   Diagnosis Date    Acid reflux     Acquired tricuspid valve insufficiency     Anemia     Borderline diabetes     BPH (benign prostatic hyperplasia)     Cataract     20 rt eye, 20- Lt eye    Coronary artery disease     Hypertension     Pulmonary nodules     Skin cancer      Social History     Socioeconomic History    Marital status:    Tobacco Use    Smoking status: Former Smoker     Quit date:      Years since quittin.2    Smokeless tobacco: Never Used   Substance and Sexual Activity    Alcohol use: Yes     Comment: occasional    Drug use: No    Sexual activity: Yes     Partners:  Female     Social Determinants of Health     Financial Resource Strain: Low Risk     Difficulty of Paying Living Expenses: Not hard at all   Food Insecurity: No Food Insecurity    Worried About Running Out of Food in the Last Year: Never true    Ran Out of Food in the Last Year: Never true   Transportation Needs: No Transportation Needs    Lack of Transportation (Medical): No    Lack of Transportation (Non-Medical): No   Physical Activity: Inactive    Days of Exercise per Week: 0 days    Minutes of Exercise per Session: 0 min   Stress: No Stress Concern Present    Feeling of Stress : Not at all   Social Connections: Unknown    Frequency of Communication with Friends and Family: Twice a week    Frequency of Social Gatherings with Friends and Family: Once a week    Active Member of Clubs or Organizations: Yes    Attends Club or Organization Meetings: Never    Marital Status:    Housing Stability: Low Risk     Unable to Pay for Housing in the Last Year: No    Number of Places Lived in the Last Year: 1    Unstable Housing in the Last Year: No     Family History   Problem Relation Age of Onset    Cancer Mother         skin    Stroke Mother     Heart failure Father     Kidney disease Father      Review of patient's allergies indicates:  No Known Allergies    Current Outpatient Medications:     amLODIPine (NORVASC) 10 MG tablet, Take 1 tablet (10 mg total) by mouth once daily., Disp: 90 tablet, Rfl: 1    aspirin (ECOTRIN) 81 MG EC tablet, aspirin 81 mg tablet,delayed release  Take 1 tablet every day by oral route., Disp: , Rfl:     co-enzyme Q-10 30 mg capsule, Take 30 mg by mouth 3 (three) times daily., Disp: , Rfl:     hydroCHLOROthiazide (HYDRODIURIL) 25 MG tablet, Take 1 tablet (25 mg total) by mouth once daily., Disp: 90 tablet, Rfl: 1    mirabegron (MYRBETRIQ) 50 mg Tb24, Take 1 tablet (50 mg total) by mouth once daily., Disp: 90 tablet, Rfl: 3    multivit with minerals/lutein  "(MULTIVITAMIN 50 PLUS ORAL), multivitamin, Disp: , Rfl:     omega 3-dha-epa-fish oil (FISH OIL) 100-160-1,000 mg Cap, Fish Oil, Disp: , Rfl:     omeprazole (PRILOSEC) 20 MG capsule, Take 1 capsule (20 mg total) by mouth once daily., Disp: 90 capsule, Rfl: 1    rosuvastatin (CRESTOR) 10 MG tablet, Take 1 tablet (10 mg total) by mouth once daily., Disp: 90 tablet, Rfl: 3    tadalafiL (CIALIS) 5 MG tablet, Take 1 tablet (5 mg total) by mouth once daily., Disp: 90 tablet, Rfl: 3    telmisartan (MICARDIS) 80 MG Tab, Take 1 tablet by mouth once daily, Disp: 90 tablet, Rfl: 1  Blood pressure 120/71, pulse 65, resp. rate 18, height 5' 10" (1.778 m), weight 71.5 kg (157 lb 10.1 oz).      Neurologic Exam     Mental Status   Oriented to person, place, and time.   Oriented to person.   Oriented to place.   Oriented to time.   Follows 3 step commands.   Attention: normal. Concentration: normal.   Speech: speech is normal   Level of consciousness: alert  Knowledge: consistent with education.   Able to name object. Able to read. Able to repeat. Able to write. Normal comprehension.      Cranial Nerves      CN II   Visual acuity: normal  Right visual field deficit: none  Left visual field deficit: none      CN III, IV, VI   Pupils are equal, round, and reactive to light.  Right pupil: Size: 3 mm. Shape: regular. Reactivity: brisk. Consensual response: intact.   Left pupil: Size: 3 mm. Shape: regular. Reactivity: brisk. Consensual response: intact.   CN III: no CN III palsy  CN VI: no CN VI palsy  Nystagmus: none   Diplopia: none  Ophthalmoparesis: none  Conjugate gaze: present     CN V   Right facial sensation deficit: none  Left facial sensation deficit: none     CN VII   Right facial weakness: none  Left facial weakness: none     CN VIII   Hearing: intact     CN IX, X   CN IX normal.   CN X normal.      CN XI   Right sternocleidomastoid strength: normal  Left sternocleidomastoid strength: normal  Right trapezius strength: " normal  Left trapezius strength: normal     CN XII   Fasciculations: absent  Tongue deviation: none     Motor Exam   Muscle bulk: normal  Overall muscle tone: normal  Right arm pronator drift: absent  Left arm pronator drift: absent     Strength   Right deltoid: 5/5  Left deltoid: 5/5  Right biceps: 5/5  Left biceps: 5/5  Right triceps: 5/5  Left triceps: 5/5  Right wrist flexion: 5/5  Left wrist flexion: 5/5  Right wrist extension: 5/5  Left wrist extension: 5/5  Right interossei: 5/5  Left interossei: 5/5  Right iliopsoas: 5/5  Left iliopsoas: 5/5  Right quadriceps: 5/5  Left quadriceps: 5/5  Right hamstrin/5  Left hamstrin/5  Right anterior tibial: 5/5  Left anterior tibial: 5/5  Right posterior tibial: 5/5  Left posterior tibial: 5/5  Right peroneal: 5/5  Left peroneal: 5/5  Right gastroc: 5/5  Left gastroc: 5/5     Sensory Exam   Right arm light touch: numbness in the antecubital fossa  Left arm light touch: normal  Right leg light touch: normal  Left leg light touch: normal     Gait, Coordination, and Reflexes      Gait  Gait: normal      Coordination   Romberg: negative  Finger to nose coordination: normal  Heel to shin coordination: normal  Tandem walking coordination: normal     Tremor   Resting tremor: absent  Intention tremor: absent  Action tremor: absent     Reflexes   Right brachioradialis: 2+  Left brachioradialis: 2+  Right biceps: 2+  Left biceps: 2+  Right triceps: 2+  Left triceps: 2+  Right patellar: 2+  Left patellar: 2+  Right achilles: 2+  Left achilles: 2+  Right Wheeler: present  Left Wheeler: absent  Right ankle clonus: 5 beats present  Left ankle clonus: inconsistently 3-5 beats present  Right plantar: normal  Left plantar: normal     Physical Exam  Constitutional: Oriented to person, place, and time. Appears well-developed and well-nourished.   HENT:   Head: Normocephalic and atraumatic.   Eyes: Pupils are equal, round, and reactive to light.   Neck: Normal range of motion. Neck  "supple.   Cardiovascular: Normal rate.    Pulmonary/Chest: Effort normal.   Musculoskeletal: Normal range of motion. Exhibits no edema.   Neurological: Alert and oriented to person, place, and time. Normal Finger-Nose-Finger Test, a normal Heel to Shin Test, a normal Romberg Test and a normal Tandem Gait Test. Gait normal.   Reflex Scores:       Tricep reflexes are 2+ on the right side and 2+ on the left side.       Bicep reflexes are 2+ on the right side and 2+ on the left side.       Brachioradialis reflexes are 2+ on the right side and 2+ on the left side.       Patellar reflexes are 2+ on the right side and 2+ on the left side.       Achilles reflexes are 2+ on the right side and 2+ on the left side.  Skin: Skin is warm, dry and intact.   Psychiatric: Normal mood and affect. Speech is normal and behavior is normal. Judgment and thought content normal.   Nursing note and vitals reviewed.    Provider dictation:    "The patient is a 71 year old male who presents for neurosurgical consultation reffered by Dr. Rojo. Patient reports mainly pain that starts in the left knee and shoots down the lateral aspect of the lower leg to his ankle. Denies numbness. He reports intermittent mild lower back pain, but not debilitating. The left knee and leg pain is worse when initially standing from a sitting position. The pain improves with standing for periods of time. He has a history of bilateral knee arthropathy and underwent knee injections with Dr. Rojo, but denies significant improvement. Of note patient was found to have a liver mass on MRI Lumbar spine. Dr. Rojo has ordered an MRI of the abdomen and patient is being scheduled with hepatology for this. He denies significant neck pain. He does report occasionally dropping objects from his hands, but denies upper extremity weakness."    Overall, the patient reports stable symptoms since prior visit. The patient reports some intermittent numbness in the right hand " when driving. He has mild neck stiffness, but denies significant neck pain. Denies difficulty with fine motor movements. He continues to drop things from his hands occasionally-usually his keys. Denies balance difficulty. His left knee pain doesn't bother him much. He has some occasional tightness/stiffness in the knee.     Overall exam remains stable with no signs of worsening cervical myelopathy.     We will schedule the patient to follow-up in clinic in 6 months to reassess for any signs of progressive cervical myelopathy.     1. Cervical spinal stenosis

## 2022-03-14 NOTE — PROGRESS NOTES
I have seen the patient, reviewed the Advanced Practice Provider's history and physical, assessment and plan. I have personally interviewed and examined the patient at bedside and interpreted the relevant imaging and lab work and I agree with the findings. I personally performed the documented services. See below for any additional comments.    Reports resolution of his left knee pain since his last visit.  He denies any worsening clumsiness, gait imbalance or numbness.  He intermittently gets numbness in his right hand while driving but this has been happening for years and has not been progressive.  He states that he is cautious will when walking down stairs but he feels that this started when his knee problems started and is associated with his intrinsic knee pathology.  He sometimes drops his keys but does not notice any particular new hand clumsiness.  He states that he feels it she in the right antecubital fossa area and in his right upper back.  This is intermittent and he does not describe it as painful, burning or as pins and needles.  He was recently hospitalized for acute appendicitis with an associated abscess.  He also states that the liver mass that was incidentally discovered was evaluated and he was told to follow-up with repeat imaging in a few months.    On exam, he has just a right Gera's appreciable today.  He continues to have 2+ deep tendon reflexes throughout.  He did have approximately 5 beats of clonus in his right ankle and inconsistently 3-5 beats in his left ankle but more consistently two.  No weakness.    Overall no significant changes in his neurological examination and no symptoms of progressive cervical myelopathy.  I have counseled him again on the signs and symptoms of cervical myelopathy and he knows to contact us if any of them develops or worsens.  We will schedule a follow-up in 6 months to reassess him clinically.

## 2022-04-05 ENCOUNTER — LAB VISIT (OUTPATIENT)
Dept: LAB | Facility: HOSPITAL | Age: 72
End: 2022-04-05
Attending: INTERNAL MEDICINE
Payer: COMMERCIAL

## 2022-04-05 DIAGNOSIS — N18.2 CHRONIC KIDNEY DISEASE, STAGE II (MILD): Primary | ICD-10-CM

## 2022-04-05 LAB
ALBUMIN SERPL BCP-MCNC: 4 G/DL (ref 3.5–5.2)
ANION GAP SERPL CALC-SCNC: 12 MMOL/L (ref 8–16)
BASOPHILS # BLD AUTO: 0.06 K/UL (ref 0–0.2)
BASOPHILS NFR BLD: 1 % (ref 0–1.9)
BILIRUB UR QL STRIP: NEGATIVE
BUN SERPL-MCNC: 33 MG/DL (ref 8–23)
CALCIUM SERPL-MCNC: 9.2 MG/DL (ref 8.7–10.5)
CHLORIDE SERPL-SCNC: 104 MMOL/L (ref 95–110)
CLARITY UR: CLEAR
CO2 SERPL-SCNC: 25 MMOL/L (ref 23–29)
COLOR UR: YELLOW
CREAT SERPL-MCNC: 1 MG/DL (ref 0.5–1.4)
CREAT UR-MCNC: 81 MG/DL (ref 23–375)
DIFFERENTIAL METHOD: ABNORMAL
EOSINOPHIL # BLD AUTO: 0.3 K/UL (ref 0–0.5)
EOSINOPHIL NFR BLD: 5.6 % (ref 0–8)
ERYTHROCYTE [DISTWIDTH] IN BLOOD BY AUTOMATED COUNT: 14.8 % (ref 11.5–14.5)
EST. GFR  (AFRICAN AMERICAN): >60 ML/MIN/1.73 M^2
EST. GFR  (NON AFRICAN AMERICAN): >60 ML/MIN/1.73 M^2
GLUCOSE SERPL-MCNC: 106 MG/DL (ref 70–110)
GLUCOSE UR QL STRIP: NEGATIVE
HCT VFR BLD AUTO: 39.1 % (ref 40–54)
HGB BLD-MCNC: 12.8 G/DL (ref 14–18)
HGB UR QL STRIP: NEGATIVE
IMM GRANULOCYTES # BLD AUTO: 0.02 K/UL (ref 0–0.04)
IMM GRANULOCYTES NFR BLD AUTO: 0.3 % (ref 0–0.5)
KETONES UR QL STRIP: NEGATIVE
LEUKOCYTE ESTERASE UR QL STRIP: NEGATIVE
LYMPHOCYTES # BLD AUTO: 2.1 K/UL (ref 1–4.8)
LYMPHOCYTES NFR BLD: 34.8 % (ref 18–48)
MCH RBC QN AUTO: 29.2 PG (ref 27–31)
MCHC RBC AUTO-ENTMCNC: 32.7 G/DL (ref 32–36)
MCV RBC AUTO: 89 FL (ref 82–98)
MONOCYTES # BLD AUTO: 0.7 K/UL (ref 0.3–1)
MONOCYTES NFR BLD: 11.2 % (ref 4–15)
NEUTROPHILS # BLD AUTO: 2.9 K/UL (ref 1.8–7.7)
NEUTROPHILS NFR BLD: 47.1 % (ref 38–73)
NITRITE UR QL STRIP: NEGATIVE
NRBC BLD-RTO: 0 /100 WBC
PH UR STRIP: 8 [PH] (ref 5–8)
PHOSPHATE SERPL-MCNC: 3.6 MG/DL (ref 2.7–4.5)
PLATELET # BLD AUTO: 221 K/UL (ref 150–450)
PMV BLD AUTO: 9.7 FL (ref 9.2–12.9)
POTASSIUM SERPL-SCNC: 3.7 MMOL/L (ref 3.5–5.1)
PROT UR QL STRIP: NEGATIVE
PROT UR-MCNC: 13 MG/DL (ref 6–15)
PROT/CREAT UR: 0.16 MG/G{CREAT} (ref 0–0.2)
RBC # BLD AUTO: 4.38 M/UL (ref 4.6–6.2)
SODIUM SERPL-SCNC: 141 MMOL/L (ref 136–145)
SP GR UR STRIP: 1.02 (ref 1–1.03)
URN SPEC COLLECT METH UR: NORMAL
UROBILINOGEN UR STRIP-ACNC: NEGATIVE EU/DL
WBC # BLD AUTO: 6.07 K/UL (ref 3.9–12.7)

## 2022-04-05 PROCEDURE — 84156 ASSAY OF PROTEIN URINE: CPT | Performed by: INTERNAL MEDICINE

## 2022-04-05 PROCEDURE — 85025 COMPLETE CBC W/AUTO DIFF WBC: CPT | Performed by: INTERNAL MEDICINE

## 2022-04-05 PROCEDURE — 36415 COLL VENOUS BLD VENIPUNCTURE: CPT | Performed by: INTERNAL MEDICINE

## 2022-04-05 PROCEDURE — 81003 URINALYSIS AUTO W/O SCOPE: CPT | Performed by: INTERNAL MEDICINE

## 2022-04-05 PROCEDURE — 80069 RENAL FUNCTION PANEL: CPT | Performed by: INTERNAL MEDICINE

## 2022-04-13 ENCOUNTER — OFFICE VISIT (OUTPATIENT)
Dept: FAMILY MEDICINE | Facility: CLINIC | Age: 72
End: 2022-04-13
Payer: COMMERCIAL

## 2022-04-13 VITALS
HEIGHT: 70 IN | BODY MASS INDEX: 23.91 KG/M2 | SYSTOLIC BLOOD PRESSURE: 122 MMHG | TEMPERATURE: 98 F | HEART RATE: 60 BPM | OXYGEN SATURATION: 97 % | DIASTOLIC BLOOD PRESSURE: 74 MMHG | WEIGHT: 167 LBS

## 2022-04-13 DIAGNOSIS — I10 ESSENTIAL HYPERTENSION: ICD-10-CM

## 2022-04-13 PROCEDURE — 4010F PR ACE/ARB THEARPY RXD/TAKEN: ICD-10-PCS | Mod: S$GLB,,, | Performed by: NURSE PRACTITIONER

## 2022-04-13 PROCEDURE — 1159F MED LIST DOCD IN RCRD: CPT | Mod: S$GLB,,, | Performed by: NURSE PRACTITIONER

## 2022-04-13 PROCEDURE — 1126F PR PAIN SEVERITY QUANTIFIED, NO PAIN PRESENT: ICD-10-PCS | Mod: S$GLB,,, | Performed by: NURSE PRACTITIONER

## 2022-04-13 PROCEDURE — 3074F SYST BP LT 130 MM HG: CPT | Mod: S$GLB,,, | Performed by: NURSE PRACTITIONER

## 2022-04-13 PROCEDURE — 99213 PR OFFICE/OUTPT VISIT, EST, LEVL III, 20-29 MIN: ICD-10-PCS | Mod: S$GLB,,, | Performed by: NURSE PRACTITIONER

## 2022-04-13 PROCEDURE — 3288F PR FALLS RISK ASSESSMENT DOCUMENTED: ICD-10-PCS | Mod: S$GLB,,, | Performed by: NURSE PRACTITIONER

## 2022-04-13 PROCEDURE — 4010F ACE/ARB THERAPY RXD/TAKEN: CPT | Mod: S$GLB,,, | Performed by: NURSE PRACTITIONER

## 2022-04-13 PROCEDURE — 1126F AMNT PAIN NOTED NONE PRSNT: CPT | Mod: S$GLB,,, | Performed by: NURSE PRACTITIONER

## 2022-04-13 PROCEDURE — 1160F PR REVIEW ALL MEDS BY PRESCRIBER/CLIN PHARMACIST DOCUMENTED: ICD-10-PCS | Mod: S$GLB,,, | Performed by: NURSE PRACTITIONER

## 2022-04-13 PROCEDURE — 1101F PR PT FALLS ASSESS DOC 0-1 FALLS W/OUT INJ PAST YR: ICD-10-PCS | Mod: S$GLB,,, | Performed by: NURSE PRACTITIONER

## 2022-04-13 PROCEDURE — 1101F PT FALLS ASSESS-DOCD LE1/YR: CPT | Mod: S$GLB,,, | Performed by: NURSE PRACTITIONER

## 2022-04-13 PROCEDURE — 3288F FALL RISK ASSESSMENT DOCD: CPT | Mod: S$GLB,,, | Performed by: NURSE PRACTITIONER

## 2022-04-13 PROCEDURE — 99213 OFFICE O/P EST LOW 20 MIN: CPT | Mod: S$GLB,,, | Performed by: NURSE PRACTITIONER

## 2022-04-13 PROCEDURE — 3074F PR MOST RECENT SYSTOLIC BLOOD PRESSURE < 130 MM HG: ICD-10-PCS | Mod: S$GLB,,, | Performed by: NURSE PRACTITIONER

## 2022-04-13 PROCEDURE — 3008F BODY MASS INDEX DOCD: CPT | Mod: S$GLB,,, | Performed by: NURSE PRACTITIONER

## 2022-04-13 PROCEDURE — 3078F DIAST BP <80 MM HG: CPT | Mod: S$GLB,,, | Performed by: NURSE PRACTITIONER

## 2022-04-13 PROCEDURE — 1160F RVW MEDS BY RX/DR IN RCRD: CPT | Mod: S$GLB,,, | Performed by: NURSE PRACTITIONER

## 2022-04-13 PROCEDURE — 1159F PR MEDICATION LIST DOCUMENTED IN MEDICAL RECORD: ICD-10-PCS | Mod: S$GLB,,, | Performed by: NURSE PRACTITIONER

## 2022-04-13 PROCEDURE — 3078F PR MOST RECENT DIASTOLIC BLOOD PRESSURE < 80 MM HG: ICD-10-PCS | Mod: S$GLB,,, | Performed by: NURSE PRACTITIONER

## 2022-04-13 PROCEDURE — 3008F PR BODY MASS INDEX (BMI) DOCUMENTED: ICD-10-PCS | Mod: S$GLB,,, | Performed by: NURSE PRACTITIONER

## 2022-04-13 RX ORDER — TELMISARTAN 80 MG/1
80 TABLET ORAL DAILY
Qty: 90 TABLET | Refills: 1 | Status: SHIPPED | OUTPATIENT
Start: 2022-04-13 | End: 2022-06-09

## 2022-04-13 NOTE — PROGRESS NOTES
SUBJECTIVE:      Patient ID: Onesimo Paula is a 71 y.o. male.    Chief Complaint: Hypertension (Follow-up)    Presents for HTN recheck    Hypertension  This is a chronic problem. The current episode started more than 1 year ago. The problem has been gradually improving since onset. The problem is controlled. Associated symptoms include headaches and peripheral edema. Pertinent negatives include no anxiety, blurred vision, chest pain, malaise/fatigue, neck pain, orthopnea, palpitations, PND, shortness of breath or sweats. There are no associated agents to hypertension. Risk factors for coronary artery disease include dyslipidemia, male gender, sedentary lifestyle and family history. Past treatments include diuretics and angiotensin blockers. The current treatment provides moderate improvement. Compliance problems include exercise and diet.  Hypertensive end-organ damage includes kidney disease and CAD/MI. Identifiable causes of hypertension include chronic renal disease and renovascular disease.       Past Surgical History:   Procedure Laterality Date    CYSTOSCOPY WITH TRANSURETHRAL DESTRUCTION OF PROSTATE,RFA N/A 10/21/2020    Procedure: CYSTOSCOPY WITH TRANSURETHRAL DESTRUCTION OF PROSTATE,RFA;  Surgeon: La Nena James MD;  Location: [facility] OR;  Service: Urology;  Laterality: N/A;  please make sure [facility] rep available [phone]    EYE SURGERY      for cataracts, rt eye 1/16/20, left eye 1/30/20    HERNIA REPAIR  12/1999    HERNIA REPAIR  05/2004    KNEE ARTHROSCOPY Left 04/12/2018    LAPAROSCOPIC APPENDECTOMY N/A 1/2/2022    Procedure: APPENDECTOMY, LAPAROSCOPIC;  Surgeon: Sam Goss MD;  Location: [facility] OR;  Service: General;  Laterality: N/A;    SKIN LESION EXCISION  10/2009    Neck    SKIN LESION EXCISION  2015    STAPEDECTOMY Right 03/2001    TRANSRECTAL ULTRASOUND EXAMINATION N/A 8/17/2020    Procedure: TRANSRECTAL ULTRASOUND;  Surgeon: La Nena James MD;  Location:  [facility] OR;  Service: Urology;  Laterality: N/A;     Family History   Problem Relation Age of Onset    Cancer Mother         skin    Stroke Mother     Heart failure Father     Kidney disease Father       Social History     Socioeconomic History    Marital status:    Tobacco Use    Smoking status: Former Smoker     Quit date:      Years since quittin.2    Smokeless tobacco: Never Used   Substance and Sexual Activity    Alcohol use: Yes     Comment: occasional    Drug use: No    Sexual activity: Yes     Partners: Female     Social Determinants of Health     Financial Resource Strain: Low Risk     Difficulty of Paying Living Expenses: Not hard at all   Food Insecurity: No Food Insecurity    Worried About Running Out of Food in the Last Year: Never true    Ran Out of Food in the Last Year: Never true   Transportation Needs: No Transportation Needs    Lack of Transportation (Medical): No    Lack of Transportation (Non-Medical): No   Physical Activity: Inactive    Days of Exercise per Week: 0 days    Minutes of Exercise per Session: 0 min   Stress: No Stress Concern Present    Feeling of Stress : Not at all   Social Connections: Unknown    Frequency of Communication with Friends and Family: More than three times a week    Frequency of Social Gatherings with Friends and Family: Once a week    Active Member of Clubs or Organizations: Yes    Attends Club or Organization Meetings: Never    Marital Status:    Housing Stability: Low Risk     Unable to Pay for Housing in the Last Year: No    Number of Places Lived in the Last Year: 1    Unstable Housing in the Last Year: No     Current Outpatient Medications   Medication Sig Dispense Refill    amLODIPine (NORVASC) 10 MG tablet Take 1 tablet (10 mg total) by mouth once daily. 90 tablet 1    aspirin (ECOTRIN) 81 MG EC tablet aspirin 81 mg tablet,delayed release   Take 1 tablet every day by oral route.      co-enzyme Q-10 30 mg  capsule Take 30 mg by mouth 3 (three) times daily.      hydroCHLOROthiazide (HYDRODIURIL) 25 MG tablet Take 1 tablet (25 mg total) by mouth once daily. 90 tablet 1    mirabegron (MYRBETRIQ) 50 mg Tb24 Take 1 tablet (50 mg total) by mouth once daily. 90 tablet 3    multivit with minerals/lutein (MULTIVITAMIN 50 PLUS ORAL) multivitamin      omega 3-dha-epa-fish oil (FISH OIL) 100-160-1,000 mg Cap Fish Oil      omeprazole (PRILOSEC) 20 MG capsule Take 1 capsule (20 mg total) by mouth once daily. 90 capsule 1    rosuvastatin (CRESTOR) 10 MG tablet Take 1 tablet (10 mg total) by mouth once daily. 90 tablet 3    tadalafiL (CIALIS) 5 MG tablet Take 1 tablet (5 mg total) by mouth once daily. 90 tablet 3    telmisartan (MICARDIS) 80 MG Tab Take 1 tablet (80 mg total) by mouth once daily. 90 tablet 1     No current facility-administered medications for this visit.     Review of patient's allergies indicates:  No Known Allergies   Past Medical History:   Diagnosis Date    Acid reflux     Acquired tricuspid valve insufficiency     Anemia     Borderline diabetes     BPH (benign prostatic hyperplasia)     Cataract     1/16/20 rt eye, 1/30/20- Lt eye    Coronary artery disease     Hypertension     Pulmonary nodules     Skin cancer      Past Surgical History:   Procedure Laterality Date    CYSTOSCOPY WITH TRANSURETHRAL DESTRUCTION OF PROSTATE,RFA N/A 10/21/2020    Procedure: CYSTOSCOPY WITH TRANSURETHRAL DESTRUCTION OF PROSTATE,RFA;  Surgeon: La Nena James MD;  Location: [facility] OR;  Service: Urology;  Laterality: N/A;  please make sure [facility] available [phone]    EYE SURGERY      for cataracts, rt eye 1/16/20, left eye 1/30/20    HERNIA REPAIR  12/1999    HERNIA REPAIR  05/2004    KNEE ARTHROSCOPY Left 04/12/2018    LAPAROSCOPIC APPENDECTOMY N/A 1/2/2022    Procedure: APPENDECTOMY, LAPAROSCOPIC;  Surgeon: Sam Goss MD;  Location: [facility] OR;  Service: General;  Laterality: N/A;     SKIN LESION EXCISION  10/2009    Neck    SKIN LESION EXCISION  2015    STAPEDECTOMY Right 03/2001    TRANSRECTAL ULTRASOUND EXAMINATION N/A 8/17/2020    Procedure: TRANSRECTAL ULTRASOUND;  Surgeon: La Nena James MD;  Location: [facility] OR;  Service: Urology;  Laterality: N/A;       Review of Systems   Constitutional: Negative for activity change, appetite change, fatigue, fever, malaise/fatigue and unexpected weight change.   HENT: Negative for congestion, ear pain, hearing loss, postnasal drip, rhinorrhea, sinus pressure, sinus pain, sneezing, sore throat and trouble swallowing.    Eyes: Negative for blurred vision, photophobia, pain, discharge and visual disturbance.   Respiratory: Negative for cough, chest tightness, shortness of breath and wheezing.    Cardiovascular: Negative for chest pain, palpitations, orthopnea, leg swelling and PND.   Gastrointestinal: Negative for abdominal distention, abdominal pain, blood in stool, constipation, diarrhea, nausea and vomiting.   Endocrine: Negative for cold intolerance, heat intolerance, polydipsia and polyuria.   Genitourinary: Negative for difficulty urinating, dysuria, flank pain, frequency, hematuria and urgency.        Following with uro & nephro   Musculoskeletal: Positive for arthralgias. Negative for back pain, joint swelling, myalgias and neck pain.   Skin: Negative for pallor and rash.   Allergic/Immunologic: Negative for environmental allergies and food allergies.   Neurological: Positive for headaches. Negative for dizziness, weakness and light-headedness.   Hematological: Does not bruise/bleed easily.   Psychiatric/Behavioral: Negative for confusion, decreased concentration, dysphoric mood and sleep disturbance. The patient is not nervous/anxious.       OBJECTIVE:      Vitals:    04/13/22 1049   BP: 122/74   BP Location: Right arm   Patient Position: Sitting   BP Method: Medium (Manual)   Pulse: 60   Temp: 98.1 °F (36.7 °C)   TempSrc: Oral   SpO2:  "97%   Weight: 75.8 kg (167 lb)   Height: 5' 10" (1.778 m)     Physical Exam  Vitals and nursing note reviewed.   Constitutional:       General: He is not in acute distress.     Appearance: Normal appearance. He is well-developed and normal weight.   HENT:      Head: Normocephalic and atraumatic.      Right Ear: Hearing normal.      Left Ear: Hearing normal.      Nose: Nose normal. No rhinorrhea.   Eyes:      General: Lids are normal.         Right eye: No discharge.         Left eye: No discharge.      Conjunctiva/sclera: Conjunctivae normal.      Right eye: Right conjunctiva is not injected.      Left eye: Left conjunctiva is not injected.      Pupils: Pupils are equal, round, and reactive to light. Pupils are equal.      Right eye: Pupil is round and reactive.      Left eye: Pupil is round and reactive.   Neck:      Thyroid: No thyromegaly.      Vascular: No JVD.      Trachea: Trachea normal. No tracheal deviation.   Cardiovascular:      Rate and Rhythm: Normal rate and regular rhythm.      Pulses:           Radial pulses are 2+ on the right side and 2+ on the left side.      Heart sounds: Normal heart sounds. No murmur heard.    No friction rub. No gallop.   Pulmonary:      Effort: Pulmonary effort is normal. No respiratory distress.      Breath sounds: Normal breath sounds. No stridor. No decreased breath sounds, wheezing, rhonchi or rales.   Abdominal:      General: Bowel sounds are normal. There is no distension.      Palpations: Abdomen is soft. Abdomen is not rigid.      Tenderness: There is no abdominal tenderness. There is no guarding.   Musculoskeletal:         General: No swelling. Normal range of motion.      Cervical back: Normal range of motion and neck supple.   Lymphadenopathy:      Cervical: No cervical adenopathy.   Skin:     General: Skin is warm and dry.      Capillary Refill: Capillary refill takes less than 2 seconds.      Coloration: Skin is not pale.      Findings: No lesion or rash. "   Neurological:      Mental Status: He is alert and oriented to person, place, and time.      Motor: No atrophy.      Coordination: Coordination normal.      Gait: Gait normal.   Psychiatric:         Attention and Perception: He is attentive.         Speech: Speech normal.         Behavior: Behavior normal.         Thought Content: Thought content normal.         Judgment: Judgment normal.        Assessment:       1. Essential hypertension        Plan:       Essential hypertension  -     telmisartan (MICARDIS) 80 MG Tab; Take 1 tablet (80 mg total) by mouth once daily.  Dispense: 90 tablet; Refill: 1          Follow up in about 6 months (around 10/13/2022) for HTN.      4/13/2022 Reynaldo Rahman, JAMES, FNP-C

## 2022-05-25 ENCOUNTER — PATIENT MESSAGE (OUTPATIENT)
Dept: UROLOGY | Facility: CLINIC | Age: 72
End: 2022-05-25
Payer: COMMERCIAL

## 2022-05-26 ENCOUNTER — PATIENT MESSAGE (OUTPATIENT)
Dept: UROLOGY | Facility: CLINIC | Age: 72
End: 2022-05-26
Payer: COMMERCIAL

## 2022-05-27 DIAGNOSIS — I12.9 HYPERTENSIVE KIDNEY DISEASE: ICD-10-CM

## 2022-05-27 LAB
A/G RATIO: 1.7 (ref 1.1–2.2)
ALBUMIN SERPL-MCNC: 4.2 G/DL (ref 3.4–5)
ALP BLD-CCNC: 66 U/L (ref 40–129)
ALT SERPL-CCNC: 34 U/L (ref 10–40)
ANION GAP SERPL CALCULATED.3IONS-SCNC: 14 MMOL/L (ref 3–16)
AST SERPL-CCNC: 23 U/L (ref 15–37)
BILIRUB SERPL-MCNC: 0.3 MG/DL (ref 0–1)
BUN BLDV-MCNC: 23 MG/DL (ref 7–20)
CALCIUM SERPL-MCNC: 9.4 MG/DL (ref 8.3–10.6)
CHLORIDE BLD-SCNC: 102 MMOL/L (ref 99–110)
CO2: 22 MMOL/L (ref 21–32)
CREAT SERPL-MCNC: 1.6 MG/DL (ref 0.8–1.3)
CREATININE URINE: 46.7 MG/DL (ref 39–259)
GFR AFRICAN AMERICAN: 52
GFR NON-AFRICAN AMERICAN: 43
GLUCOSE BLD-MCNC: 189 MG/DL (ref 70–99)
HCT VFR BLD CALC: 34.6 % (ref 40.5–52.5)
HEMOGLOBIN: 11.8 G/DL (ref 13.5–17.5)
MCH RBC QN AUTO: 31.3 PG (ref 26–34)
MCHC RBC AUTO-ENTMCNC: 34 G/DL (ref 31–36)
MCV RBC AUTO: 92.1 FL (ref 80–100)
PDW BLD-RTO: 13.7 % (ref 12.4–15.4)
PLATELET # BLD: 161 K/UL (ref 135–450)
PMV BLD AUTO: 9.3 FL (ref 5–10.5)
POTASSIUM SERPL-SCNC: 3.8 MMOL/L (ref 3.5–5.1)
PROTEIN PROTEIN: 25 MG/DL
PROTEIN/CREAT RATIO: 0.5 MG/DL
RBC # BLD: 3.76 M/UL (ref 4.2–5.9)
SODIUM BLD-SCNC: 138 MMOL/L (ref 136–145)
TOTAL PROTEIN: 6.7 G/DL (ref 6.4–8.2)
WBC # BLD: 4.4 K/UL (ref 4–11)

## 2022-05-31 ENCOUNTER — LAB VISIT (OUTPATIENT)
Dept: LAB | Facility: HOSPITAL | Age: 72
End: 2022-05-31
Attending: UROLOGY
Payer: COMMERCIAL

## 2022-05-31 DIAGNOSIS — N40.1 BPH WITH URINARY OBSTRUCTION: ICD-10-CM

## 2022-05-31 DIAGNOSIS — N13.8 BPH WITH URINARY OBSTRUCTION: ICD-10-CM

## 2022-05-31 LAB — COMPLEXED PSA SERPL-MCNC: 1.3 NG/ML (ref 0–4)

## 2022-05-31 PROCEDURE — 84153 ASSAY OF PSA TOTAL: CPT | Performed by: UROLOGY

## 2022-05-31 PROCEDURE — 36415 COLL VENOUS BLD VENIPUNCTURE: CPT | Performed by: UROLOGY

## 2022-06-03 ENCOUNTER — TELEPHONE (OUTPATIENT)
Dept: CARDIOLOGY | Facility: CLINIC | Age: 72
End: 2022-06-03
Payer: COMMERCIAL

## 2022-06-03 PROBLEM — N18.30 CHRONIC RENAL DISEASE, STAGE III (HCC): Status: ACTIVE | Noted: 2022-06-03

## 2022-06-07 ENCOUNTER — OFFICE VISIT (OUTPATIENT)
Dept: UROLOGY | Facility: CLINIC | Age: 72
End: 2022-06-07
Payer: COMMERCIAL

## 2022-06-07 VITALS
HEART RATE: 57 BPM | WEIGHT: 167.69 LBS | DIASTOLIC BLOOD PRESSURE: 78 MMHG | BODY MASS INDEX: 24.01 KG/M2 | RESPIRATION RATE: 18 BRPM | SYSTOLIC BLOOD PRESSURE: 138 MMHG | HEIGHT: 70 IN

## 2022-06-07 DIAGNOSIS — N32.81 OAB (OVERACTIVE BLADDER): Primary | ICD-10-CM

## 2022-06-07 PROBLEM — Z12.5 SCREENING FOR PROSTATE CANCER: Status: RESOLVED | Noted: 2019-02-18 | Resolved: 2022-06-07

## 2022-06-07 LAB
BILIRUB SERPL-MCNC: NORMAL MG/DL
BLOOD URINE, POC: NORMAL
CLARITY, POC UA: CLEAR
COLOR, POC UA: YELLOW
GLUCOSE UR QL STRIP: NORMAL
KETONES UR QL STRIP: NORMAL
LEUKOCYTE ESTERASE URINE, POC: NORMAL
NITRITE, POC UA: NORMAL
PH, POC UA: 6.5
POC RESIDUAL URINE VOLUME: 5 ML (ref 0–100)
PROTEIN, POC: NORMAL
SPECIFIC GRAVITY, POC UA: 1.02
UROBILINOGEN, POC UA: 0.2

## 2022-06-07 PROCEDURE — 3075F PR MOST RECENT SYSTOLIC BLOOD PRESS GE 130-139MM HG: ICD-10-PCS | Mod: CPTII,S$GLB,, | Performed by: UROLOGY

## 2022-06-07 PROCEDURE — 3008F BODY MASS INDEX DOCD: CPT | Mod: CPTII,S$GLB,, | Performed by: UROLOGY

## 2022-06-07 PROCEDURE — 1101F PT FALLS ASSESS-DOCD LE1/YR: CPT | Mod: CPTII,S$GLB,, | Performed by: UROLOGY

## 2022-06-07 PROCEDURE — 3008F PR BODY MASS INDEX (BMI) DOCUMENTED: ICD-10-PCS | Mod: CPTII,S$GLB,, | Performed by: UROLOGY

## 2022-06-07 PROCEDURE — 99214 OFFICE O/P EST MOD 30 MIN: CPT | Mod: S$GLB,,, | Performed by: UROLOGY

## 2022-06-07 PROCEDURE — 1126F PR PAIN SEVERITY QUANTIFIED, NO PAIN PRESENT: ICD-10-PCS | Mod: CPTII,S$GLB,, | Performed by: UROLOGY

## 2022-06-07 PROCEDURE — 3078F DIAST BP <80 MM HG: CPT | Mod: CPTII,S$GLB,, | Performed by: UROLOGY

## 2022-06-07 PROCEDURE — 51798 US URINE CAPACITY MEASURE: CPT | Mod: S$GLB,,, | Performed by: UROLOGY

## 2022-06-07 PROCEDURE — 51798 POCT BLADDER SCAN: ICD-10-PCS | Mod: S$GLB,,, | Performed by: UROLOGY

## 2022-06-07 PROCEDURE — 99999 PR PBB SHADOW E&M-EST. PATIENT-LVL IV: ICD-10-PCS | Mod: PBBFAC,,, | Performed by: UROLOGY

## 2022-06-07 PROCEDURE — 3075F SYST BP GE 130 - 139MM HG: CPT | Mod: CPTII,S$GLB,, | Performed by: UROLOGY

## 2022-06-07 PROCEDURE — 1160F RVW MEDS BY RX/DR IN RCRD: CPT | Mod: CPTII,S$GLB,, | Performed by: UROLOGY

## 2022-06-07 PROCEDURE — 4010F ACE/ARB THERAPY RXD/TAKEN: CPT | Mod: CPTII,S$GLB,, | Performed by: UROLOGY

## 2022-06-07 PROCEDURE — 3288F PR FALLS RISK ASSESSMENT DOCUMENTED: ICD-10-PCS | Mod: CPTII,S$GLB,, | Performed by: UROLOGY

## 2022-06-07 PROCEDURE — 1126F AMNT PAIN NOTED NONE PRSNT: CPT | Mod: CPTII,S$GLB,, | Performed by: UROLOGY

## 2022-06-07 PROCEDURE — 81002 POCT URINE DIPSTICK WITHOUT MICROSCOPE: ICD-10-PCS | Mod: S$GLB,,, | Performed by: UROLOGY

## 2022-06-07 PROCEDURE — 99214 PR OFFICE/OUTPT VISIT, EST, LEVL IV, 30-39 MIN: ICD-10-PCS | Mod: S$GLB,,, | Performed by: UROLOGY

## 2022-06-07 PROCEDURE — 1101F PR PT FALLS ASSESS DOC 0-1 FALLS W/OUT INJ PAST YR: ICD-10-PCS | Mod: CPTII,S$GLB,, | Performed by: UROLOGY

## 2022-06-07 PROCEDURE — 81002 URINALYSIS NONAUTO W/O SCOPE: CPT | Mod: S$GLB,,, | Performed by: UROLOGY

## 2022-06-07 PROCEDURE — 1159F PR MEDICATION LIST DOCUMENTED IN MEDICAL RECORD: ICD-10-PCS | Mod: CPTII,S$GLB,, | Performed by: UROLOGY

## 2022-06-07 PROCEDURE — 99999 PR PBB SHADOW E&M-EST. PATIENT-LVL IV: CPT | Mod: PBBFAC,,, | Performed by: UROLOGY

## 2022-06-07 PROCEDURE — 3078F PR MOST RECENT DIASTOLIC BLOOD PRESSURE < 80 MM HG: ICD-10-PCS | Mod: CPTII,S$GLB,, | Performed by: UROLOGY

## 2022-06-07 PROCEDURE — 4010F PR ACE/ARB THEARPY RXD/TAKEN: ICD-10-PCS | Mod: CPTII,S$GLB,, | Performed by: UROLOGY

## 2022-06-07 PROCEDURE — 1159F MED LIST DOCD IN RCRD: CPT | Mod: CPTII,S$GLB,, | Performed by: UROLOGY

## 2022-06-07 PROCEDURE — 3288F FALL RISK ASSESSMENT DOCD: CPT | Mod: CPTII,S$GLB,, | Performed by: UROLOGY

## 2022-06-07 PROCEDURE — 1160F PR REVIEW ALL MEDS BY PRESCRIBER/CLIN PHARMACIST DOCUMENTED: ICD-10-PCS | Mod: CPTII,S$GLB,, | Performed by: UROLOGY

## 2022-06-07 RX ORDER — TADALAFIL 5 MG/1
5 TABLET ORAL DAILY
Qty: 90 TABLET | Refills: 3 | Status: SHIPPED | OUTPATIENT
Start: 2022-06-07 | End: 2023-05-25 | Stop reason: SDUPTHER

## 2022-06-07 RX ORDER — SOLIFENACIN SUCCINATE 10 MG/1
10 TABLET, FILM COATED ORAL DAILY
Qty: 90 TABLET | Refills: 3 | Status: SHIPPED | OUTPATIENT
Start: 2022-06-07 | End: 2023-05-25

## 2022-06-07 NOTE — PATIENT INSTRUCTIONS
1. OAB (overactive bladder)      oab improved on myrbetriq 50mg daily previously but now recurrent. Do not think bph. Last peak flow 19. Can add in vesicare to see if it helps. bm daily.     Still working 6 days a week so PTNS not a great option. Does not plan to retire soon.    Plan:     Continue myrbetriq 50mg daily, has enough until 4/2021  Continue tadalafil 5mg daily, refilled today for a year  Add vesicare/solfenacin 10mg daily to myrbetriq. If no improvement after 2 months the can consider botox vs interstim if bothersome enough or interstim then botox if interstim trial doesn't work.  Bring voiding dairy    F/u in 2 months

## 2022-06-07 NOTE — PROGRESS NOTES
[facility] Note - [place]  Staff: MD Erika  PCP: JAMES Rose,FNP-C  Date of Service: 06/07/2022      Subjective:        HPI: Onesimo Paula is a 71 y.o. male     Seen by me 9/30/19  Patient had MRI for right hip pain and was found have a thick-walled bladder with enlarged prostate.  He states that he saw urologist over 40 years ago for burning with urination.  He had a renal bladder ultrasound in 2016 which showed enlarged prostate and bilateral renal cyst done for renal insufficiency whom he sees  for.  Denies any gross hematuria.  Review of for previous urine show no microscopic hematuria.  Twenty-five pack-year history of smoking but quit in 1997.      He also has AUA symptom score 6 and occasional weak stream and urgency but voids every 3-5 hours when he is working and a little more frequent when he is not.  Denies any urge incontinence.  Okay stream with occasional weak stream.  PVR today 09/03/2019 is 15 cc.  He may have tried Flomax a couple years ago but does not recall it helping her changing any was symptoms.  Overall really not that bothered by his urinary symptoms.      Also states he has ED.  Previously tried sildenafil unsure dose and it helped but wife does not want him to take, exam revealed peyronie's     Plan: started tadalafil 5mg due to thick walled bladder although essentially asx. No further f/u fr b renal cysts.      Interval history 11/18/19:   Pt states today Cialis has NOT improved any of his lower urinary tract symptoms since starting the medication.  He currently takes this medication before his bedtime.  Pt still c/o urinary urgency, especially during the day.  Nocturia is 1-2x nightly and not bothersome at this time.  He is a , therefore the daytime urgency really bothers him.     Plan: started ditropan 10mg XL and cont'd tadalafil     Interval history by idalia 1/6/20:   TODAY, pt states the oxybutynin only improved  his LUTS by over 10% at this time.  PVR by bladder scan performed in office today:  11 mL     Rec'd: d/c oxybutynin, start myrbetriq, cont tadalafil 5mg daily.      Interval history 7/27/20:      Tried myrbetriq but didn't help. Having urgency, hesistancy and intermittent slow stream but urgency most bothersome and having some UUI with a few drop, but not that bothersome. Drinks 1 c of coffee in am. Soda during day. 1 c of coffee int he evening. Has never tried stopping these to see if they help. Drinks beer and notices increase in frequency following day.      On tadalafil 5mg daily for ED and bph.  pvr by scan: 32cc  Voiding every 30 minutes a day recently more pronounced at home (has been on vacation).  Takes hctz in am. When he's working he only voids about 2x-4x a day. Drinks caffeine while working including energy drinks. No nocturia.      psa 1/31/20 2.2 (up from 1.2 year prior). Repeat psa 0.85 7/29/20  WINSOME today: 40g + no nodules.     Uroflow 7/30/20: peak flow 12.8mL/s,  avg flow 5.4mL/s, voided vol: 191cc, pvr by scan:0      Cysto trus 8/17/20: normal bladder, no stricture. Membranous and distal prostatic urethra with papillary tissue suspicious for prostatitis.trus volume 42.6g. bc of his urgency on tadalafil, flomax, vesicare found rx cipro for 14d to see if it helped w urgency. D/c'd vesicare. cont'd flomax and tadalafil. rtc to see idalia in 2-3 weeks and me in 2 months to discuss bph procedures     Interval history by idalia 9/9/20:   Pt states he is still having the urinary urgency even since finishing the Cipro antibiotics at this time.  He denies gross hematuria, dysuria.  UA today shows normal findings.  PVR by bladder scan today:  9 mL     Plan: rec'd restarting vesicare     Interval history 10/1/20:  Taking flomax 0.4mg nightly and says flow is intermittent on this.   Also on the vesicare and says it doesn't help with the urgency. Voiding 2-3x a day and none at night. No incontinence. 3-4x a  week c/o urge incontinence if he tries to hold it too long, mostly in the am when he wakes up. Denies any difficulty with walking. No weakness or numbness.   Changed to decaf coffee 1x a day. Changed to sprite and caffeine free coke.   He does not want to take any more meds. He is not happy with the retrograde ejaculation.   Sees dr. leger for Mitral Valve leakage, last saw him a week ago, was told he should f/u in March 2021. Tadalafil helping with erections     Interval history 11/19/20:   Underwent rezum on 10/21/20. Font flomax, vesicare and tadafil daily for 3 more months.      Returned for fill and pull on 10/26/20. Filled with 210, voided 210  For 2 weeks postop would have blood when he had a BM, improving.      Today (1 month later) he states he has some increased urgency. UUI 2-3x a day (increased from 3-4x a week).  Still taking vesicare but ran out. freq q1 hour (worse in cold weather). Without cold weather freq q 3 hour.   ua today with small wbc and mod blood - some dysuria  No significant change in stream yet  pvr by scan today: 134  AUA ssx:(2 incomplete emptying, 3 frequency, 3 intermittency, 4 urgency, 2 weak stream, 2 straining, 0 sleeping). 16. QOL: 4    Interval history by me in clinic on 3/9/21:  Returns today and states states he ran out of flomax and vesicare a month ago. No increased frequency, urge incontinence or slower stream off the flomax. Still having UUI 1-2x a day with a few drops when he hears water. Voids 8x/24 hours and 4x/8 hours while at work. Nocturia occ (same). No longer drinking caffeine. Still on tadalafil 5mg daily.  No longer having retrograde ejaculation and constipation improved. Hctz in am.  Frequency mostly in  the pm. Bothers him but able to stop at bathrooms.   AUA ssx today:(1 incomplete emptying, 2 frequency, 1 intermittency, 3 urgency, 3 weak stream, 0 straining, 0 sleeping). 10. QOL: mixed  Bladder scan PVR today was 35mL.       I reviewed his previous  urinalysis and cultures as well as his urinalysis today. His urinalysis today shows no abnormalities.      I reviewed his previous PSAs and his most recent psa within the last year was 2.3, %free 20.87, last year was 2.2     Sometimes loses erection with tadalafil 5mg daily. Rare. Not more frequent. Still getting morning erections.     Interval history with idalia on 4/9/21:  UA today showed normal findings.  Pt states today even though he tried and changed his HCTZ med to evenings as discussed last ov, his LUTS have not improved.  Pt as of today's visit is still c/o urinary urgency and frequency during the daytime.  Has not improved since last ov.  Pt states today he has tried Vesicare and Oxybutynin in the past with no improvement in symptoms.  Pt currently denies gross hematuria and dysuria.  She put him on myrbetriq again       Interval history by me 6/15/21:  Uroflow results (date: 03/18/2021): Voiding time: 24.3s, Flow time: 23.1s, TTP flow: 8.0s, Peak flowrate: 19.2 mL/s, Average flowrate: 10.6mL/s, Intervals: 2, Voided volume: 244 mL, Pvr by bladder scan 33. Pattern of curve: see uploaded image, reviewed by     At last visit was c/o freq/urgency. Frequency 8-10x/day. Tried myrbetriq before rezum, ditropan, vesicare and changing hctz to pm but no improvement. Then in April started myrbetriq again , after about 4 weeks saw improvement, now down to 5-6x a day. avg urg: 3. No nocturia. No incontinence.  Also on tadalafil 5mg daily.   AUA ssx today:(1 incomplete emptying, 1 frequency, 1 intermittency, 2 urgency, 3 weak stream, 0 straining, 0 sleeping). 8. QOL:   Bladder scan PVR today was 0mL.      HPI/CC today 6/7/22:   · Had him Continue myrbetriq 50mg daily, has enough until 4/2021, Continue tadalafil 5mg daily, refilled today for a year.  · Says myrbetriq not helping anymore. He held it for a few days and noticed no difference. On cialis 5mg daily.   · Does not have constipation  · Voids about 9-10x  during waking hours. occ urge incontinence 3x a week. Few drops. Started drinking caffeine this week.   · Nocturia 1-2x a night. Urine flow varies.    · psa 5/31/22 1.3  · ua today neg, pvr by scan: 5  · AUA ssx:(2 incomplete emptying, 3 frequency, 2 intermittency, 4 urgency, 3 weak stream, 1 straining, 1 sleeping). 16. QOL: mixed  · Appendix removed in January complicated by post-op ileus.      PSA history: no family hx of prostate cancer  5/31/22 1.3  6/15/21 pvr by scan: 0  3/18/21 UF: pF: 19.2, avg: 10.6, voided vol: 244, pvr: 33  3/2/21              2.3, %free 20.87, pvr: 35 (psa screen 4.6)  11/19/20          pvr by scan: 134 (couldn't start after he stopped)  10/26/20          Fill and pull: 0cc pvr  11/19/20          rezum  9/9/20              pvr by scan: 9cc  8/17/20            Cysto trus: 42.6g.   7/30/20           UF: peak flow 12.8mL/s,  avg flow 5.4mL/s, voided vol: 191cc, pvr by scan:0   7/29/20            0.85, %free 54.12  7/27/20            WINSOME: 40+g, pvr by scan: 32cc  1/31/20            2.2   1/6/20              pvr by scan: 11cc  9/3/19              WINSOME:40+gm, pvr by scan: 15cc   2/21/19            1.2  3/14/18            1.0    Urine history  6/7/22  neg  11/19/20          Small wbc/mod blood-send for ua patricio and culture pvr by scan: 134  10/14/20          Neg  9/16/20            neg  9/9/20              neg  3/31/20            Neg  1/6/20              Neg  11/18/19          neg  8/8/19              No cx, void: n/a 0 rbc  2/21/19            Ng, void: neg, 0 rbc  4/9/18              No cx, void: neg  9/2017             No blood       Current REVIEW OF SYSTEMS:  neg    Objective:     Vitals:    06/07/22 1423   BP: 138/78   Pulse: (!) 57   Resp: 18       Focused  exam  neg    Assessment:     Onesimo Paula is a 71 y.o. male with       1. OAB (overactive bladder)      oab improved on myrbetriq 50mg daily previously but now recurrent. Do not think bph. Last peak flow 19. Can add in vesicare to  see if it helps. bm daily.     Still working 6 days a week so PTNS not a great option. Does not plan to retire soon.    Plan:      Continue myrbetriq 50mg daily, has enough until 4/2021   Continue tadalafil 5mg daily, refilled today for a year   Add vesicare/solfenacin 10mg daily to myrbetriq. If no improvement after 2 months the can consider botox vs interstim if bothersome enough or interstim then botox if interstim trial doesn't work.   Bring voiding dairy    F/u in 2 months for aua ssx and pvr      La Nena James MD

## 2022-07-26 DIAGNOSIS — E78.5 DYSLIPIDEMIA: ICD-10-CM

## 2022-07-26 RX ORDER — FENOFIBRATE 48 MG/1
48 TABLET, COATED ORAL DAILY
Qty: 90 TABLET | Refills: 3 | OUTPATIENT
Start: 2022-07-26

## 2022-08-16 ENCOUNTER — HOSPITAL ENCOUNTER (OUTPATIENT)
Dept: RADIOLOGY | Facility: HOSPITAL | Age: 72
Discharge: HOME | End: 2022-08-16
Attending: INTERNAL MEDICINE
Payer: COMMERCIAL

## 2022-08-16 DIAGNOSIS — K76.89 LIVER CYST: ICD-10-CM

## 2022-08-16 PROCEDURE — 76705 ECHO EXAM OF ABDOMEN: CPT | Mod: TC

## 2022-08-23 ENCOUNTER — OFFICE VISIT (OUTPATIENT)
Dept: PULMONOLOGY | Facility: CLINIC | Age: 72
End: 2022-08-23
Payer: COMMERCIAL

## 2022-08-23 ENCOUNTER — OFFICE VISIT (OUTPATIENT)
Dept: UROLOGY | Facility: CLINIC | Age: 72
End: 2022-08-23
Payer: COMMERCIAL

## 2022-08-23 VITALS
BODY MASS INDEX: 24.34 KG/M2 | WEIGHT: 170 LBS | DIASTOLIC BLOOD PRESSURE: 78 MMHG | SYSTOLIC BLOOD PRESSURE: 138 MMHG | RESPIRATION RATE: 18 BRPM | HEART RATE: 59 BPM | HEIGHT: 70 IN

## 2022-08-23 VITALS
WEIGHT: 170.31 LBS | DIASTOLIC BLOOD PRESSURE: 70 MMHG | OXYGEN SATURATION: 97 % | BODY MASS INDEX: 24.44 KG/M2 | SYSTOLIC BLOOD PRESSURE: 115 MMHG | HEART RATE: 66 BPM

## 2022-08-23 DIAGNOSIS — R91.8 MULTIPLE PULMONARY NODULES: ICD-10-CM

## 2022-08-23 DIAGNOSIS — R39.198 INCREASING RESIDUAL URINE: ICD-10-CM

## 2022-08-23 DIAGNOSIS — N52.9 ERECTILE DYSFUNCTION, UNSPECIFIED ERECTILE DYSFUNCTION TYPE: ICD-10-CM

## 2022-08-23 DIAGNOSIS — R06.09 DYSPNEA ON EXERTION: Primary | ICD-10-CM

## 2022-08-23 DIAGNOSIS — N40.1 BPH WITH URINARY OBSTRUCTION: Primary | ICD-10-CM

## 2022-08-23 DIAGNOSIS — N13.8 BPH WITH URINARY OBSTRUCTION: Primary | ICD-10-CM

## 2022-08-23 DIAGNOSIS — N32.81 OAB (OVERACTIVE BLADDER): ICD-10-CM

## 2022-08-23 LAB
BILIRUB SERPL-MCNC: NORMAL MG/DL
BLOOD URINE, POC: NORMAL
CLARITY, POC UA: CLEAR
COLOR, POC UA: YELLOW
GLUCOSE UR QL STRIP: NORMAL
KETONES UR QL STRIP: NORMAL
LEUKOCYTE ESTERASE URINE, POC: NORMAL
NITRITE, POC UA: NORMAL
PH, POC UA: 6.5
POC RESIDUAL URINE VOLUME: 115 ML (ref 0–100)
PROTEIN, POC: NORMAL
SPECIFIC GRAVITY, POC UA: 1.02
UROBILINOGEN, POC UA: 0.2

## 2022-08-23 PROCEDURE — 4010F PR ACE/ARB THEARPY RXD/TAKEN: ICD-10-PCS | Mod: CPTII,S$GLB,, | Performed by: UROLOGY

## 2022-08-23 PROCEDURE — 3078F PR MOST RECENT DIASTOLIC BLOOD PRESSURE < 80 MM HG: ICD-10-PCS | Mod: CPTII,S$GLB,, | Performed by: UROLOGY

## 2022-08-23 PROCEDURE — 1160F PR REVIEW ALL MEDS BY PRESCRIBER/CLIN PHARMACIST DOCUMENTED: ICD-10-PCS | Mod: CPTII,S$GLB,, | Performed by: INTERNAL MEDICINE

## 2022-08-23 PROCEDURE — 99213 OFFICE O/P EST LOW 20 MIN: CPT | Mod: S$GLB,,, | Performed by: UROLOGY

## 2022-08-23 PROCEDURE — 1101F PT FALLS ASSESS-DOCD LE1/YR: CPT | Mod: CPTII,S$GLB,, | Performed by: UROLOGY

## 2022-08-23 PROCEDURE — 1159F PR MEDICATION LIST DOCUMENTED IN MEDICAL RECORD: ICD-10-PCS | Mod: CPTII,S$GLB,, | Performed by: INTERNAL MEDICINE

## 2022-08-23 PROCEDURE — 4010F PR ACE/ARB THEARPY RXD/TAKEN: ICD-10-PCS | Mod: CPTII,S$GLB,, | Performed by: INTERNAL MEDICINE

## 2022-08-23 PROCEDURE — 3288F FALL RISK ASSESSMENT DOCD: CPT | Mod: CPTII,S$GLB,, | Performed by: INTERNAL MEDICINE

## 2022-08-23 PROCEDURE — 3078F PR MOST RECENT DIASTOLIC BLOOD PRESSURE < 80 MM HG: ICD-10-PCS | Mod: CPTII,S$GLB,, | Performed by: INTERNAL MEDICINE

## 2022-08-23 PROCEDURE — 1101F PR PT FALLS ASSESS DOC 0-1 FALLS W/OUT INJ PAST YR: ICD-10-PCS | Mod: CPTII,S$GLB,, | Performed by: INTERNAL MEDICINE

## 2022-08-23 PROCEDURE — 99999 PR PBB SHADOW E&M-EST. PATIENT-LVL IV: CPT | Mod: PBBFAC,,, | Performed by: UROLOGY

## 2022-08-23 PROCEDURE — 1159F MED LIST DOCD IN RCRD: CPT | Mod: CPTII,S$GLB,, | Performed by: INTERNAL MEDICINE

## 2022-08-23 PROCEDURE — 1126F PR PAIN SEVERITY QUANTIFIED, NO PAIN PRESENT: ICD-10-PCS | Mod: CPTII,S$GLB,, | Performed by: UROLOGY

## 2022-08-23 PROCEDURE — 3008F BODY MASS INDEX DOCD: CPT | Mod: CPTII,S$GLB,, | Performed by: INTERNAL MEDICINE

## 2022-08-23 PROCEDURE — 1160F RVW MEDS BY RX/DR IN RCRD: CPT | Mod: CPTII,S$GLB,, | Performed by: UROLOGY

## 2022-08-23 PROCEDURE — 51798 US URINE CAPACITY MEASURE: CPT | Mod: S$GLB,,, | Performed by: UROLOGY

## 2022-08-23 PROCEDURE — 4010F ACE/ARB THERAPY RXD/TAKEN: CPT | Mod: CPTII,S$GLB,, | Performed by: INTERNAL MEDICINE

## 2022-08-23 PROCEDURE — 1101F PR PT FALLS ASSESS DOC 0-1 FALLS W/OUT INJ PAST YR: ICD-10-PCS | Mod: CPTII,S$GLB,, | Performed by: UROLOGY

## 2022-08-23 PROCEDURE — 1160F RVW MEDS BY RX/DR IN RCRD: CPT | Mod: CPTII,S$GLB,, | Performed by: INTERNAL MEDICINE

## 2022-08-23 PROCEDURE — 1160F PR REVIEW ALL MEDS BY PRESCRIBER/CLIN PHARMACIST DOCUMENTED: ICD-10-PCS | Mod: CPTII,S$GLB,, | Performed by: UROLOGY

## 2022-08-23 PROCEDURE — 3008F PR BODY MASS INDEX (BMI) DOCUMENTED: ICD-10-PCS | Mod: CPTII,S$GLB,, | Performed by: UROLOGY

## 2022-08-23 PROCEDURE — 1101F PT FALLS ASSESS-DOCD LE1/YR: CPT | Mod: CPTII,S$GLB,, | Performed by: INTERNAL MEDICINE

## 2022-08-23 PROCEDURE — 3075F SYST BP GE 130 - 139MM HG: CPT | Mod: CPTII,S$GLB,, | Performed by: UROLOGY

## 2022-08-23 PROCEDURE — 3288F FALL RISK ASSESSMENT DOCD: CPT | Mod: CPTII,S$GLB,, | Performed by: UROLOGY

## 2022-08-23 PROCEDURE — 99214 OFFICE O/P EST MOD 30 MIN: CPT | Mod: S$GLB,,, | Performed by: INTERNAL MEDICINE

## 2022-08-23 PROCEDURE — 1159F PR MEDICATION LIST DOCUMENTED IN MEDICAL RECORD: ICD-10-PCS | Mod: CPTII,S$GLB,, | Performed by: UROLOGY

## 2022-08-23 PROCEDURE — 3078F DIAST BP <80 MM HG: CPT | Mod: CPTII,S$GLB,, | Performed by: INTERNAL MEDICINE

## 2022-08-23 PROCEDURE — 3074F SYST BP LT 130 MM HG: CPT | Mod: CPTII,S$GLB,, | Performed by: INTERNAL MEDICINE

## 2022-08-23 PROCEDURE — 99213 PR OFFICE/OUTPT VISIT, EST, LEVL III, 20-29 MIN: ICD-10-PCS | Mod: S$GLB,,, | Performed by: UROLOGY

## 2022-08-23 PROCEDURE — 1126F AMNT PAIN NOTED NONE PRSNT: CPT | Mod: CPTII,S$GLB,, | Performed by: INTERNAL MEDICINE

## 2022-08-23 PROCEDURE — 81002 POCT URINE DIPSTICK WITHOUT MICROSCOPE: ICD-10-PCS | Mod: S$GLB,,, | Performed by: UROLOGY

## 2022-08-23 PROCEDURE — 3288F PR FALLS RISK ASSESSMENT DOCUMENTED: ICD-10-PCS | Mod: CPTII,S$GLB,, | Performed by: UROLOGY

## 2022-08-23 PROCEDURE — 3288F PR FALLS RISK ASSESSMENT DOCUMENTED: ICD-10-PCS | Mod: CPTII,S$GLB,, | Performed by: INTERNAL MEDICINE

## 2022-08-23 PROCEDURE — 3074F PR MOST RECENT SYSTOLIC BLOOD PRESSURE < 130 MM HG: ICD-10-PCS | Mod: CPTII,S$GLB,, | Performed by: INTERNAL MEDICINE

## 2022-08-23 PROCEDURE — 51798 POCT BLADDER SCAN: ICD-10-PCS | Mod: S$GLB,,, | Performed by: UROLOGY

## 2022-08-23 PROCEDURE — 3008F BODY MASS INDEX DOCD: CPT | Mod: CPTII,S$GLB,, | Performed by: UROLOGY

## 2022-08-23 PROCEDURE — 1126F PR PAIN SEVERITY QUANTIFIED, NO PAIN PRESENT: ICD-10-PCS | Mod: CPTII,S$GLB,, | Performed by: INTERNAL MEDICINE

## 2022-08-23 PROCEDURE — 99999 PR PBB SHADOW E&M-EST. PATIENT-LVL IV: ICD-10-PCS | Mod: PBBFAC,,, | Performed by: UROLOGY

## 2022-08-23 PROCEDURE — 99214 PR OFFICE/OUTPT VISIT, EST, LEVL IV, 30-39 MIN: ICD-10-PCS | Mod: S$GLB,,, | Performed by: INTERNAL MEDICINE

## 2022-08-23 PROCEDURE — 81002 URINALYSIS NONAUTO W/O SCOPE: CPT | Mod: S$GLB,,, | Performed by: UROLOGY

## 2022-08-23 PROCEDURE — 1126F AMNT PAIN NOTED NONE PRSNT: CPT | Mod: CPTII,S$GLB,, | Performed by: UROLOGY

## 2022-08-23 PROCEDURE — 1159F MED LIST DOCD IN RCRD: CPT | Mod: CPTII,S$GLB,, | Performed by: UROLOGY

## 2022-08-23 PROCEDURE — 3075F PR MOST RECENT SYSTOLIC BLOOD PRESS GE 130-139MM HG: ICD-10-PCS | Mod: CPTII,S$GLB,, | Performed by: UROLOGY

## 2022-08-23 PROCEDURE — 3008F PR BODY MASS INDEX (BMI) DOCUMENTED: ICD-10-PCS | Mod: CPTII,S$GLB,, | Performed by: INTERNAL MEDICINE

## 2022-08-23 PROCEDURE — 3078F DIAST BP <80 MM HG: CPT | Mod: CPTII,S$GLB,, | Performed by: UROLOGY

## 2022-08-23 PROCEDURE — 4010F ACE/ARB THERAPY RXD/TAKEN: CPT | Mod: CPTII,S$GLB,, | Performed by: UROLOGY

## 2022-08-23 NOTE — PROGRESS NOTES
HPI:    7/13/2017 - Here for follow up, had repeat CT chest showing stable nodules (2-4 mm) since 1/2017, will repeat CT in 1 year.  Only complaint is some mild BURT (PFT 9/2016 - mild decrease DLCO o/w ok, methacholine challenge 12/2016 +, will try prn beta agonist.  No other new complaints.    1/23/2018 - Here for follow up, felt breathing didn't respond to inhalers.  He has stopped all of his BP meds and reports SBP to 190 at times (d/w him). No new respiratory symptoms.    7/26/2018 - Here for follow up, no new issues still with some dyspnea with exertion but not any worse (has not really responded to inhalers ( had previous PFT and methacholine challenge were ok).  BP remains an issues (has anywhere from elevated to low BP).  No chest pain or CHF symptoms.    7/23/2019 - Here for follow up, doing well, still with intermittent dyspnea.  BP remains variable and he feels better when it is lower.  Has some blurred vision at times (to see Ophthalmologist for cataracts)    7/28/2020 -  Here for follow up, doing well still with mild intermittent dyspnea.  Had his cataract surgery done.  No known corona virus exposure and has been practicing social distancing.  Did have skin cancers removed (no melanoma).  Reports that he has had some ankle swelling at times but has resolved.    8/24/2021 - Here for follow up, no new respiratory issues.  Having knee issues and may be getting PRP (platelet rich plasma).  Patient has no known corona virus exposures and has been practicing social distancing.  We have discussed the virus and precautions and all questions have been answered.  Has had Covid vaccine (Moderna)    8/23/2022 - Here for follow up, dyspnea is about the same.  He did have appendicitis and had surgery in  1/2022 (11 day hospital stay primarily for slow bowel function return).  Patient has no known corona virus exposures and has been practicing social distancing.  We have discussed the virus and precautions and all  questions have been answered.  He has had Moderna vaccine and booster x 2.  The dyspnea is not predictable and is not consistent.  He denies any chest discomfort with the symptoms.      Abnormal Chest CT scan    Date of last scan - 2019    Findings - stable multiple nodules    Followup - stable since 2017 - no CT follow up needed      Medications    Current Outpatient Medications:     amLODIPine (NORVASC) 10 MG tablet, Take 1 tablet (10 mg total) by mouth once daily., Disp: 90 tablet, Rfl: 1    aspirin (ECOTRIN) 81 MG EC tablet, aspirin 81 mg tablet,delayed release  Take 1 tablet every day by oral route., Disp: , Rfl:     co-enzyme Q-10 30 mg capsule, Take 30 mg by mouth 3 (three) times daily., Disp: , Rfl:     hydroCHLOROthiazide (HYDRODIURIL) 25 MG tablet, Take 1 tablet (25 mg total) by mouth once daily., Disp: 90 tablet, Rfl: 1    multivit with minerals/lutein (MULTIVITAMIN 50 PLUS ORAL), multivitamin, Disp: , Rfl:     MYRBETRIQ 50 mg Tb24, TAKE 1 TABLET ONCE DAILY, Disp: 90 tablet, Rfl: 3    omega 3-dha-epa-fish oil (FISH OIL) 100-160-1,000 mg Cap, Fish Oil, Disp: , Rfl:     omeprazole (PRILOSEC) 20 MG capsule, Take 1 capsule by mouth once daily, Disp: 90 capsule, Rfl: 0    rosuvastatin (CRESTOR) 10 MG tablet, Take 1 tablet (10 mg total) by mouth once daily., Disp: 90 tablet, Rfl: 3    solifenacin (VESICARE) 10 MG tablet, Take 1 tablet (10 mg total) by mouth once daily., Disp: 90 tablet, Rfl: 3    tadalafiL (CIALIS) 5 MG tablet, Take 1 tablet (5 mg total) by mouth once daily., Disp: 90 tablet, Rfl: 3    telmisartan (MICARDIS) 80 MG Tab, Take 1 tablet by mouth once daily, Disp: 90 tablet, Rfl: 0    Allergies  Review of patient's allergies indicates:  No Known Allergies    Social History    Social History     Tobacco Use   Smoking Status Former Smoker    Quit date:     Years since quittin.6   Smokeless Tobacco Never Used     ETOH - 3 drinks per week.  Social History     Substance and  Sexual Activity   Drug Use No     Occupation - works as a     Family History  Family History   Problem Relation Age of Onset    Cancer Mother         skin    Stroke Mother     Heart failure Father     Kidney disease Father        ROS  Review of Systems   Constitutional: Negative for chills, diaphoresis, fever, malaise/fatigue and weight loss.   HENT: Negative for congestion.    Eyes: Negative for pain.   Respiratory: Positive for shortness of breath. Negative for cough, hemoptysis, sputum production, wheezing and stridor.    Cardiovascular: Negative for chest pain, palpitations, orthopnea, claudication, leg swelling and PND.   Gastrointestinal: Negative for abdominal pain, constipation, diarrhea, heartburn, nausea and vomiting.   Genitourinary: Negative for dysuria, frequency and urgency.   Musculoskeletal: Negative for falls and myalgias.   Neurological: Negative for sensory change, focal weakness and weakness.   Psychiatric/Behavioral: Negative for depression. The patient is not nervous/anxious.        Physical Exam    Vitals:    08/23/22 0851   BP: 115/70   BP Location: Left arm   Patient Position: Sitting   Pulse: 66   SpO2: 97%   Weight: 77.2 kg (170 lb 4.8 oz)       Physical Exam  Vitals and nursing note reviewed.   Constitutional:       General: He is not in acute distress.     Appearance: He is well-developed. He is not diaphoretic.   HENT:      Head: Normocephalic and atraumatic.      Right Ear: External ear normal.      Left Ear: External ear normal.      Nose: Nose normal.   Eyes:      Pupils: Pupils are equal, round, and reactive to light.   Neck:      Thyroid: No thyromegaly.      Vascular: No JVD.      Trachea: No tracheal deviation.   Cardiovascular:      Rate and Rhythm: Normal rate and regular rhythm.      Heart sounds: Normal heart sounds. No murmur heard.    No friction rub. No gallop.   Pulmonary:      Effort: Pulmonary effort is normal. No respiratory distress.      Breath  sounds: Normal breath sounds. No stridor. No wheezing or rales.   Chest:      Chest wall: No tenderness.   Abdominal:      General: Bowel sounds are normal. There is no distension.      Palpations: Abdomen is soft.   Musculoskeletal:         General: No tenderness. Normal range of motion.      Cervical back: Normal range of motion and neck supple.   Lymphadenopathy:      Cervical: No cervical adenopathy.   Neurological:      Mental Status: He is alert and oriented to person, place, and time.      Cranial Nerves: No cranial nerve deficit.      Deep Tendon Reflexes: Reflexes are normal and symmetric.   Psychiatric:         Behavior: Behavior normal.         Lab        Xray     CT CHEST WITHOUT CONTRAST     CLINICAL HISTORY:  Other nonspecific abnormal finding of lung field pulmonary nodules;     TECHNIQUE:  CMS Mandated Quality Data-CT Radiation Dose-436     All CT scans at this facility dose modulation, iterative reconstruction, and or weight-based dosing when appropriate to reduce radiation dose to as low as reasonably achievable.     COMPARISON:  CT chest 07/31/2018 and CT chest 01/26/2017     FINDINGS:  Bilateral fissural nodules compatible with intrapulmonary lymph nodes are stable compared to prior studies.  Medial right upper lobe nodule measuring 4 mm on image 88 is stable from prior.  Medial right lung base nodule measuring 5 mm on image 196 is also stable.  Additional bilateral subcentimeter pulmonary nodules are stable.  No new or enlarging pulmonary nodule.  No confluent airspace disease.  No pleural effusion or pneumothorax.  Mild biapical pleuroparenchymal scarring.     Atherosclerotic calcification of the aorta.  Normal size mediastinal lymph nodes, stable from prior.  Esophagus is unremarkable.  Central airways are patent.     Bone window images demonstrate no acute or aggressive osseous abnormality.     Limited images through the upper abdomen demonstrate stable hepatic cyst and no acute  process.     Impression:     Stable subcentimeter pulmonary nodules within both lungs.        Electronically signed by: Esua Reyes MD  Date:                                            07/31/2019  Time:                                           10:38    Impression/Plan  Problem List Items Addressed This Visit        Pulmonary    Multiple pulmonary nodules  - low risk pt  - stable 2017  - no further scans needed at this time      Dyspnea on exertion - Primary  - has noted some increased in symptoms - will recheck PFT and CXR  - RTC 1 year    HTN  - reports overall good control              Other Visit Diagnoses    None.         Micah Curtis MD

## 2022-08-23 NOTE — PATIENT INSTRUCTIONS
1. BPH with urinary obstruction    2. Increasing residual urine    3. OAB (overactive bladder)    4. Erectile dysfunction, unspecified erectile dysfunction type      oab improved on myrbetriq 50mg and vesicare however with some increasing residual.    Plan:     Continue myrbetriq 50mg daily and Continue vesicare/solfenacin 10mg - for overactive bladder. Need to monitor residual. Has 115 in bladder. No urge to urinate. Highest it's been. If GERD/reflux (which can be caused by vesicare) becomes bothersome enough can discuss botox, interstim or ptns  Continue tadalafil 5mg daily for ED.      F/u in 6 months for aua ssx and pvr

## 2022-08-23 NOTE — PROGRESS NOTES
[facility] Note - [place]  Staff: MD Erika  PCP: JAMES Rose,FNP-C  Date of Service: 08/23/2022      Subjective:        HPI: Onesimo Paula is a 71 y.o. male     Seen by me 9/30/19  Patient had MRI for right hip pain and was found have a thick-walled bladder with enlarged prostate.  He states that he saw urologist over 40 years ago for burning with urination.  He had a renal bladder ultrasound in 2016 which showed enlarged prostate and bilateral renal cyst done for renal insufficiency whom he sees  for.  Denies any gross hematuria.  Review of for previous urine show no microscopic hematuria.  Twenty-five pack-year history of smoking but quit in 1997.      He also has AUA symptom score 6 and occasional weak stream and urgency but voids every 3-5 hours when he is working and a little more frequent when he is not.  Denies any urge incontinence.  Okay stream with occasional weak stream.  PVR today 09/03/2019 is 15 cc.  He may have tried Flomax a couple years ago but does not recall it helping her changing any was symptoms.  Overall really not that bothered by his urinary symptoms.      Also states he has ED.  Previously tried sildenafil unsure dose and it helped but wife does not want him to take, exam revealed peyronie's     Plan: started tadalafil 5mg due to thick walled bladder although essentially asx. No further f/u fr b renal cysts.      Interval history 11/18/19:   Pt states today Cialis has NOT improved any of his lower urinary tract symptoms since starting the medication.  He currently takes this medication before his bedtime.  Pt still c/o urinary urgency, especially during the day.  Nocturia is 1-2x nightly and not bothersome at this time.  He is a , therefore the daytime urgency really bothers him.     Plan: started ditropan 10mg XL and cont'd tadalafil     Interval history by idalia 1/6/20:   TODAY, pt states the oxybutynin only improved  his LUTS by over 10% at this time.  PVR by bladder scan performed in office today:  11 mL     Rec'd: d/c oxybutynin, start myrbetriq, cont tadalafil 5mg daily.      Interval history 7/27/20:      Tried myrbetriq but didn't help. Having urgency, hesistancy and intermittent slow stream but urgency most bothersome and having some UUI with a few drop, but not that bothersome. Drinks 1 c of coffee in am. Soda during day. 1 c of coffee int he evening. Has never tried stopping these to see if they help. Drinks beer and notices increase in frequency following day.      On tadalafil 5mg daily for ED and bph.  pvr by scan: 32cc  Voiding every 30 minutes a day recently more pronounced at home (has been on vacation).  Takes hctz in am. When he's working he only voids about 2x-4x a day. Drinks caffeine while working including energy drinks. No nocturia.      psa 1/31/20 2.2 (up from 1.2 year prior). Repeat psa 0.85 7/29/20  WINSOME today: 40g + no nodules.     Uroflow 7/30/20: peak flow 12.8mL/s,  avg flow 5.4mL/s, voided vol: 191cc, pvr by scan:0      Cysto trus 8/17/20: normal bladder, no stricture. Membranous and distal prostatic urethra with papillary tissue suspicious for prostatitis.trus volume 42.6g. bc of his urgency on tadalafil, flomax, vesicare found rx cipro for 14d to see if it helped w urgency. D/c'd vesicare. cont'd flomax and tadalafil. rtc to see idalia in 2-3 weeks and me in 2 months to discuss bph procedures     Interval history by idalia 9/9/20:   Pt states he is still having the urinary urgency even since finishing the Cipro antibiotics at this time.  He denies gross hematuria, dysuria.  UA today shows normal findings.  PVR by bladder scan today:  9 mL     Plan: rec'd restarting vesicare     Interval history 10/1/20:  Taking flomax 0.4mg nightly and says flow is intermittent on this.   Also on the vesicare and says it doesn't help with the urgency. Voiding 2-3x a day and none at night. No incontinence. 3-4x a  week c/o urge incontinence if he tries to hold it too long, mostly in the am when he wakes up. Denies any difficulty with walking. No weakness or numbness.   Changed to decaf coffee 1x a day. Changed to sprite and caffeine free coke.   He does not want to take any more meds. He is not happy with the retrograde ejaculation.   Sees dr. leger for Mitral Valve leakage, last saw him a week ago, was told he should f/u in March 2021. Tadalafil helping with erections     Interval history 11/19/20:   Underwent rezum on 10/21/20. Font flomax, vesicare and tadafil daily for 3 more months.      Returned for fill and pull on 10/26/20. Filled with 210, voided 210  For 2 weeks postop would have blood when he had a BM, improving.      Today (1 month later) he states he has some increased urgency. UUI 2-3x a day (increased from 3-4x a week).  Still taking vesicare but ran out. freq q1 hour (worse in cold weather). Without cold weather freq q 3 hour.   ua today with small wbc and mod blood - some dysuria  No significant change in stream yet  pvr by scan today: 134  AUA ssx:(2 incomplete emptying, 3 frequency, 3 intermittency, 4 urgency, 2 weak stream, 2 straining, 0 sleeping). 16. QOL: 4    Interval history by me in clinic on 3/9/21:  Returns today and states states he ran out of flomax and vesicare a month ago. No increased frequency, urge incontinence or slower stream off the flomax. Still having UUI 1-2x a day with a few drops when he hears water. Voids 8x/24 hours and 4x/8 hours while at work. Nocturia occ (same). No longer drinking caffeine. Still on tadalafil 5mg daily.  No longer having retrograde ejaculation and constipation improved. Hctz in am.  Frequency mostly in  the pm. Bothers him but able to stop at bathrooms.   AUA ssx today:(1 incomplete emptying, 2 frequency, 1 intermittency, 3 urgency, 3 weak stream, 0 straining, 0 sleeping). 10. QOL: mixed  Bladder scan PVR today was 35mL.       I reviewed his previous  urinalysis and cultures as well as his urinalysis today. His urinalysis today shows no abnormalities.      I reviewed his previous PSAs and his most recent psa within the last year was 2.3, %free 20.87, last year was 2.2     Sometimes loses erection with tadalafil 5mg daily. Rare. Not more frequent. Still getting morning erections.     Interval history with idalia on 4/9/21:  UA today showed normal findings.  Pt states today even though he tried and changed his HCTZ med to evenings as discussed last ov, his LUTS have not improved.  Pt as of today's visit is still c/o urinary urgency and frequency during the daytime.  Has not improved since last ov.  Pt states today he has tried Vesicare and Oxybutynin in the past with no improvement in symptoms.  Pt currently denies gross hematuria and dysuria.  She put him on myrbetriq again       Interval history by me 6/15/21:  Uroflow results (date: 03/18/2021): Voiding time: 24.3s, Flow time: 23.1s, TTP flow: 8.0s, Peak flowrate: 19.2 mL/s, Average flowrate: 10.6mL/s, Intervals: 2, Voided volume: 244 mL, Pvr by bladder scan 33. Pattern of curve: see uploaded image, reviewed by     At last visit was c/o freq/urgency. Frequency 8-10x/day. Tried myrbetriq before rezum, ditropan, vesicare and changing hctz to pm but no improvement. Then in April started myrbetriq again , after about 4 weeks saw improvement, now down to 5-6x a day. avg urg: 3. No nocturia. No incontinence.  Also on tadalafil 5mg daily.   AUA ssx today:(1 incomplete emptying, 1 frequency, 1 intermittency, 2 urgency, 3 weak stream, 0 straining, 0 sleeping). 8. QOL:   Bladder scan PVR today was 0mL.      HPI/CC today 6/7/22:   · Had him Continue myrbetriq 50mg daily, has enough until 4/2021, Continue tadalafil 5mg daily, refilled today for a year.  · Says myrbetriq not helping anymore. He held it for a few days and noticed no difference. On cialis 5mg daily.   · Does not have constipation  · Voids about 9-10x  during waking hours. occ urge incontinence 3x a week. Few drops. Started drinking caffeine this week.   · Nocturia 1-2x a night. Urine flow varies.    · psa 5/31/22 1.3  · ua today neg, pvr by scan: 5  · AUA ssx:(2 incomplete emptying, 3 frequency, 2 intermittency, 4 urgency, 3 weak stream, 1 straining, 1 sleeping). 16. QOL: mixed  · Appendix removed in January complicated by post-op ileus.    Interval history 8/23/22:   Continue myrbetriq 50mg daily, has enough until 4/2021   Continue tadalafil 5mg daily, refilled today for a year     After adding vesicare to myrbetriq - went from voiding 9-10x day, 1-2x a night with occ UUI 3x a week to 5-6x/day, 0 night and no UUI. Says he has dry mouth but about the same. However does have some heart burn that is new.    UA today: neg. pvr by scan: 115 (this is the highest it's been, feels empty).   AUA ssx:(1 incomplete emptying, 2 frequency, 0 intermittency, 2 urgency, 2 weak stream, 1 straining, 0 sleeping). 8. QOL: pleased       F/u in 2 months for aua ssx and pvr       PVR History:  8/23/22 115  6/15/21 pvr by scan: 0  3/18/21 UF: pF: 19.2, avg: 10.6, voided vol: 244, pvr: 33  3/2/21               pvr: 35  11/19/20          pvr by scan: 134 (couldn't start after he stopped)  10/26/20          Fill and pull: 0cc pvr  11/19/20          rezum  9/9/20              pvr by scan: 9cc  7/27/20            pvr by scan: 32cc  8/17/20            Cysto trus: 42.6g.   7/30/20           UF: peak flow 12.8mL/s,  avg flow 5.4mL/s, voided vol: 191cc, pvr by scan:0   1/6/20              pvr by scan: 11cc   9/3/19              pvr by scan: 15cc     PSA history: no family hx of prostate cancer  5/31/22 1.3  3/2/21              2.3, %free 20.87, pvr: 35 (psa screen 4.6)  11/19/20          rezum  7/29/20            0.85, %free 54.12  1/31/20            2.2   2/21/19            1.2  3/14/18            1.0    Urine history  6/7/22  neg  11/19/20          Small wbc/mod blood-send for ua patricio  and culture pvr by scan: 134  10/14/20          Neg  9/16/20            neg  9/9/20              neg  3/31/20            Neg  1/6/20              Neg  11/18/19          neg  8/8/19              No cx, void: n/a 0 rbc  2/21/19            Ng, void: neg, 0 rbc  4/9/18              No cx, void: neg  9/2017             No blood       Current REVIEW OF SYSTEMS:  neg    Objective:     Vitals:    08/23/22 1438   BP: 138/78   Pulse: (!) 59   Resp: 18       Focused  exam  neg    Assessment:     Onesimo Paula is a 71 y.o. male with       1. BPH with urinary obstruction    2. Increasing residual urine    3. OAB (overactive bladder)    4. Erectile dysfunction, unspecified erectile dysfunction type      oab improved on myrbetriq 50mg and vesicare however with some increasing residual.    Plan:      Continue myrbetriq 50mg daily and Continue vesicare/solfenacin 10mg - for overactive bladder. Need to monitor residual. Has 115 in bladder. No urge to urinate. Highest it's been. If GERD/reflux (which can be caused by vesicare) becomes bothersome enough can discuss botox, interstim or ptns   Continue tadalafil 5mg daily for ED.      F/u in 6 months for aua ssx and pvr       La Nena James MD

## 2022-08-24 ENCOUNTER — OFFICE VISIT (OUTPATIENT)
Dept: HEPATOLOGY | Facility: CLINIC | Age: 72
End: 2022-08-24
Payer: COMMERCIAL

## 2022-08-24 VITALS
DIASTOLIC BLOOD PRESSURE: 78 MMHG | HEART RATE: 64 BPM | WEIGHT: 170.06 LBS | HEIGHT: 70 IN | OXYGEN SATURATION: 97 % | RESPIRATION RATE: 17 BRPM | TEMPERATURE: 98 F | SYSTOLIC BLOOD PRESSURE: 152 MMHG | BODY MASS INDEX: 24.35 KG/M2

## 2022-08-24 DIAGNOSIS — K76.89 LIVER CYST: Primary | ICD-10-CM

## 2022-08-24 PROBLEM — R74.01 ELEVATED TRANSAMINASE LEVEL: Status: RESOLVED | Noted: 2017-10-23 | Resolved: 2022-08-24

## 2022-08-24 PROBLEM — K35.33 ACUTE APPENDICITIS WITH PERFORATION, LOCALIZED PERITONITIS, AND ABSCESS, WITHOUT GANGRENE: Status: RESOLVED | Noted: 2022-01-02 | Resolved: 2022-08-24

## 2022-08-24 PROCEDURE — 4010F ACE/ARB THERAPY RXD/TAKEN: CPT | Mod: CPTII,S$GLB,, | Performed by: INTERNAL MEDICINE

## 2022-08-24 PROCEDURE — 1160F RVW MEDS BY RX/DR IN RCRD: CPT | Mod: CPTII,S$GLB,, | Performed by: INTERNAL MEDICINE

## 2022-08-24 PROCEDURE — 99213 PR OFFICE/OUTPT VISIT, EST, LEVL III, 20-29 MIN: ICD-10-PCS | Mod: S$GLB,,, | Performed by: INTERNAL MEDICINE

## 2022-08-24 PROCEDURE — 1101F PT FALLS ASSESS-DOCD LE1/YR: CPT | Mod: CPTII,S$GLB,, | Performed by: INTERNAL MEDICINE

## 2022-08-24 PROCEDURE — 4010F PR ACE/ARB THEARPY RXD/TAKEN: ICD-10-PCS | Mod: CPTII,S$GLB,, | Performed by: INTERNAL MEDICINE

## 2022-08-24 PROCEDURE — 3008F BODY MASS INDEX DOCD: CPT | Mod: CPTII,S$GLB,, | Performed by: INTERNAL MEDICINE

## 2022-08-24 PROCEDURE — 99999 PR PBB SHADOW E&M-EST. PATIENT-LVL IV: CPT | Mod: PBBFAC,,, | Performed by: INTERNAL MEDICINE

## 2022-08-24 PROCEDURE — 1126F AMNT PAIN NOTED NONE PRSNT: CPT | Mod: CPTII,S$GLB,, | Performed by: INTERNAL MEDICINE

## 2022-08-24 PROCEDURE — 1101F PR PT FALLS ASSESS DOC 0-1 FALLS W/OUT INJ PAST YR: ICD-10-PCS | Mod: CPTII,S$GLB,, | Performed by: INTERNAL MEDICINE

## 2022-08-24 PROCEDURE — 1160F PR REVIEW ALL MEDS BY PRESCRIBER/CLIN PHARMACIST DOCUMENTED: ICD-10-PCS | Mod: CPTII,S$GLB,, | Performed by: INTERNAL MEDICINE

## 2022-08-24 PROCEDURE — 1159F PR MEDICATION LIST DOCUMENTED IN MEDICAL RECORD: ICD-10-PCS | Mod: CPTII,S$GLB,, | Performed by: INTERNAL MEDICINE

## 2022-08-24 PROCEDURE — 3288F FALL RISK ASSESSMENT DOCD: CPT | Mod: CPTII,S$GLB,, | Performed by: INTERNAL MEDICINE

## 2022-08-24 PROCEDURE — 99213 OFFICE O/P EST LOW 20 MIN: CPT | Mod: S$GLB,,, | Performed by: INTERNAL MEDICINE

## 2022-08-24 PROCEDURE — 3008F PR BODY MASS INDEX (BMI) DOCUMENTED: ICD-10-PCS | Mod: CPTII,S$GLB,, | Performed by: INTERNAL MEDICINE

## 2022-08-24 PROCEDURE — 3077F PR MOST RECENT SYSTOLIC BLOOD PRESSURE >= 140 MM HG: ICD-10-PCS | Mod: CPTII,S$GLB,, | Performed by: INTERNAL MEDICINE

## 2022-08-24 PROCEDURE — 1126F PR PAIN SEVERITY QUANTIFIED, NO PAIN PRESENT: ICD-10-PCS | Mod: CPTII,S$GLB,, | Performed by: INTERNAL MEDICINE

## 2022-08-24 PROCEDURE — 3078F DIAST BP <80 MM HG: CPT | Mod: CPTII,S$GLB,, | Performed by: INTERNAL MEDICINE

## 2022-08-24 PROCEDURE — 1159F MED LIST DOCD IN RCRD: CPT | Mod: CPTII,S$GLB,, | Performed by: INTERNAL MEDICINE

## 2022-08-24 PROCEDURE — 3078F PR MOST RECENT DIASTOLIC BLOOD PRESSURE < 80 MM HG: ICD-10-PCS | Mod: CPTII,S$GLB,, | Performed by: INTERNAL MEDICINE

## 2022-08-24 PROCEDURE — 3288F PR FALLS RISK ASSESSMENT DOCUMENTED: ICD-10-PCS | Mod: CPTII,S$GLB,, | Performed by: INTERNAL MEDICINE

## 2022-08-24 PROCEDURE — 99999 PR PBB SHADOW E&M-EST. PATIENT-LVL IV: ICD-10-PCS | Mod: PBBFAC,,, | Performed by: INTERNAL MEDICINE

## 2022-08-24 PROCEDURE — 3077F SYST BP >= 140 MM HG: CPT | Mod: CPTII,S$GLB,, | Performed by: INTERNAL MEDICINE

## 2022-08-24 NOTE — PROGRESS NOTES
HEPATOLOGY FOLLOW UP    Referring Physician: JAMES Rose,KELLY  Current Corresponding Physician: JAMES Rose,KELLY    Onesimo Paula is here for follow up of Liver cyst      HPI  Onesimo Paula is a 71 y.o. male who had an MRI of the abdomen that revealed a mildly complex liver cyst. I saw him in consultation 2/3/22:     MRI abdo w wo contrast 12/7/21:  segment 5 there is a 4 cm lobular T1 hypointense T2 hyperintense lesion     CT abdo pelvis 1/2/22: The liver is hypodense compatible with fatty infiltration. There is a 3.5 cm cyst within the right lobe of the liver. There is no biliary duct dilatation.  CT abdo pelvis w contrast 1/9/22: Liver is normal size. Hepatic cyst again noted noted and is unchanged. No new liver lesions  CTA abdomen 1/12/22: Simple fluid attenuation      He denied abdo pain, N/V. He did recently have intraabdominal abscesses after an appendectomy but those had resolved and he did not symptoms when I saw him in consultation.     Labs 1/13/22: ALT 24, AST 19, ALKP 52, Tbil 0.8     The patient also denied any symptoms of decompensated cirrhosis, including no ascites or edema, cognitive problems that would suggest hepatic encephalopathy, or GI bleeding from varices.    Interval History  Since Onesimo Paula's last visit:    My impression was that he had a simple cyst in the liver. I recommended an abdo US:    Abdo US 2/16/22: The liver is normal in overall size. There is a 4.2 cm thinly septated cyst within the right hepatic lobe as has been demonstrated on previous imaging. There are no additional focal hepatic parenchymal abnormalities demonstrated.  Abdo US 8/16/22: Liver maintains normal echogenicity without focal mass or intrahepatic bile duct dilation. There is redemonstration of right hepatic lobe cyst measuring 3.3 x 3.7 x 4.1 cm, with septation and is not significant changed when compared with previous exam.    He continues to not have any symptoms in the RUQ  that could be attributed to the cyst.    Outpatient Encounter Medications as of 8/24/2022   Medication Sig Dispense Refill    amLODIPine (NORVASC) 10 MG tablet Take 1 tablet (10 mg total) by mouth once daily. 90 tablet 1    aspirin (ECOTRIN) 81 MG EC tablet aspirin 81 mg tablet,delayed release   Take 1 tablet every day by oral route.      co-enzyme Q-10 30 mg capsule Take 30 mg by mouth 3 (three) times daily.      hydroCHLOROthiazide (HYDRODIURIL) 25 MG tablet Take 1 tablet (25 mg total) by mouth once daily. 90 tablet 1    multivit with minerals/lutein (MULTIVITAMIN 50 PLUS ORAL) multivitamin      MYRBETRIQ 50 mg Tb24 TAKE 1 TABLET ONCE DAILY 90 tablet 3    omega 3-dha-epa-fish oil (FISH OIL) 100-160-1,000 mg Cap Fish Oil      omeprazole (PRILOSEC) 20 MG capsule Take 1 capsule by mouth once daily 90 capsule 0    rosuvastatin (CRESTOR) 10 MG tablet Take 1 tablet (10 mg total) by mouth once daily. 90 tablet 3    solifenacin (VESICARE) 10 MG tablet Take 1 tablet (10 mg total) by mouth once daily. 90 tablet 3    tadalafiL (CIALIS) 5 MG tablet Take 1 tablet (5 mg total) by mouth once daily. 90 tablet 3    telmisartan (MICARDIS) 80 MG Tab Take 1 tablet by mouth once daily 90 tablet 0     No facility-administered encounter medications on file as of 8/24/2022.     Review of patient's allergies indicates:  No Known Allergies  Past Medical History:   Diagnosis Date    Acid reflux     Acquired tricuspid valve insufficiency     Anemia     Borderline diabetes     BPH (benign prostatic hyperplasia)     Cataract     1/16/20 rt eye, 1/30/20- Lt eye    Coronary artery disease     Hypertension     Pulmonary nodules     Skin cancer        Review of Systems   Constitutional: Negative.    HENT: Negative.    Eyes: Negative.    Respiratory: Negative.    Cardiovascular: Negative.    Gastrointestinal: Negative.    Genitourinary: Negative.    Musculoskeletal: Negative.    Skin: Negative.    Neurological: Negative.     Psychiatric/Behavioral: Negative.      Vitals:    08/24/22 0816   BP: (!) 152/78   Pulse: 64   Resp: 17   Temp: 98.1 °F (36.7 °C)       Physical Exam  Vitals reviewed.   Constitutional:       Appearance: He is well-developed.   HENT:      Head: Normocephalic and atraumatic.   Eyes:      General: No scleral icterus.     Conjunctiva/sclera: Conjunctivae normal.      Pupils: Pupils are equal, round, and reactive to light.   Neck:      Thyroid: No thyromegaly.   Cardiovascular:      Rate and Rhythm: Normal rate and regular rhythm.      Heart sounds: Normal heart sounds.   Pulmonary:      Effort: Pulmonary effort is normal.      Breath sounds: Normal breath sounds. No rales.   Abdominal:      General: Bowel sounds are normal. There is no distension.      Palpations: Abdomen is soft. There is no mass.      Tenderness: There is no abdominal tenderness.   Musculoskeletal:         General: Normal range of motion.      Cervical back: Normal range of motion and neck supple.   Skin:     General: Skin is warm and dry.      Findings: No rash.   Neurological:      Mental Status: He is alert and oriented to person, place, and time.         MELD-Na score: 8 at 1/13/2022  6:44 AM  MELD score: 8 at 1/13/2022  6:44 AM  Calculated from:  Serum Creatinine: 1.1 mg/dL at 1/13/2022  6:44 AM  Serum Sodium: 138 mmol/L (Using max of 137 mmol/L) at 1/13/2022  6:44 AM  Total Bilirubin: 0.8 mg/dL (Using min of 1 mg/dL) at 1/13/2022  6:44 AM  INR(ratio): 1.1 at 1/11/2022  9:00 AM  Age: 71 years    Lab Results   Component Value Date     04/05/2022     (H) 02/21/2019    BUN 33 (H) 04/05/2022    CREATININE 1.0 04/05/2022    CREATININE 1.11 02/21/2019    CALCIUM 9.2 04/05/2022     04/05/2022     02/21/2019    K 3.7 04/05/2022     04/05/2022     02/21/2019    PROT 6.2 01/13/2022    CO2 25 04/05/2022    ANIONGAP 12 04/05/2022    WBC 6.07 04/05/2022    RBC 4.38 (L) 04/05/2022    HGB 12.8 (L) 04/05/2022    HCT 39.1  (L) 04/05/2022    MCV 89 04/05/2022    MCH 29.2 04/05/2022    MCHC 32.7 04/05/2022     Lab Results   Component Value Date    RDW 14.8 (H) 04/05/2022     04/05/2022    MPV 9.7 04/05/2022    GRAN 2.9 04/05/2022    GRAN 47.1 04/05/2022    LYMPH 2.1 04/05/2022    LYMPH 34.8 04/05/2022    MONO 0.7 04/05/2022    MONO 11.2 04/05/2022    EOSINOPHIL 5.6 04/05/2022    BASOPHIL 1.0 04/05/2022    EOS 0.3 04/05/2022    BASO 0.06 04/05/2022    APTT 28.3 01/11/2022    CHOL 164 02/04/2021    TRIG 58 02/04/2021    HDL 45 02/04/2021    CHOLHDL 27.4 02/04/2021    TOTALCHOLEST 3.6 02/04/2021    ALBUMIN 4.0 04/05/2022    ALBUMIN 4.2 02/21/2019    AST 19 01/13/2022    ALT 24 01/13/2022    ALKPHOS 52 (L) 01/13/2022    MG 2.1 01/13/2022    INR 1.1 01/11/2022    PSA 1.3 05/31/2022       Assessment and Plan:  Onesimo Paula is a 71 y.o. male with a Liver cyst  I has not changed in size. CT scan 01/22 revealed no concerning features and no other cysts. He remains asymptomatic. However, it does have a thin septation. I am therefore recommending continued surveillance. I have recommended a repeat abdo US in 1 year.    Return 1 year.

## 2022-08-26 ENCOUNTER — HOSPITAL ENCOUNTER (OUTPATIENT)
Dept: PULMONOLOGY | Facility: HOSPITAL | Age: 72
Discharge: HOME | End: 2022-08-26
Attending: INTERNAL MEDICINE
Payer: COMMERCIAL

## 2022-08-26 ENCOUNTER — HOSPITAL ENCOUNTER (OUTPATIENT)
Dept: RADIOLOGY | Facility: HOSPITAL | Age: 72
Discharge: HOME | End: 2022-08-26
Attending: INTERNAL MEDICINE
Payer: COMMERCIAL

## 2022-08-26 VITALS — WEIGHT: 170 LBS | HEIGHT: 70 IN | BODY MASS INDEX: 24.34 KG/M2

## 2022-08-26 DIAGNOSIS — R06.09 DYSPNEA ON EXERTION: ICD-10-CM

## 2022-08-26 PROCEDURE — 94618 PULMONARY STRESS TESTING: CPT

## 2022-08-26 PROCEDURE — 94727 GAS DIL/WSHOT DETER LNG VOL: CPT

## 2022-08-26 PROCEDURE — 94729 DIFFUSING CAPACITY: CPT

## 2022-08-26 PROCEDURE — 94010 BREATHING CAPACITY TEST: CPT

## 2022-08-26 PROCEDURE — 71046 X-RAY EXAM CHEST 2 VIEWS: CPT | Mod: TC

## 2022-08-26 PROCEDURE — 94060 EVALUATION OF WHEEZING: CPT

## 2022-08-26 NOTE — CARE UPDATE
"   08/26/22 0648   6MW Test   Ordering Provider Micah Curtis MD   Diagnosis Shortness of Breath   Height 5' 10" (1.778 m)   Weight 77.1 kg (170 lb)   BMI (Calculated) 24.4   Predicted Distance 350.44   Patient Race    6MWT Status completed without stopping   Patient Reported No complaints   Was O2 used? No   6MW Distance walked (feet) 1400 feet   Distance walked (meters) 426.72 meters   Did patient stop? No   Type of assistive device(s) used? no assistive devices   Is extra documentation required for this patient? Yes   Pre-Exercise   Oxygen Saturation 96 %   Supplemental Oxygen Room Air   Heart Rate 63 bpm   Claritza Dyspnea Rating  nothing at all   Post Exercise   Oxygen Saturation 95 %   Supplemental Oxygen Room Air   Heart Rate 81 bpm   Claritza Dyspnea Rating  nothing at all   Recovery   Oxygen Saturation 95 %   Supplemental Oxygen Room Air   Heart Rate 80 bpm   Claritza Dyspnea Rating  very, very light (just noticeable)   Interpretation   Is procedure ready for interpretation? Yes   Did the patient stop or pause? No   Total Laps Walked 7   Final Partial Lap Distance (feet) 0 feet   Total Distance Feet (Calculated) 1400 feet   Total Distance Meters (Calculated) 426.72 meters   Predicted Distance Meters (Calculated) 539.79 meters   Percentage of Predicted (Calculated) 79.05   Peak VO2 (Calculated) 16.78   Mets 4.79   Comments This is a Non-Hypoxemic 6 min. walk   Oxygen Qualification   Oxygen Qualification? No     "

## 2022-09-15 ENCOUNTER — TELEPHONE (OUTPATIENT)
Dept: CARDIOLOGY | Facility: CLINIC | Age: 72
End: 2022-09-15
Payer: COMMERCIAL

## 2022-09-15 ENCOUNTER — PATIENT MESSAGE (OUTPATIENT)
Dept: FAMILY MEDICINE | Facility: CLINIC | Age: 72
End: 2022-09-15

## 2022-09-15 NOTE — TELEPHONE ENCOUNTER
----- Message from Servando Chapa MA sent at 9/15/2022  9:55 AM CDT -----  Contact: ASHLEY PARTIDA [[id number]]     Type: Needs Medical Advice    Who Called: ASHLEY PARTIDA [[id number]]   Best Call Back Number: [phone]  Inquiry/Question: Please call ASHLEY PARTIDA [[phone]] regarding check up appt      Thank you~

## 2022-10-03 ENCOUNTER — LAB VISIT (OUTPATIENT)
Dept: LAB | Facility: HOSPITAL | Age: 72
End: 2022-10-03
Attending: INTERNAL MEDICINE
Payer: COMMERCIAL

## 2022-10-03 DIAGNOSIS — N18.2 CHRONIC KIDNEY DISEASE, STAGE II (MILD): Primary | ICD-10-CM

## 2022-10-03 DIAGNOSIS — D64.9 ANEMIA, UNSPECIFIED: ICD-10-CM

## 2022-10-03 LAB
ALBUMIN SERPL BCP-MCNC: 4.1 G/DL (ref 3.5–5.2)
ANION GAP SERPL CALC-SCNC: 9 MMOL/L (ref 8–16)
BASOPHILS # BLD AUTO: 0.07 K/UL (ref 0–0.2)
BASOPHILS NFR BLD: 0.9 % (ref 0–1.9)
BILIRUB UR QL STRIP: NEGATIVE
BUN SERPL-MCNC: 29 MG/DL (ref 8–23)
CALCIUM SERPL-MCNC: 9 MG/DL (ref 8.7–10.5)
CHLORIDE SERPL-SCNC: 102 MMOL/L (ref 95–110)
CLARITY UR: CLEAR
CO2 SERPL-SCNC: 27 MMOL/L (ref 23–29)
COLOR UR: YELLOW
CREAT SERPL-MCNC: 1.1 MG/DL (ref 0.5–1.4)
CREAT UR-MCNC: 129 MG/DL (ref 23–375)
DIFFERENTIAL METHOD: ABNORMAL
EOSINOPHIL # BLD AUTO: 0.3 K/UL (ref 0–0.5)
EOSINOPHIL NFR BLD: 3.9 % (ref 0–8)
ERYTHROCYTE [DISTWIDTH] IN BLOOD BY AUTOMATED COUNT: 13.4 % (ref 11.5–14.5)
EST. GFR  (NO RACE VARIABLE): >60 ML/MIN/1.73 M^2
FERRITIN SERPL-MCNC: 129 NG/ML (ref 20–300)
GLUCOSE SERPL-MCNC: 113 MG/DL (ref 70–110)
GLUCOSE UR QL STRIP: NEGATIVE
HCT VFR BLD AUTO: 41.7 % (ref 40–54)
HGB BLD-MCNC: 13.6 G/DL (ref 14–18)
HGB UR QL STRIP: NEGATIVE
IMM GRANULOCYTES # BLD AUTO: 0.02 K/UL (ref 0–0.04)
IMM GRANULOCYTES NFR BLD AUTO: 0.3 % (ref 0–0.5)
IRON SERPL-MCNC: 35 UG/DL (ref 45–160)
KETONES UR QL STRIP: NEGATIVE
LEUKOCYTE ESTERASE UR QL STRIP: NEGATIVE
LYMPHOCYTES # BLD AUTO: 1.8 K/UL (ref 1–4.8)
LYMPHOCYTES NFR BLD: 23 % (ref 18–48)
MCH RBC QN AUTO: 30 PG (ref 27–31)
MCHC RBC AUTO-ENTMCNC: 32.6 G/DL (ref 32–36)
MCV RBC AUTO: 92 FL (ref 82–98)
MONOCYTES # BLD AUTO: 0.8 K/UL (ref 0.3–1)
MONOCYTES NFR BLD: 11.1 % (ref 4–15)
NEUTROPHILS # BLD AUTO: 4.6 K/UL (ref 1.8–7.7)
NEUTROPHILS NFR BLD: 60.8 % (ref 38–73)
NITRITE UR QL STRIP: NEGATIVE
NRBC BLD-RTO: 0 /100 WBC
PH UR STRIP: 7 [PH] (ref 5–8)
PHOSPHATE SERPL-MCNC: 3.3 MG/DL (ref 2.7–4.5)
PLATELET # BLD AUTO: 248 K/UL (ref 150–450)
PMV BLD AUTO: 10.3 FL (ref 9.2–12.9)
POTASSIUM SERPL-SCNC: 3.7 MMOL/L (ref 3.5–5.1)
PROT UR QL STRIP: NEGATIVE
PROT UR-MCNC: 13 MG/DL (ref 6–15)
PROT/CREAT UR: 0.1 MG/G{CREAT} (ref 0–0.2)
RBC # BLD AUTO: 4.54 M/UL (ref 4.6–6.2)
SATURATED IRON: 11 % (ref 20–50)
SODIUM SERPL-SCNC: 138 MMOL/L (ref 136–145)
SP GR UR STRIP: 1.02 (ref 1–1.03)
TOTAL IRON BINDING CAPACITY: 315 UG/DL (ref 250–450)
TRANSFERRIN SERPL-MCNC: 225 MG/DL (ref 200–375)
URN SPEC COLLECT METH UR: NORMAL
UROBILINOGEN UR STRIP-ACNC: NEGATIVE EU/DL
WBC # BLD AUTO: 7.6 K/UL (ref 3.9–12.7)

## 2022-10-03 PROCEDURE — 81003 URINALYSIS AUTO W/O SCOPE: CPT | Performed by: INTERNAL MEDICINE

## 2022-10-03 PROCEDURE — 85025 COMPLETE CBC W/AUTO DIFF WBC: CPT | Performed by: INTERNAL MEDICINE

## 2022-10-03 PROCEDURE — 36415 COLL VENOUS BLD VENIPUNCTURE: CPT | Performed by: INTERNAL MEDICINE

## 2022-10-03 PROCEDURE — 82728 ASSAY OF FERRITIN: CPT | Performed by: INTERNAL MEDICINE

## 2022-10-03 PROCEDURE — 82570 ASSAY OF URINE CREATININE: CPT | Performed by: INTERNAL MEDICINE

## 2022-10-03 PROCEDURE — 84466 ASSAY OF TRANSFERRIN: CPT | Performed by: INTERNAL MEDICINE

## 2022-10-03 PROCEDURE — 80069 RENAL FUNCTION PANEL: CPT | Performed by: INTERNAL MEDICINE

## 2022-10-12 ENCOUNTER — OFFICE VISIT (OUTPATIENT)
Dept: FAMILY MEDICINE | Facility: CLINIC | Age: 72
End: 2022-10-12
Payer: COMMERCIAL

## 2022-10-12 ENCOUNTER — HOSPITAL ENCOUNTER (OUTPATIENT)
Dept: RADIOLOGY | Facility: HOSPITAL | Age: 72
Discharge: HOME | End: 2022-10-12
Attending: NURSE PRACTITIONER
Payer: COMMERCIAL

## 2022-10-12 VITALS
HEART RATE: 56 BPM | HEIGHT: 70 IN | SYSTOLIC BLOOD PRESSURE: 124 MMHG | WEIGHT: 167.5 LBS | OXYGEN SATURATION: 98 % | BODY MASS INDEX: 23.98 KG/M2 | DIASTOLIC BLOOD PRESSURE: 76 MMHG

## 2022-10-12 DIAGNOSIS — M65.351 TRIGGER FINGER, RIGHT LITTLE FINGER: Primary | ICD-10-CM

## 2022-10-12 DIAGNOSIS — R73.03 PREDIABETES: ICD-10-CM

## 2022-10-12 DIAGNOSIS — M79.641 RIGHT HAND PAIN: ICD-10-CM

## 2022-10-12 DIAGNOSIS — E78.01 FAMILIAL HYPERCHOLESTEROLEMIA: ICD-10-CM

## 2022-10-12 DIAGNOSIS — K21.9 GASTROESOPHAGEAL REFLUX DISEASE WITHOUT ESOPHAGITIS: ICD-10-CM

## 2022-10-12 DIAGNOSIS — Z12.5 PROSTATE CANCER SCREENING: ICD-10-CM

## 2022-10-12 DIAGNOSIS — I10 ESSENTIAL HYPERTENSION: ICD-10-CM

## 2022-10-12 PROCEDURE — 3288F PR FALLS RISK ASSESSMENT DOCUMENTED: ICD-10-PCS | Mod: CPTII,S$GLB,, | Performed by: NURSE PRACTITIONER

## 2022-10-12 PROCEDURE — 3074F SYST BP LT 130 MM HG: CPT | Mod: CPTII,S$GLB,, | Performed by: NURSE PRACTITIONER

## 2022-10-12 PROCEDURE — 1101F PR PT FALLS ASSESS DOC 0-1 FALLS W/OUT INJ PAST YR: ICD-10-PCS | Mod: CPTII,S$GLB,, | Performed by: NURSE PRACTITIONER

## 2022-10-12 PROCEDURE — 1160F RVW MEDS BY RX/DR IN RCRD: CPT | Mod: CPTII,S$GLB,, | Performed by: NURSE PRACTITIONER

## 2022-10-12 PROCEDURE — 1101F PT FALLS ASSESS-DOCD LE1/YR: CPT | Mod: CPTII,S$GLB,, | Performed by: NURSE PRACTITIONER

## 2022-10-12 PROCEDURE — 3078F DIAST BP <80 MM HG: CPT | Mod: CPTII,S$GLB,, | Performed by: NURSE PRACTITIONER

## 2022-10-12 PROCEDURE — 3288F FALL RISK ASSESSMENT DOCD: CPT | Mod: CPTII,S$GLB,, | Performed by: NURSE PRACTITIONER

## 2022-10-12 PROCEDURE — 1159F PR MEDICATION LIST DOCUMENTED IN MEDICAL RECORD: ICD-10-PCS | Mod: CPTII,S$GLB,, | Performed by: NURSE PRACTITIONER

## 2022-10-12 PROCEDURE — 1159F MED LIST DOCD IN RCRD: CPT | Mod: CPTII,S$GLB,, | Performed by: NURSE PRACTITIONER

## 2022-10-12 PROCEDURE — 99214 PR OFFICE/OUTPT VISIT, EST, LEVL IV, 30-39 MIN: ICD-10-PCS | Mod: S$GLB,,, | Performed by: NURSE PRACTITIONER

## 2022-10-12 PROCEDURE — 4010F ACE/ARB THERAPY RXD/TAKEN: CPT | Mod: CPTII,S$GLB,, | Performed by: NURSE PRACTITIONER

## 2022-10-12 PROCEDURE — 3078F PR MOST RECENT DIASTOLIC BLOOD PRESSURE < 80 MM HG: ICD-10-PCS | Mod: CPTII,S$GLB,, | Performed by: NURSE PRACTITIONER

## 2022-10-12 PROCEDURE — 99214 OFFICE O/P EST MOD 30 MIN: CPT | Mod: S$GLB,,, | Performed by: NURSE PRACTITIONER

## 2022-10-12 PROCEDURE — 1126F PR PAIN SEVERITY QUANTIFIED, NO PAIN PRESENT: ICD-10-PCS | Mod: CPTII,S$GLB,, | Performed by: NURSE PRACTITIONER

## 2022-10-12 PROCEDURE — 1160F PR REVIEW ALL MEDS BY PRESCRIBER/CLIN PHARMACIST DOCUMENTED: ICD-10-PCS | Mod: CPTII,S$GLB,, | Performed by: NURSE PRACTITIONER

## 2022-10-12 PROCEDURE — 73130 X-RAY EXAM OF HAND: CPT | Mod: TC,RT

## 2022-10-12 PROCEDURE — 3074F PR MOST RECENT SYSTOLIC BLOOD PRESSURE < 130 MM HG: ICD-10-PCS | Mod: CPTII,S$GLB,, | Performed by: NURSE PRACTITIONER

## 2022-10-12 PROCEDURE — 1126F AMNT PAIN NOTED NONE PRSNT: CPT | Mod: CPTII,S$GLB,, | Performed by: NURSE PRACTITIONER

## 2022-10-12 PROCEDURE — 4010F PR ACE/ARB THEARPY RXD/TAKEN: ICD-10-PCS | Mod: CPTII,S$GLB,, | Performed by: NURSE PRACTITIONER

## 2022-10-12 RX ORDER — ROSUVASTATIN CALCIUM 10 MG/1
10 TABLET, COATED ORAL DAILY
Qty: 90 TABLET | Refills: 1 | Status: SHIPPED | OUTPATIENT
Start: 2022-10-12 | End: 2023-04-12 | Stop reason: SDUPTHER

## 2022-10-12 RX ORDER — PANTOPRAZOLE SODIUM 40 MG/1
40 TABLET, DELAYED RELEASE ORAL DAILY
Qty: 90 TABLET | Refills: 1 | Status: SHIPPED | OUTPATIENT
Start: 2022-10-12 | End: 2023-04-12 | Stop reason: SDUPTHER

## 2022-10-12 RX ORDER — HYDROCHLOROTHIAZIDE 25 MG/1
25 TABLET ORAL DAILY
Qty: 90 TABLET | Refills: 1 | Status: SHIPPED | OUTPATIENT
Start: 2022-10-12 | End: 2023-04-12 | Stop reason: SDUPTHER

## 2022-10-12 RX ORDER — AMLODIPINE BESYLATE 10 MG/1
10 TABLET ORAL DAILY
Qty: 90 TABLET | Refills: 1 | Status: SHIPPED | OUTPATIENT
Start: 2022-10-12 | End: 2023-04-12 | Stop reason: SDUPTHER

## 2022-10-12 RX ORDER — TELMISARTAN 80 MG/1
80 TABLET ORAL DAILY
Qty: 90 TABLET | Refills: 1 | Status: SHIPPED | OUTPATIENT
Start: 2022-10-12 | End: 2023-04-12 | Stop reason: SDUPTHER

## 2022-10-13 NOTE — PROGRESS NOTES
SUBJECTIVE:      Patient ID: Onesimo Paula is a 71 y.o. male.    Chief Complaint: Hypertension (6 month f/u HTN )    Presents for HTN recheck    Hypertension  This is a chronic problem. The current episode started more than 1 year ago. The problem has been resolved since onset. The problem is controlled. Associated symptoms include headaches and peripheral edema. Pertinent negatives include no anxiety, blurred vision, chest pain, malaise/fatigue, neck pain, orthopnea, palpitations, PND, shortness of breath or sweats. There are no associated agents to hypertension. Risk factors for coronary artery disease include dyslipidemia, male gender, sedentary lifestyle and family history. Past treatments include diuretics, angiotensin blockers and calcium channel blockers. The current treatment provides moderate improvement. Compliance problems include exercise and diet.  Hypertensive end-organ damage includes kidney disease and CAD/MI. Identifiable causes of hypertension include chronic renal disease and renovascular disease.   Hyperlipidemia  This is a chronic problem. The current episode started more than 1 year ago. Exacerbating diseases include chronic renal disease. Factors aggravating his hyperlipidemia include thiazides. Pertinent negatives include no chest pain, myalgias or shortness of breath. Current antihyperlipidemic treatment includes statins (fish oil). Compliance problems include adherence to exercise.  Risk factors for coronary artery disease include dyslipidemia, male sex, hypertension, a sedentary lifestyle and family history.   Hand Pain   The incident occurred more than 1 week ago. The incident occurred at home. There was no injury mechanism. The pain is present in the right hand and right fingers. The quality of the pain is described as aching, cramping, shooting and stabbing. The pain radiates to the right hand. The pain is moderate. The pain has been Fluctuating since the incident. Pertinent  negatives include no chest pain. The symptoms are aggravated by movement and lifting. He has tried rest and NSAIDs for the symptoms. The treatment provided mild relief.   Gastroesophageal Reflux  He complains of heartburn. He reports no abdominal pain, no chest pain, no coughing, no nausea, no sore throat or no wheezing. This is a chronic problem. The current episode started more than 1 month ago. The problem occurs occasionally. The problem has been gradually improving. The heartburn is located in the substernum. The heartburn is of mild intensity. The heartburn does not wake him from sleep. The heartburn does not limit his activity. The symptoms are aggravated by certain foods. Pertinent negatives include no fatigue. Risk factors include lack of exercise. He has tried a PPI for the symptoms. The treatment provided moderate relief. Past procedures include an EGD.     Past Surgical History:   Procedure Laterality Date    CYSTOSCOPY WITH TRANSURETHRAL DESTRUCTION OF PROSTATE,RFA N/A 10/21/2020    Procedure: CYSTOSCOPY WITH TRANSURETHRAL DESTRUCTION OF PROSTATE,RFA;  Surgeon: La Nena James MD;  Location: [facility] OR;  Service: Urology;  Laterality: N/A;  please make sure [facility] available [phone]    EYE SURGERY      for cataracts, rt eye 1/16/20, left eye 1/30/20    HERNIA REPAIR  12/1999    HERNIA REPAIR  05/2004    KNEE ARTHROSCOPY Left 04/12/2018    LAPAROSCOPIC APPENDECTOMY N/A 1/2/2022    Procedure: APPENDECTOMY, LAPAROSCOPIC;  Surgeon: Sam Goss MD;  Location: [facility] OR;  Service: General;  Laterality: N/A;    SKIN LESION EXCISION  10/2009    Neck    SKIN LESION EXCISION  2015    STAPEDECTOMY Right 03/2001    TRANSRECTAL ULTRASOUND EXAMINATION N/A 8/17/2020    Procedure: TRANSRECTAL ULTRASOUND;  Surgeon: La Nena James MD;  Location: [facility] OR;  Service: Urology;  Laterality: N/A;     Family History   Problem Relation Age of Onset    Cancer Mother         skin    Stroke Mother      Heart failure Father     Kidney disease Father       Social History     Socioeconomic History    Marital status:    Tobacco Use    Smoking status: Former     Types: Cigarettes     Quit date:      Years since quittin.7    Smokeless tobacco: Never   Substance and Sexual Activity    Alcohol use: Yes     Comment: occasional    Drug use: No    Sexual activity: Yes     Partners: Female     Social Determinants of Health     Financial Resource Strain: Low Risk     Difficulty of Paying Living Expenses: Not hard at all   Food Insecurity: No Food Insecurity    Worried About Running Out of Food in the Last Year: Never true    Ran Out of Food in the Last Year: Never true   Transportation Needs: No Transportation Needs    Lack of Transportation (Medical): No    Lack of Transportation (Non-Medical): No   Physical Activity: Inactive    Days of Exercise per Week: 0 days    Minutes of Exercise per Session: 0 min   Stress: No Stress Concern Present    Feeling of Stress : Not at all   Social Connections: Unknown    Frequency of Communication with Friends and Family: Three times a week    Frequency of Social Gatherings with Friends and Family: Once a week    Active Member of Clubs or Organizations: Yes    Attends Club or Organization Meetings: Never    Marital Status:    Housing Stability: Low Risk     Unable to Pay for Housing in the Last Year: No    Number of Places Lived in the Last Year: 1    Unstable Housing in the Last Year: No     Current Outpatient Medications   Medication Sig Dispense Refill    aspirin (ECOTRIN) 81 MG EC tablet aspirin 81 mg tablet,delayed release   Take 1 tablet every day by oral route.      co-enzyme Q-10 30 mg capsule Take 30 mg by mouth 3 (three) times daily.      multivit with minerals/lutein (MULTIVITAMIN 50 PLUS ORAL) multivitamin      MYRBETRIQ 50 mg Tb24 TAKE 1 TABLET ONCE DAILY 90 tablet 3    omega 3-dha-epa-fish oil (FISH OIL) 100-160-1,000 mg Cap Fish Oil      solifenacin  (VESICARE) 10 MG tablet Take 1 tablet (10 mg total) by mouth once daily. 90 tablet 3    tadalafiL (CIALIS) 5 MG tablet Take 1 tablet (5 mg total) by mouth once daily. 90 tablet 3    amLODIPine (NORVASC) 10 MG tablet Take 1 tablet (10 mg total) by mouth once daily. 90 tablet 1    hydroCHLOROthiazide (HYDRODIURIL) 25 MG tablet Take 1 tablet (25 mg total) by mouth once daily. 90 tablet 1    pantoprazole (PROTONIX) 40 MG tablet Take 1 tablet (40 mg total) by mouth once daily. 90 tablet 1    rosuvastatin (CRESTOR) 10 MG tablet Take 1 tablet (10 mg total) by mouth once daily. 90 tablet 1    telmisartan (MICARDIS) 80 MG Tab Take 1 tablet (80 mg total) by mouth once daily. 90 tablet 1     No current facility-administered medications for this visit.     Review of patient's allergies indicates:  No Known Allergies   Past Medical History:   Diagnosis Date    Acid reflux     Acquired tricuspid valve insufficiency     Anemia     Borderline diabetes     BPH (benign prostatic hyperplasia)     Cataract     1/16/20 rt eye, 1/30/20- Lt eye    Coronary artery disease     Hypertension     Liver cyst 8/24/2022    Pulmonary nodules     Skin cancer      Past Surgical History:   Procedure Laterality Date    CYSTOSCOPY WITH TRANSURETHRAL DESTRUCTION OF PROSTATE,RFA N/A 10/21/2020    Procedure: CYSTOSCOPY WITH TRANSURETHRAL DESTRUCTION OF PROSTATE,RFA;  Surgeon: La Nena James MD;  Location: [facility] OR;  Service: Urology;  Laterality: N/A;  please make sure [facility] available [phone]    EYE SURGERY      for cataracts, rt eye 1/16/20, left eye 1/30/20    HERNIA REPAIR  12/1999    HERNIA REPAIR  05/2004    KNEE ARTHROSCOPY Left 04/12/2018    LAPAROSCOPIC APPENDECTOMY N/A 1/2/2022    Procedure: APPENDECTOMY, LAPAROSCOPIC;  Surgeon: Sam Goss MD;  Location: [facility] OR;  Service: General;  Laterality: N/A;    SKIN LESION EXCISION  10/2009    Neck    SKIN LESION EXCISION  2015    STAPEDECTOMY Right 03/2001    TRANSRECTAL  "ULTRASOUND EXAMINATION N/A 8/17/2020    Procedure: TRANSRECTAL ULTRASOUND;  Surgeon: La Nena James MD;  Location: [facility] OR;  Service: Urology;  Laterality: N/A;       Review of Systems   Constitutional:  Negative for activity change, appetite change, fatigue, fever, malaise/fatigue and unexpected weight change.   HENT:  Negative for congestion, ear pain, hearing loss, postnasal drip, rhinorrhea, sinus pressure, sinus pain, sneezing, sore throat and trouble swallowing.    Eyes:  Negative for blurred vision, photophobia, pain, discharge and visual disturbance.   Respiratory:  Negative for cough, chest tightness, shortness of breath and wheezing.    Cardiovascular:  Negative for chest pain, palpitations, orthopnea, leg swelling and PND.   Gastrointestinal:  Positive for heartburn. Negative for abdominal distention, abdominal pain, blood in stool, constipation, diarrhea, nausea and vomiting.   Endocrine: Negative for cold intolerance, heat intolerance, polydipsia and polyuria.   Genitourinary:  Negative for difficulty urinating, dysuria, flank pain, frequency, hematuria and urgency.        Following with uro & nephro   Musculoskeletal:  Positive for arthralgias. Negative for back pain, joint swelling, myalgias and neck pain.   Skin:  Negative for pallor and rash.   Allergic/Immunologic: Negative for environmental allergies and food allergies.   Neurological:  Positive for headaches. Negative for dizziness, weakness and light-headedness.   Hematological:  Does not bruise/bleed easily.   Psychiatric/Behavioral:  Negative for confusion, decreased concentration, dysphoric mood and sleep disturbance. The patient is not nervous/anxious.     OBJECTIVE:      Vitals:    10/12/22 1107   BP: 124/76   BP Location: Right arm   Patient Position: Sitting   BP Method: Large (Manual)   Pulse: (!) 56   SpO2: 98%   Weight: 76 kg (167 lb 8 oz)   Height: 5' 10" (1.778 m)     Physical Exam  Vitals and nursing note reviewed. "   Constitutional:       General: He is not in acute distress.     Appearance: Normal appearance. He is well-developed and normal weight.   HENT:      Head: Normocephalic and atraumatic.      Right Ear: Hearing normal.      Left Ear: Hearing normal.      Nose: Nose normal. No rhinorrhea.   Eyes:      General: Lids are normal.         Right eye: No discharge.         Left eye: No discharge.      Conjunctiva/sclera: Conjunctivae normal.      Right eye: Right conjunctiva is not injected.      Left eye: Left conjunctiva is not injected.      Pupils: Pupils are equal, round, and reactive to light. Pupils are equal.      Right eye: Pupil is round and reactive.      Left eye: Pupil is round and reactive.   Neck:      Thyroid: No thyromegaly.      Vascular: No JVD.      Trachea: Trachea normal. No tracheal deviation.   Cardiovascular:      Rate and Rhythm: Regular rhythm. Bradycardia present.      Pulses:           Radial pulses are 2+ on the right side and 2+ on the left side.      Heart sounds: Normal heart sounds. No murmur heard.    No friction rub. No gallop.   Pulmonary:      Effort: Pulmonary effort is normal. No respiratory distress.      Breath sounds: Normal breath sounds. No stridor. No decreased breath sounds, wheezing, rhonchi or rales.   Abdominal:      General: Bowel sounds are normal. There is no distension.      Palpations: Abdomen is soft. Abdomen is not rigid.      Tenderness: There is no abdominal tenderness. There is no guarding.   Musculoskeletal:         General: No swelling. Normal range of motion.      Cervical back: Normal range of motion and neck supple.      Comments: R pinky trigger finger   Lymphadenopathy:      Cervical: No cervical adenopathy.   Skin:     General: Skin is warm and dry.      Capillary Refill: Capillary refill takes less than 2 seconds.      Coloration: Skin is not pale.      Findings: No lesion or rash.   Neurological:      Mental Status: He is alert and oriented to person,  place, and time.      Motor: No atrophy.      Coordination: Coordination normal.      Gait: Gait normal.   Psychiatric:         Attention and Perception: He is attentive.         Speech: Speech normal.         Behavior: Behavior normal.         Thought Content: Thought content normal.         Judgment: Judgment normal.      Assessment:       1. Trigger finger, right little finger    2. Right hand pain    3. Essential hypertension    4. Familial hypercholesterolemia    5. Gastroesophageal reflux disease without esophagitis    6. Prediabetes    7. Prostate cancer screening        Plan:       Trigger finger, right little finger  -     Ambulatory referral/consult to Orthopedics; Future; Expected date: 10/19/2022    Right hand pain  -     X-Ray Hand 2 View Right; Future; Expected date: 10/12/2022    Essential hypertension  -     amLODIPine (NORVASC) 10 MG tablet; Take 1 tablet (10 mg total) by mouth once daily.  Dispense: 90 tablet; Refill: 1  -     hydroCHLOROthiazide (HYDRODIURIL) 25 MG tablet; Take 1 tablet (25 mg total) by mouth once daily.  Dispense: 90 tablet; Refill: 1  -     telmisartan (MICARDIS) 80 MG Tab; Take 1 tablet (80 mg total) by mouth once daily.  Dispense: 90 tablet; Refill: 1  -     Comprehensive Metabolic Panel; Future; Expected date: 10/13/2022  -     Lipid Panel; Future; Expected date: 10/13/2022    Familial hypercholesterolemia  -     rosuvastatin (CRESTOR) 10 MG tablet; Take 1 tablet (10 mg total) by mouth once daily.  Dispense: 90 tablet; Refill: 1  -     Lipid Panel; Future; Expected date: 10/13/2022    Gastroesophageal reflux disease without esophagitis  -     pantoprazole (PROTONIX) 40 MG tablet; Take 1 tablet (40 mg total) by mouth once daily.  Dispense: 90 tablet; Refill: 1  -     Ambulatory referral/consult to Gastroenterology; Future; Expected date: 10/19/2022    Prediabetes  -     Comprehensive Metabolic Panel; Future; Expected date: 10/13/2022  -     Hemoglobin A1C; Future; Expected  date: 10/13/2022    Prostate cancer screening  -     PSA, Screening; Future; Expected date: 10/13/2022        Follow up in about 6 months (around 4/12/2023) for HTN & lab review.      10/13/2022 JAMES Rose, FNP-C

## 2022-10-24 ENCOUNTER — HOSPITAL ENCOUNTER (EMERGENCY)
Facility: HOSPITAL | Age: 72
Discharge: HOME | End: 2022-10-24
Attending: EMERGENCY MEDICINE
Payer: COMMERCIAL

## 2022-10-24 VITALS
OXYGEN SATURATION: 97 % | HEART RATE: 68 BPM | RESPIRATION RATE: 18 BRPM | BODY MASS INDEX: 23.68 KG/M2 | SYSTOLIC BLOOD PRESSURE: 148 MMHG | TEMPERATURE: 98 F | DIASTOLIC BLOOD PRESSURE: 68 MMHG | WEIGHT: 165 LBS

## 2022-10-24 DIAGNOSIS — K52.9 COLITIS: Primary | ICD-10-CM

## 2022-10-24 LAB
ABO + RH BLD: NORMAL
ALBUMIN SERPL BCP-MCNC: 4.1 G/DL (ref 3.5–5.2)
ALP SERPL-CCNC: 63 U/L (ref 55–135)
ALT SERPL W/O P-5'-P-CCNC: 26 U/L (ref 10–44)
ANION GAP SERPL CALC-SCNC: 13 MMOL/L (ref 8–16)
AST SERPL-CCNC: 27 U/L (ref 10–40)
BASOPHILS # BLD AUTO: 0.03 K/UL (ref 0–0.2)
BASOPHILS NFR BLD: 0.2 % (ref 0–1.9)
BILIRUB SERPL-MCNC: 0.6 MG/DL (ref 0.1–1)
BLD GP AB SCN CELLS X3 SERPL QL: NORMAL
BUN SERPL-MCNC: 34 MG/DL (ref 8–23)
CALCIUM SERPL-MCNC: 9.5 MG/DL (ref 8.7–10.5)
CHLORIDE SERPL-SCNC: 104 MMOL/L (ref 95–110)
CO2 SERPL-SCNC: 22 MMOL/L (ref 23–29)
CREAT SERPL-MCNC: 1.2 MG/DL (ref 0.5–1.4)
CREAT SERPL-MCNC: 1.3 MG/DL (ref 0.5–1.4)
DIFFERENTIAL METHOD: ABNORMAL
EOSINOPHIL # BLD AUTO: 0.1 K/UL (ref 0–0.5)
EOSINOPHIL NFR BLD: 0.3 % (ref 0–8)
ERYTHROCYTE [DISTWIDTH] IN BLOOD BY AUTOMATED COUNT: 13.6 % (ref 11.5–14.5)
EST. GFR  (NO RACE VARIABLE): >60 ML/MIN/1.73 M^2
GLUCOSE SERPL-MCNC: 157 MG/DL (ref 70–110)
HCT VFR BLD AUTO: 42.6 % (ref 40–54)
HGB BLD-MCNC: 14.3 G/DL (ref 14–18)
IMM GRANULOCYTES # BLD AUTO: 0.1 K/UL (ref 0–0.04)
IMM GRANULOCYTES NFR BLD AUTO: 0.6 % (ref 0–0.5)
INR PPP: 1
LIPASE SERPL-CCNC: 28 U/L (ref 4–60)
LYMPHOCYTES # BLD AUTO: 0.5 K/UL (ref 1–4.8)
LYMPHOCYTES NFR BLD: 3.1 % (ref 18–48)
MCH RBC QN AUTO: 30.1 PG (ref 27–31)
MCHC RBC AUTO-ENTMCNC: 33.6 G/DL (ref 32–36)
MCV RBC AUTO: 90 FL (ref 82–98)
MONOCYTES # BLD AUTO: 1.3 K/UL (ref 0.3–1)
MONOCYTES NFR BLD: 7.3 % (ref 4–15)
NEUTROPHILS # BLD AUTO: 15.3 K/UL (ref 1.8–7.7)
NEUTROPHILS NFR BLD: 88.5 % (ref 38–73)
NRBC BLD-RTO: 0 /100 WBC
OB PNL STL: POSITIVE
PLATELET # BLD AUTO: 218 K/UL (ref 150–450)
PMV BLD AUTO: 10.5 FL (ref 9.2–12.9)
POTASSIUM SERPL-SCNC: 3.6 MMOL/L (ref 3.5–5.1)
PROT SERPL-MCNC: 7.5 G/DL (ref 6–8.4)
PROTHROMBIN TIME: 12.8 SEC (ref 11.4–13.7)
RBC # BLD AUTO: 4.75 M/UL (ref 4.6–6.2)
SAMPLE: NORMAL
SODIUM SERPL-SCNC: 139 MMOL/L (ref 136–145)
WBC # BLD AUTO: 17.32 K/UL (ref 3.9–12.7)

## 2022-10-24 PROCEDURE — 96374 THER/PROPH/DIAG INJ IV PUSH: CPT | Mod: 59

## 2022-10-24 PROCEDURE — 93010 ELECTROCARDIOGRAM REPORT: CPT | Mod: ,,, | Performed by: INTERNAL MEDICINE

## 2022-10-24 PROCEDURE — 82272 OCCULT BLD FECES 1-3 TESTS: CPT | Performed by: EMERGENCY MEDICINE

## 2022-10-24 PROCEDURE — 85025 COMPLETE CBC W/AUTO DIFF WBC: CPT | Performed by: EMERGENCY MEDICINE

## 2022-10-24 PROCEDURE — 93010 EKG 12-LEAD: ICD-10-PCS | Mod: ,,, | Performed by: INTERNAL MEDICINE

## 2022-10-24 PROCEDURE — 25000003 PHARM REV CODE 250: Performed by: EMERGENCY MEDICINE

## 2022-10-24 PROCEDURE — 83690 ASSAY OF LIPASE: CPT | Performed by: EMERGENCY MEDICINE

## 2022-10-24 PROCEDURE — 63600175 PHARM REV CODE 636 W HCPCS: Performed by: EMERGENCY MEDICINE

## 2022-10-24 PROCEDURE — 85610 PROTHROMBIN TIME: CPT | Performed by: EMERGENCY MEDICINE

## 2022-10-24 PROCEDURE — C9113 INJ PANTOPRAZOLE SODIUM, VIA: HCPCS | Performed by: EMERGENCY MEDICINE

## 2022-10-24 PROCEDURE — 25500020 PHARM REV CODE 255: Performed by: EMERGENCY MEDICINE

## 2022-10-24 PROCEDURE — 80053 COMPREHEN METABOLIC PANEL: CPT | Performed by: EMERGENCY MEDICINE

## 2022-10-24 PROCEDURE — 93005 ELECTROCARDIOGRAM TRACING: CPT | Performed by: INTERNAL MEDICINE

## 2022-10-24 PROCEDURE — 99285 EMERGENCY DEPT VISIT HI MDM: CPT | Mod: 25

## 2022-10-24 PROCEDURE — 86901 BLOOD TYPING SEROLOGIC RH(D): CPT | Performed by: EMERGENCY MEDICINE

## 2022-10-24 RX ORDER — PANTOPRAZOLE SODIUM 40 MG/10ML
80 INJECTION, POWDER, LYOPHILIZED, FOR SOLUTION INTRAVENOUS
Status: COMPLETED | OUTPATIENT
Start: 2022-10-24 | End: 2022-10-24

## 2022-10-24 RX ORDER — CIPROFLOXACIN 500 MG/1
500 TABLET ORAL 2 TIMES DAILY
Qty: 20 TABLET | Refills: 0 | Status: SHIPPED | OUTPATIENT
Start: 2022-10-24 | End: 2022-11-03

## 2022-10-24 RX ORDER — METRONIDAZOLE 500 MG/1
500 TABLET ORAL 3 TIMES DAILY
Qty: 21 TABLET | Refills: 0 | Status: SHIPPED | OUTPATIENT
Start: 2022-10-24 | End: 2022-10-31

## 2022-10-24 RX ORDER — ONDANSETRON 4 MG/1
4 TABLET, ORALLY DISINTEGRATING ORAL EVERY 6 HOURS PRN
Qty: 12 TABLET | Refills: 0 | Status: SHIPPED | OUTPATIENT
Start: 2022-10-24 | End: 2023-02-28 | Stop reason: ALTCHOICE

## 2022-10-24 RX ADMIN — SODIUM CHLORIDE 1000 ML: 0.9 INJECTION, SOLUTION INTRAVENOUS at 05:10

## 2022-10-24 RX ADMIN — PANTOPRAZOLE SODIUM 80 MG: 40 INJECTION, POWDER, FOR SOLUTION INTRAVENOUS at 05:10

## 2022-10-24 RX ADMIN — IOHEXOL 100 ML: 350 INJECTION, SOLUTION INTRAVENOUS at 04:10

## 2022-10-24 NOTE — ED PROVIDER NOTES
Encounter Date: 10/24/2022       History     Chief Complaint   Patient presents with    Abdominal Pain     Pt reports abd pain starting last night. Pt states he has had multiple blood stools today.    Rectal Bleeding     71-year-old male with a past medical history of acid reflux, hypertension presents today complaining of blood in stool patient reports this started today patient has a history of coronary artery disease in takes a daily aspirin otherwise he reports no anticoagulation patient denies trauma patient reports some abdominal discomfort in the left lower quadrant patient has no vomiting patient has no other acute complaints at this time.    Review of patient's allergies indicates:  No Known Allergies  Past Medical History:   Diagnosis Date    Acid reflux     Acquired tricuspid valve insufficiency     Anemia     Borderline diabetes     BPH (benign prostatic hyperplasia)     Cataract     1/16/20 rt eye, 1/30/20- Lt eye    Coronary artery disease     Hypertension     Liver cyst 8/24/2022    Pulmonary nodules     Skin cancer      Past Surgical History:   Procedure Laterality Date    CYSTOSCOPY WITH TRANSURETHRAL DESTRUCTION OF PROSTATE,RFA N/A 10/21/2020    Procedure: CYSTOSCOPY WITH TRANSURETHRAL DESTRUCTION OF PROSTATE,RFA;  Surgeon: La Nena James MD;  Location: [facility];  Service: Urology;  Laterality: N/A;  please make sure [facility] rep available [phone]    EYE SURGERY      for cataracts, rt eye 1/16/20, left eye 1/30/20    HERNIA REPAIR  12/1999    HERNIA REPAIR  05/2004    KNEE ARTHROSCOPY Left 04/12/2018    LAPAROSCOPIC APPENDECTOMY N/A 1/2/2022    Procedure: APPENDECTOMY, LAPAROSCOPIC;  Surgeon: Sam Goss MD;  Location: [facility] OR;  Service: General;  Laterality: N/A;    SKIN LESION EXCISION  10/2009    Neck    SKIN LESION EXCISION  2015    STAPEDECTOMY Right 03/2001    TRANSRECTAL ULTRASOUND EXAMINATION N/A 8/17/2020    Procedure: TRANSRECTAL ULTRASOUND;  Surgeon: La Nena BRICENO  MD Erika;  Location: [facility] OR;  Service: Urology;  Laterality: N/A;     Family History   Problem Relation Age of Onset    Cancer Mother         skin    Stroke Mother     Heart failure Father     Kidney disease Father      Social History     Tobacco Use    Smoking status: Former     Types: Cigarettes     Quit date:      Years since quittin.8    Smokeless tobacco: Never   Substance Use Topics    Alcohol use: Yes     Comment: occasional    Drug use: No     Review of Systems   Constitutional:  Negative for fever.   HENT:  Negative for congestion, rhinorrhea, sore throat and trouble swallowing.    Eyes:  Negative for visual disturbance.   Respiratory:  Negative for cough, chest tightness, shortness of breath and wheezing.    Cardiovascular:  Negative for chest pain, palpitations and leg swelling.   Gastrointestinal:  Positive for abdominal pain and blood in stool. Negative for abdominal distention, constipation, diarrhea, nausea and vomiting.   Genitourinary:  Negative for difficulty urinating, dysuria, flank pain and frequency.   Musculoskeletal:  Negative for arthralgias, back pain, joint swelling and neck pain.   Skin:  Negative for color change and rash.   Neurological:  Negative for dizziness, syncope, speech difficulty, weakness, numbness and headaches.   All other systems reviewed and are negative.    Physical Exam     Initial Vitals [10/24/22 1450]   BP Pulse Resp Temp SpO2   (!) 148/76 77 16 98.2 °F (36.8 °C) 97 %      MAP       --         Physical Exam    Nursing note and vitals reviewed.  Constitutional: He appears well-developed and well-nourished. He is not diaphoretic. No distress.   HENT:   Head: Normocephalic and atraumatic.   Right Ear: External ear normal.   Left Ear: External ear normal.   Nose: Nose normal.   Mouth/Throat: Oropharynx is clear and moist. No oropharyngeal exudate.   Eyes: Conjunctivae and EOM are normal. Pupils are equal, round, and reactive to light. Right eye exhibits  no discharge. Left eye exhibits no discharge. No scleral icterus.   Neck: Neck supple. No thyromegaly present. No tracheal deviation present. No JVD present.   Normal range of motion.  Cardiovascular:  Normal rate, regular rhythm, normal heart sounds and intact distal pulses.     Exam reveals no gallop and no friction rub.       No murmur heard.  Pulmonary/Chest: Breath sounds normal. No stridor. No respiratory distress. He has no wheezes. He has no rhonchi. He has no rales. He exhibits no tenderness.   Abdominal: Abdomen is soft. Bowel sounds are normal. He exhibits no distension and no mass. There is abdominal tenderness.   Mild tenderness to palpation in left lower quadrant no rebound or guarding noted There is no rebound and no guarding.   Genitourinary:    Genitourinary Comments: Rectal exam performed with nurse as chaperone patient has no gross blood noted no sign of hemorrhoids no other acute abnormalities noted patient has brown stool in rectal vault     Musculoskeletal:         General: No tenderness or edema. Normal range of motion.      Cervical back: Normal range of motion and neck supple.     Lymphadenopathy:     He has no cervical adenopathy.   Neurological: He is alert and oriented to person, place, and time. He has normal strength. No cranial nerve deficit or sensory deficit.   Skin: Skin is warm and dry. No rash noted. No erythema.       ED Course   Procedures  Labs Reviewed   CBC W/ AUTO DIFFERENTIAL - Abnormal; Notable for the following components:       Result Value    WBC 17.32 (*)     Immature Granulocytes 0.6 (*)     Gran # (ANC) 15.3 (*)     Immature Grans (Abs) 0.10 (*)     Lymph # 0.5 (*)     Mono # 1.3 (*)     Gran % 88.5 (*)     Lymph % 3.1 (*)     All other components within normal limits   COMPREHENSIVE METABOLIC PANEL - Abnormal; Notable for the following components:    CO2 22 (*)     Glucose 157 (*)     BUN 34 (*)     All other components within normal limits   OCCULT BLOOD X 1, STOOL  - Abnormal; Notable for the following components:    Occult Blood Positive (*)     All other components within normal limits   PROTIME-INR   LIPASE   URINALYSIS   TYPE & SCREEN   ISTAT CREATININE   POCT CREATININE        ECG Results              EKG 12-lead (Final result)  Result time 10/24/22 17:07:11      Final result by Interface, Lab In [facility] (10/24/22 17:07:11)                   Narrative:    Test Reason : K92.2,    Vent. Rate : 071 BPM     Atrial Rate : 071 BPM     P-R Int : 186 ms          QRS Dur : 112 ms      QT Int : 396 ms       P-R-T Axes : 028 -25 010 degrees     QTc Int : 430 ms    Normal sinus rhythm  Minimal voltage criteria for LVH, may be normal variant  Borderline Abnormal ECG  When compared with ECG of 16-SEP-2020 09:23,  T wave amplitude has decreased in Lateral leads  Confirmed by Timur Glass MD ([id number]) on 10/24/2022 5:07:00 PM    Referred By: [email or web address]   SELF           Confirmed By:Timur Glass MD                                  Imaging Results              X-Ray Chest AP Portable (Final result)  Result time 10/24/22 16:30:41      Final result by Esau Reyes MD (10/24/22 16:30:41)                   Narrative:    XR CHEST 1 VIEW    CLINICAL HISTORY:  71 years Male gi bleed    COMPARISON: August 26, 2022    FINDINGS: Cardiac silhouette size is within normal limits. No confluent airspace disease. No large pleural effusion or pneumothorax. No acute osseous abnormality.    IMPRESSION: No acute pulmonary process.    Electronically signed by:  Esau Reyes MD  10/24/2022 4:30 PM CDT Workstation: [id number]                                     CT Abdomen Pelvis With Contrast (Final result)  Result time 10/24/22 16:19:24      Final result by Lalo Ramírez MD (10/24/22 16:19:24)                   Narrative:    CMS MANDATED QUALITY DATA - CT RADIATION - 436    All CT scans at this facility utilize dose modulation, iterative reconstruction, and/or weight based dosing when appropriate to  reduce radiation dose to as low as reasonably achievable.        Reason: GI bleed    TECHNIQUE: CT abdomen and pelvis with 100 mL Omnipaque 350.    COMPARISON: 1/9/2022    CT ABDOMEN:  Partially visualized lung bases show minor dependent atelectasis.    4.3 cm right hepatic lobe cyst is similar to prior exam. Liver shows no new abnormality. Gallbladder, pancreas, spleen, and adrenals are normal. Inferior right renal cyst is present, with suggestion of left renal sinus cyst. Exophytic 13 mm hypodensity arising from mid left renal cortex has not significantly changed, remaining of indeterminate density. Nonobstructing left renal calculus is present.    Aortoiliac calcifications are present.    Surgical clips at cecum suggest appendectomy. Long segment diffuse moderate colonic wall thickening affects distal transverse and descending colon. Colonic diverticula are present. Small intestines are unremarkable. No free intraperitoneal gas.    Degenerative changes affect the spine.    CT PELVIS:  Prostate remains enlarged. Bladder is normal. Postsurgical changes of right inguinal herniorrhaphy are present. No free pelvic fluid. No acute osseous abnormality.    IMPRESSION:    1. Resolution of prior rim-enhancing fluid collections within the abdomen and pelvis.  2. Long segment diffuse moderate colonic wall thickening of distal transverse and descending colon. The appearance suggests infectious or inflammatory colitis. Clinical and laboratory correlation is requested.  3. Nonobstructing left renal calculus.  4. Diverticulosis.  5. Enlarged prostate.    Electronically signed by:  Lalo Ramírez MD  10/24/2022 4:19 PM CDT Workstation: [id number]                                     Medications   sodium chloride 0.9% bolus 1,000 mL (1,000 mLs Intravenous New Bag 10/24/22 1701)   pantoprazole injection 80 mg (80 mg Intravenous Given 10/24/22 1700)   iohexoL (OMNIPAQUE 350) injection 100 mL (100 mLs Intravenous Given 10/24/22 1605)      Medical Decision Making:   History:   Old Medical Records: I decided to obtain old medical records.  Initial Assessment:   Emergent evaluation of a 71-year-old male presenting with blood in stool differential diagnosis includes GI bleed, anemia, coagulopathy, infection          Attending Attestation:             Attending ED Notes:   Patient's imaging shows findings consistent with colitis, patient is tolerating p.o. in the ER and reports that he has not had any further bloody bowel movements patient has stable hemoglobin and hematocrit and stable vital signs patient is to follow-up with his GI doctor in the next 2-3 days for further evaluation and management patient is to stop taking his daily aspirin at this time patient will be started on a course of antibiotics patient is diagnosed with colitis patient is to return immediately to the ER for any worsening or for any further concerns    I had a detailed discussion with the patient and/or guardian regarding: The historical points, exam findings, and diagnostic results supporting the discharge diagnosis, lab results, pertinent radiology results, and the need for outpatient follow-up, for definitive care with a family practitioner and to return to the emergency department if symptoms worsen or persist or if there are any questions or concerns that arise at home. All questions have been answered in detail. Strict return to Emergency Department precautions have been provided.     A dictation software program was used for this note.  Please expect some simple typographical  errors in this note.     This patient was seen during the context of the Covid 19 global pandemic where local, state, hospital guidelines, were followed to the best of ability given the circumstances of the pandemic.                         Clinical Impression:   Final diagnoses:  [K52.9] Colitis (Primary)        ED Disposition Condition    Discharge Stable          ED Prescriptions        Medication Sig Dispense Start Date End Date Auth. Provider    ondansetron (ZOFRAN-ODT) 4 MG TbDL Take 1 tablet (4 mg total) by mouth every 6 (six) hours as needed. 12 tablet 10/24/2022 -- Sal Klein MD    ciprofloxacin HCl (CIPRO) 500 MG tablet Take 1 tablet (500 mg total) by mouth 2 (two) times daily. for 10 days 20 tablet 10/24/2022 11/3/2022 Sal Klein MD    metroNIDAZOLE (FLAGYL) 500 MG tablet Take 1 tablet (500 mg total) by mouth 3 (three) times daily. for 7 days 21 tablet 10/24/2022 10/31/2022 Sal Klein MD          Follow-up Information       Follow up With Specialties Details Why Contact Info Additional Information    [facility] - Emergency Dept Emergency Medicine  If symptoms worsen [address]  [facility] [id number] [phone] 1st floor    Malcolm Whelan MD Gastroenterology Schedule an appointment as soon as possible for a visit in 2 days  [id number] [facility] [address]  [phone]                Sal Klein MD  10/24/22 1800

## 2022-10-27 ENCOUNTER — OFFICE VISIT (OUTPATIENT)
Dept: ORTHOPEDICS | Facility: CLINIC | Age: 72
End: 2022-10-27
Payer: COMMERCIAL

## 2022-10-27 VITALS
BODY MASS INDEX: 23.62 KG/M2 | DIASTOLIC BLOOD PRESSURE: 71 MMHG | SYSTOLIC BLOOD PRESSURE: 138 MMHG | WEIGHT: 165 LBS | HEIGHT: 70 IN

## 2022-10-27 DIAGNOSIS — M65.351 TRIGGER FINGER, RIGHT LITTLE FINGER: ICD-10-CM

## 2022-10-27 PROCEDURE — 20550 NJX 1 TENDON SHEATH/LIGAMENT: CPT | Mod: RT,S$GLB,, | Performed by: ORTHOPAEDIC SURGERY

## 2022-10-27 PROCEDURE — 99203 PR OFFICE/OUTPT VISIT, NEW, LEVL III, 30-44 MIN: ICD-10-PCS | Mod: 25,S$GLB,, | Performed by: ORTHOPAEDIC SURGERY

## 2022-10-27 PROCEDURE — 3288F PR FALLS RISK ASSESSMENT DOCUMENTED: ICD-10-PCS | Mod: CPTII,S$GLB,, | Performed by: ORTHOPAEDIC SURGERY

## 2022-10-27 PROCEDURE — 1159F MED LIST DOCD IN RCRD: CPT | Mod: CPTII,S$GLB,, | Performed by: ORTHOPAEDIC SURGERY

## 2022-10-27 PROCEDURE — 3078F DIAST BP <80 MM HG: CPT | Mod: CPTII,S$GLB,, | Performed by: ORTHOPAEDIC SURGERY

## 2022-10-27 PROCEDURE — 1126F AMNT PAIN NOTED NONE PRSNT: CPT | Mod: CPTII,S$GLB,, | Performed by: ORTHOPAEDIC SURGERY

## 2022-10-27 PROCEDURE — 1126F PR PAIN SEVERITY QUANTIFIED, NO PAIN PRESENT: ICD-10-PCS | Mod: CPTII,S$GLB,, | Performed by: ORTHOPAEDIC SURGERY

## 2022-10-27 PROCEDURE — 4010F ACE/ARB THERAPY RXD/TAKEN: CPT | Mod: CPTII,S$GLB,, | Performed by: ORTHOPAEDIC SURGERY

## 2022-10-27 PROCEDURE — 1101F PR PT FALLS ASSESS DOC 0-1 FALLS W/OUT INJ PAST YR: ICD-10-PCS | Mod: CPTII,S$GLB,, | Performed by: ORTHOPAEDIC SURGERY

## 2022-10-27 PROCEDURE — 3078F PR MOST RECENT DIASTOLIC BLOOD PRESSURE < 80 MM HG: ICD-10-PCS | Mod: CPTII,S$GLB,, | Performed by: ORTHOPAEDIC SURGERY

## 2022-10-27 PROCEDURE — 1160F RVW MEDS BY RX/DR IN RCRD: CPT | Mod: CPTII,S$GLB,, | Performed by: ORTHOPAEDIC SURGERY

## 2022-10-27 PROCEDURE — 1159F PR MEDICATION LIST DOCUMENTED IN MEDICAL RECORD: ICD-10-PCS | Mod: CPTII,S$GLB,, | Performed by: ORTHOPAEDIC SURGERY

## 2022-10-27 PROCEDURE — 99203 OFFICE O/P NEW LOW 30 MIN: CPT | Mod: 25,S$GLB,, | Performed by: ORTHOPAEDIC SURGERY

## 2022-10-27 PROCEDURE — 1101F PT FALLS ASSESS-DOCD LE1/YR: CPT | Mod: CPTII,S$GLB,, | Performed by: ORTHOPAEDIC SURGERY

## 2022-10-27 PROCEDURE — 4010F PR ACE/ARB THEARPY RXD/TAKEN: ICD-10-PCS | Mod: CPTII,S$GLB,, | Performed by: ORTHOPAEDIC SURGERY

## 2022-10-27 PROCEDURE — 3288F FALL RISK ASSESSMENT DOCD: CPT | Mod: CPTII,S$GLB,, | Performed by: ORTHOPAEDIC SURGERY

## 2022-10-27 PROCEDURE — 3075F PR MOST RECENT SYSTOLIC BLOOD PRESS GE 130-139MM HG: ICD-10-PCS | Mod: CPTII,S$GLB,, | Performed by: ORTHOPAEDIC SURGERY

## 2022-10-27 PROCEDURE — 1160F PR REVIEW ALL MEDS BY PRESCRIBER/CLIN PHARMACIST DOCUMENTED: ICD-10-PCS | Mod: CPTII,S$GLB,, | Performed by: ORTHOPAEDIC SURGERY

## 2022-10-27 PROCEDURE — 20550 TENDON SHEATH: ICD-10-PCS | Mod: RT,S$GLB,, | Performed by: ORTHOPAEDIC SURGERY

## 2022-10-27 PROCEDURE — 3075F SYST BP GE 130 - 139MM HG: CPT | Mod: CPTII,S$GLB,, | Performed by: ORTHOPAEDIC SURGERY

## 2022-10-27 RX ORDER — TRIAMCINOLONE ACETONIDE 40 MG/ML
40 INJECTION, SUSPENSION INTRA-ARTICULAR; INTRAMUSCULAR
Status: DISCONTINUED | OUTPATIENT
Start: 2022-10-27 | End: 2022-10-27 | Stop reason: HOSPADM

## 2022-10-27 RX ADMIN — TRIAMCINOLONE ACETONIDE 40 MG: 40 INJECTION, SUSPENSION INTRA-ARTICULAR; INTRAMUSCULAR at 07:10

## 2022-10-27 NOTE — PROGRESS NOTES
[facility]    Subjective:     Chief Complaint:   Chief Complaint   Patient presents with    Right Hand - Pain     Right little finger trigger finger. Complains of hand burning. Unable to  items       Past Medical History:   Diagnosis Date    Acid reflux     Acquired tricuspid valve insufficiency     Anemia     Borderline diabetes     BPH (benign prostatic hyperplasia)     Cataract     1/16/20 rt eye, 1/30/20- Lt eye    Coronary artery disease     Hypertension     Liver cyst 8/24/2022    Pulmonary nodules     Skin cancer        Past Surgical History:   Procedure Laterality Date    CYSTOSCOPY WITH TRANSURETHRAL DESTRUCTION OF PROSTATE,RFA N/A 10/21/2020    Procedure: CYSTOSCOPY WITH TRANSURETHRAL DESTRUCTION OF PROSTATE,RFA;  Surgeon: La Nena James MD;  Location: [facility] OR;  Service: Urology;  Laterality: N/A;  please make sure [facility] rep available [phone]    EYE SURGERY      for cataracts, rt eye 1/16/20, left eye 1/30/20    HERNIA REPAIR  12/1999    HERNIA REPAIR  05/2004    KNEE ARTHROSCOPY Left 04/12/2018    LAPAROSCOPIC APPENDECTOMY N/A 1/2/2022    Procedure: APPENDECTOMY, LAPAROSCOPIC;  Surgeon: Sam Goss MD;  Location: [facility] OR;  Service: General;  Laterality: N/A;    SKIN LESION EXCISION  10/2009    Neck    SKIN LESION EXCISION  2015    STAPEDECTOMY Right 03/2001    TRANSRECTAL ULTRASOUND EXAMINATION N/A 8/17/2020    Procedure: TRANSRECTAL ULTRASOUND;  Surgeon: La Nena James MD;  Location: [facility] OR;  Service: Urology;  Laterality: N/A;       Current Outpatient Medications   Medication Sig    amLODIPine (NORVASC) 10 MG tablet Take 1 tablet (10 mg total) by mouth once daily.    aspirin (ECOTRIN) 81 MG EC tablet Take 81 mg by mouth once daily.    co-enzyme Q-10 30 mg capsule Take 30 mg by mouth 3 (three) times daily.    hydroCHLOROthiazide (HYDRODIURIL) 25 MG tablet Take 1 tablet (25 mg total) by mouth once daily.    metroNIDAZOLE (FLAGYL) 500 MG tablet Take 1 tablet  (500 mg total) by mouth 3 (three) times daily. for 7 days    multivit with minerals/lutein (MULTIVITAMIN 50 PLUS ORAL) Take 1 tablet by mouth once daily.    MYRBETRIQ 50 mg Tb24 TAKE 1 TABLET ONCE DAILY    omega 3-dha-epa-fish oil (FISH OIL) 100-160-1,000 mg Cap Take 1 capsule by mouth once daily.    ondansetron (ZOFRAN-ODT) 4 MG TbDL Take 1 tablet (4 mg total) by mouth every 6 (six) hours as needed.    pantoprazole (PROTONIX) 40 MG tablet Take 1 tablet (40 mg total) by mouth once daily.    rosuvastatin (CRESTOR) 10 MG tablet Take 1 tablet (10 mg total) by mouth once daily.    solifenacin (VESICARE) 10 MG tablet Take 1 tablet (10 mg total) by mouth once daily.    tadalafiL (CIALIS) 5 MG tablet Take 1 tablet (5 mg total) by mouth once daily.    telmisartan (MICARDIS) 80 MG Tab Take 1 tablet (80 mg total) by mouth once daily.    ciprofloxacin HCl (CIPRO) 500 MG tablet Take 1 tablet (500 mg total) by mouth 2 (two) times daily. for 10 days (Patient not taking: Reported on 10/27/2022)     No current facility-administered medications for this visit.       Review of patient's allergies indicates:  No Known Allergies    Family History   Problem Relation Age of Onset    Cancer Mother         skin    Stroke Mother     Heart failure Father     Kidney disease Father        Social History     Socioeconomic History    Marital status:    Tobacco Use    Smoking status: Former     Types: Cigarettes     Quit date:      Years since quittin.8    Smokeless tobacco: Never   Substance and Sexual Activity    Alcohol use: Yes     Comment: occasional    Drug use: No    Sexual activity: Yes     Partners: Female     Social Determinants of Health     Financial Resource Strain: Low Risk     Difficulty of Paying Living Expenses: Not hard at all   Food Insecurity: No Food Insecurity    Worried About Running Out of Food in the Last Year: Never true    Ran Out of Food in the Last Year: Never true   Transportation Needs: No  Transportation Needs    Lack of Transportation (Medical): No    Lack of Transportation (Non-Medical): No   Physical Activity: Inactive    Days of Exercise per Week: 0 days    Minutes of Exercise per Session: 0 min   Stress: No Stress Concern Present    Feeling of Stress : Not at all   Social Connections: Unknown    Frequency of Communication with Friends and Family: Three times a week    Frequency of Social Gatherings with Friends and Family: Once a week    Active Member of Clubs or Organizations: Yes    Attends Club or Organization Meetings: Never    Marital Status:    Housing Stability: Low Risk     Unable to Pay for Housing in the Last Year: No    Number of Places Lived in the Last Year: 1    Unstable Housing in the Last Year: No       History of present illness:  Mr. Echols presents to the clinic today as a new patient chief complaint of right pinky finger triggering that has been going on intermittently for several months.  He denies any injury or trauma to the hand.  Fortunately, he is left-hand dominant.  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    Review of Systems:    Constitution: Negative for chills, fever, and sweats.  Negative for unexplained weight loss.    HENT:  Negative for headaches and blurry vision.    Cardiovascular:Negative for chest pain or irregular heart beat. Negative for hypertension.    Respiratory:  Negative for cough and shortness of breath.    Gastrointestinal: Negative for abdominal pain, heartburn, melena, nausea, and vomitting.    Genitourinary:  Negative bladder incontinence and dysuria.    Musculoskeletal:  See HPI for details.     Neurological: Negative for numbness.    Psychiatric/Behavioral: Negative for depression.  The patient is not nervous/anxious.      Endocrine: Negative for polyuria    Hematologic/Lymphatic: Negative for bleeding problem.  Does not bruise/bleed easily.    Skin: Negative for poor would  "healing and rash    Objective:      Physical Examination:    Vital Signs:    Vitals:    10/27/22 0732   BP: 138/71       Body mass index is 23.68 kg/m².    This a well-developed, well nourished patient in no acute distress.  They are alert and oriented and cooperative to examination.        Right hand exam:  Skin to his right hand is clean dry and intact.  There is no erythema or ecchymosis.  There are no signs or symptoms of infection.  Patient is neurovascular intact throughout the right upper extremity.  He can fully open and close right hand into a fist.  Can oppose right thumb all digits in his right hand.  He does have a tender palpable nodule along the flexor tendon of his left pinky finger just proximal to the MCP joint.  He is not actively triggering on exam today.    Pertinent New Results:    XRAY Report / Interpretation:   No new radiographs were taken in our clinic today.  However did review radiographs taken approximately 2 weeks ago by his primary care provider which did not reveal any acute fractures or dislocations to the right hand.  Visualized soft tissues were unremarkable.  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    Assessment/Plan:      1.  Right pinky finger stenosing tenosynovitis.    I injected the patient's right pinky finger flexor tendon near the A1 pulley with 40 mg of Kenalog and lidocaine.  He tolerated this well.  Advised if he had a recurrence of symptoms the treatment recommendation would be to proceed with an A1 pulley release surgically.  Patient will follow up if he has recurrence.    Guero Bangura, Physician Assistant, served in the capacity as a "scribe" for this patient encounter.  A "face-to-face" encounter occurred with Dr. Mc Escobedo on this date.  The treatment plan and medical decision-making is outlined above. Patient was seen and examined with a chaperone.       This note was created using Dragon voice recognition software that " occasionally misinterpreted phrases or words.

## 2022-10-27 NOTE — PROCEDURES
Tendon Sheath    Date/Time: 10/27/2022 7:30 AM  Performed by: Mc Escobedo MD  Authorized by: Mc Escobedo MD     Consent Done?:  Yes (Verbal)  Indications:  Pain  Site marked: the procedure site was marked    Timeout: prior to procedure the correct patient, procedure, and site was verified    Prep: patient was prepped and draped in usual sterile fashion      Local anesthesia used?: Yes    Local anesthetic:  Lidocaine 1% without epinephrine  Location:  Ring finger  Site:  R ring flexor tendon sheath  Ultrasonic guidance for needle placement?: No    Needle size:  25 G  Medications:  40 mg triamcinolone acetonide 40 mg/mL  Patient tolerance:  Patient tolerated the procedure well with no immediate complications

## 2022-11-20 PROBLEM — K52.9 COLITIS: Status: ACTIVE | Noted: 2022-11-20

## 2022-11-21 ENCOUNTER — OFFICE VISIT (OUTPATIENT)
Dept: CARDIOLOGY | Facility: CLINIC | Age: 72
End: 2022-11-21
Payer: COMMERCIAL

## 2022-11-21 VITALS
DIASTOLIC BLOOD PRESSURE: 68 MMHG | SYSTOLIC BLOOD PRESSURE: 120 MMHG | HEIGHT: 70 IN | HEART RATE: 60 BPM | WEIGHT: 165.38 LBS | BODY MASS INDEX: 23.68 KG/M2

## 2022-11-21 DIAGNOSIS — I10 ESSENTIAL HYPERTENSION: Primary | ICD-10-CM

## 2022-11-21 DIAGNOSIS — E78.2 MIXED HYPERLIPIDEMIA: ICD-10-CM

## 2022-11-21 DIAGNOSIS — I34.0 NONRHEUMATIC MITRAL VALVE REGURGITATION: ICD-10-CM

## 2022-11-21 DIAGNOSIS — K52.9 COLITIS: ICD-10-CM

## 2022-11-21 DIAGNOSIS — R91.8 MULTIPLE PULMONARY NODULES: ICD-10-CM

## 2022-11-21 DIAGNOSIS — R06.09 DYSPNEA ON EXERTION: ICD-10-CM

## 2022-11-21 PROCEDURE — 3288F PR FALLS RISK ASSESSMENT DOCUMENTED: ICD-10-PCS | Mod: CPTII,S$GLB,, | Performed by: NURSE PRACTITIONER

## 2022-11-21 PROCEDURE — 99214 PR OFFICE/OUTPT VISIT, EST, LEVL IV, 30-39 MIN: ICD-10-PCS | Mod: S$GLB,,, | Performed by: NURSE PRACTITIONER

## 2022-11-21 PROCEDURE — 3008F PR BODY MASS INDEX (BMI) DOCUMENTED: ICD-10-PCS | Mod: CPTII,S$GLB,, | Performed by: NURSE PRACTITIONER

## 2022-11-21 PROCEDURE — 99999 PR PBB SHADOW E&M-EST. PATIENT-LVL IV: CPT | Mod: PBBFAC,,, | Performed by: NURSE PRACTITIONER

## 2022-11-21 PROCEDURE — 1160F PR REVIEW ALL MEDS BY PRESCRIBER/CLIN PHARMACIST DOCUMENTED: ICD-10-PCS | Mod: CPTII,S$GLB,, | Performed by: NURSE PRACTITIONER

## 2022-11-21 PROCEDURE — 1159F PR MEDICATION LIST DOCUMENTED IN MEDICAL RECORD: ICD-10-PCS | Mod: CPTII,S$GLB,, | Performed by: NURSE PRACTITIONER

## 2022-11-21 PROCEDURE — 99999 PR PBB SHADOW E&M-EST. PATIENT-LVL IV: ICD-10-PCS | Mod: PBBFAC,,, | Performed by: NURSE PRACTITIONER

## 2022-11-21 PROCEDURE — 1126F AMNT PAIN NOTED NONE PRSNT: CPT | Mod: CPTII,S$GLB,, | Performed by: NURSE PRACTITIONER

## 2022-11-21 PROCEDURE — 1160F RVW MEDS BY RX/DR IN RCRD: CPT | Mod: CPTII,S$GLB,, | Performed by: NURSE PRACTITIONER

## 2022-11-21 PROCEDURE — 4010F PR ACE/ARB THEARPY RXD/TAKEN: ICD-10-PCS | Mod: CPTII,S$GLB,, | Performed by: NURSE PRACTITIONER

## 2022-11-21 PROCEDURE — 3288F FALL RISK ASSESSMENT DOCD: CPT | Mod: CPTII,S$GLB,, | Performed by: NURSE PRACTITIONER

## 2022-11-21 PROCEDURE — 1126F PR PAIN SEVERITY QUANTIFIED, NO PAIN PRESENT: ICD-10-PCS | Mod: CPTII,S$GLB,, | Performed by: NURSE PRACTITIONER

## 2022-11-21 PROCEDURE — 3078F PR MOST RECENT DIASTOLIC BLOOD PRESSURE < 80 MM HG: ICD-10-PCS | Mod: CPTII,S$GLB,, | Performed by: NURSE PRACTITIONER

## 2022-11-21 PROCEDURE — 93000 EKG 12-LEAD: ICD-10-PCS | Mod: S$GLB,,, | Performed by: INTERNAL MEDICINE

## 2022-11-21 PROCEDURE — 93000 ELECTROCARDIOGRAM COMPLETE: CPT | Mod: S$GLB,,, | Performed by: INTERNAL MEDICINE

## 2022-11-21 PROCEDURE — 3074F SYST BP LT 130 MM HG: CPT | Mod: CPTII,S$GLB,, | Performed by: NURSE PRACTITIONER

## 2022-11-21 PROCEDURE — 1101F PR PT FALLS ASSESS DOC 0-1 FALLS W/OUT INJ PAST YR: ICD-10-PCS | Mod: CPTII,S$GLB,, | Performed by: NURSE PRACTITIONER

## 2022-11-21 PROCEDURE — 1101F PT FALLS ASSESS-DOCD LE1/YR: CPT | Mod: CPTII,S$GLB,, | Performed by: NURSE PRACTITIONER

## 2022-11-21 PROCEDURE — 99214 OFFICE O/P EST MOD 30 MIN: CPT | Mod: S$GLB,,, | Performed by: NURSE PRACTITIONER

## 2022-11-21 PROCEDURE — 3008F BODY MASS INDEX DOCD: CPT | Mod: CPTII,S$GLB,, | Performed by: NURSE PRACTITIONER

## 2022-11-21 PROCEDURE — 3074F PR MOST RECENT SYSTOLIC BLOOD PRESSURE < 130 MM HG: ICD-10-PCS | Mod: CPTII,S$GLB,, | Performed by: NURSE PRACTITIONER

## 2022-11-21 PROCEDURE — 3078F DIAST BP <80 MM HG: CPT | Mod: CPTII,S$GLB,, | Performed by: NURSE PRACTITIONER

## 2022-11-21 PROCEDURE — 4010F ACE/ARB THERAPY RXD/TAKEN: CPT | Mod: CPTII,S$GLB,, | Performed by: NURSE PRACTITIONER

## 2022-11-21 PROCEDURE — 1159F MED LIST DOCD IN RCRD: CPT | Mod: CPTII,S$GLB,, | Performed by: NURSE PRACTITIONER

## 2022-11-21 RX ORDER — RANOLAZINE 500 MG/1
500 TABLET, EXTENDED RELEASE ORAL 2 TIMES DAILY
Qty: 60 TABLET | Refills: 11 | Status: SHIPPED | OUTPATIENT
Start: 2022-11-21 | End: 2023-09-22 | Stop reason: SDUPTHER

## 2022-11-21 RX ORDER — SUCRALFATE 1 G/1
1 TABLET ORAL 2 TIMES DAILY
COMMUNITY
End: 2023-06-28 | Stop reason: ALTCHOICE

## 2022-11-21 NOTE — PROGRESS NOTES
Subjective:    Patient ID:  Onesimo Paula is a 71 y.o. male patient here for evaluation Hypertension and Hyperlipidemia      History of Present Illness:       Patient is here for a check up.  Recent ER visit on 10/24 for blood in stool, LLQ abdominal pain and was diagnosed with colitis. ER  discharge orders to dc daily aspirin and was prescribed keflex, flagyl, and zofran. Follow-up with GI. He was diagnosed with ischemic colitis. He is now on carafate. No more bleeding in the stool. He complains of CP and SOB.     Review of patient's allergies indicates:  No Known Allergies    Past Medical History:   Diagnosis Date    Acid reflux     Acquired tricuspid valve insufficiency     Anemia     Borderline diabetes     BPH (benign prostatic hyperplasia)     Cataract     1/16/20 rt eye, 1/30/20- Lt eye    Coronary artery disease     Hypertension     Liver cyst 8/24/2022    Pulmonary nodules     Skin cancer      Past Surgical History:   Procedure Laterality Date    CYSTOSCOPY WITH TRANSURETHRAL DESTRUCTION OF PROSTATE,RFA N/A 10/21/2020    Procedure: CYSTOSCOPY WITH TRANSURETHRAL DESTRUCTION OF PROSTATE,RFA;  Surgeon: La Nena James MD;  Location: [facility] OR;  Service: Urology;  Laterality: N/A;  please make sure [facility] rep available [phone]    EYE SURGERY      for cataracts, rt eye 1/16/20, left eye 1/30/20    HERNIA REPAIR  12/1999    HERNIA REPAIR  05/2004    KNEE ARTHROSCOPY Left 04/12/2018    LAPAROSCOPIC APPENDECTOMY N/A 1/2/2022    Procedure: APPENDECTOMY, LAPAROSCOPIC;  Surgeon: Sam Goss MD;  Location: [facility] OR;  Service: General;  Laterality: N/A;    SKIN LESION EXCISION  10/2009    Neck    SKIN LESION EXCISION  2015    STAPEDECTOMY Right 03/2001    TRANSRECTAL ULTRASOUND EXAMINATION N/A 8/17/2020    Procedure: TRANSRECTAL ULTRASOUND;  Surgeon: La Nena James MD;  Location: [facility] OR;  Service: Urology;  Laterality: N/A;     Social History     Tobacco Use    Smoking status: Former      Types: Cigarettes     Quit date:      Years since quittin.9    Smokeless tobacco: Never   Substance Use Topics    Alcohol use: Yes     Comment: occasional    Drug use: No        Review of Systems:    As noted in HPI in addition              Objective        Vitals:    22 1404   BP: 120/68   Pulse: 60       LIPIDS - LAST 2   Lab Results   Component Value Date    CHOL 164 2021    CHOL 156 2020    HDL 45 2021    HDL 54 2020    LDLCALC 107.4 2021    LDLCALC 90.8 2020    TRIG 58 2021    TRIG 56 2020    CHOLHDL 27.4 2021    CHOLHDL 34.6 2020       CBC - LAST 2  Lab Results   Component Value Date    WBC 17.32 (H) 10/24/2022    WBC 7.60 10/03/2022    RBC 4.75 10/24/2022    RBC 4.54 (L) 10/03/2022    HGB 14.3 10/24/2022    HGB 13.6 (L) 10/03/2022    HCT 42.6 10/24/2022    HCT 41.7 10/03/2022    MCV 90 10/24/2022    MCV 92 10/03/2022    MCH 30.1 10/24/2022    MCH 30.0 10/03/2022    MCHC 33.6 10/24/2022    MCHC 32.6 10/03/2022    RDW 13.6 10/24/2022    RDW 13.4 10/03/2022     10/24/2022     10/03/2022    MPV 10.5 10/24/2022    MPV 10.3 10/03/2022    GRAN 15.3 (H) 10/24/2022    GRAN 88.5 (H) 10/24/2022    LYMPH 0.5 (L) 10/24/2022    LYMPH 3.1 (L) 10/24/2022    MONO 1.3 (H) 10/24/2022    MONO 7.3 10/24/2022    BASO 0.03 10/24/2022    BASO 0.07 10/03/2022    NRBC 0 10/24/2022    NRBC 0 10/03/2022       CHEMISTRY & LIVER FUNCTION - LAST 2  Lab Results   Component Value Date     10/24/2022     10/03/2022    K 3.6 10/24/2022    K 3.7 10/03/2022     10/24/2022     10/03/2022    CO2 22 (L) 10/24/2022    CO2 27 10/03/2022    ANIONGAP 13 10/24/2022    ANIONGAP 9 10/03/2022    BUN 34 (H) 10/24/2022    BUN 29 (H) 10/03/2022    CREATININE 1.2 10/24/2022    CREATININE 1.1 10/03/2022     (H) 10/24/2022     (H) 10/03/2022    CALCIUM 9.5 10/24/2022    CALCIUM 9.0 10/03/2022    MG 2.1 2022    MG 2.3 2022     ALBUMIN 4.1 10/24/2022    ALBUMIN 4.1 10/03/2022    PROT 7.5 10/24/2022    PROT 6.2 01/13/2022    ALKPHOS 63 10/24/2022    ALKPHOS 52 (L) 01/13/2022    ALT 26 10/24/2022    ALT 24 01/13/2022    AST 27 10/24/2022    AST 19 01/13/2022    BILITOT 0.6 10/24/2022    BILITOT 0.8 01/13/2022        CARDIAC PROFILE - LAST 2  Lab Results   Component Value Date     07/30/2020     (H) 03/31/2020        COAGULATION - LAST 2  Lab Results   Component Value Date    LABPT 12.8 10/24/2022    LABPT 13.8 (H) 01/11/2022    INR 1.0 10/24/2022    INR 1.1 01/11/2022    APTT 28.3 01/11/2022       ENDOCRINE & PSA - LAST 2  Lab Results   Component Value Date    HGBA1C 6.0 08/10/2021    HGBA1C 5.8 02/04/2021    TSH 3.270 08/10/2021    TSH 2.650 02/04/2021    PSA 1.3 05/31/2022    PSA 4.6 (H) 02/04/2021        ECHOCARDIOGRAM RESULTS  Results for orders placed during the hospital encounter of 03/16/21    Echo Color Flow Doppler? Yes    Interpretation Summary  · The left ventricle is normal in size with moderate predominantly basal septal moderate asymmetric hypertrophy eccentric hypertrophy and normal systolic function. The estimated ejection fraction is 68%.  · Normal left ventricular diastolic function.  · Normal right ventricular size with normal right ventricular systolic function.  · Mild mitral regurgitation.  · Normal central venous pressure (3 mmHg).      CURRENT/PREVIOUS VISIT EKG  Results for orders placed or performed during the hospital encounter of 10/24/22   EKG 12-lead    Collection Time: 10/24/22  3:25 PM    Narrative    Test Reason : K92.2,    Vent. Rate : 071 BPM     Atrial Rate : 071 BPM     P-R Int : 186 ms          QRS Dur : 112 ms      QT Int : 396 ms       P-R-T Axes : 028 -25 010 degrees     QTc Int : 430 ms    Normal sinus rhythm  Minimal voltage criteria for LVH, may be normal variant  Borderline Abnormal ECG  When compared with ECG of 16-SEP-2020 09:23,  T wave amplitude has decreased in Lateral  leads  Confirmed by Timur Glass MD [id number]) on 10/24/2022 5:07:00 PM    Referred By: [email or web address]   SELF           Confirmed By:Timur Glass MD         PHYSICAL EXAM  CONSTITUTIONAL: Well built, well nourished in no apparent distress  NECK: no carotid bruit, no JVD  LUNGS: CTA  CHEST WALL: no tenderness  HEART: regular rate and rhythm, S1, S2 normal, no murmur, click, rub or gallop   ABDOMEN: soft, non-tender; bowel sounds normal; no masses,  no organomegaly  EXTREMITIES: Extremities normal, no edema, no calf tenderness noted  NEURO: AAO X 3    I HAVE REVIEWED :    The vital signs, nurses notes, and all the pertinent radiology and labs.        Current Outpatient Medications   Medication Instructions    amLODIPine (NORVASC) 10 mg, Oral, Daily    aspirin (ECOTRIN) 81 mg, Oral, Daily    co-enzyme Q-10 30 mg, Oral, 3 times daily    hydroCHLOROthiazide (HYDRODIURIL) 25 mg, Oral, Daily    multivit with minerals/lutein (MULTIVITAMIN 50 PLUS ORAL) 1 tablet, Oral, Daily    MYRBETRIQ 50 mg Tb24 TAKE 1 TABLET ONCE DAILY    omega 3-dha-epa-fish oil (FISH OIL) 100-160-1,000 mg Cap 1 capsule, Oral, Daily    ondansetron (ZOFRAN-ODT) 4 mg, Oral, Every 6 hours PRN    pantoprazole (PROTONIX) 40 mg, Oral, Daily    ranolazine (RANEXA) 500 mg, Oral, 2 times daily    rosuvastatin (CRESTOR) 10 mg, Oral, Daily    solifenacin (VESICARE) 10 mg, Oral, Daily    sucralfate (CARAFATE) 1 g, Oral, 2 times daily    tadalafiL (CIALIS) 5 mg, Oral, Daily    telmisartan (MICARDIS) 80 mg, Oral, Daily          Assessment & Plan     Mixed hyperlipidemia  LDL 2021 -107  Repeat Lipid Panel  Continue Crestor 10 mg daily      Essential hypertension  BP Stable Continue current regimen    Nonrheumatic mitral valve regurgitation  Recommend Repeat ECHO as patient has been having more SOB    Dyspnea on exertion  Anginal?  Vs Pulmonary  Abnormal EKG  Recommend Haydee Myoview and ECHO  Add Ranexa 500 mg PO BID    Colitis  Recent  Hemoglobin stable          No  follow-ups on file.

## 2022-11-29 ENCOUNTER — TELEPHONE (OUTPATIENT)
Dept: CARDIOLOGY | Facility: HOSPITAL | Age: 72
End: 2022-11-29

## 2022-11-29 NOTE — TELEPHONE ENCOUNTER
Patient advised, [facility] [facility]   Will need to register on the first floor at the main entrance.   Patient advised that arrival time is 6:50am.  Patient advised that he may be here about 3.5-4 hours, and may want to bring something to occupy their time, as there will be periods of waiting.    Patient advised, may take his medications prior to testing if you need to.  Advised if he needs to eat to take his medications, please keep it light, like toast and juice.    Patient advised to avoid all caffeine 12 hours prior to testing.  This includes decaf tea and coffee.    Will provide peanut butter crackers for a snack after stress test.  If patient would prefer something else, please bring a snack from home.    Wear comfortable clothing.   No lotions, oils, or powders to the upper chest area. May wear deodorant.    No metal jewelry, buttons, or zippers to the upper body.  Patient verbalizes understanding of instructions.   6:50am

## 2022-11-30 ENCOUNTER — HOSPITAL ENCOUNTER (OUTPATIENT)
Dept: RADIOLOGY | Facility: HOSPITAL | Age: 72
Discharge: HOME | End: 2022-11-30
Attending: NURSE PRACTITIONER
Payer: COMMERCIAL

## 2022-11-30 ENCOUNTER — LAB VISIT (OUTPATIENT)
Dept: LAB | Facility: HOSPITAL | Age: 72
End: 2022-11-30
Attending: NURSE PRACTITIONER
Payer: COMMERCIAL

## 2022-11-30 ENCOUNTER — HOSPITAL ENCOUNTER (OUTPATIENT)
Dept: CARDIOLOGY | Facility: HOSPITAL | Age: 72
Discharge: HOME | End: 2022-11-30
Attending: NURSE PRACTITIONER
Payer: COMMERCIAL

## 2022-11-30 DIAGNOSIS — R06.09 DYSPNEA ON EXERTION: ICD-10-CM

## 2022-11-30 DIAGNOSIS — I34.0 NONRHEUMATIC MITRAL VALVE REGURGITATION: ICD-10-CM

## 2022-11-30 DIAGNOSIS — K52.9 COLITIS: ICD-10-CM

## 2022-11-30 DIAGNOSIS — R91.8 MULTIPLE PULMONARY NODULES: ICD-10-CM

## 2022-11-30 DIAGNOSIS — I10 ESSENTIAL HYPERTENSION: ICD-10-CM

## 2022-11-30 DIAGNOSIS — E78.2 MIXED HYPERLIPIDEMIA: ICD-10-CM

## 2022-11-30 DIAGNOSIS — I10 ESSENTIAL HYPERTENSION: Primary | ICD-10-CM

## 2022-11-30 LAB
ALBUMIN SERPL BCP-MCNC: 4.3 G/DL (ref 3.5–5.2)
ALP SERPL-CCNC: 59 U/L (ref 55–135)
ALT SERPL W/O P-5'-P-CCNC: 26 U/L (ref 10–44)
ANION GAP SERPL CALC-SCNC: 8 MMOL/L (ref 8–16)
AST SERPL-CCNC: 25 U/L (ref 10–40)
BASOPHILS # BLD AUTO: 0.06 K/UL (ref 0–0.2)
BASOPHILS NFR BLD: 0.8 % (ref 0–1.9)
BILIRUB SERPL-MCNC: 0.7 MG/DL (ref 0.1–1)
BUN SERPL-MCNC: 37 MG/DL (ref 8–23)
CALCIUM SERPL-MCNC: 9.3 MG/DL (ref 8.7–10.5)
CHLORIDE SERPL-SCNC: 102 MMOL/L (ref 95–110)
CHOLEST SERPL-MCNC: 170 MG/DL (ref 120–199)
CHOLEST/HDLC SERPL: 3.3 {RATIO} (ref 2–5)
CO2 SERPL-SCNC: 27 MMOL/L (ref 23–29)
CREAT SERPL-MCNC: 1.3 MG/DL (ref 0.5–1.4)
DIFFERENTIAL METHOD: ABNORMAL
EOSINOPHIL # BLD AUTO: 0.2 K/UL (ref 0–0.5)
EOSINOPHIL NFR BLD: 2.9 % (ref 0–8)
ERYTHROCYTE [DISTWIDTH] IN BLOOD BY AUTOMATED COUNT: 14.2 % (ref 11.5–14.5)
EST. GFR  (NO RACE VARIABLE): 58 ML/MIN/1.73 M^2
GLUCOSE SERPL-MCNC: 105 MG/DL (ref 70–110)
HCT VFR BLD AUTO: 42.2 % (ref 40–54)
HDLC SERPL-MCNC: 52 MG/DL (ref 40–75)
HDLC SERPL: 30.6 % (ref 20–50)
HGB BLD-MCNC: 13.7 G/DL (ref 14–18)
IMM GRANULOCYTES # BLD AUTO: 0.02 K/UL (ref 0–0.04)
IMM GRANULOCYTES NFR BLD AUTO: 0.3 % (ref 0–0.5)
LDLC SERPL CALC-MCNC: 107.2 MG/DL (ref 63–159)
LYMPHOCYTES # BLD AUTO: 2.4 K/UL (ref 1–4.8)
LYMPHOCYTES NFR BLD: 33.1 % (ref 18–48)
MAGNESIUM SERPL-MCNC: 2.4 MG/DL (ref 1.6–2.6)
MCH RBC QN AUTO: 30.2 PG (ref 27–31)
MCHC RBC AUTO-ENTMCNC: 32.5 G/DL (ref 32–36)
MCV RBC AUTO: 93 FL (ref 82–98)
MONOCYTES # BLD AUTO: 0.7 K/UL (ref 0.3–1)
MONOCYTES NFR BLD: 10.1 % (ref 4–15)
NEUTROPHILS # BLD AUTO: 3.9 K/UL (ref 1.8–7.7)
NEUTROPHILS NFR BLD: 52.8 % (ref 38–73)
NONHDLC SERPL-MCNC: 118 MG/DL
NRBC BLD-RTO: 0 /100 WBC
PLATELET # BLD AUTO: 221 K/UL (ref 150–450)
PMV BLD AUTO: 9.6 FL (ref 9.2–12.9)
POTASSIUM SERPL-SCNC: 4 MMOL/L (ref 3.5–5.1)
PROT SERPL-MCNC: 7.7 G/DL (ref 6–8.4)
RBC # BLD AUTO: 4.53 M/UL (ref 4.6–6.2)
SODIUM SERPL-SCNC: 137 MMOL/L (ref 136–145)
TRIGL SERPL-MCNC: 54 MG/DL (ref 30–150)
TSH SERPL DL<=0.005 MIU/L-ACNC: 3.64 UIU/ML (ref 0.34–5.6)
WBC # BLD AUTO: 7.32 K/UL (ref 3.9–12.7)

## 2022-11-30 PROCEDURE — 93306 TTE W/DOPPLER COMPLETE: CPT | Mod: 26,,, | Performed by: GENERAL PRACTICE

## 2022-11-30 PROCEDURE — 80061 LIPID PANEL: CPT | Performed by: NURSE PRACTITIONER

## 2022-11-30 PROCEDURE — 85025 COMPLETE CBC W/AUTO DIFF WBC: CPT | Performed by: NURSE PRACTITIONER

## 2022-11-30 PROCEDURE — 93306 ECHO (CUPID ONLY): ICD-10-PCS | Mod: 26,,, | Performed by: GENERAL PRACTICE

## 2022-11-30 PROCEDURE — 93306 TTE W/DOPPLER COMPLETE: CPT

## 2022-11-30 PROCEDURE — 78452 HT MUSCLE IMAGE SPECT MULT: CPT | Mod: 26,,, | Performed by: INTERNAL MEDICINE

## 2022-11-30 PROCEDURE — 93018 NUCLEAR STRESS - CARDIOLOGY INTERPRETED (CUPID ONLY): ICD-10-PCS | Mod: ,,, | Performed by: INTERNAL MEDICINE

## 2022-11-30 PROCEDURE — 93016 CV STRESS TEST SUPVJ ONLY: CPT | Mod: ,,, | Performed by: NURSE PRACTITIONER

## 2022-11-30 PROCEDURE — 78452 NUCLEAR STRESS - CARDIOLOGY INTERPRETED (CUPID ONLY): ICD-10-PCS | Mod: 26,,, | Performed by: INTERNAL MEDICINE

## 2022-11-30 PROCEDURE — 36415 COLL VENOUS BLD VENIPUNCTURE: CPT | Performed by: NURSE PRACTITIONER

## 2022-11-30 PROCEDURE — 80053 COMPREHEN METABOLIC PANEL: CPT | Performed by: NURSE PRACTITIONER

## 2022-11-30 PROCEDURE — 83735 ASSAY OF MAGNESIUM: CPT | Performed by: NURSE PRACTITIONER

## 2022-11-30 PROCEDURE — 84443 ASSAY THYROID STIM HORMONE: CPT | Performed by: NURSE PRACTITIONER

## 2022-11-30 PROCEDURE — 93017 CV STRESS TEST TRACING ONLY: CPT

## 2022-11-30 PROCEDURE — 93018 CV STRESS TEST I&R ONLY: CPT | Mod: ,,, | Performed by: INTERNAL MEDICINE

## 2022-11-30 PROCEDURE — 93016 PR CARDIAC STRESS TST,DR SUPERV ONLY: ICD-10-PCS | Mod: ,,, | Performed by: NURSE PRACTITIONER

## 2022-11-30 RX ORDER — REGADENOSON 0.08 MG/ML
0.4 INJECTION, SOLUTION INTRAVENOUS ONCE
Status: COMPLETED | OUTPATIENT
Start: 2022-11-30 | End: 2022-11-30

## 2022-11-30 RX ADMIN — REGADENOSON 0.4 MG: 0.08 INJECTION, SOLUTION INTRAVENOUS at 08:11

## 2022-12-01 LAB
AORTIC ROOT ANNULUS: 3.8 CM
AORTIC VALVE CUSP SEPERATION: 2.1 CM
AV INDEX (PROSTH): 0.83
AV MEAN GRADIENT: 3 MMHG
AV PEAK GRADIENT: 7 MMHG
AV VALVE AREA: 3.44 CM2
AV VELOCITY RATIO: 0.77
CV ECHO LV RWT: 0.44 CM
DOP CALC AO PEAK VEL: 1.32 M/S
DOP CALC AO VTI: 30.2 CM
DOP CALC LVOT AREA: 4.2 CM2
DOP CALC LVOT DIAMETER: 2.3 CM
DOP CALC LVOT PEAK VEL: 1.02 M/S
DOP CALC LVOT STROKE VOLUME: 103.82 CM3
DOP CALCLVOT PEAK VEL VTI: 25 CM
E WAVE DECELERATION TIME: 132 MSEC
E/A RATIO: 0.93
E/E' RATIO: 10.75 M/S
ECHO LV POSTERIOR WALL: 1.09 CM (ref 0.6–1.1)
EJECTION FRACTION: 60 %
FRACTIONAL SHORTENING: 37 % (ref 28–44)
INTERVENTRICULAR SEPTUM: 1.21 CM (ref 0.6–1.1)
IVRT: 100 MSEC
LA MAJOR: 3.6 CM
LEFT ATRIUM SIZE: 3.6 CM
LEFT INTERNAL DIMENSION IN SYSTOLE: 3.14 CM (ref 2.1–4)
LEFT VENTRICLE DIASTOLIC VOLUME: 117 ML
LEFT VENTRICLE SYSTOLIC VOLUME: 31.4 ML
LEFT VENTRICULAR INTERNAL DIMENSION IN DIASTOLE: 4.98 CM (ref 3.5–6)
LEFT VENTRICULAR MASS: 218.85 G
LV LATERAL E/E' RATIO: 8.6 M/S
LV SEPTAL E/E' RATIO: 14.33 M/S
LVOT MG: 2 MMHG
LVOT MV: 0.63 CM/S
MV PEAK A VEL: 0.92 M/S
MV PEAK E VEL: 0.86 M/S
MV STENOSIS PRESSURE HALF TIME: 57 MS
MV VALVE AREA P 1/2 METHOD: 3.86 CM2
PISA TR MAX VEL: 2.61 M/S
RA PRESSURE: 3 MMHG
RIGHT VENTRICULAR END-DIASTOLIC DIMENSION: 2.37 CM
TDI LATERAL: 0.1 M/S
TDI SEPTAL: 0.06 M/S
TDI: 0.08 M/S
TR MAX PG: 27 MMHG
TV REST PULMONARY ARTERY PRESSURE: 30 MMHG

## 2022-12-02 LAB
CV PHARM DOSE: 0.4 MG
CV STRESS BASE HR: 53 BPM
DIASTOLIC BLOOD PRESSURE: 74 MMHG
EJECTION FRACTION- HIGH: 65 %
END DIASTOLIC INDEX-HIGH: 153 ML/M2
END DIASTOLIC INDEX-LOW: 93 ML/M2
END SYSTOLIC INDEX-HIGH: 71 ML/M2
END SYSTOLIC INDEX-LOW: 31 ML/M2
NUC STRESS DIASTOLIC VOLUME INDEX: 109
NUC STRESS EJECTION FRACTION: 79 %
NUC STRESS SYSTOLIC VOLUME INDEX: 32
OHS CV CPX 1 MINUTE RECOVERY HEART RATE: 75 BPM
OHS CV CPX 85 PERCENT MAX PREDICTED HEART RATE MALE: 125
OHS CV CPX MAX PREDICTED HEART RATE: 147
OHS CV CPX PATIENT IS FEMALE: 0
OHS CV CPX PATIENT IS MALE: 1
OHS CV CPX PEAK DIASTOLIC BLOOD PRESSURE: 72 MMHG
OHS CV CPX PEAK HEAR RATE: 75 BPM
OHS CV CPX PEAK RATE PRESSURE PRODUCT: 9000
OHS CV CPX PEAK SYSTOLIC BLOOD PRESSURE: 120 MMHG
OHS CV CPX PERCENT MAX PREDICTED HEART RATE ACHIEVED: 51
OHS CV CPX RATE PRESSURE PRODUCT PRESENTING: 7420
RETIRED EF AND QEF - SEE NOTES: 53 %
SYSTOLIC BLOOD PRESSURE: 140 MMHG

## 2022-12-06 ENCOUNTER — PATIENT MESSAGE (OUTPATIENT)
Dept: RESEARCH | Facility: HOSPITAL | Age: 72
End: 2022-12-06
Payer: MEDICARE

## 2023-02-28 ENCOUNTER — OFFICE VISIT (OUTPATIENT)
Dept: UROLOGY | Facility: CLINIC | Age: 73
End: 2023-02-28
Payer: COMMERCIAL

## 2023-02-28 VITALS
DIASTOLIC BLOOD PRESSURE: 77 MMHG | BODY MASS INDEX: 23.34 KG/M2 | HEIGHT: 70 IN | SYSTOLIC BLOOD PRESSURE: 136 MMHG | WEIGHT: 163 LBS | HEART RATE: 61 BPM

## 2023-02-28 DIAGNOSIS — N40.1 BPH WITH URINARY OBSTRUCTION: Primary | ICD-10-CM

## 2023-02-28 DIAGNOSIS — N32.81 OAB (OVERACTIVE BLADDER): ICD-10-CM

## 2023-02-28 DIAGNOSIS — N13.8 BPH WITH URINARY OBSTRUCTION: Primary | ICD-10-CM

## 2023-02-28 DIAGNOSIS — N52.9 ERECTILE DYSFUNCTION, UNSPECIFIED ERECTILE DYSFUNCTION TYPE: ICD-10-CM

## 2023-02-28 LAB
BILIRUBIN, UA POC OHS: NEGATIVE
BLOOD, UA POC OHS: NEGATIVE
CLARITY, UA POC OHS: CLEAR
COLOR, UA POC OHS: YELLOW
GLUCOSE, UA POC OHS: NEGATIVE
KETONES, UA POC OHS: NEGATIVE
LEUKOCYTES, UA POC OHS: NEGATIVE
NITRITE, UA POC OHS: NEGATIVE
PH, UA POC OHS: 7
POC RESIDUAL URINE VOLUME: 25 ML (ref 0–100)
PROTEIN, UA POC OHS: NEGATIVE
SPECIFIC GRAVITY, UA POC OHS: 1.01
UROBILINOGEN, UA POC OHS: 0.2

## 2023-02-28 PROCEDURE — 1159F MED LIST DOCD IN RCRD: CPT | Mod: CPTII,S$GLB,, | Performed by: UROLOGY

## 2023-02-28 PROCEDURE — 1160F RVW MEDS BY RX/DR IN RCRD: CPT | Mod: CPTII,S$GLB,, | Performed by: UROLOGY

## 2023-02-28 PROCEDURE — 4010F PR ACE/ARB THEARPY RXD/TAKEN: ICD-10-PCS | Mod: CPTII,S$GLB,, | Performed by: UROLOGY

## 2023-02-28 PROCEDURE — 3288F PR FALLS RISK ASSESSMENT DOCUMENTED: ICD-10-PCS | Mod: CPTII,S$GLB,, | Performed by: UROLOGY

## 2023-02-28 PROCEDURE — 4010F ACE/ARB THERAPY RXD/TAKEN: CPT | Mod: CPTII,S$GLB,, | Performed by: UROLOGY

## 2023-02-28 PROCEDURE — 3075F PR MOST RECENT SYSTOLIC BLOOD PRESS GE 130-139MM HG: ICD-10-PCS | Mod: CPTII,S$GLB,, | Performed by: UROLOGY

## 2023-02-28 PROCEDURE — 81003 URINALYSIS AUTO W/O SCOPE: CPT | Mod: QW,S$GLB,, | Performed by: UROLOGY

## 2023-02-28 PROCEDURE — 99999 PR PBB SHADOW E&M-EST. PATIENT-LVL IV: ICD-10-PCS | Mod: PBBFAC,,, | Performed by: UROLOGY

## 2023-02-28 PROCEDURE — 3078F PR MOST RECENT DIASTOLIC BLOOD PRESSURE < 80 MM HG: ICD-10-PCS | Mod: CPTII,S$GLB,, | Performed by: UROLOGY

## 2023-02-28 PROCEDURE — 99214 OFFICE O/P EST MOD 30 MIN: CPT | Mod: S$GLB,,, | Performed by: UROLOGY

## 2023-02-28 PROCEDURE — 1101F PR PT FALLS ASSESS DOC 0-1 FALLS W/OUT INJ PAST YR: ICD-10-PCS | Mod: CPTII,S$GLB,, | Performed by: UROLOGY

## 2023-02-28 PROCEDURE — 51798 US URINE CAPACITY MEASURE: CPT | Mod: S$GLB,,, | Performed by: UROLOGY

## 2023-02-28 PROCEDURE — 3008F PR BODY MASS INDEX (BMI) DOCUMENTED: ICD-10-PCS | Mod: CPTII,S$GLB,, | Performed by: UROLOGY

## 2023-02-28 PROCEDURE — 1101F PT FALLS ASSESS-DOCD LE1/YR: CPT | Mod: CPTII,S$GLB,, | Performed by: UROLOGY

## 2023-02-28 PROCEDURE — 1126F AMNT PAIN NOTED NONE PRSNT: CPT | Mod: CPTII,S$GLB,, | Performed by: UROLOGY

## 2023-02-28 PROCEDURE — 3075F SYST BP GE 130 - 139MM HG: CPT | Mod: CPTII,S$GLB,, | Performed by: UROLOGY

## 2023-02-28 PROCEDURE — 51798 POCT BLADDER SCAN: ICD-10-PCS | Mod: S$GLB,,, | Performed by: UROLOGY

## 2023-02-28 PROCEDURE — 3078F DIAST BP <80 MM HG: CPT | Mod: CPTII,S$GLB,, | Performed by: UROLOGY

## 2023-02-28 PROCEDURE — 1126F PR PAIN SEVERITY QUANTIFIED, NO PAIN PRESENT: ICD-10-PCS | Mod: CPTII,S$GLB,, | Performed by: UROLOGY

## 2023-02-28 PROCEDURE — 3008F BODY MASS INDEX DOCD: CPT | Mod: CPTII,S$GLB,, | Performed by: UROLOGY

## 2023-02-28 PROCEDURE — 99214 PR OFFICE/OUTPT VISIT, EST, LEVL IV, 30-39 MIN: ICD-10-PCS | Mod: S$GLB,,, | Performed by: UROLOGY

## 2023-02-28 PROCEDURE — 81003 POCT URINALYSIS(INSTRUMENT): ICD-10-PCS | Mod: QW,S$GLB,, | Performed by: UROLOGY

## 2023-02-28 PROCEDURE — 99999 PR PBB SHADOW E&M-EST. PATIENT-LVL IV: CPT | Mod: PBBFAC,,, | Performed by: UROLOGY

## 2023-02-28 PROCEDURE — 1159F PR MEDICATION LIST DOCUMENTED IN MEDICAL RECORD: ICD-10-PCS | Mod: CPTII,S$GLB,, | Performed by: UROLOGY

## 2023-02-28 PROCEDURE — 1160F PR REVIEW ALL MEDS BY PRESCRIBER/CLIN PHARMACIST DOCUMENTED: ICD-10-PCS | Mod: CPTII,S$GLB,, | Performed by: UROLOGY

## 2023-02-28 PROCEDURE — 3288F FALL RISK ASSESSMENT DOCD: CPT | Mod: CPTII,S$GLB,, | Performed by: UROLOGY

## 2023-02-28 NOTE — PATIENT INSTRUCTIONS
1. BPH with urinary obstruction    2. Erectile dysfunction, unspecified erectile dysfunction type    3. OAB (overactive bladder)        Plan:     discontinue myrbetriq Continue myrbetriq 50mg daily and vesicare/solfenacin 10mg - for overactive bladder.   2 weeks after discontinuing record every time he urinates for 3 days.  Return to see Urology nurse practitioner with diary and symptom check.  Want to see how many times he is urinating during daytime hours, how we times he is urinating during nighttime hours, how many times a day he has any leakage, average urgency (1-5 with 5 being most urgent).  Needs this recorded for 3 days.  We can schedule him for InterStim, percutaneous at surgery center for Monday afternoon without anesthesia if his overactive bladder and urgency returns off medications.  Goal would be to see if he has greater than 50% improvement with the InterStim and if so could implant the full system.  Battery last 10-15 years.  Goal is to discontinue medications which are expensive long-term.  If he has no increase in overactive bladder on no medications then can just stay off medications.  On asa 81mg daily. Cardiologist: La Nena Coreas (NP)  Can try tadalafil 20mg as needed (Sunday mornings-last in system for 36 hours).       F/u with Teena with diary in 4 weeks for aua ssx, pvr, diary- she can write me after she sees him and I can schedule for interstim percutaneous at [facility].

## 2023-02-28 NOTE — PROGRESS NOTES
[facility] Note - [place]  Staff: MD Erika  PCP: JAMES Rose,FNP-C  Date of Service: 02/28/2023      Subjective:        HPI: Onesimo Paula is a 72 y.o. male     Seen by me 9/30/19  Patient had MRI for right hip pain and was found have a thick-walled bladder with enlarged prostate.  He states that he saw urologist over 40 years ago for burning with urination.  He had a renal bladder ultrasound in 2016 which showed enlarged prostate and bilateral renal cyst done for renal insufficiency whom he sees  for.  Denies any gross hematuria.  Review of for previous urine show no microscopic hematuria.  Twenty-five pack-year history of smoking but quit in 1997.      He also has AUA symptom score 6 and occasional weak stream and urgency but voids every 3-5 hours when he is working and a little more frequent when he is not.  Denies any urge incontinence.  Okay stream with occasional weak stream.  PVR today 09/03/2019 is 15 cc.  He may have tried Flomax a couple years ago but does not recall it helping her changing any was symptoms.  Overall really not that bothered by his urinary symptoms.      Also states he has ED.  Previously tried sildenafil unsure dose and it helped but wife does not want him to take, exam revealed peyronie's     Plan: started tadalafil 5mg due to thick walled bladder although essentially asx. No further f/u fr b renal cysts.      Interval history 11/18/19:   Pt states today Cialis has NOT improved any of his lower urinary tract symptoms since starting the medication.  He currently takes this medication before his bedtime.  Pt still c/o urinary urgency, especially during the day.  Nocturia is 1-2x nightly and not bothersome at this time.  He is a , therefore the daytime urgency really bothers him.     Plan: started ditropan 10mg XL and cont'd tadalafil     Interval history by idalia 1/6/20:   TODAY, pt states the oxybutynin only improved  his LUTS by over 10% at this time.  PVR by bladder scan performed in office today:  11 mL     Rec'd: d/c oxybutynin, start myrbetriq, cont tadalafil 5mg daily.      Interval history 7/27/20:      Tried myrbetriq but didn't help. Having urgency, hesistancy and intermittent slow stream but urgency most bothersome and having some UUI with a few drop, but not that bothersome. Drinks 1 c of coffee in am. Soda during day. 1 c of coffee int he evening. Has never tried stopping these to see if they help. Drinks beer and notices increase in frequency following day.      On tadalafil 5mg daily for ED and bph.  pvr by scan: 32cc  Voiding every 30 minutes a day recently more pronounced at home (has been on vacation).  Takes hctz in am. When he's working he only voids about 2x-4x a day. Drinks caffeine while working including energy drinks. No nocturia.      psa 1/31/20 2.2 (up from 1.2 year prior). Repeat psa 0.85 7/29/20  WINSOME today: 40g + no nodules.     Uroflow 7/30/20: peak flow 12.8mL/s,  avg flow 5.4mL/s, voided vol: 191cc, pvr by scan:0      Cysto trus 8/17/20: normal bladder, no stricture. Membranous and distal prostatic urethra with papillary tissue suspicious for prostatitis.trus volume 42.6g. bc of his urgency on tadalafil, flomax, vesicare found rx cipro for 14d to see if it helped w urgency. D/c'd vesicare. cont'd flomax and tadalafil. rtc to see idalia in 2-3 weeks and me in 2 months to discuss bph procedures     Interval history by idalia 9/9/20:   Pt states he is still having the urinary urgency even since finishing the Cipro antibiotics at this time.  He denies gross hematuria, dysuria.  UA today shows normal findings.  PVR by bladder scan today:  9 mL     Plan: rec'd restarting vesicare     Interval history 10/1/20:  Taking flomax 0.4mg nightly and says flow is intermittent on this.   Also on the vesicare and says it doesn't help with the urgency. Voiding 2-3x a day and none at night. No incontinence. 3-4x a  week c/o urge incontinence if he tries to hold it too long, mostly in the am when he wakes up. Denies any difficulty with walking. No weakness or numbness.   Changed to decaf coffee 1x a day. Changed to sprite and caffeine free coke.   He does not want to take any more meds. He is not happy with the retrograde ejaculation.   Sees dr. leger for Mitral Valve leakage, last saw him a week ago, was told he should f/u in March 2021. Tadalafil helping with erections     Interval history 11/19/20:   Underwent rezum on 10/21/20. Font flomax, vesicare and tadafil daily for 3 more months.      Returned for fill and pull on 10/26/20. Filled with 210, voided 210  For 2 weeks postop would have blood when he had a BM, improving.      Today (1 month later) he states he has some increased urgency. UUI 2-3x a day (increased from 3-4x a week).  Still taking vesicare but ran out. freq q1 hour (worse in cold weather). Without cold weather freq q 3 hour.   ua today with small wbc and mod blood - some dysuria  No significant change in stream yet  pvr by scan today: 134  AUA ssx:(2 incomplete emptying, 3 frequency, 3 intermittency, 4 urgency, 2 weak stream, 2 straining, 0 sleeping). 16. QOL: 4    Interval history by me in clinic on 3/9/21:  Returns today and states states he ran out of flomax and vesicare a month ago. No increased frequency, urge incontinence or slower stream off the flomax. Still having UUI 1-2x a day with a few drops when he hears water. Voids 8x/24 hours and 4x/8 hours while at work. Nocturia occ (same). No longer drinking caffeine. Still on tadalafil 5mg daily.  No longer having retrograde ejaculation and constipation improved. Hctz in am.  Frequency mostly in  the pm. Bothers him but able to stop at bathrooms.   AUA ssx today:(1 incomplete emptying, 2 frequency, 1 intermittency, 3 urgency, 3 weak stream, 0 straining, 0 sleeping). 10. QOL: mixed  Bladder scan PVR today was 35mL.       I reviewed his previous  urinalysis and cultures as well as his urinalysis today. His urinalysis today shows no abnormalities.      I reviewed his previous PSAs and his most recent psa within the last year was 2.3, %free 20.87, last year was 2.2     Sometimes loses erection with tadalafil 5mg daily. Rare. Not more frequent. Still getting morning erections.     Interval history with idalia on 4/9/21:  UA today showed normal findings.  Pt states today even though he tried and changed his HCTZ med to evenings as discussed last ov, his LUTS have not improved.  Pt as of today's visit is still c/o urinary urgency and frequency during the daytime.  Has not improved since last ov.  Pt states today he has tried Vesicare and Oxybutynin in the past with no improvement in symptoms.  Pt currently denies gross hematuria and dysuria.  She put him on myrbetriq again       Interval history by me 6/15/21:  Uroflow results (date: 03/18/2021): Voiding time: 24.3s, Flow time: 23.1s, TTP flow: 8.0s, Peak flowrate: 19.2 mL/s, Average flowrate: 10.6mL/s, Intervals: 2, Voided volume: 244 mL, Pvr by bladder scan 33. Pattern of curve: see uploaded image, reviewed by     At last visit was c/o freq/urgency. Frequency 8-10x/day. Tried myrbetriq before rezum, ditropan, vesicare and changing hctz to pm but no improvement. Then in April started myrbetriq again , after about 4 weeks saw improvement, now down to 5-6x a day. avg urg: 3. No nocturia. No incontinence.  Also on tadalafil 5mg daily.   AUA ssx today:(1 incomplete emptying, 1 frequency, 1 intermittency, 2 urgency, 3 weak stream, 0 straining, 0 sleeping). 8. QOL:   Bladder scan PVR today was 0mL.      HPI/CC today 6/7/22:   Had him Continue myrbetriq 50mg daily, has enough until 4/2021, Continue tadalafil 5mg daily, refilled today for a year.  Says myrbetriq not helping anymore. He held it for a few days and noticed no difference. On cialis 5mg daily.   Does not have constipation  Voids about 9-10x during  waking hours. occ urge incontinence 3x a week. Few drops. Started drinking caffeine this week.   Nocturia 1-2x a night. Urine flow varies.    psa 5/31/22 1.3  ua today neg, pvr by scan: 5  AUA ssx:(2 incomplete emptying, 3 frequency, 2 intermittency, 4 urgency, 3 weak stream, 1 straining, 1 sleeping). 16. QOL: mixed  Appendix removed in January complicated by post-op ileus.    Interval history 8/23/22:  Continue myrbetriq 50mg daily, has enough until 4/2021  Continue tadalafil 5mg daily, refilled today for a year  After adding vesicare to myrbetriq - went from voiding 9-10x day, 1-2x a night with occ UUI 3x a week to 5-6x/day, 0 night and no UUI. Says he has dry mouth but about the same. However does have some heart burn that is new.   UA today: neg. pvr by scan: 115 (this is the highest it's been, feels empty).  AUA ssx:(1 incomplete emptying, 2 frequency, 0 intermittency, 2 urgency, 2 weak stream, 1 straining, 0 sleeping). 8. QOL: pleased    Interval history 2/28/23:  Says he is doing well on VESIcare and myrbetriq.  However it is expensive.  About 120 dollars every 3 months.  Also having dry mouth with it.  On tadalafil 5 mg daily for erections, obtaining from [employer].  This dose wish for him sometimes.  He tried Viagra 100 mg and it seemed to work.  Aua ssx: 12, pvr: 25    PVR History:  2/28/23 25, aua ssx: 12  8/23/22 115  6/15/21 pvr by scan: 0  3/18/21 UF: pF: 19.2, avg: 10.6, voided vol: 244, pvr: 33  3/2/21               pvr: 35  11/19/20          pvr by scan: 134 (couldn't start after he stopped)  10/26/20          Fill and pull: 0cc pvr  11/19/20          rezum  9/9/20              pvr by scan: 9cc  7/27/20            pvr by scan: 32cc  8/17/20            Cysto trus: 42.6g.   7/30/20           UF: peak flow 12.8mL/s,  avg flow 5.4mL/s, voided vol: 191cc, pvr by scan:0   1/6/20              pvr by scan: 11cc   9/3/19              pvr by scan: 15cc     PSA history: no family hx of prostate  cancer  5/31/22 1.3  3/2/21              2.3, %free 20.87, pvr: 35 (psa screen 4.6)  11/19/20          rezum  7/29/20            0.85, %free 54.12  1/31/20            2.2   2/21/19            1.2  3/14/18            1.0    Urine history  6/7/22  neg  11/19/20          Small wbc/mod blood-send for ua patricio and culture pvr by scan: 134  10/14/20          Neg  9/16/20            neg  9/9/20              neg  3/31/20            Neg  1/6/20              Neg  11/18/19          neg  8/8/19              No cx, void: n/a 0 rbc  2/21/19            Ng, void: neg, 0 rbc  4/9/18              No cx, void: neg  9/2017             No blood       Current REVIEW OF SYSTEMS:  neg    Objective:     Vitals:    02/28/23 1402   BP: 136/77   Pulse: 61       Focused  exam  neg    Assessment:     Onesimo Paula is a 72 y.o. male with       1. BPH with urinary obstruction    2. Erectile dysfunction, unspecified erectile dysfunction type    3. OAB (overactive bladder)        Plan:     discontinue myrbetriq Continue myrbetriq 50mg daily and vesicare/solfenacin 10mg - for overactive bladder.   2 weeks after discontinuing record every time he urinates for 3 days.  Return to see Urology nurse practitioner with diary and symptom check.  Want to see how many times he is urinating during daytime hours, how we times he is urinating during nighttime hours, how many times a day he has any leakage, average urgency (1-5 with 5 being most urgent).  Needs this recorded for 3 days.  We can schedule him for InterStim, percutaneous at surgery center for Monday afternoon without anesthesia if his overactive bladder and urgency returns off medications.  Goal would be to see if he has greater than 50% improvement with the InterStim and if so could implant the full system.  Battery last 10-15 years.  Goal is to discontinue medications which are expensive long-term.  If he has no increase in overactive bladder on no medications then can just stay off  medications.  On asa 81mg daily. Cardiologist: La Nena Coreas (NP)  Can try tadalafil 20mg as needed (Sunday mornings-last in system for 36 hours).       F/u with Teena with diary for aua ssx, pvr, diary- she can write me after she sees him and I can schedule for interstim percutaneous at [facility].        La Nena James MD

## 2023-03-29 ENCOUNTER — OFFICE VISIT (OUTPATIENT)
Dept: UROLOGY | Facility: CLINIC | Age: 73
End: 2023-03-29
Payer: COMMERCIAL

## 2023-03-29 VITALS
HEIGHT: 70 IN | SYSTOLIC BLOOD PRESSURE: 126 MMHG | DIASTOLIC BLOOD PRESSURE: 77 MMHG | HEART RATE: 64 BPM | BODY MASS INDEX: 23.39 KG/M2

## 2023-03-29 DIAGNOSIS — N13.8 BPH WITH OBSTRUCTION/LOWER URINARY TRACT SYMPTOMS: Primary | ICD-10-CM

## 2023-03-29 DIAGNOSIS — N40.1 BPH WITH OBSTRUCTION/LOWER URINARY TRACT SYMPTOMS: Primary | ICD-10-CM

## 2023-03-29 DIAGNOSIS — N32.81 OAB (OVERACTIVE BLADDER): ICD-10-CM

## 2023-03-29 LAB — POC RESIDUAL URINE VOLUME: 0 ML (ref 0–100)

## 2023-03-29 PROCEDURE — 1126F PR PAIN SEVERITY QUANTIFIED, NO PAIN PRESENT: ICD-10-PCS | Mod: CPTII,S$GLB,, | Performed by: NURSE PRACTITIONER

## 2023-03-29 PROCEDURE — 1160F RVW MEDS BY RX/DR IN RCRD: CPT | Mod: CPTII,S$GLB,, | Performed by: NURSE PRACTITIONER

## 2023-03-29 PROCEDURE — 3008F BODY MASS INDEX DOCD: CPT | Mod: CPTII,S$GLB,, | Performed by: NURSE PRACTITIONER

## 2023-03-29 PROCEDURE — 1101F PT FALLS ASSESS-DOCD LE1/YR: CPT | Mod: CPTII,S$GLB,, | Performed by: NURSE PRACTITIONER

## 2023-03-29 PROCEDURE — 51798 POCT BLADDER SCAN: ICD-10-PCS | Mod: S$GLB,,, | Performed by: NURSE PRACTITIONER

## 2023-03-29 PROCEDURE — 3078F PR MOST RECENT DIASTOLIC BLOOD PRESSURE < 80 MM HG: ICD-10-PCS | Mod: CPTII,S$GLB,, | Performed by: NURSE PRACTITIONER

## 2023-03-29 PROCEDURE — 1159F PR MEDICATION LIST DOCUMENTED IN MEDICAL RECORD: ICD-10-PCS | Mod: CPTII,S$GLB,, | Performed by: NURSE PRACTITIONER

## 2023-03-29 PROCEDURE — 3288F FALL RISK ASSESSMENT DOCD: CPT | Mod: CPTII,S$GLB,, | Performed by: NURSE PRACTITIONER

## 2023-03-29 PROCEDURE — 3074F PR MOST RECENT SYSTOLIC BLOOD PRESSURE < 130 MM HG: ICD-10-PCS | Mod: CPTII,S$GLB,, | Performed by: NURSE PRACTITIONER

## 2023-03-29 PROCEDURE — 4010F PR ACE/ARB THEARPY RXD/TAKEN: ICD-10-PCS | Mod: CPTII,S$GLB,, | Performed by: NURSE PRACTITIONER

## 2023-03-29 PROCEDURE — 3288F PR FALLS RISK ASSESSMENT DOCUMENTED: ICD-10-PCS | Mod: CPTII,S$GLB,, | Performed by: NURSE PRACTITIONER

## 2023-03-29 PROCEDURE — 51798 US URINE CAPACITY MEASURE: CPT | Mod: S$GLB,,, | Performed by: NURSE PRACTITIONER

## 2023-03-29 PROCEDURE — 1126F AMNT PAIN NOTED NONE PRSNT: CPT | Mod: CPTII,S$GLB,, | Performed by: NURSE PRACTITIONER

## 2023-03-29 PROCEDURE — 3008F PR BODY MASS INDEX (BMI) DOCUMENTED: ICD-10-PCS | Mod: CPTII,S$GLB,, | Performed by: NURSE PRACTITIONER

## 2023-03-29 PROCEDURE — 99214 OFFICE O/P EST MOD 30 MIN: CPT | Mod: S$GLB,,, | Performed by: NURSE PRACTITIONER

## 2023-03-29 PROCEDURE — 1101F PR PT FALLS ASSESS DOC 0-1 FALLS W/OUT INJ PAST YR: ICD-10-PCS | Mod: CPTII,S$GLB,, | Performed by: NURSE PRACTITIONER

## 2023-03-29 PROCEDURE — 99999 PR PBB SHADOW E&M-EST. PATIENT-LVL IV: CPT | Mod: PBBFAC,,, | Performed by: NURSE PRACTITIONER

## 2023-03-29 PROCEDURE — 99214 PR OFFICE/OUTPT VISIT, EST, LEVL IV, 30-39 MIN: ICD-10-PCS | Mod: S$GLB,,, | Performed by: NURSE PRACTITIONER

## 2023-03-29 PROCEDURE — 3078F DIAST BP <80 MM HG: CPT | Mod: CPTII,S$GLB,, | Performed by: NURSE PRACTITIONER

## 2023-03-29 PROCEDURE — 1159F MED LIST DOCD IN RCRD: CPT | Mod: CPTII,S$GLB,, | Performed by: NURSE PRACTITIONER

## 2023-03-29 PROCEDURE — 1160F PR REVIEW ALL MEDS BY PRESCRIBER/CLIN PHARMACIST DOCUMENTED: ICD-10-PCS | Mod: CPTII,S$GLB,, | Performed by: NURSE PRACTITIONER

## 2023-03-29 PROCEDURE — 4010F ACE/ARB THERAPY RXD/TAKEN: CPT | Mod: CPTII,S$GLB,, | Performed by: NURSE PRACTITIONER

## 2023-03-29 PROCEDURE — 99999 PR PBB SHADOW E&M-EST. PATIENT-LVL IV: ICD-10-PCS | Mod: PBBFAC,,, | Performed by: NURSE PRACTITIONER

## 2023-03-29 PROCEDURE — 3074F SYST BP LT 130 MM HG: CPT | Mod: CPTII,S$GLB,, | Performed by: NURSE PRACTITIONER

## 2023-03-29 NOTE — PROGRESS NOTES
CHIEF COMPLAINT:    Mr. Paula is a 72 y.o. male presenting for f/u OAB  PRESENTING ILLNESS:    Onesimo Paula is a 72 y.o. male who presents for f/u OAB. Last clinic visit was 2/28/23 with Dr. James.    Seen by me (Dr. James) 9/30/19  Patient had MRI for right hip pain and was found have a thick-walled bladder with enlarged prostate.  He states that he saw urologist over 40 years ago for burning with urination.  He had a renal bladder ultrasound in 2016 which showed enlarged prostate and bilateral renal cyst done for renal insufficiency whom he sees  for.  Denies any gross hematuria.  Review of for previous urine show no microscopic hematuria.  Twenty-five pack-year history of smoking but quit in 1997.      He also has AUA symptom score 6 and occasional weak stream and urgency but voids every 3-5 hours when he is working and a little more frequent when he is not.  Denies any urge incontinence.  Okay stream with occasional weak stream.  PVR today 09/03/2019 is 15 cc.  He may have tried Flomax a couple years ago but does not recall it helping her changing any was symptoms.  Overall really not that bothered by his urinary symptoms.      Also states he has ED.  Previously tried sildenafil unsure dose and it helped but wife does not want him to take, exam revealed peyronie's     Plan: started tadalafil 5mg due to thick walled bladder although essentially asx. No further f/u fr b renal cysts.      Interval history 11/18/19:   Pt states today Cialis has NOT improved any of his lower urinary tract symptoms since starting the medication.  He currently takes this medication before his bedtime.  Pt still c/o urinary urgency, especially during the day.  Nocturia is 1-2x nightly and not bothersome at this time.  He is a , therefore the daytime urgency really bothers him.     Plan: started ditropan 10mg XL and cont'd tadalafil     Interval history by idalia 1/6/20:   TODAY, pt states the  oxybutynin only improved his LUTS by over 10% at this time.  PVR by bladder scan performed in office today:  11 mL     Rec'd: d/c oxybutynin, start myrbetriq, cont tadalafil 5mg daily.      Interval history 7/27/20:      Tried myrbetriq but didn't help. Having urgency, hesistancy and intermittent slow stream but urgency most bothersome and having some UUI with a few drop, but not that bothersome. Drinks 1 c of coffee in am. Soda during day. 1 c of coffee int he evening. Has never tried stopping these to see if they help. Drinks beer and notices increase in frequency following day.      On tadalafil 5mg daily for ED and bph.  pvr by scan: 32cc  Voiding every 30 minutes a day recently more pronounced at home (has been on vacation).  Takes hctz in am. When he's working he only voids about 2x-4x a day. Drinks caffeine while working including energy drinks. No nocturia.      psa 1/31/20 2.2 (up from 1.2 year prior). Repeat psa 0.85 7/29/20  WINSOME today: 40g + no nodules.     Uroflow 7/30/20: peak flow 12.8mL/s,  avg flow 5.4mL/s, voided vol: 191cc, pvr by scan:0      Cysto trus 8/17/20: normal bladder, no stricture. Membranous and distal prostatic urethra with papillary tissue suspicious for prostatitis.trus volume 42.6g. bc of his urgency on tadalafil, flomax, vesicare found rx cipro for 14d to see if it helped w urgency. D/c'd vesicare. cont'd flomax and tadalafil. rtc to see idalia in 2-3 weeks and me in 2 months to discuss bph procedures     Interval history by idalia 9/9/20:   Pt states he is still having the urinary urgency even since finishing the Cipro antibiotics at this time.  He denies gross hematuria, dysuria.  UA today shows normal findings.  PVR by bladder scan today:  9 mL     Plan: rec'd restarting vesicare     Interval history 10/1/20:  Taking flomax 0.4mg nightly and says flow is intermittent on this.   Also on the vesicare and says it doesn't help with the urgency. Voiding 2-3x a day and none at night.  No incontinence. 3-4x a week c/o urge incontinence if he tries to hold it too long, mostly in the am when he wakes up. Denies any difficulty with walking. No weakness or numbness.   Changed to decaf coffee 1x a day. Changed to sprite and caffeine free coke.   He does not want to take any more meds. He is not happy with the retrograde ejaculation.   Sees dr. leger for Mitral Valve leakage, last saw him a week ago, was told he should f/u in March 2021. Tadalafil helping with erections      Interval history 11/19/20:   Underwent rezum on 10/21/20. Font flomax, vesicare and tadafil daily for 3 more months.      Returned for fill and pull on 10/26/20. Filled with 210, voided 210  For 2 weeks postop would have blood when he had a BM, improving.      Today (1 month later) he states he has some increased urgency. UUI 2-3x a day (increased from 3-4x a week).  Still taking vesicare but ran out. freq q1 hour (worse in cold weather). Without cold weather freq q 3 hour.   ua today with small wbc and mod blood - some dysuria  No significant change in stream yet  pvr by scan today: 134  AUA ssx:(2 incomplete emptying, 3 frequency, 3 intermittency, 4 urgency, 2 weak stream, 2 straining, 0 sleeping). 16. QOL: 4     Interval history by me in clinic on 3/9/21:  Returns today and states states he ran out of flomax and vesicare a month ago. No increased frequency, urge incontinence or slower stream off the flomax. Still having UUI 1-2x a day with a few drops when he hears water. Voids 8x/24 hours and 4x/8 hours while at work. Nocturia occ (same). No longer drinking caffeine. Still on tadalafil 5mg daily.  No longer having retrograde ejaculation and constipation improved. Hctz in am.  Frequency mostly in  the pm. Bothers him but able to stop at bathrooms.   AUA ssx today:(1 incomplete emptying, 2 frequency, 1 intermittency, 3 urgency, 3 weak stream, 0 straining, 0 sleeping). 10. QOL: mixed  Bladder scan PVR today was 35mL.       I  reviewed his previous urinalysis and cultures as well as his urinalysis today. His urinalysis today shows no abnormalities.      I reviewed his previous PSAs and his most recent psa within the last year was 2.3, %free 20.87, last year was 2.2     Sometimes loses erection with tadalafil 5mg daily. Rare. Not more frequent. Still getting morning erections.      Interval history with idalia on 4/9/21:  UA today showed normal findings.  Pt states today even though he tried and changed his HCTZ med to evenings as discussed last ov, his LUTS have not improved.  Pt as of today's visit is still c/o urinary urgency and frequency during the daytime.  Has not improved since last ov.  Pt states today he has tried Vesicare and Oxybutynin in the past with no improvement in symptoms.  Pt currently denies gross hematuria and dysuria.  She put him on myrbetriq again        Interval history by me 6/15/21:  Uroflow results (date: 03/18/2021): Voiding time: 24.3s, Flow time: 23.1s, TTP flow: 8.0s, Peak flowrate: 19.2 mL/s, Average flowrate: 10.6mL/s, Intervals: 2, Voided volume: 244 mL, Pvr by bladder scan 33. Pattern of curve: see uploaded image, reviewed by      At last visit was c/o freq/urgency. Frequency 8-10x/day. Tried myrbetriq before rezum, ditropan, vesicare and changing hctz to pm but no improvement. Then in April started myrbetriq again , after about 4 weeks saw improvement, now down to 5-6x a day. avg urg: 3. No nocturia. No incontinence.  Also on tadalafil 5mg daily.   AUA ssx today:(1 incomplete emptying, 1 frequency, 1 intermittency, 2 urgency, 3 weak stream, 0 straining, 0 sleeping). 8. QOL:   Bladder scan PVR today was 0mL.       HPI/CC today 6/7/22:   Had him Continue myrbetriq 50mg daily, has enough until 4/2021, Continue tadalafil 5mg daily, refilled today for a year.  Says myrbetriq not helping anymore. He held it for a few days and noticed no difference. On cialis 5mg daily.   Does not have  constipation  Voids about 9-10x during waking hours. occ urge incontinence 3x a week. Few drops. Started drinking caffeine this week.   Nocturia 1-2x a night. Urine flow varies.    psa 22 1.3  ua today neg, pvr by scan: 5  AUA ssx:(2 incomplete emptying, 3 frequency, 2 intermittency, 4 urgency, 3 weak stream, 1 straining, 1 sleeping). 16. QOL: mixed  Appendix removed in January complicated by post-op ileus.     Interval history 22:  Continue myrbetriq 50mg daily, has enough until 2021  Continue tadalafil 5mg daily, refilled today for a year  After adding vesicare to myrbetriq - went from voiding 9-10x day, 1-2x a night with occ UUI 3x a week to 5-6x/day, 0 night and no UUI. Says he has dry mouth but about the same. However does have some heart burn that is new.   UA today: neg. pvr by scan: 115 (this is the highest it's been, feels empty).  AUA ssx:(1 incomplete emptying, 2 frequency, 0 intermittency, 2 urgency, 2 weak stream, 1 straining, 0 sleeping). 8. QOL: pleased     Interval history 23:  Says he is doing well on VESIcare and myrbetriq.  However it is expensive.  About 120 dollars every 3 months.  Also having dry mouth with it.  On tadalafil 5 mg daily for erections, obtaining from [facility].  This dose wish for him sometimes.  He tried Viagra 100 mg and it seemed to work.  Aua ssx: 12, pvr: 25    Interval history 3/29/23  Patient presents today for f/u OAB, BPH.   AUA SS:  Incomplete Emptyin  Frequency: 3  Intermittency: 2  Urgency: 4  Weak Stream: 2  Strainin  Sleepin  Total SS: 14   Quality of Life: 5  Patient discontinued myrbetriq and vesicare after last clinic visit. He is only taking tadalafil 5 mg daily.  Denies dysuria, gross hematuria, flank pain, fever, chills, nausea or vomiting.    PVR today 0 ml.    Patient also brought voiding diary with him. He reports his main issues is daytime frequency and urgency. He had 2 episodes of incontinence with large volume since last  visit and a few episodes of post void dribbling since last visit.    He is interested in proceeding with interstim lead placement.              PVR History:  3/29/23 0, aua ssx: 14  2/28/23            25, aua ssx: 12  8/23/22            115  6/15/21            pvr by scan: 0  3/18/21            UF: pF: 19.2, avg: 10.6, voided vol: 244, pvr: 33  3/2/21               pvr: 35  11/19/20          pvr by scan: 134 (couldn't start after he stopped)  10/26/20          Fill and pull: 0cc pvr  11/19/20          rezum  9/9/20              pvr by scan: 9cc  7/27/20            pvr by scan: 32cc  8/17/20            Cysto trus: 42.6g.   7/30/20           UF: peak flow 12.8mL/s,  avg flow 5.4mL/s, voided vol: 191cc, pvr by scan:0   1/6/20              pvr by scan: 11cc   9/3/19              pvr by scan: 15cc      PSA history: no family hx of prostate cancer  5/31/22            1.3  3/2/21              2.3, %free 20.87, pvr: 35 (psa screen 4.6)  11/19/20          rezum  7/29/20            0.85, %free 54.12  1/31/20            2.2   2/21/19            1.2  3/14/18            1.0     Urine history  6/7/22              neg  11/19/20          Small wbc/mod blood-send for ua patricio and culture pvr by scan: 134  10/14/20          Neg  9/16/20            neg  9/9/20              neg  3/31/20            Neg  1/6/20              Neg  11/18/19          neg  8/8/19              No cx, void: n/a 0 rbc  2/21/19            Ng, void: neg, 0 rbc  4/9/18              No cx, void: neg  9/2017             No blood     REVIEW OF SYSTEMS:    Review of Systems    Constitutional: Negative for fever and chills.   HENT: Negative for hearing loss.   Eyes: Negative for visual disturbance.   Respiratory: Negative for shortness of breath.   Cardiovascular: Negative for chest pain.   Gastrointestinal: Negative for nausea, vomiting.   Genitourinary:  See above  Neurological: Negative for dizziness.   Hematological: Does not bruise/bleed easily.    Psychiatric/Behavioral: Negative for confusion.       PATIENT HISTORY:    Past Medical History:   Diagnosis Date    Acid reflux     Acquired tricuspid valve insufficiency     Anemia     Borderline diabetes     BPH (benign prostatic hyperplasia)     Cataract     20 rt eye, 20- Lt eye    Coronary artery disease     Hypertension     Liver cyst 2022    Pulmonary nodules     Skin cancer        Past Surgical History:   Procedure Laterality Date    CYSTOSCOPY WITH TRANSURETHRAL DESTRUCTION OF PROSTATE,RFA N/A 10/21/2020    Procedure: CYSTOSCOPY WITH TRANSURETHRAL DESTRUCTION OF PROSTATE,RFA;  Surgeon: La Nena James MD;  Location: [facility] OR;  Service: Urology;  Laterality: N/A;  please make sure [facility] rep available [phone]    EYE SURGERY      for cataracts, rt eye 20, left eye 20    HERNIA REPAIR  1999    HERNIA REPAIR  2004    KNEE ARTHROSCOPY Left 2018    LAPAROSCOPIC APPENDECTOMY N/A 2022    Procedure: APPENDECTOMY, LAPAROSCOPIC;  Surgeon: Sam Goss MD;  Location: [facility] OR;  Service: General;  Laterality: N/A;    SKIN LESION EXCISION  10/2009    Neck    SKIN LESION EXCISION  2015    STAPEDECTOMY Right 2001    TRANSRECTAL ULTRASOUND EXAMINATION N/A 2020    Procedure: TRANSRECTAL ULTRASOUND;  Surgeon: La Nena James MD;  Location: [facility] OR;  Service: Urology;  Laterality: N/A;       Family History   Problem Relation Age of Onset    Cancer Mother         skin    Stroke Mother     Heart failure Father     Kidney disease Father        Social History     Socioeconomic History    Marital status:    Tobacco Use    Smoking status: Former     Types: Cigarettes     Quit date:      Years since quittin.2    Smokeless tobacco: Never   Substance and Sexual Activity    Alcohol use: Yes     Comment: occasional    Drug use: No    Sexual activity: Yes     Partners: Female     Social Determinants of Health     Financial Resource Strain: Low  Risk     Difficulty of Paying Living Expenses: Not hard at all   Food Insecurity: No Food Insecurity    Worried About Running Out of Food in the Last Year: Never true    Ran Out of Food in the Last Year: Never true   Transportation Needs: No Transportation Needs    Lack of Transportation (Medical): No    Lack of Transportation (Non-Medical): No   Physical Activity: Inactive    Days of Exercise per Week: 0 days    Minutes of Exercise per Session: 0 min   Stress: No Stress Concern Present    Feeling of Stress : Only a little   Social Connections: Unknown    Frequency of Communication with Friends and Family: More than three times a week    Frequency of Social Gatherings with Friends and Family: Twice a week    Active Member of Clubs or Organizations: Yes    Attends Club or Organization Meetings: Never    Marital Status:    Housing Stability: Low Risk     Unable to Pay for Housing in the Last Year: No    Number of Places Lived in the Last Year: 1    Unstable Housing in the Last Year: No       Allergies:  Patient has no known allergies.    Medications:    Current Outpatient Medications:     amLODIPine (NORVASC) 10 MG tablet, Take 1 tablet (10 mg total) by mouth once daily., Disp: 90 tablet, Rfl: 1    aspirin (ECOTRIN) 81 MG EC tablet, Take 81 mg by mouth once daily., Disp: , Rfl:     co-enzyme Q-10 30 mg capsule, Take 30 mg by mouth 3 (three) times daily., Disp: , Rfl:     hydroCHLOROthiazide (HYDRODIURIL) 25 MG tablet, Take 1 tablet (25 mg total) by mouth once daily., Disp: 90 tablet, Rfl: 1    multivit with minerals/lutein (MULTIVITAMIN 50 PLUS ORAL), Take 1 tablet by mouth once daily., Disp: , Rfl:     MYRBETRIQ 50 mg Tb24, TAKE 1 TABLET ONCE DAILY, Disp: 90 tablet, Rfl: 3    omega 3-dha-epa-fish oil (FISH OIL) 100-160-1,000 mg Cap, Take 1 capsule by mouth once daily., Disp: , Rfl:     pantoprazole (PROTONIX) 40 MG tablet, Take 1 tablet (40 mg total) by mouth once daily., Disp: 90 tablet, Rfl: 1    ranolazine  (RANEXA) 500 MG Tb12, Take 1 tablet (500 mg total) by mouth 2 (two) times daily., Disp: 60 tablet, Rfl: 11    rosuvastatin (CRESTOR) 10 MG tablet, Take 1 tablet (10 mg total) by mouth once daily., Disp: 90 tablet, Rfl: 1    solifenacin (VESICARE) 10 MG tablet, Take 1 tablet (10 mg total) by mouth once daily., Disp: 90 tablet, Rfl: 3    sucralfate (CARAFATE) 1 gram tablet, Take 1 g by mouth 2 (two) times daily., Disp: , Rfl:     tadalafiL (CIALIS) 5 MG tablet, Take 1 tablet (5 mg total) by mouth once daily., Disp: 90 tablet, Rfl: 3    telmisartan (MICARDIS) 80 MG Tab, Take 1 tablet (80 mg total) by mouth once daily., Disp: 90 tablet, Rfl: 1    PHYSICAL EXAMINATION:    Constitutional: He is oriented to person, place, and time. He appears well-developed and well-nourished.  He is in no apparent distress.    Neck: Normal ROM.     Cardiovascular: Normal rate.      Pulmonary/Chest: Effort normal. No respiratory distress.     Abdominal:  He exhibits no distension.  There is no CVA tenderness.     Neurological: He is alert and oriented to person, place, and time.     Skin: Skin is warm and dry.     Psych: Cooperative with normal affect.      Physical Exam      LABS:    Lab Results   Component Value Date    PSA 1.3 05/31/2022    PSA 4.6 (H) 02/04/2021    PSA 2.2 01/31/2020    PSATOTAL 2.3 03/02/2021    PSATOTAL 0.85 07/29/2020    PSAFREE 0.48 03/02/2021    PSAFREE 0.46 07/29/2020    PSAFREEPCT 20.87 03/02/2021    PSAFREEPCT 54.12 07/29/2020     Lab Results   Component Value Date    CREATININE 1.3 11/30/2022         IMPRESSION:    Encounter Diagnoses   Name Primary?    BPH with obstruction/lower urinary tract symptoms Yes    OAB (overactive bladder)          PLAN:  -Voiding diary uploaded to media and placed in chart  -PVR 0 ml  -Dr. James to plan for interstim lead placement.    I encouraged him or any of his family members to call or email me with questions and/or concerns.      30 minutes of total time spent on the  encounter, which includes face to face time and non-face to face time preparing to see the patient (eg, review of tests), Obtaining and/or reviewing separately obtained history, Documenting clinical information in the electronic or other health record, Independently interpreting results (not separately reported) and communicating results to the patient/family/caregiver, or Care coordination (not separately reported).

## 2023-04-03 ENCOUNTER — PATIENT MESSAGE (OUTPATIENT)
Dept: FAMILY MEDICINE | Facility: CLINIC | Age: 73
End: 2023-04-03

## 2023-04-05 ENCOUNTER — PATIENT MESSAGE (OUTPATIENT)
Dept: UROLOGY | Facility: CLINIC | Age: 73
End: 2023-04-05
Payer: COMMERCIAL

## 2023-04-05 ENCOUNTER — LAB VISIT (OUTPATIENT)
Dept: LAB | Facility: HOSPITAL | Age: 73
End: 2023-04-05
Attending: NURSE PRACTITIONER
Payer: COMMERCIAL

## 2023-04-05 DIAGNOSIS — Z12.5 PROSTATE CANCER SCREENING: ICD-10-CM

## 2023-04-05 DIAGNOSIS — E78.01 FAMILIAL HYPERCHOLESTEROLEMIA: ICD-10-CM

## 2023-04-05 DIAGNOSIS — I10 ESSENTIAL HYPERTENSION: ICD-10-CM

## 2023-04-05 DIAGNOSIS — R73.03 PREDIABETES: ICD-10-CM

## 2023-04-05 LAB
ALBUMIN SERPL BCP-MCNC: 4.2 G/DL (ref 3.5–5.2)
ALP SERPL-CCNC: 54 U/L (ref 55–135)
ALT SERPL W/O P-5'-P-CCNC: 37 U/L (ref 10–44)
ANION GAP SERPL CALC-SCNC: 8 MMOL/L (ref 8–16)
AST SERPL-CCNC: 30 U/L (ref 10–40)
BILIRUB SERPL-MCNC: 0.6 MG/DL (ref 0.1–1)
BUN SERPL-MCNC: 34 MG/DL (ref 8–23)
CALCIUM SERPL-MCNC: 9.2 MG/DL (ref 8.7–10.5)
CHLORIDE SERPL-SCNC: 105 MMOL/L (ref 95–110)
CHOLEST SERPL-MCNC: 160 MG/DL (ref 120–199)
CHOLEST/HDLC SERPL: 3.3 {RATIO} (ref 2–5)
CO2 SERPL-SCNC: 27 MMOL/L (ref 23–29)
COMPLEXED PSA SERPL-MCNC: 1.5 NG/ML (ref 0–4)
CREAT SERPL-MCNC: 1.2 MG/DL (ref 0.5–1.4)
EST. GFR  (NO RACE VARIABLE): >60 ML/MIN/1.73 M^2
ESTIMATED AVG GLUCOSE: 131 MG/DL (ref 68–131)
GLUCOSE SERPL-MCNC: 109 MG/DL (ref 70–110)
HBA1C MFR BLD: 6.2 % (ref 4.5–6.2)
HDLC SERPL-MCNC: 48 MG/DL (ref 40–75)
HDLC SERPL: 30 % (ref 20–50)
LDLC SERPL CALC-MCNC: 99.8 MG/DL (ref 63–159)
NONHDLC SERPL-MCNC: 112 MG/DL
POTASSIUM SERPL-SCNC: 3.5 MMOL/L (ref 3.5–5.1)
PROT SERPL-MCNC: 7.3 G/DL (ref 6–8.4)
SODIUM SERPL-SCNC: 140 MMOL/L (ref 136–145)
TRIGL SERPL-MCNC: 61 MG/DL (ref 30–150)

## 2023-04-05 PROCEDURE — 84153 ASSAY OF PSA TOTAL: CPT | Performed by: NURSE PRACTITIONER

## 2023-04-05 PROCEDURE — 36415 COLL VENOUS BLD VENIPUNCTURE: CPT | Performed by: NURSE PRACTITIONER

## 2023-04-05 PROCEDURE — 80053 COMPREHEN METABOLIC PANEL: CPT | Performed by: NURSE PRACTITIONER

## 2023-04-05 PROCEDURE — 80061 LIPID PANEL: CPT | Performed by: NURSE PRACTITIONER

## 2023-04-05 PROCEDURE — 83036 HEMOGLOBIN GLYCOSYLATED A1C: CPT | Performed by: NURSE PRACTITIONER

## 2023-04-12 ENCOUNTER — OFFICE VISIT (OUTPATIENT)
Dept: FAMILY MEDICINE | Facility: CLINIC | Age: 73
End: 2023-04-12
Payer: COMMERCIAL

## 2023-04-12 ENCOUNTER — HOSPITAL ENCOUNTER (OUTPATIENT)
Dept: RADIOLOGY | Facility: HOSPITAL | Age: 73
Discharge: HOME | End: 2023-04-12
Attending: NURSE PRACTITIONER
Payer: COMMERCIAL

## 2023-04-12 VITALS
DIASTOLIC BLOOD PRESSURE: 72 MMHG | OXYGEN SATURATION: 97 % | SYSTOLIC BLOOD PRESSURE: 126 MMHG | WEIGHT: 165.88 LBS | HEART RATE: 55 BPM | HEIGHT: 70 IN | BODY MASS INDEX: 23.75 KG/M2

## 2023-04-12 DIAGNOSIS — M79.622 LEFT UPPER ARM PAIN: ICD-10-CM

## 2023-04-12 DIAGNOSIS — E78.01 FAMILIAL HYPERCHOLESTEROLEMIA: ICD-10-CM

## 2023-04-12 DIAGNOSIS — I10 ESSENTIAL HYPERTENSION: Primary | ICD-10-CM

## 2023-04-12 DIAGNOSIS — K21.9 GASTROESOPHAGEAL REFLUX DISEASE WITHOUT ESOPHAGITIS: ICD-10-CM

## 2023-04-12 PROCEDURE — 3074F PR MOST RECENT SYSTOLIC BLOOD PRESSURE < 130 MM HG: ICD-10-PCS | Mod: CPTII,S$GLB,, | Performed by: NURSE PRACTITIONER

## 2023-04-12 PROCEDURE — 1160F PR REVIEW ALL MEDS BY PRESCRIBER/CLIN PHARMACIST DOCUMENTED: ICD-10-PCS | Mod: CPTII,S$GLB,, | Performed by: NURSE PRACTITIONER

## 2023-04-12 PROCEDURE — 99214 OFFICE O/P EST MOD 30 MIN: CPT | Mod: S$GLB,,, | Performed by: NURSE PRACTITIONER

## 2023-04-12 PROCEDURE — 3044F PR MOST RECENT HEMOGLOBIN A1C LEVEL <7.0%: ICD-10-PCS | Mod: CPTII,S$GLB,, | Performed by: NURSE PRACTITIONER

## 2023-04-12 PROCEDURE — 1101F PT FALLS ASSESS-DOCD LE1/YR: CPT | Mod: CPTII,S$GLB,, | Performed by: NURSE PRACTITIONER

## 2023-04-12 PROCEDURE — 1159F MED LIST DOCD IN RCRD: CPT | Mod: CPTII,S$GLB,, | Performed by: NURSE PRACTITIONER

## 2023-04-12 PROCEDURE — 3288F FALL RISK ASSESSMENT DOCD: CPT | Mod: CPTII,S$GLB,, | Performed by: NURSE PRACTITIONER

## 2023-04-12 PROCEDURE — 3078F PR MOST RECENT DIASTOLIC BLOOD PRESSURE < 80 MM HG: ICD-10-PCS | Mod: CPTII,S$GLB,, | Performed by: NURSE PRACTITIONER

## 2023-04-12 PROCEDURE — 1159F PR MEDICATION LIST DOCUMENTED IN MEDICAL RECORD: ICD-10-PCS | Mod: CPTII,S$GLB,, | Performed by: NURSE PRACTITIONER

## 2023-04-12 PROCEDURE — 73030 X-RAY EXAM OF SHOULDER: CPT | Mod: TC,LT

## 2023-04-12 PROCEDURE — 1160F RVW MEDS BY RX/DR IN RCRD: CPT | Mod: CPTII,S$GLB,, | Performed by: NURSE PRACTITIONER

## 2023-04-12 PROCEDURE — 3078F DIAST BP <80 MM HG: CPT | Mod: CPTII,S$GLB,, | Performed by: NURSE PRACTITIONER

## 2023-04-12 PROCEDURE — 1125F AMNT PAIN NOTED PAIN PRSNT: CPT | Mod: CPTII,S$GLB,, | Performed by: NURSE PRACTITIONER

## 2023-04-12 PROCEDURE — 99214 PR OFFICE/OUTPT VISIT, EST, LEVL IV, 30-39 MIN: ICD-10-PCS | Mod: S$GLB,,, | Performed by: NURSE PRACTITIONER

## 2023-04-12 PROCEDURE — 1101F PR PT FALLS ASSESS DOC 0-1 FALLS W/OUT INJ PAST YR: ICD-10-PCS | Mod: CPTII,S$GLB,, | Performed by: NURSE PRACTITIONER

## 2023-04-12 PROCEDURE — 3074F SYST BP LT 130 MM HG: CPT | Mod: CPTII,S$GLB,, | Performed by: NURSE PRACTITIONER

## 2023-04-12 PROCEDURE — 3044F HG A1C LEVEL LT 7.0%: CPT | Mod: CPTII,S$GLB,, | Performed by: NURSE PRACTITIONER

## 2023-04-12 PROCEDURE — 3288F PR FALLS RISK ASSESSMENT DOCUMENTED: ICD-10-PCS | Mod: CPTII,S$GLB,, | Performed by: NURSE PRACTITIONER

## 2023-04-12 PROCEDURE — 3008F BODY MASS INDEX DOCD: CPT | Mod: CPTII,S$GLB,, | Performed by: NURSE PRACTITIONER

## 2023-04-12 PROCEDURE — 3008F PR BODY MASS INDEX (BMI) DOCUMENTED: ICD-10-PCS | Mod: CPTII,S$GLB,, | Performed by: NURSE PRACTITIONER

## 2023-04-12 PROCEDURE — 4010F ACE/ARB THERAPY RXD/TAKEN: CPT | Mod: CPTII,S$GLB,, | Performed by: NURSE PRACTITIONER

## 2023-04-12 PROCEDURE — 4010F PR ACE/ARB THEARPY RXD/TAKEN: ICD-10-PCS | Mod: CPTII,S$GLB,, | Performed by: NURSE PRACTITIONER

## 2023-04-12 PROCEDURE — 1125F PR PAIN SEVERITY QUANTIFIED, PAIN PRESENT: ICD-10-PCS | Mod: CPTII,S$GLB,, | Performed by: NURSE PRACTITIONER

## 2023-04-12 PROCEDURE — 73060 X-RAY EXAM OF HUMERUS: CPT | Mod: TC,LT

## 2023-04-12 RX ORDER — DICLOFENAC SODIUM 10 MG/G
2 GEL TOPICAL 4 TIMES DAILY PRN
Qty: 200 G | Refills: 0 | Status: ON HOLD | OUTPATIENT
Start: 2023-04-12 | End: 2024-03-13 | Stop reason: CLARIF

## 2023-04-12 RX ORDER — HYDROCHLOROTHIAZIDE 25 MG/1
25 TABLET ORAL DAILY
Qty: 90 TABLET | Refills: 1 | Status: SHIPPED | OUTPATIENT
Start: 2023-04-12 | End: 2023-08-24 | Stop reason: SDUPTHER

## 2023-04-12 RX ORDER — TELMISARTAN 80 MG/1
80 TABLET ORAL DAILY
Qty: 90 TABLET | Refills: 1 | Status: SHIPPED | OUTPATIENT
Start: 2023-04-12 | End: 2024-02-05 | Stop reason: SDUPTHER

## 2023-04-12 RX ORDER — AMLODIPINE BESYLATE 10 MG/1
10 TABLET ORAL DAILY
Qty: 90 TABLET | Refills: 1 | Status: SHIPPED | OUTPATIENT
Start: 2023-04-12 | End: 2023-08-24 | Stop reason: SDUPTHER

## 2023-04-12 RX ORDER — PANTOPRAZOLE SODIUM 40 MG/1
40 TABLET, DELAYED RELEASE ORAL DAILY
Qty: 90 TABLET | Refills: 1 | Status: SHIPPED | OUTPATIENT
Start: 2023-04-12 | End: 2023-10-26

## 2023-04-12 RX ORDER — ROSUVASTATIN CALCIUM 10 MG/1
10 TABLET, COATED ORAL DAILY
Qty: 90 TABLET | Refills: 1 | Status: SHIPPED | OUTPATIENT
Start: 2023-04-12 | End: 2023-12-13

## 2023-04-13 ENCOUNTER — PATIENT MESSAGE (OUTPATIENT)
Dept: FAMILY MEDICINE | Facility: CLINIC | Age: 73
End: 2023-04-13

## 2023-04-13 PROBLEM — E44.0 MALNUTRITION OF MODERATE DEGREE: Status: RESOLVED | Noted: 2022-01-11 | Resolved: 2023-04-13

## 2023-04-13 NOTE — PROGRESS NOTES
SUBJECTIVE:      Patient ID: Onesimo Paula is a 72 y.o. male.    Chief Complaint: Hypertension (6 month f/u HTN)      Presents for HTN recheck & lab review - BUN 34, fasting glucose 109, A1c 6.2    Discussed outstanding health maintenance     Hypertension  This is a chronic problem. The current episode started more than 1 year ago. The problem has been resolved since onset. The problem is controlled. Associated symptoms include headaches and peripheral edema. Pertinent negatives include no anxiety, blurred vision, chest pain, malaise/fatigue, neck pain, orthopnea, palpitations, PND, shortness of breath or sweats. There are no associated agents to hypertension. Risk factors for coronary artery disease include dyslipidemia, male gender, sedentary lifestyle, family history and stress. Past treatments include diuretics, angiotensin blockers and calcium channel blockers. The current treatment provides moderate improvement. Compliance problems include exercise and diet.  Hypertensive end-organ damage includes kidney disease and CAD/MI. Identifiable causes of hypertension include chronic renal disease and renovascular disease.   Hyperlipidemia  This is a chronic problem. The current episode started more than 1 year ago. The problem is controlled. Recent lipid tests were reviewed and are normal. Exacerbating diseases include chronic renal disease. Factors aggravating his hyperlipidemia include thiazides. Pertinent negatives include no chest pain, myalgias or shortness of breath. Current antihyperlipidemic treatment includes statins (fish oil). The current treatment provides moderate improvement of lipids. Compliance problems include adherence to exercise.  Risk factors for coronary artery disease include dyslipidemia, male sex, hypertension, a sedentary lifestyle, family history and stress.   Gastroesophageal Reflux  He complains of heartburn. He reports no abdominal pain, no chest pain, no coughing, no nausea, no  sore throat or no wheezing. This is a chronic problem. The current episode started more than 1 month ago. The problem occurs occasionally. The problem has been gradually improving. The heartburn is located in the substernum. The heartburn is of mild intensity. The heartburn does not wake him from sleep. The heartburn does not limit his activity. The symptoms are aggravated by certain foods. Pertinent negatives include no fatigue. Risk factors include lack of exercise. He has tried a PPI for the symptoms. The treatment provided moderate relief. Past procedures include an EGD.   Arm Pain   The incident occurred more than 1 week ago. The incident occurred at home. There was no injury mechanism. The pain is present in the upper left arm. The quality of the pain is described as aching and burning. The pain does not radiate. The pain is at a severity of 4/10. The pain is moderate. The pain has been Worsening since the incident. Pertinent negatives include no chest pain. The symptoms are aggravated by movement and palpation (lying on that side at night). He has tried rest (BioFreeze) for the symptoms. The treatment provided mild relief.     Past Surgical History:   Procedure Laterality Date    CYSTOSCOPY WITH TRANSURETHRAL DESTRUCTION OF PROSTATE,RFA N/A 10/21/2020    Procedure: CYSTOSCOPY WITH TRANSURETHRAL DESTRUCTION OF PROSTATE,RFA;  Surgeon: La Nena James MD;  Location: [facility] OR;  Service: Urology;  Laterality: N/A;  please make sure [facility] rep available [phone]    EYE SURGERY      for cataracts, rt eye 1/16/20, left eye 1/30/20    HERNIA REPAIR  12/1999    HERNIA REPAIR  05/2004    KNEE ARTHROSCOPY Left 04/12/2018    LAPAROSCOPIC APPENDECTOMY N/A 1/2/2022    Procedure: APPENDECTOMY, LAPAROSCOPIC;  Surgeon: Sam Goss MD;  Location: [facility] OR;  Service: General;  Laterality: N/A;    SKIN LESION EXCISION  10/2009    Neck    SKIN LESION EXCISION  2015    STAPEDECTOMY Right 03/2001    TRANSRECTAL  ULTRASOUND EXAMINATION N/A 2020    Procedure: TRANSRECTAL ULTRASOUND;  Surgeon: La Nena James MD;  Location: [facility] OR;  Service: Urology;  Laterality: N/A;     Family History   Problem Relation Age of Onset    Cancer Mother         skin    Stroke Mother     Heart failure Father     Kidney disease Father       Social History     Socioeconomic History    Marital status:    Tobacco Use    Smoking status: Former     Types: Cigarettes     Quit date:      Years since quittin.2    Smokeless tobacco: Never   Substance and Sexual Activity    Alcohol use: Yes     Comment: occasional    Drug use: No    Sexual activity: Yes     Partners: Female     Social Determinants of Health     Financial Resource Strain: Low Risk     Difficulty of Paying Living Expenses: Not hard at all   Food Insecurity: No Food Insecurity    Worried About Running Out of Food in the Last Year: Never true    Ran Out of Food in the Last Year: Never true   Transportation Needs: No Transportation Needs    Lack of Transportation (Medical): No    Lack of Transportation (Non-Medical): No   Physical Activity: Inactive    Days of Exercise per Week: 0 days    Minutes of Exercise per Session: 0 min   Stress: No Stress Concern Present    Feeling of Stress : Not at all   Social Connections: Unknown    Frequency of Communication with Friends and Family: Three times a week    Frequency of Social Gatherings with Friends and Family: Once a week    Active Member of Clubs or Organizations: Yes    Attends Club or Organization Meetings: Never    Marital Status:    Housing Stability: Low Risk     Unable to Pay for Housing in the Last Year: No    Number of Places Lived in the Last Year: 1    Unstable Housing in the Last Year: No     Current Outpatient Medications   Medication Sig Dispense Refill    aspirin (ECOTRIN) 81 MG EC tablet Take 81 mg by mouth once daily.      co-enzyme Q-10 30 mg capsule Take 30 mg by mouth 3 (three) times daily.       multivit with minerals/lutein (MULTIVITAMIN 50 PLUS ORAL) Take 1 tablet by mouth once daily.      MYRBETRIQ 50 mg Tb24 TAKE 1 TABLET ONCE DAILY 90 tablet 3    omega 3-dha-epa-fish oil (FISH OIL) 100-160-1,000 mg Cap Take 1 capsule by mouth once daily.      ranolazine (RANEXA) 500 MG Tb12 Take 1 tablet (500 mg total) by mouth 2 (two) times daily. 60 tablet 11    solifenacin (VESICARE) 10 MG tablet Take 1 tablet (10 mg total) by mouth once daily. 90 tablet 3    sucralfate (CARAFATE) 1 gram tablet Take 1 g by mouth 2 (two) times daily.      tadalafiL (CIALIS) 5 MG tablet Take 1 tablet (5 mg total) by mouth once daily. 90 tablet 3    amLODIPine (NORVASC) 10 MG tablet Take 1 tablet (10 mg total) by mouth once daily. 90 tablet 1    diclofenac sodium (VOLTAREN) 1 % Gel Apply 2 g topically 4 (four) times daily as needed (left shoulder/arm pain). 200 g 0    hydroCHLOROthiazide (HYDRODIURIL) 25 MG tablet Take 1 tablet (25 mg total) by mouth once daily. 90 tablet 1    pantoprazole (PROTONIX) 40 MG tablet Take 1 tablet (40 mg total) by mouth once daily. 90 tablet 1    rosuvastatin (CRESTOR) 10 MG tablet Take 1 tablet (10 mg total) by mouth once daily. 90 tablet 1    telmisartan (MICARDIS) 80 MG Tab Take 1 tablet (80 mg total) by mouth once daily. 90 tablet 1     No current facility-administered medications for this visit.     Review of patient's allergies indicates:  No Known Allergies   Past Medical History:   Diagnosis Date    Acid reflux     Acquired tricuspid valve insufficiency     Anemia     Borderline diabetes     BPH (benign prostatic hyperplasia)     Cataract     1/16/20 rt eye, 1/30/20- Lt eye    Coronary artery disease     Hypertension     Liver cyst 8/24/2022    Pulmonary nodules     Skin cancer      Past Surgical History:   Procedure Laterality Date    CYSTOSCOPY WITH TRANSURETHRAL DESTRUCTION OF PROSTATE,RFA N/A 10/21/2020    Procedure: CYSTOSCOPY WITH TRANSURETHRAL DESTRUCTION OF PROSTATE,RFA;  Surgeon:  La Nena James MD;  Location: [facility] OR;  Service: Urology;  Laterality: N/A;  please make sure [facility] rep available [phone]    EYE SURGERY      for cataracts, rt eye 1/16/20, left eye 1/30/20    HERNIA REPAIR  12/1999    HERNIA REPAIR  05/2004    KNEE ARTHROSCOPY Left 04/12/2018    LAPAROSCOPIC APPENDECTOMY N/A 1/2/2022    Procedure: APPENDECTOMY, LAPAROSCOPIC;  Surgeon: Sam Goss MD;  Location: [facility] OR;  Service: General;  Laterality: N/A;    SKIN LESION EXCISION  10/2009    Neck    SKIN LESION EXCISION  2015    STAPEDECTOMY Right 03/2001    TRANSRECTAL ULTRASOUND EXAMINATION N/A 8/17/2020    Procedure: TRANSRECTAL ULTRASOUND;  Surgeon: La Nena James MD;  Location: [facility] OR;  Service: Urology;  Laterality: N/A;       Review of Systems   Constitutional:  Negative for activity change, appetite change, fatigue, fever, malaise/fatigue and unexpected weight change.   HENT:  Negative for congestion, ear pain, hearing loss, postnasal drip, rhinorrhea, sinus pressure, sinus pain, sneezing, sore throat and trouble swallowing.    Eyes:  Negative for blurred vision, photophobia, pain, discharge and visual disturbance.   Respiratory:  Negative for cough, chest tightness, shortness of breath and wheezing.    Cardiovascular:  Negative for chest pain, palpitations, orthopnea, leg swelling and PND.   Gastrointestinal:  Positive for heartburn. Negative for abdominal distention, abdominal pain, blood in stool, constipation, diarrhea, nausea and vomiting.   Endocrine: Negative for cold intolerance, heat intolerance, polydipsia and polyuria.   Genitourinary:  Negative for difficulty urinating, dysuria, flank pain, frequency, hematuria and urgency.        Following with uro & nephro   Musculoskeletal:  Positive for arthralgias. Negative for back pain, joint swelling, myalgias and neck pain.   Skin:  Negative for pallor and rash.   Allergic/Immunologic: Negative for environmental allergies and food  "allergies.   Neurological:  Positive for headaches. Negative for dizziness, weakness and light-headedness.   Hematological:  Does not bruise/bleed easily.   Psychiatric/Behavioral:  Negative for confusion, decreased concentration, dysphoric mood and sleep disturbance. The patient is not nervous/anxious.     OBJECTIVE:      Vitals:    04/12/23 1055   BP: 126/72   BP Location: Right arm   Patient Position: Sitting   BP Method: Large (Manual)   Pulse: (!) 55   SpO2: 97%   Weight: 75.3 kg (165 lb 14.4 oz)   Height: 5' 10" (1.778 m)     Physical Exam  Vitals and nursing note reviewed.   Constitutional:       General: He is not in acute distress.     Appearance: Normal appearance. He is well-developed and normal weight.   HENT:      Head: Normocephalic and atraumatic.      Right Ear: Hearing normal.      Left Ear: Hearing normal.      Nose: Nose normal. No rhinorrhea.   Eyes:      General: Lids are normal.         Right eye: No discharge.         Left eye: No discharge.      Conjunctiva/sclera: Conjunctivae normal.      Right eye: Right conjunctiva is not injected.      Left eye: Left conjunctiva is not injected.      Pupils: Pupils are equal, round, and reactive to light. Pupils are equal.      Right eye: Pupil is round and reactive.      Left eye: Pupil is round and reactive.   Neck:      Thyroid: No thyromegaly.      Vascular: No JVD.      Trachea: Trachea normal. No tracheal deviation.   Cardiovascular:      Rate and Rhythm: Regular rhythm. Bradycardia present.      Pulses:           Radial pulses are 2+ on the right side and 2+ on the left side.      Heart sounds: Normal heart sounds. No murmur heard.    No friction rub. No gallop.   Pulmonary:      Effort: Pulmonary effort is normal. No respiratory distress.      Breath sounds: Normal breath sounds. No stridor. No decreased breath sounds, wheezing, rhonchi or rales.   Abdominal:      General: Bowel sounds are normal. There is no distension.      Palpations: Abdomen " is soft. Abdomen is not rigid.      Tenderness: There is no abdominal tenderness. There is no guarding.   Musculoskeletal:         General: No swelling. Normal range of motion.      Right upper arm: Tenderness (bottom of medial deltoid/top-middle of bicep) present.      Cervical back: Normal range of motion and neck supple.   Lymphadenopathy:      Cervical: No cervical adenopathy.   Skin:     General: Skin is warm and dry.      Capillary Refill: Capillary refill takes less than 2 seconds.      Coloration: Skin is not pale.      Findings: No lesion or rash.   Neurological:      Mental Status: He is alert and oriented to person, place, and time.      Motor: No atrophy.      Coordination: Coordination normal.      Gait: Gait normal.   Psychiatric:         Attention and Perception: He is attentive.         Speech: Speech normal.         Behavior: Behavior normal.         Thought Content: Thought content normal.         Judgment: Judgment normal.      Assessment:       1. Essential hypertension    2. Gastroesophageal reflux disease without esophagitis    3. Familial hypercholesterolemia    4. Left upper arm pain        Plan:       Essential hypertension  -     amLODIPine (NORVASC) 10 MG tablet; Take 1 tablet (10 mg total) by mouth once daily.  Dispense: 90 tablet; Refill: 1  -     hydroCHLOROthiazide (HYDRODIURIL) 25 MG tablet; Take 1 tablet (25 mg total) by mouth once daily.  Dispense: 90 tablet; Refill: 1  -     telmisartan (MICARDIS) 80 MG Tab; Take 1 tablet (80 mg total) by mouth once daily.  Dispense: 90 tablet; Refill: 1    Gastroesophageal reflux disease without esophagitis  -     pantoprazole (PROTONIX) 40 MG tablet; Take 1 tablet (40 mg total) by mouth once daily.  Dispense: 90 tablet; Refill: 1    Familial hypercholesterolemia  -     rosuvastatin (CRESTOR) 10 MG tablet; Take 1 tablet (10 mg total) by mouth once daily.  Dispense: 90 tablet; Refill: 1    Left upper arm pain  -     diclofenac sodium (VOLTAREN) 1 %  Gel; Apply 2 g topically 4 (four) times daily as needed (left shoulder/arm pain).  Dispense: 200 g; Refill: 0  -     X-Ray Shoulder 2 or More Views Left; Future; Expected date: 04/12/2023  -     X-Ray Humerus 2 View Left; Future; Expected date: 04/12/2023            Follow up in about 6 months (around 10/12/2023) for HTN.      4/13/2023 JAMES Rose, FNP-C

## 2023-04-20 ENCOUNTER — PATIENT MESSAGE (OUTPATIENT)
Dept: FAMILY MEDICINE | Facility: CLINIC | Age: 73
End: 2023-04-20

## 2023-04-21 ENCOUNTER — TELEPHONE (OUTPATIENT)
Dept: UROLOGY | Facility: CLINIC | Age: 73
End: 2023-04-21

## 2023-04-21 ENCOUNTER — OFFICE VISIT (OUTPATIENT)
Dept: UROLOGY | Facility: CLINIC | Age: 73
End: 2023-04-21
Payer: COMMERCIAL

## 2023-04-21 VITALS
SYSTOLIC BLOOD PRESSURE: 131 MMHG | BODY MASS INDEX: 23.77 KG/M2 | HEART RATE: 58 BPM | WEIGHT: 166 LBS | HEIGHT: 70 IN | DIASTOLIC BLOOD PRESSURE: 77 MMHG

## 2023-04-21 DIAGNOSIS — N52.9 ERECTILE DYSFUNCTION, UNSPECIFIED ERECTILE DYSFUNCTION TYPE: ICD-10-CM

## 2023-04-21 DIAGNOSIS — N39.41 URGE INCONTINENCE OF URINE: ICD-10-CM

## 2023-04-21 DIAGNOSIS — N32.81 OAB (OVERACTIVE BLADDER): ICD-10-CM

## 2023-04-21 DIAGNOSIS — N13.8 BPH WITH OBSTRUCTION/LOWER URINARY TRACT SYMPTOMS: Primary | ICD-10-CM

## 2023-04-21 DIAGNOSIS — N40.1 BPH WITH OBSTRUCTION/LOWER URINARY TRACT SYMPTOMS: Primary | ICD-10-CM

## 2023-04-21 LAB
BILIRUBIN, UA POC OHS: NEGATIVE
BLOOD, UA POC OHS: NEGATIVE
CLARITY, UA POC OHS: CLEAR
COLOR, UA POC OHS: YELLOW
GLUCOSE, UA POC OHS: NEGATIVE
KETONES, UA POC OHS: NEGATIVE
LEUKOCYTES, UA POC OHS: NEGATIVE
NITRITE, UA POC OHS: NEGATIVE
PH, UA POC OHS: 7
PROTEIN, UA POC OHS: ABNORMAL
SPECIFIC GRAVITY, UA POC OHS: 1.01
UROBILINOGEN, UA POC OHS: 0.2

## 2023-04-21 PROCEDURE — 3078F PR MOST RECENT DIASTOLIC BLOOD PRESSURE < 80 MM HG: ICD-10-PCS | Mod: CPTII,S$GLB,, | Performed by: UROLOGY

## 2023-04-21 PROCEDURE — 1101F PR PT FALLS ASSESS DOC 0-1 FALLS W/OUT INJ PAST YR: ICD-10-PCS | Mod: CPTII,S$GLB,, | Performed by: UROLOGY

## 2023-04-21 PROCEDURE — 3008F PR BODY MASS INDEX (BMI) DOCUMENTED: ICD-10-PCS | Mod: CPTII,S$GLB,, | Performed by: UROLOGY

## 2023-04-21 PROCEDURE — 1160F PR REVIEW ALL MEDS BY PRESCRIBER/CLIN PHARMACIST DOCUMENTED: ICD-10-PCS | Mod: CPTII,S$GLB,, | Performed by: UROLOGY

## 2023-04-21 PROCEDURE — 99214 PR OFFICE/OUTPT VISIT, EST, LEVL IV, 30-39 MIN: ICD-10-PCS | Mod: S$GLB,,, | Performed by: UROLOGY

## 2023-04-21 PROCEDURE — 1159F MED LIST DOCD IN RCRD: CPT | Mod: CPTII,S$GLB,, | Performed by: UROLOGY

## 2023-04-21 PROCEDURE — 3044F HG A1C LEVEL LT 7.0%: CPT | Mod: CPTII,S$GLB,, | Performed by: UROLOGY

## 2023-04-21 PROCEDURE — 4010F ACE/ARB THERAPY RXD/TAKEN: CPT | Mod: CPTII,S$GLB,, | Performed by: UROLOGY

## 2023-04-21 PROCEDURE — 3288F FALL RISK ASSESSMENT DOCD: CPT | Mod: CPTII,S$GLB,, | Performed by: UROLOGY

## 2023-04-21 PROCEDURE — 99999 PR PBB SHADOW E&M-EST. PATIENT-LVL V: CPT | Mod: PBBFAC,,, | Performed by: UROLOGY

## 2023-04-21 PROCEDURE — 1101F PT FALLS ASSESS-DOCD LE1/YR: CPT | Mod: CPTII,S$GLB,, | Performed by: UROLOGY

## 2023-04-21 PROCEDURE — 3008F BODY MASS INDEX DOCD: CPT | Mod: CPTII,S$GLB,, | Performed by: UROLOGY

## 2023-04-21 PROCEDURE — 99999 PR PBB SHADOW E&M-EST. PATIENT-LVL V: ICD-10-PCS | Mod: PBBFAC,,, | Performed by: UROLOGY

## 2023-04-21 PROCEDURE — 99214 OFFICE O/P EST MOD 30 MIN: CPT | Mod: S$GLB,,, | Performed by: UROLOGY

## 2023-04-21 PROCEDURE — 1159F PR MEDICATION LIST DOCUMENTED IN MEDICAL RECORD: ICD-10-PCS | Mod: CPTII,S$GLB,, | Performed by: UROLOGY

## 2023-04-21 PROCEDURE — 3078F DIAST BP <80 MM HG: CPT | Mod: CPTII,S$GLB,, | Performed by: UROLOGY

## 2023-04-21 PROCEDURE — 1160F RVW MEDS BY RX/DR IN RCRD: CPT | Mod: CPTII,S$GLB,, | Performed by: UROLOGY

## 2023-04-21 PROCEDURE — 3075F SYST BP GE 130 - 139MM HG: CPT | Mod: CPTII,S$GLB,, | Performed by: UROLOGY

## 2023-04-21 PROCEDURE — 3044F PR MOST RECENT HEMOGLOBIN A1C LEVEL <7.0%: ICD-10-PCS | Mod: CPTII,S$GLB,, | Performed by: UROLOGY

## 2023-04-21 PROCEDURE — 81003 URINALYSIS AUTO W/O SCOPE: CPT | Mod: QW,S$GLB,, | Performed by: UROLOGY

## 2023-04-21 PROCEDURE — 4010F PR ACE/ARB THEARPY RXD/TAKEN: ICD-10-PCS | Mod: CPTII,S$GLB,, | Performed by: UROLOGY

## 2023-04-21 PROCEDURE — 81003 POCT URINALYSIS(INSTRUMENT): ICD-10-PCS | Mod: QW,S$GLB,, | Performed by: UROLOGY

## 2023-04-21 PROCEDURE — 3288F PR FALLS RISK ASSESSMENT DOCUMENTED: ICD-10-PCS | Mod: CPTII,S$GLB,, | Performed by: UROLOGY

## 2023-04-21 PROCEDURE — 3075F PR MOST RECENT SYSTOLIC BLOOD PRESS GE 130-139MM HG: ICD-10-PCS | Mod: CPTII,S$GLB,, | Performed by: UROLOGY

## 2023-04-21 RX ORDER — ACETAMINOPHEN 500 MG
1000 TABLET ORAL
Status: CANCELLED | OUTPATIENT
Start: 2023-04-21

## 2023-04-21 RX ORDER — DOXYCYCLINE 100 MG/1
100 CAPSULE ORAL EVERY 12 HOURS
Qty: 10 CAPSULE | Refills: 0 | Status: SHIPPED | OUTPATIENT
Start: 2023-05-21 | End: 2023-05-26

## 2023-04-21 RX ORDER — LIDOCAINE HYDROCHLORIDE 10 MG/ML
1 INJECTION, SOLUTION EPIDURAL; INFILTRATION; INTRACAUDAL; PERINEURAL ONCE
Status: CANCELLED | OUTPATIENT
Start: 2023-04-21 | End: 2023-04-21

## 2023-04-21 RX ORDER — CIPROFLOXACIN 250 MG/1
500 TABLET, FILM COATED ORAL
Status: CANCELLED | OUTPATIENT
Start: 2023-04-21

## 2023-04-21 RX ORDER — LORAZEPAM 0.5 MG/1
0.5 TABLET ORAL
Status: DISCONTINUED | OUTPATIENT
Start: 2023-04-21 | End: 2023-05-22 | Stop reason: HOSPADM

## 2023-04-21 RX ORDER — LIDOCAINE AND PRILOCAINE 25; 25 MG/G; MG/G
CREAM TOPICAL
Qty: 30 G | Refills: 0 | Status: ON HOLD | OUTPATIENT
Start: 2023-04-21 | End: 2023-05-22 | Stop reason: HOSPADM

## 2023-04-21 NOTE — PATIENT INSTRUCTIONS
Schedule peripheral nerve evaluation for Monday May 22nd.  Patient is skinny.  Not on any blood thinners.  Will write for Gretchen cream.  Good candidate for this procedure.  If he has greater than 50% improvement then will schedule him for non rechargeable implant.  Start doxycycline 100 mg twice a day the day before.  Take for 5 days.   EMLA cream and bring to preop.  They will tell him when and where to apply.  30 minutes prior to the actual procedure.  Wrote for Ativan to receive prior to procedure.  Will need a ride home.  Will schedule him for a follow-up that following Thursday he will need to bring a diary.  Average urgency, number of times he urinates during the day and the number of leak episodes per day for at least 3 days.   Confirmed that currently he urinates 7-10 times a day, average urgency is 4 and 1-2 leaks a day.    Interstim therapy: PNE scheduling and consent  I explained that InterStim Therapy for Urinary Control is indicated for the treatment of urinary retention and the symptoms of overactive bladder, including urinary urge incontinence and significant symptoms of urgency-frequency alone or in combination, in patients who have failed or could not tolerate more conservative treatments.    Options of sacral nerve test stimulation by PNE (percutaneous lead) vs. Stage I InterStim Therapy ( implanted electrode) explained. Once the test stimulation results in a successful response, either a complete InterStim Therapy ( both stage I and II) or Stage II InterStim Therapy ( implantation of InterStim neuro-generator) will be scheduled.    Will schedule the patient for sacral nerve test stimulation to see whether or not it will improve this patient's refractory urinary symptoms.    Nature and risks of the procedure including, but not limited to pain, bleeding, infection, failure to improve urinary symptoms, lead migration, electrical shock, nerve injury, or mechanical failure explained. The patient  understands that we will place an electrode wire on each side of the sacrum (tailbone).  An external stimulator will be attached to the electrodes and a low voltage electrical current will apply stimulation to the sacral nerves which are responsible to maintain bowel control. (i.e. The patient will have an external wire temporarily during the trial). All questions were answered.    Patient understands and agrees to undergo the proposed procedure.  The consent was obtained.      The patient is scheduled for: Monday may 22  The patient is not on anti-coagulation.   The patient will start doxycycline the day before and take twice a dya for 5 days  Apply emla cream to lower back when you get there.       Prior to the procedure:   Please do not take any blood thinners or medication containing aspirin for seven (7) days prior and three (3) days after the procedure.    If you are on Coumadin or Pradaxa please discontinue for five (5) days prior to your procedure.  Also, you must call your prescribing physician to inform him/her of the need to discontinue the medication.    You may resume these medications 1-3 days after your procedure according to you physician.      Wear comfortable pants such as sweat pants or any loose fitting pants   Have a light meal prior to your procedure   You will have a follow-up appointment scheduled within a few days after your procedure to have the wire removed

## 2023-04-21 NOTE — TELEPHONE ENCOUNTER
Spoke with apryl with [facility] pharmacy clarified prescription Emla cream apply once 30 minutes prior to procedure. Verbally voiced understanding.

## 2023-04-21 NOTE — PROGRESS NOTES
[facility] Note - [place]  Staff: MD Erika  PCP: JAMES Rose,FNP-C  Date of Service: 04/21/2023      Subjective:        HPI: Onesimo Paula is a 72 y.o. male     Seen by me 9/30/19  Patient had MRI for right hip pain and was found have a thick-walled bladder with enlarged prostate.  He states that he saw urologist over 40 years ago for burning with urination.  He had a renal bladder ultrasound in 2016 which showed enlarged prostate and bilateral renal cyst done for renal insufficiency whom he sees  for.  Denies any gross hematuria.  Review of for previous urine show no microscopic hematuria.  Twenty-five pack-year history of smoking but quit in 1997.      He also has AUA symptom score 6 and occasional weak stream and urgency but voids every 3-5 hours when he is working and a little more frequent when he is not.  Denies any urge incontinence.  Okay stream with occasional weak stream.  PVR today 09/03/2019 is 15 cc.  He may have tried Flomax a couple years ago but does not recall it helping her changing any was symptoms.  Overall really not that bothered by his urinary symptoms.      Also states he has ED.  Previously tried sildenafil unsure dose and it helped but wife does not want him to take, exam revealed peyronie's     Plan: started tadalafil 5mg due to thick walled bladder although essentially asx. No further f/u fr b renal cysts.      Interval history 11/18/19:   Pt states today Cialis has NOT improved any of his lower urinary tract symptoms since starting the medication.  He currently takes this medication before his bedtime.  Pt still c/o urinary urgency, especially during the day.  Nocturia is 1-2x nightly and not bothersome at this time.  He is a , therefore the daytime urgency really bothers him.     Plan: started ditropan 10mg XL and cont'd tadalafil     Interval history by idalia 1/6/20:   TODAY, pt states the oxybutynin only  improved his LUTS by over 10% at this time.  PVR by bladder scan performed in office today:  11 mL     Rec'd: d/c oxybutynin, start myrbetriq, cont tadalafil 5mg daily.      Interval history 7/27/20:      Tried myrbetriq but didn't help. Having urgency, hesistancy and intermittent slow stream but urgency most bothersome and having some UUI with a few drop, but not that bothersome. Drinks 1 c of coffee in am. Soda during day. 1 c of coffee int he evening. Has never tried stopping these to see if they help. Drinks beer and notices increase in frequency following day.      On tadalafil 5mg daily for ED and bph.  pvr by scan: 32cc  Voiding every 30 minutes a day recently more pronounced at home (has been on vacation).  Takes hctz in am. When he's working he only voids about 2x-4x a day. Drinks caffeine while working including energy drinks. No nocturia.      psa 1/31/20 2.2 (up from 1.2 year prior). Repeat psa 0.85 7/29/20  WINSOME today: 40g + no nodules.     Uroflow 7/30/20: peak flow 12.8mL/s,  avg flow 5.4mL/s, voided vol: 191cc, pvr by scan:0      Cysto trus 8/17/20: normal bladder, no stricture. Membranous and distal prostatic urethra with papillary tissue suspicious for prostatitis.trus volume 42.6g. bc of his urgency on tadalafil, flomax, vesicare found rx cipro for 14d to see if it helped w urgency. D/c'd vesicare. cont'd flomax and tadalafil. rtc to see idalia in 2-3 weeks and me in 2 months to discuss bph procedures     Interval history by idalia 9/9/20:   Pt states he is still having the urinary urgency even since finishing the Cipro antibiotics at this time.  He denies gross hematuria, dysuria.  UA today shows normal findings.  PVR by bladder scan today:  9 mL     Plan: rec'd restarting vesicare     Interval history 10/1/20:  Taking flomax 0.4mg nightly and says flow is intermittent on this.   Also on the vesicare and says it doesn't help with the urgency. Voiding 2-3x a day and none at night. No  incontinence. 3-4x a week c/o urge incontinence if he tries to hold it too long, mostly in the am when he wakes up. Denies any difficulty with walking. No weakness or numbness.   Changed to decaf coffee 1x a day. Changed to sprite and caffeine free coke.   He does not want to take any more meds. He is not happy with the retrograde ejaculation.   Sees dr. leger for Mitral Valve leakage, last saw him a week ago, was told he should f/u in March 2021. Tadalafil helping with erections     Interval history 11/19/20:   Underwent rezum on 10/21/20. Font flomax, vesicare and tadafil daily for 3 more months.      Returned for fill and pull on 10/26/20. Filled with 210, voided 210  For 2 weeks postop would have blood when he had a BM, improving.      Today (1 month later) he states he has some increased urgency. UUI 2-3x a day (increased from 3-4x a week).  Still taking vesicare but ran out. freq q1 hour (worse in cold weather). Without cold weather freq q 3 hour.   ua today with small wbc and mod blood - some dysuria  No significant change in stream yet  pvr by scan today: 134  AUA ssx:(2 incomplete emptying, 3 frequency, 3 intermittency, 4 urgency, 2 weak stream, 2 straining, 0 sleeping). 16. QOL: 4    Interval history by me in clinic on 3/9/21:  Returns today and states states he ran out of flomax and vesicare a month ago. No increased frequency, urge incontinence or slower stream off the flomax. Still having UUI 1-2x a day with a few drops when he hears water. Voids 8x/24 hours and 4x/8 hours while at work. Nocturia occ (same). No longer drinking caffeine. Still on tadalafil 5mg daily.  No longer having retrograde ejaculation and constipation improved. Hctz in am.  Frequency mostly in  the pm. Bothers him but able to stop at bathrooms.   AUA ssx today:(1 incomplete emptying, 2 frequency, 1 intermittency, 3 urgency, 3 weak stream, 0 straining, 0 sleeping). 10. QOL: mixed  Bladder scan PVR today was 35mL.       I  reviewed his previous urinalysis and cultures as well as his urinalysis today. His urinalysis today shows no abnormalities.      I reviewed his previous PSAs and his most recent psa within the last year was 2.3, %free 20.87, last year was 2.2     Sometimes loses erection with tadalafil 5mg daily. Rare. Not more frequent. Still getting morning erections.     Interval history with idalia on 4/9/21:  UA today showed normal findings.  Pt states today even though he tried and changed his HCTZ med to evenings as discussed last ov, his LUTS have not improved.  Pt as of today's visit is still c/o urinary urgency and frequency during the daytime.  Has not improved since last ov.  Pt states today he has tried Vesicare and Oxybutynin in the past with no improvement in symptoms.  Pt currently denies gross hematuria and dysuria.  She put him on myrbetriq again       Interval history by me 6/15/21:  Uroflow results (date: 03/18/2021): Voiding time: 24.3s, Flow time: 23.1s, TTP flow: 8.0s, Peak flowrate: 19.2 mL/s, Average flowrate: 10.6mL/s, Intervals: 2, Voided volume: 244 mL, Pvr by bladder scan 33. Pattern of curve: see uploaded image, reviewed by     At last visit was c/o freq/urgency. Frequency 8-10x/day. Tried myrbetriq before rezum, ditropan, vesicare and changing hctz to pm but no improvement. Then in April started myrbetriq again , after about 4 weeks saw improvement, now down to 5-6x a day. avg urg: 3. No nocturia. No incontinence.  Also on tadalafil 5mg daily.   AUA ssx today:(1 incomplete emptying, 1 frequency, 1 intermittency, 2 urgency, 3 weak stream, 0 straining, 0 sleeping). 8. QOL:   Bladder scan PVR today was 0mL.      HPI/CC today 6/7/22:   Had him Continue myrbetriq 50mg daily, has enough until 4/2021, Continue tadalafil 5mg daily, refilled today for a year.  Says myrbetriq not helping anymore. He held it for a few days and noticed no difference. On cialis 5mg daily.   Does not have constipation  Voids  about 9-10x during waking hours. occ urge incontinence 3x a week. Few drops. Started drinking caffeine this week.   Nocturia 1-2x a night. Urine flow varies.    psa 5/31/22 1.3  ua today neg, pvr by scan: 5  AUA ssx:(2 incomplete emptying, 3 frequency, 2 intermittency, 4 urgency, 3 weak stream, 1 straining, 1 sleeping). 16. QOL: mixed  Appendix removed in January complicated by post-op ileus.    Interval history 8/23/22:  Continue myrbetriq 50mg daily, has enough until 4/2021  Continue tadalafil 5mg daily, refilled today for a year  After adding vesicare to myrbetriq - went from voiding 9-10x day, 1-2x a night with occ UUI 3x a week to 5-6x/day, 0 night and no UUI. Says he has dry mouth but about the same. However does have some heart burn that is new.   UA today: neg. pvr by scan: 115 (this is the highest it's been, feels empty).  AUA ssx:(1 incomplete emptying, 2 frequency, 0 intermittency, 2 urgency, 2 weak stream, 1 straining, 0 sleeping). 8. QOL: pleased    Interval history 2/28/23:  Says he is doing well on VESIcare and myrbetriq.  However it is expensive.  About 120 dollars every 3 months.  Also having dry mouth with it.  On tadalafil 5 mg daily for erections, obtaining from [employer].  This dose wish for him sometimes.  He tried Viagra 100 mg and it seemed to work.  Aua ssx: 12, pvr: 25    Interval history 4/21/23:  I had him discontinue both myrbetriq and VESIcare at the last visit to see if his incontinence would get worse.  Followed up with Teena on 03/29/2023.  His PVR was 0.  His AUA symptom score was 14, 5.  He was still taking tadalafil 5 mg daily.  He said with stopping VESIcare and myrbetriq he had 2 episodes of incontinence that were large volume, increased postvoid dribbling and he feels like his urinary frequency increased.  Was scheduled with me to discuss InterStim because he wanted to hold off on restarting any medications due to cost and side effects  He returns today with a voiding diary.  He  kept a voiding diary for me days and it looks like he urinates on average about 9-10 times a day.  That is up from 6 times a day when he was taking medications.  He did not documented on his voiding diary but he leaks urine 2 to 3 times a day on the way to the bathroom.  Not wearing any pads.  Does not have to change his underwear.  If he could rate his urgency he would say on average his urgency is 4/5.  He is interested in proceeding with InterStim.  He ended up using tadalafil 10 mg once and said this dose worked good for him for his erectile dysfunction  Voided urine today is negative.            PVR History:  2/28/23 25, aua ssx: 12  8/23/22 115  6/15/21 pvr by scan: 0  3/18/21 UF: pF: 19.2, avg: 10.6, voided vol: 244, pvr: 33  3/2/21               pvr: 35  11/19/20          pvr by scan: 134 (couldn't start after he stopped)  10/26/20          Fill and pull: 0cc pvr  11/19/20          rezum  9/9/20              pvr by scan: 9cc  7/27/20            pvr by scan: 32cc  8/17/20            Cysto trus: 42.6g.   7/30/20           UF: peak flow 12.8mL/s,  avg flow 5.4mL/s, voided vol: 191cc, pvr by scan:0   1/6/20              pvr by scan: 11cc   9/3/19              pvr by scan: 15cc     PSA history: no family hx of prostate cancer  5/31/22 1.3  3/2/21              2.3, %free 20.87, pvr: 35 (psa screen 4.6)  11/19/20          rezum  7/29/20            0.85, %free 54.12  1/31/20            2.2   2/21/19            1.2  3/14/18            1.0    Urine history  4/21/23 Tr prot  6/7/22  neg  11/19/20          Small wbc/mod blood-send for ua patricio and culture pvr by scan: 134  10/14/20          Neg  9/16/20            neg  9/9/20              neg  3/31/20            Neg  1/6/20              Neg  11/18/19          neg  8/8/19              No cx, void: n/a 0 rbc  2/21/19            Ng, void: neg, 0 rbc  4/9/18              No cx, void: neg  9/2017             No blood       Current REVIEW OF SYSTEMS:  neg    Objective:      Vitals:    04/21/23 1141   BP: 131/77   Pulse: (!) 58       Focused  exam  neg    Assessment:     Onesimo Paula is a 72 y.o. male with       1. BPH with obstruction/lower urinary tract symptoms    2. OAB (overactive bladder)    3. Urge incontinence of urine    4. Erectile dysfunction, unspecified erectile dysfunction type      He has overactive bladder with significant urgency and worsening urge incontinence.  Had Rezum for BPH but these symptoms persist.  He has tried 2 medications and still has overactive bladder but also was having side effects and the medicine was also very expensive and so he wants to see if he can try InterStim.  However if InterStim does not work does not want to do Botox and would rather take medications again but possibly different ones.    Plan:     Schedule peripheral nerve evaluation for Monday May 22nd.  Patient is skinny.  Not on any blood thinners.  Will write for Gretchen cream.  Good candidate for this procedure.  If he has greater than 50% improvement then will schedule him for non rechargeable implant.  Start doxycycline 100 mg twice a day the day before.  Take for 5 days.   EMLA cream and bring to preop.  They will tell him when and where to apply.  30 minutes prior to the actual procedure.  Wrote for Ativan to receive prior to procedure.  Will need a ride home.  Will schedule him for a follow-up that following Thursday he will need to bring a diary.  Average urgency, number of times he urinates during the day and the number of leak episodes per day for at least 3 days.   Confirmed that currently he urinates 7-10 times a day, average urgency is 4 and 1-2 leaks a day.  Confirmed with clarita  On no blood thinners    Interstim therapy: PNE scheduling and consent  I explained that InterStim Therapy for Urinary Control is indicated for the treatment of urinary retention and the symptoms of overactive bladder, including urinary urge incontinence and significant symptoms of  urgency-frequency alone or in combination, in patients who have failed or could not tolerate more conservative treatments.    Options of sacral nerve test stimulation by PNE (percutaneous lead) vs. Stage I InterStim Therapy ( implanted electrode) explained. Once the test stimulation results in a successful response, either a complete InterStim Therapy ( both stage I and II) or Stage II InterStim Therapy ( implantation of InterStim neuro-generator) will be scheduled.    Will schedule the patient for sacral nerve test stimulation to see whether or not it will improve this patient's refractory urinary symptoms.    Nature and risks of the procedure including, but not limited to pain, bleeding, infection, failure to improve urinary symptoms, lead migration, electrical shock, nerve injury, or mechanical failure explained. The patient understands that we will place an electrode wire on each side of the sacrum (tailbone).  An external stimulator will be attached to the electrodes and a low voltage electrical current will apply stimulation to the sacral nerves which are responsible to maintain bowel control. (i.e. The patient will have an external wire temporarily during the trial). All questions were answered.    Patient understands and agrees to undergo the proposed procedure.  The consent was obtained.      The patient is scheduled for: Monday may 22  The patient is not on anti-coagulation.   The patient will start doxycycline the day before and take twice a dya for 5 days  Apply emla cream to lower back when you get there.       Prior to the procedure:   Please do not take any blood thinners or medication containing aspirin for seven (7) days prior and three (3) days after the procedure.    If you are on Coumadin or Pradaxa please discontinue for five (5) days prior to your procedure.  Also, you must call your prescribing physician to inform him/her of the need to discontinue the medication.    You may resume these  medications 1-3 days after your procedure according to you physician.      Wear comfortable pants such as sweat pants or any loose fitting pants   Have a light meal prior to your procedure   You will have a follow-up appointment scheduled within a few days after your procedure to have the wire removed           La Nena James MD

## 2023-05-11 ENCOUNTER — PATIENT MESSAGE (OUTPATIENT)
Dept: SURGERY | Facility: AMBULARY SURGERY CENTER | Age: 73
End: 2023-05-11
Payer: COMMERCIAL

## 2023-05-22 ENCOUNTER — HOSPITAL ENCOUNTER (OUTPATIENT)
Facility: AMBULARY SURGERY CENTER | Age: 73
Discharge: HOME | End: 2023-05-22
Attending: UROLOGY | Admitting: UROLOGY
Payer: COMMERCIAL

## 2023-05-22 DIAGNOSIS — N40.1 BPH WITH OBSTRUCTION/LOWER URINARY TRACT SYMPTOMS: ICD-10-CM

## 2023-05-22 DIAGNOSIS — N39.41 URGE INCONTINENCE OF URINE: ICD-10-CM

## 2023-05-22 DIAGNOSIS — N13.8 BPH WITH OBSTRUCTION/LOWER URINARY TRACT SYMPTOMS: ICD-10-CM

## 2023-05-22 LAB
BILIRUBIN, UA POC OHS: NEGATIVE
BLOOD, UA POC OHS: NEGATIVE
CLARITY, UA POC OHS: CLEAR
COLOR, UA POC OHS: YELLOW
GLUCOSE, UA POC OHS: NEGATIVE
KETONES, UA POC OHS: NEGATIVE
LEUKOCYTES, UA POC OHS: NEGATIVE
NITRITE, UA POC OHS: NEGATIVE
PH, UA POC OHS: 7.5
PROTEIN, UA POC OHS: NEGATIVE
SPECIFIC GRAVITY, UA POC OHS: 1.01
UROBILINOGEN, UA POC OHS: 0.2

## 2023-05-22 PROCEDURE — 64561 IMPLANT NEUROELECTRODES: CPT | Mod: 50,,, | Performed by: UROLOGY

## 2023-05-22 PROCEDURE — 64561 IMPLANT NEUROELECTRODES: CPT | Mod: 50 | Performed by: UROLOGY

## 2023-05-22 PROCEDURE — 55876 PLACE RT DEVICE/MARKER PROS: CPT | Performed by: UROLOGY

## 2023-05-22 PROCEDURE — 64561 PR PERCUT IMPLANT,NEUROELEC,SACRAL NERVE: ICD-10-PCS | Mod: 50,,, | Performed by: UROLOGY

## 2023-05-22 RX ORDER — ACETAMINOPHEN 325 MG/1
975 TABLET ORAL
Status: COMPLETED | OUTPATIENT
Start: 2023-05-22 | End: 2023-05-22

## 2023-05-22 RX ORDER — CIPROFLOXACIN 500 MG/1
500 TABLET ORAL
Status: COMPLETED | OUTPATIENT
Start: 2023-05-22 | End: 2023-05-22

## 2023-05-22 RX ORDER — LIDOCAINE HYDROCHLORIDE 10 MG/ML
1 INJECTION, SOLUTION EPIDURAL; INFILTRATION; INTRACAUDAL; PERINEURAL ONCE
Status: DISCONTINUED | OUTPATIENT
Start: 2023-05-22 | End: 2023-05-22 | Stop reason: HOSPADM

## 2023-05-22 RX ORDER — LIDOCAINE HYDROCHLORIDE 10 MG/ML
INJECTION, SOLUTION EPIDURAL; INFILTRATION; INTRACAUDAL; PERINEURAL
Status: DISCONTINUED | OUTPATIENT
Start: 2023-05-22 | End: 2023-05-22 | Stop reason: HOSPADM

## 2023-05-22 RX ORDER — ALPRAZOLAM 0.5 MG/1
0.5 TABLET, ORALLY DISINTEGRATING ORAL
Status: COMPLETED | OUTPATIENT
Start: 2023-05-22 | End: 2023-05-22

## 2023-05-22 RX ADMIN — ALPRAZOLAM 0.5 MG: 0.5 TABLET, ORALLY DISINTEGRATING ORAL at 03:05

## 2023-05-22 RX ADMIN — CIPROFLOXACIN 500 MG: 500 TABLET ORAL at 03:05

## 2023-05-22 RX ADMIN — ACETAMINOPHEN 975 MG: 325 TABLET ORAL at 03:05

## 2023-05-22 NOTE — H&P
[facility] Note - [place]  Staff: MD Erika  PCP: JAMES Rose,FNP-C  Date of Service: 05/22/2023      Subjective:        HPI: Onesimo Paula is a 72 y.o. male     Seen by me 9/30/19  Patient had MRI for right hip pain and was found have a thick-walled bladder with enlarged prostate.  He states that he saw urologist over 40 years ago for burning with urination.  He had a renal bladder ultrasound in 2016 which showed enlarged prostate and bilateral renal cyst done for renal insufficiency whom he sees  for.  Denies any gross hematuria.  Review of for previous urine show no microscopic hematuria.  Twenty-five pack-year history of smoking but quit in 1997.      He also has AUA symptom score 6 and occasional weak stream and urgency but voids every 3-5 hours when he is working and a little more frequent when he is not.  Denies any urge incontinence.  Okay stream with occasional weak stream.  PVR today 09/03/2019 is 15 cc.  He may have tried Flomax a couple years ago but does not recall it helping her changing any was symptoms.  Overall really not that bothered by his urinary symptoms.      Also states he has ED.  Previously tried sildenafil unsure dose and it helped but wife does not want him to take, exam revealed peyronie's     Plan: started tadalafil 5mg due to thick walled bladder although essentially asx. No further f/u fr b renal cysts.      Interval history 11/18/19:   Pt states today Cialis has NOT improved any of his lower urinary tract symptoms since starting the medication.  He currently takes this medication before his bedtime.  Pt still c/o urinary urgency, especially during the day.  Nocturia is 1-2x nightly and not bothersome at this time.  He is a , therefore the daytime urgency really bothers him.     Plan: started ditropan 10mg XL and cont'd tadalafil     Interval history by idalia 1/6/20:   TODAY, pt states the oxybutynin only  improved his LUTS by over 10% at this time.  PVR by bladder scan performed in office today:  11 mL     Rec'd: d/c oxybutynin, start myrbetriq, cont tadalafil 5mg daily.      Interval history 7/27/20:      Tried myrbetriq but didn't help. Having urgency, hesistancy and intermittent slow stream but urgency most bothersome and having some UUI with a few drop, but not that bothersome. Drinks 1 c of coffee in am. Soda during day. 1 c of coffee int he evening. Has never tried stopping these to see if they help. Drinks beer and notices increase in frequency following day.      On tadalafil 5mg daily for ED and bph.  pvr by scan: 32cc  Voiding every 30 minutes a day recently more pronounced at home (has been on vacation).  Takes hctz in am. When he's working he only voids about 2x-4x a day. Drinks caffeine while working including energy drinks. No nocturia.      psa 1/31/20 2.2 (up from 1.2 year prior). Repeat psa 0.85 7/29/20  WINSOME today: 40g + no nodules.     Uroflow 7/30/20: peak flow 12.8mL/s,  avg flow 5.4mL/s, voided vol: 191cc, pvr by scan:0      Cysto trus 8/17/20: normal bladder, no stricture. Membranous and distal prostatic urethra with papillary tissue suspicious for prostatitis.trus volume 42.6g. bc of his urgency on tadalafil, flomax, vesicare found rx cipro for 14d to see if it helped w urgency. D/c'd vesicare. cont'd flomax and tadalafil. rtc to see idalia in 2-3 weeks and me in 2 months to discuss bph procedures     Interval history by idalia 9/9/20:   Pt states he is still having the urinary urgency even since finishing the Cipro antibiotics at this time.  He denies gross hematuria, dysuria.  UA today shows normal findings.  PVR by bladder scan today:  9 mL     Plan: rec'd restarting vesicare     Interval history 10/1/20:  Taking flomax 0.4mg nightly and says flow is intermittent on this.   Also on the vesicare and says it doesn't help with the urgency. Voiding 2-3x a day and none at night. No  incontinence. 3-4x a week c/o urge incontinence if he tries to hold it too long, mostly in the am when he wakes up. Denies any difficulty with walking. No weakness or numbness.   Changed to decaf coffee 1x a day. Changed to sprite and caffeine free coke.   He does not want to take any more meds. He is not happy with the retrograde ejaculation.   Sees dr. leger for Mitral Valve leakage, last saw him a week ago, was told he should f/u in March 2021. Tadalafil helping with erections     Interval history 11/19/20:   Underwent rezum on 10/21/20. Font flomax, vesicare and tadafil daily for 3 more months.      Returned for fill and pull on 10/26/20. Filled with 210, voided 210  For 2 weeks postop would have blood when he had a BM, improving.      Today (1 month later) he states he has some increased urgency. UUI 2-3x a day (increased from 3-4x a week).  Still taking vesicare but ran out. freq q1 hour (worse in cold weather). Without cold weather freq q 3 hour.   ua today with small wbc and mod blood - some dysuria  No significant change in stream yet  pvr by scan today: 134  AUA ssx:(2 incomplete emptying, 3 frequency, 3 intermittency, 4 urgency, 2 weak stream, 2 straining, 0 sleeping). 16. QOL: 4    Interval history by me in clinic on 3/9/21:  Returns today and states states he ran out of flomax and vesicare a month ago. No increased frequency, urge incontinence or slower stream off the flomax. Still having UUI 1-2x a day with a few drops when he hears water. Voids 8x/24 hours and 4x/8 hours while at work. Nocturia occ (same). No longer drinking caffeine. Still on tadalafil 5mg daily.  No longer having retrograde ejaculation and constipation improved. Hctz in am.  Frequency mostly in  the pm. Bothers him but able to stop at bathrooms.   AUA ssx today:(1 incomplete emptying, 2 frequency, 1 intermittency, 3 urgency, 3 weak stream, 0 straining, 0 sleeping). 10. QOL: mixed  Bladder scan PVR today was 35mL.       I  reviewed his previous urinalysis and cultures as well as his urinalysis today. His urinalysis today shows no abnormalities.      I reviewed his previous PSAs and his most recent psa within the last year was 2.3, %free 20.87, last year was 2.2     Sometimes loses erection with tadalafil 5mg daily. Rare. Not more frequent. Still getting morning erections.     Interval history with idalia on 4/9/21:  UA today showed normal findings.  Pt states today even though he tried and changed his HCTZ med to evenings as discussed last ov, his LUTS have not improved.  Pt as of today's visit is still c/o urinary urgency and frequency during the daytime.  Has not improved since last ov.  Pt states today he has tried Vesicare and Oxybutynin in the past with no improvement in symptoms.  Pt currently denies gross hematuria and dysuria.  She put him on myrbetriq again       Interval history by me 6/15/21:  Uroflow results (date: 03/18/2021): Voiding time: 24.3s, Flow time: 23.1s, TTP flow: 8.0s, Peak flowrate: 19.2 mL/s, Average flowrate: 10.6mL/s, Intervals: 2, Voided volume: 244 mL, Pvr by bladder scan 33. Pattern of curve: see uploaded image, reviewed by     At last visit was c/o freq/urgency. Frequency 8-10x/day. Tried myrbetriq before rezum, ditropan, vesicare and changing hctz to pm but no improvement. Then in April started myrbetriq again , after about 4 weeks saw improvement, now down to 5-6x a day. avg urg: 3. No nocturia. No incontinence.  Also on tadalafil 5mg daily.   AUA ssx today:(1 incomplete emptying, 1 frequency, 1 intermittency, 2 urgency, 3 weak stream, 0 straining, 0 sleeping). 8. QOL:   Bladder scan PVR today was 0mL.      HPI/CC today 6/7/22:   Had him Continue myrbetriq 50mg daily, has enough until 4/2021, Continue tadalafil 5mg daily, refilled today for a year.  Says myrbetriq not helping anymore. He held it for a few days and noticed no difference. On cialis 5mg daily.   Does not have constipation  Voids  about 9-10x during waking hours. occ urge incontinence 3x a week. Few drops. Started drinking caffeine this week.   Nocturia 1-2x a night. Urine flow varies.    psa 5/31/22 1.3  ua today neg, pvr by scan: 5  AUA ssx:(2 incomplete emptying, 3 frequency, 2 intermittency, 4 urgency, 3 weak stream, 1 straining, 1 sleeping). 16. QOL: mixed  Appendix removed in January complicated by post-op ileus.    Interval history 8/23/22:  Continue myrbetriq 50mg daily, has enough until 4/2021  Continue tadalafil 5mg daily, refilled today for a year  After adding vesicare to myrbetriq - went from voiding 9-10x day, 1-2x a night with occ UUI 3x a week to 5-6x/day, 0 night and no UUI. Says he has dry mouth but about the same. However does have some heart burn that is new.   UA today: neg. pvr by scan: 115 (this is the highest it's been, feels empty).  AUA ssx:(1 incomplete emptying, 2 frequency, 0 intermittency, 2 urgency, 2 weak stream, 1 straining, 0 sleeping). 8. QOL: pleased    Interval history 2/28/23:  Says he is doing well on VESIcare and myrbetriq.  However it is expensive.  About 120 dollars every 3 months.  Also having dry mouth with it.  On tadalafil 5 mg daily for erections, obtaining from [employer].  This dose wish for him sometimes.  He tried Viagra 100 mg and it seemed to work.  Aua ssx: 12, pvr: 25    Interval history 4/21/23:  I had him discontinued both myrbetriq and VESIcare at the last visit to see if his incontinence would get worse.  Followed up with Teena on 03/29/2023.  His PVR was 0.  His AUA symptom score was 14, 5.  He was still taking tadalafil 5 mg daily.  He said with stopping VESIcare and myrbetriq he had 2 episodes of incontinence that were large volume, increased postvoid dribbling and he feels like his urinary frequency increased.  Was scheduled with me to discuss InterStim because he wanted to hold off on restarting any medications due to cost and side effects  He returns today with a voiding diary.  He  kept a voiding diary and it looks like he urinates on average about 9-10 times a day.  That is up from 6 times a day when he was taking medications.  He did not document on his voiding diary but he leaks urine 2 to 3 times a day on the way to the bathroom.    Not wearing any pads.  Does not have to change his underwear.    If he could rate his urgency he would say on average his urgency is 4/5.  He is interested in proceeding with InterSti.  He ended up using tadalafil 10 mg once and said this dose worked good for him for his erectile dysfunction  Voided urine today is negative.        Interval history 5/22/23:  Here today for PNE      PVR History:  2/28/23 25, aua ssx: 12  8/23/22 115  6/15/21 pvr by scan: 0  3/18/21 UF: pF: 19.2, avg: 10.6, voided vol: 244, pvr: 33  3/2/21               pvr: 35  11/19/20          pvr by scan: 134 (couldn't start after he stopped)  10/26/20          Fill and pull: 0cc pvr  11/19/20          rezum  9/9/20              pvr by scan: 9cc  7/27/20            pvr by scan: 32cc  8/17/20            Cysto trus: 42.6g.   7/30/20           UF: peak flow 12.8mL/s,  avg flow 5.4mL/s, voided vol: 191cc, pvr by scan:0   1/6/20              pvr by scan: 11cc   9/3/19              pvr by scan: 15cc     PSA history: no family hx of prostate cancer  5/31/22 1.3  3/2/21              2.3, %free 20.87, pvr: 35 (psa screen 4.6)  11/19/20          rezum  7/29/20            0.85, %free 54.12  1/31/20            2.2   2/21/19            1.2  3/14/18            1.0    Urine history  4/21/23 Tr prot  6/7/22  neg  11/19/20          Small wbc/mod blood-send for ua patricio and culture pvr by scan: 134  10/14/20          Neg  9/16/20            neg  9/9/20              neg  3/31/20            Neg  1/6/20              Neg  11/18/19          neg  8/8/19              No cx, void: n/a 0 rbc  2/21/19            Ng, void: neg, 0 rbc  4/9/18              No cx, void: neg  9/2017             No blood       Current REVIEW OF  SYSTEMS:  neg    Objective:     Vitals:    05/22/23 1539   BP: (!) 144/71   Pulse: (!) 57   Resp: 18   Temp: 97.5 °F (36.4 °C)         General:wdwn  in NAD  Neurologic: CN grossly normal. Normal sensation.   Psychiatric: awake, alert and oriented x 3. Mood and affect Normal. Cooperative.  Eyes: PERRLA, normal conjunctiva  Respiratory: no increased work on breathing. No wheezing.   Cardiovascular: No obvious extremity edema. Warm and well perfused.  GI: no obvious stomach distension  Musculoskeletal: normal  range of motion of bilateral upper extremities. Normal muscle strength and tone.  Skin: no obvious rashes or lesions. No tightening of skin noted.    Pertinent  exam in HPI        Focused  exam  Neg      Recent Labs   Lab 10/03/22  0701 10/24/22  1518 11/30/22  0626   WBC 7.60 17.32 H 7.32   Hemoglobin 13.6 L 14.3 13.7 L   Hematocrit 41.7 42.6 42.2   Platelets 248 218 221   ]  Recent Labs   Lab 01/12/22  0429 01/13/22  0644 04/05/22  0544 10/03/22  0701 10/24/22  1518 11/30/22  0626 04/05/23  0707   Sodium 139 138 141 138 139 137 140   Potassium 4.2 3.9 3.7 3.7 3.6 4.0 3.5   Chloride 109 106 104 102 104 102 105   CO2 23 24 25 27 22 L 27 27   BUN 23 20 33 H 29 H 34 H 37 H 34 H   Creatinine 0.9 1.1 1.0 1.1 1.2 1.3 1.2   Glucose 98 101 106 113 H 157 H 105 109   Calcium 8.1 L 8.2 L 9.2 9.0 9.5 9.3 9.2   Magnesium 2.3 2.1  --   --   --  2.4  --    Phosphorus 2.5 L 2.8 3.6 3.3  --   --   --    Alkaline Phosphatase 52 L 52 L  --   --  63 59 54 L   Total Protein 6.0 6.2  --   --  7.5 7.7 7.3   Albumin 2.5 L 2.6 L 4.0 4.1 4.1 4.3 4.2   Total Bilirubin 0.7 0.8  --   --  0.6 0.7 0.6   AST 24 19  --   --  27 25 30   ALT 26 24  --   --  26 26 37   ]    Lab Results   Component Value Date    HGBA1C 6.2 04/05/2023         Assessment:     Onesimo Paula is a 72 y.o. male with       No diagnosis found.    He has overactive bladder with significant urgency and worsening urge incontinence.  Had Rezum for BPH but these symptoms  persist.  He has tried 2 medications and still has overactive bladder but also was having side effects and the medicine was also very expensive and so he wants to see if he can try InterStim.  However if InterStim does not work does not want to do Botox and would rather take medications again but possibly different ones.    Plan:     Schedule peripheral nerve evaluation for Monday May 22nd.  Patient is skinny.  Not on any blood thinners.  Will write for Gretchen cream.  Good candidate for this procedure.  If he has greater than 50% improvement then will schedule him for non rechargeable implant.  Start doxycycline 100 mg twice a day the day before.  Take for 5 days.   EMLA cream and bring to preop.  They will tell him when and where to apply.  30 minutes prior to the actual procedure.  Wrote for Ativan to receive prior to procedure.  Will need a ride home.  Will schedule him for a follow-up that following Thursday he will need to bring a diary.  Average urgency, number of times he urinates during the day and the number of leak episodes per day for at least 3 days.   Confirmed that currently he urinates 7-10 times a day, average urgency is 4 and 1-2 leaks a day.  Confirmed with clarita  On no blood thinners    Interstim therapy: PNE scheduling and consent  I explained that InterStim Therapy for Urinary Control is indicated for the treatment of urinary retention and the symptoms of overactive bladder, including urinary urge incontinence and significant symptoms of urgency-frequency alone or in combination, in patients who have failed or could not tolerate more conservative treatments.    Options of sacral nerve test stimulation by PNE (percutaneous lead) vs. Stage I InterStim Therapy ( implanted electrode) explained. Once the test stimulation results in a successful response, either a complete InterStim Therapy ( both stage I and II) or Stage II InterStim Therapy ( implantation of InterStim neuro-generator) will be  scheduled.    Will schedule the patient for sacral nerve test stimulation to see whether or not it will improve this patient's refractory urinary symptoms.    Nature and risks of the procedure including, but not limited to pain, bleeding, infection, failure to improve urinary symptoms, lead migration, electrical shock, nerve injury, or mechanical failure explained. The patient understands that we will place an electrode wire on each side of the sacrum (tailbone).  An external stimulator will be attached to the electrodes and a low voltage electrical current will apply stimulation to the sacral nerves which are responsible to maintain bowel control. (i.e. The patient will have an external wire temporarily during the trial). All questions were answered.    Patient understands and agrees to undergo the proposed procedure.  The consent was obtained.      The patient is scheduled for: Monday may 22  The patient is not on anti-coagulation.   The patient will start doxycycline the day before and take twice a dya for 5 days  Apply emla cream to lower back when you get there.       Prior to the procedure:   Please do not take any blood thinners or medication containing aspirin for seven (7) days prior and three (3) days after the procedure.    If you are on Coumadin or Pradaxa please discontinue for five (5) days prior to your procedure.  Also, you must call your prescribing physician to inform him/her of the need to discontinue the medication.    You may resume these medications 1-3 days after your procedure according to you physician.      Wear comfortable pants such as sweat pants or any loose fitting pants   Have a light meal prior to your procedure   You will have a follow-up appointment scheduled within a few days after your procedure to have the wire removed           La Nena James MD

## 2023-05-22 NOTE — DISCHARGE SUMMARY
[facility]  Urology  Discharge Note - Short Stay      Patient Name: Onesimo Paula  MRN: [id number]  Discharge Date and Time:  05/22/2023 5:28 PM  Attending Physician: La Nena James,*   Discharging Provider: La Nena James MD  Primary Care Physician: Reynaldo Rahman, APRN,FNP-C    There are no hospital problems to display for this patient.      Final Diagnoses: Same as principal problem.    Hospital Course: Patient was admitted for an outpatient procedure and tolerated the procedure well with no complications.*    Procedure(s) (LRB):  INSERTION, ELECTRODE LEAD, NEUROSTIMULATOR, SACRAL, PERCUTANEOUS Keep first (N/A)     Indwelling Lines/Drains at time of discharge:   Lines/Drains/Airways       Drain  Duration                  Closed/Suction Drain 01/11/22 1238 Right;Posterior Buttock Bulb 10 Fr. 496 days                    Discharged Condition: good    Disposition: home    Follow Up:      Patient Instructions:   No discharge procedures on file.    Medications:  Reconciled Home Medications:      Medication List        CONTINUE taking these medications      amLODIPine 10 MG tablet  Commonly known as: NORVASC  Take 1 tablet (10 mg total) by mouth once daily.     aspirin 81 MG EC tablet  Commonly known as: ECOTRIN  Take 81 mg by mouth once daily.     co-enzyme Q-10 30 mg capsule  Take 30 mg by mouth 3 (three) times daily.     diclofenac sodium 1 % Gel  Commonly known as: VOLTAREN  Apply 2 g topically 4 (four) times daily as needed (left shoulder/arm pain).     doxycycline 100 MG Cap  Commonly known as: VIBRAMYCIN  Take 1 capsule (100 mg total) by mouth every 12 (twelve) hours. for 5 days     Fish OiL 100-160-1,000 mg Cap  Generic drug: omega 3-dha-epa-fish oil  Take 1 capsule by mouth once daily.     hydroCHLOROthiazide 25 MG tablet  Commonly known as: HYDRODIURIL  Take 1 tablet (25 mg total) by mouth once daily.     MULTIVITAMIN 50 PLUS ORAL  Take 1 tablet by mouth once daily.      MYRBETRIQ 50 mg Tb24  Generic drug: mirabegron  TAKE 1 TABLET ONCE DAILY     pantoprazole 40 MG tablet  Commonly known as: PROTONIX  Take 1 tablet (40 mg total) by mouth once daily.     ranolazine 500 MG Tb12  Commonly known as: RANEXA  Take 1 tablet (500 mg total) by mouth 2 (two) times daily.     rosuvastatin 10 MG tablet  Commonly known as: CRESTOR  Take 1 tablet (10 mg total) by mouth once daily.     solifenacin 10 MG tablet  Commonly known as: VESICARE  Take 1 tablet (10 mg total) by mouth once daily.     tadalafiL 5 MG tablet  Commonly known as: CIALIS  Take 1 tablet (5 mg total) by mouth once daily.     telmisartan 80 MG Tab  Commonly known as: MICARDIS  Take 1 tablet (80 mg total) by mouth once daily.            STOP taking these medications      LIDOcaine-prilocaine cream  Commonly known as: EMLA            ASK your doctor about these medications      sucralfate 1 gram tablet  Commonly known as: CARAFATE  Take 1 g by mouth 2 (two) times daily.              Discharge Procedure Orders (must include Diet, Follow-up, Activity):  No discharge procedures on file.     F/u this thursday pm In my clinic with 3 day voiding diary  Do not wet back until patient sees me  Antibiotics x 5 days  If one side doesn't work then will flip to the other side.     La Nena James MD  Urology  [facility]

## 2023-05-22 NOTE — PATIENT INSTRUCTIONS
Your urologist is: Dr.Jennifer James  Office number: [phone]  Address: [address] [place] [address], [address] [facility], [place] [address]      PLEASE READ the following directions and contact our office with any questions via phone or the portal. If you were told that you need an appointment and no appointment was made, call the office the day after surgery and ask to speak with 's nurse to make an appointment.    Disposition:    F/u this thursday pm In my clinic with 3 day voiding diary  Do not wet back until patient sees me  Antibiotics x 5 days  If one side doesn't work then will flip to the other side.     La Nena James MD

## 2023-05-22 NOTE — OP NOTE
[facility]  Operative Note    Date: 05/22/2023      Pre-op Diagnosis:    Urge incontinence   Frequency of micturition  Feeling of incomplete bladder emptying    Post-op Diagnosis:  Same    Procedure(s) Performed:   1.  Percutaneous implantation of neurostimulator electrode into right S3 sacral foramen for test stimulation   2.  Percutaneous implantation of neurostimulator electrode into left S3 sacral foramen for test stimulation  3.  Fluoro <1h    Specimen(s): none    Staff Surgeon: La Nena James MD    Anesthesia:  Local - lidocaine /epi    Indications: Onesimo Paula is a 72 y.o. male with refractory oab presenting for surgical intervention.    Risks and benefits discussed with patient and pt consented to proceed    Estimated Blood Loss: minimal    Findings:  Elicited responses as below, in description of procedure.   Using Fluro, Sacarl foramen identified and a Foramen needle was used to probe the S3 foramen.  Once appropriate responses noted from electical stimulation, a single electrode was placed under fluroscopic guidance.  The final responses were confirmed using electrical stimulation.  Electrical stimulation resulted in sensation involving  the perineal area.   A electrode was placed bilaterally.     Right S3 stimulation resulted in the sensation at the perineal area.   Levator ani contraction:+, 1.0.  A big toe motor function: +, 1.0 .  Left S3 stimulation resulted in the sensation at the perineal area.     Levator ani contraction: +, 0.3.  A big toe motor function:+, 0.3    Description of Procedure:  Informed Consent:      - Risks, benefits and alternatives of procedure discussed with patient and informed consent obtained. The pt had kept a 3d voiding diary prior to procedure which has been uploaded.   Patient Position:      - Prone.   Equipment:       - [employer] test stimulation Lead Kit.     Procedure in detail:      - Ground pad placed on patient and connected to  test stimulator cable.        - Test stimulator cable connected to patient and to test stimulator.        - Sacral foramina identified on each side using bony landmarks and fluoroscopy with cross hairs technique.        - Skin and periosteum anesthetized as above, near chosen foramen.       - I measured 9cm from the tip of the coccyx and used flouro and foramen needle to identify S3.       - Electrically insulated foramen needle placed into the foramen on each side      - J-hook of Patient Cable attached to foramen needle on one side and tested.      - Test stimulator activated and amplitude gradually increased.        - Best Responses Elicited: Jeison of perineum and plantar flexion great toe.        - This indicated the best level of response at S3 on both sides.       - Stylet removed from foramen needle and replaced by test stimulation lead.       - Test stimulation lead advanced until depth indicator on lead properly aligned with needle on one side and then the other.       - Lead position confirmed by fluoroscopy.        - Patient test stimulated again for each side      - Foramen needle removed leaving  in position and patient retested.        - Test procedure repeated leaving one on each side      - Sterile dressing placed to secure .        - Ground pad relocated to patients lower back and secured with transparent dressing.       - Anteroposterior and lateral radiographs obtained to confirm and document position.        - , ground pad and test stimulator connected.        - Extensive counseling / instructions provided to patient regarding operation of device.     Disposition:    F/u this thursday pm In my clinic with 3 day voiding diary  Do not wet back until patient sees me  Antibiotics x 5 days  If one side doesn't work then will flip to the other side.     La Nena James MD

## 2023-05-23 VITALS
TEMPERATURE: 98 F | DIASTOLIC BLOOD PRESSURE: 80 MMHG | SYSTOLIC BLOOD PRESSURE: 139 MMHG | HEIGHT: 70 IN | HEART RATE: 55 BPM | OXYGEN SATURATION: 98 % | BODY MASS INDEX: 23.77 KG/M2 | WEIGHT: 166 LBS | RESPIRATION RATE: 18 BRPM

## 2023-05-25 ENCOUNTER — OFFICE VISIT (OUTPATIENT)
Dept: UROLOGY | Facility: CLINIC | Age: 73
End: 2023-05-25
Payer: COMMERCIAL

## 2023-05-25 ENCOUNTER — PATIENT MESSAGE (OUTPATIENT)
Dept: SURGERY | Facility: HOSPITAL | Age: 73
End: 2023-05-25
Payer: COMMERCIAL

## 2023-05-25 VITALS
WEIGHT: 165.38 LBS | BODY MASS INDEX: 23.68 KG/M2 | DIASTOLIC BLOOD PRESSURE: 74 MMHG | HEIGHT: 70 IN | HEART RATE: 73 BPM | SYSTOLIC BLOOD PRESSURE: 124 MMHG

## 2023-05-25 DIAGNOSIS — N39.41 URGE INCONTINENCE: ICD-10-CM

## 2023-05-25 DIAGNOSIS — N32.81 OAB (OVERACTIVE BLADDER): Primary | ICD-10-CM

## 2023-05-25 PROCEDURE — 1159F MED LIST DOCD IN RCRD: CPT | Mod: CPTII,S$GLB,, | Performed by: UROLOGY

## 2023-05-25 PROCEDURE — 3008F PR BODY MASS INDEX (BMI) DOCUMENTED: ICD-10-PCS | Mod: CPTII,S$GLB,, | Performed by: UROLOGY

## 2023-05-25 PROCEDURE — 1101F PT FALLS ASSESS-DOCD LE1/YR: CPT | Mod: CPTII,S$GLB,, | Performed by: UROLOGY

## 2023-05-25 PROCEDURE — 1160F RVW MEDS BY RX/DR IN RCRD: CPT | Mod: CPTII,S$GLB,, | Performed by: UROLOGY

## 2023-05-25 PROCEDURE — 1101F PR PT FALLS ASSESS DOC 0-1 FALLS W/OUT INJ PAST YR: ICD-10-PCS | Mod: CPTII,S$GLB,, | Performed by: UROLOGY

## 2023-05-25 PROCEDURE — 3288F FALL RISK ASSESSMENT DOCD: CPT | Mod: CPTII,S$GLB,, | Performed by: UROLOGY

## 2023-05-25 PROCEDURE — 3074F PR MOST RECENT SYSTOLIC BLOOD PRESSURE < 130 MM HG: ICD-10-PCS | Mod: CPTII,S$GLB,, | Performed by: UROLOGY

## 2023-05-25 PROCEDURE — 99024 PR POST-OP FOLLOW-UP VISIT: ICD-10-PCS | Mod: S$GLB,,, | Performed by: UROLOGY

## 2023-05-25 PROCEDURE — 99999 PR PBB SHADOW E&M-EST. PATIENT-LVL V: ICD-10-PCS | Mod: PBBFAC,,, | Performed by: UROLOGY

## 2023-05-25 PROCEDURE — 3044F PR MOST RECENT HEMOGLOBIN A1C LEVEL <7.0%: ICD-10-PCS | Mod: CPTII,S$GLB,, | Performed by: UROLOGY

## 2023-05-25 PROCEDURE — 4010F PR ACE/ARB THEARPY RXD/TAKEN: ICD-10-PCS | Mod: CPTII,S$GLB,, | Performed by: UROLOGY

## 2023-05-25 PROCEDURE — 1159F PR MEDICATION LIST DOCUMENTED IN MEDICAL RECORD: ICD-10-PCS | Mod: CPTII,S$GLB,, | Performed by: UROLOGY

## 2023-05-25 PROCEDURE — 99024 POSTOP FOLLOW-UP VISIT: CPT | Mod: S$GLB,,, | Performed by: UROLOGY

## 2023-05-25 PROCEDURE — 3008F BODY MASS INDEX DOCD: CPT | Mod: CPTII,S$GLB,, | Performed by: UROLOGY

## 2023-05-25 PROCEDURE — 3078F PR MOST RECENT DIASTOLIC BLOOD PRESSURE < 80 MM HG: ICD-10-PCS | Mod: CPTII,S$GLB,, | Performed by: UROLOGY

## 2023-05-25 PROCEDURE — 99999 PR PBB SHADOW E&M-EST. PATIENT-LVL V: CPT | Mod: PBBFAC,,, | Performed by: UROLOGY

## 2023-05-25 PROCEDURE — 1160F PR REVIEW ALL MEDS BY PRESCRIBER/CLIN PHARMACIST DOCUMENTED: ICD-10-PCS | Mod: CPTII,S$GLB,, | Performed by: UROLOGY

## 2023-05-25 PROCEDURE — 3078F DIAST BP <80 MM HG: CPT | Mod: CPTII,S$GLB,, | Performed by: UROLOGY

## 2023-05-25 PROCEDURE — 3288F PR FALLS RISK ASSESSMENT DOCUMENTED: ICD-10-PCS | Mod: CPTII,S$GLB,, | Performed by: UROLOGY

## 2023-05-25 PROCEDURE — 4010F ACE/ARB THERAPY RXD/TAKEN: CPT | Mod: CPTII,S$GLB,, | Performed by: UROLOGY

## 2023-05-25 PROCEDURE — 3044F HG A1C LEVEL LT 7.0%: CPT | Mod: CPTII,S$GLB,, | Performed by: UROLOGY

## 2023-05-25 PROCEDURE — 3074F SYST BP LT 130 MM HG: CPT | Mod: CPTII,S$GLB,, | Performed by: UROLOGY

## 2023-05-25 RX ORDER — TADALAFIL 5 MG/1
5 TABLET ORAL DAILY
Qty: 90 TABLET | Refills: 3 | Status: ON HOLD | OUTPATIENT
Start: 2023-05-25 | End: 2024-03-13

## 2023-05-25 NOTE — PROGRESS NOTES
[facility] Note - [place]  Staff: MD Erika  PCP: JAMES Rose,FNP-C  Date of Service: 05/25/2023      Subjective:        HPI: Onesimo Paula is a 72 y.o. male     Seen by me 9/30/19  Patient had MRI for right hip pain and was found have a thick-walled bladder with enlarged prostate.  He states that he saw urologist over 40 years ago for burning with urination.  He had a renal bladder ultrasound in 2016 which showed enlarged prostate and bilateral renal cyst done for renal insufficiency whom he sees  for.  Denies any gross hematuria.  Review of for previous urine show no microscopic hematuria.  Twenty-five pack-year history of smoking but quit in 1997.      He also has AUA symptom score 6 and occasional weak stream and urgency but voids every 3-5 hours when he is working and a little more frequent when he is not.  Denies any urge incontinence.  Okay stream with occasional weak stream.  PVR today 09/03/2019 is 15 cc.  He may have tried Flomax a couple years ago but does not recall it helping her changing any was symptoms.  Overall really not that bothered by his urinary symptoms.      Also states he has ED.  Previously tried sildenafil unsure dose and it helped but wife does not want him to take, exam revealed peyronie's     Plan: started tadalafil 5mg due to thick walled bladder although essentially asx. No further f/u fr b renal cysts.      Interval history 11/18/19:   Pt states today Cialis has NOT improved any of his lower urinary tract symptoms since starting the medication.  He currently takes this medication before his bedtime.  Pt still c/o urinary urgency, especially during the day.  Nocturia is 1-2x nightly and not bothersome at this time.  He is a , therefore the daytime urgency really bothers him.     Plan: started ditropan 10mg XL and cont'd tadalafil     Interval history by idalia 1/6/20:   TODAY, pt states the oxybutynin only  improved his LUTS by over 10% at this time.  PVR by bladder scan performed in office today:  11 mL     Rec'd: d/c oxybutynin, start myrbetriq, cont tadalafil 5mg daily.      Interval history 7/27/20:      Tried myrbetriq but didn't help. Having urgency, hesistancy and intermittent slow stream but urgency most bothersome and having some UUI with a few drop, but not that bothersome. Drinks 1 c of coffee in am. Soda during day. 1 c of coffee int he evening. Has never tried stopping these to see if they help. Drinks beer and notices increase in frequency following day.      On tadalafil 5mg daily for ED and bph.  pvr by scan: 32cc  Voiding every 30 minutes a day recently more pronounced at home (has been on vacation).  Takes hctz in am. When he's working he only voids about 2x-4x a day. Drinks caffeine while working including energy drinks. No nocturia.      psa 1/31/20 2.2 (up from 1.2 year prior). Repeat psa 0.85 7/29/20  WINSOME today: 40g + no nodules.     Uroflow 7/30/20: peak flow 12.8mL/s,  avg flow 5.4mL/s, voided vol: 191cc, pvr by scan:0      Cysto trus 8/17/20: normal bladder, no stricture. Membranous and distal prostatic urethra with papillary tissue suspicious for prostatitis.trus volume 42.6g. bc of his urgency on tadalafil, flomax, vesicare found rx cipro for 14d to see if it helped w urgency. D/c'd vesicare. cont'd flomax and tadalafil. rtc to see idalia in 2-3 weeks and me in 2 months to discuss bph procedures     Interval history by idalia 9/9/20:   Pt states he is still having the urinary urgency even since finishing the Cipro antibiotics at this time.  He denies gross hematuria, dysuria.  UA today shows normal findings.  PVR by bladder scan today:  9 mL     Plan: rec'd restarting vesicare     Interval history 10/1/20:  Taking flomax 0.4mg nightly and says flow is intermittent on this.   Also on the vesicare and says it doesn't help with the urgency. Voiding 2-3x a day and none at night. No  incontinence. 3-4x a week c/o urge incontinence if he tries to hold it too long, mostly in the am when he wakes up. Denies any difficulty with walking. No weakness or numbness.   Changed to decaf coffee 1x a day. Changed to sprite and caffeine free coke.   He does not want to take any more meds. He is not happy with the retrograde ejaculation.   Sees dr. leger for Mitral Valve leakage, last saw him a week ago, was told he should f/u in March 2021. Tadalafil helping with erections     Interval history 11/19/20:   Underwent rezum on 10/21/20. Font flomax, vesicare and tadafil daily for 3 more months.      Returned for fill and pull on 10/26/20. Filled with 210, voided 210  For 2 weeks postop would have blood when he had a BM, improving.      Today (1 month later) he states he has some increased urgency. UUI 2-3x a day (increased from 3-4x a week).  Still taking vesicare but ran out. freq q1 hour (worse in cold weather). Without cold weather freq q 3 hour.   ua today with small wbc and mod blood - some dysuria  No significant change in stream yet  pvr by scan today: 134  AUA ssx:(2 incomplete emptying, 3 frequency, 3 intermittency, 4 urgency, 2 weak stream, 2 straining, 0 sleeping). 16. QOL: 4    Interval history by me in clinic on 3/9/21:  Returns today and states states he ran out of flomax and vesicare a month ago. No increased frequency, urge incontinence or slower stream off the flomax. Still having UUI 1-2x a day with a few drops when he hears water. Voids 8x/24 hours and 4x/8 hours while at work. Nocturia occ (same). No longer drinking caffeine. Still on tadalafil 5mg daily.  No longer having retrograde ejaculation and constipation improved. Hctz in am.  Frequency mostly in  the pm. Bothers him but able to stop at bathrooms.   AUA ssx today:(1 incomplete emptying, 2 frequency, 1 intermittency, 3 urgency, 3 weak stream, 0 straining, 0 sleeping). 10. QOL: mixed  Bladder scan PVR today was 35mL.       I  reviewed his previous urinalysis and cultures as well as his urinalysis today. His urinalysis today shows no abnormalities.      I reviewed his previous PSAs and his most recent psa within the last year was 2.3, %free 20.87, last year was 2.2     Sometimes loses erection with tadalafil 5mg daily. Rare. Not more frequent. Still getting morning erections.     Interval history with idalia on 4/9/21:  UA today showed normal findings.  Pt states today even though he tried and changed his HCTZ med to evenings as discussed last ov, his LUTS have not improved.  Pt as of today's visit is still c/o urinary urgency and frequency during the daytime.  Has not improved since last ov.  Pt states today he has tried Vesicare and Oxybutynin in the past with no improvement in symptoms.  Pt currently denies gross hematuria and dysuria.  She put him on myrbetriq again       Interval history by me 6/15/21:  Uroflow results (date: 03/18/2021): Voiding time: 24.3s, Flow time: 23.1s, TTP flow: 8.0s, Peak flowrate: 19.2 mL/s, Average flowrate: 10.6mL/s, Intervals: 2, Voided volume: 244 mL, Pvr by bladder scan 33. Pattern of curve: see uploaded image, reviewed by     At last visit was c/o freq/urgency. Frequency 8-10x/day. Tried myrbetriq before rezum, ditropan, vesicare and changing hctz to pm but no improvement. Then in April started myrbetriq again , after about 4 weeks saw improvement, now down to 5-6x a day. avg urg: 3. No nocturia. No incontinence.  Also on tadalafil 5mg daily.   AUA ssx today:(1 incomplete emptying, 1 frequency, 1 intermittency, 2 urgency, 3 weak stream, 0 straining, 0 sleeping). 8. QOL:   Bladder scan PVR today was 0mL.      HPI/CC today 6/7/22:   Had him Continue myrbetriq 50mg daily, has enough until 4/2021, Continue tadalafil 5mg daily, refilled today for a year.  Says myrbetriq not helping anymore. He held it for a few days and noticed no difference. On cialis 5mg daily.   Does not have constipation  Voids  about 9-10x during waking hours. occ urge incontinence 3x a week. Few drops. Started drinking caffeine this week.   Nocturia 1-2x a night. Urine flow varies.    psa 5/31/22 1.3  ua today neg, pvr by scan: 5  AUA ssx:(2 incomplete emptying, 3 frequency, 2 intermittency, 4 urgency, 3 weak stream, 1 straining, 1 sleeping). 16. QOL: mixed  Appendix removed in January complicated by post-op ileus.    Interval history 8/23/22:  Continue myrbetriq 50mg daily, has enough until 4/2021  Continue tadalafil 5mg daily, refilled today for a year  After adding vesicare to myrbetriq - went from voiding 9-10x day, 1-2x a night with occ UUI 3x a week to 5-6x/day, 0 night and no UUI. Says he has dry mouth but about the same. However does have some heart burn that is new.   UA today: neg. pvr by scan: 115 (this is the highest it's been, feels empty).  AUA ssx:(1 incomplete emptying, 2 frequency, 0 intermittency, 2 urgency, 2 weak stream, 1 straining, 0 sleeping). 8. QOL: pleased    Interval history 2/28/23:  Says he is doing well on VESIcare and myrbetriq.  However it is expensive.  About 120 dollars every 3 months.  Also having dry mouth with it.  On tadalafil 5 mg daily for erections, obtaining from [employer]  This dose wish for him sometimes.  He tried Viagra 100 mg and it seemed to work.  Aua ssx: 12, pvr: 25    Interval history 4/21/23:  I had him discontinue both myrbetriq and VESIcare at the last visit to see if his incontinence would get worse.  Followed up with Teena on 03/29/2023.  His PVR was 0.  His AUA symptom score was 14, 5.  He was still taking tadalafil 5 mg daily.  He said with stopping VESIcare and myrbetriq he had 2 episodes of incontinence that were large volume, increased postvoid dribbling and he feels like his urinary frequency increased.  Was scheduled with me to discuss InterStim because he wanted to hold off on restarting any medications due to cost and side effects  He returns today with a voiding diary.  He  kept a voiding diary for me days and it looks like he urinates on average about 9-10 times a day.  That is up from 6 times a day when he was taking medications.  He did not documented on his voiding diary but he leaks urine 2 to 3 times a day on the way to the bathroom.  Not wearing any pads.  Does not have to change his underwear.  If he could rate his urgency he would say on average his urgency is 4/5.  He is interested in proceeding with [facility].  He ended up using tadalafil 10 mg once and said this dose worked good for him for his erectile dysfunction  Voided urine today is negative.  Confirmed again that currently he urinates 7-10 times a day, average urgency is 4 and 1-2 leaks a day.        Interval history 5/25/23:  I did a peripheral nerve evaluation on him on Monday, 4 days ago.  Since then he has gone from urinating 6-10 times a day down to about 6 times a day.  Never had issues at night.  He went from leaking 2 to 3 times a day incontinence down to 1x over the last 3 days. Avg urgency went from 4 to 2. He says >50% improvement and wants to proceed. Not on any meds.   He previously saw  for stress tests bc of SOB. His last w/u was 11/2022 and had a neg stress test. On asa 81mg. No stents. They could never figure out why he has SOB.  He sees pulmonology regularly for nodules in the lungs.  Wires removed today        PVR History:  2/28/23 25, aua ssx: 12  8/23/22 115  6/15/21 pvr by scan: 0  3/18/21 UF: pF: 19.2, avg: 10.6, voided vol: 244, pvr: 33  3/2/21               pvr: 35  11/19/20          pvr by scan: 134 (couldn't start after he stopped)  10/26/20          Fill and pull: 0cc pvr  11/19/20          rezum  9/9/20              pvr by scan: 9cc  7/27/20            pvr by scan: 32cc  8/17/20            Cysto trus: 42.6g.   7/30/20           UF: peak flow 12.8mL/s,  avg flow 5.4mL/s, voided vol: 191cc, pvr by scan:0   1/6/20              pvr by scan: 11cc   9/3/19              pvr by scan:  15cc     PSA history: no family hx of prostate cancer  5/31/22 1.3  3/2/21              2.3, %free 20.87, pvr: 35 (psa screen 4.6)  11/19/20          rezum  7/29/20            0.85, %free 54.12  1/31/20            2.2   2/21/19            1.2  3/14/18            1.0    Urine history  4/21/23 Tr prot  6/7/22  neg  11/19/20          Small wbc/mod blood-send for ua patricio and culture pvr by scan: 134  10/14/20          Neg  9/16/20            neg  9/9/20              neg  3/31/20            Neg  1/6/20              Neg  11/18/19          neg  8/8/19              No cx, void: n/a 0 rbc  2/21/19            Ng, void: neg, 0 rbc  4/9/18              No cx, void: neg  9/2017             No blood       Current REVIEW OF SYSTEMS:  neg    Objective:     Vitals:    05/25/23 1453   BP: 124/74   Pulse: 73       Focused  exam  neg    Assessment:     Onesimo Paula is a 72 y.o. male with       1. OAB (overactive bladder)    2. Urge incontinence        He has overactive bladder with significant urgency and worsening urge incontinence.  Had Rezum for BPH but these symptoms persist.  He has tried 2 medications and still has overactive bladder but also was having side effects and the medicine was also very expensive and so he wants to see if he can try InterStim.  PNE showed >50 % improvement in his urgency and incontinence. Decrease in his frequency. Wants to proceed.     Plan:     Schedule InterStim stage I and 2 for Wednesday June 21st as long as our rep is available that day if not will do July 14th. Pt is out of town July 7  Preop appt 1 week prior with ekg, cxr, cbc and bmp  Hold asa 81mg daily prior  No cards clearance needed unless determined by anesthesia  Risks and benefits discussed. See dc plan.   Sleeps on both sides   Doesn't take pain medicine regularly      La Nena James MD

## 2023-05-25 NOTE — PATIENT INSTRUCTIONS
He has overactive bladder with significant urgency and worsening urge incontinence.  Had Rezum for BPH but these symptoms persist.  He has tried 2 medications and still has overactive bladder but also was having side effects and the medicine was also very expensive and so he wants to see if he can try InterStim.  PNE showed >50 % improvement in his urgency and incontinence. Decrease in his frequency. Wants to proceed.     Plan:     Schedule InterStim stage I and 2 for Wednesday June 21st as long as our rep is available that day if not will do July 14th. Pt is out of town July 7  Preop appt 1 week prior with ekg, cxr, cbc and bmp  Hold asa 81mg daily prior  No cards clearance needed unless determined by anesthesia  Risks and benefits discussed. See dc plan.   Sleeps on both sides   Doesn't take pain medicine regularly    Interstim therapy:   I explained that InterStim Therapy for Urinary Control is indicated for the treatment of urinary retention and the symptoms of overactive bladder, including urinary urge incontinence and significant symptoms of urgency-frequency alone or in combination, in patients who have failed or could not tolerate more conservative treatments.    In my hands 30% of patients improved significantly with longstanding results, another 30% do well but then then urgency and frequency return for different reasons and another 30% have no improvement despite the 2nd stage.    Stage I InterStim Therapy (implanted electrode).    Will schedule the patient for sacral nerve test stimulation to see whether or not it will improve this patient's refractory urinary symptoms.    Nature and risks of the procedure including, but not limited to pain, bleeding, infection, failure to improve urinary symptoms, lead migration, electrical shock, nerve injury, or mechanical failure explained. The patient understands that we will place an electrode wire on each side of the sacrum (tailbone).  An external stimulator will  be attached to the one electrode with best response and a low voltage electrical current will apply stimulation to the sacral nerves which are responsible to maintain bowel control. (i.e. The patient will have an external wire temporarily during the trial).     Prior to the procedure:   Please do not take any blood thinners or medication containing aspirin for seven (7) days prior and three (3) days after the procedure.    If you are on Coumadin or Pradaxa please discontinue for five (5) days prior to your procedure.  Also, you must call your prescribing physician to inform him/her of the need to discontinue the medication.    You may resume these medications 1-3 days after your procedure according to you physician.      Wear comfortable pants such as sweat pants or any loose fitting pants   Have a light meal prior to your procedure   You will have a follow-up appointment scheduled within a few days after your procedure to have the wire removed    Please keep a diary for 3 days prior to your 1 week postop appointment.   If You have greater than a 50% improvement in her symptoms we can proceed with stage II.  Stage II should be no more than 2 weeks after stage I.     You can choose between either the rechargeable battery which needs to be charged once a week via a belt you will wear for short period of time weekly.  Or you could have a battery that needs to be changed out every 5 years or around 5 years but requires a short surgery to exchange it.     Stage 2:   Stage 2 is the implantation of the internal stimulator if your symptoms are significantly improved or removal of the lead if there is no change. You and your doctor will make this decision with the information provided in your bladder diaries.  The incision on the buttock area from the first surgery is reopened and a pocket is made in the fatty tissue and the generator placed inside.  The outside wire is removed  The procedure takes about 30 minutes and yoU  can go home the same day.  You should again limit for activities for 7-10 days.      After Stage 2:  As you heal and increase your activity the stimulation may need to be adjusted. There are many settings that can be changed to give you the best results.     You will have a patient  at home that enables you to turn the generator off and on as well as increase or decrease the stimulation. Please bring this with you to all your office visits    Possible risks: Risks that may occur during or after the 2 stages are rare, but may  occur.  Anytime the skin is opened you may develop and infection. Every care is taken to avoid this and the procedure is performed under sterile technique. You will be  placed on antibiotics after each procedure. If an infection does develop there is a  chance that the equipment will need to be removed.  There is risk of bleeding from the incision sites, the formation of a seroma (fluid under the incision area) or hematoma (blood collection under the incision).  Some swelling, bleeding, and bruising is normal and very treatable.  You will have some pain or discomfort at the incision site. Pain medicine will be prescribed.  If you have pain with the stimulation the settings can be adjusted to reduce the pain.  There is a risk of the lead moving after placement. This can be caused by too much activity too soon or a fall. The site of stimulation may need to be change or the lead may need to be replaced.  The risks to an unborn child are unknown. If you become or are planning to become pregnant the device should be turned off for the duration of your pregnancy.  There may be other risks, which are unknown at the time.

## 2023-05-25 NOTE — Clinical Note
Elizabeth preop appt 1 weekp rir= cbc, bmp, ekg, cxr, ua  Naida otto he can do June 21st. If not will need to do July 14 Pt can't do July 7 If he can't do June 21st then call or and move to July 14

## 2023-05-26 ENCOUNTER — PATIENT MESSAGE (OUTPATIENT)
Dept: SURGERY | Facility: HOSPITAL | Age: 73
End: 2023-05-26
Payer: COMMERCIAL

## 2023-06-19 NOTE — TELEPHONE ENCOUNTER
----- Message from Keaton Prescott sent at 4/21/2023  1:01 PM CDT -----  Type:  Pharmacy Calling to Clarify an RX    Name of Caller:  Parveen/ [facility]   Pharmacy Name:    [facility] - [place] - [id number]  [id number]  [place] [place] [address]  Phone: [phone] Fax: [phone]          Prescription Name:  LIDOcaine-prilocaine (EMLA) cream      What do they need to clarify?:  Directions --- How many times per day?  Best Call Back Number:   [phone]  Additional Information:         Congruent

## 2023-06-22 ENCOUNTER — TELEPHONE (OUTPATIENT)
Dept: UROLOGY | Facility: CLINIC | Age: 73
End: 2023-06-22
Payer: COMMERCIAL

## 2023-06-22 NOTE — TELEPHONE ENCOUNTER
Ok I reviewed plan- I do plan to do stage 1 and stage 2 same day- does he still want to proceed with this on July 12th? If so remind him to stop aspirin 7 days prior. Preop appt if he hasn't done already. Include th urine    Schedule InterStim stage I and 2 for Wednesday June 21st as long as our rep is available that day if not will do July 14th. Pt is out of town July 7  · Preop appt 1 week prior with ekg, cxr, cbc and bmp  · Hold asa 81mg 7 daily prior  · No cards clearance needed unless determined by anesthesia  · Risks and benefits discussed. See dc plan.   · Sleeps on both sides   · Doesn't take pain medicine regularly

## 2023-06-22 NOTE — TELEPHONE ENCOUNTER
----- Message from Eryn Kaufman LPN sent at 6/22/2023  1:43 PM CDT -----  Spoke with patient he states he already had the stage 1 test run before  ----- Message -----  From: La Nena James MD  Sent: 6/22/2023  12:45 PM CDT  To: Erika Deutsch Staff    Please check with mr jasso if he wants to proceed with interstim stage 1 July 12 and 2nd stage vs botox July 26. Let me know either wawy what he saays or even if you can't get in touch with him in a tele note routed to me. importatnt

## 2023-06-23 NOTE — TELEPHONE ENCOUNTER
Spoke with patient he still wants to proceed with procedure. Patient informed of recommendations. Verbally voiced understanding.

## 2023-06-28 ENCOUNTER — HOSPITAL ENCOUNTER (OUTPATIENT)
Dept: PREADMISSION TESTING | Facility: HOSPITAL | Age: 73
Discharge: HOME | End: 2023-06-28
Attending: UROLOGY
Payer: COMMERCIAL

## 2023-06-28 DIAGNOSIS — N32.81 OAB (OVERACTIVE BLADDER): ICD-10-CM

## 2023-06-28 DIAGNOSIS — N39.41 URGE INCONTINENCE: ICD-10-CM

## 2023-06-28 DIAGNOSIS — R73.03 PREDIABETES: Primary | ICD-10-CM

## 2023-06-28 DIAGNOSIS — Z01.818 PREOP TESTING: ICD-10-CM

## 2023-06-28 LAB
ANION GAP SERPL CALC-SCNC: 11 MMOL/L (ref 8–16)
BASOPHILS # BLD AUTO: 0.07 K/UL (ref 0–0.2)
BASOPHILS NFR BLD: 0.9 % (ref 0–1.9)
BUN SERPL-MCNC: 32 MG/DL (ref 8–23)
CALCIUM SERPL-MCNC: 9.8 MG/DL (ref 8.7–10.5)
CHLORIDE SERPL-SCNC: 108 MMOL/L (ref 95–110)
CO2 SERPL-SCNC: 22 MMOL/L (ref 23–29)
CREAT SERPL-MCNC: 1.3 MG/DL (ref 0.5–1.4)
DIFFERENTIAL METHOD: ABNORMAL
EOSINOPHIL # BLD AUTO: 0.2 K/UL (ref 0–0.5)
EOSINOPHIL NFR BLD: 3 % (ref 0–8)
ERYTHROCYTE [DISTWIDTH] IN BLOOD BY AUTOMATED COUNT: 13.6 % (ref 11.5–14.5)
EST. GFR  (NO RACE VARIABLE): 58 ML/MIN/1.73 M^2
GLUCOSE SERPL-MCNC: 124 MG/DL (ref 70–110)
HCT VFR BLD AUTO: 40.8 % (ref 40–54)
HGB BLD-MCNC: 13.8 G/DL (ref 14–18)
IMM GRANULOCYTES # BLD AUTO: 0.03 K/UL (ref 0–0.04)
IMM GRANULOCYTES NFR BLD AUTO: 0.4 % (ref 0–0.5)
LYMPHOCYTES # BLD AUTO: 2.1 K/UL (ref 1–4.8)
LYMPHOCYTES NFR BLD: 25.5 % (ref 18–48)
MCH RBC QN AUTO: 30.3 PG (ref 27–31)
MCHC RBC AUTO-ENTMCNC: 33.8 G/DL (ref 32–36)
MCV RBC AUTO: 90 FL (ref 82–98)
MONOCYTES # BLD AUTO: 0.7 K/UL (ref 0.3–1)
MONOCYTES NFR BLD: 8.7 % (ref 4–15)
NEUTROPHILS # BLD AUTO: 5 K/UL (ref 1.8–7.7)
NEUTROPHILS NFR BLD: 61.5 % (ref 38–73)
NRBC BLD-RTO: 0 /100 WBC
PLATELET # BLD AUTO: 246 K/UL (ref 150–450)
PMV BLD AUTO: 10.1 FL (ref 9.2–12.9)
POTASSIUM SERPL-SCNC: 3.8 MMOL/L (ref 3.5–5.1)
RBC # BLD AUTO: 4.56 M/UL (ref 4.6–6.2)
SODIUM SERPL-SCNC: 141 MMOL/L (ref 136–145)
WBC # BLD AUTO: 8.04 K/UL (ref 3.9–12.7)

## 2023-06-28 PROCEDURE — 99900104 DSU ONLY-NO CHARGE-EA ADD'L HR (STAT)

## 2023-06-28 PROCEDURE — 93010 EKG 12-LEAD: ICD-10-PCS | Mod: ,,, | Performed by: SPECIALIST

## 2023-06-28 PROCEDURE — 93005 ELECTROCARDIOGRAM TRACING: CPT

## 2023-06-28 PROCEDURE — 85025 COMPLETE CBC W/AUTO DIFF WBC: CPT | Performed by: UROLOGY

## 2023-06-28 PROCEDURE — 80048 BASIC METABOLIC PNL TOTAL CA: CPT | Performed by: UROLOGY

## 2023-06-28 PROCEDURE — 99900103 DSU ONLY-NO CHARGE-INITIAL HR (STAT)

## 2023-06-28 PROCEDURE — 36415 COLL VENOUS BLD VENIPUNCTURE: CPT | Performed by: UROLOGY

## 2023-06-28 PROCEDURE — 93010 ELECTROCARDIOGRAM REPORT: CPT | Mod: ,,, | Performed by: SPECIALIST

## 2023-06-28 RX ORDER — METFORMIN HYDROCHLORIDE 500 MG/1
500 TABLET ORAL
Qty: 90 TABLET | Refills: 1 | Status: SHIPPED | OUTPATIENT
Start: 2023-06-28 | End: 2023-12-20

## 2023-06-28 RX ORDER — METFORMIN HYDROCHLORIDE 500 MG/1
500 TABLET ORAL
COMMUNITY
End: 2023-06-28 | Stop reason: SDUPTHER

## 2023-06-28 NOTE — DISCHARGE INSTRUCTIONS
To confirm, Your doctor has instructed you that surgery is scheduled for: 7/12/23    Please report to [facility], Registration the morning of surgery. You must check-in and receive a wristband before going to your procedure.    Pre-Op will call the afternoon prior to surgery between 1:00 and 6:00 PM with the final arrival time.  Phone number: [phone]    PLEASE NOTE:  The surgery schedule has many variables which may affect the time of your surgery case.  Family members should be available if your surgery time changes.  Plan to be here the day of your procedure between 4-6 hours.    MEDICATIONS:  TAKE ONLY THESE MEDICATIONS WITH A SMALL SIP OF WATER THE MORNING OF YOUR PROCEDURE:    SEE MED LIST        DO NOT TAKE THESE MEDICATIONS 5-7 DAYS PRIOR to your procedure or per your surgeon's request:   ASPIRIN, ALEVE, ADVIL, IBUPROFEN, FISH OIL VITAMIN E, HERBALS  (May take Tylenol)    ONLY if you are prescribed any types of blood thinners such as:  Aspirin, Coumadin, Plavix, Pradaxa, Xarelto, Aggrenox, Effient, Eliquis, Savasya, Brilinta, or any other, ask your surgeon whether you should stop taking them and how long before surgery you should stop.  You may also need to verify with the prescribing physician if it is ok to stop your medication.      INSTRUCTIONS IMPORTANT!!  Do not eat or drink anything between midnight and the time of your procedure- this includes gum, mints, and candy.  Do not smoke or drink alcoholic beverages 24 hours prior to your procedure.  Shower the night before AND the morning of your procedure with a Chlorhexidine wash such as Hibiclens or Dial antibacterial soap from the neck down.  Do not get it on your face or in your eyes.  You may use your own shampoo and face wash. This helps your skin to be as bacteria free as possible.    If you wear contact lenses, dentures, hearing aids or glasses, bring a container to put them in during surgery and give to a family member  for safe keeping.  Please leave all jewelry, piercing's and valuables at home. You must remove your false eyelashes prior to surgery.    DO NOT remove hair from the surgery site.  Do not shave the incision site unless you are given specific instructions to do so.    ONLY if you have been diagnosed with sleep apnea please bring your C-PAP machine.  ONLY if you wear home oxygen please bring your portable oxygen tank the day of your procedure.  ONLY if you have a history of OPEN HEART SURGERY you will need a clearance from your Cardiologist per Anesthesia.      ONLY for patients requiring bowel prep, written instructions will be given by your doctor's office.  ONLY if you have a neuro stimulator, please bring the controller with you the morning of surgery  ONLY if a type and screen test is needed before surgery, please return:  If your doctor has scheduled you for an overnight stay, bring a small overnight bag with any personal items you need.  Make arrangements in advance for transportation home by a responsible adult.  It is not safe to drive a vehicle during the 24 hours after anesthesia.      [facility] Visitor Policy    Effective September 26, 2022    [facility] will resume routine visitation for COVID-19 negative patients, including inpatients, outpatients, and procedural areas, in accordance with local campus procedures.    All [facility] facilities and properties are tobacco free.  Smoking is NOT allowed.   If you have any questions about these instructions, call Pre-Op Admit  Nursing at [phone] or the Pre-Op Day Surgery Unit at [phone].

## 2023-06-28 NOTE — PROGRESS NOTES
No but if they offer him metformin which they said they would then he should use that (helps manage prediabetes/diabetes) blood sugars which will help decrease his risk of infection

## 2023-07-11 ENCOUNTER — ANESTHESIA EVENT (OUTPATIENT)
Dept: SURGERY | Facility: HOSPITAL | Age: 73
End: 2023-07-11
Payer: COMMERCIAL

## 2023-07-11 NOTE — H&P
[facility] Note - [place]  Staff: MD Erika  PCP: JAMES Rose,FNP-C  Date of Service: 07/11/2023      Subjective:        HPI: Onesimo Paula is a 72 y.o. male     Seen by me 9/30/19  Patient had MRI for right hip pain and was found have a thick-walled bladder with enlarged prostate.  He states that he saw urologist over 40 years ago for burning with urination.  He had a renal bladder ultrasound in 2016 which showed enlarged prostate and bilateral renal cyst done for renal insufficiency whom he sees  for.  Denies any gross hematuria.  Review of for previous urine show no microscopic hematuria.  Twenty-five pack-year history of smoking but quit in 1997.      He also has AUA symptom score 6 and occasional weak stream and urgency but voids every 3-5 hours when he is working and a little more frequent when he is not.  Denies any urge incontinence.  Okay stream with occasional weak stream.  PVR today 09/03/2019 is 15 cc.  He may have tried Flomax a couple years ago but does not recall it helping her changing any was symptoms.  Overall really not that bothered by his urinary symptoms.      Also states he has ED.  Previously tried sildenafil unsure dose and it helped but wife does not want him to take, exam revealed peyronie's     Plan: started tadalafil 5mg due to thick walled bladder although essentially asx. No further f/u fr b renal cysts.      Interval history 11/18/19:   Pt states today Cialis has NOT improved any of his lower urinary tract symptoms since starting the medication.  He currently takes this medication before his bedtime.  Pt still c/o urinary urgency, especially during the day.  Nocturia is 1-2x nightly and not bothersome at this time.  He is a , therefore the daytime urgency really bothers him.     Plan: started ditropan 10mg XL and cont'd tadalafil     Interval history by idalia 1/6/20:   TODAY, pt states the oxybutynin only  improved his LUTS by over 10% at this time.  PVR by bladder scan performed in office today:  11 mL     Rec'd: d/c oxybutynin, start myrbetriq, cont tadalafil 5mg daily.      Interval history 7/27/20:      Tried myrbetriq but didn't help. Having urgency, hesistancy and intermittent slow stream but urgency most bothersome and having some UUI with a few drop, but not that bothersome. Drinks 1 c of coffee in am. Soda during day. 1 c of coffee int he evening. Has never tried stopping these to see if they help. Drinks beer and notices increase in frequency following day.      On tadalafil 5mg daily for ED and bph.  pvr by scan: 32cc  Voiding every 30 minutes a day recently more pronounced at home (has been on vacation).  Takes hctz in am. When he's working he only voids about 2x-4x a day. Drinks caffeine while working including energy drinks. No nocturia.      psa 1/31/20 2.2 (up from 1.2 year prior). Repeat psa 0.85 7/29/20  WINSOME today: 40g + no nodules.     Uroflow 7/30/20: peak flow 12.8mL/s,  avg flow 5.4mL/s, voided vol: 191cc, pvr by scan:0      Cysto trus 8/17/20: normal bladder, no stricture. Membranous and distal prostatic urethra with papillary tissue suspicious for prostatitis.trus volume 42.6g. bc of his urgency on tadalafil, flomax, vesicare found rx cipro for 14d to see if it helped w urgency. D/c'd vesicare. cont'd flomax and tadalafil. rtc to see idalia in 2-3 weeks and me in 2 months to discuss bph procedures     Interval history by idalia 9/9/20:   Pt states he is still having the urinary urgency even since finishing the Cipro antibiotics at this time.  He denies gross hematuria, dysuria.  UA today shows normal findings.  PVR by bladder scan today:  9 mL     Plan: rec'd restarting vesicare     Interval history 10/1/20:  Taking flomax 0.4mg nightly and says flow is intermittent on this.   Also on the vesicare and says it doesn't help with the urgency. Voiding 2-3x a day and none at night. No  incontinence. 3-4x a week c/o urge incontinence if he tries to hold it too long, mostly in the am when he wakes up. Denies any difficulty with walking. No weakness or numbness.   Changed to decaf coffee 1x a day. Changed to sprite and caffeine free coke.   He does not want to take any more meds. He is not happy with the retrograde ejaculation.   Sees dr. leger for Mitral Valve leakage, last saw him a week ago, was told he should f/u in March 2021. Tadalafil helping with erections     Interval history 11/19/20:   Underwent rezum on 10/21/20. Font flomax, vesicare and tadafil daily for 3 more months.      Returned for fill and pull on 10/26/20. Filled with 210, voided 210  For 2 weeks postop would have blood when he had a BM, improving.      Today (1 month later) he states he has some increased urgency. UUI 2-3x a day (increased from 3-4x a week).  Still taking vesicare but ran out. freq q1 hour (worse in cold weather). Without cold weather freq q 3 hour.   ua today with small wbc and mod blood - some dysuria  No significant change in stream yet  pvr by scan today: 134  AUA ssx:(2 incomplete emptying, 3 frequency, 3 intermittency, 4 urgency, 2 weak stream, 2 straining, 0 sleeping). 16. QOL: 4    Interval history by me in clinic on 3/9/21:  Returns today and states states he ran out of flomax and vesicare a month ago. No increased frequency, urge incontinence or slower stream off the flomax. Still having UUI 1-2x a day with a few drops when he hears water. Voids 8x/24 hours and 4x/8 hours while at work. Nocturia occ (same). No longer drinking caffeine. Still on tadalafil 5mg daily.  No longer having retrograde ejaculation and constipation improved. Hctz in am.  Frequency mostly in  the pm. Bothers him but able to stop at bathrooms.   AUA ssx today:(1 incomplete emptying, 2 frequency, 1 intermittency, 3 urgency, 3 weak stream, 0 straining, 0 sleeping). 10. QOL: mixed  Bladder scan PVR today was 35mL.       I  reviewed his previous urinalysis and cultures as well as his urinalysis today. His urinalysis today shows no abnormalities.      I reviewed his previous PSAs and his most recent psa within the last year was 2.3, %free 20.87, last year was 2.2     Sometimes loses erection with tadalafil 5mg daily. Rare. Not more frequent. Still getting morning erections.     Interval history with idalia on 4/9/21:  UA today showed normal findings.  Pt states today even though he tried and changed his HCTZ med to evenings as discussed last ov, his LUTS have not improved.  Pt as of today's visit is still c/o urinary urgency and frequency during the daytime.  Has not improved since last ov.  Pt states today he has tried Vesicare and Oxybutynin in the past with no improvement in symptoms.  Pt currently denies gross hematuria and dysuria.  She put him on myrbetriq again       Interval history by me 6/15/21:  Uroflow results (date: 03/18/2021): Voiding time: 24.3s, Flow time: 23.1s, TTP flow: 8.0s, Peak flowrate: 19.2 mL/s, Average flowrate: 10.6mL/s, Intervals: 2, Voided volume: 244 mL, Pvr by bladder scan 33. Pattern of curve: see uploaded image, reviewed by     At last visit was c/o freq/urgency. Frequency 8-10x/day. Tried myrbetriq before rezum, ditropan, vesicare and changing hctz to pm but no improvement. Then in April started myrbetriq again , after about 4 weeks saw improvement, now down to 5-6x a day. avg urg: 3. No nocturia. No incontinence.  Also on tadalafil 5mg daily.   AUA ssx today:(1 incomplete emptying, 1 frequency, 1 intermittency, 2 urgency, 3 weak stream, 0 straining, 0 sleeping). 8. QOL:   Bladder scan PVR today was 0mL.      HPI/CC today 6/7/22:   Had him Continue myrbetriq 50mg daily, has enough until 4/2021, Continue tadalafil 5mg daily, refilled today for a year.  Says myrbetriq not helping anymore. He held it for a few days and noticed no difference. On cialis 5mg daily.   Does not have constipation  Voids  about 9-10x during waking hours. occ urge incontinence 3x a week. Few drops. Started drinking caffeine this week.   Nocturia 1-2x a night. Urine flow varies.    psa 5/31/22 1.3  ua today neg, pvr by scan: 5  AUA ssx:(2 incomplete emptying, 3 frequency, 2 intermittency, 4 urgency, 3 weak stream, 1 straining, 1 sleeping). 16. QOL: mixed  Appendix removed in January complicated by post-op ileus.    Interval history 8/23/22:  Continue myrbetriq 50mg daily, has enough until 4/2021  Continue tadalafil 5mg daily, refilled today for a year  After adding vesicare to myrbetriq - went from voiding 9-10x day, 1-2x a night with occ UUI 3x a week to 5-6x/day, 0 night and no UUI. Says he has dry mouth but about the same. However does have some heart burn that is new.   UA today: neg. pvr by scan: 115 (this is the highest it's been, feels empty).  AUA ssx:(1 incomplete emptying, 2 frequency, 0 intermittency, 2 urgency, 2 weak stream, 1 straining, 0 sleeping). 8. QOL: pleased    Interval history 2/28/23:  Says he is doing well on VESIcare and myrbetriq.  However it is expensive.  About 120 dollars every 3 months.  Also having dry mouth with it.  On tadalafil 5 mg daily for erections, obtaining from [employer].  This dose wish for him sometimes.  He tried Viagra 100 mg and it seemed to work.  Aua ssx: 12, pvr: 25    Interval history 4/21/23:  I had him discontinue both myrbetriq and VESIcare at the last visit to see if his incontinence would get worse.  Followed up with Teena on 03/29/2023.  His PVR was 0.  His AUA symptom score was 14, 5.  He was still taking tadalafil 5 mg daily.  He said with stopping VESIcare and myrbetriq he had 2 episodes of incontinence that were large volume, increased postvoid dribbling and he feels like his urinary frequency increased.  Was scheduled with me to discuss InterStim because he wanted to hold off on restarting any medications due to cost and side effects  He returns today with a voiding diary.  He  kept a voiding diary for me days and it looks like he urinates on average about 9-10 times a day.  That is up from 6 times a day when he was taking medications.  He did not documented on his voiding diary but he leaks urine 2 to 3 times a day on the way to the bathroom.  Not wearing any pads.  Does not have to change his underwear.  If he could rate his urgency he would say on average his urgency is 4/5.  He is interested in proceeding with InterStim.  He ended up using tadalafil 10 mg once and said this dose worked good for him for his erectile dysfunction  Voided urine today is negative.  Confirmed again that currently he urinates 7-10 times a day, average urgency is 4 and 1-2 leaks a day.        Interval history 5/25/23:  I did a peripheral nerve evaluation on him on Monday, 4 days ago.  Since then he has gone from urinating 6-10 times a day down to about 6 times a day.  Never had issues at night.  He went from leaking 2 to 3 times a day incontinence down to 1x over the last 3 days. Avg urgency went from 4 to 2. He says >50% improvement and wants to proceed. Not on any meds.   He previously saw  for stress tests bc of SOB. His last w/u was 11/2022 and had a neg stress test. On asa 81mg. No stents. They could never figure out why he has SOB.  He sees pulmonology regularly for nodules in the lungs.  Wires removed today    Interval history 7/11/23:  Here today for interstim. Preop appt revealed a1c 6.2, glucose 124. Ua with tr protein/tr bld on 6/28. Started on metformin.   Urinating every 1-2 hours. Uui with drops 2x a day. No pads or underwear changes.  Held asa 81mg for 7d.           PVR History:  2/28/23 25, aua ssx: 12  8/23/22 115  6/15/21 pvr by scan: 0  3/18/21 UF: pF: 19.2, avg: 10.6, voided vol: 244, pvr: 33  3/2/21               pvr: 35  11/19/20          pvr by scan: 134 (couldn't start after he stopped)  10/26/20          Fill and pull: 0cc pvr  11/19/20          rezum  9/9/20               pvr by scan: 9cc  7/27/20            pvr by scan: 32cc  8/17/20            Cysto trus: 42.6g.   7/30/20           UF: peak flow 12.8mL/s,  avg flow 5.4mL/s, voided vol: 191cc, pvr by scan:0   1/6/20              pvr by scan: 11cc   9/3/19              pvr by scan: 15cc     PSA history: no family hx of prostate cancer  5/31/22 1.3  3/2/21              2.3, %free 20.87, pvr: 35 (psa screen 4.6)  11/19/20          rezum  7/29/20            0.85, %free 54.12  1/31/20            2.2   2/21/19            1.2  3/14/18            1.0    Urine history  4/21/23 Tr prot  6/7/22  neg  11/19/20          Small wbc/mod blood-send for ua patricio and culture pvr by scan: 134  10/14/20          Neg  9/16/20            neg  9/9/20              neg  3/31/20            Neg  1/6/20              Neg  11/18/19          neg  8/8/19              No cx, void: n/a 0 rbc  2/21/19            Ng, void: neg, 0 rbc  4/9/18              No cx, void: neg  9/2017             No blood       Current REVIEW OF SYSTEMS:  neg    Objective:     Vitals:    07/12/23 0621   BP: 135/68   Pulse: 60   Resp: 19   Temp: 97.8 °F (36.6 °C)           General:wdwn  in NAD  Neurologic: CN grossly normal. Normal sensation.   Psychiatric: awake, alert and oriented x 3. Mood and affect Normal. Cooperative.  Eyes: PERRLA, normal conjunctiva  Respiratory: no increased work on breathing. No wheezing.   Cardiovascular: No obvious extremity edema. Warm and well perfused.  GI: no obvious stomach distension  Musculoskeletal: normal  range of motion of bilateral upper extremities. Normal muscle strength and tone.  Skin: no obvious rashes or lesions. No tightening of skin noted.    Pertinent  exam in HPI    Recent Labs   Lab 10/24/22  1518 11/30/22  0626 06/28/23  0734   WBC 17.32 H 7.32 8.04   Hemoglobin 14.3 13.7 L 13.8 L   Hematocrit 42.6 42.2 40.8   Platelets 218 221 246   ]  Recent Labs   Lab 01/12/22  0429 01/13/22  0644 04/05/22  0544 10/03/22  0701 10/24/22  1518  11/30/22  0626 04/05/23  0707 06/28/23  0734   Sodium 139 138 141 138 139 137 140 141   Potassium 4.2 3.9 3.7 3.7 3.6 4.0 3.5 3.8   Chloride 109 106 104 102 104 102 105 108   CO2 23 24 25 27 22 L 27 27 22 L   BUN 23 20 33 H 29 H 34 H 37 H 34 H 32 H   Creatinine 0.9 1.1 1.0 1.1 1.2 1.3 1.2 1.3   Glucose 98 101 106 113 H 157 H 105 109 124 H   Calcium 8.1 L 8.2 L 9.2 9.0 9.5 9.3 9.2 9.8   Magnesium 2.3 2.1  --   --   --  2.4  --   --    Phosphorus 2.5 L 2.8 3.6 3.3  --   --   --   --    Alkaline Phosphatase 52 L 52 L  --   --  63 59 54 L  --    Total Protein 6.0 6.2  --   --  7.5 7.7 7.3  --    Albumin 2.5 L 2.6 L 4.0 4.1 4.1 4.3 4.2  --    Total Bilirubin 0.7 0.8  --   --  0.6 0.7 0.6  --    AST 24 19  --   --  27 25 30  --    ALT 26 24  --   --  26 26 37  --    ]    Lab Results   Component Value Date    HGBA1C 6.2 04/05/2023         Assessment:     Onesimo Paula is a 72 y.o. male with       1. Urge incontinence    2. OAB (overactive bladder)          He has overactive bladder with significant urgency and worsening urge incontinence.  Had Rezum for BPH but these symptoms persist.  He has tried behavioral modification and failed, he has tried 2 medications and still has overactive bladder but also was having side effects and the medicine was also very expensive and so he wants to see if he can try InterStim.  PNE showed >50 % improvement in his urgency and incontinence. Decrease in his frequency. Wants to proceed.     Plan:     Schedule InterStim stage I and 2 for today  Sleeps on both sides   Doesn't take pain medicine regularly      Interstim therapy:   I explained that InterStim Therapy for Urinary Control is indicated for the treatment of urinary retention and the symptoms of overactive bladder, including urinary urge incontinence and significant symptoms of urgency-frequency alone or in combination, in patients who have failed or could not tolerate more conservative treatments.    In my hands 30% of patients  improved significantly with longstanding results, another 30% do well but then then urgency and frequency return for different reasons and another 30% have no improvement despite the 2nd stage.    Stage I InterStim Therapy (implanted electrode).    Will schedule the patient for sacral nerve test stimulation to see whether or not it will improve this patient's refractory urinary symptoms.    Nature and risks of the procedure including, but not limited to pain, bleeding, infection, failure to improve urinary symptoms, lead migration, electrical shock, nerve injury, or mechanical failure explained. The patient understands that we will place an electrode wire on each side of the sacrum (tailbone).  An external stimulator will be attached to the one electrode with best response and a low voltage electrical current will apply stimulation to the sacral nerves which are responsible to maintain bowel control. (i.e. The patient will have an external wire temporarily during the trial).     Prior to the procedure:   Please do not take any blood thinners or medication containing aspirin for seven (7) days prior and three (3) days after the procedure.    If you are on Coumadin or Pradaxa please discontinue for five (5) days prior to your procedure.  Also, you must call your prescribing physician to inform him/her of the need to discontinue the medication.    You may resume these medications 1-3 days after your procedure according to you physician.      Wear comfortable pants such as sweat pants or any loose fitting pants   Have a light meal prior to your procedure   You will have a follow-up appointment scheduled within a few days after your procedure to have the wire removed    Please keep a diary for 3 days prior to your 1 week postop appointment.   If You have greater than a 50% improvement in her symptoms we can proceed with stage II.  Stage II should be no more than 2 weeks after stage I.     You can choose between either  the rechargeable battery which needs to be charged once a week via a belt you will wear for short period of time weekly.  Or you could have a battery that needs to be changed out every 5 years or around 5 years but requires a short surgery to exchange it.     Stage 2:   Stage 2 is the implantation of the internal stimulator if your symptoms are significantly improved or removal of the lead if there is no change. You and your doctor will make this decision with the information provided in your bladder diaries.  The incision on the buttock area from the first surgery is reopened and a pocket is made in the fatty tissue and the generator placed inside.  The outside wire is removed  The procedure takes about 30 minutes and yoU can go home the same day.  You should again limit for activities for 7-10 days.      After Stage 2:  As you heal and increase your activity the stimulation may need to be adjusted. There are many settings that can be changed to give you the best results.     You will have a patient  at home that enables you to turn the generator off and on as well as increase or decrease the stimulation. Please bring this with you to all your office visits    Possible risks: Risks that may occur during or after the 2 stages are rare, but may  occur.  Anytime the skin is opened you may develop and infection. Every care is taken to avoid this and the procedure is performed under sterile technique. You will be  placed on antibiotics after each procedure. If an infection does develop there is a  chance that the equipment will need to be removed.  There is risk of bleeding from the incision sites, the formation of a seroma (fluid under the incision area) or hematoma (blood collection under the incision).  Some swelling, bleeding, and bruising is normal and very treatable.  You will have some pain or discomfort at the incision site. Pain medicine will be prescribed.  If you have pain with the stimulation the  settings can be adjusted to reduce the pain.  There is a risk of the lead moving after placement. This can be caused by too much activity too soon or a fall. The site of stimulation may need to be change or the lead may need to be replaced.  The risks to an unborn child are unknown. If you become or are planning to become pregnant the device should be turned off for the duration of your pregnancy.  There may be other risks, which are unknown at the time.            La Nena James MD

## 2023-07-12 ENCOUNTER — ANESTHESIA (OUTPATIENT)
Dept: SURGERY | Facility: HOSPITAL | Age: 73
End: 2023-07-12
Payer: COMMERCIAL

## 2023-07-12 ENCOUNTER — TELEPHONE (OUTPATIENT)
Dept: UROLOGY | Facility: CLINIC | Age: 73
End: 2023-07-12
Payer: COMMERCIAL

## 2023-07-12 ENCOUNTER — HOSPITAL ENCOUNTER (OUTPATIENT)
Facility: HOSPITAL | Age: 73
Discharge: HOME | End: 2023-07-12
Attending: UROLOGY | Admitting: UROLOGY
Payer: COMMERCIAL

## 2023-07-12 DIAGNOSIS — N39.41 URGE INCONTINENCE: ICD-10-CM

## 2023-07-12 DIAGNOSIS — N32.81 OAB (OVERACTIVE BLADDER): ICD-10-CM

## 2023-07-12 PROCEDURE — 63600175 PHARM REV CODE 636 W HCPCS: Performed by: UROLOGY

## 2023-07-12 PROCEDURE — 71000015 HC POSTOP RECOV 1ST HR: Performed by: UROLOGY

## 2023-07-12 PROCEDURE — D9220A PRA ANESTHESIA: ICD-10-PCS | Mod: ANES,,, | Performed by: ANESTHESIOLOGY

## 2023-07-12 PROCEDURE — 64561 PR PERCUT IMPLANT,NEUROELEC,SACRAL NERVE: ICD-10-PCS | Mod: 22,RT,, | Performed by: UROLOGY

## 2023-07-12 PROCEDURE — 64590 INS/RPL PRPH SAC/GSTR NPG/R: CPT | Mod: 51,,, | Performed by: UROLOGY

## 2023-07-12 PROCEDURE — 25000003 PHARM REV CODE 250: Performed by: UROLOGY

## 2023-07-12 PROCEDURE — C1787 PATIENT PROGR, NEUROSTIM: HCPCS | Performed by: UROLOGY

## 2023-07-12 PROCEDURE — 94799 UNLISTED PULMONARY SVC/PX: CPT

## 2023-07-12 PROCEDURE — 36000707: Performed by: UROLOGY

## 2023-07-12 PROCEDURE — C1889 IMPLANT/INSERT DEVICE, NOC: HCPCS | Performed by: UROLOGY

## 2023-07-12 PROCEDURE — 37000008 HC ANESTHESIA 1ST 15 MINUTES: Performed by: UROLOGY

## 2023-07-12 PROCEDURE — 63600175 PHARM REV CODE 636 W HCPCS: Performed by: NURSE ANESTHETIST, CERTIFIED REGISTERED

## 2023-07-12 PROCEDURE — D9220A PRA ANESTHESIA: ICD-10-PCS | Mod: CRNA,,, | Performed by: NURSE ANESTHETIST, CERTIFIED REGISTERED

## 2023-07-12 PROCEDURE — D9220A PRA ANESTHESIA: Mod: ANES,,, | Performed by: ANESTHESIOLOGY

## 2023-07-12 PROCEDURE — D9220A PRA ANESTHESIA: Mod: CRNA,,, | Performed by: NURSE ANESTHETIST, CERTIFIED REGISTERED

## 2023-07-12 PROCEDURE — 25000003 PHARM REV CODE 250: Performed by: ANESTHESIOLOGY

## 2023-07-12 PROCEDURE — C1767 GENERATOR, NEURO NON-RECHARG: HCPCS | Performed by: UROLOGY

## 2023-07-12 PROCEDURE — 64561 IMPLANT NEUROELECTRODES: CPT | Mod: 22,RT,, | Performed by: UROLOGY

## 2023-07-12 PROCEDURE — 25000003 PHARM REV CODE 250: Performed by: NURSE ANESTHETIST, CERTIFIED REGISTERED

## 2023-07-12 PROCEDURE — 36000706: Performed by: UROLOGY

## 2023-07-12 PROCEDURE — 71000039 HC RECOVERY, EACH ADD'L HOUR: Performed by: UROLOGY

## 2023-07-12 PROCEDURE — 95971 ALYS SMPL SP/PN NPGT W/PRGRM: CPT | Mod: 59,,, | Performed by: UROLOGY

## 2023-07-12 PROCEDURE — 71000033 HC RECOVERY, INTIAL HOUR: Performed by: UROLOGY

## 2023-07-12 PROCEDURE — 64590 PR IMPLANT PERIPH/GASTRIC NEUROSTIM/RECEIVER: ICD-10-PCS | Mod: 51,,, | Performed by: UROLOGY

## 2023-07-12 PROCEDURE — 37000009 HC ANESTHESIA EA ADD 15 MINS: Performed by: UROLOGY

## 2023-07-12 PROCEDURE — 71000016 HC POSTOP RECOV ADDL HR: Performed by: UROLOGY

## 2023-07-12 PROCEDURE — C1778 LEAD, NEUROSTIMULATOR: HCPCS | Performed by: UROLOGY

## 2023-07-12 PROCEDURE — 95971 PR ANALYZE NEUROSTIM,SIMPLE/PROG: ICD-10-PCS | Mod: 59,,, | Performed by: UROLOGY

## 2023-07-12 DEVICE — ENVELOPE TYRX ANTIBACT MED: Type: IMPLANTABLE DEVICE | Site: BACK | Status: FUNCTIONAL

## 2023-07-12 DEVICE — LEAD INTERSTIM 2 SURESCAN 28CM: Type: IMPLANTABLE DEVICE | Site: BACK | Status: FUNCTIONAL

## 2023-07-12 DEVICE — SYS INTERSTIM X RECHARGE FREE: Type: IMPLANTABLE DEVICE | Site: BACK | Status: FUNCTIONAL

## 2023-07-12 RX ORDER — SODIUM CHLORIDE, SODIUM LACTATE, POTASSIUM CHLORIDE, CALCIUM CHLORIDE 600; 310; 30; 20 MG/100ML; MG/100ML; MG/100ML; MG/100ML
INJECTION, SOLUTION INTRAVENOUS CONTINUOUS
Status: ACTIVE | OUTPATIENT
Start: 2023-07-12

## 2023-07-12 RX ORDER — FENTANYL CITRATE 50 UG/ML
INJECTION, SOLUTION INTRAMUSCULAR; INTRAVENOUS
Status: DISCONTINUED | OUTPATIENT
Start: 2023-07-12 | End: 2023-07-12

## 2023-07-12 RX ORDER — ACETAMINOPHEN 10 MG/ML
INJECTION, SOLUTION INTRAVENOUS
Status: DISCONTINUED | OUTPATIENT
Start: 2023-07-12 | End: 2023-07-12

## 2023-07-12 RX ORDER — EPHEDRINE SULFATE 50 MG/ML
INJECTION, SOLUTION INTRAVENOUS
Status: DISCONTINUED | OUTPATIENT
Start: 2023-07-12 | End: 2023-07-12

## 2023-07-12 RX ORDER — DEXMEDETOMIDINE HYDROCHLORIDE 100 UG/ML
INJECTION, SOLUTION INTRAVENOUS
Status: DISCONTINUED | OUTPATIENT
Start: 2023-07-12 | End: 2023-07-12

## 2023-07-12 RX ORDER — FENTANYL CITRATE 50 UG/ML
25 INJECTION, SOLUTION INTRAMUSCULAR; INTRAVENOUS EVERY 5 MIN PRN
Status: ACTIVE | OUTPATIENT
Start: 2023-07-12

## 2023-07-12 RX ORDER — DOXYCYCLINE 100 MG/1
100 CAPSULE ORAL EVERY 12 HOURS
Qty: 10 CAPSULE | Refills: 0 | Status: SHIPPED | OUTPATIENT
Start: 2023-07-12 | End: 2023-08-08

## 2023-07-12 RX ORDER — OXYCODONE HYDROCHLORIDE 5 MG/1
5 TABLET ORAL ONCE AS NEEDED
Status: ACTIVE | OUTPATIENT
Start: 2023-07-12 | End: 2034-12-08

## 2023-07-12 RX ORDER — METOCLOPRAMIDE HYDROCHLORIDE 5 MG/ML
10 INJECTION INTRAMUSCULAR; INTRAVENOUS EVERY 10 MIN PRN
Status: ACTIVE | OUTPATIENT
Start: 2023-07-12

## 2023-07-12 RX ORDER — LIDOCAINE HYDROCHLORIDE 10 MG/ML
1 INJECTION, SOLUTION EPIDURAL; INFILTRATION; INTRACAUDAL; PERINEURAL ONCE
Status: ACTIVE | OUTPATIENT
Start: 2023-07-12

## 2023-07-12 RX ORDER — MIDAZOLAM HYDROCHLORIDE 1 MG/ML
INJECTION, SOLUTION INTRAMUSCULAR; INTRAVENOUS
Status: DISCONTINUED | OUTPATIENT
Start: 2023-07-12 | End: 2023-07-12

## 2023-07-12 RX ORDER — TRAMADOL HYDROCHLORIDE 50 MG/1
50 TABLET ORAL EVERY 6 HOURS PRN
Qty: 10 TABLET | Refills: 0 | Status: SHIPPED | OUTPATIENT
Start: 2023-07-12 | End: 2023-08-08

## 2023-07-12 RX ORDER — ONDANSETRON 2 MG/ML
INJECTION INTRAMUSCULAR; INTRAVENOUS
Status: DISCONTINUED | OUTPATIENT
Start: 2023-07-12 | End: 2023-07-12

## 2023-07-12 RX ORDER — PROPOFOL 10 MG/ML
VIAL (ML) INTRAVENOUS CONTINUOUS PRN
Status: DISCONTINUED | OUTPATIENT
Start: 2023-07-12 | End: 2023-07-12

## 2023-07-12 RX ORDER — PHENYLEPHRINE HYDROCHLORIDE 10 MG/ML
INJECTION INTRAVENOUS
Status: DISCONTINUED | OUTPATIENT
Start: 2023-07-12 | End: 2023-07-12

## 2023-07-12 RX ADMIN — ONDANSETRON 4 MG: 2 INJECTION INTRAMUSCULAR; INTRAVENOUS at 08:07

## 2023-07-12 RX ADMIN — FENTANYL CITRATE 25 MCG: 50 INJECTION, SOLUTION INTRAMUSCULAR; INTRAVENOUS at 08:07

## 2023-07-12 RX ADMIN — FENTANYL CITRATE 50 MCG: 50 INJECTION, SOLUTION INTRAMUSCULAR; INTRAVENOUS at 07:07

## 2023-07-12 RX ADMIN — EPHEDRINE SULFATE 10 MG: 50 INJECTION, SOLUTION INTRAMUSCULAR; INTRAVENOUS; SUBCUTANEOUS at 08:07

## 2023-07-12 RX ADMIN — DEXMEDETOMIDINE 10 MCG: 200 INJECTION, SOLUTION INTRAVENOUS at 08:07

## 2023-07-12 RX ADMIN — PHENYLEPHRINE HYDROCHLORIDE 200 MCG: 10 INJECTION INTRAVENOUS at 09:07

## 2023-07-12 RX ADMIN — SODIUM CHLORIDE, SODIUM GLUCONATE, SODIUM ACETATE, POTASSIUM CHLORIDE AND MAGNESIUM CHLORIDE: 526; 502; 368; 37; 30 INJECTION, SOLUTION INTRAVENOUS at 06:07

## 2023-07-12 RX ADMIN — VANCOMYCIN HYDROCHLORIDE 1000 MG: 1 INJECTION, POWDER, LYOPHILIZED, FOR SOLUTION INTRAVENOUS at 07:07

## 2023-07-12 RX ADMIN — PHENYLEPHRINE HYDROCHLORIDE 200 MCG: 10 INJECTION INTRAVENOUS at 08:07

## 2023-07-12 RX ADMIN — MIDAZOLAM 2 MG: 1 INJECTION INTRAMUSCULAR; INTRAVENOUS at 07:07

## 2023-07-12 RX ADMIN — ACETAMINOPHEN 1000 MG: 10 INJECTION, SOLUTION INTRAVENOUS at 08:07

## 2023-07-12 RX ADMIN — PROPOFOL 50 MCG/KG/MIN: 10 INJECTION, EMULSION INTRAVENOUS at 07:07

## 2023-07-12 RX ADMIN — GENTAMICIN SULFATE 110 MG: 40 INJECTION, SOLUTION INTRAMUSCULAR; INTRAVENOUS at 07:07

## 2023-07-12 RX ADMIN — SODIUM CHLORIDE, SODIUM GLUCONATE, SODIUM ACETATE, POTASSIUM CHLORIDE AND MAGNESIUM CHLORIDE: 526; 502; 368; 37; 30 INJECTION, SOLUTION INTRAVENOUS at 09:07

## 2023-07-12 RX ADMIN — GLYCOPYRROLATE 0.2 MG: 0.2 INJECTION, SOLUTION INTRAMUSCULAR; INTRAVITREAL at 08:07

## 2023-07-12 RX ADMIN — FENTANYL CITRATE 25 MCG: 50 INJECTION, SOLUTION INTRAMUSCULAR; INTRAVENOUS at 09:07

## 2023-07-12 NOTE — TELEPHONE ENCOUNTER
----- Message from La Nena James MD sent at 7/12/2023 11:13 AM CDT -----  Shedule for a 3 week f/u for virtual visit- phone visit ok

## 2023-07-12 NOTE — TRANSFER OF CARE
"Anesthesia Transfer of Care Note    Patient: Onesimo Paula    Procedure(s) Performed: Procedure(s) (LRB):  INSERTION, ELECTRODE LEAD, NEUROSTIMULATOR, SACRAL, PERCUTANEOUS (N/A)  INSERTION, ELECTRODE LEADS AND PULSE GENERATOR, NEUROSTIMULATOR, SACRAL (N/A)    Patient location: PACU    Anesthesia Type: general    Transport from OR: Transported from OR on 2-3 L/min O2 by NC with adequate spontaneous ventilation    Post pain: adequate analgesia    Post assessment: no apparent anesthetic complications    Post vital signs: stable    Level of consciousness: awake and alert    Nausea/Vomiting: no nausea/vomiting    Complications: none    Transfer of care protocol was followed      Last vitals:   Visit Vitals  /68 (BP Location: Right arm, Patient Position: Lying)   Pulse 60   Temp 36.6 °C (97.8 °F) (Oral)   Resp 19   Ht 5' 10" (1.778 m)   Wt 75.3 kg (166 lb)   SpO2 99%   BMI 23.82 kg/m²     "

## 2023-07-12 NOTE — OP NOTE
[facility] [place]  Operative Note    Date: 07/12/2023    Pre-Op Diagnosis:   Urge incontinence   Frequency of micturition  Feeling of incomplete bladder emptying    Post-Op Diagnosis: same    Procedure(s) Performed:   1.  Incision for implantation of sacral nerve neurostimulator electrodes (tranforaminal placement) (91007)  2.  Insertion of peripheral neurostimulator pulse generator (57463)  3.  Electronic analysis of implanted neurostimulator pulse generator system with intraoperative or subsequent programming (78168)  4.  Fluoro < 1 h (41839)      Request for 22 Modifier due to increased level of difficulty  An additional 60 minutes was spent trying to identify the best foramen- replacing and testing needle on both left and righ tside multiple times. .       Specimen(s): none    Staff Surgeon: La Nena James MD    Anesthesia: Monitored Local Anesthesia with Sedation    Indications: Onesimo Paula is a 72 y.o. male with oab, UUI    Findings:   Non rechargeable Generator was placed on right side  MRI compatible Lead was placed on the right side    No response on left except high in the foramen at s2 and s3  On the right able to elicit good jeremy but kept eliciting a heel response except at 3 or higher  Had to wake patient up and check sensation. Pt only felt in rectum for sensation. Did not feel in leg.             Estimated Blood Loss: minimal    Drains: none    Complications:  None    Procedure in Detail:  After informed consent was obtained, the patient was transferred to the operating room and placed in the prone position.  Pillows were placed under lower abdomen to flatten sacrum and under shins to allow the toes to dangle freely.  MAC anesthesia was administered.  The patient was prepped and draped in the usual sterile fashion ad preoperative Vancomycin and Gentamicin were confirmed.      The C-arm was draped and moved into AP position to provide fluoroscopic mapping of the sacral  region.  It was then moved to lateral position to image the same area.  The rolling pen technique was used to identify the highest point on the fulcrum.  10mL of 1% lidocaine with epi was injected into the area where the finder needles were to be placed.  A foramen needle was introduced approximately 2 cm above sciatic notch and 2 cm lateral to sacral midline until the S3 foramen was identified and penetrated first on the rigth side. This was then repeated on the left side. Flouro was used to help identify location in both the AP and lateral position. Over 1 hour spent repeating this.     Findings as above. Checked motor first. Could not elicit exact motor responses we were looking for. Asked them to wake the patient up.     Proper needle position was eventually confirmed by sensation in the rectum.     The needle in the medial position on the right S3 foramen had the best response. Confirmation of correct foramen placement was seen using flouro in AP and lateral position. The foramen needle stylet was removed and a directional guide was placed and confirmed fluoroscopically.  The needle was removed keeping the directional guide in place.  An incision was made peripherally to the directional guide through the fascial layer.  The lead Introducer sheath with dilator was placed over the directional guide and directed into the foramen ensuring the radiopaque marker did not extend beyond the anterior edge of the sacrum.  The dilator was unlocked and removed along with the directional guide keeping the introducer sheath in place.     The lead was then placed through the introducer sheath to the first white line.  Position was checked fluoroscopically.  The lead was then further introduced until 3 electrodes were visible below the sacrum.  Each electrode was tested for location of patient sensation, visualization of jeremy and plantar flexion was seen. After satisfactory positioning was confirmed, the introducer sheath  was retracted under continuous fluoro, deploying lead tines into the perisacral tissue.  A 5c cm incision was made sharply using a 15 blade into subcutaneous tissue inferior to the iliac crest and lateral to the sacrum on the patient's right.  Bovie electrocautery and blunt dissection was used until the gluteal fascia was identified.  Hemostasis was excellent.      A tunneling tool with straw was placed from the lead exit site subcutaneously to the incised pocket site.  The tunneling tool was removed and the lead was fed through the straw and pulled out at the pocket site.  The lead was cleansed of body fluid and dried and then inserted into the header of the neurostimulator until the blue tip was visualized at the distal window.  The single set screw was tightened.  The neurostimulator was placed into the subcutaneous pocket with the etched identification side placed upwards. The programming head was placed over the implanted neurostimulator in a sterile cover to ensure adequate lead connection and that parameters were within normal limits.  Impedances were confirmed to be within normal limits (>50 and <4,000).      The incision over the generator was closed in 2 layers, using 3-0 vicryl deep dermal sutures and a running 4-0 monocryl subcuticular superficially.  The midline incision was closed using chromic with 2 vertical mattress interrupted sutures. Dermabond was used over both incisions and a waterproof dressing was placed on both.     The patient tolerated the procedure well and was transferred to the recovery room in stable condition.      Disposition:   Follow up with me in 3 weeks for virtual visit - phone visit.   He needs to drop off submit a diary prior to this.     Meds:  Prescriptions written for:  -tramadol for significant pain, but use tylenol or ibuprofen alternating for less pain   -doxycyline twice a day for 7 days    No shower for 2 days  Ok to remove dressing in a week  Skin glue will start to  peel off  All sutures are absorbable  Call clinic if you have redness/tenderness or if you see pus draining     Bring 3 day diary to your follow-up   If you have no improvement in urinary symptoms within a few days after surgery call [employer] to adjust your settings  If you are still confused or have questions after speaking with them notify

## 2023-07-12 NOTE — PATIENT INSTRUCTIONS
POST-OP INTERSTIM DIRECTIONS, (LEAVE AT TOP )    If you have any questions or concerns, please contact office. If you sign up for [employer] you can send a message (easiest way to get in touch) or you can call the clinic at [phone].     Post op instructions:  Meds:  Prescriptions written for:  -tramadol for significant pain, but use tylenol or ibuprofen alternating for less pain   -doxycyline twice a day for 7 days    No shower for 2 days  Ok to remove dressing in a week  Skin glue will start to peel off  All sutures are absorbable  Call clinic if you have redness/tenderness or if you see pus draining     Bring 3 day diary to your follow-up   If you have no improvement in urinary symptoms within a few days after surgery call [employer] to adjust your settings  If you are still confused or have questions after speaking with them notify         You had Stage 1 and 2 today:   Stage 2 is the implantation of the internal stimulator if your symptoms are significantly improved or removal of the lead if there is no change. You and your doctor will make this decision with the information provided in your bladder diaries.  The incision on the buttock area from the first surgery is reopened and a pocket is made in the fatty tissue and the generator placed inside.  The outside wire is removed  The procedure takes about 30 minutes and yoU can go home the same day.  You should again limit for activities for 7-10 days.      After Stage 2:  As you heal and increase your activity the stimulation may need to be adjusted. There are many settings that can be changed to give you the best results.     You will have a patient  at home that enables you to turn the generator off and on as well as increase or decrease the stimulation. Please bring this with you to all your office visits    Possible risks: Risks that may occur during or after the 2 stages are rare, but may  occur.  Anytime the skin is opened you may  develop and infection. Every care is taken to avoid this and the procedure is performed under sterile technique. You will be  placed on antibiotics after each procedure. If an infection does develop there is a  chance that the equipment will need to be removed.  There is risk of bleeding from the incision sites, the formation of a seroma (fluid under the incision area) or hematoma (blood collection under the incision).  Some swelling, bleeding, and bruising is normal and very treatable.  You will have some pain or discomfort at the incision site. Pain medicine will be prescribed.  If you have pain with the stimulation the settings can be adjusted to reduce the pain.  There is a risk of the lead moving after placement. This can be caused by too much activity too soon or a fall. The site of stimulation may need to be change or the lead may need to be replaced.  The risks to an unborn child are unknown. If you become or are planning to become pregnant the device should be turned off for the duration of your pregnancy.  There may be other risks, which are unknown at the time.

## 2023-07-12 NOTE — PLAN OF CARE
Pt voided once without difficulty. Pt tolerating PO fluids. Pt and family given discharge instructions. Prescriptions sen to pt's pharmacy. Educated on post anesthesia precautions, dressing care, and when to notify MD. All belongings returned to pt. Transferred to personal vehicle via wheelchair.

## 2023-07-12 NOTE — DISCHARGE INSTRUCTIONS
General Information:    1.  Do not drink alcoholic beverages including beer for 24 hours or as long as you are on pain medication..  2.  Do not drive a motor vehicle, operate machinery or power tools, or signs legal papers for 24 hours or as long as you are on pain medication.   3.  You may experience light-headedness, dizziness, and sleepiness following surgery. Please do not stay alone. A responsible adult should be with you for this 24 hour period.  4.  Go home and rest.    5. Progress slowly to a normal diet unless instructed.  Otherwise, begin with liquids such as soft drinks, then soup and crackers working up to solid foods. Drink plenty of nonalcoholic fluids.  6.  Certain anesthetics and pain medications produce nausea and vomiting in certain       individuals. If nausea becomes a problem at home, call you doctor.    7. A nurse will be calling you sometime after surgery. Do not be alarmed. This is our way of finding out how you are doing.    8. Several times every hour while you are awake, take 2-3 deep breaths and cough. If you had stomach surgery hold a pillow or rolled towel firmly against your stomach before you cough. This will help with any pain the cough might cause.  9. Several times every hour while you are awake, pump and flex your feet 5-6 times and do foot circles. This will help prevent blood clots.    10.Call your doctor for severe pain, bleeding, fever, or signs or symptoms of infection (pain, swelling, redness, foul odor, drainage).       Using an Incentive Spirometer    An incentive spirometer is a device that helps you do deep breathing exercises. These exercises expand your lungs, aid in circulation, and help prevent pneumonia. Deep breathing exercises also help you breathe better and improve the function of your lungs by:  Keeping your lungs clear  Strengthening your breathing muscles  Helping prevent respiratory complications or problems  The incentive spirometer gives you a way to  take an active part in recover. A nurse or therapist will teach you breathing exercises. To do these exercises, you will breathe in through your mouth and not your nose. The incentive spirometer only works correctly if you breathe in through your mouth.  Steps to clear lungs  Step 1. Exhale normally. Then, inhale normally.  Relax and breathe out.  Step 2. Place your lips tightly around the mouthpiece.  Make sure the device is upright and not tilted.  Step 3. Inhale as much air as you can through the mouthpiece (don't breath through your nose).  Inhale slowly and deeply.  Hold your breath long enough to keep the balls or disk raised for at least 3 to 5 seconds, or as instructed by your healthcare provider.  Some spirometers have an indicator to let you know that you are breathing in too fast. If the indicator goes off, breathe in more slowly.  Step 4. Repeat the exercise regularly.  Do this exercise every hour while you're awake, or as instructed by your healthcare provider.  If you were taught deep breathing and coughing exercises, do them regularly as instructed by your healthcare provider.      We hope your stay was comfortable as you heal now, mend and rest.    For we have enjoyed taking care of you by giving your our best.    And as you get better, by regaining your health and strength;   We count it as a privilege to have served you and hope your time at [facility] was well spent.      Thank  You!!!

## 2023-07-12 NOTE — DISCHARGE SUMMARY
[facility]  Urology  Discharge Note - Short Stay      Patient Name: Onesimo Paula  MRN: [id number]  Discharge Date and Time:  07/12/2023 11:18 AM  Attending Physician: La Nena James,*   Discharging Provider: La Nena James MD  Primary Care Physician: Reynaldo Rahman, APRN,FNP-C    There are no hospital problems to display for this patient.      Final Diagnoses: Same as principal problem.    Hospital Course: Patient was admitted for an outpatient procedure and tolerated the procedure well with no complications.*    Procedure(s) (LRB):  INSERTION, ELECTRODE LEAD, NEUROSTIMULATOR, SACRAL, PERCUTANEOUS (N/A)  INSERTION, ELECTRODE LEADS AND PULSE GENERATOR, NEUROSTIMULATOR, SACRAL (N/A)     Indwelling Lines/Drains at time of discharge:   Lines/Drains/Airways       Drain  Duration                  Closed/Suction Drain 01/11/22 1238 Right;Posterior Buttock Bulb 10 Fr. 546 days                    Discharged Condition: good    Disposition: home    Follow Up:      Patient Instructions:   No discharge procedures on file.    Medications:  Reconciled Home Medications:      Medication List        START taking these medications      doxycycline 100 MG Cap  Commonly known as: VIBRAMYCIN  Take 1 capsule (100 mg total) by mouth every 12 (twelve) hours. for 5 days     traMADoL 50 mg tablet  Commonly known as: ULTRAM  Take 1 tablet (50 mg total) by mouth every 6 (six) hours as needed for Pain.            CONTINUE taking these medications      amLODIPine 10 MG tablet  Commonly known as: NORVASC  Take 1 tablet (10 mg total) by mouth once daily.     aspirin 81 MG EC tablet  Commonly known as: ECOTRIN  Take 81 mg by mouth once daily.     co-enzyme Q-10 30 mg capsule  Take 30 mg by mouth 3 (three) times daily.     diclofenac sodium 1 % Gel  Commonly known as: VOLTAREN  Apply 2 g topically 4 (four) times daily as needed (left shoulder/arm pain).     Fish OiL 100-160-1,000 mg Cap  Generic drug:  omega 3-dha-epa-fish oil  Take 1 capsule by mouth once daily.     hydroCHLOROthiazide 25 MG tablet  Commonly known as: HYDRODIURIL  Take 1 tablet (25 mg total) by mouth once daily.     metFORMIN 500 MG tablet  Commonly known as: GLUCOPHAGE  Take 1 tablet (500 mg total) by mouth daily with breakfast.     MULTIVITAMIN 50 PLUS ORAL  Take 1 tablet by mouth once daily.     pantoprazole 40 MG tablet  Commonly known as: PROTONIX  Take 1 tablet (40 mg total) by mouth once daily.     ranolazine 500 MG Tb12  Commonly known as: RANEXA  Take 1 tablet (500 mg total) by mouth 2 (two) times daily.     rosuvastatin 10 MG tablet  Commonly known as: CRESTOR  Take 1 tablet (10 mg total) by mouth once daily.     tadalafiL 5 MG tablet  Commonly known as: CIALIS  Take 1 tablet (5 mg total) by mouth once daily.     telmisartan 80 MG Tab  Commonly known as: MICARDIS  Take 1 tablet (80 mg total) by mouth once daily.              Discharge Procedure Orders (must include Diet, Follow-up, Activity):  No discharge procedures on file.     Meds:  Prescriptions written for:  -tramadol for significant pain, but use tylenol or ibuprofen alternating for less pain   -doxycyline twice a day for 7 days    No shower for 2 days  Ok to remove dressing in a week  Skin glue will start to peel off  All sutures are absorbable  Call clinic if you have redness/tenderness or if you see pus draining     Bring 3 day diary to your follow-up   If you have no improvement in urinary symptoms within a few days after surgery call [employer] to adjust your settings  If you are still confused or have questions after speaking with them notify Dr.Chamberlain La Nena James MD  Urology  [facility]

## 2023-07-13 VITALS
BODY MASS INDEX: 23.77 KG/M2 | TEMPERATURE: 98 F | RESPIRATION RATE: 18 BRPM | OXYGEN SATURATION: 95 % | HEART RATE: 56 BPM | DIASTOLIC BLOOD PRESSURE: 67 MMHG | HEIGHT: 70 IN | SYSTOLIC BLOOD PRESSURE: 113 MMHG | WEIGHT: 166 LBS

## 2023-07-13 NOTE — PROGRESS NOTES
Very pleased with care given.  All staff were kind and professional and good support.  States he has read and understands Written discharge instructions.

## 2023-07-13 NOTE — ANESTHESIA POSTPROCEDURE EVALUATION
Anesthesia Post Evaluation    Patient: Onesimo Paula    Procedure(s) Performed: Procedure(s) (LRB):  INSERTION, ELECTRODE LEAD, NEUROSTIMULATOR, SACRAL, PERCUTANEOUS (N/A)  INSERTION, ELECTRODE LEADS AND PULSE GENERATOR, NEUROSTIMULATOR, SACRAL (N/A)    Final Anesthesia Type: MAC      Patient location during evaluation: PACU  Patient participation: Yes- Able to Participate  Level of consciousness: awake and alert  Post-procedure vital signs: reviewed and stable  Pain management: adequate  Airway patency: patent    PONV status at discharge: No PONV  Anesthetic complications: no      Cardiovascular status: blood pressure returned to baseline  Respiratory status: unassisted and room air  Hydration status: euvolemic  Follow-up not needed.          Vitals Value Taken Time   /67 07/12/23 1147   Temp 36.4 °C (97.6 °F) 07/12/23 1130   Pulse 56 07/12/23 1147   Resp 18 07/12/23 1147   SpO2 95 % 07/12/23 1147         Event Time   Out of Recovery 11:37:09         Pain/Violette Score: Violette Score: 10 (7/12/2023 12:46 PM)  Modified Violette Score: 20 (7/12/2023 12:46 PM)

## 2023-07-27 ENCOUNTER — PATIENT MESSAGE (OUTPATIENT)
Dept: UROLOGY | Facility: CLINIC | Age: 73
End: 2023-07-27
Payer: COMMERCIAL

## 2023-08-07 NOTE — PROGRESS NOTES
The patient location is:[place]  Date of Service: 08/08/2023  The chief complaint leading to consultation is: see hpi and visit diagnosis  Visit type: Virtual visit with REAL TIME AUDIO only. The reason for the audio only service rather than synchronous audio and video virtual visit was related to technical difficulties or patient preference/necessity.    Total time spent with patient: 10. More than half the time was spent counseling or coordinating care.   Date of Service: 08/08/2023  Note by: La Nena James MD          Each patient to whom he or she provides medical services by telemedicine is:    (1) informed of the relationship between the physician and patient and the respective role of any other health care provider with respect to management of the patient; and   (2) notified that he or she may decline to receive medical services by telemedicine and may withdraw from such care at any time.  (3) Patient verbally consented to treatment via phone, according to the clinic consent to treat policies outlined by the registrar    This service was not originating from a related E/M service provided within the previous 7 days nor will  to an E/M service or procedure within the next 24 hours or my soonest available appointment.  Prevailing standard of care was able to be met in this audio-only visit.            [facility] Note - [place]  Staff: MD Erika  PCP: Reynaldo Rahman APRN,SHRADDHAP-C  Date of Service: 08/07/2023      Subjective:        HPI: Onesimo Paula is a 72 y.o. male     Seen by me 9/30/19  Patient had MRI for right hip pain and was found have a thick-walled bladder with enlarged prostate.  He states that he saw urologist over 40 years ago for burning with urination.  He had a renal bladder ultrasound in 2016 which showed enlarged prostate and bilateral renal cyst done for renal insufficiency whom he sees  for.  Denies any gross hematuria.   Review of for previous urine show no microscopic hematuria.  Twenty-five pack-year history of smoking but quit in 1997.      He also has AUA symptom score 6 and occasional weak stream and urgency but voids every 3-5 hours when he is working and a little more frequent when he is not.  Denies any urge incontinence.  Okay stream with occasional weak stream.  PVR today 09/03/2019 is 15 cc.  He may have tried Flomax a couple years ago but does not recall it helping her changing any was symptoms.  Overall really not that bothered by his urinary symptoms.      Also states he has ED.  Previously tried sildenafil unsure dose and it helped but wife does not want him to take, exam revealed peyronie's     Plan: started tadalafil 5mg due to thick walled bladder although essentially asx. No further f/u fr b renal cysts.      Interval history 11/18/19:   Pt states today Cialis has NOT improved any of his lower urinary tract symptoms since starting the medication.  He currently takes this medication before his bedtime.  Pt still c/o urinary urgency, especially during the day.  Nocturia is 1-2x nightly and not bothersome at this time.  He is a , therefore the daytime urgency really bothers him.     Plan: started ditropan 10mg XL and cont'd tadalafil     Interval history by idalia 1/6/20:   TODAY, pt states the oxybutynin only improved his LUTS by over 10% at this time.  PVR by bladder scan performed in office today:  11 mL     Rec'd: d/c oxybutynin, start myrbetriq, cont tadalafil 5mg daily.      Interval history 7/27/20:      Tried myrbetriq but didn't help. Having urgency, hesistancy and intermittent slow stream but urgency most bothersome and having some UUI with a few drop, but not that bothersome. Drinks 1 c of coffee in am. Soda during day. 1 c of coffee int he evening. Has never tried stopping these to see if they help. Drinks beer and notices increase in frequency following day.      On tadalafil 5mg daily for  ED and bph.  pvr by scan: 32cc  Voiding every 30 minutes a day recently more pronounced at home (has been on vacation).  Takes hctz in am. When he's working he only voids about 2x-4x a day. Drinks caffeine while working including energy drinks. No nocturia.      psa 1/31/20 2.2 (up from 1.2 year prior). Repeat psa 0.85 7/29/20  WINSOME today: 40g + no nodules.     Uroflow 7/30/20: peak flow 12.8mL/s,  avg flow 5.4mL/s, voided vol: 191cc, pvr by scan:0      Cysto trus 8/17/20: normal bladder, no stricture. Membranous and distal prostatic urethra with papillary tissue suspicious for prostatitis.trus volume 42.6g. bc of his urgency on tadalafil, flomax, vesicare found rx cipro for 14d to see if it helped w urgency. D/c'd vesicare. cont'd flomax and tadalafil. rtc to see idalia in 2-3 weeks and me in 2 months to discuss bph procedures     Interval history by idalia 9/9/20:   Pt states he is still having the urinary urgency even since finishing the Cipro antibiotics at this time.  He denies gross hematuria, dysuria.  UA today shows normal findings.  PVR by bladder scan today:  9 mL     Plan: rec'd restarting vesicare     Interval history 10/1/20:  Taking flomax 0.4mg nightly and says flow is intermittent on this.   Also on the vesicare and says it doesn't help with the urgency. Voiding 2-3x a day and none at night. No incontinence. 3-4x a week c/o urge incontinence if he tries to hold it too long, mostly in the am when he wakes up. Denies any difficulty with walking. No weakness or numbness.   Changed to decaf coffee 1x a day. Changed to sprite and caffeine free coke.   He does not want to take any more meds. He is not happy with the retrograde ejaculation.   Sees dr. leger for Mitral Valve leakage, last saw him a week ago, was told he should f/u in March 2021. Tadalafil helping with erections     Interval history 11/19/20:   Underwent rezum on 10/21/20. Font flomax, vesicare and tadafil daily for 3 more months.    Returned for fill and pull on 10/26/20. Filled with 210, voided 210  For 2 weeks postop would have blood when he had a BM, improving.   Today (1 month later) he states he has some increased urgency. UUI 2-3x a day (increased from 3-4x a week).  Still taking vesicare but ran out. freq q1 hour (worse in cold weather). Without cold weather freq q 3 hour.   ua today with small wbc and mod blood - some dysuria  No significant change in stream yet  pvr by scan today: 134  AUA ssx:(2 incomplete emptying, 3 frequency, 3 intermittency, 4 urgency, 2 weak stream, 2 straining, 0 sleeping). 16. QOL: 4    Interval history by me in clinic on 3/9/21:  Returns today and states states he ran out of flomax and vesicare a month ago. No increased frequency, urge incontinence or slower stream off the flomax. Still having UUI 1-2x a day with a few drops when he hears water. Voids 8x/24 hours and 4x/8 hours while at work. Nocturia occ (same). No longer drinking caffeine. Still on tadalafil 5mg daily.  No longer having retrograde ejaculation and constipation improved. Hctz in am.  Frequency mostly in  the pm. Bothers him but able to stop at bathrooms.   AUA ssx today:(1 incomplete emptying, 2 frequency, 1 intermittency, 3 urgency, 3 weak stream, 0 straining, 0 sleeping). 10. QOL: mixed  Bladder scan PVR today was 35mL.       I reviewed his previous urinalysis and cultures as well as his urinalysis today. His urinalysis today shows no abnormalities.      I reviewed his previous PSAs and his most recent psa within the last year was 2.3, %free 20.87, last year was 2.2     Sometimes loses erection with tadalafil 5mg daily. Rare. Not more frequent. Still getting morning erections.     Interval history with idalia on 4/9/21:  UA today showed normal findings.  Pt states today even though he tried and changed his HCTZ med to evenings as discussed last ov, his LUTS have not improved.  Pt as of today's visit is still c/o urinary urgency and  frequency during the daytime.  Has not improved since last ov.  Pt states today he has tried Vesicare and Oxybutynin in the past with no improvement in symptoms.  Pt currently denies gross hematuria and dysuria.  She put him on myrbetriq again       Interval history by me 6/15/21:  Uroflow results (date: 03/18/2021): Voiding time: 24.3s, Flow time: 23.1s, TTP flow: 8.0s, Peak flowrate: 19.2 mL/s, Average flowrate: 10.6mL/s, Intervals: 2, Voided volume: 244 mL, Pvr by bladder scan 33. Pattern of curve: see uploaded image, reviewed by     At last visit was c/o freq/urgency. Frequency 8-10x/day. Tried myrbetriq before rezum, ditropan, vesicare and changing hctz to pm but no improvement. Then in April started myrbetriq again , after about 4 weeks saw improvement, now down to 5-6x a day. avg urg: 3. No nocturia. No incontinence.  Also on tadalafil 5mg daily.   AUA ssx today:(1 incomplete emptying, 1 frequency, 1 intermittency, 2 urgency, 3 weak stream, 0 straining, 0 sleeping). 8. QOL:   Bladder scan PVR today was 0mL.      HPI/CC today 6/7/22:   Had him Continue myrbetriq 50mg daily, has enough until 4/2021, Continue tadalafil 5mg daily, refilled today for a year.  Says myrbetriq not helping anymore. He held it for a few days and noticed no difference. On cialis 5mg daily.   Does not have constipation  Voids about 9-10x during waking hours. occ urge incontinence 3x a week. Few drops. Started drinking caffeine this week.   Nocturia 1-2x a night. Urine flow varies.    psa 5/31/22 1.3  ua today neg, pvr by scan: 5  AUA ssx:(2 incomplete emptying, 3 frequency, 2 intermittency, 4 urgency, 3 weak stream, 1 straining, 1 sleeping). 16. QOL: mixed  Appendix removed in January complicated by post-op ileus.    Interval history 8/23/22:  Continue myrbetriq 50mg daily, has enough until 4/2021  Continue tadalafil 5mg daily, refilled today for a year  After adding vesicare to myrbetriq - went from voiding 9-10x day, 1-2x a night  with occ UUI 3x a week to 5-6x/day, 0 night and no UUI. Says he has dry mouth but about the same. However does have some heart burn that is new.   UA today: neg. pvr by scan: 115 (this is the highest it's been, feels empty).  AUA ssx:(1 incomplete emptying, 2 frequency, 0 intermittency, 2 urgency, 2 weak stream, 1 straining, 0 sleeping). 8. QOL: pleased    Interval history 2/28/23:  Says he is doing well on VESIcare and myrbetriq.  However it is expensive.  About 120 dollars every 3 months.  Also having dry mouth with it.  On tadalafil 5 mg daily for erections, obtaining from [facility].  This dose wish for him sometimes.  He tried Viagra 100 mg and it seemed to work.  Aua ssx: 12, pvr: 25    Interval history 4/21/23:  I had him discontinue both myrbetriq and VESIcare at the last visit to see if his incontinence would get worse.  Followed up with Teena on 03/29/2023.  His PVR was 0.  His AUA symptom score was 14, 5.  He was still taking tadalafil 5 mg daily.  He said with stopping VESIcare and myrbetriq he had 2 episodes of incontinence that were large volume, increased postvoid dribbling and he feels like his urinary frequency increased.  Was scheduled with me to discuss InterStim because he wanted to hold off on restarting any medications due to cost and side effects  He returns today with a voiding diary.  He kept a voiding diary for me days and it looks like he urinates on average about 9-10 times a day.  That is up from 6 times a day when he was taking medications.  He did not documented on his voiding diary but he leaks urine 2 to 3 times a day on the way to the bathroom.  Not wearing any pads.  Does not have to change his underwear.  If he could rate his urgency he would say on average his urgency is 4/5.  He is interested in proceeding with InterStim.  He ended up using tadalafil 10 mg once and said this dose worked good for him for his erectile dysfunction  Voided urine today is negative.  Confirmed again that  currently he urinates 7-10 times a day, average urgency is 4 and 1-2 leaks a day.        Interval history 5/25/23:  I did a peripheral nerve evaluation on him on Monday, 4 days ago.  Since then he has gone from urinating 6-10 times a day down to about 6 times a day.  Never had issues at night.  He went from leaking 2 to 3 times a day incontinence down to 1x over the last 3 days. Avg urgency went from 4 to 2. He says >50% improvement and wants to proceed. Not on any meds.   He previously saw  for stress tests bc of SOB. His last w/u was 11/2022 and had a neg stress test. On asa 81mg. No stents. They could never figure out why he has SOB.  He sees pulmonology regularly for nodules in the lungs.  Wires removed today    Interval history 7/11/23:  Here today for interstim stage 1 and 2. Preop appt revealed a1c 6.2, glucose 124. Ua with tr protein/tr bld on 6/28. Started on metformin.   Urinating every 1-2 hours. Uui with drops 2x a day. No pads or underwear changes.  Held asa 81mg for 7d.     Interval history 8/8/23 telephone visit :  Pt states that initially he had issues with urgency and frequency after his procedure but since talking to clarita and making adjustments he has gone from urinating every 1-2 hours to 3-4x a day. Avg urgency remains around 4 and no UUI.   He is happy with his symptoms.        PVR History:  2/28/23 25, aua ssx: 12  8/23/22 115  6/15/21 pvr by scan: 0  3/18/21 UF: pF: 19.2, avg: 10.6, voided vol: 244, pvr: 33  3/2/21               pvr: 35  11/19/20          pvr by scan: 134 (couldn't start after he stopped)  10/26/20          Fill and pull: 0cc pvr  11/19/20          rezum  9/9/20              pvr by scan: 9cc  7/27/20            pvr by scan: 32cc  8/17/20            Cysto trus: 42.6g.   7/30/20           UF: peak flow 12.8mL/s,  avg flow 5.4mL/s, voided vol: 191cc, pvr by scan:0   1/6/20              pvr by scan: 11cc   9/3/19              pvr by scan: 15cc     PSA history: no  family hx of prostate cancer  5/31/22 1.3  3/2/21              2.3, %free 20.87, pvr: 35 (psa screen 4.6)  11/19/20          rezum  7/29/20            0.85, %free 54.12  1/31/20            2.2   2/21/19            1.2  3/14/18            1.0    Urine history  4/21/23 Tr prot  6/7/22  neg  11/19/20          Small wbc/mod blood-send for ua patricio and culture pvr by scan: 134  10/14/20          Neg  9/16/20            neg  9/9/20              neg  3/31/20            Neg  1/6/20              Neg  11/18/19          neg  8/8/19              No cx, void: n/a 0 rbc  2/21/19            Ng, void: neg, 0 rbc  4/9/18              No cx, void: neg  9/2017             No blood       Current REVIEW OF SYSTEMS:  neg    Objective:     There were no vitals filed for this visit.      Focused  exam  neg    Assessment:     Onesimo Paula is a 72 y.o. male with       1. OAB (overactive bladder)        Plan:     Sx significantly improved 1 month after stage 1/2  F/u in 3months for aua ssx       La Nena James MD

## 2023-08-08 ENCOUNTER — OFFICE VISIT (OUTPATIENT)
Dept: UROLOGY | Facility: CLINIC | Age: 73
End: 2023-08-08
Payer: COMMERCIAL

## 2023-08-08 DIAGNOSIS — N32.81 OAB (OVERACTIVE BLADDER): Primary | ICD-10-CM

## 2023-08-08 PROCEDURE — 99499 UNLISTED E&M SERVICE: CPT | Mod: 95,,, | Performed by: UROLOGY

## 2023-08-08 PROCEDURE — 99499 NO LOS: ICD-10-PCS | Mod: 95,,, | Performed by: UROLOGY

## 2023-08-22 ENCOUNTER — OFFICE VISIT (OUTPATIENT)
Dept: PULMONOLOGY | Facility: CLINIC | Age: 73
End: 2023-08-22
Payer: COMMERCIAL

## 2023-08-22 VITALS
WEIGHT: 164 LBS | DIASTOLIC BLOOD PRESSURE: 80 MMHG | HEART RATE: 62 BPM | SYSTOLIC BLOOD PRESSURE: 125 MMHG | BODY MASS INDEX: 23.53 KG/M2 | OXYGEN SATURATION: 98 %

## 2023-08-22 DIAGNOSIS — R06.09 DYSPNEA ON EXERTION: Primary | ICD-10-CM

## 2023-08-22 PROCEDURE — 3044F PR MOST RECENT HEMOGLOBIN A1C LEVEL <7.0%: ICD-10-PCS | Mod: CPTII,S$GLB,, | Performed by: INTERNAL MEDICINE

## 2023-08-22 PROCEDURE — 4010F PR ACE/ARB THEARPY RXD/TAKEN: ICD-10-PCS | Mod: CPTII,S$GLB,, | Performed by: INTERNAL MEDICINE

## 2023-08-22 PROCEDURE — 3008F PR BODY MASS INDEX (BMI) DOCUMENTED: ICD-10-PCS | Mod: CPTII,S$GLB,, | Performed by: INTERNAL MEDICINE

## 2023-08-22 PROCEDURE — 3079F DIAST BP 80-89 MM HG: CPT | Mod: CPTII,S$GLB,, | Performed by: INTERNAL MEDICINE

## 2023-08-22 PROCEDURE — 1159F MED LIST DOCD IN RCRD: CPT | Mod: CPTII,S$GLB,, | Performed by: INTERNAL MEDICINE

## 2023-08-22 PROCEDURE — 1101F PT FALLS ASSESS-DOCD LE1/YR: CPT | Mod: CPTII,S$GLB,, | Performed by: INTERNAL MEDICINE

## 2023-08-22 PROCEDURE — 1101F PR PT FALLS ASSESS DOC 0-1 FALLS W/OUT INJ PAST YR: ICD-10-PCS | Mod: CPTII,S$GLB,, | Performed by: INTERNAL MEDICINE

## 2023-08-22 PROCEDURE — 3074F PR MOST RECENT SYSTOLIC BLOOD PRESSURE < 130 MM HG: ICD-10-PCS | Mod: CPTII,S$GLB,, | Performed by: INTERNAL MEDICINE

## 2023-08-22 PROCEDURE — 1160F RVW MEDS BY RX/DR IN RCRD: CPT | Mod: CPTII,S$GLB,, | Performed by: INTERNAL MEDICINE

## 2023-08-22 PROCEDURE — 3074F SYST BP LT 130 MM HG: CPT | Mod: CPTII,S$GLB,, | Performed by: INTERNAL MEDICINE

## 2023-08-22 PROCEDURE — 1126F AMNT PAIN NOTED NONE PRSNT: CPT | Mod: CPTII,S$GLB,, | Performed by: INTERNAL MEDICINE

## 2023-08-22 PROCEDURE — 1159F PR MEDICATION LIST DOCUMENTED IN MEDICAL RECORD: ICD-10-PCS | Mod: CPTII,S$GLB,, | Performed by: INTERNAL MEDICINE

## 2023-08-22 PROCEDURE — 3044F HG A1C LEVEL LT 7.0%: CPT | Mod: CPTII,S$GLB,, | Performed by: INTERNAL MEDICINE

## 2023-08-22 PROCEDURE — 3008F BODY MASS INDEX DOCD: CPT | Mod: CPTII,S$GLB,, | Performed by: INTERNAL MEDICINE

## 2023-08-22 PROCEDURE — 3288F PR FALLS RISK ASSESSMENT DOCUMENTED: ICD-10-PCS | Mod: CPTII,S$GLB,, | Performed by: INTERNAL MEDICINE

## 2023-08-22 PROCEDURE — 3079F PR MOST RECENT DIASTOLIC BLOOD PRESSURE 80-89 MM HG: ICD-10-PCS | Mod: CPTII,S$GLB,, | Performed by: INTERNAL MEDICINE

## 2023-08-22 PROCEDURE — 1160F PR REVIEW ALL MEDS BY PRESCRIBER/CLIN PHARMACIST DOCUMENTED: ICD-10-PCS | Mod: CPTII,S$GLB,, | Performed by: INTERNAL MEDICINE

## 2023-08-22 PROCEDURE — 1126F PR PAIN SEVERITY QUANTIFIED, NO PAIN PRESENT: ICD-10-PCS | Mod: CPTII,S$GLB,, | Performed by: INTERNAL MEDICINE

## 2023-08-22 PROCEDURE — 4010F ACE/ARB THERAPY RXD/TAKEN: CPT | Mod: CPTII,S$GLB,, | Performed by: INTERNAL MEDICINE

## 2023-08-22 PROCEDURE — 3288F FALL RISK ASSESSMENT DOCD: CPT | Mod: CPTII,S$GLB,, | Performed by: INTERNAL MEDICINE

## 2023-08-22 PROCEDURE — 99214 PR OFFICE/OUTPT VISIT, EST, LEVL IV, 30-39 MIN: ICD-10-PCS | Mod: S$GLB,,, | Performed by: INTERNAL MEDICINE

## 2023-08-22 PROCEDURE — 99214 OFFICE O/P EST MOD 30 MIN: CPT | Mod: S$GLB,,, | Performed by: INTERNAL MEDICINE

## 2023-08-22 NOTE — PROGRESS NOTES
HPI:    7/13/2017 - Here for follow up, had repeat CT chest showing stable nodules (2-4 mm) since 1/2017, will repeat CT in 1 year.  Only complaint is some mild BURT (PFT 9/2016 - mild decrease DLCO o/w ok, methacholine challenge 12/2016 +, will try prn beta agonist.  No other new complaints.    1/23/2018 - Here for follow up, felt breathing didn't respond to inhalers.  He has stopped all of his BP meds and reports SBP to 190 at times (d/w him). No new respiratory symptoms.    7/26/2018 - Here for follow up, no new issues still with some dyspnea with exertion but not any worse (has not really responded to inhalers ( had previous PFT and methacholine challenge were ok).  BP remains an issues (has anywhere from elevated to low BP).  No chest pain or CHF symptoms.    7/23/2019 - Here for follow up, doing well, still with intermittent dyspnea.  BP remains variable and he feels better when it is lower.  Has some blurred vision at times (to see Ophthalmologist for cataracts)    7/28/2020 -  Here for follow up, doing well still with mild intermittent dyspnea.  Had his cataract surgery done.  No known corona virus exposure and has been practicing social distancing.  Did have skin cancers removed (no melanoma).  Reports that he has had some ankle swelling at times but has resolved.    8/24/2021 - Here for follow up, no new respiratory issues.  Having knee issues and may be getting PRP (platelet rich plasma).  Patient has no known corona virus exposures and has been practicing social distancing.  We have discussed the virus and precautions and all questions have been answered.  Has had Covid vaccine (Moderna)    8/23/2022 - Here for follow up, dyspnea is about the same.  He did have appendicitis and had surgery in  1/2022 (11 day hospital stay primarily for slow bowel function return).  Patient has no known corona virus exposures and has been practicing social distancing.  We have discussed the virus and precautions and all  questions have been answered.  He has had Moderna vaccine and booster x 2.  The dyspnea is not predictable and is not consistent.  He denies any chest discomfort with the symptoms.    8/22/2023 - Here for follow up, breathing about the same.  No new respiratory issues reported.  He has had some dizziness and reports some BP < 100 systolic (he is to see cardiology about possible med adjustment).  He has only been hospitalized for same day surgery.  Still working and planning on retiring in the next year or so.  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  He has noted some symptoms with change in position.  In past he did work at a paper [employer], no definite asbestos exposure.  He quit smoking 1997 - 1 PPD for about 25 years.  He was in the service and may have had some indirect exposure to agent orange.      Abnormal Chest CT scan    Date of last scan - 7/2019    Findings - stable multiple nodules    Followup - stable since 2017 - no CT follow up needed      Medications    Current Outpatient Medications:     amLODIPine (NORVASC) 10 MG tablet, Take 1 tablet (10 mg total) by mouth once daily., Disp: 90 tablet, Rfl: 1    aspirin (ECOTRIN) 81 MG EC tablet, Take 81 mg by mouth once daily., Disp: , Rfl:     co-enzyme Q-10 30 mg capsule, Take 30 mg by mouth 3 (three) times daily., Disp: , Rfl:     diclofenac sodium (VOLTAREN) 1 % Gel, Apply 2 g topically 4 (four) times daily as needed (left shoulder/arm pain)., Disp: 200 g, Rfl: 0    hydroCHLOROthiazide (HYDRODIURIL) 25 MG tablet, Take 1 tablet (25 mg total) by mouth once daily., Disp: 90 tablet, Rfl: 1    metFORMIN (GLUCOPHAGE) 500 MG tablet, Take 1 tablet (500 mg total) by mouth daily with breakfast., Disp: 90 tablet, Rfl: 1    multivit with minerals/lutein (MULTIVITAMIN 50 PLUS ORAL), Take 1 tablet by mouth once daily., Disp: , Rfl:     omega 3-dha-epa-fish oil (FISH OIL) 100-160-1,000 mg Cap, Take 1 capsule by mouth once daily., Disp: , Rfl:     pantoprazole (PROTONIX) 40 MG  tablet, Take 1 tablet (40 mg total) by mouth once daily., Disp: 90 tablet, Rfl: 1    ranolazine (RANEXA) 500 MG Tb12, Take 1 tablet (500 mg total) by mouth 2 (two) times daily., Disp: 60 tablet, Rfl: 11    rosuvastatin (CRESTOR) 10 MG tablet, Take 1 tablet (10 mg total) by mouth once daily., Disp: 90 tablet, Rfl: 1    tadalafiL (CIALIS) 5 MG tablet, Take 1 tablet (5 mg total) by mouth once daily., Disp: 90 tablet, Rfl: 3    telmisartan (MICARDIS) 80 MG Tab, Take 1 tablet (80 mg total) by mouth once daily., Disp: 90 tablet, Rfl: 1  No current facility-administered medications for this visit.    Facility-Administered Medications Ordered in Other Visits:     electrolyte-S (ISOLYTE), , Intravenous, Continuous, Loyd Chakraborty MD, Stopped at 23 1141    fentaNYL 50 mcg/mL injection 25 mcg, 25 mcg, Intravenous, Q5 Min PRN, Loyd Chakraborty MD    lactated ringers infusion, , Intravenous, Continuous, Loyd Chakraborty MD    LIDOcaine (PF) 10 mg/ml (1%) injection 10 mg, 1 mL, Intradermal, Once, Loyd Chakraborty MD    metoclopramide HCl injection 10 mg, 10 mg, Intravenous, Q10 Min PRN, Loyd Chakraborty MD    oxyCODONE immediate release tablet 5 mg, 5 mg, Oral, Once PRN, Loyd Chakraborty MD    Allergies  Review of patient's allergies indicates:  No Known Allergies    Social History    Social History     Tobacco Use   Smoking Status Former    Current packs/day: 0.00    Types: Cigarettes    Quit date:     Years since quittin.6   Smokeless Tobacco Never     ETOH - 3 drinks per week.  Social History     Substance and Sexual Activity   Drug Use No     Occupation - works as a     Family History  Family History   Problem Relation Age of Onset    Cancer Mother         skin    Stroke Mother     Heart failure Father     Kidney disease Father        ROS  Review of Systems   Constitutional:  Negative for chills, diaphoresis, fever, malaise/fatigue and weight loss.   HENT:  Negative for congestion.     Eyes:  Negative for pain.   Respiratory:  Positive for shortness of breath. Negative for cough, hemoptysis, sputum production, wheezing and stridor.    Cardiovascular:  Negative for chest pain, palpitations, orthopnea, claudication, leg swelling and PND.   Gastrointestinal:  Negative for abdominal pain, constipation, diarrhea, heartburn, nausea and vomiting.   Genitourinary:  Negative for dysuria, frequency and urgency.   Musculoskeletal:  Negative for falls and myalgias.   Neurological:  Negative for sensory change, focal weakness and weakness.   Psychiatric/Behavioral:  Negative for depression. The patient is not nervous/anxious.        Physical Exam    Vitals:    08/22/23 0856   BP: 125/80   BP Location: Left arm   Patient Position: Sitting   BP Method: Medium (Manual)   Pulse: 62   SpO2: 98%   Weight: 74.4 kg (164 lb)       Physical Exam  Vitals and nursing note reviewed.   Constitutional:       General: He is not in acute distress.     Appearance: He is well-developed. He is not diaphoretic.   HENT:      Head: Normocephalic and atraumatic.      Right Ear: External ear normal.      Left Ear: External ear normal.      Nose: Nose normal.   Eyes:      Pupils: Pupils are equal, round, and reactive to light.   Neck:      Thyroid: No thyromegaly.      Vascular: No JVD.      Trachea: No tracheal deviation.   Cardiovascular:      Rate and Rhythm: Normal rate and regular rhythm.      Heart sounds: Normal heart sounds. No murmur heard.     No friction rub. No gallop.   Pulmonary:      Effort: Pulmonary effort is normal. No respiratory distress.      Breath sounds: Normal breath sounds. No stridor. No wheezing or rales.   Chest:      Chest wall: No tenderness.   Abdominal:      General: Bowel sounds are normal. There is no distension.      Palpations: Abdomen is soft.   Musculoskeletal:         General: No tenderness. Normal range of motion.      Cervical back: Normal range of motion and neck supple.   Lymphadenopathy:       Cervical: No cervical adenopathy.   Neurological:      Mental Status: He is alert and oriented to person, place, and time.      Cranial Nerves: No cranial nerve deficit.      Deep Tendon Reflexes: Reflexes are normal and symmetric.   Psychiatric:         Behavior: Behavior normal.         Lab        Xray     CT CHEST (7/31/2019)  Stable subcentimeter pulmonary nodules within both lungs.          Impression/Plan  Problem List Items Addressed This Visit          Pulmonary    Multiple pulmonary nodules  - low risk pt  - stable 2017  - no further scans needed at this time      Dyspnea on exertion - Primary  - last PFT (8/22) showed mild restriction and a moderate decrease DLCO  - will recheck  - CXR 10/2022 was OK  - RTC 1 year if stable    HTN  - reports episodes of decreased BP  - to discuss with cardiology NP                  Other Visit Diagnoses    None.         Micah Curtis MD

## 2023-08-24 ENCOUNTER — HOSPITAL ENCOUNTER (OUTPATIENT)
Dept: RADIOLOGY | Facility: HOSPITAL | Age: 73
Discharge: HOME | End: 2023-08-24
Attending: INTERNAL MEDICINE
Payer: COMMERCIAL

## 2023-08-24 ENCOUNTER — OFFICE VISIT (OUTPATIENT)
Dept: CARDIOLOGY | Facility: CLINIC | Age: 73
End: 2023-08-24
Payer: COMMERCIAL

## 2023-08-24 ENCOUNTER — TELEPHONE (OUTPATIENT)
Dept: HEPATOLOGY | Facility: CLINIC | Age: 73
End: 2023-08-24
Payer: COMMERCIAL

## 2023-08-24 VITALS
DIASTOLIC BLOOD PRESSURE: 76 MMHG | SYSTOLIC BLOOD PRESSURE: 114 MMHG | BODY MASS INDEX: 23.34 KG/M2 | HEIGHT: 70 IN | WEIGHT: 163 LBS

## 2023-08-24 DIAGNOSIS — I20.89 OTHER FORMS OF ANGINA PECTORIS: ICD-10-CM

## 2023-08-24 DIAGNOSIS — R07.9 CHEST PAIN, UNSPECIFIED TYPE: Primary | ICD-10-CM

## 2023-08-24 DIAGNOSIS — E78.2 MIXED HYPERLIPIDEMIA: ICD-10-CM

## 2023-08-24 DIAGNOSIS — K76.89 LIVER CYST: ICD-10-CM

## 2023-08-24 DIAGNOSIS — K76.89 LIVER CYST: Primary | ICD-10-CM

## 2023-08-24 DIAGNOSIS — I10 ESSENTIAL HYPERTENSION: ICD-10-CM

## 2023-08-24 PROCEDURE — 3008F BODY MASS INDEX DOCD: CPT | Mod: CPTII,S$GLB,, | Performed by: NURSE PRACTITIONER

## 2023-08-24 PROCEDURE — 3074F PR MOST RECENT SYSTOLIC BLOOD PRESSURE < 130 MM HG: ICD-10-PCS | Mod: CPTII,S$GLB,, | Performed by: NURSE PRACTITIONER

## 2023-08-24 PROCEDURE — 3288F FALL RISK ASSESSMENT DOCD: CPT | Mod: CPTII,S$GLB,, | Performed by: NURSE PRACTITIONER

## 2023-08-24 PROCEDURE — 3078F PR MOST RECENT DIASTOLIC BLOOD PRESSURE < 80 MM HG: ICD-10-PCS | Mod: CPTII,S$GLB,, | Performed by: NURSE PRACTITIONER

## 2023-08-24 PROCEDURE — 3044F HG A1C LEVEL LT 7.0%: CPT | Mod: CPTII,S$GLB,, | Performed by: NURSE PRACTITIONER

## 2023-08-24 PROCEDURE — 99999 PR PBB SHADOW E&M-EST. PATIENT-LVL IV: CPT | Mod: PBBFAC,,, | Performed by: NURSE PRACTITIONER

## 2023-08-24 PROCEDURE — 76705 ECHO EXAM OF ABDOMEN: CPT | Mod: TC,PO

## 2023-08-24 PROCEDURE — 4010F PR ACE/ARB THEARPY RXD/TAKEN: ICD-10-PCS | Mod: CPTII,S$GLB,, | Performed by: NURSE PRACTITIONER

## 2023-08-24 PROCEDURE — 3074F SYST BP LT 130 MM HG: CPT | Mod: CPTII,S$GLB,, | Performed by: NURSE PRACTITIONER

## 2023-08-24 PROCEDURE — 3288F PR FALLS RISK ASSESSMENT DOCUMENTED: ICD-10-PCS | Mod: CPTII,S$GLB,, | Performed by: NURSE PRACTITIONER

## 2023-08-24 PROCEDURE — 1125F PR PAIN SEVERITY QUANTIFIED, PAIN PRESENT: ICD-10-PCS | Mod: CPTII,S$GLB,, | Performed by: NURSE PRACTITIONER

## 2023-08-24 PROCEDURE — 1101F PT FALLS ASSESS-DOCD LE1/YR: CPT | Mod: CPTII,S$GLB,, | Performed by: NURSE PRACTITIONER

## 2023-08-24 PROCEDURE — 93000 EKG 12-LEAD: ICD-10-PCS | Mod: S$GLB,,, | Performed by: INTERNAL MEDICINE

## 2023-08-24 PROCEDURE — 1101F PR PT FALLS ASSESS DOC 0-1 FALLS W/OUT INJ PAST YR: ICD-10-PCS | Mod: CPTII,S$GLB,, | Performed by: NURSE PRACTITIONER

## 2023-08-24 PROCEDURE — 99214 OFFICE O/P EST MOD 30 MIN: CPT | Mod: S$GLB,,, | Performed by: NURSE PRACTITIONER

## 2023-08-24 PROCEDURE — 1159F MED LIST DOCD IN RCRD: CPT | Mod: CPTII,S$GLB,, | Performed by: NURSE PRACTITIONER

## 2023-08-24 PROCEDURE — 3044F PR MOST RECENT HEMOGLOBIN A1C LEVEL <7.0%: ICD-10-PCS | Mod: CPTII,S$GLB,, | Performed by: NURSE PRACTITIONER

## 2023-08-24 PROCEDURE — 1159F PR MEDICATION LIST DOCUMENTED IN MEDICAL RECORD: ICD-10-PCS | Mod: CPTII,S$GLB,, | Performed by: NURSE PRACTITIONER

## 2023-08-24 PROCEDURE — 3078F DIAST BP <80 MM HG: CPT | Mod: CPTII,S$GLB,, | Performed by: NURSE PRACTITIONER

## 2023-08-24 PROCEDURE — 99214 PR OFFICE/OUTPT VISIT, EST, LEVL IV, 30-39 MIN: ICD-10-PCS | Mod: S$GLB,,, | Performed by: NURSE PRACTITIONER

## 2023-08-24 PROCEDURE — 99999 PR PBB SHADOW E&M-EST. PATIENT-LVL IV: ICD-10-PCS | Mod: PBBFAC,,, | Performed by: NURSE PRACTITIONER

## 2023-08-24 PROCEDURE — 93000 ELECTROCARDIOGRAM COMPLETE: CPT | Mod: S$GLB,,, | Performed by: INTERNAL MEDICINE

## 2023-08-24 PROCEDURE — 4010F ACE/ARB THERAPY RXD/TAKEN: CPT | Mod: CPTII,S$GLB,, | Performed by: NURSE PRACTITIONER

## 2023-08-24 PROCEDURE — 3008F PR BODY MASS INDEX (BMI) DOCUMENTED: ICD-10-PCS | Mod: CPTII,S$GLB,, | Performed by: NURSE PRACTITIONER

## 2023-08-24 PROCEDURE — 1125F AMNT PAIN NOTED PAIN PRSNT: CPT | Mod: CPTII,S$GLB,, | Performed by: NURSE PRACTITIONER

## 2023-08-24 RX ORDER — AMLODIPINE BESYLATE 10 MG/1
5 TABLET ORAL DAILY
Qty: 90 TABLET | Refills: 1 | Status: SHIPPED | OUTPATIENT
Start: 2023-08-24 | End: 2024-02-05 | Stop reason: SDUPTHER

## 2023-08-24 RX ORDER — HYDROCHLOROTHIAZIDE 25 MG/1
12.5 TABLET ORAL DAILY
Qty: 90 TABLET | Refills: 1 | Status: SHIPPED | OUTPATIENT
Start: 2023-08-24 | End: 2023-08-24

## 2023-08-24 NOTE — PROGRESS NOTES
[facility] [place] On license of [facility]    Subjective:     Patient ID:  Onesimo Paula is a 72 y.o. male patient here for evaluation Follow-up, Shortness of Breath, Chest Pain, and Dizziness      HPI      Onesimo Paula is here for follow-up visit.  Denies recent fever cough chills or congestion. Denies blood in the urine or blood in the stool.  Denies myalgias. Denies orthopnea or peripheral edema. Denies nausea vomiting or dyspepsia. No recent arm neck or jaw pain.      Review of Systems   Respiratory:  Positive for shortness of breath.    Cardiovascular:  Positive for chest pain.   Neurological:  Positive for dizziness.   All other systems reviewed and are negative.       Past Medical History:   Diagnosis Date    Acid reflux     Acquired tricuspid valve insufficiency     Anemia     Borderline diabetes     BPH (benign prostatic hyperplasia)     Cataract     1/16/20 rt eye, 1/30/20- Lt eye    Coronary artery disease     Hypertension     Liver cyst 08/24/2022    Pulmonary nodules     Skin cancer        Past Surgical History:   Procedure Laterality Date    CYSTOSCOPY WITH TRANSURETHRAL DESTRUCTION OF PROSTATE,RFA N/A 10/21/2020    Procedure: CYSTOSCOPY WITH TRANSURETHRAL DESTRUCTION OF PROSTATE,RFA;  Surgeon: La Nena James MD;  Location: [facility];  Service: Urology;  Laterality: N/A;  please make sure [facility] available [phone]    EYE SURGERY      for cataracts, rt eye 1/16/20, left eye 1/30/20    HERNIA REPAIR  12/1999    HERNIA REPAIR  05/2004    INSERTION OF SACRAL NEUROSTIMULATOR, ELECTRODES, AND IMPLANTABLE PULSE GENERATOR (IPG) N/A 7/12/2023    Procedure: INSERTION, ELECTRODE LEADS AND PULSE GENERATOR, NEUROSTIMULATOR, SACRAL;  Surgeon: La Nena James MD;  Location: [facility] OR;  Service: Urology;  Laterality: N/A;    KNEE ARTHROSCOPY Left 04/12/2018    LAPAROSCOPIC APPENDECTOMY N/A 1/2/2022    Procedure: APPENDECTOMY, LAPAROSCOPIC;  Surgeon: Sam JARVIS  MD Wolfgang;  Location: [facility] OR;  Service: General;  Laterality: N/A;    PERCUTANEOUS INSERTION OF SACRAL NERVE NEUROSTIMULATOR ELECTRODE LEAD N/A 2023    Procedure: INSERTION, ELECTRODE LEAD, NEUROSTIMULATOR, SACRAL, PERCUTANEOUS Keep first;  Surgeon: La Nena James MD;  Location: [facility] OR;  Service: Urology;  Laterality: N/A;  patient needs to apply EMLA cream to entirety of lower back 20 minutes prior to procedure, also need 1% lidocaine available    PERCUTANEOUS INSERTION OF SACRAL NERVE NEUROSTIMULATOR ELECTRODE LEAD N/A 2023    Procedure: INSERTION, ELECTRODE LEAD, NEUROSTIMULATOR, SACRAL, PERCUTANEOUS;  Surgeon: La Nena James MD;  Location: [facility] OR;  Service: Urology;  Laterality: N/A;    SKIN LESION EXCISION  10/2009    Neck    SKIN LESION EXCISION  2015    STAPEDECTOMY Right 2001    TRANSRECTAL ULTRASOUND EXAMINATION N/A 2020    Procedure: TRANSRECTAL ULTRASOUND;  Surgeon: La Nena James MD;  Location: [facility] OR;  Service: Urology;  Laterality: N/A;       Family History   Problem Relation Age of Onset    Cancer Mother         skin    Stroke Mother     Heart failure Father     Kidney disease Father        Social History     Socioeconomic History    Marital status:    Tobacco Use    Smoking status: Former     Current packs/day: 0.00     Types: Cigarettes     Quit date:      Years since quittin.    Smokeless tobacco: Never   Substance and Sexual Activity    Alcohol use: Yes     Comment: occasional    Drug use: No    Sexual activity: Yes     Partners: Female     Social Determinants of Health     Financial Resource Strain: Low Risk  (2023)    Overall Financial Resource Strain (CARDIA)     Difficulty of Paying Living Expenses: Not hard at all   Food Insecurity: No Food Insecurity (2023)    Hunger Vital Sign     Worried About Running Out of Food in the Last Year: Never true     Ran Out of Food in the Last Year: Never true   Transportation  Needs: No Transportation Needs (8/17/2023)    PRAPARE - Transportation     Lack of Transportation (Medical): No     Lack of Transportation (Non-Medical): No   Physical Activity: Inactive (8/17/2023)    Exercise Vital Sign     Days of Exercise per Week: 0 days     Minutes of Exercise per Session: 0 min   Stress: No Stress Concern Present (8/17/2023)    [employer] [place] [employer] - Occupational Stress Questionnaire     Feeling of Stress : Not at all   Social Connections: Unknown (8/17/2023)    Social Connection and Isolation Panel [NHANES]     Frequency of Communication with Friends and Family: More than three times a week     Frequency of Social Gatherings with Friends and Family: Once a week     Active Member of Clubs or Organizations: Yes     Attends Club or Organization Meetings: Never     Marital Status:    Housing Stability: Low Risk  (8/17/2023)    Housing Stability Vital Sign     Unable to Pay for Housing in the Last Year: No     Number of Places Lived in the Last Year: 1     Unstable Housing in the Last Year: No       Current Outpatient Medications   Medication Sig Dispense Refill    aspirin (ECOTRIN) 81 MG EC tablet Take 81 mg by mouth once daily.      co-enzyme Q-10 30 mg capsule Take 30 mg by mouth 3 (three) times daily.      diclofenac sodium (VOLTAREN) 1 % Gel Apply 2 g topically 4 (four) times daily as needed (left shoulder/arm pain). 200 g 0    metFORMIN (GLUCOPHAGE) 500 MG tablet Take 1 tablet (500 mg total) by mouth daily with breakfast. 90 tablet 1    multivit with minerals/lutein (MULTIVITAMIN 50 PLUS ORAL) Take 1 tablet by mouth once daily.      omega 3-dha-epa-fish oil (FISH OIL) 100-160-1,000 mg Cap Take 1 capsule by mouth once daily.      pantoprazole (PROTONIX) 40 MG tablet Take 1 tablet (40 mg total) by mouth once daily. 90 tablet 1    ranolazine (RANEXA) 500 MG Tb12 Take 1 tablet (500 mg total) by mouth 2 (two) times daily. 60 tablet 11    rosuvastatin (CRESTOR) 10 MG  tablet Take 1 tablet (10 mg total) by mouth once daily. 90 tablet 1    tadalafiL (CIALIS) 5 MG tablet Take 1 tablet (5 mg total) by mouth once daily. 90 tablet 3    telmisartan (MICARDIS) 80 MG Tab Take 1 tablet (80 mg total) by mouth once daily. 90 tablet 1    amLODIPine (NORVASC) 10 MG tablet Take 0.5 tablets (5 mg total) by mouth once daily. 90 tablet 1     No current facility-administered medications for this visit.     Facility-Administered Medications Ordered in Other Visits   Medication Dose Route Frequency Provider Last Rate Last Admin    electrolyte-S (ISOLYTE)   Intravenous Continuous Loyd Chakraborty MD   Stopped at 07/12/23 1141    fentaNYL 50 mcg/mL injection 25 mcg  25 mcg Intravenous Q5 Min PRN Loyd Chakraborty MD        lactated ringers infusion   Intravenous Continuous Loyd Chakraborty MD        LIDOcaine (PF) 10 mg/ml (1%) injection 10 mg  1 mL Intradermal Once Loyd Chakraborty MD        metoclopramide HCl injection 10 mg  10 mg Intravenous Q10 Min PRN Loyd Chakraborty MD        oxyCODONE immediate release tablet 5 mg  5 mg Oral Once PRN Loyd Chakraborty MD           Review of patient's allergies indicates:  No Known Allergies      Objective:        Vitals:    08/24/23 1535   BP: 114/76       Physical Exam  Vitals reviewed.   Constitutional:       Appearance: Normal appearance.   HENT:      Head: Normocephalic.   Eyes:      Pupils: Pupils are equal, round, and reactive to light.   Cardiovascular:      Rate and Rhythm: Normal rate and regular rhythm.   Pulmonary:      Effort: Pulmonary effort is normal.      Breath sounds: Normal breath sounds.   Musculoskeletal:         General: Normal range of motion.   Skin:     General: Skin is warm and dry.      Capillary Refill: Capillary refill takes less than 2 seconds.   Neurological:      Mental Status: He is alert and oriented to person, place, and time.   Psychiatric:         Mood and Affect: Mood normal.         Behavior: Behavior normal.          Thought Content: Thought content normal.         Judgment: Judgment normal.         LIPIDS - LAST 2   Lab Results   Component Value Date    CHOL 160 04/05/2023    CHOL 170 11/30/2022    HDL 48 04/05/2023    HDL 52 11/30/2022    LDLCALC 99.8 04/05/2023    LDLCALC 107.2 11/30/2022    TRIG 61 04/05/2023    TRIG 54 11/30/2022    CHOLHDL 30.0 04/05/2023    CHOLHDL 30.6 11/30/2022       CBC - LAST 2  Lab Results   Component Value Date    WBC 8.04 06/28/2023    WBC 7.32 11/30/2022    RBC 4.56 (L) 06/28/2023    RBC 4.53 (L) 11/30/2022    HGB 13.8 (L) 06/28/2023    HGB 13.7 (L) 11/30/2022    HCT 40.8 06/28/2023    HCT 42.2 11/30/2022    MCV 90 06/28/2023    MCV 93 11/30/2022    MCH 30.3 06/28/2023    MCH 30.2 11/30/2022    MCHC 33.8 06/28/2023    MCHC 32.5 11/30/2022    RDW 13.6 06/28/2023    RDW 14.2 11/30/2022     06/28/2023     11/30/2022    MPV 10.1 06/28/2023    MPV 9.6 11/30/2022    GRAN 5.0 06/28/2023    GRAN 61.5 06/28/2023    LYMPH 2.1 06/28/2023    LYMPH 25.5 06/28/2023    MONO 0.7 06/28/2023    MONO 8.7 06/28/2023    BASO 0.07 06/28/2023    BASO 0.06 11/30/2022    NRBC 0 06/28/2023    NRBC 0 11/30/2022       CHEMISTRY & LIVER FUNCTION - LAST 2  Lab Results   Component Value Date     06/28/2023     04/05/2023    K 3.8 06/28/2023    K 3.5 04/05/2023     06/28/2023     04/05/2023    CO2 22 (L) 06/28/2023    CO2 27 04/05/2023    ANIONGAP 11 06/28/2023    ANIONGAP 8 04/05/2023    BUN 32 (H) 06/28/2023    BUN 34 (H) 04/05/2023    CREATININE 1.3 06/28/2023    CREATININE 1.2 04/05/2023     (H) 06/28/2023     04/05/2023    CALCIUM 9.8 06/28/2023    CALCIUM 9.2 04/05/2023    MG 2.4 11/30/2022    MG 2.1 01/13/2022    ALBUMIN 4.2 04/05/2023    ALBUMIN 4.3 11/30/2022    PROT 7.3 04/05/2023    PROT 7.7 11/30/2022    ALKPHOS 54 (L) 04/05/2023    ALKPHOS 59 11/30/2022    ALT 37 04/05/2023    ALT 26 11/30/2022    AST 30 04/05/2023    AST 25 11/30/2022    BILITOT 0.6  04/05/2023    BILITOT 0.7 11/30/2022        CARDIAC PROFILE - LAST 2  Lab Results   Component Value Date     07/30/2020     (H) 03/31/2020        COAGULATION - LAST 2  Lab Results   Component Value Date    LABPT 12.8 10/24/2022    LABPT 13.8 (H) 01/11/2022    INR 1.0 10/24/2022    INR 1.1 01/11/2022    APTT 28.3 01/11/2022       ENDOCRINE & PSA - LAST 2  Lab Results   Component Value Date    HGBA1C 6.2 04/05/2023    HGBA1C 6.0 08/10/2021    TSH 3.640 11/30/2022    TSH 3.270 08/10/2021    PSA 1.5 04/05/2023    PSA 1.3 05/31/2022        ECHOCARDIOGRAM RESULTS  Results for orders placed during the hospital encounter of 11/30/22    Echo    Interpretation Summary  · The left ventricle is normal in size with mild concentric hypertrophy and normal systolic function.  · Normal left ventricular diastolic function.  · The estimated PA systolic pressure is 30 mmHg.  · Normal right ventricular size with normal right ventricular systolic function.  · Normal central venous pressure (3 mmHg).  · The estimated ejection fraction is 60%.  · Moderate mitral regurgitation.  · Mild tricuspid regurgitation.      CURRENT/PREVIOUS VISIT EKG  Results for orders placed or performed in visit on 08/24/23   IN OFFICE EKG 12-LEAD (to Percy)    Collection Time: 08/24/23  3:36 PM    Narrative    Test Reason : R07.9,    Vent. Rate : 053 BPM     Atrial Rate : 053 BPM     P-R Int : 192 ms          QRS Dur : 114 ms      QT Int : 428 ms       P-R-T Axes : 027 002 034 degrees     QTc Int : 401 ms    Sinus bradycardia  Otherwise normal ECG  When compared with ECG of 28-JUN-2023 07:09,  No significant change was found    Referred By:  REKHA           Confirmed By:      No valid procedures specified.   Results for orders placed during the hospital encounter of 11/30/22    Nuclear Stress - Cardiology Interpreted    Interpretation Summary    Normal myocardial perfusion scan. There is no evidence of myocardial ischemia or infarction.    The gated  perfusion images showed an ejection fraction of 79% post stress. Normal ejection fraction is greater than 53%.    There is normal wall motion post stress.    LV cavity size is  and normal at stress.    The EKG portion of this study is negative for ischemia.    The patient reported no chest pain during the stress test.    There were no arrhythmias during stress.          Assessment:       1. Chest pain, unspecified type    2. Essential hypertension    3. Other forms of angina pectoris    4. Mixed hyperlipidemia           Plan:           Problem List Items Addressed This Visit          Cardiac/Vascular    Chest pain - Primary    Overview     intermittent chest pain  for past several months. Hx of smoking 26 years smoking. quitted in 12/1997.Echo showed: normal left ventricular function with mitral / tricuspid valve regurgitation.         Mixed hyperlipidemia    Overview     total cholesterol 226, . HDL 41 in 3/2015. 10 year ASCVD Risk is 15 %.total cholesterol 160, LDL 95, HDL 42 in 7/2015.  on Lipitor since 4/2015. 8/2015:  d/c Lipitor 2/2 muscle cramps. Crestor 10 mg. ( start 5 mg first).   crestor sample was given to pt.9/2015: check lab 2 months later.  mild alternative shoulder pain. check cpk.11/2015: cut crestor to half . 2/2 CK high 308.  LDL 71.2/2015: CK decrease to 145, continue on Crestor 1/2 .5/2016: crestor 5 mg. total cholesterol 160, HDL 42, LDL 97.3/2017: tolerate crestor 5 mg. . declines increasing dosage of Crestor.         Essential hypertension    Current Assessment & Plan     Now with hypotension and dizziness  STOP HCTZ  Cut amlodipine to 5  Keep log           Orders Placed This Encounter   Procedures    CTA Cardiac     Standing Status:   Future     Standing Expiration Date:   8/24/2024     Order Specific Question:   Is the patient allergic to iodine contrast?     Answer:   No     Order Specific Question:   Does this patient have impaired renal function?     Answer:   No     Order  Specific Question:   Diabetes?     Answer:   No     Order Specific Question:   May the Radiologist modify the order per protocol to meet the clinical needs of the patient?     Answer:   Yes    Creatinine, serum     Standing Status:   Future     Number of Occurrences:   1     Standing Expiration Date:   10/22/2024    IN OFFICE EKG 12-LEAD (to Muse)        [facility] [place]  [place]

## 2023-08-24 NOTE — TELEPHONE ENCOUNTER
----- Message from Rachel Soni MD sent at 8/24/2023  8:35 AM CDT -----  Cyst minimally increased in size. Repeat in one year

## 2023-08-25 ENCOUNTER — PATIENT MESSAGE (OUTPATIENT)
Dept: HEPATOLOGY | Facility: CLINIC | Age: 73
End: 2023-08-25
Payer: COMMERCIAL

## 2023-08-28 DIAGNOSIS — K76.89 LIVER CYST: Primary | ICD-10-CM

## 2023-08-29 ENCOUNTER — PATIENT MESSAGE (OUTPATIENT)
Dept: CARDIOLOGY | Facility: CLINIC | Age: 73
End: 2023-08-29
Payer: COMMERCIAL

## 2023-09-01 ENCOUNTER — PATIENT MESSAGE (OUTPATIENT)
Dept: CARDIOLOGY | Facility: CLINIC | Age: 73
End: 2023-09-01
Payer: COMMERCIAL

## 2023-09-05 ENCOUNTER — HOSPITAL ENCOUNTER (OUTPATIENT)
Dept: PULMONOLOGY | Facility: HOSPITAL | Age: 73
Discharge: HOME | End: 2023-09-05
Attending: INTERNAL MEDICINE
Payer: COMMERCIAL

## 2023-09-05 ENCOUNTER — OFFICE VISIT (OUTPATIENT)
Dept: HEPATOLOGY | Facility: CLINIC | Age: 73
End: 2023-09-05
Payer: COMMERCIAL

## 2023-09-05 ENCOUNTER — PATIENT MESSAGE (OUTPATIENT)
Dept: CARDIOLOGY | Facility: CLINIC | Age: 73
End: 2023-09-05
Payer: COMMERCIAL

## 2023-09-05 ENCOUNTER — HOSPITAL ENCOUNTER (OUTPATIENT)
Dept: RADIOLOGY | Facility: HOSPITAL | Age: 73
Discharge: HOME | End: 2023-09-05
Attending: INTERNAL MEDICINE
Payer: COMMERCIAL

## 2023-09-05 VITALS
SYSTOLIC BLOOD PRESSURE: 175 MMHG | BODY MASS INDEX: 24.07 KG/M2 | OXYGEN SATURATION: 99 % | HEART RATE: 63 BPM | HEIGHT: 70 IN | WEIGHT: 168.13 LBS | DIASTOLIC BLOOD PRESSURE: 84 MMHG | RESPIRATION RATE: 19 BRPM | TEMPERATURE: 97 F

## 2023-09-05 DIAGNOSIS — R06.09 DYSPNEA ON EXERTION: ICD-10-CM

## 2023-09-05 DIAGNOSIS — K76.89 LIVER CYST: Primary | ICD-10-CM

## 2023-09-05 PROCEDURE — 3008F BODY MASS INDEX DOCD: CPT | Mod: CPTII,S$GLB,, | Performed by: INTERNAL MEDICINE

## 2023-09-05 PROCEDURE — 3079F PR MOST RECENT DIASTOLIC BLOOD PRESSURE 80-89 MM HG: ICD-10-PCS | Mod: CPTII,S$GLB,, | Performed by: INTERNAL MEDICINE

## 2023-09-05 PROCEDURE — 94726 PLETHYSMOGRAPHY LUNG VOLUMES: CPT

## 2023-09-05 PROCEDURE — 1101F PR PT FALLS ASSESS DOC 0-1 FALLS W/OUT INJ PAST YR: ICD-10-PCS | Mod: CPTII,S$GLB,, | Performed by: INTERNAL MEDICINE

## 2023-09-05 PROCEDURE — 3077F SYST BP >= 140 MM HG: CPT | Mod: CPTII,S$GLB,, | Performed by: INTERNAL MEDICINE

## 2023-09-05 PROCEDURE — 3008F PR BODY MASS INDEX (BMI) DOCUMENTED: ICD-10-PCS | Mod: CPTII,S$GLB,, | Performed by: INTERNAL MEDICINE

## 2023-09-05 PROCEDURE — 3079F DIAST BP 80-89 MM HG: CPT | Mod: CPTII,S$GLB,, | Performed by: INTERNAL MEDICINE

## 2023-09-05 PROCEDURE — 1126F PR PAIN SEVERITY QUANTIFIED, NO PAIN PRESENT: ICD-10-PCS | Mod: CPTII,S$GLB,, | Performed by: INTERNAL MEDICINE

## 2023-09-05 PROCEDURE — 99213 OFFICE O/P EST LOW 20 MIN: CPT | Mod: S$GLB,,, | Performed by: INTERNAL MEDICINE

## 2023-09-05 PROCEDURE — 4010F PR ACE/ARB THEARPY RXD/TAKEN: ICD-10-PCS | Mod: CPTII,S$GLB,, | Performed by: INTERNAL MEDICINE

## 2023-09-05 PROCEDURE — 1126F AMNT PAIN NOTED NONE PRSNT: CPT | Mod: CPTII,S$GLB,, | Performed by: INTERNAL MEDICINE

## 2023-09-05 PROCEDURE — 1159F PR MEDICATION LIST DOCUMENTED IN MEDICAL RECORD: ICD-10-PCS | Mod: CPTII,S$GLB,, | Performed by: INTERNAL MEDICINE

## 2023-09-05 PROCEDURE — 99213 PR OFFICE/OUTPT VISIT, EST, LEVL III, 20-29 MIN: ICD-10-PCS | Mod: S$GLB,,, | Performed by: INTERNAL MEDICINE

## 2023-09-05 PROCEDURE — 1159F MED LIST DOCD IN RCRD: CPT | Mod: CPTII,S$GLB,, | Performed by: INTERNAL MEDICINE

## 2023-09-05 PROCEDURE — 99999 PR PBB SHADOW E&M-EST. PATIENT-LVL V: CPT | Mod: PBBFAC,,, | Performed by: INTERNAL MEDICINE

## 2023-09-05 PROCEDURE — 3044F PR MOST RECENT HEMOGLOBIN A1C LEVEL <7.0%: ICD-10-PCS | Mod: CPTII,S$GLB,, | Performed by: INTERNAL MEDICINE

## 2023-09-05 PROCEDURE — 94729 DIFFUSING CAPACITY: CPT

## 2023-09-05 PROCEDURE — 3288F FALL RISK ASSESSMENT DOCD: CPT | Mod: CPTII,S$GLB,, | Performed by: INTERNAL MEDICINE

## 2023-09-05 PROCEDURE — 1160F RVW MEDS BY RX/DR IN RCRD: CPT | Mod: CPTII,S$GLB,, | Performed by: INTERNAL MEDICINE

## 2023-09-05 PROCEDURE — 99999 PR PBB SHADOW E&M-EST. PATIENT-LVL V: ICD-10-PCS | Mod: PBBFAC,,, | Performed by: INTERNAL MEDICINE

## 2023-09-05 PROCEDURE — 1160F PR REVIEW ALL MEDS BY PRESCRIBER/CLIN PHARMACIST DOCUMENTED: ICD-10-PCS | Mod: CPTII,S$GLB,, | Performed by: INTERNAL MEDICINE

## 2023-09-05 PROCEDURE — 71046 X-RAY EXAM CHEST 2 VIEWS: CPT | Mod: 26,,, | Performed by: RADIOLOGY

## 2023-09-05 PROCEDURE — 94060 EVALUATION OF WHEEZING: CPT

## 2023-09-05 PROCEDURE — 1101F PT FALLS ASSESS-DOCD LE1/YR: CPT | Mod: CPTII,S$GLB,, | Performed by: INTERNAL MEDICINE

## 2023-09-05 PROCEDURE — 71046 X-RAY EXAM CHEST 2 VIEWS: CPT | Mod: TC

## 2023-09-05 PROCEDURE — 4010F ACE/ARB THERAPY RXD/TAKEN: CPT | Mod: CPTII,S$GLB,, | Performed by: INTERNAL MEDICINE

## 2023-09-05 PROCEDURE — 3288F PR FALLS RISK ASSESSMENT DOCUMENTED: ICD-10-PCS | Mod: CPTII,S$GLB,, | Performed by: INTERNAL MEDICINE

## 2023-09-05 PROCEDURE — 3077F PR MOST RECENT SYSTOLIC BLOOD PRESSURE >= 140 MM HG: ICD-10-PCS | Mod: CPTII,S$GLB,, | Performed by: INTERNAL MEDICINE

## 2023-09-05 PROCEDURE — 71046 XR CHEST PA AND LATERAL: ICD-10-PCS | Mod: 26,,, | Performed by: RADIOLOGY

## 2023-09-05 PROCEDURE — 3044F HG A1C LEVEL LT 7.0%: CPT | Mod: CPTII,S$GLB,, | Performed by: INTERNAL MEDICINE

## 2023-09-05 RX ORDER — ALBUTEROL SULFATE 2.5 MG/.5ML
SOLUTION RESPIRATORY (INHALATION)
Status: DISCONTINUED
Start: 2023-09-05 | End: 2023-09-06 | Stop reason: HOSPADM

## 2023-09-05 NOTE — PROGRESS NOTES
HEPATOLOGY FOLLOW UP    Referring Physician: JAMES Rose,KELLY  Current Corresponding Physician: JAMES Rose,KELLY    Onesimo Paula is here for follow up of No chief complaint on file.      HPI  Onesimo Paula is a 72 y.o. male who had an MRI of the abdomen that revealed a mildly complex liver cyst. I saw him in consultation 2/3/22:     MRI abdo w wo contrast 12/7/21:  segment 5 there is a 4 cm lobular T1 hypointense T2 hyperintense lesion     CT abdo pelvis 1/2/22: The liver is hypodense compatible with fatty infiltration. There is a 3.5 cm cyst within the right lobe of the liver. There is no biliary duct dilatation.  CT abdo pelvis w contrast 1/9/22: Liver is normal size. Hepatic cyst again noted noted and is unchanged. No new liver lesions  CTA abdomen 1/12/22: Simple fluid attenuation      He denied abdo pain, N/V. He did recently have intraabdominal abscesses after an appendectomy but those had resolved and he did not symptoms when I saw him in consultation.     Labs 1/13/22: ALT 24, AST 19, ALKP 52, Tbil 0.8     The patient also denied any symptoms of decompensated cirrhosis, including no ascites or edema, cognitive problems that would suggest hepatic encephalopathy, or GI bleeding from varices.    Interval History  Since Onesimo Paula's last 2 visits:    My impression was that he had a simple cyst in the liver. I recommended an abdo US:    Abdo US 2/16/22: The liver is normal in overall size. There is a 4.2 cm thinly septated cyst within the right hepatic lobe as has been demonstrated on previous imaging. There are no additional focal hepatic parenchymal abnormalities demonstrated.  Abdo US 8/16/22: Liver maintains normal echogenicity without focal mass or intrahepatic bile duct dilation. There is redemonstration of right hepatic lobe cyst measuring 3.3 x 3.7 x 4.1 cm, with septation and is not significant changed when compared with previous exam.  Abdo US 8/24/23: Liver measures  15.8 cm in length.  Right hepatic lobe cyst measures 4.4 x 4.3 x 3.8 cm, minimally increased in size compared to prior.    He continues to not have any symptoms in the RUQ that could be attributed to the cyst.    Outpatient Encounter Medications as of 9/5/2023   Medication Sig Dispense Refill    amLODIPine (NORVASC) 10 MG tablet Take 0.5 tablets (5 mg total) by mouth once daily. 90 tablet 1    aspirin (ECOTRIN) 81 MG EC tablet Take 81 mg by mouth once daily.      co-enzyme Q-10 30 mg capsule Take 30 mg by mouth 3 (three) times daily.      metFORMIN (GLUCOPHAGE) 500 MG tablet Take 1 tablet (500 mg total) by mouth daily with breakfast. 90 tablet 1    multivit with minerals/lutein (MULTIVITAMIN 50 PLUS ORAL) Take 1 tablet by mouth once daily.      omega 3-dha-epa-fish oil (FISH OIL) 100-160-1,000 mg Cap Take 1 capsule by mouth once daily.      pantoprazole (PROTONIX) 40 MG tablet Take 1 tablet (40 mg total) by mouth once daily. 90 tablet 1    ranolazine (RANEXA) 500 MG Tb12 Take 1 tablet (500 mg total) by mouth 2 (two) times daily. 60 tablet 11    rosuvastatin (CRESTOR) 10 MG tablet Take 1 tablet (10 mg total) by mouth once daily. 90 tablet 1    tadalafiL (CIALIS) 5 MG tablet Take 1 tablet (5 mg total) by mouth once daily. 90 tablet 3    telmisartan (MICARDIS) 80 MG Tab Take 1 tablet (80 mg total) by mouth once daily. 90 tablet 1    diclofenac sodium (VOLTAREN) 1 % Gel Apply 2 g topically 4 (four) times daily as needed (left shoulder/arm pain). 200 g 0    [DISCONTINUED] amLODIPine (NORVASC) 10 MG tablet Take 1 tablet (10 mg total) by mouth once daily. 90 tablet 1    [DISCONTINUED] hydroCHLOROthiazide (HYDRODIURIL) 25 MG tablet Take 1 tablet (25 mg total) by mouth once daily. 90 tablet 1    [DISCONTINUED] traMADoL (ULTRAM) 50 mg tablet Take 1 tablet (50 mg total) by mouth every 6 (six) hours as needed for Pain. 10 tablet 0     Facility-Administered Encounter Medications as of 9/5/2023   Medication Dose Route Frequency  Provider Last Rate Last Admin    electrolyte-S (ISOLYTE)   Intravenous Continuous Loyd Chakraborty MD   Stopped at 07/12/23 1141    fentaNYL 50 mcg/mL injection 25 mcg  25 mcg Intravenous Q5 Min PRN Lody Chakraborty MD        lactated ringers infusion   Intravenous Continuous Loyd Chakraborty MD        LIDOcaine (PF) 10 mg/ml (1%) injection 10 mg  1 mL Intradermal Once Loyd Chakraborty MD        metoclopramide HCl injection 10 mg  10 mg Intravenous Q10 Min PRN Loyd Chakraborty MD        oxyCODONE immediate release tablet 5 mg  5 mg Oral Once PRN Loyd Chakraborty MD         Review of patient's allergies indicates:  No Known Allergies  Past Medical History:   Diagnosis Date    Acid reflux     Acquired tricuspid valve insufficiency     Anemia     Borderline diabetes     BPH (benign prostatic hyperplasia)     Cataract     1/16/20 rt eye, 1/30/20- Lt eye    Coronary artery disease     Hypertension     Liver cyst 08/24/2022    Pulmonary nodules     Skin cancer        Review of Systems   Constitutional: Negative.    HENT: Negative.     Eyes: Negative.    Respiratory: Negative.     Cardiovascular: Negative.    Gastrointestinal: Negative.    Genitourinary: Negative.    Musculoskeletal: Negative.    Skin: Negative.    Neurological: Negative.    Psychiatric/Behavioral: Negative.       Vitals:    09/05/23 0842   BP: (!) 175/84   Pulse: 63   Resp: 19   Temp: 96.8 °F (36 °C)       Physical Exam  Vitals reviewed.   Constitutional:       Appearance: He is well-developed.   HENT:      Head: Normocephalic and atraumatic.   Eyes:      General: No scleral icterus.     Conjunctiva/sclera: Conjunctivae normal.      Pupils: Pupils are equal, round, and reactive to light.   Neck:      Thyroid: No thyromegaly.   Cardiovascular:      Rate and Rhythm: Normal rate and regular rhythm.      Heart sounds: Normal heart sounds.   Pulmonary:      Effort: Pulmonary effort is normal.      Breath sounds: Normal breath sounds. No rales.    Abdominal:      General: Bowel sounds are normal. There is no distension.      Palpations: Abdomen is soft. There is no mass.      Tenderness: There is no abdominal tenderness.   Musculoskeletal:         General: Normal range of motion.      Cervical back: Normal range of motion and neck supple.   Skin:     General: Skin is warm and dry.      Findings: No rash.   Neurological:      Mental Status: He is alert and oriented to person, place, and time.         MELD 3.0: 8 at 10/24/2022  3:18 PM  MELD-Na: 8 at 10/24/2022  3:18 PM  Calculated from:  Serum Creatinine: 1.2 mg/dL at 10/24/2022  3:18 PM  Serum Sodium: 139 mmol/L (Using max of 137 mmol/L) at 10/24/2022  3:18 PM  Total Bilirubin: 0.6 mg/dL (Using min of 1 mg/dL) at 10/24/2022  3:18 PM  Serum Albumin: 4.1 g/dL (Using max of 3.5 g/dL) at 10/24/2022  3:18 PM  INR(ratio): 1.0 at 10/24/2022  3:18 PM  Age at listing (hypothetical): 71 years  Sex: Male at 10/24/2022  3:18 PM      Lab Results   Component Value Date     (H) 06/28/2023     (H) 02/21/2019    BUN 32 (H) 06/28/2023    CREATININE 1.8 (H) 08/24/2023    CREATININE 1.11 02/21/2019    CALCIUM 9.8 06/28/2023     06/28/2023     02/21/2019    K 3.8 06/28/2023     06/28/2023     02/21/2019    PROT 7.3 04/05/2023    CO2 22 (L) 06/28/2023    ANIONGAP 11 06/28/2023    WBC 8.04 06/28/2023    RBC 4.56 (L) 06/28/2023    HGB 13.8 (L) 06/28/2023    HCT 40.8 06/28/2023    MCV 90 06/28/2023    MCH 30.3 06/28/2023    MCHC 33.8 06/28/2023     Lab Results   Component Value Date    RDW 13.6 06/28/2023     06/28/2023    MPV 10.1 06/28/2023    GRAN 5.0 06/28/2023    GRAN 61.5 06/28/2023    LYMPH 2.1 06/28/2023    LYMPH 25.5 06/28/2023    MONO 0.7 06/28/2023    MONO 8.7 06/28/2023    EOSINOPHIL 3.0 06/28/2023    BASOPHIL 0.9 06/28/2023    EOS 0.2 06/28/2023    BASO 0.07 06/28/2023    APTT 28.3 01/11/2022    GROUPTRH A NEG 10/24/2022    CHOL 160 04/05/2023    TRIG 61 04/05/2023    HDL 48  04/05/2023    CHOLHDL 30.0 04/05/2023    TOTALCHOLEST 3.3 04/05/2023    ALBUMIN 4.2 04/05/2023    ALBUMIN 4.2 02/21/2019    AST 30 04/05/2023    ALT 37 04/05/2023    ALKPHOS 54 (L) 04/05/2023    MG 2.4 11/30/2022    INR 1.0 10/24/2022    PSA 1.5 04/05/2023       Assessment and Plan:  Onesimo Paula is a 72 y.o. male with a liver cyst. It is minimally increased on abdo US 8/23. CT scan 01/22 revealed no concerning features and no other cysts. He remains asymptomatic. The cyst does have a thin septation. I am therefore recommending continued surveillance. I have recommended a repeat abdo US in 1 year.    Return 1 year.

## 2023-09-06 ENCOUNTER — TELEPHONE (OUTPATIENT)
Dept: PULMONOLOGY | Facility: CLINIC | Age: 73
End: 2023-09-06

## 2023-09-06 LAB
BRPFT: ABNORMAL
DLCO SINGLE BREATH LLN: 19.66
DLCO SINGLE BREATH PRE REF: 48.6 %
DLCO SINGLE BREATH REF: 26.59
DLCOC SBVA LLN: 2.58
DLCOC SBVA REF: 3.73
DLCOC SINGLE BREATH LLN: 19.66
DLCOC SINGLE BREATH REF: 26.59
DLCOVA LLN: 2.58
DLCOVA PRE REF: 78.9 %
DLCOVA PRE: 2.95 ML/(MIN*MMHG*L) (ref 2.58–4.88)
DLCOVA REF: 3.73
ERV LLN: -16448.98
ERV PRE REF: 69.8 %
ERV REF: 1.02
FEF 25 75 CHG: 0.7 %
FEF 25 75 LLN: 0.94
FEF 25 75 POST REF: 103.4 %
FEF 25 75 PRE REF: 102.6 %
FEF 25 75 REF: 2.27
FET100 CHG: 59.5 %
FEV1 CHG: -0.5 %
FEV1 FVC CHG: 0.2 %
FEV1 FVC LLN: 62
FEV1 FVC POST REF: 104.2 %
FEV1 FVC PRE REF: 103.9 %
FEV1 FVC REF: 75
FEV1 LLN: 2.19
FEV1 POST REF: 81 %
FEV1 PRE REF: 81.5 %
FEV1 REF: 3.08
FRCPLETH LLN: 2.74
FRCPLETH PREREF: 85.5 %
FRCPLETH REF: 3.73
FVC CHG: -0.7 %
FVC LLN: 3.02
FVC POST REF: 77.3 %
FVC PRE REF: 77.9 %
FVC REF: 4.11
IVC PRE: 3.22 L (ref 3.02–5.22)
IVC SINGLE BREATH LLN: 3.02
IVC SINGLE BREATH PRE REF: 78.3 %
IVC SINGLE BREATH REF: 4.11
MVV LLN: 103
MVV PRE REF: 77.9 %
MVV REF: 122
PEF CHG: -5.4 %
PEF LLN: 5.73
PEF POST REF: 78.2 %
PEF PRE REF: 82.7 %
PEF REF: 8.05
POST FEF 25 75: 2.35 L/S (ref 0.94–4.19)
POST FET 100: 9.58 SEC
POST FEV1 FVC: 78.59 % (ref 61.54–87.86)
POST FEV1: 2.5 L (ref 2.19–3.91)
POST FVC: 3.18 L (ref 3.02–5.22)
POST PEF: 6.3 L/S (ref 5.73–10.37)
PRE DLCO: 12.92 ML/(MIN*MMHG) (ref 19.66–33.52)
PRE ERV: 0.71 L (ref -16448.98–16451.02)
PRE FEF 25 75: 2.33 L/S (ref 0.94–4.19)
PRE FET 100: 6 SEC
PRE FEV1 FVC: 78.42 % (ref 61.54–87.86)
PRE FEV1: 2.51 L (ref 2.19–3.91)
PRE FRC PL: 3.19 L (ref 2.74–4.71)
PRE FVC: 3.2 L (ref 3.02–5.22)
PRE MVV: 94.69 L/MIN (ref 103.31–139.77)
PRE PEF: 6.66 L/S (ref 5.73–10.37)
PRE RV: 2.47 L (ref 2.03–3.38)
PRE TLC: 5.68 L (ref 5.97–8.28)
RAW LLN: 3.06
RAW PRE REF: 89.7 %
RAW PRE: 2.74 CMH2O*S/L (ref 3.06–3.06)
RAW REF: 3.06
RV LLN: 2.03
RV PRE REF: 91.5 %
RV REF: 2.71
RVTLC LLN: 33
RVTLC PRE REF: 102.7 %
RVTLC PRE: 43.59 % (ref 33.45–51.41)
RVTLC REF: 42
TLC LLN: 5.97
TLC PRE REF: 79.7 %
TLC REF: 7.13
VA PRE: 4.39 L (ref 6.98–6.98)
VA SINGLE BREATH LLN: 6.98
VA SINGLE BREATH PRE REF: 62.9 %
VA SINGLE BREATH REF: 6.98
VC LLN: 3.02
VC PRE REF: 77.9 %
VC PRE: 3.2 L (ref 3.02–5.22)
VC REF: 4.11

## 2023-09-06 PROCEDURE — 94060 EVALUATION OF WHEEZING: CPT | Mod: 26,59,, | Performed by: INTERNAL MEDICINE

## 2023-09-06 PROCEDURE — 94726 PLETHYSMOGRAPHY LUNG VOLUMES: CPT | Mod: 26,,, | Performed by: INTERNAL MEDICINE

## 2023-09-06 PROCEDURE — 94060 PR EVAL OF BRONCHOSPASM: ICD-10-PCS | Mod: 26,59,, | Performed by: INTERNAL MEDICINE

## 2023-09-06 PROCEDURE — 94618 PULMONARY STRESS TESTING: CPT | Mod: 26,,, | Performed by: INTERNAL MEDICINE

## 2023-09-06 PROCEDURE — 94729 PR C02/MEMBANE DIFFUSE CAPACITY: ICD-10-PCS | Mod: 26,,, | Performed by: INTERNAL MEDICINE

## 2023-09-06 PROCEDURE — 94726 PULM FUNCT TST PLETHYSMOGRAP: ICD-10-PCS | Mod: 26,,, | Performed by: INTERNAL MEDICINE

## 2023-09-06 PROCEDURE — 94729 DIFFUSING CAPACITY: CPT | Mod: 26,,, | Performed by: INTERNAL MEDICINE

## 2023-09-06 PROCEDURE — 94618 PULMONARY STRESS TESTING: ICD-10-PCS | Mod: 26,,, | Performed by: INTERNAL MEDICINE

## 2023-09-06 NOTE — TELEPHONE ENCOUNTER
----- Message from Micah Curtis MD sent at 9/6/2023 10:06 AM CDT -----  PFT shows mild restriction and a moderate reduction in diffusing capacity and is similar to his last study

## 2023-09-19 NOTE — TELEPHONE ENCOUNTER
Vianca, when you get a chance can you look in her gone) out the lipid profile that his cardiologist ordered and any other labs that I did not get sent to me because his nephrologist ordered. He do not have to shown to me just scanning to media and amend the health maintenance block for his lipids.   Universal Safety Interventions

## 2023-09-22 ENCOUNTER — OFFICE VISIT (OUTPATIENT)
Dept: CARDIOLOGY | Facility: CLINIC | Age: 73
End: 2023-09-22
Payer: COMMERCIAL

## 2023-09-22 VITALS
OXYGEN SATURATION: 98 % | DIASTOLIC BLOOD PRESSURE: 68 MMHG | SYSTOLIC BLOOD PRESSURE: 112 MMHG | WEIGHT: 164 LBS | BODY MASS INDEX: 23.48 KG/M2 | HEART RATE: 63 BPM | HEIGHT: 70 IN

## 2023-09-22 DIAGNOSIS — R07.9 CHEST PAIN, UNSPECIFIED TYPE: ICD-10-CM

## 2023-09-22 DIAGNOSIS — E78.2 MIXED HYPERLIPIDEMIA: ICD-10-CM

## 2023-09-22 DIAGNOSIS — I10 ESSENTIAL HYPERTENSION: Primary | ICD-10-CM

## 2023-09-22 DIAGNOSIS — I34.0 NONRHEUMATIC MITRAL VALVE REGURGITATION: ICD-10-CM

## 2023-09-22 DIAGNOSIS — I20.89 OTHER FORMS OF ANGINA PECTORIS: ICD-10-CM

## 2023-09-22 DIAGNOSIS — R91.8 MULTIPLE PULMONARY NODULES: ICD-10-CM

## 2023-09-22 PROCEDURE — 3078F PR MOST RECENT DIASTOLIC BLOOD PRESSURE < 80 MM HG: ICD-10-PCS | Mod: CPTII,S$GLB,, | Performed by: NURSE PRACTITIONER

## 2023-09-22 PROCEDURE — 99999 PR PBB SHADOW E&M-EST. PATIENT-LVL IV: ICD-10-PCS | Mod: PBBFAC,,, | Performed by: NURSE PRACTITIONER

## 2023-09-22 PROCEDURE — 99214 OFFICE O/P EST MOD 30 MIN: CPT | Mod: S$GLB,,, | Performed by: NURSE PRACTITIONER

## 2023-09-22 PROCEDURE — 1159F PR MEDICATION LIST DOCUMENTED IN MEDICAL RECORD: ICD-10-PCS | Mod: CPTII,S$GLB,, | Performed by: NURSE PRACTITIONER

## 2023-09-22 PROCEDURE — 3074F SYST BP LT 130 MM HG: CPT | Mod: CPTII,S$GLB,, | Performed by: NURSE PRACTITIONER

## 2023-09-22 PROCEDURE — 3074F PR MOST RECENT SYSTOLIC BLOOD PRESSURE < 130 MM HG: ICD-10-PCS | Mod: CPTII,S$GLB,, | Performed by: NURSE PRACTITIONER

## 2023-09-22 PROCEDURE — 3044F HG A1C LEVEL LT 7.0%: CPT | Mod: CPTII,S$GLB,, | Performed by: NURSE PRACTITIONER

## 2023-09-22 PROCEDURE — 1101F PT FALLS ASSESS-DOCD LE1/YR: CPT | Mod: CPTII,S$GLB,, | Performed by: NURSE PRACTITIONER

## 2023-09-22 PROCEDURE — 1126F PR PAIN SEVERITY QUANTIFIED, NO PAIN PRESENT: ICD-10-PCS | Mod: CPTII,S$GLB,, | Performed by: NURSE PRACTITIONER

## 2023-09-22 PROCEDURE — 3288F PR FALLS RISK ASSESSMENT DOCUMENTED: ICD-10-PCS | Mod: CPTII,S$GLB,, | Performed by: NURSE PRACTITIONER

## 2023-09-22 PROCEDURE — 4010F PR ACE/ARB THEARPY RXD/TAKEN: ICD-10-PCS | Mod: CPTII,S$GLB,, | Performed by: NURSE PRACTITIONER

## 2023-09-22 PROCEDURE — 1126F AMNT PAIN NOTED NONE PRSNT: CPT | Mod: CPTII,S$GLB,, | Performed by: NURSE PRACTITIONER

## 2023-09-22 PROCEDURE — 4010F ACE/ARB THERAPY RXD/TAKEN: CPT | Mod: CPTII,S$GLB,, | Performed by: NURSE PRACTITIONER

## 2023-09-22 PROCEDURE — 99999 PR PBB SHADOW E&M-EST. PATIENT-LVL IV: CPT | Mod: PBBFAC,,, | Performed by: NURSE PRACTITIONER

## 2023-09-22 PROCEDURE — 1159F MED LIST DOCD IN RCRD: CPT | Mod: CPTII,S$GLB,, | Performed by: NURSE PRACTITIONER

## 2023-09-22 PROCEDURE — 3008F PR BODY MASS INDEX (BMI) DOCUMENTED: ICD-10-PCS | Mod: CPTII,S$GLB,, | Performed by: NURSE PRACTITIONER

## 2023-09-22 PROCEDURE — 3008F BODY MASS INDEX DOCD: CPT | Mod: CPTII,S$GLB,, | Performed by: NURSE PRACTITIONER

## 2023-09-22 PROCEDURE — 1101F PR PT FALLS ASSESS DOC 0-1 FALLS W/OUT INJ PAST YR: ICD-10-PCS | Mod: CPTII,S$GLB,, | Performed by: NURSE PRACTITIONER

## 2023-09-22 PROCEDURE — 3288F FALL RISK ASSESSMENT DOCD: CPT | Mod: CPTII,S$GLB,, | Performed by: NURSE PRACTITIONER

## 2023-09-22 PROCEDURE — 3078F DIAST BP <80 MM HG: CPT | Mod: CPTII,S$GLB,, | Performed by: NURSE PRACTITIONER

## 2023-09-22 PROCEDURE — 3044F PR MOST RECENT HEMOGLOBIN A1C LEVEL <7.0%: ICD-10-PCS | Mod: CPTII,S$GLB,, | Performed by: NURSE PRACTITIONER

## 2023-09-22 PROCEDURE — 99214 PR OFFICE/OUTPT VISIT, EST, LEVL IV, 30-39 MIN: ICD-10-PCS | Mod: S$GLB,,, | Performed by: NURSE PRACTITIONER

## 2023-09-22 RX ORDER — RANOLAZINE 500 MG/1
1000 TABLET, EXTENDED RELEASE ORAL 2 TIMES DAILY
Qty: 120 TABLET | Refills: 11 | Status: SHIPPED | OUTPATIENT
Start: 2023-09-22 | End: 2024-09-21

## 2023-09-22 NOTE — PROGRESS NOTES
Subjective:    Patient ID:  Onesimo Paula is a 72 y.o. male patient here for evaluation Follow-up      History of Present Illness:       Onesimo Paula is here for follow-up visit. Still having CP that comes and goes. He did not yet get cardiac CTA. Denies recent fever cough chills or congestion. Denies blood in the urine or blood in the stool.  Denies myalgias. Denies orthopnea or peripheral edema. Denies nausea vomiting or dyspepsia. No recent arm neck or jaw pain. No lightheadedness or dizziness.         Review of patient's allergies indicates:  No Known Allergies    Past Medical History:   Diagnosis Date    Acid reflux     Acquired tricuspid valve insufficiency     Anemia     Borderline diabetes     BPH (benign prostatic hyperplasia)     Cataract     1/16/20 rt eye, 1/30/20- Lt eye    Coronary artery disease     Hypertension     Liver cyst 08/24/2022    Pulmonary nodules     Skin cancer      Past Surgical History:   Procedure Laterality Date    CYSTOSCOPY WITH TRANSURETHRAL DESTRUCTION OF PROSTATE,RFA N/A 10/21/2020    Procedure: CYSTOSCOPY WITH TRANSURETHRAL DESTRUCTION OF PROSTATE,RFA;  Surgeon: La Nena James MD;  Location: [facility] OR;  Service: Urology;  Laterality: N/A;  please make sure [facility] rep available [phone]    EYE SURGERY      for cataracts, rt eye 1/16/20, left eye 1/30/20    HERNIA REPAIR  12/1999    HERNIA REPAIR  05/2004    INSERTION OF SACRAL NEUROSTIMULATOR, ELECTRODES, AND IMPLANTABLE PULSE GENERATOR (IPG) N/A 7/12/2023    Procedure: INSERTION, ELECTRODE LEADS AND PULSE GENERATOR, NEUROSTIMULATOR, SACRAL;  Surgeon: La Nena James MD;  Location: [facility] OR;  Service: Urology;  Laterality: N/A;    KNEE ARTHROSCOPY Left 04/12/2018    LAPAROSCOPIC APPENDECTOMY N/A 1/2/2022    Procedure: APPENDECTOMY, LAPAROSCOPIC;  Surgeon: Sam Goss MD;  Location: [facility] OR;  Service: General;  Laterality: N/A;    PERCUTANEOUS INSERTION OF SACRAL NERVE NEUROSTIMULATOR ELECTRODE LEAD  N/A 2023    Procedure: INSERTION, ELECTRODE LEAD, NEUROSTIMULATOR, SACRAL, PERCUTANEOUS Keep first;  Surgeon: La Nena James MD;  Location: [facility] OR;  Service: Urology;  Laterality: N/A;  patient needs to apply EMLA cream to entirety of lower back 20 minutes prior to procedure, also need 1% lidocaine available    PERCUTANEOUS INSERTION OF SACRAL NERVE NEUROSTIMULATOR ELECTRODE LEAD N/A 2023    Procedure: INSERTION, ELECTRODE LEAD, NEUROSTIMULATOR, SACRAL, PERCUTANEOUS;  Surgeon: La Nena James MD;  Location: [facility] OR;  Service: Urology;  Laterality: N/A;    SKIN LESION EXCISION  10/2009    Neck    SKIN LESION EXCISION  2015    STAPEDECTOMY Right 2001    TRANSRECTAL ULTRASOUND EXAMINATION N/A 2020    Procedure: TRANSRECTAL ULTRASOUND;  Surgeon: La Nena James MD;  Location: [facility] OR;  Service: Urology;  Laterality: N/A;     Social History     Tobacco Use    Smoking status: Former     Current packs/day: 0.00     Types: Cigarettes     Quit date:      Years since quittin.    Smokeless tobacco: Never   Substance Use Topics    Alcohol use: Yes     Comment: occasional    Drug use: No        Review of Systems:    As noted in HPI             Objective        Vitals:    23 1122   BP: 112/68   Pulse: 63       LIPIDS - LAST 2   Lab Results   Component Value Date    CHOL 160 2023    CHOL 170 2022    HDL 48 2023    HDL 52 2022    LDLCALC 99.8 2023    LDLCALC 107.2 2022    TRIG 61 2023    TRIG 54 2022    CHOLHDL 30.0 2023    CHOLHDL 30.6 2022       CBC - LAST 2  Lab Results   Component Value Date    WBC 8.04 2023    WBC 7.32 2022    RBC 4.56 (L) 2023    RBC 4.53 (L) 2022    HGB 13.8 (L) 2023    HGB 13.7 (L) 2022    HCT 40.8 2023    HCT 42.2 2022    MCV 90 2023    MCV 93 2022    MCH 30.3 2023    MCH 30.2 2022    MCHC 33.8 2023    MCHC  32.5 11/30/2022    RDW 13.6 06/28/2023    RDW 14.2 11/30/2022     06/28/2023     11/30/2022    MPV 10.1 06/28/2023    MPV 9.6 11/30/2022    GRAN 5.0 06/28/2023    GRAN 61.5 06/28/2023    LYMPH 2.1 06/28/2023    LYMPH 25.5 06/28/2023    MONO 0.7 06/28/2023    MONO 8.7 06/28/2023    BASO 0.07 06/28/2023    BASO 0.06 11/30/2022    NRBC 0 06/28/2023    NRBC 0 11/30/2022       CHEMISTRY & LIVER FUNCTION - LAST 2  Lab Results   Component Value Date     06/28/2023     04/05/2023    K 3.8 06/28/2023    K 3.5 04/05/2023     06/28/2023     04/05/2023    CO2 22 (L) 06/28/2023    CO2 27 04/05/2023    ANIONGAP 11 06/28/2023    ANIONGAP 8 04/05/2023    BUN 32 (H) 06/28/2023    BUN 34 (H) 04/05/2023    CREATININE 1.8 (H) 08/24/2023    CREATININE 1.3 06/28/2023     (H) 06/28/2023     04/05/2023    CALCIUM 9.8 06/28/2023    CALCIUM 9.2 04/05/2023    MG 2.4 11/30/2022    MG 2.1 01/13/2022    ALBUMIN 4.2 04/05/2023    ALBUMIN 4.3 11/30/2022    PROT 7.3 04/05/2023    PROT 7.7 11/30/2022    ALKPHOS 54 (L) 04/05/2023    ALKPHOS 59 11/30/2022    ALT 37 04/05/2023    ALT 26 11/30/2022    AST 30 04/05/2023    AST 25 11/30/2022    BILITOT 0.6 04/05/2023    BILITOT 0.7 11/30/2022        CARDIAC PROFILE - LAST 2  Lab Results   Component Value Date     07/30/2020     (H) 03/31/2020        COAGULATION - LAST 2  Lab Results   Component Value Date    LABPT 12.8 10/24/2022    LABPT 13.8 (H) 01/11/2022    INR 1.0 10/24/2022    INR 1.1 01/11/2022    APTT 28.3 01/11/2022       ENDOCRINE & PSA - LAST 2  Lab Results   Component Value Date    HGBA1C 6.2 04/05/2023    HGBA1C 6.0 08/10/2021    TSH 3.640 11/30/2022    TSH 3.270 08/10/2021    PSA 1.5 04/05/2023    PSA 1.3 05/31/2022        ECHOCARDIOGRAM RESULTS  Results for orders placed during the hospital encounter of 11/30/22    Echo    Interpretation Summary  · The left ventricle is normal in size with mild concentric hypertrophy and  normal systolic function.  · Normal left ventricular diastolic function.  · The estimated PA systolic pressure is 30 mmHg.  · Normal right ventricular size with normal right ventricular systolic function.  · Normal central venous pressure (3 mmHg).  · The estimated ejection fraction is 60%.  · Moderate mitral regurgitation.  · Mild tricuspid regurgitation.      CURRENT/PREVIOUS VISIT EKG  Results for orders placed or performed in visit on 08/24/23   IN OFFICE EKG 12-LEAD (to [employer])    Collection Time: 08/24/23  3:36 PM    Narrative    Test Reason : R07.9,    Vent. Rate : 053 BPM     Atrial Rate : 053 BPM     P-R Int : 192 ms          QRS Dur : 114 ms      QT Int : 428 ms       P-R-T Axes : 027 002 034 degrees     QTc Int : 401 ms    Sinus bradycardia  Otherwise normal ECG  When compared with ECG of 28-JUN-2023 07:09,  No significant change was found    Referred By:  REKHA           Confirmed By:      No valid procedures specified.   Results for orders placed during the hospital encounter of 11/30/22    Nuclear Stress - Cardiology Interpreted    Interpretation Summary    Normal myocardial perfusion scan. There is no evidence of myocardial ischemia or infarction.    The gated perfusion images showed an ejection fraction of 79% post stress. Normal ejection fraction is greater than 53%.    There is normal wall motion post stress.    LV cavity size is  and normal at stress.    The EKG portion of this study is negative for ischemia.    The patient reported no chest pain during the stress test.    There were no arrhythmias during stress.        PHYSICAL EXAM  CONSTITUTIONAL: Well built, well nourished in no apparent distress  NECK: no carotid bruit, no JVD  LUNGS: CTA  CHEST WALL: no tenderness  HEART: regular rate and rhythm, S1, S2 normal, no murmur, click, rub or gallop   ABDOMEN: soft, non-tender; bowel sounds normal; no masses,  no organomegaly  EXTREMITIES: Extremities normal, no edema, no calf tenderness noted  NEURO:  AAO X 3    I HAVE REVIEWED :    The vital signs, nurses notes, and all the pertinent radiology and labs.        Current Outpatient Medications   Medication Instructions    amLODIPine (NORVASC) 5 mg, Oral, Daily    aspirin (ECOTRIN) 81 mg, Oral, Daily    co-enzyme Q-10 30 mg, Oral, 3 times daily    diclofenac sodium (VOLTAREN) 2 g, Topical (Top), 4 times daily PRN    metFORMIN (GLUCOPHAGE) 500 mg, Oral, With breakfast    multivit with minerals/lutein (MULTIVITAMIN 50 PLUS ORAL) 1 tablet, Oral, Daily    omega 3-dha-epa-fish oil (FISH OIL) 100-160-1,000 mg Cap 1 capsule, Oral, Daily    pantoprazole (PROTONIX) 40 mg, Oral, Daily    ranolazine (RANEXA) 500 mg, Oral, 2 times daily    rosuvastatin (CRESTOR) 10 mg, Oral, Daily    tadalafiL (CIALIS) 5 mg, Oral, Daily    telmisartan (MICARDIS) 80 mg, Oral, Daily          Assessment & Plan     Problem List Items Addressed This Visit          Pulmonary    Multiple pulmonary nodules       Cardiac/Vascular    Chest pain    Mixed hyperlipidemia    Essential hypertension - Primary    Nonrheumatic mitral valve regurgitation        OBTAIN Cardiac CTA  Recommend increase Ranexa 1000 mg PO BID  If CP or SOB worsenes he knows to go to ER for evaluation

## 2023-09-25 ENCOUNTER — PATIENT MESSAGE (OUTPATIENT)
Dept: FAMILY MEDICINE | Facility: CLINIC | Age: 73
End: 2023-09-25

## 2023-09-28 ENCOUNTER — PATIENT MESSAGE (OUTPATIENT)
Dept: CARDIOLOGY | Facility: CLINIC | Age: 73
End: 2023-09-28
Payer: COMMERCIAL

## 2023-10-04 ENCOUNTER — OFFICE VISIT (OUTPATIENT)
Dept: FAMILY MEDICINE | Facility: CLINIC | Age: 73
End: 2023-10-04
Payer: COMMERCIAL

## 2023-10-04 ENCOUNTER — LAB VISIT (OUTPATIENT)
Dept: LAB | Facility: HOSPITAL | Age: 73
End: 2023-10-04
Attending: NURSE PRACTITIONER
Payer: COMMERCIAL

## 2023-10-04 VITALS
OXYGEN SATURATION: 98 % | BODY MASS INDEX: 23.09 KG/M2 | HEIGHT: 70 IN | DIASTOLIC BLOOD PRESSURE: 74 MMHG | WEIGHT: 161.31 LBS | HEART RATE: 56 BPM | SYSTOLIC BLOOD PRESSURE: 116 MMHG

## 2023-10-04 DIAGNOSIS — R06.02 SOB (SHORTNESS OF BREATH): ICD-10-CM

## 2023-10-04 DIAGNOSIS — R53.82 CHRONIC FATIGUE AND MALAISE: ICD-10-CM

## 2023-10-04 DIAGNOSIS — R53.81 CHRONIC FATIGUE AND MALAISE: ICD-10-CM

## 2023-10-04 DIAGNOSIS — D64.9 MILD ANEMIA: ICD-10-CM

## 2023-10-04 DIAGNOSIS — D64.9 MILD ANEMIA: Primary | ICD-10-CM

## 2023-10-04 LAB
25(OH)D3+25(OH)D2 SERPL-MCNC: 46 NG/ML (ref 30–96)
ALBUMIN SERPL BCP-MCNC: 4.4 G/DL (ref 3.5–5.2)
ALP SERPL-CCNC: 52 U/L (ref 55–135)
ALT SERPL W/O P-5'-P-CCNC: 21 U/L (ref 10–44)
ANION GAP SERPL CALC-SCNC: 8 MMOL/L (ref 8–16)
AST SERPL-CCNC: 24 U/L (ref 10–40)
BASOPHILS # BLD AUTO: 0.06 K/UL (ref 0–0.2)
BASOPHILS NFR BLD: 1.2 % (ref 0–1.9)
BILIRUB SERPL-MCNC: 0.5 MG/DL (ref 0.1–1)
BUN SERPL-MCNC: 31 MG/DL (ref 8–23)
CALCIUM SERPL-MCNC: 9.3 MG/DL (ref 8.7–10.5)
CHLORIDE SERPL-SCNC: 107 MMOL/L (ref 95–110)
CO2 SERPL-SCNC: 25 MMOL/L (ref 23–29)
CREAT SERPL-MCNC: 1.2 MG/DL (ref 0.5–1.4)
DIFFERENTIAL METHOD: ABNORMAL
EOSINOPHIL # BLD AUTO: 0.1 K/UL (ref 0–0.5)
EOSINOPHIL NFR BLD: 2.7 % (ref 0–8)
ERYTHROCYTE [DISTWIDTH] IN BLOOD BY AUTOMATED COUNT: 13.9 % (ref 11.5–14.5)
EST. GFR  (NO RACE VARIABLE): >60 ML/MIN/1.73 M^2
FERRITIN SERPL-MCNC: 141.7 NG/ML (ref 20–300)
FOLATE SERPL-MCNC: >22.3 NG/ML (ref 4–24)
GLUCOSE SERPL-MCNC: 114 MG/DL (ref 70–110)
HCT VFR BLD AUTO: 37.2 % (ref 40–54)
HGB BLD-MCNC: 12.4 G/DL (ref 14–18)
IMM GRANULOCYTES # BLD AUTO: 0.01 K/UL (ref 0–0.04)
IMM GRANULOCYTES NFR BLD AUTO: 0.2 % (ref 0–0.5)
IRON SERPL-MCNC: 66 UG/DL (ref 45–160)
LYMPHOCYTES # BLD AUTO: 1.4 K/UL (ref 1–4.8)
LYMPHOCYTES NFR BLD: 28.1 % (ref 18–48)
MAGNESIUM SERPL-MCNC: 2 MG/DL (ref 1.6–2.6)
MCH RBC QN AUTO: 31.5 PG (ref 27–31)
MCHC RBC AUTO-ENTMCNC: 33.3 G/DL (ref 32–36)
MCV RBC AUTO: 94 FL (ref 82–98)
MONOCYTES # BLD AUTO: 0.6 K/UL (ref 0.3–1)
MONOCYTES NFR BLD: 11.7 % (ref 4–15)
NEUTROPHILS # BLD AUTO: 2.7 K/UL (ref 1.8–7.7)
NEUTROPHILS NFR BLD: 56.1 % (ref 38–73)
NRBC BLD-RTO: 0 /100 WBC
PLATELET # BLD AUTO: 222 K/UL (ref 150–450)
PMV BLD AUTO: 10.4 FL (ref 9.2–12.9)
POTASSIUM SERPL-SCNC: 4.1 MMOL/L (ref 3.5–5.1)
PROT SERPL-MCNC: 7.3 G/DL (ref 6–8.4)
RBC # BLD AUTO: 3.94 M/UL (ref 4.6–6.2)
SODIUM SERPL-SCNC: 140 MMOL/L (ref 136–145)
TSH SERPL DL<=0.005 MIU/L-ACNC: 2.09 UIU/ML (ref 0.34–5.6)
VIT B12 SERPL-MCNC: 618 PG/ML (ref 210–950)
WBC # BLD AUTO: 4.88 K/UL (ref 3.9–12.7)

## 2023-10-04 PROCEDURE — 1101F PT FALLS ASSESS-DOCD LE1/YR: CPT | Mod: CPTII,S$GLB,, | Performed by: NURSE PRACTITIONER

## 2023-10-04 PROCEDURE — 99213 PR OFFICE/OUTPT VISIT, EST, LEVL III, 20-29 MIN: ICD-10-PCS | Mod: S$GLB,,, | Performed by: NURSE PRACTITIONER

## 2023-10-04 PROCEDURE — 3044F PR MOST RECENT HEMOGLOBIN A1C LEVEL <7.0%: ICD-10-PCS | Mod: CPTII,S$GLB,, | Performed by: NURSE PRACTITIONER

## 2023-10-04 PROCEDURE — 3008F PR BODY MASS INDEX (BMI) DOCUMENTED: ICD-10-PCS | Mod: CPTII,S$GLB,, | Performed by: NURSE PRACTITIONER

## 2023-10-04 PROCEDURE — 4010F ACE/ARB THERAPY RXD/TAKEN: CPT | Mod: CPTII,S$GLB,, | Performed by: NURSE PRACTITIONER

## 2023-10-04 PROCEDURE — 1159F PR MEDICATION LIST DOCUMENTED IN MEDICAL RECORD: ICD-10-PCS | Mod: CPTII,S$GLB,, | Performed by: NURSE PRACTITIONER

## 2023-10-04 PROCEDURE — 1159F MED LIST DOCD IN RCRD: CPT | Mod: CPTII,S$GLB,, | Performed by: NURSE PRACTITIONER

## 2023-10-04 PROCEDURE — 3074F SYST BP LT 130 MM HG: CPT | Mod: CPTII,S$GLB,, | Performed by: NURSE PRACTITIONER

## 2023-10-04 PROCEDURE — 1101F PR PT FALLS ASSESS DOC 0-1 FALLS W/OUT INJ PAST YR: ICD-10-PCS | Mod: CPTII,S$GLB,, | Performed by: NURSE PRACTITIONER

## 2023-10-04 PROCEDURE — 1160F PR REVIEW ALL MEDS BY PRESCRIBER/CLIN PHARMACIST DOCUMENTED: ICD-10-PCS | Mod: CPTII,S$GLB,, | Performed by: NURSE PRACTITIONER

## 2023-10-04 PROCEDURE — 82306 VITAMIN D 25 HYDROXY: CPT | Performed by: NURSE PRACTITIONER

## 2023-10-04 PROCEDURE — 84443 ASSAY THYROID STIM HORMONE: CPT | Performed by: NURSE PRACTITIONER

## 2023-10-04 PROCEDURE — 3074F PR MOST RECENT SYSTOLIC BLOOD PRESSURE < 130 MM HG: ICD-10-PCS | Mod: CPTII,S$GLB,, | Performed by: NURSE PRACTITIONER

## 2023-10-04 PROCEDURE — 82746 ASSAY OF FOLIC ACID SERUM: CPT | Performed by: NURSE PRACTITIONER

## 2023-10-04 PROCEDURE — 80053 COMPREHEN METABOLIC PANEL: CPT | Performed by: NURSE PRACTITIONER

## 2023-10-04 PROCEDURE — 1126F AMNT PAIN NOTED NONE PRSNT: CPT | Mod: CPTII,S$GLB,, | Performed by: NURSE PRACTITIONER

## 2023-10-04 PROCEDURE — 82728 ASSAY OF FERRITIN: CPT | Performed by: NURSE PRACTITIONER

## 2023-10-04 PROCEDURE — 1160F RVW MEDS BY RX/DR IN RCRD: CPT | Mod: CPTII,S$GLB,, | Performed by: NURSE PRACTITIONER

## 2023-10-04 PROCEDURE — 4010F PR ACE/ARB THEARPY RXD/TAKEN: ICD-10-PCS | Mod: CPTII,S$GLB,, | Performed by: NURSE PRACTITIONER

## 2023-10-04 PROCEDURE — 85025 COMPLETE CBC W/AUTO DIFF WBC: CPT | Performed by: NURSE PRACTITIONER

## 2023-10-04 PROCEDURE — 83540 ASSAY OF IRON: CPT | Performed by: NURSE PRACTITIONER

## 2023-10-04 PROCEDURE — 83735 ASSAY OF MAGNESIUM: CPT | Performed by: NURSE PRACTITIONER

## 2023-10-04 PROCEDURE — 3078F DIAST BP <80 MM HG: CPT | Mod: CPTII,S$GLB,, | Performed by: NURSE PRACTITIONER

## 2023-10-04 PROCEDURE — 3044F HG A1C LEVEL LT 7.0%: CPT | Mod: CPTII,S$GLB,, | Performed by: NURSE PRACTITIONER

## 2023-10-04 PROCEDURE — 3078F PR MOST RECENT DIASTOLIC BLOOD PRESSURE < 80 MM HG: ICD-10-PCS | Mod: CPTII,S$GLB,, | Performed by: NURSE PRACTITIONER

## 2023-10-04 PROCEDURE — 3008F BODY MASS INDEX DOCD: CPT | Mod: CPTII,S$GLB,, | Performed by: NURSE PRACTITIONER

## 2023-10-04 PROCEDURE — 99213 OFFICE O/P EST LOW 20 MIN: CPT | Mod: S$GLB,,, | Performed by: NURSE PRACTITIONER

## 2023-10-04 PROCEDURE — 3288F PR FALLS RISK ASSESSMENT DOCUMENTED: ICD-10-PCS | Mod: CPTII,S$GLB,, | Performed by: NURSE PRACTITIONER

## 2023-10-04 PROCEDURE — 3288F FALL RISK ASSESSMENT DOCD: CPT | Mod: CPTII,S$GLB,, | Performed by: NURSE PRACTITIONER

## 2023-10-04 PROCEDURE — 1126F PR PAIN SEVERITY QUANTIFIED, NO PAIN PRESENT: ICD-10-PCS | Mod: CPTII,S$GLB,, | Performed by: NURSE PRACTITIONER

## 2023-10-04 PROCEDURE — 36415 COLL VENOUS BLD VENIPUNCTURE: CPT | Performed by: NURSE PRACTITIONER

## 2023-10-04 PROCEDURE — 82607 VITAMIN B-12: CPT | Performed by: NURSE PRACTITIONER

## 2023-10-04 NOTE — PROGRESS NOTES
SUBJECTIVE:      Patient ID: Onesimo Paula is a 72 y.o. male.    Chief Complaint: Shortness of Breath (SOB - no energy - been going on for a couple of months now )      Presents for problem visit    Shortness of Breath  This is a recurrent problem. The current episode started more than 1 month ago. The problem occurs intermittently. The problem has been waxing and waning. Associated symptoms include chest pain (rare) and headaches. Pertinent negatives include no abdominal pain, ear pain, fever, leg swelling, neck pain, orthopnea, PND, rash, rhinorrhea, sore throat, vomiting or wheezing. The symptoms are aggravated by weather changes, URIs and any activity. He has tried rest for the symptoms. The treatment provided mild relief. There is no history of allergies or chronic lung disease.   Fatigue  This is a chronic problem. The current episode started more than 1 month ago. The problem occurs constantly. The problem has been gradually worsening. Associated symptoms include arthralgias, chest pain (rare), fatigue, headaches and myalgias. Pertinent negatives include no abdominal pain, congestion, coughing, fever, joint swelling, nausea, neck pain, rash, sore throat, vomiting or weakness. The symptoms are aggravated by exertion, stress, walking and standing. He has tried rest, sleep, position changes, relaxation and lying down for the symptoms. The treatment provided mild relief.       Past Surgical History:   Procedure Laterality Date    CYSTOSCOPY WITH TRANSURETHRAL DESTRUCTION OF PROSTATE,RFA N/A 10/21/2020    Procedure: CYSTOSCOPY WITH TRANSURETHRAL DESTRUCTION OF PROSTATE,RFA;  Surgeon: La Nena James MD;  Location: [facility];  Service: Urology;  Laterality: N/A;  please make sure [facility] [phone]    EYE SURGERY      for cataracts, rt eye 1/16/20, left eye 1/30/20    HERNIA REPAIR  12/1999    HERNIA REPAIR  05/2004    INSERTION OF SACRAL NEUROSTIMULATOR, ELECTRODES, AND IMPLANTABLE PULSE  GENERATOR (IPG) N/A 2023    Procedure: INSERTION, ELECTRODE LEADS AND PULSE GENERATOR, NEUROSTIMULATOR, SACRAL;  Surgeon: La Nena James MD;  Location: [facility];  Service: Urology;  Laterality: N/A;    KNEE ARTHROSCOPY Left 2018    LAPAROSCOPIC APPENDECTOMY N/A 2022    Procedure: APPENDECTOMY, LAPAROSCOPIC;  Surgeon: Sam Goss MD;  Location: [facility];  Service: General;  Laterality: N/A;    PERCUTANEOUS INSERTION OF SACRAL NERVE NEUROSTIMULATOR ELECTRODE LEAD N/A 2023    Procedure: INSERTION, ELECTRODE LEAD, NEUROSTIMULATOR, SACRAL, PERCUTANEOUS Keep first;  Surgeon: La Nena James MD;  Location: [facility];  Service: Urology;  Laterality: N/A;  patient needs to apply EMLA cream to entirety of lower back 20 minutes prior to procedure, also need 1% lidocaine available    PERCUTANEOUS INSERTION OF SACRAL NERVE NEUROSTIMULATOR ELECTRODE LEAD N/A 2023    Procedure: INSERTION, ELECTRODE LEAD, NEUROSTIMULATOR, SACRAL, PERCUTANEOUS;  Surgeon: La Nena James MD;  Location: [facility];  Service: Urology;  Laterality: N/A;    SKIN LESION EXCISION  10/2009    Neck    SKIN LESION EXCISION  2015    STAPEDECTOMY Right 2001    TRANSRECTAL ULTRASOUND EXAMINATION N/A 2020    Procedure: TRANSRECTAL ULTRASOUND;  Surgeon: La Nena James MD;  Location: [facility];  Service: Urology;  Laterality: N/A;     Family History   Problem Relation Age of Onset    Cancer Mother         skin    Stroke Mother     Heart failure Father     Kidney disease Father       Social History     Socioeconomic History    Marital status:    Tobacco Use    Smoking status: Former     Current packs/day: 0.00     Types: Cigarettes     Quit date:      Years since quittin.7    Smokeless tobacco: Never   Substance and Sexual Activity    Alcohol use: Yes     Comment: occasional    Drug use: No    Sexual activity: Yes     Partners: Female     Social Determinants of Health     Financial  Resource Strain: Low Risk  (9/28/2023)    Overall Financial Resource Strain (CARDIA)     Difficulty of Paying Living Expenses: Not hard at all   Food Insecurity: No Food Insecurity (9/28/2023)    Hunger Vital Sign     Worried About Running Out of Food in the Last Year: Never true     Ran Out of Food in the Last Year: Never true   Transportation Needs: No Transportation Needs (9/28/2023)    PRAPARE - Transportation     Lack of Transportation (Medical): No     Lack of Transportation (Non-Medical): No   Physical Activity: Inactive (9/28/2023)    Exercise Vital Sign     Days of Exercise per Week: 0 days     Minutes of Exercise per Session: 0 min   Stress: No Stress Concern Present (9/28/2023)    [employer] [place] [employer] - Occupational Stress Questionnaire     Feeling of Stress : Only a little   Social Connections: Unknown (9/28/2023)    Social Connection and Isolation Panel [NHANES]     Frequency of Communication with Friends and Family: More than three times a week     Frequency of Social Gatherings with Friends and Family: Once a week     Active Member of Clubs or Organizations: Yes     Attends Club or Organization Meetings: Never     Marital Status:    Housing Stability: Low Risk  (9/28/2023)    Housing Stability Vital Sign     Unable to Pay for Housing in the Last Year: No     Number of Places Lived in the Last Year: 1     Unstable Housing in the Last Year: No     Current Outpatient Medications   Medication Sig Dispense Refill    amLODIPine (NORVASC) 10 MG tablet Take 0.5 tablets (5 mg total) by mouth once daily. 90 tablet 1    aspirin (ECOTRIN) 81 MG EC tablet Take 81 mg by mouth once daily.      co-enzyme Q-10 30 mg capsule Take 30 mg by mouth 3 (three) times daily.      diclofenac sodium (VOLTAREN) 1 % Gel Apply 2 g topically 4 (four) times daily as needed (left shoulder/arm pain). 200 g 0    metFORMIN (GLUCOPHAGE) 500 MG tablet Take 1 tablet (500 mg total) by mouth daily with breakfast. 90  tablet 1    multivit with minerals/lutein (MULTIVITAMIN 50 PLUS ORAL) Take 1 tablet by mouth once daily.      omega 3-dha-epa-fish oil (FISH OIL) 100-160-1,000 mg Cap Take 1 capsule by mouth once daily.      pantoprazole (PROTONIX) 40 MG tablet Take 1 tablet (40 mg total) by mouth once daily. 90 tablet 1    ranolazine (RANEXA) 500 MG Tb12 Take 2 tablets (1,000 mg total) by mouth 2 (two) times daily. 120 tablet 11    rosuvastatin (CRESTOR) 10 MG tablet Take 1 tablet (10 mg total) by mouth once daily. 90 tablet 1    tadalafiL (CIALIS) 5 MG tablet Take 1 tablet (5 mg total) by mouth once daily. 90 tablet 3    telmisartan (MICARDIS) 80 MG Tab Take 1 tablet (80 mg total) by mouth once daily. 90 tablet 1     No current facility-administered medications for this visit.     Facility-Administered Medications Ordered in Other Visits   Medication Dose Route Frequency Provider Last Rate Last Admin    electrolyte-S (ISOLYTE)   Intravenous Continuous Loyd Chakraborty MD   Stopped at 07/12/23 1141    fentaNYL 50 mcg/mL injection 25 mcg  25 mcg Intravenous Q5 Min PRN Loyd Chakraborty MD        lactated ringers infusion   Intravenous Continuous Loyd Chakraborty MD        LIDOcaine (PF) 10 mg/ml (1%) injection 10 mg  1 mL Intradermal Once Loyd Chakraborty MD        metoclopramide HCl injection 10 mg  10 mg Intravenous Q10 Min PRN Loyd Chakraborty MD        oxyCODONE immediate release tablet 5 mg  5 mg Oral Once PRN Loyd Chakraborty MD         Review of patient's allergies indicates:  No Known Allergies   Past Medical History:   Diagnosis Date    Acid reflux     Acquired tricuspid valve insufficiency     Anemia     Borderline diabetes     BPH (benign prostatic hyperplasia)     Cataract     1/16/20 rt eye, 1/30/20- Lt eye    Coronary artery disease     Hypertension     Liver cyst 08/24/2022    Pulmonary nodules     Skin cancer      Past Surgical History:   Procedure Laterality Date    CYSTOSCOPY WITH TRANSURETHRAL  DESTRUCTION OF PROSTATE,RFA N/A 10/21/2020    Procedure: CYSTOSCOPY WITH TRANSURETHRAL DESTRUCTION OF PROSTATE,RFA;  Surgeon: La Nena James MD;  Location: [facility];  Service: Urology;  Laterality: N/A;  please make sure eleanor rep available [phone]    EYE SURGERY      for cataracts, rt eye 1/16/20, left eye 1/30/20    HERNIA REPAIR  12/1999    HERNIA REPAIR  05/2004    INSERTION OF SACRAL NEUROSTIMULATOR, ELECTRODES, AND IMPLANTABLE PULSE GENERATOR (IPG) N/A 7/12/2023    Procedure: INSERTION, ELECTRODE LEADS AND PULSE GENERATOR, NEUROSTIMULATOR, SACRAL;  Surgeon: La Nena James MD;  Location: [facility];  Service: Urology;  Laterality: N/A;    KNEE ARTHROSCOPY Left 04/12/2018    LAPAROSCOPIC APPENDECTOMY N/A 1/2/2022    Procedure: APPENDECTOMY, LAPAROSCOPIC;  Surgeon: Sam Goss MD;  Location[facility];  Service: General;  Laterality: N/A;    PERCUTANEOUS INSERTION OF SACRAL NERVE NEUROSTIMULATOR ELECTRODE LEAD N/A 5/22/2023    Procedure: INSERTION, ELECTRODE LEAD, NEUROSTIMULATOR, SACRAL, PERCUTANEOUS Keep first;  Surgeon: La Nena James MD;  Location: [facility];  Service: Urology;  Laterality: N/A;  patient needs to apply EMLA cream to entirety of lower back 20 minutes prior to procedure, also need 1% lidocaine available    PERCUTANEOUS INSERTION OF SACRAL NERVE NEUROSTIMULATOR ELECTRODE LEAD N/A 7/12/2023    Procedure: INSERTION, ELECTRODE LEAD, NEUROSTIMULATOR, SACRAL, PERCUTANEOUS;  Surgeon: La Nena James MD;  Location: [facility];  Service: Urology;  Laterality: N/A;    SKIN LESION EXCISION  10/2009    Neck    SKIN LESION EXCISION  2015    STAPEDECTOMY Right 03/2001    TRANSRECTAL ULTRASOUND EXAMINATION N/A 8/17/2020    Procedure: TRANSRECTAL ULTRASOUND;  Surgeon: La Nena James MD;  Location: [facility];  Service: Urology;  Laterality: N/A;       Review of Systems   Constitutional:  Positive for fatigue. Negative for activity change, appetite change, fever  "and unexpected weight change.   HENT:  Negative for congestion, ear pain, hearing loss, postnasal drip, rhinorrhea, sinus pressure, sinus pain, sneezing, sore throat and trouble swallowing.    Eyes:  Negative for photophobia, pain, discharge and visual disturbance.   Respiratory:  Positive for chest tightness and shortness of breath. Negative for cough and wheezing.    Cardiovascular:  Positive for chest pain (rare). Negative for palpitations, orthopnea, leg swelling and PND.        Waiting for cardiac CTA to be scheduled via cardiology   Gastrointestinal:  Negative for abdominal distention, abdominal pain, blood in stool, constipation, diarrhea, nausea and vomiting.   Endocrine: Negative for cold intolerance, heat intolerance, polydipsia and polyuria.   Genitourinary:  Negative for difficulty urinating, dysuria, flank pain, frequency, hematuria and urgency.        Following with uro & nephro   Musculoskeletal:  Positive for arthralgias and myalgias. Negative for back pain, joint swelling and neck pain.   Skin:  Negative for pallor and rash.   Allergic/Immunologic: Negative for environmental allergies and food allergies.   Neurological:  Positive for headaches. Negative for dizziness, weakness and light-headedness.   Hematological:  Does not bruise/bleed easily.   Psychiatric/Behavioral:  Negative for confusion, decreased concentration, dysphoric mood and sleep disturbance. The patient is not nervous/anxious.         States it has been more stressful at work with staffing issues      OBJECTIVE:      Vitals:    10/04/23 1144   BP: 116/74   BP Location: Right arm   Patient Position: Sitting   BP Method: Large (Manual)   Pulse: (!) 56   SpO2: 98%   Weight: 73.2 kg (161 lb 4.8 oz)   Height: 5' 10" (1.778 m)     Physical Exam  Vitals and nursing note reviewed.   Constitutional:       General: He is not in acute distress.     Appearance: Normal appearance. He is well-developed and normal weight.   HENT:      Head: " Normocephalic and atraumatic.      Right Ear: Hearing normal.      Left Ear: Hearing normal.      Nose: Nose normal. No rhinorrhea.   Eyes:      General: Lids are normal.         Right eye: No discharge.         Left eye: No discharge.      Conjunctiva/sclera: Conjunctivae normal.      Right eye: Right conjunctiva is not injected.      Left eye: Left conjunctiva is not injected.      Pupils: Pupils are equal, round, and reactive to light. Pupils are equal.      Right eye: Pupil is round and reactive.      Left eye: Pupil is round and reactive.   Neck:      Thyroid: No thyromegaly.      Vascular: No JVD.      Trachea: Trachea normal. No tracheal deviation.   Cardiovascular:      Rate and Rhythm: Regular rhythm. Bradycardia present.      Pulses:           Radial pulses are 2+ on the right side and 2+ on the left side.      Heart sounds: Normal heart sounds. No murmur heard.     No friction rub. No gallop.   Pulmonary:      Effort: Pulmonary effort is normal. No respiratory distress.      Breath sounds: Normal breath sounds. No stridor. No decreased breath sounds, wheezing, rhonchi or rales.      Comments: Recent CXR unremarkable  Abdominal:      General: Bowel sounds are normal. There is no distension.      Palpations: Abdomen is soft. Abdomen is not rigid.      Tenderness: There is no abdominal tenderness. There is no guarding.   Musculoskeletal:         General: No swelling. Normal range of motion.      Cervical back: Normal range of motion and neck supple.   Lymphadenopathy:      Cervical: No cervical adenopathy.   Skin:     General: Skin is warm and dry.      Capillary Refill: Capillary refill takes less than 2 seconds.      Coloration: Skin is not pale.      Findings: No lesion or rash.   Neurological:      Mental Status: He is alert and oriented to person, place, and time.      Motor: No atrophy.      Coordination: Coordination normal.      Gait: Gait normal.   Psychiatric:         Attention and Perception: He  is attentive.         Speech: Speech normal.         Behavior: Behavior normal.         Thought Content: Thought content normal.         Judgment: Judgment normal.        Assessment:       1. Mild anemia    2. SOB (shortness of breath)    3. Chronic fatigue and malaise        Plan:       Mild anemia  -     CBC Auto Differential; Future; Expected date: 10/11/2023  -     Ferritin; Future; Expected date: 10/04/2023  -     Iron; Future; Expected date: 10/04/2023  -     Folate; Future; Expected date: 10/04/2023  -     Vitamin B12; Future; Expected date: 10/04/2023  -     Vitamin D; Future; Expected date: 10/04/2023  -     Magnesium; Future; Expected date: 10/04/2023    SOB (shortness of breath)  -     CBC Auto Differential; Future; Expected date: 10/11/2023  -     Comprehensive Metabolic Panel; Future; Expected date: 10/04/2023    Chronic fatigue and malaise  -     CBC Auto Differential; Future; Expected date: 10/11/2023  -     Comprehensive Metabolic Panel; Future; Expected date: 10/04/2023  -     TSH; Future; Expected date: 10/04/2023  -     Ferritin; Future; Expected date: 10/04/2023  -     Iron; Future; Expected date: 10/04/2023  -     Folate; Future; Expected date: 10/04/2023  -     Vitamin B12; Future; Expected date: 10/04/2023  -     Vitamin D; Future; Expected date: 10/04/2023  -     Magnesium; Future; Expected date: 10/04/2023            Follow up in about 4 months (around 2/4/2024) for energy recheck.      10/4/2023 JAMES Rose, FNP-C

## 2023-10-25 DIAGNOSIS — K21.9 GASTROESOPHAGEAL REFLUX DISEASE WITHOUT ESOPHAGITIS: ICD-10-CM

## 2023-10-25 NOTE — TELEPHONE ENCOUNTER
Patient last seen: 10/04/23  Scheduled to be seen: 02/05/24  Medication last filled: 04/12/23    Medication pended

## 2023-10-26 RX ORDER — PANTOPRAZOLE SODIUM 40 MG/1
40 TABLET, DELAYED RELEASE ORAL
Qty: 90 TABLET | Refills: 0 | Status: SHIPPED | OUTPATIENT
Start: 2023-10-26 | End: 2024-02-05 | Stop reason: ALTCHOICE

## 2023-11-13 ENCOUNTER — PATIENT MESSAGE (OUTPATIENT)
Dept: UROLOGY | Facility: CLINIC | Age: 73
End: 2023-11-13

## 2023-11-13 ENCOUNTER — OFFICE VISIT (OUTPATIENT)
Dept: UROLOGY | Facility: CLINIC | Age: 73
End: 2023-11-13
Payer: COMMERCIAL

## 2023-11-13 ENCOUNTER — LAB VISIT (OUTPATIENT)
Dept: LAB | Facility: HOSPITAL | Age: 73
End: 2023-11-13
Attending: INTERNAL MEDICINE
Payer: COMMERCIAL

## 2023-11-13 VITALS
SYSTOLIC BLOOD PRESSURE: 140 MMHG | BODY MASS INDEX: 23.1 KG/M2 | HEIGHT: 70 IN | WEIGHT: 161.38 LBS | DIASTOLIC BLOOD PRESSURE: 75 MMHG | HEART RATE: 65 BPM

## 2023-11-13 DIAGNOSIS — N18.2 CHRONIC KIDNEY DISEASE, STAGE II (MILD): Primary | ICD-10-CM

## 2023-11-13 DIAGNOSIS — N39.41 URGE INCONTINENCE: ICD-10-CM

## 2023-11-13 DIAGNOSIS — N32.81 OAB (OVERACTIVE BLADDER): Primary | ICD-10-CM

## 2023-11-13 LAB
ALBUMIN SERPL BCP-MCNC: 4.4 G/DL (ref 3.5–5.2)
ANION GAP SERPL CALC-SCNC: 6 MMOL/L (ref 8–16)
BACTERIA #/AREA URNS HPF: NEGATIVE /HPF
BASOPHILS # BLD AUTO: 0.07 K/UL (ref 0–0.2)
BASOPHILS NFR BLD: 1 % (ref 0–1.9)
BUN SERPL-MCNC: 36 MG/DL (ref 8–23)
CALCIUM SERPL-MCNC: 9.8 MG/DL (ref 8.7–10.5)
CHLORIDE SERPL-SCNC: 107 MMOL/L (ref 95–110)
CO2 SERPL-SCNC: 29 MMOL/L (ref 23–29)
CREAT SERPL-MCNC: 1.3 MG/DL (ref 0.5–1.4)
CREAT UR-MCNC: 90.8 MG/DL (ref 23–375)
DIFFERENTIAL METHOD BLD: ABNORMAL
EOSINOPHIL # BLD AUTO: 0.2 K/UL (ref 0–0.5)
EOSINOPHIL NFR BLD: 2.6 % (ref 0–8)
ERYTHROCYTE [DISTWIDTH] IN BLOOD BY AUTOMATED COUNT: 13.8 % (ref 11.5–14.5)
EST. GFR  (NO RACE VARIABLE): 58 ML/MIN/1.73 M^2
GLUCOSE SERPL-MCNC: 102 MG/DL (ref 70–110)
HCT VFR BLD AUTO: 40.6 % (ref 40–54)
HGB BLD-MCNC: 13.2 G/DL (ref 14–18)
HYALINE CASTS #/AREA URNS LPF: 0 /LPF
IMM GRANULOCYTES # BLD AUTO: 0.03 K/UL (ref 0–0.04)
IMM GRANULOCYTES NFR BLD AUTO: 0.4 % (ref 0–0.5)
LYMPHOCYTES # BLD AUTO: 2.1 K/UL (ref 1–4.8)
LYMPHOCYTES NFR BLD: 28.3 % (ref 18–48)
MCH RBC QN AUTO: 31.1 PG (ref 27–31)
MCHC RBC AUTO-ENTMCNC: 32.5 G/DL (ref 32–36)
MCV RBC AUTO: 96 FL (ref 82–98)
MICROSCOPIC COMMENT: NORMAL
MONOCYTES # BLD AUTO: 0.8 K/UL (ref 0.3–1)
MONOCYTES NFR BLD: 10.4 % (ref 4–15)
NEUTROPHILS # BLD AUTO: 4.2 K/UL (ref 1.8–7.7)
NEUTROPHILS NFR BLD: 57.3 % (ref 38–73)
NRBC BLD-RTO: 0 /100 WBC
PHOSPHATE SERPL-MCNC: 3.3 MG/DL (ref 2.7–4.5)
PLATELET # BLD AUTO: 250 K/UL (ref 150–450)
PMV BLD AUTO: 10.2 FL (ref 9.2–12.9)
POTASSIUM SERPL-SCNC: 4.2 MMOL/L (ref 3.5–5.1)
PROT UR-MCNC: 16 MG/DL (ref 6–15)
PROT/CREAT UR: 0.18 MG/G{CREAT} (ref 0–0.2)
RBC # BLD AUTO: 4.25 M/UL (ref 4.6–6.2)
RBC #/AREA URNS HPF: 1 /HPF (ref 0–4)
SODIUM SERPL-SCNC: 142 MMOL/L (ref 136–145)
SQUAMOUS #/AREA URNS HPF: 0 /HPF
WBC # BLD AUTO: 7.32 K/UL (ref 3.9–12.7)
WBC #/AREA URNS HPF: 0 /HPF (ref 0–5)

## 2023-11-13 PROCEDURE — 1126F AMNT PAIN NOTED NONE PRSNT: CPT | Mod: CPTII,S$GLB,, | Performed by: UROLOGY

## 2023-11-13 PROCEDURE — 3008F BODY MASS INDEX DOCD: CPT | Mod: CPTII,S$GLB,, | Performed by: UROLOGY

## 2023-11-13 PROCEDURE — 80069 RENAL FUNCTION PANEL: CPT | Performed by: INTERNAL MEDICINE

## 2023-11-13 PROCEDURE — 4010F ACE/ARB THERAPY RXD/TAKEN: CPT | Mod: CPTII,S$GLB,, | Performed by: UROLOGY

## 2023-11-13 PROCEDURE — 85025 COMPLETE CBC W/AUTO DIFF WBC: CPT | Performed by: INTERNAL MEDICINE

## 2023-11-13 PROCEDURE — 3077F SYST BP >= 140 MM HG: CPT | Mod: CPTII,S$GLB,, | Performed by: UROLOGY

## 2023-11-13 PROCEDURE — 1160F RVW MEDS BY RX/DR IN RCRD: CPT | Mod: CPTII,S$GLB,, | Performed by: UROLOGY

## 2023-11-13 PROCEDURE — 3044F HG A1C LEVEL LT 7.0%: CPT | Mod: CPTII,S$GLB,, | Performed by: UROLOGY

## 2023-11-13 PROCEDURE — 1160F PR REVIEW ALL MEDS BY PRESCRIBER/CLIN PHARMACIST DOCUMENTED: ICD-10-PCS | Mod: CPTII,S$GLB,, | Performed by: UROLOGY

## 2023-11-13 PROCEDURE — 99214 OFFICE O/P EST MOD 30 MIN: CPT | Mod: S$GLB,,, | Performed by: UROLOGY

## 2023-11-13 PROCEDURE — 82570 ASSAY OF URINE CREATININE: CPT | Performed by: INTERNAL MEDICINE

## 2023-11-13 PROCEDURE — 36415 COLL VENOUS BLD VENIPUNCTURE: CPT | Performed by: INTERNAL MEDICINE

## 2023-11-13 PROCEDURE — 81001 URINALYSIS AUTO W/SCOPE: CPT | Performed by: INTERNAL MEDICINE

## 2023-11-13 PROCEDURE — 99999 PR PBB SHADOW E&M-EST. PATIENT-LVL IV: CPT | Mod: PBBFAC,,, | Performed by: UROLOGY

## 2023-11-13 PROCEDURE — 1101F PR PT FALLS ASSESS DOC 0-1 FALLS W/OUT INJ PAST YR: ICD-10-PCS | Mod: CPTII,S$GLB,, | Performed by: UROLOGY

## 2023-11-13 PROCEDURE — 99214 PR OFFICE/OUTPT VISIT, EST, LEVL IV, 30-39 MIN: ICD-10-PCS | Mod: S$GLB,,, | Performed by: UROLOGY

## 2023-11-13 PROCEDURE — 3008F PR BODY MASS INDEX (BMI) DOCUMENTED: ICD-10-PCS | Mod: CPTII,S$GLB,, | Performed by: UROLOGY

## 2023-11-13 PROCEDURE — 1101F PT FALLS ASSESS-DOCD LE1/YR: CPT | Mod: CPTII,S$GLB,, | Performed by: UROLOGY

## 2023-11-13 PROCEDURE — 99999 PR PBB SHADOW E&M-EST. PATIENT-LVL IV: ICD-10-PCS | Mod: PBBFAC,,, | Performed by: UROLOGY

## 2023-11-13 PROCEDURE — 3078F PR MOST RECENT DIASTOLIC BLOOD PRESSURE < 80 MM HG: ICD-10-PCS | Mod: CPTII,S$GLB,, | Performed by: UROLOGY

## 2023-11-13 PROCEDURE — 3078F DIAST BP <80 MM HG: CPT | Mod: CPTII,S$GLB,, | Performed by: UROLOGY

## 2023-11-13 PROCEDURE — 1159F MED LIST DOCD IN RCRD: CPT | Mod: CPTII,S$GLB,, | Performed by: UROLOGY

## 2023-11-13 PROCEDURE — 1159F PR MEDICATION LIST DOCUMENTED IN MEDICAL RECORD: ICD-10-PCS | Mod: CPTII,S$GLB,, | Performed by: UROLOGY

## 2023-11-13 PROCEDURE — 3044F PR MOST RECENT HEMOGLOBIN A1C LEVEL <7.0%: ICD-10-PCS | Mod: CPTII,S$GLB,, | Performed by: UROLOGY

## 2023-11-13 PROCEDURE — 1126F PR PAIN SEVERITY QUANTIFIED, NO PAIN PRESENT: ICD-10-PCS | Mod: CPTII,S$GLB,, | Performed by: UROLOGY

## 2023-11-13 PROCEDURE — 3077F PR MOST RECENT SYSTOLIC BLOOD PRESSURE >= 140 MM HG: ICD-10-PCS | Mod: CPTII,S$GLB,, | Performed by: UROLOGY

## 2023-11-13 PROCEDURE — 3288F FALL RISK ASSESSMENT DOCD: CPT | Mod: CPTII,S$GLB,, | Performed by: UROLOGY

## 2023-11-13 PROCEDURE — 4010F PR ACE/ARB THEARPY RXD/TAKEN: ICD-10-PCS | Mod: CPTII,S$GLB,, | Performed by: UROLOGY

## 2023-11-13 PROCEDURE — 3288F PR FALLS RISK ASSESSMENT DOCUMENTED: ICD-10-PCS | Mod: CPTII,S$GLB,, | Performed by: UROLOGY

## 2023-11-13 NOTE — Clinical Note
Schedule him for urodynamics in 2 months but change to a fu instead if his symptoms improve. No ua needed.

## 2023-11-13 NOTE — PROGRESS NOTES
[facility] Note - [place]  Staff: MD Erika  PCP: Reynaldo Rahman, APRN,FNP-C  Date of Service: 11/13/2023      Subjective:        HPI: Onesimo Paula is a 72 y.o. male     Seen by me 9/30/19  Patient had MRI for right hip pain and was found have a thick-walled bladder with enlarged prostate.  He states that he saw urologist over 40 years ago for burning with urination.  He had a renal bladder ultrasound in 2016 which showed enlarged prostate and bilateral renal cyst done for renal insufficiency whom he sees  for.  Denies any gross hematuria.  Review of for previous urine show no microscopic hematuria.  Twenty-five pack-year history of smoking but quit in 1997.      He also has AUA symptom score 6 and occasional weak stream and urgency but voids every 3-5 hours when he is working and a little more frequent when he is not.  Denies any urge incontinence.  Okay stream with occasional weak stream.  PVR today 09/03/2019 is 15 cc.  He may have tried Flomax a couple years ago but does not recall it helping her changing any was symptoms.  Overall really not that bothered by his urinary symptoms.      Also states he has ED.  Previously tried sildenafil unsure dose and it helped but wife does not want him to take, exam revealed peyronie's     Plan: started tadalafil 5mg due to thick walled bladder although essentially asx. No further f/u fr b renal cysts.      Interval history 11/18/19:   Pt states today Cialis has NOT improved any of his lower urinary tract symptoms since starting the medication.  He currently takes this medication before his bedtime.  Pt still c/o urinary urgency, especially during the day.  Nocturia is 1-2x nightly and not bothersome at this time.  He is a , therefore the daytime urgency really bothers him.  Plan: started ditropan 10mg XL and cont'd tadalafil     Interval history by idalia 1/6/20:   TODAY, pt states the oxybutynin only improved his  LUTS by over 10% at this time.  PVR by bladder scan performed in office today:  11 mL  Rec'd: d/c oxybutynin, start myrbetriq, cont tadalafil 5mg daily.     Interval history 7/27/20:   Tried myrbetriq but didn't help. Having urgency, hesistancy and intermittent slow stream but urgency most bothersome and having some UUI with a few drop, but not that bothersome. Drinks 1 c of coffee in am. Soda during day. 1 c of coffee int he evening. Has never tried stopping these to see if they help. Drinks beer and notices increase in frequency following day.   On tadalafil 5mg daily for ED and bph.  pvr by scan: 32cc  Voiding every 30 minutes a day recently more pronounced at home (has been on vacation).  Takes hctz in am. When he's working he only voids about 2x-4x a day. Drinks caffeine while working including energy drinks. No nocturia.   psa 1/31/20 2.2 (up from 1.2 year prior). Repeat psa 0.85 7/29/20  WINSOME today: 40g + no nodules.  Uroflow 7/30/20: peak flow 12.8mL/s,  avg flow 5.4mL/s, voided vol: 191cc, pvr by scan:0      Cysto trus 8/17/20: normal bladder, no stricture. Membranous and distal prostatic urethra with papillary tissue suspicious for prostatitis.trus volume 42.6g. bc of his urgency on tadalafil, flomax, vesicare found rx cipro for 14d to see if it helped w urgency. D/c'd vesicare. cont'd flomax and tadalafil. rtc to see idalia in 2-3 weeks and me in 2 months to discuss bph procedures     Interval history by idalia 9/9/20:   Pt states he is still having the urinary urgency even since finishing the Cipro antibiotics at this time.  He denies gross hematuria, dysuria.  UA today shows normal findings.  PVR by bladder scan today:  9 mL  Plan: rec'd restarting vesicare     Interval history 10/1/20:    Taking flomax 0.4mg nightly and says flow is intermittent on this.   Also on the vesicare and says it doesn't help with the urgency. Voiding 2-3x a day and none at night. 3-4x a week c/o urge incontinence if he tries  to hold it too long, mostly in the am when he wakes up. Denies any difficulty with walking. No weakness or numbness.   Changed to decaf coffee 1x a day. Changed to sprite and caffeine free coke.   He does not want to take any more meds. He is not happy with the retrograde ejaculation.   Sees dr. leger for Mitral Valve leakage, last saw him a week ago, was told he should f/u in March 2021. Tadalafil helping with erections     Interval history 11/19/20:   Underwent rezum on 10/21/20. continue flomax, vesicare and tadafil daily for 3 more months.   Returned for fill and pull on 10/26/20. Filled with 210, voided 210  For 2 weeks postop would have blood when he had a BM, improving.   Today (1 month later) he states he has some increased urgency. UUI 2-3x a day (increased from 3-4x a week).  Still taking vesicare  freq q1 hour (worse in cold weather). Without cold weather freq q 3 hour.   ua today with small wbc and mod blood - some dysuria  No significant change in stream yet  pvr by scan today: 134  AUA ssx:(2 incomplete emptying, 3 frequency, 3 intermittency, 4 urgency, 2 weak stream, 2 straining, 0 sleeping). 16. QOL: 4    Interval history by me in clinic on 3/9/21:  Returns today and states states he ran out of flomax and vesicare a month ago. No increased frequency, urge incontinence or slower stream off the flomax  Still having UUI 1-2x a day with a few drops when he hears water.   Voids 8x/24 hours and 4x/8 hours while at work. Nocturia occ (same). No longer drinking caffeine.   Still on tadalafil 5mg daily.  No longer having retrograde ejaculation and constipation improved. Hctz in am.  Frequency mostly in  the pm. Bothers him but able to stop at bathrooms. Sometimes loses erection with tadalafil 5mg daily. Rare. Not more frequent. Still getting morning erections.   AUA ssx today:(1 incomplete emptying, 2 frequency, 1 intermittency, 3 urgency, 3 weak stream, 0 straining, 0 sleeping). 10. QOL: mixed  Bladder  scan PVR today was 35mL.    I reviewed his previous PSAs and his most recent psa within the last year was 2.3, %free 20.87, last year was 2.2      Interval history with idalia on 4/9/21:  UA today showed normal findings.  Pt states today even though he tried and changed his HCTZ med to evenings as discussed last ov, his LUTS have not improved.  Pt as of today's visit is still c/o urinary urgency and frequency during the daytime.  Has not improved since last ov.  Pt states today he has tried Vesicare and Oxybutynin in the past with no improvement in symptoms.  Pt currently denies gross hematuria and dysuria.  She put him on myrbetriq again       Interval history by me 6/15/21:  Uroflow results (date: 03/18/2021): Voiding time: 24.3s, Flow time: 23.1s, TTP flow: 8.0s, Peak flowrate: 19.2 mL/s, Average flowrate: 10.6mL/s, Intervals: 2, Voided volume: 244 mL, Pvr by bladder scan 33. Pattern of curve: see uploaded image, reviewed by   At last visit was c/o freq/urgency. Frequency 8-10x/day. Tried myrbetriq before rezum, ditropan, vesicare and changing hctz to pm but no improvement. Then in April started myrbetriq again , after about 4 weeks saw improvement, now down to 5-6x a day. avg urg: 3. No nocturia. No incontinence.  Also on tadalafil 5mg daily.   AUA ssx today:(1 incomplete emptying, 1 frequency, 1 intermittency, 2 urgency, 3 weak stream, 0 straining, 0 sleeping). 8. QOL:   Bladder scan PVR today was 0mL.      HPI/CC today 6/7/22:   Had him Continue myrbetriq 50mg daily, has enough until 4/2021, Continue tadalafil 5mg daily, refilled today for a year.  Says myrbetriq not helping anymore. He held it for a few days and noticed no difference. On cialis 5mg daily.   Does not have constipation  Voids about 9-10x during waking hours. occ urge incontinence 3x a week. Few drops. Started drinking caffeine this week.   Nocturia 1-2x a night. Urine flow varies.    psa 5/31/22 1.3  ua today neg, pvr by scan: 5  AUA  ssx:(2 incomplete emptying, 3 frequency, 2 intermittency, 4 urgency, 3 weak stream, 1 straining, 1 sleeping). 16. QOL: mixed  Appendix removed in January complicated by post-op ileus.    Interval history 8/23/22:  Continue myrbetriq 50mg daily, has enough until 4/2021  Continue tadalafil 5mg daily, refilled today for a year  After adding vesicare to myrbetriq - went from voiding 9-10x day, 1-2x a night with occ UUI 3x a week to 5-6x/day, 0 night and no daily UUI with drops. Says he has dry mouth but about the same. However does have some heart burn that is new.   UA today: neg. pvr by scan: 115 (this is the highest it's been, feels empty).  AUA ssx:(1 incomplete emptying, 2 frequency, 0 intermittency, 2 urgency, 2 weak stream, 1 straining, 0 sleeping). 8. QOL: pleased    Interval history 2/28/23:  Says he is doing well on VESIcare and myrbetriq.  However it is expensive.  About 120 dollars every 3 months.  Also having dry mouth with it.  On tadalafil 5 mg daily for erections, obtaining from [facility].  This dose wish for him sometimes.  He tried Viagra 100 mg and it seemed to work.  Aua ssx: 12, pvr: 25    Interval history 4/21/23:  I had him discontinue both myrbetriq and VESIcare at the last visit to see if his incontinence would get worse.  Followed up with Teena on 03/29/2023.  His PVR was 0.  His AUA symptom score was 14, 5.  He was still taking tadalafil 5 mg daily.  He said with stopping VESIcare and myrbetriq he had 2 episodes of incontinence that were large volume, increased postvoid dribbling and he feels like his urinary frequency increased.  Was scheduled with me to discuss InterStim because he wanted to hold off on restarting any medications due to cost and side effects  He returns today with a voiding diary.  He kept a voiding diary for me days and it looks like he urinates on average about 9-10 times a day.  That is up from 6 times a day when he was taking medications.  He did not documented on his  voiding diary but he leaks urine 2 to 3 times a day on the way to the bathroom.  Not wearing any pads.  Does not have to change his underwear.  If he could rate his urgency he would say on average his urgency is 4/5.  He is interested in proceeding with InterStim.  He ended up using tadalafil 10 mg once and said this dose worked good for him for his erectile dysfunction  Voided urine today is negative.  Confirmed again that currently he urinates 7-10 times a day, average urgency is 4 and 1-2 leaks a day.        Interval history 5/25/23:  I did a peripheral nerve evaluation on him on Monday, 4 days ago.  Since then he has gone from urinating 6-10 times a day down to about 6 times a day.  Never had issues at night.  He went from leaking 2 to 3 times a day incontinence down to 1x over the last 3 days. Avg urgency went from 4 to 2. He says >50% improvement and wants to proceed. Not on any meds.   He previously saw  for stress tests bc of SOB. His last w/u was 11/2022 and had a neg stress test. On asa 81mg. No stents. They could never figure out why he has SOB.  He sees pulmonology regularly for nodules in the lungs.  Wires removed today    Interval history 7/11/23:  Here today for interstim stage 1 and 2. Preop appt revealed a1c 6.2, glucose 124. Ua with tr protein/tr bld on 6/28. Started on metformin.   Urinating every 1-2 hours. Uui with drops 2x a day. No pads or underwear changes.  Held asa 81mg for 7d.     Interval history 8/8/23 telephone visit :  Pt states that initially he had issues with urgency and frequency after his procedure but since talking to clarita and making adjustments he has gone from urinating every 1-2 hours to 3-4x a day. Avg urgency remains around 4 and no UUI.   He is happy with his symptoms.     Interval history by ME in CLINIC on 11/13/23 for postop interstim fu and oab fu:  He says that his frequency has returned despite changing his settings. Says changing the setting works for a  bit. Then no longer works. Nos on 3.2.   He says after turning off his interstim for us in August for us abd for his liver he had an increase in his frequency and had to increase the amplitude   He changed the settings on 10/31 and he went from 8x a day down to 5x a day. He stopped hctz a month ago by his cardiologist but then he restarted the hctz in the morning last week and his frequency increased to 9x a day.  Currently on program 2, 3.2.    He says urinating about 8x a day and 1x a night. Leaking with UUI and drops 3-4x a day and 2 accidents last week. No pads.   Urgency 5/5.  1 cup of caffeine in the morning.   Voiding diary: 10/30/23- 8x, changed on 10/31. On 10/31 -5x, 11/1- 5x, 11/3- 6x, 11/11-9x. 11/12-7x. This morning 4x/ 6 hours.   AUA ssx:(2 incomplete emptying, 3 frequency, 2 intermittency, 3 urgency, 3 weak stream, 0 straining, 1 sleeping). 14. QOL: mostly satisfied  Pvr today: 40. Ua today: neg      PVR History:  11/13/23 40  2/28/23 25, aua ssx: 12  8/23/22 115  6/15/21 pvr by scan: 0  3/18/21 UF: pF: 19.2, avg: 10.6, voided vol: 244, pvr: 33  3/2/21               pvr: 35  11/19/20          pvr by scan: 134 (couldn't start after he stopped)  10/26/20          Fill and pull: 0cc pvr  11/19/20          rezum  9/9/20              pvr by scan: 9cc  7/27/20            pvr by scan: 32cc  8/17/20            Cysto trus: 42.6g.   7/30/20           UF: peak flow 12.8mL/s,  avg flow 5.4mL/s, voided vol: 191cc, pvr by scan:0   1/6/20              pvr by scan: 11cc   9/3/19              pvr by scan: 15cc     PSA history: no family hx of prostate cancer  5/31/22 1.3  3/2/21              2.3, %free 20.87, pvr: 35 (psa screen 4.6)  11/19/20          rezum  7/29/20            0.85, %free 54.12  1/31/20            2.2   2/21/19            1.2  3/14/18            1.0    Urine history  4/21/23 Tr prot  6/7/22  neg  11/19/20          Small wbc/mod blood-send for ua patricio and culture pvr by scan: 134  10/14/20           Neg  9/16/20            neg  9/9/20              neg  3/31/20            Neg  1/6/20              Neg  11/18/19          neg  8/8/19              No cx, void: n/a 0 rbc  2/21/19            Ng, void: neg, 0 rbc  4/9/18              No cx, void: neg  9/2017             No blood       Current REVIEW OF SYSTEMS:  neg    Objective:     Vitals:    11/13/23 1026   BP: (!) 140/75   Pulse: 65         Focused  exam  neg    Assessment:     Onesimo Paula is a 72 y.o. male with     1. OAB (overactive bladder)    2. Urge incontinence        Plan:     He adjusted interstim on 10/31. Seemed to improve from 8x a day down to 6x w improvement in urgency but then restarted hctz after and now increased freuqency again. Edilberto on program 2, 3.2. recommend increasing amplitude now that he is on hctz until he feels stimulation. If no improvement in urgency and frequency and leaks and accidents after 1-2 weeks then     He could also change to program 3 and increase his amplitude until he feels stimulation if no improvement after increasing amplitude. Or can contact me and I can he will let me know and will have clarita contact him to change programs and make fu.     Schedule him for urodynamics in 2 months but change to a fu instead if his symptoms improve. No ua needed.       La Nena James MD

## 2023-11-13 NOTE — PATIENT INSTRUCTIONS
Onesimo Paula is a 72 y.o. male with     1. OAB (overactive bladder)    2. Urge incontinence        Plan:       He adjusted interstim on 10/31. Seemed to improve from 8x a day down to 6x w improvement in urgency but then restarted hctz after and now increased freuqency again. Shuny on program 2, 3.2. recommend increasing amplitude now that he is on hctz until he feels stimulation. If no improvement in urgency and frequency and leaks and accidents after 1-2 weeks then     He could also change to program 3 and increase his amplitude until he feels stimulation if no improvement after increasing amplitude. Or can contact me and I can he will let me know and will have clarita contact him to change programs and make fu.     Schedule him for urodynamics in 2 months but change to a fu instead if his symptoms improve. No ua needed.

## 2023-11-17 ENCOUNTER — OFFICE VISIT (OUTPATIENT)
Dept: CARDIOLOGY | Facility: CLINIC | Age: 73
End: 2023-11-17
Payer: COMMERCIAL

## 2023-11-17 VITALS
BODY MASS INDEX: 23.27 KG/M2 | HEART RATE: 61 BPM | OXYGEN SATURATION: 95 % | DIASTOLIC BLOOD PRESSURE: 68 MMHG | SYSTOLIC BLOOD PRESSURE: 137 MMHG | WEIGHT: 162.13 LBS

## 2023-11-17 DIAGNOSIS — R00.2 PALPITATIONS: ICD-10-CM

## 2023-11-17 DIAGNOSIS — K21.9 GASTROESOPHAGEAL REFLUX DISEASE WITHOUT ESOPHAGITIS: ICD-10-CM

## 2023-11-17 DIAGNOSIS — E78.2 MIXED HYPERLIPIDEMIA: ICD-10-CM

## 2023-11-17 DIAGNOSIS — I20.89 OTHER FORMS OF ANGINA PECTORIS: ICD-10-CM

## 2023-11-17 DIAGNOSIS — R06.09 DYSPNEA ON EXERTION: ICD-10-CM

## 2023-11-17 DIAGNOSIS — R42 DIZZY SPELLS: ICD-10-CM

## 2023-11-17 DIAGNOSIS — R07.9 CHEST PAIN, UNSPECIFIED TYPE: ICD-10-CM

## 2023-11-17 DIAGNOSIS — I10 ESSENTIAL HYPERTENSION: Primary | ICD-10-CM

## 2023-11-17 PROCEDURE — 99999 PR PBB SHADOW E&M-EST. PATIENT-LVL III: CPT | Mod: PBBFAC,,, | Performed by: GENERAL PRACTICE

## 2023-11-17 PROCEDURE — 3075F PR MOST RECENT SYSTOLIC BLOOD PRESS GE 130-139MM HG: ICD-10-PCS | Mod: CPTII,S$GLB,, | Performed by: GENERAL PRACTICE

## 2023-11-17 PROCEDURE — 3078F PR MOST RECENT DIASTOLIC BLOOD PRESSURE < 80 MM HG: ICD-10-PCS | Mod: CPTII,S$GLB,, | Performed by: GENERAL PRACTICE

## 2023-11-17 PROCEDURE — 1101F PT FALLS ASSESS-DOCD LE1/YR: CPT | Mod: CPTII,S$GLB,, | Performed by: GENERAL PRACTICE

## 2023-11-17 PROCEDURE — 1101F PR PT FALLS ASSESS DOC 0-1 FALLS W/OUT INJ PAST YR: ICD-10-PCS | Mod: CPTII,S$GLB,, | Performed by: GENERAL PRACTICE

## 2023-11-17 PROCEDURE — 99214 OFFICE O/P EST MOD 30 MIN: CPT | Mod: S$GLB,,, | Performed by: GENERAL PRACTICE

## 2023-11-17 PROCEDURE — 3044F PR MOST RECENT HEMOGLOBIN A1C LEVEL <7.0%: ICD-10-PCS | Mod: CPTII,S$GLB,, | Performed by: GENERAL PRACTICE

## 2023-11-17 PROCEDURE — 1159F MED LIST DOCD IN RCRD: CPT | Mod: CPTII,S$GLB,, | Performed by: GENERAL PRACTICE

## 2023-11-17 PROCEDURE — 3288F PR FALLS RISK ASSESSMENT DOCUMENTED: ICD-10-PCS | Mod: CPTII,S$GLB,, | Performed by: GENERAL PRACTICE

## 2023-11-17 PROCEDURE — 99214 PR OFFICE/OUTPT VISIT, EST, LEVL IV, 30-39 MIN: ICD-10-PCS | Mod: S$GLB,,, | Performed by: GENERAL PRACTICE

## 2023-11-17 PROCEDURE — 93000 ELECTROCARDIOGRAM COMPLETE: CPT | Mod: S$GLB,,, | Performed by: GENERAL PRACTICE

## 2023-11-17 PROCEDURE — 3078F DIAST BP <80 MM HG: CPT | Mod: CPTII,S$GLB,, | Performed by: GENERAL PRACTICE

## 2023-11-17 PROCEDURE — 3044F HG A1C LEVEL LT 7.0%: CPT | Mod: CPTII,S$GLB,, | Performed by: GENERAL PRACTICE

## 2023-11-17 PROCEDURE — 1160F RVW MEDS BY RX/DR IN RCRD: CPT | Mod: CPTII,S$GLB,, | Performed by: GENERAL PRACTICE

## 2023-11-17 PROCEDURE — 99999 PR PBB SHADOW E&M-EST. PATIENT-LVL III: ICD-10-PCS | Mod: PBBFAC,,, | Performed by: GENERAL PRACTICE

## 2023-11-17 PROCEDURE — 4010F PR ACE/ARB THEARPY RXD/TAKEN: ICD-10-PCS | Mod: CPTII,S$GLB,, | Performed by: GENERAL PRACTICE

## 2023-11-17 PROCEDURE — 1160F PR REVIEW ALL MEDS BY PRESCRIBER/CLIN PHARMACIST DOCUMENTED: ICD-10-PCS | Mod: CPTII,S$GLB,, | Performed by: GENERAL PRACTICE

## 2023-11-17 PROCEDURE — 3075F SYST BP GE 130 - 139MM HG: CPT | Mod: CPTII,S$GLB,, | Performed by: GENERAL PRACTICE

## 2023-11-17 PROCEDURE — 3008F PR BODY MASS INDEX (BMI) DOCUMENTED: ICD-10-PCS | Mod: CPTII,S$GLB,, | Performed by: GENERAL PRACTICE

## 2023-11-17 PROCEDURE — 4010F ACE/ARB THERAPY RXD/TAKEN: CPT | Mod: CPTII,S$GLB,, | Performed by: GENERAL PRACTICE

## 2023-11-17 PROCEDURE — 1159F PR MEDICATION LIST DOCUMENTED IN MEDICAL RECORD: ICD-10-PCS | Mod: CPTII,S$GLB,, | Performed by: GENERAL PRACTICE

## 2023-11-17 PROCEDURE — 93000 EKG 12-LEAD: ICD-10-PCS | Mod: S$GLB,,, | Performed by: GENERAL PRACTICE

## 2023-11-17 PROCEDURE — 3288F FALL RISK ASSESSMENT DOCD: CPT | Mod: CPTII,S$GLB,, | Performed by: GENERAL PRACTICE

## 2023-11-17 PROCEDURE — 3008F BODY MASS INDEX DOCD: CPT | Mod: CPTII,S$GLB,, | Performed by: GENERAL PRACTICE

## 2023-11-17 NOTE — PROGRESS NOTES
Subjective:    Patient ID:  Onesimo Paula is a 72 y.o. male who presents for evaluation of   Chief Complaint   Patient presents with    Establish Care    Chest Pain    Shortness of Breath       HPI:  He is here today with complaints of dizziness shortness of breath and chest pain.  He has gastroesophageal reflux but his pains are not related to eating and her different than reflux pains who come and go in the anterior portion of his chest.  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  Goes up stairs he gets dizzy if he stands for a while and stops.  He regular walking is okay can walk around the shopping in [facility].  Sometime just talking he gets short of breath.  He does have palpitations.  He had pulmonary function test and chest x-ray and was told that everything was okay he quit smoking 97.  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  EKG today reveals sinus bradycardia otherwise normal EKG    Review of patient's allergies indicates:  No Known Allergies    Past Medical History:   Diagnosis Date    Acid reflux     Acquired tricuspid valve insufficiency     Anemia     Borderline diabetes     BPH (benign prostatic hyperplasia)     Cataract     1/16/20 rt eye, 1/30/20- Lt eye    Coronary artery disease     Hypertension     Liver cyst 08/24/2022    Pulmonary nodules     Skin cancer      Past Surgical History:   Procedure Laterality Date    CYSTOSCOPY WITH TRANSURETHRAL DESTRUCTION OF PROSTATE,RFA N/A 10/21/2020    Procedure: CYSTOSCOPY WITH TRANSURETHRAL DESTRUCTION OF PROSTATE,RFA;  Surgeon: La Nena James MD;  Location: [facility];  Service: Urology;  Laterality: N/A;  please make sure [facility] available [phone]    EYE SURGERY      for cataracts, rt eye 1/16/20, left eye 1/30/20    HERNIA REPAIR   1999    HERNIA REPAIR  2004    INSERTION OF SACRAL NEUROSTIMULATOR, ELECTRODES, AND IMPLANTABLE PULSE GENERATOR (IPG) N/A 2023    Procedure: INSERTION, ELECTRODE LEADS AND PULSE GENERATOR, NEUROSTIMULATOR, SACRAL;  Surgeon: La Nena James MD;  Location: [facility];  Service: Urology;  Laterality: N/A;    KNEE ARTHROSCOPY Left 2018    LAPAROSCOPIC APPENDECTOMY N/A 2022    Procedure: APPENDECTOMY, LAPAROSCOPIC;  Surgeon: Sam Goss MD;  Location: [facility] OR;  Service: General;  Laterality: N/A;    PERCUTANEOUS INSERTION OF SACRAL NERVE NEUROSTIMULATOR ELECTRODE LEAD N/A 2023    Procedure: INSERTION, ELECTRODE LEAD, NEUROSTIMULATOR, SACRAL, PERCUTANEOUS Keep first;  Surgeon: La Nena Jamse MD;  Location: [facility];  Service: Urology;  Laterality: N/A;  patient needs to apply EMLA cream to entirety of lower back 20 minutes prior to procedure, also need 1% lidocaine available    PERCUTANEOUS INSERTION OF SACRAL NERVE NEUROSTIMULATOR ELECTRODE LEAD N/A 2023    Procedure: INSERTION, ELECTRODE LEAD, NEUROSTIMULATOR, SACRAL, PERCUTANEOUS;  Surgeon: La Nena James MD;  Location: [facility];  Service: Urology;  Laterality: N/A;    SKIN LESION EXCISION  10/2009    Neck    SKIN LESION EXCISION  2015    STAPEDECTOMY Right 2001    TRANSRECTAL ULTRASOUND EXAMINATION N/A 2020    Procedure: TRANSRECTAL ULTRASOUND;  Surgeon: La Nena James MD;  Location: [facility];  Service: Urology;  Laterality: N/A;     Social History     Tobacco Use    Smoking status: Former     Current packs/day: 0.00     Types: Cigarettes     Quit date:      Years since quittin.8    Smokeless tobacco: Never   Substance Use Topics    Alcohol use: Yes     Comment: occasional    Drug use: No     Family History   Problem Relation Age of Onset    Cancer Mother         skin    Stroke Mother     Heart failure Father     Kidney disease Father         Review of Systems:   Constitution:  Negative for diaphoresis and fever.   HEENT: Negative for nosebleeds.    Cardiovascular: Negative for chest pain       No dyspnea on exertion       No leg swelling        No palpitations  Respiratory: Negative for shortness of breath and wheezing.    Hematologic/Lymphatic: Negative for bleeding problem. Does not bruise/bleed easily.   Skin: Negative for color change and rash.   Musculoskeletal: Negative for falls and myalgias.   Gastrointestinal: Negative for hematemesis and hematochezia.   Genitourinary: Negative for hematuria.   Neurological: Negative for dizziness and light-headedness.   Psychiatric/Behavioral: Negative for altered mental status and memory loss.          Objective:        Vitals:    11/17/23 0911   BP: 137/68   Pulse: 61       Lab Results   Component Value Date    WBC 7.32 11/13/2023    HGB 13.2 (L) 11/13/2023    HCT 40.6 11/13/2023     11/13/2023    CHOL 160 04/05/2023    TRIG 61 04/05/2023    HDL 48 04/05/2023    ALT 21 10/04/2023    AST 24 10/04/2023     11/13/2023    K 4.2 11/13/2023     11/13/2023    CREATININE 1.3 11/13/2023    BUN 36 (H) 11/13/2023    CO2 29 11/13/2023    TSH 2.090 10/04/2023    PSA 1.5 04/05/2023    INR 1.0 10/24/2022    HGBA1C 6.2 04/05/2023        ECHOCARDIOGRAM RESULTS  Results for orders placed during the hospital encounter of 11/30/22    Echo    Interpretation Summary  · The left ventricle is normal in size with mild concentric hypertrophy and normal systolic function.  · Normal left ventricular diastolic function.  · The estimated PA systolic pressure is 30 mmHg.  · Normal right ventricular size with normal right ventricular systolic function.  · Normal central venous pressure (3 mmHg).  · The estimated ejection fraction is 60%.  · Moderate mitral regurgitation.  · Mild tricuspid regurgitation.        CURRENT/PREVIOUS VISIT EKG  Results for orders placed or performed in visit on 08/24/23   IN OFFICE EKG 12-LEAD (to [facility])    Collection Time:  08/24/23  3:36 PM    Narrative    Test Reason : R07.9,    Vent. Rate : 053 BPM     Atrial Rate : 053 BPM     P-R Int : 192 ms          QRS Dur : 114 ms      QT Int : 428 ms       P-R-T Axes : 027 002 034 degrees     QTc Int : 401 ms    Sinus bradycardia  Otherwise normal ECG  When compared with ECG of 28-JUN-2023 07:09,  No significant change was found  Confirmed by Son Glass MD ([id number]) on 9/23/2023 4:51:34 PM    Referred By:  REKHA           Confirmed By:Son Glass MD     No valid procedures specified.   Results for orders placed during the hospital encounter of 11/30/22    Nuclear Stress - Cardiology Interpreted    Interpretation Summary    Normal myocardial perfusion scan. There is no evidence of myocardial ischemia or infarction.    The gated perfusion images showed an ejection fraction of 79% post stress. Normal ejection fraction is greater than 53%.    There is normal wall motion post stress.    LV cavity size is  and normal at stress.    The EKG portion of this study is negative for ischemia.    The patient reported no chest pain during the stress test.    There were no arrhythmias during stress.      Physical Exam:  CONSTITUTIONAL: No fever, no chills  HEENT: Normocephalic, atraumatic,pupils reactive to light                 NECK:  No JVD no carotid bruit  CVS: S1S2+, RRR, no murmurs,   LUNGS: Clear  ABDOMEN: Soft, NT, BS+  EXTREMITIES: No cyanosis, edema  : No jean catheter  NEURO: AAO X 3  PSY: Normal affect      Medication List with Changes/Refills   Current Medications    AMLODIPINE (NORVASC) 10 MG TABLET    Take 0.5 tablets (5 mg total) by mouth once daily.    ASPIRIN (ECOTRIN) 81 MG EC TABLET    Take 81 mg by mouth once daily.    CO-ENZYME Q-10 30 MG CAPSULE    Take 30 mg by mouth 3 (three) times daily.    DICLOFENAC SODIUM (VOLTAREN) 1 % GEL    Apply 2 g topically 4 (four) times daily as needed (left shoulder/arm pain).    METFORMIN (GLUCOPHAGE) 500 MG TABLET    Take 1 tablet (500 mg total)  by mouth daily with breakfast.    MULTIVIT WITH MINERALS/LUTEIN (MULTIVITAMIN 50 PLUS ORAL)    Take 1 tablet by mouth once daily.    OMEGA 3-DHA-EPA-FISH OIL (FISH OIL) 100-160-1,000 MG CAP    Take 1 capsule by mouth once daily.    PANTOPRAZOLE (PROTONIX) 40 MG TABLET    Take 1 tablet by mouth once daily    RANOLAZINE (RANEXA) 500 MG TB12    Take 2 tablets (1,000 mg total) by mouth 2 (two) times daily.    ROSUVASTATIN (CRESTOR) 10 MG TABLET    Take 1 tablet (10 mg total) by mouth once daily.    TADALAFIL (CIALIS) 5 MG TABLET    Take 1 tablet (5 mg total) by mouth once daily.    TELMISARTAN (MICARDIS) 80 MG TAB    Take 1 tablet (80 mg total) by mouth once daily.             Assessment:       1. Essential hypertension    2. Gastroesophageal reflux disease without esophagitis    3. Dizzy spells    4. Palpitations    5. Chest pain, unspecified type    6. Dyspnea on exertion    7. Other forms of angina pectoris    8. Mixed hyperlipidemia         Plan:     Problem List Items Addressed This Visit          Cardiac/Vascular    Chest pain    Overview     intermittent chest pain  for past several months. Hx of smoking 26 years smoking. quitted in 12/1997.Echo showed: normal left ventricular function with mitral / tricuspid valve regurgitation.         Relevant Orders    IN OFFICE EKG 12-LEAD (to [facility])    Stress Echo Which stress agent will be used? Treadmill Exercise; Color Flow Doppler? No    Echo Saline Bubble? No    Mixed hyperlipidemia    Overview     total cholesterol 226, . HDL 41 in 3/2015. 10 year ASCVD Risk is 15 %.total cholesterol 160, LDL 95, HDL 42 in 7/2015.  on Lipitor since 4/2015. 8/2015:  d/c Lipitor 2/2 muscle cramps. Crestor 10 mg. ( start 5 mg first).   crestor sample was given to pt.9/2015: check lab 2 months later.  mild alternative shoulder pain. check cpk.11/2015: cut crestor to half . 2/2 CK high 308.  LDL 71.2/2015: CK decrease to 145, continue on Crestor 1/2 .5/2016: crestor 5 mg. total  cholesterol 160, HDL 42, LDL 97.3/2017: tolerate crestor 5 mg. . declines increasing dosage of Crestor.         Relevant Orders    Stress Echo Which stress agent will be used? Treadmill Exercise; Color Flow Doppler? No    Essential hypertension - Primary    Relevant Orders    Stress Echo Which stress agent will be used? Treadmill Exercise; Color Flow Doppler? No    Echo Saline Bubble? No    Dyspnea on exertion    Relevant Orders    Stress Echo Which stress agent will be used? Treadmill Exercise; Color Flow Doppler? No    Echo Saline Bubble? No       GI    Gastroesophageal reflux disease without esophagitis    Relevant Orders    Stress Echo Which stress agent will be used? Treadmill Exercise; Color Flow Doppler? No    Echo Saline Bubble? No     Other Visit Diagnoses       Dizzy spells        Relevant Orders    Stress Echo Which stress agent will be used? Treadmill Exercise; Color Flow Doppler? No    Echo Saline Bubble? No    Palpitations        Relevant Orders    Cardiac Monitor - 3-15 Day Adult (Cupid Only)        He was never able to get the CTA done of the coronaries.  Have to see Dr. Garcia and sees him on a regular basis for elevated creatinine apparently from dehydration.  Although he does drink a lot of fluids.  It would probably be more beneficial to obtain a exercise stress echo where we can observe his symptoms while walking on a treadmill.  This will also be much quicker to get results.    No follow-ups on file.    The patients questions were answered, they verbalized understanding, and agreed with the treatment plan.     NBA RUIZ MD  [facility]

## 2023-11-27 ENCOUNTER — TELEPHONE (OUTPATIENT)
Dept: CARDIOLOGY | Facility: HOSPITAL | Age: 73
End: 2023-11-27

## 2023-11-28 ENCOUNTER — CLINICAL SUPPORT (OUTPATIENT)
Dept: CARDIOLOGY | Facility: HOSPITAL | Age: 73
End: 2023-11-28
Attending: GENERAL PRACTICE
Payer: COMMERCIAL

## 2023-11-28 VITALS — BODY MASS INDEX: 23.19 KG/M2 | WEIGHT: 162 LBS | HEIGHT: 70 IN

## 2023-11-28 DIAGNOSIS — R07.9 CHEST PAIN, UNSPECIFIED TYPE: ICD-10-CM

## 2023-11-28 DIAGNOSIS — R42 DIZZY SPELLS: ICD-10-CM

## 2023-11-28 DIAGNOSIS — I10 ESSENTIAL HYPERTENSION: ICD-10-CM

## 2023-11-28 DIAGNOSIS — K21.9 GASTROESOPHAGEAL REFLUX DISEASE WITHOUT ESOPHAGITIS: ICD-10-CM

## 2023-11-28 DIAGNOSIS — R06.09 DYSPNEA ON EXERTION: ICD-10-CM

## 2023-11-28 DIAGNOSIS — E78.2 MIXED HYPERLIPIDEMIA: ICD-10-CM

## 2023-11-28 LAB
AORTIC ROOT ANNULUS: 3.8 CM
AORTIC VALVE CUSP SEPERATION: 2.2 CM
ASCENDING AORTA: 2.9 CM
AV INDEX (PROSTH): 0.81
AV MEAN GRADIENT: 4 MMHG
AV PEAK GRADIENT: 8 MMHG
AV VALVE AREA BY VELOCITY RATIO: 2.39 CM²
AV VALVE AREA: 2.54 CM²
AV VELOCITY RATIO: 0.76
BSA FOR ECHO PROCEDURE: 1.9 M2
BSA FOR ECHO PROCEDURE: 1.9 M2
CV ECHO LV RWT: 0.29 CM
CV STRESS BASE HR: 62 BPM
DIASTOLIC BLOOD PRESSURE: 77 MMHG
DOP CALC AO PEAK VEL: 1.39 M/S
DOP CALC AO VTI: 27.9 CM
DOP CALC LVOT AREA: 3.1 CM2
DOP CALC LVOT DIAMETER: 2 CM
DOP CALC LVOT PEAK VEL: 1.06 M/S
DOP CALC LVOT STROKE VOLUME: 70.96 CM3
DOP CALC MV VTI: 33.4 CM
DOP CALCLVOT PEAK VEL VTI: 22.6 CM
E WAVE DECELERATION TIME: 218 MSEC
E/A RATIO: 0.9
E/E' RATIO: 10.32 M/S
ECHO LV POSTERIOR WALL: 0.75 CM (ref 0.6–1.1)
FRACTIONAL SHORTENING: 39 % (ref 28–44)
INTERVENTRICULAR SEPTUM: 1.1 CM (ref 0.6–1.1)
IVC DIAMETER: 1.63 CM
LEFT ATRIUM SIZE: 3.4 CM
LEFT ATRIUM VOLUME INDEX MOD: 16.3 ML/M2
LEFT ATRIUM VOLUME MOD: 31.1 CM3
LEFT INTERNAL DIMENSION IN SYSTOLE: 3.19 CM (ref 2.1–4)
LEFT VENTRICLE DIASTOLIC VOLUME INDEX: 68.06 ML/M2
LEFT VENTRICLE DIASTOLIC VOLUME: 130 ML
LEFT VENTRICLE MASS INDEX: 92 G/M2
LEFT VENTRICLE SYSTOLIC VOLUME INDEX: 21.3 ML/M2
LEFT VENTRICLE SYSTOLIC VOLUME: 40.6 ML
LEFT VENTRICULAR INTERNAL DIMENSION IN DIASTOLE: 5.21 CM (ref 3.5–6)
LEFT VENTRICULAR MASS: 175.72 G
LV LATERAL E/E' RATIO: 8.91 M/S
LV SEPTAL E/E' RATIO: 12.25 M/S
LVOT MG: 2 MMHG
LVOT MV: 0.71 CM/S
MV MEAN GRADIENT: 2 MMHG
MV PEAK A VEL: 1.09 M/S
MV PEAK E VEL: 0.98 M/S
MV PEAK GRADIENT: 5 MMHG
MV STENOSIS PRESSURE HALF TIME: 65 MS
MV VALVE AREA BY CONTINUITY EQUATION: 2.12 CM2
MV VALVE AREA P 1/2 METHOD: 3.38 CM2
OHS CV CPX 1 MINUTE RECOVERY HEART RATE: 122 BPM
OHS CV CPX 85 PERCENT MAX PREDICTED HEART RATE MALE: 124
OHS CV CPX ESTIMATED METS: 7.1
OHS CV CPX MAX PREDICTED HEART RATE: 146
OHS CV CPX PATIENT IS FEMALE: 0
OHS CV CPX PATIENT IS MALE: 1
OHS CV CPX PEAK DIASTOLIC BLOOD PRESSURE: 78 MMHG
OHS CV CPX PEAK HEAR RATE: 148 BPM
OHS CV CPX PEAK RATE PRESSURE PRODUCT: NORMAL
OHS CV CPX PEAK SYSTOLIC BLOOD PRESSURE: 203 MMHG
OHS CV CPX PERCENT MAX PREDICTED HEART RATE ACHIEVED: 101
OHS CV CPX RATE PRESSURE PRODUCT PRESENTING: 8618
OHS LV EJECTION FRACTION SIMPSONS BIPLANE MOD: 66 %
PISA MRMAX VEL: 5.36 M/S
PISA TR MAX VEL: 2.53 M/S
PV MV: 0.88 M/S
PV PEAK GRADIENT: 7 MMHG
PV PEAK VELOCITY: 1.36 M/S
RA PRESSURE ESTIMATED: 3 MMHG
RA PRESSURE ESTIMATED: 3 MMHG
RV TB RVSP: 6 MMHG
RV TISSUE DOPPLER FREE WALL SYSTOLIC VELOCITY 1 (APICAL 4 CHAMBER VIEW): 13.2 CM/S
SINUS: 3.26 CM
STJ: 3.03 CM
STRESS ECHO POST EXERCISE DUR MIN: 6 MINUTES
STRESS ECHO POST EXERCISE DUR SEC: 0 SECONDS
SYSTOLIC BLOOD PRESSURE: 139 MMHG
TDI LATERAL: 0.11 M/S
TDI SEPTAL: 0.08 M/S
TDI: 0.1 M/S
TR MAX PG: 26 MMHG
TRICUSPID ANNULAR PLANE SYSTOLIC EXCURSION: 1.76 CM
TV REST PULMONARY ARTERY PRESSURE: 29 MMHG
Z-SCORE OF LEFT VENTRICULAR DIMENSION IN END DIASTOLE: -0.31
Z-SCORE OF LEFT VENTRICULAR DIMENSION IN END SYSTOLE: -0.29

## 2023-11-28 PROCEDURE — 93306 TTE W/DOPPLER COMPLETE: CPT | Mod: 59

## 2023-11-28 PROCEDURE — 93351 STRESS TTE COMPLETE: CPT | Mod: 26,,, | Performed by: INTERNAL MEDICINE

## 2023-11-28 PROCEDURE — 93306 ECHO (CUPID ONLY): ICD-10-PCS | Mod: 26,59,, | Performed by: INTERNAL MEDICINE

## 2023-11-28 PROCEDURE — 93351 STRESS TTE COMPLETE: CPT

## 2023-11-28 PROCEDURE — 93306 TTE W/DOPPLER COMPLETE: CPT | Mod: 26,59,, | Performed by: INTERNAL MEDICINE

## 2023-11-28 PROCEDURE — 93351 STRESS ECHO (CUPID ONLY): ICD-10-PCS | Mod: 26,,, | Performed by: INTERNAL MEDICINE

## 2023-12-05 ENCOUNTER — PATIENT MESSAGE (OUTPATIENT)
Dept: CARDIOLOGY | Facility: CLINIC | Age: 73
End: 2023-12-05
Payer: COMMERCIAL

## 2023-12-07 DIAGNOSIS — E78.01 FAMILIAL HYPERCHOLESTEROLEMIA: ICD-10-CM

## 2023-12-13 RX ORDER — ROSUVASTATIN CALCIUM 10 MG/1
10 TABLET, COATED ORAL
Qty: 90 TABLET | Refills: 0 | Status: SHIPPED | OUTPATIENT
Start: 2023-12-13 | End: 2024-02-05 | Stop reason: SDUPTHER

## 2023-12-15 DIAGNOSIS — R73.03 PREDIABETES: ICD-10-CM

## 2023-12-18 NOTE — TELEPHONE ENCOUNTER
Patient last seen: 10/04/23  Scheduled to be seen: 02/05/24  Medication last filled: 06/28/23  Medication pended

## 2023-12-20 RX ORDER — METFORMIN HYDROCHLORIDE 500 MG/1
500 TABLET ORAL
Qty: 90 TABLET | Refills: 0 | Status: SHIPPED | OUTPATIENT
Start: 2023-12-20 | End: 2024-02-05 | Stop reason: SDUPTHER

## 2023-12-22 ENCOUNTER — TELEPHONE (OUTPATIENT)
Dept: RADIOLOGY | Facility: HOSPITAL | Age: 73
End: 2023-12-22

## 2023-12-22 DIAGNOSIS — Z01.812 PRE-PROCEDURE LAB EXAM: Primary | ICD-10-CM

## 2023-12-22 NOTE — NURSING
Cta cardiac scheduled @ [facility] on 1/5 @ 10am with arrival @ 9:15 if bloddwork done prior to 1/4. 8:15 if blood work being done day of. Pre-testing instructions given and understanding verbalized.

## 2024-01-02 ENCOUNTER — LAB VISIT (OUTPATIENT)
Dept: LAB | Facility: HOSPITAL | Age: 74
End: 2024-01-02
Attending: NURSE PRACTITIONER
Payer: COMMERCIAL

## 2024-01-02 DIAGNOSIS — Z01.812 PRE-PROCEDURE LAB EXAM: ICD-10-CM

## 2024-01-02 LAB
BUN SERPL-MCNC: 27 MG/DL (ref 8–23)
CREAT SERPL-MCNC: 1.2 MG/DL (ref 0.5–1.4)
EST. GFR  (NO RACE VARIABLE): >60 ML/MIN/1.73 M^2

## 2024-01-02 PROCEDURE — 82565 ASSAY OF CREATININE: CPT | Performed by: NURSE PRACTITIONER

## 2024-01-02 PROCEDURE — 84520 ASSAY OF UREA NITROGEN: CPT | Performed by: NURSE PRACTITIONER

## 2024-01-02 PROCEDURE — 36415 COLL VENOUS BLD VENIPUNCTURE: CPT | Performed by: NURSE PRACTITIONER

## 2024-01-05 ENCOUNTER — HOSPITAL ENCOUNTER (OUTPATIENT)
Dept: RADIOLOGY | Facility: HOSPITAL | Age: 74
Discharge: HOME | End: 2024-01-05
Attending: NURSE PRACTITIONER
Payer: COMMERCIAL

## 2024-01-05 VITALS
DIASTOLIC BLOOD PRESSURE: 64 MMHG | SYSTOLIC BLOOD PRESSURE: 123 MMHG | RESPIRATION RATE: 15 BRPM | OXYGEN SATURATION: 99 % | HEART RATE: 65 BPM

## 2024-01-05 DIAGNOSIS — R07.9 CHEST PAIN, UNSPECIFIED TYPE: ICD-10-CM

## 2024-01-05 DIAGNOSIS — I20.89 OTHER FORMS OF ANGINA PECTORIS: ICD-10-CM

## 2024-01-05 PROCEDURE — 25500020 PHARM REV CODE 255: Performed by: NURSE PRACTITIONER

## 2024-01-05 PROCEDURE — 75574 CT ANGIO HRT W/3D IMAGE: CPT | Mod: TC

## 2024-01-05 PROCEDURE — 25000003 PHARM REV CODE 250: Performed by: NURSE PRACTITIONER

## 2024-01-05 RX ORDER — NITROGLYCERIN 400 UG/1
1 SPRAY ORAL ONCE
Status: COMPLETED | OUTPATIENT
Start: 2024-01-05 | End: 2024-01-05

## 2024-01-05 RX ADMIN — NITROGLYCERIN 1 SPRAY: 400 SPRAY ORAL at 10:01

## 2024-01-05 RX ADMIN — IOHEXOL 90 ML: 350 INJECTION, SOLUTION INTRAVENOUS at 12:01

## 2024-01-05 NOTE — PLAN OF CARE
Tolerated well.  Vss. AAO x 3. Resp REU. Denies pain or nausea.  Instructed to drink at least 64 ounces of fluid today to flush kidneys of contrast.   Understanding verbalized.  Ambulatory. Discharged to home

## 2024-01-05 NOTE — PLAN OF CARE
Ambulated to radiology.  Id'd x 2 pt identifiers.  AAO x 3. SILVA x4. Resp REU.   Denies pain or nausea. All questions answered.

## 2024-01-17 ENCOUNTER — TELEPHONE (OUTPATIENT)
Dept: UROLOGY | Facility: CLINIC | Age: 74
End: 2024-01-17
Payer: COMMERCIAL

## 2024-01-17 NOTE — TELEPHONE ENCOUNTER
Spoke with patient symptoms has not improved since last visit. Will keep urodynamics procedure as scheduled on Monday.

## 2024-01-17 NOTE — TELEPHONE ENCOUNTER
----- Message from Dara Terrell sent at 1/17/2024 10:58 AM CST -----  Type:  Patient Returning Call    Who Called:  pt  Who Left Message for Patient:  Eryn  Does the patient know what this is regarding?:  Yes  Best Call Back Number:  [phone]  Additional Information:  Please call back to advise Thanks!

## 2024-01-21 DIAGNOSIS — I10 ESSENTIAL HYPERTENSION: Chronic | ICD-10-CM

## 2024-01-22 ENCOUNTER — PATIENT MESSAGE (OUTPATIENT)
Dept: UROLOGY | Facility: CLINIC | Age: 74
End: 2024-01-22
Payer: COMMERCIAL

## 2024-01-22 ENCOUNTER — PROCEDURE VISIT (OUTPATIENT)
Dept: UROLOGY | Facility: CLINIC | Age: 74
End: 2024-01-22
Payer: COMMERCIAL

## 2024-01-22 VITALS
DIASTOLIC BLOOD PRESSURE: 73 MMHG | HEART RATE: 65 BPM | SYSTOLIC BLOOD PRESSURE: 132 MMHG | HEIGHT: 70 IN | BODY MASS INDEX: 23.2 KG/M2 | WEIGHT: 162.06 LBS

## 2024-01-22 DIAGNOSIS — N32.81 OAB (OVERACTIVE BLADDER): Primary | ICD-10-CM

## 2024-01-22 PROCEDURE — 99214 OFFICE O/P EST MOD 30 MIN: CPT | Mod: S$GLB,,, | Performed by: UROLOGY

## 2024-01-22 RX ORDER — DOXAZOSIN MESYLATE 4 MG/1
4 TABLET ORAL DAILY
Qty: 90 TABLET | Refills: 0 | Status: SHIPPED | OUTPATIENT
Start: 2024-01-22

## 2024-01-22 NOTE — PROGRESS NOTES
Procedure Order to Urology [[id number]]    Electronically signed by: La Nena James MD on 01/22/24 1024 Status: Active   Ordering user: La Nena James MD 01/22/24 1024 Authorized by: La Nena James MD   Ordering mode: Standard   Frequency:  01/22/24 -     Diagnoses  OAB (overactive bladder) [N32.81]   Questionnaire    Question Answer   Procedure Cystoscopy Comment - 2/19/24   Facility Name: [facility], Please order Urine POCT without micro . Local sedation

## 2024-01-22 NOTE — PATIENT INSTRUCTIONS
Onesimo Paula is a 73 y.o. male with     1. OAB (overactive bladder)          Plan:     Dip urine and if + for blood- needs ua patricio and cytology with smoking hx although quit in 1997    Uroflow and pvr soon. Has oab. Will need to drink fluid here right before the test.   Schedule cystoscopy to rule out stricture as cause of oab on Monday feb 19th.   If he does have a urethral stricture- then can explain sx and would need to treat stricture to that improves symptoms.then would need to do in and out cath intermittently forr a while to keep stricture from scarring down again.   If he doesn't have urethral stricture will proceed with uds.   (Will go ahead and schedule to follow cysto/trus and can cancel If we don't need)  Recommend uds since he already has had rezum and interstim and want to evaluate if he has detrussor overactivity/bladder muscle kenn too much.   If so would offer botox since he has already has interstim. But has to be repeated.

## 2024-01-22 NOTE — PROCEDURES
[facility] Note - [place]  Staff: MD Erika  PCP: Reynaldo Rahman, APRN,FNP-C  Date of Service: 01/22/2024      Subjective:        HPI: Onesimo Paula is a 73 y.o. male     Seen by me 9/30/19  Patient had MRI for right hip pain and was found have a thick-walled bladder with enlarged prostate.  He states that he saw urologist over 40 years ago for burning with urination.  He had a renal bladder ultrasound in 2016 which showed enlarged prostate and bilateral renal cyst done for renal insufficiency whom he sees  for.  Denies any gross hematuria.  Review of for previous urine show no microscopic hematuria.  Twenty-five pack-year history of smoking but quit in 1997.      He also has AUA symptom score 6 and occasional weak stream and urgency but voids every 3-5 hours when he is working and a little more frequent when he is not.  Denies any urge incontinence.  Okay stream with occasional weak stream.  PVR today 09/03/2019 is 15 cc.  He may have tried Flomax a couple years ago but does not recall it helping her changing any was symptoms.  Overall really not that bothered by his urinary symptoms.      Also states he has ED.  Previously tried sildenafil unsure dose and it helped but wife does not want him to take, exam revealed peyronie's     Plan: started tadalafil 5mg due to thick walled bladder although essentially asx. No further f/u fr b renal cysts.      Interval history 11/18/19:   Pt states today Cialis has NOT improved any of his lower urinary tract symptoms since starting the medication.  He currently takes this medication before his bedtime.  Pt still c/o urinary urgency, especially during the day.  Nocturia is 1-2x nightly and not bothersome at this time.  He is a , therefore the daytime urgency really bothers him.  Plan: started ditropan 10mg XL and cont'd tadalafil     Interval history by idalia 1/6/20:   TODAY, pt states the oxybutynin only improved his  LUTS by over 10% at this time.  PVR by bladder scan performed in office today:  11 mL  Rec'd: d/c oxybutynin, start myrbetriq, cont tadalafil 5mg daily.     Interval history 7/27/20:   Tried myrbetriq but didn't help. Having urgency, hesistancy and intermittent slow stream but urgency most bothersome and having some UUI with a few drop, but not that bothersome. Drinks 1 c of coffee in am. Soda during day. 1 c of coffee int he evening. Has never tried stopping these to see if they help. Drinks beer and notices increase in frequency following day.   On tadalafil 5mg daily for ED and bph.  pvr by scan: 32cc  Voiding every 30 minutes a day recently more pronounced at home (has been on vacation).  Takes hctz in am. When he's working he only voids about 2x-4x a day. Drinks caffeine while working including energy drinks. No nocturia.   psa 1/31/20 2.2 (up from 1.2 year prior). Repeat psa 0.85 7/29/20  WINSOME today: 40g + no nodules.  Uroflow 7/30/20: peak flow 12.8mL/s,  avg flow 5.4mL/s, voided vol: 191cc, pvr by scan:0      Cysto trus 8/17/20: normal bladder, no stricture. Membranous and distal prostatic urethra with papillary tissue suspicious for prostatitis.trus volume 42.6g. bc of his urgency on tadalafil, flomax, vesicare found rx cipro for 14d to see if it helped w urgency. D/c'd vesicare. cont'd flomax and tadalafil. rtc to see idalia in 2-3 weeks and me in 2 months to discuss bph procedures     Interval history by idalia 9/9/20:   Pt states he is still having the urinary urgency even since finishing the Cipro antibiotics at this time.  He denies gross hematuria, dysuria.  UA today shows normal findings.  PVR by bladder scan today:  9 mL  Plan: rec'd restarting vesicare     Interval history 10/1/20:    Taking flomax 0.4mg nightly and says flow is intermittent on this.   Also on the vesicare and says it doesn't help with the urgency. Voiding 2-3x a day and none at night. 3-4x a week c/o urge incontinence if he tries  to hold it too long, mostly in the am when he wakes up. Denies any difficulty with walking. No weakness or numbness.   Changed to decaf coffee 1x a day. Changed to sprite and caffeine free coke.   He does not want to take any more meds. He is not happy with the retrograde ejaculation.   Sees dr. leger for Mitral Valve leakage, last saw him a week ago, was told he should f/u in March 2021. Tadalafil helping with erections     Interval history 11/19/20:   Underwent rezum on 10/21/20. continue flomax, vesicare and tadafil daily for 3 more months.   Returned for fill and pull on 10/26/20. Filled with 210, voided 210  For 2 weeks postop would have blood when he had a BM, improving.   Today (1 month later) he states he has some increased urgency. UUI 2-3x a day (increased from 3-4x a week).  Still taking vesicare  freq q1 hour (worse in cold weather). Without cold weather freq q 3 hour.   ua today with small wbc and mod blood - some dysuria  No significant change in stream yet  pvr by scan today: 134  AUA ssx:(2 incomplete emptying, 3 frequency, 3 intermittency, 4 urgency, 2 weak stream, 2 straining, 0 sleeping). 16. QOL: 4    Interval history by me in clinic on 3/9/21:  Returns today and states states he ran out of flomax and vesicare a month ago. No increased frequency, urge incontinence or slower stream off the flomax  Still having UUI 1-2x a day with a few drops when he hears water.   Voids 8x/24 hours and 4x/8 hours while at work. Nocturia occ (same). No longer drinking caffeine.   Still on tadalafil 5mg daily.  No longer having retrograde ejaculation and constipation improved. Hctz in am.  Frequency mostly in  the pm. Bothers him but able to stop at bathrooms. Sometimes loses erection with tadalafil 5mg daily. Rare. Not more frequent. Still getting morning erections.   AUA ssx today:(1 incomplete emptying, 2 frequency, 1 intermittency, 3 urgency, 3 weak stream, 0 straining, 0 sleeping). 10. QOL: mixed  Bladder  scan PVR today was 35mL.    I reviewed his previous PSAs and his most recent psa within the last year was 2.3, %free 20.87, last year was 2.2      Interval history with idalia on 4/9/21:  UA today showed normal findings.  Pt states today even though he tried and changed his HCTZ med to evenings as discussed last ov, his LUTS have not improved.  Pt as of today's visit is still c/o urinary urgency and frequency during the daytime.  Has not improved since last ov.  Pt states today he has tried Vesicare and Oxybutynin in the past with no improvement in symptoms.  Pt currently denies gross hematuria and dysuria.  She put him on myrbetriq again       Interval history by me 6/15/21:  Uroflow results (date: 03/18/2021): Voiding time: 24.3s, Flow time: 23.1s, TTP flow: 8.0s, Peak flowrate: 19.2 mL/s, Average flowrate: 10.6mL/s, Intervals: 2, Voided volume: 244 mL, Pvr by bladder scan 33. Pattern of curve: see uploaded image, reviewed by   At last visit was c/o freq/urgency. Frequency 8-10x/day. Tried myrbetriq before rezum, ditropan, vesicare and changing hctz to pm but no improvement. Then in April started myrbetriq again , after about 4 weeks saw improvement, now down to 5-6x a day. avg urg: 3. No nocturia. No incontinence.  Also on tadalafil 5mg daily.   AUA ssx today:(1 incomplete emptying, 1 frequency, 1 intermittency, 2 urgency, 3 weak stream, 0 straining, 0 sleeping). 8. QOL:   Bladder scan PVR today was 0mL.      HPI/CC today 6/7/22:   Had him Continue myrbetriq 50mg daily, has enough until 4/2021, Continue tadalafil 5mg daily, refilled today for a year.  Says myrbetriq not helping anymore. He held it for a few days and noticed no difference. On cialis 5mg daily.   Does not have constipation  Voids about 9-10x during waking hours. occ urge incontinence 3x a week. Few drops. Started drinking caffeine this week.   Nocturia 1-2x a night. Urine flow varies.    psa 5/31/22 1.3  ua today neg, pvr by scan: 5  AUA  ssx:(2 incomplete emptying, 3 frequency, 2 intermittency, 4 urgency, 3 weak stream, 1 straining, 1 sleeping). 16. QOL: mixed  Appendix removed in January complicated by post-op ileus.    Interval history 8/23/22:  Continue myrbetriq 50mg daily, has enough until 4/2021  Continue tadalafil 5mg daily, refilled today for a year  After adding vesicare to myrbetriq - went from voiding 9-10x day, 1-2x a night with occ UUI 3x a week to 5-6x/day, 0 night and no daily UUI with drops. Says he has dry mouth but about the same. However does have some heart burn that is new.   UA today: neg. pvr by scan: 115 (this is the highest it's been, feels empty).  AUA ssx:(1 incomplete emptying, 2 frequency, 0 intermittency, 2 urgency, 2 weak stream, 1 straining, 0 sleeping). 8. QOL: pleased    Interval history 2/28/23:  Says he is doing well on VESIcare and myrbetriq.  However it is expensive.  About 120 dollars every 3 months.  Also having dry mouth with it.  On tadalafil 5 mg daily for erections, obtaining from [employer].  This dose wish for him sometimes.  He tried Viagra 100 mg and it seemed to work.  Aua ssx: 12, pvr: 25    Interval history 4/21/23:  I had him discontinue both myrbetriq and VESIcare at the last visit to see if his incontinence would get worse.  Followed up with Teena on 03/29/2023.  His PVR was 0.  His AUA symptom score was 14, 5.  He was still taking tadalafil 5 mg daily.  He said with stopping VESIcare and myrbetriq he had 2 episodes of incontinence that were large volume, increased postvoid dribbling and he feels like his urinary frequency increased.  Was scheduled with me to discuss InterStim because he wanted to hold off on restarting any medications due to cost and side effects  He returns today with a voiding diary.  He kept a voiding diary for me days and it looks like he urinates on average about 9-10 times a day.  That is up from 6 times a day when he was taking medications.  He did not documented on his  voiding diary but he leaks urine 2 to 3 times a day on the way to the bathroom.  Not wearing any pads.  Does not have to change his underwear.  If he could rate his urgency he would say on average his urgency is 4/5.  He is interested in proceeding with InterStim.  He ended up using tadalafil 10 mg once and said this dose worked good for him for his erectile dysfunction  Voided urine today is negative.  Confirmed again that currently he urinates 7-10 times a day, average urgency is 4 and 1-2 leaks a day.        Interval history 5/25/23:  I did a peripheral nerve evaluation on him on Monday, 4 days ago.  Since then he has gone from urinating 6-10 times a day down to about 6 times a day.  Never had issues at night.  He went from leaking 2 to 3 times a day incontinence down to 1x over the last 3 days. Avg urgency went from 4 to 2. He says >50% improvement and wants to proceed. Not on any meds.   He previously saw  for stress tests bc of SOB. His last w/u was 11/2022 and had a neg stress test. On asa 81mg. No stents. They could never figure out why he has SOB.  He sees pulmonology regularly for nodules in the lungs.  Wires removed today    Interval history 7/11/23:  Here today for interstim stage 1 and 2. Preop appt revealed a1c 6.2, glucose 124. Ua with tr protein/tr bld on 6/28. Started on metformin.   Urinating every 1-2 hours. Uui with drops 2x a day. No pads or underwear changes.  Held asa 81mg for 7d.     Interval history 8/8/23 telephone visit :  Pt states that initially he had issues with urgency and frequency after his procedure but since talking to clarita and making adjustments he has gone from urinating every 1-2 hours to 3-4x a day. Avg urgency remains around 4 and no UUI.   He is happy with his symptoms.     Interval history by ME in CLINIC on 11/13/23 for postop interstim fu and oab fu:  He says that his frequency has returned despite changing his settings. Says changing the setting works for a  bit. Then no longer works. Nos on 3.2.   He says after turning off his interstim for us in August for us abd for his liver he had an increase in his frequency and had to increase the amplitude   He changed the settings on 10/31 and he went from 8x a day down to 5x a day. He stopped hctz a month ago by his cardiologist but then he restarted the hctz in the morning last week and his frequency increased to 9x a day.  Currently on program 2, 3.2.    He says urinating about 8x a day and 1x a night. Leaking with UUI and drops 3-4x a day and 2 accidents last week. No pads.   Urgency 5/5.  1 cup of caffeine in the morning.   Voiding diary: 10/30/23- 8x, changed on 10/31. On 10/31 -5x, 11/1- 5x, 11/3- 6x, 11/11-9x. 11/12-7x. This morning 4x/ 6 hours.   AUA ssx:(2 incomplete emptying, 3 frequency, 2 intermittency, 3 urgency, 3 weak stream, 0 straining, 1 sleeping). 14. QOL: mostly satisfied  Pvr today: 40. Ua today: neg    Interval history by ME in CLINIC on 1/22/24 for oab:  Plan was to do uds bc of his oab but when joe tried to place ureteral catheter it did not go easily. Then she tried 16fr coude and stopped mid way and saw blood. Decided to hold off on and plan for cysto. He adjusted his interstim and he said no change. Feels the flutter in his right perineum. . He says flow is fast sometimes and slow sometimes.  Cysto trus 8/17/20: 42.6g.   Rezum 10/21/20  Last peak flow was 19.2 in 3/18/21.  Last catheter was at time of cholecystectomy in jan 2022.   Interstim 7/11/23  Frequency:Still urinates bw 6 to 12x a day. Has more bad days than good. Nocturia 1x a night.  Incontinence:  Has drops/leaks twice a day.  No pads  Drinks 1 soda in am and decaf cup in evening. No tea.         PVR History:  11/13/23 40  2/28/23 25, aua ssx: 12  8/23/22 115  6/15/21 pvr by scan: 0  3/18/21 UF: pF: 19.2, avg: 10.6, voided vol: 244, pvr: 33  3/2/21               pvr: 35  11/19/20          pvr by scan: 134 (couldn't start after he  stopped)  10/26/20          Fill and pull: 0cc pvr  11/19/20          rezum  9/9/20              pvr by scan: 9cc  7/27/20            pvr by scan: 32cc  8/17/20            Cysto trus: 42.6g.   7/30/20           UF: peak flow 12.8mL/s,  avg flow 5.4mL/s, voided vol: 191cc, pvr by scan:0   1/6/20              pvr by scan: 11cc   9/3/19              pvr by scan: 15cc     PSA history: no family hx of prostate cancer  1/22/24 WINSOME: 40g  5/31/22 1.3  3/2/21              2.3, %free 20.87, pvr: 35 (psa screen 4.6)  11/19/20          rezum  7/29/20            0.85, %free 54.12  1/31/20            2.2   2/21/19            1.2  3/14/18            1.0    Urine history: 1 ppd x 26 years. Quit 1997. Anticoag: asa 81mg  1/22/24 Not able to provide- do at the lab  11/13/23 1 rbc  6/28/23 Tr bld  5/22/23 neg  4/21/23 Tr prot  6/7/22  neg  11/19/20          Small wbc/mod blood-send for ua patricio and culture pvr by scan: 134  10/14/20          Neg  9/16/20            neg  9/9/20              neg  3/31/20            Neg  1/6/20              Neg  11/18/19          neg  8/8/19              No cx, void: n/a 0 rbc  2/21/19            Ng, void: neg, 0 rbc  4/9/18              No cx, void: neg  9/2017             No blood       Current REVIEW OF SYSTEMS:  neg    Objective:     Vitals:    01/22/24 0853   BP: 132/73   Pulse: 65         Focused  exam  neg    Assessment:     Onesimo Paula is a 73 y.o. male with     1. OAB (overactive bladder)          Plan:     Needs to do urine at the lab- needs ua patricio and cytology (would need to returnwith smoking hx although quit in 1997  Uroflow and pvr soon. Has oab. Will need to drink fluid here right before the test.   Schedule cystoscopy and TRUS to rule out stricture as cause of oab on Monday feb 19th. See how much prostate has grown back. Previously had eleanor in 2020 and trus vol 46g prior to this.   If he does have a urethral stricture- then can explain sx and would need to treat stricture to that  improves symptoms.then would need to do in and out cath intermittently forr a while to keep stricture from scarring down again.   If he doesn't have urethral stricture will proceed with uds.   (Will go ahead and schedule to follow cysto/trus and can cancel If we don't need)  Recommend uds since he already has had rezum and interstim and want to evaluate if he has detrussor overactivity/bladder muscle kenn too much.   If so would offer botox since he has already has interstim. But has to be repeated.       La Nena James MD

## 2024-01-30 ENCOUNTER — CLINICAL SUPPORT (OUTPATIENT)
Dept: UROLOGY | Facility: CLINIC | Age: 74
End: 2024-01-30
Payer: COMMERCIAL

## 2024-01-30 DIAGNOSIS — R82.998 CELLS AND CASTS IN URINE: Primary | ICD-10-CM

## 2024-01-30 DIAGNOSIS — N13.8 BPH WITH OBSTRUCTION/LOWER URINARY TRACT SYMPTOMS: ICD-10-CM

## 2024-01-30 DIAGNOSIS — N40.1 BPH WITH OBSTRUCTION/LOWER URINARY TRACT SYMPTOMS: ICD-10-CM

## 2024-01-30 LAB
BILIRUB UR QL STRIP: NEGATIVE
CLARITY UR REFRACT.AUTO: CLEAR
COLOR UR AUTO: COLORLESS
GLUCOSE UR QL STRIP: NEGATIVE
HGB UR QL STRIP: NEGATIVE
KETONES UR QL STRIP: NEGATIVE
LEUKOCYTE ESTERASE UR QL STRIP: NEGATIVE
NITRITE UR QL STRIP: NEGATIVE
PH UR STRIP: 7 [PH] (ref 5–8)
PROT UR QL STRIP: NEGATIVE
SP GR UR STRIP: 1.01 (ref 1–1.03)
URN SPEC COLLECT METH UR: ABNORMAL

## 2024-01-30 PROCEDURE — 51741 ELECTRO-UROFLOWMETRY FIRST: CPT | Mod: S$GLB,,, | Performed by: UROLOGY

## 2024-01-30 PROCEDURE — 81003 URINALYSIS AUTO W/O SCOPE: CPT | Performed by: UROLOGY

## 2024-01-30 PROCEDURE — 99499 UNLISTED E&M SERVICE: CPT | Mod: S$GLB,,, | Performed by: UROLOGY

## 2024-01-30 NOTE — PROGRESS NOTES
Copied plan from most recent note on 1/22/2024 by :  Needs to do urine at the lab- needs ua patricio and cytology (would need to returnwith smoking hx although quit in 1997  Uroflow and pvr soon. Has oab. Will need to drink fluid here right before the test.   Schedule cystoscopy and TRUS to rule out stricture as cause of oab on Monday feb 19th. See how much prostate has grown back. Previously had rezum in 2020 and trus vol 46g prior to this.   If he does have a urethral stricture- then can explain sx and would need to treat stricture to that improves symptoms.then would need to do in and out cath intermittently forr a while to keep stricture from scarring down again.   If he doesn't have urethral stricture will proceed with uds.   (Will go ahead and schedule to follow cysto/trus and can cancel If we don't need)  Recommend uds since he already has had rezum and interstim and want to evaluate if he has detrussor overactivity/bladder muscle kenn too much.   If so would offer botox since he has already has interstim. But has to be repeated.     Uroflow results (date: 01/30/2024): Voiding time: 39.4s, Flow time: 39.3s, TTP flow: 10.6s, Peak flowrate: 16.7 mL/s, Average flowrate: 8.8mL/s, Intervals: 1, Voided volume: 346 mL, Pvr by bladder scan: 45mL . Pattern of curve: see 's addendum.     On flomax 0.4mg nightly: No  On flomax 0.8mg nightly: No          Uroflow curve: bell  Recommendations: fast flow, proceed with cysto to r/o stricture    Uroflow images uploaded and reviewed by me 02/01/2024     Follow up scheduled for: 2/19/2024    Patient provided a clean catch urine sample  Urine sent for urine reflex.  Ua 1/30/24 neg

## 2024-02-05 ENCOUNTER — OFFICE VISIT (OUTPATIENT)
Dept: FAMILY MEDICINE | Facility: CLINIC | Age: 74
End: 2024-02-05
Payer: COMMERCIAL

## 2024-02-05 VITALS
HEIGHT: 70 IN | DIASTOLIC BLOOD PRESSURE: 72 MMHG | SYSTOLIC BLOOD PRESSURE: 124 MMHG | HEART RATE: 60 BPM | WEIGHT: 167.31 LBS | BODY MASS INDEX: 23.95 KG/M2 | OXYGEN SATURATION: 98 %

## 2024-02-05 DIAGNOSIS — E78.01 FAMILIAL HYPERCHOLESTEROLEMIA: ICD-10-CM

## 2024-02-05 DIAGNOSIS — R73.03 PREDIABETES: ICD-10-CM

## 2024-02-05 DIAGNOSIS — K21.9 GASTROESOPHAGEAL REFLUX DISEASE WITHOUT ESOPHAGITIS: ICD-10-CM

## 2024-02-05 DIAGNOSIS — I10 ESSENTIAL HYPERTENSION: Primary | ICD-10-CM

## 2024-02-05 PROCEDURE — 3288F FALL RISK ASSESSMENT DOCD: CPT | Mod: CPTII,S$GLB,, | Performed by: NURSE PRACTITIONER

## 2024-02-05 PROCEDURE — 3078F DIAST BP <80 MM HG: CPT | Mod: CPTII,S$GLB,, | Performed by: NURSE PRACTITIONER

## 2024-02-05 PROCEDURE — 3074F SYST BP LT 130 MM HG: CPT | Mod: CPTII,S$GLB,, | Performed by: NURSE PRACTITIONER

## 2024-02-05 PROCEDURE — 1101F PT FALLS ASSESS-DOCD LE1/YR: CPT | Mod: CPTII,S$GLB,, | Performed by: NURSE PRACTITIONER

## 2024-02-05 PROCEDURE — 3008F BODY MASS INDEX DOCD: CPT | Mod: CPTII,S$GLB,, | Performed by: NURSE PRACTITIONER

## 2024-02-05 PROCEDURE — 1159F MED LIST DOCD IN RCRD: CPT | Mod: CPTII,S$GLB,, | Performed by: NURSE PRACTITIONER

## 2024-02-05 PROCEDURE — 4010F ACE/ARB THERAPY RXD/TAKEN: CPT | Mod: CPTII,S$GLB,, | Performed by: NURSE PRACTITIONER

## 2024-02-05 PROCEDURE — 99214 OFFICE O/P EST MOD 30 MIN: CPT | Mod: S$GLB,,, | Performed by: NURSE PRACTITIONER

## 2024-02-05 PROCEDURE — 99999 PR PBB SHADOW E&M-EST. PATIENT-LVL IV: CPT | Mod: PBBFAC,,, | Performed by: NURSE PRACTITIONER

## 2024-02-05 PROCEDURE — 1126F AMNT PAIN NOTED NONE PRSNT: CPT | Mod: CPTII,S$GLB,, | Performed by: NURSE PRACTITIONER

## 2024-02-05 PROCEDURE — 1160F RVW MEDS BY RX/DR IN RCRD: CPT | Mod: CPTII,S$GLB,, | Performed by: NURSE PRACTITIONER

## 2024-02-05 RX ORDER — OMEPRAZOLE 20 MG/1
20 CAPSULE, DELAYED RELEASE ORAL DAILY
Qty: 90 CAPSULE | Refills: 1 | Status: SHIPPED | OUTPATIENT
Start: 2024-02-05

## 2024-02-05 RX ORDER — ROSUVASTATIN CALCIUM 10 MG/1
10 TABLET, COATED ORAL DAILY
Qty: 90 TABLET | Refills: 1 | Status: SHIPPED | OUTPATIENT
Start: 2024-02-05

## 2024-02-05 RX ORDER — AMLODIPINE BESYLATE 10 MG/1
10 TABLET ORAL DAILY
Qty: 90 TABLET | Refills: 1 | Status: ON HOLD | OUTPATIENT
Start: 2024-02-05 | End: 2024-03-13

## 2024-02-05 RX ORDER — METFORMIN HYDROCHLORIDE 500 MG/1
500 TABLET ORAL
Qty: 90 TABLET | Refills: 1 | Status: SHIPPED | OUTPATIENT
Start: 2024-02-05

## 2024-02-05 RX ORDER — TELMISARTAN 80 MG/1
80 TABLET ORAL DAILY
Qty: 90 TABLET | Refills: 1 | Status: SHIPPED | OUTPATIENT
Start: 2024-02-05

## 2024-02-06 ENCOUNTER — HOSPITAL ENCOUNTER (OUTPATIENT)
Age: 74
Discharge: HOME | End: 2024-02-06
Payer: MEDICARE

## 2024-02-06 DIAGNOSIS — E11.22 TYPE 2 DIABETES MELLITUS WITH CHRONIC KIDNEY DISEASE, WITHOUT LONG-TERM CURRENT USE OF INSULIN, UNSPECIFIED CKD STAGE (HCC): ICD-10-CM

## 2024-02-06 DIAGNOSIS — N18.31 STAGE 3A CHRONIC KIDNEY DISEASE (HCC): ICD-10-CM

## 2024-02-06 DIAGNOSIS — R80.9 PROTEINURIA, UNSPECIFIED TYPE: ICD-10-CM

## 2024-02-06 DIAGNOSIS — N28.1 RENAL CYST: ICD-10-CM

## 2024-02-06 DIAGNOSIS — I1A.0 RESISTANT HYPERTENSION: ICD-10-CM

## 2024-02-06 LAB
ALBUMIN SERPL-MCNC: 4.4 G/DL (ref 3.4–5)
ANION GAP SERPL CALCULATED.3IONS-SCNC: 8 MMOL/L (ref 3–16)
BACTERIA URNS QL MICRO: ABNORMAL /HPF
BILIRUB UR QL STRIP.AUTO: NEGATIVE
BUN SERPL-MCNC: 33 MG/DL (ref 7–20)
CALCIUM SERPL-MCNC: 9.2 MG/DL (ref 8.3–10.6)
CHLORIDE SERPL-SCNC: 103 MMOL/L (ref 99–110)
CLARITY UR: CLEAR
CO2 SERPL-SCNC: 27 MMOL/L (ref 21–32)
COLOR UR: YELLOW
CREAT SERPL-MCNC: 1.8 MG/DL (ref 0.8–1.3)
CREAT UR-MCNC: 55.3 MG/DL (ref 39–259)
DEPRECATED RDW RBC AUTO: 13.4 % (ref 12.4–15.4)
EPI CELLS #/AREA URNS AUTO: 0 /HPF (ref 0–5)
GFR SERPLBLD CREATININE-BSD FMLA CKD-EPI: 39 ML/MIN/{1.73_M2}
GLUCOSE SERPL-MCNC: 110 MG/DL (ref 70–99)
GLUCOSE UR STRIP.AUTO-MCNC: NEGATIVE MG/DL
HCT VFR BLD AUTO: 36.6 % (ref 40.5–52.5)
HGB BLD-MCNC: 12.6 G/DL (ref 13.5–17.5)
HGB UR QL STRIP.AUTO: NEGATIVE
HYALINE CASTS #/AREA URNS AUTO: 0 /LPF (ref 0–8)
KETONES UR STRIP.AUTO-MCNC: NEGATIVE MG/DL
LEUKOCYTE ESTERASE UR QL STRIP.AUTO: NEGATIVE
MCH RBC QN AUTO: 31.5 PG (ref 26–34)
MCHC RBC AUTO-ENTMCNC: 34.6 G/DL (ref 31–36)
MCV RBC AUTO: 91.1 FL (ref 80–100)
MICROALBUMIN UR DL<=1MG/L-MCNC: 17 MG/DL
MICROALBUMIN/CREAT UR: 307.4 MG/G (ref 0–30)
NITRITE UR QL STRIP.AUTO: NEGATIVE
PH UR STRIP.AUTO: 6.5 [PH] (ref 5–8)
PHOSPHATE SERPL-MCNC: 3.2 MG/DL (ref 2.5–4.9)
PLATELET # BLD AUTO: 166 K/UL (ref 135–450)
PMV BLD AUTO: 9.5 FL (ref 5–10.5)
POTASSIUM SERPL-SCNC: 4.4 MMOL/L (ref 3.5–5.1)
PROT UR STRIP.AUTO-MCNC: 30 MG/DL
PROT UR-MCNC: 24 MG/DL
PROT/CREAT UR-RTO: 0.4 MG/DL
RBC # BLD AUTO: 4.01 M/UL (ref 4.2–5.9)
RBC CLUMPS #/AREA URNS AUTO: 0 /HPF (ref 0–4)
SODIUM SERPL-SCNC: 138 MMOL/L (ref 136–145)
SP GR UR STRIP.AUTO: <=1.005 (ref 1–1.03)
UA DIPSTICK W REFLEX MICRO PNL UR: YES
URN SPEC COLLECT METH UR: ABNORMAL
UROBILINOGEN UR STRIP-ACNC: 0.2 E.U./DL
WBC # BLD AUTO: 5.4 K/UL (ref 4–11)
WBC #/AREA URNS AUTO: 1 /HPF (ref 0–5)

## 2024-02-06 PROCEDURE — 84156 ASSAY OF PROTEIN URINE: CPT

## 2024-02-06 PROCEDURE — 81001 URINALYSIS AUTO W/SCOPE: CPT

## 2024-02-06 PROCEDURE — 85027 COMPLETE CBC AUTOMATED: CPT

## 2024-02-06 PROCEDURE — 80069 RENAL FUNCTION PANEL: CPT

## 2024-02-06 PROCEDURE — 82570 ASSAY OF URINE CREATININE: CPT

## 2024-02-06 PROCEDURE — 82043 UR ALBUMIN QUANTITATIVE: CPT

## 2024-02-06 PROCEDURE — 36415 COLL VENOUS BLD VENIPUNCTURE: CPT

## 2024-02-07 NOTE — PROGRESS NOTES
SUBJECTIVE:      Patient ID: Onesimo Paula is a 73 y.o. male.    Chief Complaint: Follow-up (4 month f/u Lab Review)      Presents for HTN recheck & lab review - aside from mild anemia and slightly elevated fasting glucose, labs were unremarkable    Discussed outstanding health maintenance     Hypertension  This is a chronic problem. The current episode started more than 1 year ago. The problem has been resolved since onset. The problem is controlled. Associated symptoms include headaches and peripheral edema. Pertinent negatives include no anxiety, blurred vision, chest pain, malaise/fatigue, neck pain, orthopnea, palpitations, PND, shortness of breath or sweats. There are no associated agents to hypertension. Risk factors for coronary artery disease include dyslipidemia, male gender, sedentary lifestyle and family history. Past treatments include angiotensin blockers and calcium channel blockers. The current treatment provides moderate improvement. Compliance problems include exercise.  Hypertensive end-organ damage includes kidney disease and CAD/MI. Identifiable causes of hypertension include chronic renal disease and renovascular disease.   Hyperlipidemia  This is a chronic problem. The current episode started more than 1 year ago. Exacerbating diseases include chronic renal disease. Pertinent negatives include no chest pain, myalgias or shortness of breath. Current antihyperlipidemic treatment includes statins (fish oil). Compliance problems include adherence to exercise.  Risk factors for coronary artery disease include dyslipidemia, male sex, hypertension, a sedentary lifestyle and family history.   Gastroesophageal Reflux  He complains of heartburn. He reports no abdominal pain, no chest pain, no coughing, no nausea, no sore throat or no wheezing. This is a chronic problem. The current episode started more than 1 month ago. The problem occurs occasionally. The problem has been gradually improving. The  heartburn is located in the substernum. The heartburn is of mild intensity. The heartburn does not wake him from sleep. The heartburn does not limit his activity. The symptoms are aggravated by certain foods. Pertinent negatives include no fatigue. Risk factors include lack of exercise. He has tried a PPI for the symptoms. The treatment provided moderate relief. Past procedures include an EGD.       Past Surgical History:   Procedure Laterality Date    CYSTOSCOPY WITH TRANSURETHRAL DESTRUCTION OF PROSTATE,RFA N/A 10/21/2020    Procedure: CYSTOSCOPY WITH TRANSURETHRAL DESTRUCTION OF PROSTATE,RFA;  Surgeon: La Nena James MD;  Location: [facility] OR;  Service: Urology;  Laterality: N/A;  please make sure [facility] available [phone]    EYE SURGERY      for cataracts, rt eye 1/16/20, left eye 1/30/20    HERNIA REPAIR  12/1999    HERNIA REPAIR  05/2004    INSERTION OF SACRAL NEUROSTIMULATOR, ELECTRODES, AND IMPLANTABLE PULSE GENERATOR (IPG) N/A 7/12/2023    Procedure: INSERTION, ELECTRODE LEADS AND PULSE GENERATOR, NEUROSTIMULATOR, SACRAL;  Surgeon: La Nena James MD;  Location: [facility] OR;  Service: Urology;  Laterality: N/A;    KNEE ARTHROSCOPY Left 04/12/2018    LAPAROSCOPIC APPENDECTOMY N/A 1/2/2022    Procedure: APPENDECTOMY, LAPAROSCOPIC;  Surgeon: Sam Goss MD;  Location: [facility] OR;  Service: General;  Laterality: N/A;    PERCUTANEOUS INSERTION OF SACRAL NERVE NEUROSTIMULATOR ELECTRODE LEAD N/A 5/22/2023    Procedure: INSERTION, ELECTRODE LEAD, NEUROSTIMULATOR, SACRAL, PERCUTANEOUS Keep first;  Surgeon: La Nena James MD;  Location: [facility] OR;  Service: Urology;  Laterality: N/A;  patient needs to apply EMLA cream to entirety of lower back 20 minutes prior to procedure, also need 1% lidocaine available    PERCUTANEOUS INSERTION OF SACRAL NERVE NEUROSTIMULATOR ELECTRODE LEAD N/A 7/12/2023    Procedure: INSERTION, ELECTRODE LEAD, NEUROSTIMULATOR, SACRAL, PERCUTANEOUS;  Surgeon:  La Nena James MD;  Location: [facility] OR;  Service: Urology;  Laterality: N/A;    SKIN LESION EXCISION  10/2009    Neck    SKIN LESION EXCISION  2015    STAPEDECTOMY Right 2001    TRANSRECTAL ULTRASOUND EXAMINATION N/A 2020    Procedure: TRANSRECTAL ULTRASOUND;  Surgeon: LaN ena James MD;  Location: [facility] OR;  Service: Urology;  Laterality: N/A;     Family History   Problem Relation Age of Onset    Cancer Mother         skin    Stroke Mother     Heart failure Father     Kidney disease Father       Social History     Socioeconomic History    Marital status:    Tobacco Use    Smoking status: Former     Current packs/day: 0.00     Types: Cigarettes     Quit date:      Years since quittin.1    Smokeless tobacco: Never   Substance and Sexual Activity    Alcohol use: Yes     Comment: occasional    Drug use: No    Sexual activity: Yes     Partners: Female     Social Determinants of Health     Financial Resource Strain: Low Risk  (2023)    Overall Financial Resource Strain (CARDIA)     Difficulty of Paying Living Expenses: Not hard at all   Food Insecurity: No Food Insecurity (2023)    Hunger Vital Sign     Worried About Running Out of Food in the Last Year: Never true     Ran Out of Food in the Last Year: Never true   Transportation Needs: No Transportation Needs (2023)    PRAPARE - Transportation     Lack of Transportation (Medical): No     Lack of Transportation (Non-Medical): No   Physical Activity: Inactive (2023)    Exercise Vital Sign     Days of Exercise per Week: 0 days     Minutes of Exercise per Session: 0 min   Stress: Stress Concern Present (2023)    [employer] [place] [employer] - Occupational Stress Questionnaire     Feeling of Stress : To some extent   Social Connections: Unknown (2023)    Social Connection and Isolation Panel [NHANES]     Frequency of Communication with Friends and Family: More than three times a week      Frequency of Social Gatherings with Friends and Family: Once a week     Active Member of Clubs or Organizations: Yes     Attends Club or Organization Meetings: Never     Marital Status:    Housing Stability: Low Risk  (11/11/2023)    Housing Stability Vital Sign     Unable to Pay for Housing in the Last Year: No     Number of Places Lived in the Last Year: 1     Unstable Housing in the Last Year: No     Current Outpatient Medications   Medication Sig Dispense Refill    aspirin (ECOTRIN) 81 MG EC tablet Take 81 mg by mouth once daily.      co-enzyme Q-10 30 mg capsule Take 30 mg by mouth 3 (three) times daily.      diclofenac sodium (VOLTAREN) 1 % Gel Apply 2 g topically 4 (four) times daily as needed (left shoulder/arm pain). 200 g 0    multivit with minerals/lutein (MULTIVITAMIN 50 PLUS ORAL) Take 1 tablet by mouth once daily.      omega 3-dha-epa-fish oil (FISH OIL) 100-160-1,000 mg Cap Take 1 capsule by mouth once daily.      ranolazine (RANEXA) 500 MG Tb12 Take 2 tablets (1,000 mg total) by mouth 2 (two) times daily. 120 tablet 11    tadalafiL (CIALIS) 5 MG tablet Take 1 tablet (5 mg total) by mouth once daily. 90 tablet 3    amLODIPine (NORVASC) 10 MG tablet Take 1 tablet (10 mg total) by mouth once daily. 90 tablet 1    metFORMIN (GLUCOPHAGE) 500 MG tablet Take 1 tablet (500 mg total) by mouth daily with breakfast. 90 tablet 1    omeprazole (PRILOSEC) 20 MG capsule Take 1 capsule (20 mg total) by mouth once daily. 90 capsule 1    rosuvastatin (CRESTOR) 10 MG tablet Take 1 tablet (10 mg total) by mouth once daily. 90 tablet 1    telmisartan (MICARDIS) 80 MG Tab Take 1 tablet (80 mg total) by mouth once daily. 90 tablet 1     No current facility-administered medications for this visit.     Facility-Administered Medications Ordered in Other Visits   Medication Dose Route Frequency Provider Last Rate Last Admin    electrolyte-S (ISOLYTE)   Intravenous Continuous Loyd Chakraborty MD   Stopped at 07/12/23  1141    fentaNYL 50 mcg/mL injection 25 mcg  25 mcg Intravenous Q5 Min PRN Loyd Chakraborty MD        lactated ringers infusion   Intravenous Continuous Loyd Chakraborty MD        LIDOcaine (PF) 10 mg/ml (1%) injection 10 mg  1 mL Intradermal Once Loyd Chakraborty MD        metoclopramide HCl injection 10 mg  10 mg Intravenous Q10 Min PRN Loyd Chakraborty MD        oxyCODONE immediate release tablet 5 mg  5 mg Oral Once PRN Loyd Chakraborty MD         Review of patient's allergies indicates:  No Known Allergies   Past Medical History:   Diagnosis Date    Acid reflux     Acquired tricuspid valve insufficiency     Anemia     Borderline diabetes     BPH (benign prostatic hyperplasia)     Cataract     1/16/20 rt eye, 1/30/20- Lt eye    Coronary artery disease     Hypertension     Liver cyst 08/24/2022    Pulmonary nodules     Skin cancer      Past Surgical History:   Procedure Laterality Date    CYSTOSCOPY WITH TRANSURETHRAL DESTRUCTION OF PROSTATE,RFA N/A 10/21/2020    Procedure: CYSTOSCOPY WITH TRANSURETHRAL DESTRUCTION OF PROSTATE,RFA;  Surgeon: La Nena James MD;  Location: [facility] OR;  Service: Urology;  Laterality: N/A;  please make sure [facility] available [phone]    EYE SURGERY      for cataracts, rt eye 1/16/20, left eye 1/30/20    HERNIA REPAIR  12/1999    HERNIA REPAIR  05/2004    INSERTION OF SACRAL NEUROSTIMULATOR, ELECTRODES, AND IMPLANTABLE PULSE GENERATOR (IPG) N/A 7/12/2023    Procedure: INSERTION, ELECTRODE LEADS AND PULSE GENERATOR, NEUROSTIMULATOR, SACRAL;  Surgeon: La Nena James MD;  Location: [facility] OR;  Service: Urology;  Laterality: N/A;    KNEE ARTHROSCOPY Left 04/12/2018    LAPAROSCOPIC APPENDECTOMY N/A 1/2/2022    Procedure: APPENDECTOMY, LAPAROSCOPIC;  Surgeon: Sam Goss MD;  Location: [facility] OR;  Service: General;  Laterality: N/A;    PERCUTANEOUS INSERTION OF SACRAL NERVE NEUROSTIMULATOR ELECTRODE LEAD N/A 5/22/2023    Procedure: INSERTION, ELECTRODE  LEAD, NEUROSTIMULATOR, SACRAL, PERCUTANEOUS Keep first;  Surgeon: La Nena James MD;  Location: [facility] OR;  Service: Urology;  Laterality: N/A;  patient needs to apply EMLA cream to entirety of lower back 20 minutes prior to procedure, also need 1% lidocaine available    PERCUTANEOUS INSERTION OF SACRAL NERVE NEUROSTIMULATOR ELECTRODE LEAD N/A 7/12/2023    Procedure: INSERTION, ELECTRODE LEAD, NEUROSTIMULATOR, SACRAL, PERCUTANEOUS;  Surgeon: La Nena James MD;  Location: [facility] OR;  Service: Urology;  Laterality: N/A;    SKIN LESION EXCISION  10/2009    Neck    SKIN LESION EXCISION  2015    STAPEDECTOMY Right 03/2001    TRANSRECTAL ULTRASOUND EXAMINATION N/A 8/17/2020    Procedure: TRANSRECTAL ULTRASOUND;  Surgeon: La Nena James MD;  Location: [facility] OR;  Service: Urology;  Laterality: N/A;       Review of Systems   Constitutional:  Negative for activity change, appetite change, fatigue, fever, malaise/fatigue and unexpected weight change.   HENT:  Negative for congestion, ear pain, hearing loss, postnasal drip, rhinorrhea, sinus pressure, sinus pain, sneezing, sore throat and trouble swallowing.    Eyes:  Negative for blurred vision, photophobia, pain, discharge and visual disturbance.   Respiratory:  Negative for cough, chest tightness, shortness of breath and wheezing.    Cardiovascular:  Negative for chest pain, palpitations, orthopnea, leg swelling and PND.   Gastrointestinal:  Positive for heartburn. Negative for abdominal distention, abdominal pain, blood in stool, constipation, diarrhea, nausea and vomiting.   Endocrine: Negative for cold intolerance, heat intolerance, polydipsia and polyuria.   Genitourinary:  Negative for difficulty urinating, dysuria, flank pain, frequency, hematuria and urgency.        Following with uro & nephro   Musculoskeletal:  Positive for arthralgias. Negative for back pain, joint swelling, myalgias and neck pain.   Skin:  Negative for pallor and rash.  "  Allergic/Immunologic: Negative for environmental allergies and food allergies.   Neurological:  Positive for headaches. Negative for dizziness, weakness and light-headedness.   Hematological:  Does not bruise/bleed easily.   Psychiatric/Behavioral:  Negative for confusion, decreased concentration, dysphoric mood and sleep disturbance. The patient is not nervous/anxious.       OBJECTIVE:      Vitals:    02/05/24 1131   BP: 124/72   BP Location: Right arm   Patient Position: Sitting   BP Method: Large (Manual)   Pulse: 60   SpO2: 98%   Weight: 75.9 kg (167 lb 4.8 oz)   Height: 5' 10" (1.778 m)     Physical Exam  Vitals and nursing note reviewed.   Constitutional:       General: He is not in acute distress.     Appearance: Normal appearance. He is well-developed and normal weight.   HENT:      Head: Normocephalic and atraumatic.      Right Ear: Hearing normal.      Left Ear: Hearing normal.      Nose: Nose normal. No rhinorrhea.   Eyes:      General: Lids are normal.         Right eye: No discharge.         Left eye: No discharge.      Conjunctiva/sclera: Conjunctivae normal.      Right eye: Right conjunctiva is not injected.      Left eye: Left conjunctiva is not injected.      Pupils: Pupils are equal, round, and reactive to light. Pupils are equal.      Right eye: Pupil is round and reactive.      Left eye: Pupil is round and reactive.   Neck:      Thyroid: No thyromegaly.      Vascular: No JVD.      Trachea: Trachea normal. No tracheal deviation.   Cardiovascular:      Rate and Rhythm: Regular rhythm. Bradycardia present.      Pulses:           Radial pulses are 2+ on the right side and 2+ on the left side.      Heart sounds: Normal heart sounds. No murmur heard.     No friction rub. No gallop.   Pulmonary:      Effort: Pulmonary effort is normal. No respiratory distress.      Breath sounds: Normal breath sounds. No stridor. No decreased breath sounds, wheezing, rhonchi or rales.   Abdominal:      General: Bowel " "sounds are normal. There is no distension.      Palpations: Abdomen is soft. Abdomen is not rigid.      Tenderness: There is no abdominal tenderness. There is no guarding.   Musculoskeletal:         General: No swelling. Normal range of motion.      Right upper arm: Tenderness (bottom of medial deltoid/top-middle of bicep) present.      Cervical back: Normal range of motion and neck supple.   Lymphadenopathy:      Cervical: No cervical adenopathy.   Skin:     General: Skin is warm and dry.      Capillary Refill: Capillary refill takes less than 2 seconds.      Coloration: Skin is not pale.      Findings: No lesion or rash.      Comments: About 1" healing surgical incision with intact sutures to L temple s/p skin CA   Neurological:      Mental Status: He is alert and oriented to person, place, and time.      GCS: GCS eye subscore is 4. GCS verbal subscore is 5. GCS motor subscore is 6.      Cranial Nerves: Cranial nerves 2-12 are intact.      Sensory: Sensation is intact.      Motor: Motor function is intact. No atrophy.      Coordination: Coordination is intact. Coordination normal.      Gait: Gait is intact. Gait normal.   Psychiatric:         Attention and Perception: Attention and perception normal. He is attentive.         Mood and Affect: Mood and affect normal.         Speech: Speech normal.         Behavior: Behavior normal.         Thought Content: Thought content normal.         Cognition and Memory: Cognition and memory normal.         Judgment: Judgment normal.        Assessment:       1. Essential hypertension    2. Familial hypercholesterolemia    3. Gastroesophageal reflux disease without esophagitis    4. Prediabetes        Plan:       Essential hypertension  -     amLODIPine (NORVASC) 10 MG tablet; Take 1 tablet (10 mg total) by mouth once daily.  Dispense: 90 tablet; Refill: 1  -     telmisartan (MICARDIS) 80 MG Tab; Take 1 tablet (80 mg total) by mouth once daily.  Dispense: 90 tablet; Refill: " 1    Familial hypercholesterolemia  -     rosuvastatin (CRESTOR) 10 MG tablet; Take 1 tablet (10 mg total) by mouth once daily.  Dispense: 90 tablet; Refill: 1    Gastroesophageal reflux disease without esophagitis  -     omeprazole (PRILOSEC) 20 MG capsule; Take 1 capsule (20 mg total) by mouth once daily.  Dispense: 90 capsule; Refill: 1    Prediabetes  -     metFORMIN (GLUCOPHAGE) 500 MG tablet; Take 1 tablet (500 mg total) by mouth daily with breakfast.  Dispense: 90 tablet; Refill: 1            Follow up in about 6 months (around 8/5/2024) for HTN.      2/7/2024 Reynaldo Rahman, JAMES, FNP-C

## 2024-02-10 NOTE — PROGRESS NOTES
Subjective:    Patient ID:  Onesimo Paula is a 73 y.o. male who presents for follow-up of   Chief Complaint   Patient presents with    Results       HPI:    Here for followup. Testing negative for significant ASHD, zio  moniter neg for arrythmias. Still dizzy when bends over gets up quick. Or when turns head fast.  NEVER HAD CAROTID         11/17/23  He is here today with complaints of dizziness shortness of breath and chest pain.  He has gastroesophageal reflux but his pains are not related to eating and her different than reflux pains who come and go in the anterior portion of his chest.  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  Goes up stairs he gets dizzy if he stands for a while and stops.  He regular walking is okay can walk around the shopping in [employer].  Sometime just talking he gets short of breath.  He does have palpitations.  He had pulmonary function test and chest x-ray and was told that everything was okay he quit smoking 97.  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  Have to see Dr. Garcia and sees him on a regular basis for elevated creatinine apparently from dehydration.  Although he does drink a lot of fluids.  It would probably be more beneficial to obtain a exercise stress echo where we can observe his symptoms while walking on a treadmill.  This will also be much quicker to get results.       CTA Cardiac: Patient Communication     Add Comments   Seen     External Result Report    External Result Report     Narrative & Impression       CMS MANDATED QUALITY DATA - CT RADIATION - 436     All CT scans at this facility utilize dose modulation, iterative reconstruction,  and/or weight based dosing when appropriate to reduce radiation dose to as low as reasonably achievable.     EXAMINATION:  CTA CARDIAC     CLINICAL HISTORY:  Chest pain/anginal equiv, intermediate CAD risk, not treadmill candidate; Chest pain, unspecified     PATIENT DATA:     Age: 74 y/o     Gender: M     Weight: 163 pounds     Heart Rate: 56 beats per minute     Blood pressure: 132/74     TECHNIQUE:  CT angiography of the coronary arteries obtained with 90 mL Omnipaque 350.     Retrospective ECG gating was utilized during image acquisition.     3D volume rendered imaging and multi-planar maximum intensity projection reformations were created at a separate workstation under the supervision of the radiologist and stored in the patient's permanent medical record.     Dose of IV metoprolol: None     Dose of sublingual nitroglycerin: 400 mcg     COMPARISON:  None     FINDINGS:  Image quality: Good     Coronary artery calcium score: 67 Agatston method     Left main: 0     LAD: 61     Left circumflex: 0     RCA: 6     PDA: 0     CORONARY ARTERY ANATOMY:     Left Main: Left main coronary artery is a medium-size vessel which arises normally from the left aortic sinus.  It is patent with no plaque or stenosis.     Ramus intermedius: Not present.     Left anterior descending: LAD contains scattered partially calcified atherosclerotic plaque affecting proximal and mid segments.  At site of atherosclerotic plaque, prominently external remodeling occurs resulting in up to 10-20% stenosis.  LAD gives rise to 2 patent diagonal branches, with D2 branch showing additional early branching just distal to its LAD origin.     Left circumflex: Left circumflex artery is patent without plaque or stenosis.  It gives rise to 3 patent marginal branches.     Right coronary: Right coronary artery arises normally from the right aortic sinus.  RCA is dominant, giving rise to patent posterior descending artery and patent posterior left  ventricular branch.  Very mild calcified plaque involving proximal RCA results in only 10% stenosis.     CARDIAC DIMENSIONS:     EF: 73 %     LV EDV: 135 mL     LV ESV: 37 mL     SV: 98 mL     Cardiac output: 5.5 liters/minute     Left ventricular wall motion: Normal     CARDIAC ANATOMY:     Ventricles: No abnormality identified.     Atria: No abnormality identified.     Cardiac valves: 3 cusp aortic and mitral valves show no thickening or calcification.     Pericardium: Negative for pericardial effusion, thickening, or calcification.     Venous anatomy: Coronary sinus and its tributaries are unremarkable.     EXTRACARDIAC FINDINGS:     Thoracic aorta: Visualized thoracic aorta is of normal caliber.     Pulmonary artery: Visualized pulmonary artery is of normal caliber.     Impression:     1. Scattered atherosclerotic plaque affecting proximal and mid LAD resulting in up to 10-20% stenosis.  2. Very mild calcified plaque in proximal RCA resulting in 10% stenosis.  3. No other coronary artery atherosclerotic plaque or stenosis.  4. Patient's coronary artery calcium score is 67.  This places patient between 25th and 50th percentile for males between ages of 70 and 74.  This implies definite at least mild atherosclerotic plaque with mild or minimal coronary artery narrowings likely.  CAD-RADS 1- Minimal non-obstructive CAD     Management recommendation: Consider other non-atherosclerotic causes of symptoms.     Consider risk factor modification and preventive pharmacotherapy.        Electronically signed by: Lalo Ramírez MD  Date:                                            01/05/2024  Time:                                           14:56    Encounter    View Encounter             Signed by    Signed Time   within normal limits. The spinal cord and cervicomedullary junction are within normal limits.     At C2-3, the disc demonstrates moderate disc height loss with a small broad-based posterior disc osteophyte complex.  There is moderate to severe bilateral facet arthropathy and small bilateral uncovertebral osteophytes. This results in moderate to severe bilateral neural foraminal stenosis without spinal canal stenosis.     At C3-4, the disc demonstrates mild to moderate disc height loss with a small broad-based posterior disc bulge. There is severe bilateral facet arthropathy and moderate size bilateral uncovertebral osteophytes. This results in moderate to severe bilateral neural foraminal stenosis and minimal spinal canal stenosis.     At C4-5, the disc demonstrates moderate disc height loss with a small broad-based posterior disc osteophyte complex. There is severe right facet arthropathy and mild left facet arthropathy. There is a moderate right and small left uncovertebral osteophyte. This results in severe right, and moderate to severe left neural foraminal stenosis with mild spinal canal stenosis (the dorsal margin of the disc appears to touch mildly effaces the ventral margin of the spinal cord).     At C5-6, the disc shows severe disc height loss with a moderate-sized broad-based posterior disc osteophyte complex. There is mild facet arthropathy enlarged bilateral uncovertebral osteophytes. This results in severe right greater than left neural foraminal stenosis and mild to moderate spinal canal stenosis (there is mild flattening of the spinal cord).     At C6-7, the disc shows severe disc height loss with a moderate-sized broad-based posterior disc osteophyte complex. There is no facet arthropathy but there are large bilateral uncovertebral osteophyte. This results in severe bilateral neural foraminal stenosis and moderate spinal canal stenosis (with effacement of the CSF spaces around the spinal cord).     At C7-T1, the disc demonstrates moderate disc height loss with a small broad-based posterior disc bulge. There is severe bilateral facet arthropathy and tiny bilateral uncovertebral osteophytes. This results in  moderate bilateral neural foraminal stenosis without spinal canal stenosis.     IMPRESSION:  Pronounced degenerative change throughout the cervical spine as outlined in detail above most pronounced at C4-C5 and C5-C6 and C6-C7.         Review of patient's allergies indicates:  No Known Allergies    Past Medical History:   Diagnosis Date    Acid reflux     Acquired tricuspid valve insufficiency     Anemia     Borderline diabetes     BPH (benign prostatic hyperplasia)     Cataract     1/16/20 rt eye, 1/30/20- Lt eye    Coronary artery disease     Hypertension     Liver cyst 08/24/2022    Pulmonary nodules     Skin cancer      Past Surgical History:   Procedure Laterality Date    CYSTOSCOPY WITH TRANSURETHRAL DESTRUCTION OF PROSTATE,RFA N/A 10/21/2020    Procedure: CYSTOSCOPY WITH TRANSURETHRAL DESTRUCTION OF PROSTATE,RFA;  Surgeon: La Nena James MD;  Location: [facility] OR;  Service: Urology;  Laterality: N/A;  please make sure [facility] rep available [phone]    EYE SURGERY      for cataracts, rt eye 1/16/20, left eye 1/30/20    HERNIA REPAIR  12/1999    HERNIA REPAIR  05/2004    INSERTION OF SACRAL NEUROSTIMULATOR, ELECTRODES, AND IMPLANTABLE PULSE GENERATOR (IPG) N/A 7/12/2023    Procedure: INSERTION, ELECTRODE LEADS AND PULSE GENERATOR, NEUROSTIMULATOR, SACRAL;  Surgeon: La Nena James MD;  Location: [facility] OR;  Service: Urology;  Laterality: N/A;    KNEE ARTHROSCOPY Left 04/12/2018    LAPAROSCOPIC APPENDECTOMY N/A 1/2/2022    Procedure: APPENDECTOMY, LAPAROSCOPIC;  Surgeon: Sam Goss MD;  Location: [facility] OR;  Service: General;  Laterality: N/A;    PERCUTANEOUS INSERTION OF SACRAL NERVE NEUROSTIMULATOR ELECTRODE LEAD N/A 5/22/2023    Procedure: INSERTION, ELECTRODE LEAD, NEUROSTIMULATOR, SACRAL, PERCUTANEOUS Keep first;  Surgeon: La Nena James MD;  Location: [facility] OR;  Service: Urology;  Laterality: N/A;  patient needs to apply EMLA cream to entirety of lower back 20 minutes prior  to procedure, also need 1% lidocaine available    PERCUTANEOUS INSERTION OF SACRAL NERVE NEUROSTIMULATOR ELECTRODE LEAD N/A 2023    Procedure: INSERTION, ELECTRODE LEAD, NEUROSTIMULATOR, SACRAL, PERCUTANEOUS;  Surgeon: La Nena James MD;  Location: [facility] OR;  Service: Urology;  Laterality: N/A;    SKIN LESION EXCISION  10/2009    Neck    SKIN LESION EXCISION  2015    STAPEDECTOMY Right 2001    TRANSRECTAL ULTRASOUND EXAMINATION N/A 2020    Procedure: TRANSRECTAL ULTRASOUND;  Surgeon: La Nena James MD;  Location: [facility] OR;  Service: Urology;  Laterality: N/A;     Social History     Tobacco Use    Smoking status: Former     Current packs/day: 0.00     Types: Cigarettes     Quit date:      Years since quittin.    Smokeless tobacco: Never   Substance Use Topics    Alcohol use: Yes     Comment: occasional    Drug use: No     Family History   Problem Relation Age of Onset    Cancer Mother         skin    Stroke Mother     Heart failure Father     Kidney disease Father         Review of Systems:   Constitution: Negative for diaphoresis and fever.   HEENT: Negative for nosebleeds.    Cardiovascular: Negative for chest pain       No dyspnea on exertion       No leg swelling        No palpitations  Respiratory: Negative for shortness of breath and wheezing.    Hematologic/Lymphatic: Negative for bleeding problem. Does not bruise/bleed easily.   Skin: Negative for color change and rash.   Musculoskeletal: Negative for falls and myalgias.   Gastrointestinal: Negative for hematemesis and hematochezia.   Genitourinary: Negative for hematuria.   Neurological: Negative for dizziness and light-headedness.   Psychiatric/Behavioral: Negative for altered mental status and memory loss.          Objective:        Vitals:    24 0903   BP: 135/78   Pulse: 60       Lab Results   Component Value Date    WBC 7.32 2023    HGB 13.2 (L) 2023    HCT 40.6 2023     2023     CHOL 160 04/05/2023    TRIG 61 04/05/2023    HDL 48 04/05/2023    ALT 21 10/04/2023    AST 24 10/04/2023     11/13/2023    K 4.2 11/13/2023     11/13/2023    CREATININE 1.2 01/02/2024    BUN 27 (H) 01/02/2024    CO2 29 11/13/2023    TSH 2.090 10/04/2023    PSA 1.5 04/05/2023    INR 1.0 10/24/2022    HGBA1C 6.2 04/05/2023        ECHOCARDIOGRAM RESULTS  Results for orders placed in visit on 11/28/23    Echo Saline Bubble? No    Interpretation Summary    Left Ventricle: The left ventricle is normal in size. Normal wall thickness. Normal wall motion. There is normal systolic function. Biplane (2D) method of discs ejection fraction is 66%. There is normal diastolic function.    Right Ventricle: Normal right ventricular cavity size. Wall thickness is normal. Right ventricle wall motion  is normal. Systolic function is normal.    Mitral Valve: There is mild regurgitation with a centrally directed jet.    Tricuspid Valve: There is mild to moderate regurgitation with a centrally directed jet.    Pulmonary Artery: The estimated pulmonary artery systolic pressure is 29 mmHg.        CURRENT/PREVIOUS VISIT EKG  Results for orders placed or performed in visit on 11/17/23   IN OFFICE EKG 12-LEAD (to [place])    Collection Time: 11/17/23  9:10 AM    Narrative    Test Reason : R07.9,    Vent. Rate : 058 BPM     Atrial Rate : 058 BPM     P-R Int : 182 ms          QRS Dur : 106 ms      QT Int : 432 ms       P-R-T Axes : 066 008 047 degrees     QTc Int : 424 ms    Sinus bradycardia  Otherwise normal ECG  When compared with ECG of 24-AUG-2023 15:36,  No significant change was found  Confirmed by Alireza STEWART, Guero BAUER ([id number]) on 12/2/2023 12:37:29 PM    Referred By: [email or web address]   SELF           Confirmed By:Guero Lay MD     No valid procedures specified.   Results for orders placed during the hospital encounter of 11/30/22    Nuclear Stress - Cardiology Interpreted    Interpretation Summary    Normal myocardial perfusion  scan. There is no evidence of myocardial ischemia or infarction.    The gated perfusion images showed an ejection fraction of 79% post stress. Normal ejection fraction is greater than 53%.    There is normal wall motion post stress.    LV cavity size is  and normal at stress.    The EKG portion of this study is negative for ischemia.    The patient reported no chest pain during the stress test.    There were no arrhythmias during stress.      Physical Exam:  CONSTITUTIONAL: No fever, no chills  HEENT: Normocephalic, atraumatic,pupils reactive to light                 NECK:  No JVD no carotid bruit  CVS: S1S2+, RRR, no murmurs,   LUNGS: Clear  ABDOMEN: Soft, NT, BS+  EXTREMITIES: No cyanosis, edema  : No jean catheter  NEURO: AAO X 3  PSY: Normal affect      Medication List with Changes/Refills   Current Medications    AMLODIPINE (NORVASC) 10 MG TABLET    Take 1 tablet (10 mg total) by mouth once daily.    ASPIRIN (ECOTRIN) 81 MG EC TABLET    Take 81 mg by mouth once daily.    CO-ENZYME Q-10 30 MG CAPSULE    Take 30 mg by mouth 3 (three) times daily.    DICLOFENAC SODIUM (VOLTAREN) 1 % GEL    Apply 2 g topically 4 (four) times daily as needed (left shoulder/arm pain).    HYDROCHLOROTHIAZIDE (HYDRODIURIL) 25 MG TABLET    Take 25 mg by mouth once daily.    METFORMIN (GLUCOPHAGE) 500 MG TABLET    Take 1 tablet (500 mg total) by mouth daily with breakfast.    MULTIVIT WITH MINERALS/LUTEIN (MULTIVITAMIN 50 PLUS ORAL)    Take 1 tablet by mouth once daily.    OMEGA 3-DHA-EPA-FISH OIL (FISH OIL) 100-160-1,000 MG CAP    Take 1 capsule by mouth once daily.    OMEPRAZOLE (PRILOSEC) 20 MG CAPSULE    Take 1 capsule (20 mg total) by mouth once daily.    RANOLAZINE (RANEXA) 500 MG TB12    Take 2 tablets (1,000 mg total) by mouth 2 (two) times daily.    ROSUVASTATIN (CRESTOR) 10 MG TABLET    Take 1 tablet (10 mg total) by mouth once daily.    TADALAFIL (CIALIS) 5 MG TABLET    Take 1 tablet (5 mg total) by mouth once daily.     TELMISARTAN (MICARDIS) 80 MG TAB    Take 1 tablet (80 mg total) by mouth once daily.             Assessment:       1. Stricture of artery    2. Chest pain, precordial    3. Postural dizziness with presyncope    4. Atherosclerosis of native coronary artery of native heart without angina pectoris    5. Type 2 diabetes mellitus without complication, without long-term current use of insulin    6. Mixed hyperlipidemia    7. Nonrheumatic mitral valve regurgitation    8. Dizzy spells         Plan:     Problem List Items Addressed This Visit          Cardiac/Vascular    Chest pain, precordial    Overview     intermittent chest pain  for past several months. Hx of smoking 26 years smoking. quitted in 12/1997.Echo showed: normal left ventricular function with mitral / tricuspid valve regurgitation.         Relevant Orders    US Carotid Bilateral    Mixed hyperlipidemia    Overview     total cholesterol 226, . HDL 41 in 3/2015. 10 year ASCVD Risk is 15 %.total cholesterol 160, LDL 95, HDL 42 in 7/2015.  on Lipitor since 4/2015. 8/2015:  d/c Lipitor 2/2 muscle cramps. Crestor 10 mg. ( start 5 mg first).   crestor sample was given to pt.9/2015: check lab 2 months later.  mild alternative shoulder pain. check cpk.11/2015: cut crestor to half . 2/2 CK high 308.  LDL 71.2/2015: CK decrease to 145, continue on Crestor 1/2 .5/2016: crestor 5 mg. total cholesterol 160, HDL 42, LDL 97.3/2017: tolerate crestor 5 mg. . declines increasing dosage of Crestor.         Nonrheumatic mitral valve regurgitation    Overview     TTE in 3/2015 with EF 60%.         Stricture of artery - Primary    Relevant Orders    US Carotid Bilateral       Other    Postural dizziness with presyncope    Relevant Orders    US Carotid Bilateral     Other Visit Diagnoses       Atherosclerosis of native coronary artery of native heart without angina pectoris        Relevant Orders    US Carotid Bilateral    Type 2 diabetes mellitus without complication,  without long-term current use of insulin        Dizzy spells              Ultrasound Carotid Ordered.  This does sound like it could be vertebrobasilar insufficiency.  He does have marked neck disease with bulging disc and reduction of the CSF spaces.  Recommend to try to increase the physical fitness level with some aerobic exercise such as stationary by continue risk factor modification.  Monitor blood pressure lipid management.    Follow up in about 6 months (around 8/12/2024).    The patients questions were answered, they verbalized understanding, and agreed with the treatment plan.     NBA RUIZ MD  [facility]

## 2024-02-12 ENCOUNTER — OFFICE VISIT (OUTPATIENT)
Dept: CARDIOLOGY | Facility: CLINIC | Age: 74
End: 2024-02-12
Payer: COMMERCIAL

## 2024-02-12 VITALS
DIASTOLIC BLOOD PRESSURE: 78 MMHG | BODY MASS INDEX: 24.06 KG/M2 | HEART RATE: 60 BPM | WEIGHT: 167.69 LBS | OXYGEN SATURATION: 96 % | SYSTOLIC BLOOD PRESSURE: 135 MMHG

## 2024-02-12 DIAGNOSIS — I25.10 ATHEROSCLEROSIS OF NATIVE CORONARY ARTERY OF NATIVE HEART WITHOUT ANGINA PECTORIS: ICD-10-CM

## 2024-02-12 DIAGNOSIS — I34.0 NONRHEUMATIC MITRAL VALVE REGURGITATION: ICD-10-CM

## 2024-02-12 DIAGNOSIS — R42 DIZZY SPELLS: ICD-10-CM

## 2024-02-12 DIAGNOSIS — R42 POSTURAL DIZZINESS WITH PRESYNCOPE: ICD-10-CM

## 2024-02-12 DIAGNOSIS — R07.2 CHEST PAIN, PRECORDIAL: ICD-10-CM

## 2024-02-12 DIAGNOSIS — R55 POSTURAL DIZZINESS WITH PRESYNCOPE: ICD-10-CM

## 2024-02-12 DIAGNOSIS — G45.0 VERTEBRAL BASILAR INSUFFICIENCY: ICD-10-CM

## 2024-02-12 DIAGNOSIS — E11.9 TYPE 2 DIABETES MELLITUS WITHOUT COMPLICATION, WITHOUT LONG-TERM CURRENT USE OF INSULIN: ICD-10-CM

## 2024-02-12 DIAGNOSIS — I77.1 STRICTURE OF ARTERY: Primary | ICD-10-CM

## 2024-02-12 DIAGNOSIS — E78.2 MIXED HYPERLIPIDEMIA: ICD-10-CM

## 2024-02-12 PROCEDURE — 3288F FALL RISK ASSESSMENT DOCD: CPT | Mod: CPTII,S$GLB,, | Performed by: GENERAL PRACTICE

## 2024-02-12 PROCEDURE — 3078F DIAST BP <80 MM HG: CPT | Mod: CPTII,S$GLB,, | Performed by: GENERAL PRACTICE

## 2024-02-12 PROCEDURE — 3075F SYST BP GE 130 - 139MM HG: CPT | Mod: CPTII,S$GLB,, | Performed by: GENERAL PRACTICE

## 2024-02-12 PROCEDURE — 99999 PR PBB SHADOW E&M-EST. PATIENT-LVL III: CPT | Mod: PBBFAC,,, | Performed by: GENERAL PRACTICE

## 2024-02-12 PROCEDURE — 1101F PT FALLS ASSESS-DOCD LE1/YR: CPT | Mod: CPTII,S$GLB,, | Performed by: GENERAL PRACTICE

## 2024-02-12 PROCEDURE — 1159F MED LIST DOCD IN RCRD: CPT | Mod: CPTII,S$GLB,, | Performed by: GENERAL PRACTICE

## 2024-02-12 PROCEDURE — 4010F ACE/ARB THERAPY RXD/TAKEN: CPT | Mod: CPTII,S$GLB,, | Performed by: GENERAL PRACTICE

## 2024-02-12 PROCEDURE — 99214 OFFICE O/P EST MOD 30 MIN: CPT | Mod: S$GLB,,, | Performed by: GENERAL PRACTICE

## 2024-02-12 PROCEDURE — 3008F BODY MASS INDEX DOCD: CPT | Mod: CPTII,S$GLB,, | Performed by: GENERAL PRACTICE

## 2024-02-12 RX ORDER — HYDROCHLOROTHIAZIDE 25 MG/1
25 TABLET ORAL DAILY
Status: ON HOLD | COMMUNITY
Start: 2023-12-22 | End: 2024-03-13

## 2024-02-19 ENCOUNTER — TELEPHONE (OUTPATIENT)
Dept: UROLOGY | Facility: CLINIC | Age: 74
End: 2024-02-19
Payer: COMMERCIAL

## 2024-02-19 ENCOUNTER — PATIENT MESSAGE (OUTPATIENT)
Dept: SURGERY | Facility: HOSPITAL | Age: 74
End: 2024-02-19

## 2024-02-19 ENCOUNTER — HOSPITAL ENCOUNTER (OUTPATIENT)
Facility: HOSPITAL | Age: 74
Discharge: HOME | End: 2024-02-19
Attending: UROLOGY | Admitting: UROLOGY
Payer: COMMERCIAL

## 2024-02-19 DIAGNOSIS — N40.0 BENIGN PROSTATIC HYPERPLASIA WITHOUT URINARY OBSTRUCTION: Primary | ICD-10-CM

## 2024-02-19 DIAGNOSIS — N32.81 OAB (OVERACTIVE BLADDER): ICD-10-CM

## 2024-02-19 LAB
BILIRUBIN, UA POC OHS: NEGATIVE
BLOOD, UA POC OHS: NEGATIVE
CLARITY, UA POC OHS: CLEAR
COLOR, UA POC OHS: ABNORMAL
GLUCOSE, UA POC OHS: NEGATIVE
KETONES, UA POC OHS: NEGATIVE
LEUKOCYTES, UA POC OHS: NEGATIVE
NITRITE, UA POC OHS: NEGATIVE
PH, UA POC OHS: 8
PROTEIN, UA POC OHS: 30
SPECIFIC GRAVITY, UA POC OHS: 1.02
UROBILINOGEN, UA POC OHS: 0.2

## 2024-02-19 PROCEDURE — 76872 US TRANSRECTAL: CPT | Performed by: UROLOGY

## 2024-02-19 PROCEDURE — 52000 CYSTOURETHROSCOPY: CPT | Mod: ,,, | Performed by: UROLOGY

## 2024-02-19 PROCEDURE — A4217 STERILE WATER/SALINE, 500 ML: HCPCS | Performed by: UROLOGY

## 2024-02-19 PROCEDURE — 52000 CYSTOURETHROSCOPY: CPT | Performed by: UROLOGY

## 2024-02-19 PROCEDURE — 76872 US TRANSRECTAL: CPT | Mod: 26,,, | Performed by: UROLOGY

## 2024-02-19 PROCEDURE — 25000003 PHARM REV CODE 250: Performed by: UROLOGY

## 2024-02-19 RX ORDER — LIDOCAINE HYDROCHLORIDE 20 MG/ML
JELLY TOPICAL
Status: DISCONTINUED | OUTPATIENT
Start: 2024-02-19 | End: 2024-02-19 | Stop reason: HOSPADM

## 2024-02-19 RX ORDER — WATER 1000 ML/1000ML
INJECTION, SOLUTION INTRAVENOUS
Status: DISCONTINUED | OUTPATIENT
Start: 2024-02-19 | End: 2024-02-19 | Stop reason: HOSPADM

## 2024-02-19 NOTE — H&P
[facility] Note - [place]  Staff: MD Erika  PCP: Reynaldo Rahman, APRN,FNP-C  Date of Service: 02/19/2024      Subjective:        HPI: Onesimo Paula is a 73 y.o. male     Seen by me 9/30/19  Patient had MRI for right hip pain and was found have a thick-walled bladder with enlarged prostate.  He states that he saw urologist over 40 years ago for burning with urination.  He had a renal bladder ultrasound in 2016 which showed enlarged prostate and bilateral renal cyst done for renal insufficiency whom he sees  for.  Denies any gross hematuria.  Review of for previous urine show no microscopic hematuria.  Twenty-five pack-year history of smoking but quit in 1997.      He also has AUA symptom score 6 and occasional weak stream and urgency but voids every 3-5 hours when he is working and a little more frequent when he is not.  Denies any urge incontinence.  Okay stream with occasional weak stream.  PVR today 09/03/2019 is 15 cc.  He may have tried Flomax a couple years ago but does not recall it helping her changing any was symptoms.  Overall really not that bothered by his urinary symptoms.      Also states he has ED.  Previously tried sildenafil unsure dose and it helped but wife does not want him to take, exam revealed peyronie's     Plan: started tadalafil 5mg due to thick walled bladder although essentially asx. No further f/u fr b renal cysts.      Interval history 11/18/19:   Pt states today Cialis has NOT improved any of his lower urinary tract symptoms since starting the medication.  He currently takes this medication before his bedtime.  Pt still c/o urinary urgency, especially during the day.  Nocturia is 1-2x nightly and not bothersome at this time.  He is a , therefore the daytime urgency really bothers him.  Plan: started ditropan 10mg XL and cont'd tadalafil     Interval history by idalia 1/6/20:   TODAY, pt states the oxybutynin only improved his  LUTS by over 10% at this time.  PVR by bladder scan performed in office today:  11 mL  Rec'd: d/c oxybutynin, start myrbetriq, cont tadalafil 5mg daily.     Interval history 7/27/20:   Tried myrbetriq but didn't help. Having urgency, hesistancy and intermittent slow stream but urgency most bothersome and having some UUI with a few drop, but not that bothersome. Drinks 1 c of coffee in am. Soda during day. 1 c of coffee int he evening. Has never tried stopping these to see if they help. Drinks beer and notices increase in frequency following day.   On tadalafil 5mg daily for ED and bph.  pvr by scan: 32cc  Voiding every 30 minutes a day recently more pronounced at home (has been on vacation).  Takes hctz in am. When he's working he only voids about 2x-4x a day. Drinks caffeine while working including energy drinks. No nocturia.   psa 1/31/20 2.2 (up from 1.2 year prior). Repeat psa 0.85 7/29/20  WINSOME today: 40g + no nodules.  Uroflow 7/30/20: peak flow 12.8mL/s,  avg flow 5.4mL/s, voided vol: 191cc, pvr by scan:0      Cysto trus 8/17/20: normal bladder, no stricture. Membranous and distal prostatic urethra with papillary tissue suspicious for prostatitis.trus volume 42.6g. bc of his urgency on tadalafil, flomax, vesicare found rx cipro for 14d to see if it helped w urgency. D/c'd vesicare. cont'd flomax and tadalafil. rtc to see idalia in 2-3 weeks and me in 2 months to discuss bph procedures     Interval history by idalia 9/9/20:   Pt states he is still having the urinary urgency even since finishing the Cipro antibiotics at this time.  He denies gross hematuria, dysuria.  UA today shows normal findings.  PVR by bladder scan today:  9 mL  Plan: rec'd restarting vesicare     Interval history 10/1/20:    Taking flomax 0.4mg nightly and says flow is intermittent on this.   Also on the vesicare and says it doesn't help with the urgency. Voiding 2-3x a day and none at night. 3-4x a week c/o urge incontinence if he tries  to hold it too long, mostly in the am when he wakes up. Denies any difficulty with walking. No weakness or numbness.   Changed to decaf coffee 1x a day. Changed to sprite and caffeine free coke.   He does not want to take any more meds. He is not happy with the retrograde ejaculation.   Sees dr. leger for Mitral Valve leakage, last saw him a week ago, was told he should f/u in March 2021. Tadalafil helping with erections     Interval history 11/19/20:   Underwent rezum on 10/21/20. continue flomax, vesicare and tadafil daily for 3 more months.   Returned for fill and pull on 10/26/20. Filled with 210, voided 210  For 2 weeks postop would have blood when he had a BM, improving.   Today (1 month later) he states he has some increased urgency. UUI 2-3x a day (increased from 3-4x a week).  Still taking vesicare  freq q1 hour (worse in cold weather). Without cold weather freq q 3 hour.   ua today with small wbc and mod blood - some dysuria  No significant change in stream yet  pvr by scan today: 134  AUA ssx:(2 incomplete emptying, 3 frequency, 3 intermittency, 4 urgency, 2 weak stream, 2 straining, 0 sleeping). 16. QOL: 4    Interval history by me in clinic on 3/9/21:  Returns today and states states he ran out of flomax and vesicare a month ago. No increased frequency, urge incontinence or slower stream off the flomax  Still having UUI 1-2x a day with a few drops when he hears water.   Voids 8x/24 hours and 4x/8 hours while at work. Nocturia occ (same). No longer drinking caffeine.   Still on tadalafil 5mg daily.  No longer having retrograde ejaculation and constipation improved. Hctz in am.  Frequency mostly in  the pm. Bothers him but able to stop at bathrooms. Sometimes loses erection with tadalafil 5mg daily. Rare. Not more frequent. Still getting morning erections.   AUA ssx today:(1 incomplete emptying, 2 frequency, 1 intermittency, 3 urgency, 3 weak stream, 0 straining, 0 sleeping). 10. QOL: mixed  Bladder  scan PVR today was 35mL.    I reviewed his previous PSAs and his most recent psa within the last year was 2.3, %free 20.87, last year was 2.2      Interval history with idalia on 4/9/21:  UA today showed normal findings.  Pt states today even though he tried and changed his HCTZ med to evenings as discussed last ov, his LUTS have not improved.  Pt as of today's visit is still c/o urinary urgency and frequency during the daytime.  Has not improved since last ov.  Pt states today he has tried Vesicare and Oxybutynin in the past with no improvement in symptoms.  Pt currently denies gross hematuria and dysuria.  She put him on myrbetriq again       Interval history by me 6/15/21:  Uroflow results (date: 03/18/2021): Voiding time: 24.3s, Flow time: 23.1s, TTP flow: 8.0s, Peak flowrate: 19.2 mL/s, Average flowrate: 10.6mL/s, Intervals: 2, Voided volume: 244 mL, Pvr by bladder scan 33. Pattern of curve: see uploaded image, reviewed by   At last visit was c/o freq/urgency. Frequency 8-10x/day. Tried myrbetriq before rezum, ditropan, vesicare and changing hctz to pm but no improvement. Then in April started myrbetriq again , after about 4 weeks saw improvement, now down to 5-6x a day. avg urg: 3. No nocturia. No incontinence.  Also on tadalafil 5mg daily.   AUA ssx today:(1 incomplete emptying, 1 frequency, 1 intermittency, 2 urgency, 3 weak stream, 0 straining, 0 sleeping). 8. QOL:   Bladder scan PVR today was 0mL.      HPI/CC today 6/7/22:   Had him Continue myrbetriq 50mg daily, has enough until 4/2021, Continue tadalafil 5mg daily, refilled today for a year.  Says myrbetriq not helping anymore. He held it for a few days and noticed no difference. On cialis 5mg daily.   Does not have constipation  Voids about 9-10x during waking hours. occ urge incontinence 3x a week. Few drops. Started drinking caffeine this week.   Nocturia 1-2x a night. Urine flow varies.    psa 5/31/22 1.3  ua today neg, pvr by scan: 5  AUA  ssx:(2 incomplete emptying, 3 frequency, 2 intermittency, 4 urgency, 3 weak stream, 1 straining, 1 sleeping). 16. QOL: mixed  Appendix removed in January complicated by post-op ileus.    Interval history 8/23/22:  Continue myrbetriq 50mg daily, has enough until 4/2021  Continue tadalafil 5mg daily, refilled today for a year  After adding vesicare to myrbetriq - went from voiding 9-10x day, 1-2x a night with occ UUI 3x a week to 5-6x/day, 0 night and no daily UUI with drops. Says he has dry mouth but about the same. However does have some heart burn that is new.   UA today: neg. pvr by scan: 115 (this is the highest it's been, feels empty).  AUA ssx:(1 incomplete emptying, 2 frequency, 0 intermittency, 2 urgency, 2 weak stream, 1 straining, 0 sleeping). 8. QOL: pleased    Interval history 2/28/23:  Says he is doing well on VESIcare and myrbetriq.  However it is expensive.  About 120 dollars every 3 months.  Also having dry mouth with it.  On tadalafil 5 mg daily for erections, obtaining from [facility].  This dose wish for him sometimes.  He tried Viagra 100 mg and it seemed to work.  Aua ssx: 12, pvr: 25    Interval history 4/21/23:  I had him discontinue both myrbetriq and VESIcare at the last visit to see if his incontinence would get worse.  Followed up with Teena on 03/29/2023.  His PVR was 0.  His AUA symptom score was 14, 5.  He was still taking tadalafil 5 mg daily.  He said with stopping VESIcare and myrbetriq he had 2 episodes of incontinence that were large volume, increased postvoid dribbling and he feels like his urinary frequency increased.  Was scheduled with me to discuss InterStim because he wanted to hold off on restarting any medications due to cost and side effects  He returns today with a voiding diary.  He kept a voiding diary for me days and it looks like he urinates on average about 9-10 times a day.  That is up from 6 times a day when he was taking medications.  He did not documented on his  voiding diary but he leaks urine 2 to 3 times a day on the way to the bathroom.  Not wearing any pads.  Does not have to change his underwear.  If he could rate his urgency he would say on average his urgency is 4/5.  He is interested in proceeding with InterStim.  He ended up using tadalafil 10 mg once and said this dose worked good for him for his erectile dysfunction  Voided urine today is negative.  Confirmed again that currently he urinates 7-10 times a day, average urgency is 4 and 1-2 leaks a day.        Interval history 5/25/23:  I did a peripheral nerve evaluation on him on Monday, 4 days ago.  Since then he has gone from urinating 6-10 times a day down to about 6 times a day.  Never had issues at night.  He went from leaking 2 to 3 times a day incontinence down to 1x over the last 3 days. Avg urgency went from 4 to 2. He says >50% improvement and wants to proceed. Not on any meds.   He previously saw  for stress tests bc of SOB. His last w/u was 11/2022 and had a neg stress test. On asa 81mg. No stents. They could never figure out why he has SOB.  He sees pulmonology regularly for nodules in the lungs.  Wires removed today    Interval history 7/11/23:  Here today for interstim stage 1 and 2. Preop appt revealed a1c 6.2, glucose 124. Ua with tr protein/tr bld on 6/28. Started on metformin.   Urinating every 1-2 hours. Uui with drops 2x a day. No pads or underwear changes.  Held asa 81mg for 7d.     Interval history 8/8/23 telephone visit :  Pt states that initially he had issues with urgency and frequency after his procedure but since talking to clarita and making adjustments he has gone from urinating every 1-2 hours to 3-4x a day. Avg urgency remains around 4 and no UUI.   He is happy with his symptoms.     Interval history by ME in CLINIC on 11/13/23 for postop interstim fu and oab fu:  He says that his frequency has returned despite changing his settings. Says changing the setting works for a  bit. Then no longer works. Nos on 3.2.   He says after turning off his interstim for us in August for us abd for his liver he had an increase in his frequency and had to increase the amplitude   He changed the settings on 10/31 and he went from 8x a day down to 5x a day. He stopped hctz a month ago by his cardiologist but then he restarted the hctz in the morning last week and his frequency increased to 9x a day.  Currently on program 2, 3.2.    He says urinating about 8x a day and 1x a night. Leaking with UUI and drops 3-4x a day and 2 accidents last week. No pads.   Urgency 5/5.  1 cup of caffeine in the morning.   Voiding diary: 10/30/23- 8x, changed on 10/31. On 10/31 -5x, 11/1- 5x, 11/3- 6x, 11/11-9x. 11/12-7x. This morning 4x/ 6 hours.   AUA ssx:(2 incomplete emptying, 3 frequency, 2 intermittency, 3 urgency, 3 weak stream, 0 straining, 1 sleeping). 14. QOL: mostly satisfied  Pvr today: 40. Ua today: neg      Interval history by ME in CLINIC on 1/22/24 for oab:  Plan was to do uds bc of his oab but when joe tried to place ureteral catheter it did not go easily. Then she tried 16fr coude and stopped mid way and saw blood. Decided to hold off on and plan for cysto. He adjusted his interstim and he said no change. Feels the flutter in his right perineum. . He says flow is fast sometimes and slow sometimes.  Cysto trus 8/17/20: 42.6g.   Rezum 10/21/20  Last peak flow was 19.2 in 3/18/21.  Last catheter was at time of cholecystectomy in jan 2022.   Interstim 7/11/23  Frequency:Still urinates bw 6 to 12x a day. Has more bad days than good. Nocturia 1x a night.  Incontinence:  Has drops/leaks twice a day.  No pads  Drinks 1 soda in am and decaf cup in evening. No tea.     Interval history/H&P Update by me/ prior to cysto trus on 2/19/24:  urine at the lab- ua 1/30/4:neg(would need to returnwith smoking hx although quit in 1997  Ua today 2/19/24 30 protien  Uroflow and pvr soon. Has oab. Will need to  drink fluid here right before the test.   Uroflow results (date: 01/30/2024): Voiding time: 39.4s, Flow time: 39.3s, TTP flow: 10.6s, Peak flowrate: 16.7 mL/s, Average flowrate: 8.8mL/s, Intervals: 1, Voided volume: 346 mL, Pvr by bladder scan: 45mL . Pattern of curve: see 's addendum   Schedule cystoscopy and TRUS to rule out stricture as cause of oab on Monday feb 19th. See how much prostate has grown back. Previously had rezum in 2020 and trus vol 46g prior to this.   If he does have a urethral stricture- then can explain sx and would need to treat stricture to that improves symptoms.then would need to do in and out cath intermittently forr a while to keep stricture from scarring down again.   If he doesn't have urethral stricture will proceed with uds.   (Will go ahead and schedule to follow cysto/trus and can cancel If we don't need)  Recommend uds since he already has had rezum and interstim and want to evaluate if he has detrussor overactivity/bladder muscle kenn too much.   If so would offer botox since he has already has interstim. But has to be repeated.         PVR History:  1/30/24 Peak flow: 16.7, voided vol: 346, pvr: 45  11/13/23          40  2/28/23            25, aua ssx: 12  8/23/22            115  6/15/21            pvr by scan: 0  3/18/21            UF: pF: 19.2, avg: 10.6, voided vol: 244, pvr: 33  3/2/21               pvr: 35  11/19/20          pvr by scan: 134 (couldn't start after he stopped)  10/26/20          Fill and pull: 0cc pvr  11/19/20          rezum  9/9/20              pvr by scan: 9cc  7/27/20            pvr by scan: 32cc  8/17/20            Cysto trus: 42.6g.   7/30/20           UF: peak flow 12.8mL/s,  avg flow 5.4mL/s, voided vol: 191cc, pvr by scan:0   1/6/20              pvr by scan: 11cc   9/3/19              pvr by scan: 15cc      PSA history: no family hx of prostate cancer  1/22/24            WINSOME: 40g  5/31/22            1.3  3/2/21              2.3,  %free 20.87, pvr: 35 (psa screen 4.6)  11/19/20          rezum  7/29/20            0.85, %free 54.12  1/31/20            2.2   2/21/19            1.2  3/14/18            1.0     Urine history: 1 ppd x 26 years. Quit 1997. Anticoag: asa 81mg  2/19/24 30 protein  1/30/54 neg  1/22/24            Not able to provide- do at the lab  11/13/23          1 rbc  6/28/23            Tr bld  5/22/23            neg  4/21/23            Tr prot  6/7/22              neg  11/19/20          Small wbc/mod blood-send for ua patricio and culture pvr by scan: 134  10/14/20          Neg  9/16/20            neg  9/9/20              neg  3/31/20            Neg  1/6/20              Neg  11/18/19          neg  8/8/19              No cx, void: n/a 0 rbc  2/21/19            Ng, void: neg, 0 rbc  4/9/18              No cx, void: neg  9/2017             No blood       Current REVIEW OF SYSTEMS:  neg    Objective:     Vitals:    02/19/24 1416   BP: (!) 140/85   Pulse: 62   Resp: 16   Temp:        General:wdwn  in NAD  Neurologic: CN grossly normal. Normal sensation.   Psychiatric: awake, alert and oriented x 3. Mood and affect Normal. Cooperative.  Eyes: PERRLA, normal conjunctiva  Respiratory: no increased work on breathing. No wheezing.   Cardiovascular: No obvious extremity edema. Warm and well perfused.  GI: no obvious stomach distension  Musculoskeletal: normal  range of motion of bilateral upper extremities. Decreased muscle strength and tone.  Skin: no obvious rashes or lesions. No tightening of skin noted.    Pertinent  exam in HPI      Focused  exam in hpi    Recent Labs   Lab 06/28/23  0734 10/04/23  1224 11/13/23  0714   WBC 8.04 4.88 7.32   Hemoglobin 13.8 L 12.4 L 13.2 L   Hematocrit 40.8 37.2 L 40.6   Platelets 246 222 250   ]  Recent Labs   Lab 01/13/22  0644 04/05/22  0544 10/03/22  0701 10/24/22  1518 11/30/22  0626 04/05/23  0707 06/28/23  0734 08/24/23  1643 10/04/23  1224 11/13/23  0714 01/02/24  0754   Sodium 138 141 138   < >  137 140 141  --  140 142  --    Potassium 3.9 3.7 3.7   < > 4.0 3.5 3.8  --  4.1 4.2  --    Chloride 106 104 102   < > 102 105 108  --  107 107  --    CO2 24 25 27   < > 27 27 22 L  --  25 29  --    BUN 20 33 H 29 H   < > 37 H 34 H 32 H  --  31 H 36 H 27 H   Creatinine 1.1 1.0 1.1   < > 1.3 1.2 1.3   < > 1.2 1.3 1.2   Glucose 101 106 113 H   < > 105 109 124 H  --  114 H 102  --    Calcium 8.2 L 9.2 9.0   < > 9.3 9.2 9.8  --  9.3 9.8  --    Magnesium 2.1  --   --   --  2.4  --   --   --  2.0  --   --    Phosphorus 2.8 3.6 3.3  --   --   --   --   --   --  3.3  --    Alkaline Phosphatase 52 L  --   --    < > 59 54 L  --   --  52 L  --   --    Total Protein 6.2  --   --    < > 7.7 7.3  --   --  7.3  --   --    Albumin 2.6 L 4.0 4.1   < > 4.3 4.2  --   --  4.4 4.4  --    Total Bilirubin 0.8  --   --    < > 0.7 0.6  --   --  0.5  --   --    AST 19  --   --    < > 25 30  --   --  24  --   --    ALT 24  --   --    < > 26 37  --   --  21  --   --     < > = values in this interval not displayed.   ]    Lab Results   Component Value Date    HGBA1C 6.2 04/05/2023         Assessment:     Onesimo Paula is a 73 y.o. male with     1. OAB (overactive bladder)        Plan:     Schedule cystoscopy and TRUS to rule out stricture as cause of oab on Monday feb 19th. See how much prostate has grown back. Previously had rezum in 2020 and trus vol 46g prior to this.   If he does have a urethral stricture- then can explain sx and would need to treat stricture to that improves symptoms.then would need to do in and out cath intermittently forr a while to keep stricture from scarring down again.   If he doesn't have urethral stricture will proceed with uds.   (Will go ahead and schedule to follow cysto/trus and can cancel If we don't need)  Recommend uds since he already has had rezum and interstim and want to evaluate if he has detrussor overactivity/bladder muscle kenn too much.   If so would offer botox since he has already has  interstim. But has to be repeated.     This patient has been cleared for surgery in ambulatory surgical facility.       La Nena James MD

## 2024-02-19 NOTE — DISCHARGE INSTRUCTIONS
Your urologist is: Dr.Jennifer James  Office number: [phone]  Address: [address] [place] [address], [address], [facility] [id number]      PLEASE READ the following directions and contact our office with any questions via phone or the portal. If you were told that you need an appointment and no appointment was made, call the office the day after surgery and ask to speak with 's nurse to make an appointment.      Assessment: Onesimo Paula is a 73 y.o. male with h/o oab and bph sp rezum 10/21/20, interstim 5/22/23. Small ridge on rectal exam today. Difficult catheterization during attempted uds recently. No urethral stricture seen but does have high riding bladder neck.     Plan:  Continue flomax 0.8mg nightly for now to help with high riding bladder neck.  Since no urethral stricture seen fu for urodynamics to eval if he has detrussor overactivity vs bph obstruction. Botox vs tuip (transurethral incision or prostate for high riding bladder neck) vs both at the same time and seeing how he responds.   At urodynamics I will place urethral catheter. Resitance likely at prostate-high riding and small defect seen

## 2024-02-19 NOTE — TELEPHONE ENCOUNTER
----- Message from La Nena James MD sent at 2/19/2024  3:00 PM CST -----  Put note  · At urodynamics I will place urethral catheter. Resitance likely at prostate-high riding and small defect seen      
noted  
yes

## 2024-02-19 NOTE — DISCHARGE SUMMARY
[facility] - Periop Services  Urology  Discharge Note - Short Stay      Patient Name: Onesimo Paula  MRN: [id number]  Discharge Date and Time:  02/19/2024 3:01 PM  Attending Physician: La Nena James.*   Discharging Provider: La Nena James MD  Primary Care Physician: Reynaldo Rahman, APRN,FNP-C    There are no hospital problems to display for this patient.      Final Diagnoses: Same as principal problem.    Hospital Course: Patient was admitted for an outpatient procedure and tolerated the procedure well with no complications.*    Procedure(s) (LRB):  ULTRASOUND, RECTAL APPROACH (N/A)  CYSTOSCOPY (N/A)     Indwelling Lines/Drains at time of discharge:   Lines/Drains/Airways       Drain  Duration                  Closed/Suction Drain 01/11/22 1238 Right;Posterior Buttock Bulb 10 Fr. 769 days                    Discharged Condition: good    Disposition: home    Follow Up:      Patient Instructions:   No discharge procedures on file.    Medications:  Reconciled Home Medications:      Medication List        CONTINUE taking these medications      amLODIPine 10 MG tablet  Commonly known as: NORVASC  Take 1 tablet (10 mg total) by mouth once daily.     aspirin 81 MG EC tablet  Commonly known as: ECOTRIN  Take 81 mg by mouth once daily.     co-enzyme Q-10 30 mg capsule  Take 30 mg by mouth 3 (three) times daily.     diclofenac sodium 1 % Gel  Commonly known as: VOLTAREN  Apply 2 g topically 4 (four) times daily as needed (left shoulder/arm pain).     Fish OiL 100-160-1,000 mg Cap  Generic drug: omega 3-dha-epa-fish oil  Take 1 capsule by mouth once daily.     hydroCHLOROthiazide 25 MG tablet  Commonly known as: HYDRODIURIL  Take 25 mg by mouth once daily.     metFORMIN 500 MG tablet  Commonly known as: GLUCOPHAGE  Take 1 tablet (500 mg total) by mouth daily with breakfast.     MULTIVITAMIN 50 PLUS ORAL  Take 1 tablet by mouth once daily.     omeprazole 20 MG capsule  Commonly known as:  PRILOSEC  Take 1 capsule (20 mg total) by mouth once daily.     ranolazine 500 MG Tb12  Commonly known as: RANEXA  Take 2 tablets (1,000 mg total) by mouth 2 (two) times daily.     rosuvastatin 10 MG tablet  Commonly known as: CRESTOR  Take 1 tablet (10 mg total) by mouth once daily.     tadalafiL 5 MG tablet  Commonly known as: CIALIS  Take 1 tablet (5 mg total) by mouth once daily.     telmisartan 80 MG Tab  Commonly known as: MICARDIS  Take 1 tablet (80 mg total) by mouth once daily.              Discharge Procedure Orders (must include Diet, Follow-up, Activity):  No discharge procedures on file.       Assessment: Onesimo Paula is a 73 y.o. male with h/o oab and bph sp rezum 10/21/20, interstim 5/22/23. Small ridge on rectal exam today. Difficult catheterization during attempted uds recently. No urethral stricture seen but does have high riding bladder neck.     Plan:  Continue flomax 0.8mg nightly for now to help with high riding bladder neck.  Since no urethral stricture seen fu for urodynamics to eval if he has detrussor overactivity vs bph obstruction. Botox vs tuip (transurethral incision or prostate for high riding bladder neck) vs both at the same time and seeing how he responds.   At urodynamics I will place urethral catheter. Resitance likely at prostate-high riding and small defect seen    La Nena James MD  Urology  [facility] - Periop Services

## 2024-02-19 NOTE — OP NOTE
[facility] Procedure Note-[facility]  Date: 02/19/2024      TRANSRECTAL PROSTATIC ULTRASOUND and Cystoscopy    Procedures: Flexible cystourethroscopy and Transrectal Ultrasound    Pre Procedure Diagnosis:bph, oab and slow flow    Post Procedure Diagnosis: same, see below for findings    Surgeon: La Nena James MD    Indications: Onesimo Paula is a 73 y.o. male with BPH. Current PSA is 1.5. Urine from today reviewed. H&P reviewed. The risks and benefits of both procedures were explained and consent was obtained.     Cytoscopic Specimen: none    Cystoscopy was performed   2% lidocaine urojet was used for local analgesia.  The genitalia was prepped and draped in the sterile fashion with betadine.    The flexible scope was advanced into the urethra and into the bladder.  Bilateral ureteral orifice were evaluated and noted to be normal with clear efflux.  The bladder was completely surveyed in a systematic fashion and the cytoscope was retroflexed.  Cystoscopy findings as listed below.     TRANSRECTAL ULTRASOUND was performed   The transrectal ultrasound probe was placed in the rectum. Measurements were taken and recorded and the probe was removed.   The patient tolerated the procedures well without complication.          Findings: (pictures were uploaded into media)  Cystoscopic findings: rezum defect seen in median lobe closer to veru. Minimal b lobe hypertrophy. + high riding bladder neck with small intravesical protrusion.    TRUS volume: 43.38. WINSOME: just to right of midline small ridge <0.5cm felt near base. 40g prostate.   Prostate Ultrasound findings:  Seminal vesicles/Ejaculatory Ducts: Normal  Outline/Symmetry of Prostate: WNL  Central Gland/Transition Zone: Well demarcated  Peripheral Zone: WNL    Prostate Measurements:   Height:  30.2 mm  Width:  58 mm  Length:  46.7 mm  Volume: 43.38 cm3  Intravesical protrusion: 8mm    Subcervical median lobe      High riding bladder neck      Small defect in  subcervical median lobe      Defect from rezum just past veru      Minimal intravescial median lobe      Minimal trabeculations                Assessment: Onesimo Paula is a 73 y.o. male with h/o oab and bph sp rezum 10/21/20, interstim 5/22/23. Small ridge on rectal exam today. Difficult catheterization during attempted uds recently. No urethral stricture seen but does have high riding bladder neck.     Plan:  Continue flomax 0.8mg nightly for now to help with high riding bladder neck.  Since no urethral stricture seen fu for urodynamics to eval if he has detrussor overactivity vs bph obstruction. Botox vs tuip (transurethral incision or prostate for high riding bladder neck) vs both at the same time and seeing how he responds.   At urodynamics I will place urethral catheter. Resitance likely at prostate-high riding and small defect seen      La Nena James MD

## 2024-02-20 ENCOUNTER — HOSPITAL ENCOUNTER (OUTPATIENT)
Dept: RADIOLOGY | Facility: HOSPITAL | Age: 74
Discharge: HOME | End: 2024-02-20
Attending: GENERAL PRACTICE
Payer: COMMERCIAL

## 2024-02-20 VITALS
DIASTOLIC BLOOD PRESSURE: 75 MMHG | RESPIRATION RATE: 18 BRPM | WEIGHT: 167 LBS | SYSTOLIC BLOOD PRESSURE: 156 MMHG | HEIGHT: 70 IN | BODY MASS INDEX: 23.91 KG/M2 | HEART RATE: 67 BPM | OXYGEN SATURATION: 97 % | TEMPERATURE: 99 F

## 2024-02-20 DIAGNOSIS — I25.10 ATHEROSCLEROSIS OF NATIVE CORONARY ARTERY OF NATIVE HEART WITHOUT ANGINA PECTORIS: ICD-10-CM

## 2024-02-20 DIAGNOSIS — R07.2 CHEST PAIN, PRECORDIAL: ICD-10-CM

## 2024-02-20 DIAGNOSIS — R55 POSTURAL DIZZINESS WITH PRESYNCOPE: ICD-10-CM

## 2024-02-20 DIAGNOSIS — I77.1 STRICTURE OF ARTERY: ICD-10-CM

## 2024-02-20 DIAGNOSIS — R42 POSTURAL DIZZINESS WITH PRESYNCOPE: ICD-10-CM

## 2024-02-20 PROCEDURE — 93880 EXTRACRANIAL BILAT STUDY: CPT | Mod: TC

## 2024-02-21 RX ORDER — TAMSULOSIN HYDROCHLORIDE 0.4 MG/1
0.8 CAPSULE ORAL NIGHTLY
Qty: 180 CAPSULE | Refills: 3 | Status: SHIPPED | OUTPATIENT
Start: 2024-02-21 | End: 2024-04-11

## 2024-02-21 NOTE — TELEPHONE ENCOUNTER
Sending in flomax 0.8mg nightly to start for high riding bladder neck    Hi! I'm sorry for the confusion. I'm glad you read through that. Yes I would like you to start flomax again. I wrote for 0.8mg (2 before bed) but you can take 1 before bed for a week. Make sure you don't have any side effects (lightheadedness or dizziness if standing to quickly) and if you don't you can increase to 2 before bed.   I think it may help with that high riding bladder neck- it can help relax it so that you can have decreased hesistancy and an improved flow

## 2024-02-26 ENCOUNTER — TELEPHONE (OUTPATIENT)
Dept: CARDIOLOGY | Facility: CLINIC | Age: 74
End: 2024-02-26
Payer: COMMERCIAL

## 2024-03-04 ENCOUNTER — TELEPHONE (OUTPATIENT)
Dept: UROLOGY | Facility: CLINIC | Age: 74
End: 2024-03-04

## 2024-03-04 ENCOUNTER — CLINICAL SUPPORT (OUTPATIENT)
Dept: UROLOGY | Facility: CLINIC | Age: 74
End: 2024-03-04
Payer: COMMERCIAL

## 2024-03-04 ENCOUNTER — PROCEDURE VISIT (OUTPATIENT)
Dept: UROLOGY | Facility: CLINIC | Age: 74
End: 2024-03-04
Payer: COMMERCIAL

## 2024-03-04 VITALS
HEIGHT: 70 IN | HEART RATE: 63 BPM | SYSTOLIC BLOOD PRESSURE: 150 MMHG | DIASTOLIC BLOOD PRESSURE: 80 MMHG | WEIGHT: 167.13 LBS | BODY MASS INDEX: 23.93 KG/M2

## 2024-03-04 DIAGNOSIS — R33.9 URINARY RETENTION: Primary | ICD-10-CM

## 2024-03-04 DIAGNOSIS — N32.81 OAB (OVERACTIVE BLADDER): ICD-10-CM

## 2024-03-04 DIAGNOSIS — N40.0 BENIGN PROSTATIC HYPERPLASIA, UNSPECIFIED WHETHER LOWER URINARY TRACT SYMPTOMS PRESENT: Primary | ICD-10-CM

## 2024-03-04 LAB — POC RESIDUAL URINE VOLUME: 27 ML (ref 0–100)

## 2024-03-04 PROCEDURE — 51728 CYSTOMETROGRAM W/VP: CPT | Mod: 26,S$GLB,, | Performed by: UROLOGY

## 2024-03-04 PROCEDURE — 99999 PR PBB SHADOW E&M-EST. PATIENT-LVL I: CPT | Mod: PBBFAC,,,

## 2024-03-04 PROCEDURE — 99499 UNLISTED E&M SERVICE: CPT | Mod: S$GLB,,, | Performed by: UROLOGY

## 2024-03-04 PROCEDURE — 51798 US URINE CAPACITY MEASURE: CPT | Mod: S$GLB,,, | Performed by: UROLOGY

## 2024-03-04 PROCEDURE — 51797 INTRAABDOMINAL PRESSURE TEST: CPT | Mod: 26,S$GLB,, | Performed by: UROLOGY

## 2024-03-04 RX ORDER — AMOXICILLIN AND CLAVULANATE POTASSIUM 500; 125 MG/1; MG/1
1 TABLET, FILM COATED ORAL 2 TIMES DAILY
Qty: 10 TABLET | Refills: 0 | Status: SHIPPED | OUTPATIENT
Start: 2024-03-04 | End: 2024-03-09

## 2024-03-04 NOTE — PATIENT INSTRUCTIONS
Uds showed that he had a rise in detrussor pressure to 73 and slow flow-avg flow of 4.6. we also had a difficult time placing catheters bc of his high riding bladder neck and defect from his rezum. No obvious detrussor overactivity.       Still obstructed after rezum. Could be causing oab.     Understands after tuip may have stricture requiring CIC to keep open or persistent oab that may need botox if it persists.     Plan:     The patient is scheduled for primarily transurethral incision of his prostate and possible  transurethral resection of any remaining tissue next Wednesday march 13th.  Return this Wednesday for voiding trial. Can remove jean at home night before. Keep jean out unless unable to urinate by pm. Return for pvr scheduled unless he urinates. Do not have to check pvr after he urinates as long as he feels like he empties.   Sees  for htn and dizziness. Just seen in January with echo. On no asa. Will plan to dc flomax this wed if able to void. Would like cardiac clearance if able prior to procedure. May need to postpone procedure if unable to get clearance.   Set up for preop appt at hospital with cbc, bmp, EKG and cxr this week.   Take antibiotics twice a day for 2 doses then restart 2 days prior to his procedure next Wednesday.     Consent was obtained.  The risks include but are not limited to urethral stricture, bladder neck contracture, incomplete emptying or retention requiring a catheter, urethral meatus stricture, burning with urination, overactive bladder, bleeding, infection, pain, need for further procedures, injury to the kidney, ureter, bladder, bladder perforation, prostate capsular penetration, need for open surgery, need for continued use of prostate medications and chance of finding prostate cancer in the specimen chips.  The patient was informed that they may require a ureteral stent and that stents can cause irritative voiding symptoms.  They also understand that  ureteral stents are temporary and must be removed or exchanged in a timely fashion or they can calcify and make more stones and become difficult to remove. The patient understands they may will need a catheter post op and observation in the hospital overnight on continuous bladder irrigation.     Patient understands these risks and has agreed to proceed with surgery.

## 2024-03-04 NOTE — PROGRESS NOTES
Patient here today for pvr by in and out catheter.   Patient ambulated to restroom successfully emptied bladder.   PVR by bladder scan showed 409ml of residual urine.    Patient draped and prepped in sterile fashion.   16 Fr coude catheter placed into the bladder with slight difficulty  Draining 475ml of  dark red blood  Balloon filled with 10ml sterile water  Catheter attached to leg bag.   Leg bag secured to right leg with stat-lock.   Per  catheter irrigated with 500ml of sterile water until urine is clear   Patient advised to increase water intake  Return to office on 3/6 for catheter removal.  Patient left the office in satisfactory condition

## 2024-03-04 NOTE — PROCEDURES
[facility]  Urodynamic Report    Indication: Onesimo Paula is a 73 y.o. male  With     1. Benign prostatic hyperplasia, unspecified whether lower urinary tract symptoms present          See associated progress note for details.     Procedure:   Patient was taken to the Urodynamic Suite with a comfortably full bladder and asked to perform a free uroflow.  Next, the patient was prepped and the urinary residual was drained with a 14 Fr catheter.  A 7 Fr dual lumen catheter was placed to measure intravesical pressures.  A 10 Fr balloon manometer was placed into the rectum for abdominal pressure measurements.  Patch EMG electrodes were placed on the perineum.  The patient was connected to the [facility] [employer] Urodynamic machine, using a multichannel technique, the data were interpreted.  The bladder was filled with sterile water at room temperature at a rate of 30 ml/min.  Patient is filled to 368.  Filling is performed with the patient in the seated position.  Abdominal leak point pressures were not  checked.  The patient was then asked to void for a pressure flow study.    Goal of study:   Evaluate if he is obstructed or has detrussor overactivity    The following are the results of the study:  1.  Uroflow: could not perform    2.  PVR by in and out cath: 250- DIFFICULT. 14 FR COUDE CATH. BLOOD SEEN.     3.  CMG   Sensation: delayed          First sensation: 196    First desire: 288    Strong desire:322         Capacity: 368   Abnormal Contractions: none   Compliance: normal          4.  Abdominal Leak Point Pressure: did not check    5.  EMG:  could not measure, inaccurate. Had him strain and was not measuring.     6.  Voiding phase   Qmax:  8.2   Average flow: 4.6   P det at Q max: 73.5   Pattern of the curve: bell   Voided volume: 352   PVR by scan: : 23          7.  Analysis:    See progress note same day        8.  Recommendations/Plan:  See progress note same day      Charges:  08678  complex cystometrogram including bladder voiding/PFS  00063 abdominal pressure, measured rectally or vaginally   45231 if note done

## 2024-03-04 NOTE — TELEPHONE ENCOUNTER
The patient is scheduled for primarily transurethral incision of his prostate and possible  transurethral resection of any remaining tissue next Wednesday march 13th.  Return this Wednesday for voiding trial. Can remove jean at home night before. Keep jean out unless unable to urinate by pm. Return for pvr scheduled unless he urinates. Do not have to check pvr after he urinates as long as he feels like he empties.   Sees  for htn and dizziness. Just seen in January with echo. On no asa. Will plan to dc flomax this wed if able to void. Would like cardiac clearance if able prior to procedure. May need to postpone procedure if unable to get clearance.   Set up for preop appt at hospital with cbc, bmp, EKG and cxr this week.   Take antibiotics twice a day for 2 doses then restart 2 days prior to his procedure next Wednesday.

## 2024-03-04 NOTE — TELEPHONE ENCOUNTER
----- Message from Rachana Weiss sent at 3/4/2024  2:20 PM CST -----  Contact: self  Type: Sooner Appointment Request        Caller is requesting a sooner appointment. Caller declined first available appointment listed below. Caller will not accept being placed on the waitlist and is requesting a message be sent to doctor.        Name of Caller: Patient   Best Call Back Number: [phone]  Additional Information: Pt states he can't go to the bathroom after having his procedure today plz call the pt today to be advised. Thanks

## 2024-03-04 NOTE — PROCEDURES
[facility] Note - [place]  Staff: MD Erika  PCP: Reynaldo Rahman, APRN,FNP-C  Date of Service: 03/04/2024      Subjective:        HPI: Onesimo Paula is a 73 y.o. male     Seen by me 9/30/19  Patient had MRI for right hip pain and was found have a thick-walled bladder with enlarged prostate.  He states that he saw urologist over 40 years ago for burning with urination.  He had a renal bladder ultrasound in 2016 which showed enlarged prostate and bilateral renal cyst done for renal insufficiency whom he sees  for.  Denies any gross hematuria.  Review of for previous urine show no microscopic hematuria.  Twenty-five pack-year history of smoking but quit in 1997.      He also has AUA symptom score 6 and occasional weak stream and urgency but voids every 3-5 hours when he is working and a little more frequent when he is not.  Denies any urge incontinence.  Okay stream with occasional weak stream.  PVR today 09/03/2019 is 15 cc.  He may have tried Flomax a couple years ago but does not recall it helping her changing any was symptoms.  Overall really not that bothered by his urinary symptoms.      Also states he has ED.  Previously tried sildenafil unsure dose and it helped but wife does not want him to take, exam revealed peyronie's     Plan: started tadalafil 5mg due to thick walled bladder although essentially asx. No further f/u fr b renal cysts.      Interval history 11/18/19:   Pt states today Cialis has NOT improved any of his lower urinary tract symptoms since starting the medication.  He currently takes this medication before his bedtime.  Pt still c/o urinary urgency, especially during the day.  Nocturia is 1-2x nightly and not bothersome at this time.  He is a , therefore the daytime urgency really bothers him.  Plan: started ditropan 10mg XL and cont'd tadalafil     Interval history by idalia 1/6/20:   TODAY, pt states the oxybutynin only improved his  LUTS by over 10% at this time.  PVR by bladder scan performed in office today:  11 mL  Rec'd: d/c oxybutynin, start myrbetriq, cont tadalafil 5mg daily.     Interval history 7/27/20:   Tried myrbetriq but didn't help. Having urgency, hesistancy and intermittent slow stream but urgency most bothersome and having some UUI with a few drop, but not that bothersome. Drinks 1 c of coffee in am. Soda during day. 1 c of coffee int he evening. Has never tried stopping these to see if they help. Drinks beer and notices increase in frequency following day.   On tadalafil 5mg daily for ED and bph.  pvr by scan: 32cc  Voiding every 30 minutes a day recently more pronounced at home (has been on vacation).  Takes hctz in am. When he's working he only voids about 2x-4x a day. Drinks caffeine while working including energy drinks. No nocturia.   psa 1/31/20 2.2 (up from 1.2 year prior). Repeat psa 0.85 7/29/20  WINSOME today: 40g + no nodules.  Uroflow 7/30/20: peak flow 12.8mL/s,  avg flow 5.4mL/s, voided vol: 191cc, pvr by scan:0      Cysto trus 8/17/20: normal bladder, no stricture. Membranous and distal prostatic urethra with papillary tissue suspicious for prostatitis.trus volume 42.6g. bc of his urgency on tadalafil, flomax, vesicare found rx cipro for 14d to see if it helped w urgency. D/c'd vesicare. cont'd flomax and tadalafil. rtc to see idalia in 2-3 weeks and me in 2 months to discuss bph procedures     Interval history by idalia 9/9/20:   Pt states he is still having the urinary urgency even since finishing the Cipro antibiotics at this time.  He denies gross hematuria, dysuria.  UA today shows normal findings.  PVR by bladder scan today:  9 mL  Plan: rec'd restarting vesicare     Interval history 10/1/20:    Taking flomax 0.4mg nightly and says flow is intermittent on this.   Also on the vesicare and says it doesn't help with the urgency. Voiding 2-3x a day and none at night. 3-4x a week c/o urge incontinence if he tries  to hold it too long, mostly in the am when he wakes up. Denies any difficulty with walking. No weakness or numbness.   Changed to decaf coffee 1x a day. Changed to sprite and caffeine free coke.   He does not want to take any more meds. He is not happy with the retrograde ejaculation.   Sees dr. leger for Mitral Valve leakage, last saw him a week ago, was told he should f/u in March 2021. Tadalafil helping with erections     Interval history 11/19/20:   Underwent rezum on 10/21/20. continue flomax, vesicare and tadafil daily for 3 more months.   Returned for fill and pull on 10/26/20. Filled with 210, voided 210  For 2 weeks postop would have blood when he had a BM, improving.   Today (1 month later) he states he has some increased urgency. UUI 2-3x a day (increased from 3-4x a week).  Still taking vesicare  freq q1 hour (worse in cold weather). Without cold weather freq q 3 hour.   ua today with small wbc and mod blood - some dysuria  No significant change in stream yet  pvr by scan today: 134  AUA ssx:(2 incomplete emptying, 3 frequency, 3 intermittency, 4 urgency, 2 weak stream, 2 straining, 0 sleeping). 16. QOL: 4    Interval history by me in clinic on 3/9/21:  Returns today and states states he ran out of flomax and vesicare a month ago. No increased frequency, urge incontinence or slower stream off the flomax  Still having UUI 1-2x a day with a few drops when he hears water.   Voids 8x/24 hours and 4x/8 hours while at work. Nocturia occ (same). No longer drinking caffeine.   Still on tadalafil 5mg daily.  No longer having retrograde ejaculation and constipation improved. Hctz in am.  Frequency mostly in  the pm. Bothers him but able to stop at bathrooms. Sometimes loses erection with tadalafil 5mg daily. Rare. Not more frequent. Still getting morning erections.   AUA ssx today:(1 incomplete emptying, 2 frequency, 1 intermittency, 3 urgency, 3 weak stream, 0 straining, 0 sleeping). 10. QOL: mixed  Bladder  scan PVR today was 35mL.    I reviewed his previous PSAs and his most recent psa within the last year was 2.3, %free 20.87, last year was 2.2      Interval history with idalia on 4/9/21:  UA today showed normal findings.  Pt states today even though he tried and changed his HCTZ med to evenings as discussed last ov, his LUTS have not improved.  Pt as of today's visit is still c/o urinary urgency and frequency during the daytime.  Has not improved since last ov.  Pt states today he has tried Vesicare and Oxybutynin in the past with no improvement in symptoms.  Pt currently denies gross hematuria and dysuria.  She put him on myrbetriq again       Interval history by me 6/15/21:  Uroflow results (date: 03/18/2021): Voiding time: 24.3s, Flow time: 23.1s, TTP flow: 8.0s, Peak flowrate: 19.2 mL/s, Average flowrate: 10.6mL/s, Intervals: 2, Voided volume: 244 mL, Pvr by bladder scan 33. Pattern of curve: see uploaded image, reviewed by   At last visit was c/o freq/urgency. Frequency 8-10x/day. Tried myrbetriq before rezum, ditropan, vesicare and changing hctz to pm but no improvement. Then in April started myrbetriq again , after about 4 weeks saw improvement, now down to 5-6x a day. avg urg: 3. No nocturia. No incontinence.  Also on tadalafil 5mg daily.   AUA ssx today:(1 incomplete emptying, 1 frequency, 1 intermittency, 2 urgency, 3 weak stream, 0 straining, 0 sleeping). 8. QOL:   Bladder scan PVR today was 0mL.      HPI/CC today 6/7/22:   Had him Continue myrbetriq 50mg daily, has enough until 4/2021, Continue tadalafil 5mg daily, refilled today for a year.  Says myrbetriq not helping anymore. He held it for a few days and noticed no difference. On cialis 5mg daily.   Does not have constipation  Voids about 9-10x during waking hours. occ urge incontinence 3x a week. Few drops. Started drinking caffeine this week.   Nocturia 1-2x a night. Urine flow varies.    psa 5/31/22 1.3  ua today neg, pvr by scan: 5  AUA  ssx:(2 incomplete emptying, 3 frequency, 2 intermittency, 4 urgency, 3 weak stream, 1 straining, 1 sleeping). 16. QOL: mixed  Appendix removed in January complicated by post-op ileus.    Interval history 8/23/22:  Continue myrbetriq 50mg daily, has enough until 4/2021  Continue tadalafil 5mg daily, refilled today for a year  After adding vesicare to myrbetriq - went from voiding 9-10x day, 1-2x a night with occ UUI 3x a week to 5-6x/day, 0 night and no daily UUI with drops. Says he has dry mouth but about the same. However does have some heart burn that is new.   UA today: neg. pvr by scan: 115 (this is the highest it's been, feels empty).  AUA ssx:(1 incomplete emptying, 2 frequency, 0 intermittency, 2 urgency, 2 weak stream, 1 straining, 0 sleeping). 8. QOL: pleased    Interval history 2/28/23:  Says he is doing well on VESIcare and myrbetriq.  However it is expensive.  About 120 dollars every 3 months.  Also having dry mouth with it.  On tadalafil 5 mg daily for erections, obtaining from [facility].  This dose wish for him sometimes.  He tried Viagra 100 mg and it seemed to work.  Aua ssx: 12, pvr: 25    Interval history 4/21/23:  I had him discontinue both myrbetriq and VESIcare at the last visit to see if his incontinence would get worse.  Followed up with Teena on 03/29/2023.  His PVR was 0.  His AUA symptom score was 14, 5.  He was still taking tadalafil 5 mg daily.  He said with stopping VESIcare and myrbetriq he had 2 episodes of incontinence that were large volume, increased postvoid dribbling and he feels like his urinary frequency increased.  Was scheduled with me to discuss InterStim because he wanted to hold off on restarting any medications due to cost and side effects  He returns today with a voiding diary.  He kept a voiding diary for me days and it looks like he urinates on average about 9-10 times a day.  That is up from 6 times a day when he was taking medications.  He did not documented on his  voiding diary but he leaks urine 2 to 3 times a day on the way to the bathroom.  Not wearing any pads.  Does not have to change his underwear.  If he could rate his urgency he would say on average his urgency is 4/5.  He is interested in proceeding with InterStim.  He ended up using tadalafil 10 mg once and said this dose worked good for him for his erectile dysfunction  Voided urine today is negative.  Confirmed again that currently he urinates 7-10 times a day, average urgency is 4 and 1-2 leaks a day.        Interval history 5/25/23:  I did a peripheral nerve evaluation on him on Monday, 4 days ago.  Since then he has gone from urinating 6-10 times a day down to about 6 times a day.  Never had issues at night.  He went from leaking 2 to 3 times a day incontinence down to 1x over the last 3 days. Avg urgency went from 4 to 2. He says >50% improvement and wants to proceed. Not on any meds.   He previously saw  for stress tests bc of SOB. His last w/u was 11/2022 and had a neg stress test. On asa 81mg. No stents. They could never figure out why he has SOB.  He sees pulmonology regularly for nodules in the lungs.  Wires removed today    Interval history 7/11/23:  Here today for interstim stage 1 and 2. Preop appt revealed a1c 6.2, glucose 124. Ua with tr protein/tr bld on 6/28. Started on metformin.   Urinating every 1-2 hours. Uui with drops 2x a day. No pads or underwear changes.  Held asa 81mg for 7d.     Interval history 8/8/23 telephone visit :  Pt states that initially he had issues with urgency and frequency after his procedure but since talking to clarita and making adjustments he has gone from urinating every 1-2 hours to 3-4x a day. Avg urgency remains around 4 and no UUI.   He is happy with his symptoms.     Interval history by ME in CLINIC on 11/13/23 for postop interstim fu and oab fu:  He says that his frequency has returned despite changing his settings. Says changing the setting works for a  bit. Then no longer works. Nos on 3.2.   He says after turning off his interstim for us in August for us abd for his liver he had an increase in his frequency and had to increase the amplitude   He changed the settings on 10/31 and he went from 8x a day down to 5x a day. He stopped hctz a month ago by his cardiologist but then he restarted the hctz in the morning last week and his frequency increased to 9x a day.  Currently on program 2, 3.2.    He says urinating about 8x a day and 1x a night. Leaking with UUI and drops 3-4x a day and 2 accidents last week. No pads.   Urgency 5/5.  1 cup of caffeine in the morning.   Voiding diary: 10/30/23- 8x, changed on 10/31. On 10/31 -5x, 11/1- 5x, 11/3- 6x, 11/11-9x. 11/12-7x. This morning 4x/ 6 hours.   AUA ssx:(2 incomplete emptying, 3 frequency, 2 intermittency, 3 urgency, 3 weak stream, 0 straining, 1 sleeping). 14. QOL: mostly satisfied  Pvr today: 40. Ua today: neg    Interval history by ME in CLINIC on 1/22/24 for oab:  Plan was to do uds bc of his oab but when joe tried to place ureteral catheter it did not go easily. Then she tried 16fr coude and stopped mid way and saw blood. Decided to hold off on and plan for cysto. He adjusted his interstim and he said no change. Feels the flutter in his right perineum. . He says flow is fast sometimes and slow sometimes.  Cysto trus 8/17/20: 42.6g.   Rezum 10/21/20  Last peak flow was 19.2 in 3/18/21.  Last catheter was at time of cholecystectomy in jan 2022.   Interstim 7/11/23  Frequency:Still urinates bw 6 to 12x a day. Has more bad days than good. Nocturia 1x a night.  Incontinence:  Has drops/leaks twice a day.  No pads  Drinks 1 soda in am and decaf cup in evening. No tea.     Interval history/H&P Update by me/ prior to cysto trus on 2/19/24:  urine at the lab- ua 1/30/4:neg(would need to returnwith smoking hx although quit in 1997  Ua today 2/19/24 30 protien  Uroflow and pvr soon. Has oab. Will need to  drink fluid here right before the test.   Uroflow results (date: 01/30/2024): Voiding time: 39.4s, Flow time: 39.3s, TTP flow: 10.6s, Peak flowrate: 16.7 mL/s, Average flowrate: 8.8mL/s, Intervals: 1, Voided volume: 346 mL, Pvr by bladder scan: 45mL . Pattern of curve: see 's addendum   Schedule cystoscopy and TRUS to rule out stricture as cause of oab on Monday feb 19th. See how much prostate has grown back. Previously had rezum in 2020 and trus vol 46g prior to this.   If he does have a urethral stricture- then can explain sx and would need to treat stricture to that improves symptoms.then would need to do in and out cath intermittently forr a while to keep stricture from scarring down again.   If he doesn't have urethral stricture will proceed with uds.   (Will go ahead and schedule to follow cysto/trus and can cancel If we don't need)  Recommend uds since he already has had rezum and interstim and want to evaluate if he has detrussor overactivity/bladder muscle kenn too much.   If so would offer botox since he has already has interstim. But has to be repeated.      Cysto/TRUS Findings 2/19/24:   Cystoscopic findings: rezum defect seen in median lobe closer to veru. Minimal b lobe hypertrophy. + high riding bladder neck with small intravesical protrusion.    TRUS volume: 43.38. WINSOME: just to right of midline small ridge <0.5cm felt near base. 40g prostate.           Assessment: Onesimo Paula is a 73 y.o. male with h/o oab and bph sp rezum 10/21/20, interstim 5/22/23. Small ridge on rectal exam today. Difficult catheterization during attempted uds recently. No urethral stricture seen but does have high riding bladder neck.      Plan:  Continue flomax 0.8mg nightly for now to help with high riding bladder neck.  Since no urethral stricture seen fu for urodynamics to eval if he has detrussor overactivity vs bph obstruction. Botox vs tuip (transurethral incision or prostate for high riding  bladder neck) vs both at the same time and seeing how he responds.   At urodynamics I will place urethral catheter. Resitance likely at prostate-high riding and small defect seen      Interval history by me/ prior to urodynamics on 3/4/24:  Primary complaint is frequency and urgency with UUI and slow flow.  Underwent uds today which showed he had delayed sensation with slow flow. Bladder mostly emptied after procedure. Catheter placement was difficult due to high riding bladder neck but able to perform procedure. Recommended proceeding with tuip/turp.  However he returned in the afternoon bc unable to urinate.   Eryn placed coude jean and 475 dark urine drained.   She irrigated him and removed some clots  We had discussed holding the flomax bc he gets lightheaded but would like him to take for 2 nights if possible and do a voiding trial on wed.     PVR History:  3/4/24  Uds: peak flow: 8.5, avg flow: 4.7, voided vol: 352, pvr by scan: 27 but then came back and pvr by io: 475  1/30/24            Peak flow: 16.7, voided vol: 346, pvr: 45  11/13/23 40  2/28/23 25, aua ssx: 12  8/23/22 115  6/15/21 pvr by scan: 0  3/18/21 UF: pF: 19.2, avg: 10.6, voided vol: 244, pvr: 33  3/2/21               pvr: 35  11/19/20          pvr by scan: 134 (couldn't start after he stopped)  10/26/20          Fill and pull: 0cc pvr  11/19/20          rezum  9/9/20              pvr by scan: 9cc  7/27/20            pvr by scan: 32cc  8/17/20            Cysto trus: 42.6g.   7/30/20           UF: peak flow 12.8mL/s,  avg flow 5.4mL/s, voided vol: 191cc, pvr by scan:0   1/6/20              pvr by scan: 11cc   9/3/19              pvr by scan: 15cc     PSA history: no family hx of prostate cancer  1/22/24 WINSOME: 40g  5/31/22 1.3  3/2/21              2.3, %free 20.87, pvr: 35 (psa screen 4.6)  11/19/20          rezum  7/29/20            0.85, %free 54.12  1/31/20            2.2   2/21/19            1.2  3/14/18            1.0    Urine  history: 1 ppd x 26 years. Quit 1997. Anticoag: asa 81mg  2/19/24            30 protein  1/30/54            neg  1/22/24 Not able to provide- do at the lab  11/13/23 1 rbc  6/28/23 Tr bld  5/22/23 neg  4/21/23 Tr prot  6/7/22  neg  11/19/20          Small wbc/mod blood-send for ua patricio and culture pvr by scan: 134  10/14/20          Neg  9/16/20            neg  9/9/20              neg  3/31/20            Neg  1/6/20              Neg  11/18/19          neg  8/8/19              No cx, void: n/a 0 rbc  2/21/19            Ng, void: neg, 0 rbc  4/9/18              No cx, void: neg  9/2017             No blood       Current REVIEW OF SYSTEMS:  neg    Objective:     Vitals:    03/04/24 0849   BP: (!) 150/80   Pulse: 63         Focused  exam  neg    Assessment:     Onesimo Paula is a 73 y.o. male with     1. Benign prostatic hyperplasia, unspecified whether lower urinary tract symptoms present    2. OAB (overactive bladder)        Uds showed that he had a rise in detrussor pressure to 73 and slow flow-avg flow of 4.6. we also had a difficult time placing catheters bc of his high riding bladder neck and defect from his rezum. No obvious detrussor overactivity.     Still obstructed after rezum. Could be causing oab.     Understands after tuip may have stricture requiring CIC to keep open or persistent oab that may need botox if it persists.     Plan:     The patient is scheduled for primarily transurethral incision of his prostate and possible  transurethral resection of any remaining tissue next Wednesday march 13th.  Return this Wednesday for voiding trial. Can remove jean at home night before. Keep jean out unless unable to urinate by pm. Return for pvr scheduled unless he urinates. Do not have to check pvr after he urinates as long as he feels like he empties.   Sees  for htn and dizziness. Just seen in January with echo. On no asa. Will plan to dc flomax this wed if able to void. Would like cardiac clearance  if able prior to procedure. May need to postpone procedure if unable to get clearance.   Set up for preop appt at hospital with cbc, bmp, EKG and cxr this week.   Take antibiotics twice a day for 2 doses then restart 2 days prior to his procedure next Wednesday.     Consent was obtained.  The risks include but are not limited to urethral stricture, bladder neck contracture, incomplete emptying or retention requiring a catheter, urethral meatus stricture, burning with urination, overactive bladder, bleeding, infection, pain, need for further procedures, injury to the kidney, ureter, bladder, bladder perforation, prostate capsular penetration, need for open surgery, need for continued use of prostate medications and chance of finding prostate cancer in the specimen chips.  The patient was informed that they may require a ureteral stent and that stents can cause irritative voiding symptoms.  They also understand that ureteral stents are temporary and must be removed or exchanged in a timely fashion or they can calcify and make more stones and become difficult to remove. The patient understands they may will need a catheter post op and observation in the hospital overnight on continuous bladder irrigation.     Patient understands these risks and has agreed to proceed with surgery.      La Nena James MD

## 2024-03-04 NOTE — TELEPHONE ENCOUNTER
Spoke with patient unable to urinate since urodynamics procedure today. Patient scheduled for a pvr today

## 2024-03-05 NOTE — TELEPHONE ENCOUNTER
Patient scheduled for TURP with . patient needs cardiac clearance prior to procedure please advise

## 2024-03-06 ENCOUNTER — LAB VISIT (OUTPATIENT)
Dept: LAB | Facility: HOSPITAL | Age: 74
End: 2024-03-06
Attending: UROLOGY
Payer: COMMERCIAL

## 2024-03-06 ENCOUNTER — HOSPITAL ENCOUNTER (OUTPATIENT)
Dept: PREADMISSION TESTING | Facility: HOSPITAL | Age: 74
Discharge: HOME | End: 2024-03-06
Attending: UROLOGY
Payer: COMMERCIAL

## 2024-03-06 ENCOUNTER — CLINICAL SUPPORT (OUTPATIENT)
Dept: UROLOGY | Facility: CLINIC | Age: 74
End: 2024-03-06
Payer: COMMERCIAL

## 2024-03-06 ENCOUNTER — TELEPHONE (OUTPATIENT)
Dept: CARDIOLOGY | Facility: CLINIC | Age: 74
End: 2024-03-06
Payer: COMMERCIAL

## 2024-03-06 DIAGNOSIS — N32.81 OAB (OVERACTIVE BLADDER): ICD-10-CM

## 2024-03-06 DIAGNOSIS — N40.0 BENIGN PROSTATIC HYPERPLASIA, UNSPECIFIED WHETHER LOWER URINARY TRACT SYMPTOMS PRESENT: ICD-10-CM

## 2024-03-06 DIAGNOSIS — R33.9 URINARY RETENTION: Primary | ICD-10-CM

## 2024-03-06 LAB
ANION GAP SERPL CALC-SCNC: 8 MMOL/L (ref 8–16)
BASOPHILS # BLD AUTO: 0.04 K/UL (ref 0–0.2)
BASOPHILS NFR BLD: 0.5 % (ref 0–1.9)
BUN SERPL-MCNC: 27 MG/DL (ref 8–23)
CALCIUM SERPL-MCNC: 9 MG/DL (ref 8.7–10.5)
CHLORIDE SERPL-SCNC: 107 MMOL/L (ref 95–110)
CO2 SERPL-SCNC: 25 MMOL/L (ref 23–29)
CREAT SERPL-MCNC: 1.5 MG/DL (ref 0.5–1.4)
DIFFERENTIAL METHOD BLD: ABNORMAL
EOSINOPHIL # BLD AUTO: 0.1 K/UL (ref 0–0.5)
EOSINOPHIL NFR BLD: 1.3 % (ref 0–8)
ERYTHROCYTE [DISTWIDTH] IN BLOOD BY AUTOMATED COUNT: 13.2 % (ref 11.5–14.5)
EST. GFR  (NO RACE VARIABLE): 49 ML/MIN/1.73 M^2
GLUCOSE SERPL-MCNC: 202 MG/DL (ref 70–110)
HCT VFR BLD AUTO: 35.5 % (ref 40–54)
HGB BLD-MCNC: 11.6 G/DL (ref 14–18)
IMM GRANULOCYTES # BLD AUTO: 0.01 K/UL (ref 0–0.04)
IMM GRANULOCYTES NFR BLD AUTO: 0.1 % (ref 0–0.5)
LYMPHOCYTES # BLD AUTO: 1.5 K/UL (ref 1–4.8)
LYMPHOCYTES NFR BLD: 19 % (ref 18–48)
MCH RBC QN AUTO: 31.4 PG (ref 27–31)
MCHC RBC AUTO-ENTMCNC: 32.7 G/DL (ref 32–36)
MCV RBC AUTO: 96 FL (ref 82–98)
MONOCYTES # BLD AUTO: 0.6 K/UL (ref 0.3–1)
MONOCYTES NFR BLD: 7.7 % (ref 4–15)
NEUTROPHILS # BLD AUTO: 5.7 K/UL (ref 1.8–7.7)
NEUTROPHILS NFR BLD: 71.4 % (ref 38–73)
NRBC BLD-RTO: 0 /100 WBC
PLATELET # BLD AUTO: 202 K/UL (ref 150–450)
PMV BLD AUTO: 10.2 FL (ref 9.2–12.9)
POTASSIUM SERPL-SCNC: 4 MMOL/L (ref 3.5–5.1)
RBC # BLD AUTO: 3.7 M/UL (ref 4.6–6.2)
SODIUM SERPL-SCNC: 140 MMOL/L (ref 136–145)
WBC # BLD AUTO: 7.95 K/UL (ref 3.9–12.7)

## 2024-03-06 PROCEDURE — 36415 COLL VENOUS BLD VENIPUNCTURE: CPT | Performed by: UROLOGY

## 2024-03-06 PROCEDURE — 99499 UNLISTED E&M SERVICE: CPT | Mod: S$GLB,,, | Performed by: UROLOGY

## 2024-03-06 PROCEDURE — 85025 COMPLETE CBC W/AUTO DIFF WBC: CPT | Performed by: UROLOGY

## 2024-03-06 PROCEDURE — 80048 BASIC METABOLIC PNL TOTAL CA: CPT | Performed by: UROLOGY

## 2024-03-06 NOTE — PROGRESS NOTES
Patient here today for fill and pull per   ?  Bladder filled with 180ml of sterile water via catheter tip syringe until patient felt full.  10ml removed from catheter balloon.  16 jean catheter removed with no difficulty.   ?  Patient voided 250 of blood tinged urine with several blood clots in urine .   Patient educated to drink plenty of water.  Patient voiced understanding of instructions given  Left office in satisfactory condition.

## 2024-03-12 ENCOUNTER — ANESTHESIA EVENT (OUTPATIENT)
Dept: SURGERY | Facility: HOSPITAL | Age: 74
End: 2024-03-12
Payer: COMMERCIAL

## 2024-03-12 NOTE — H&P
[facility] Note - [place]  Staff: MD Erika  PCP: Reynaldo Rahman, APRN,FNP-C  Date of Service: 03/12/2024      Subjective:        HPI: Onesimo Paula is a 73 y.o. male     Seen by me 9/30/19  Patient had MRI for right hip pain and was found have a thick-walled bladder with enlarged prostate.  He states that he saw urologist over 40 years ago for burning with urination.  He had a renal bladder ultrasound in 2016 which showed enlarged prostate and bilateral renal cyst done for renal insufficiency whom he sees  for.  Denies any gross hematuria.  Review of for previous urine show no microscopic hematuria.  Twenty-five pack-year history of smoking but quit in 1997.      He also has AUA symptom score 6 and occasional weak stream and urgency but voids every 3-5 hours when he is working and a little more frequent when he is not.  Denies any urge incontinence.  Okay stream with occasional weak stream.  PVR today 09/03/2019 is 15 cc.  He may have tried Flomax a couple years ago but does not recall it helping her changing any was symptoms.  Overall really not that bothered by his urinary symptoms.      Also states he has ED.  Previously tried sildenafil unsure dose and it helped but wife does not want him to take, exam revealed peyronie's     Plan: started tadalafil 5mg due to thick walled bladder although essentially asx. No further f/u fr b renal cysts.      Interval history 11/18/19:   Pt states today Cialis has NOT improved any of his lower urinary tract symptoms since starting the medication.  He currently takes this medication before his bedtime.  Pt still c/o urinary urgency, especially during the day.  Nocturia is 1-2x nightly and not bothersome at this time.  He is a , therefore the daytime urgency really bothers him.  Plan: started ditropan 10mg XL and cont'd tadalafil     Interval history by idalia 1/6/20:   TODAY, pt states the oxybutynin only improved his  LUTS by over 10% at this time.  PVR by bladder scan performed in office today:  11 mL  Rec'd: d/c oxybutynin, start myrbetriq, cont tadalafil 5mg daily.     Interval history 7/27/20:   Tried myrbetriq but didn't help. Having urgency, hesistancy and intermittent slow stream but urgency most bothersome and having some UUI with a few drop, but not that bothersome. Drinks 1 c of coffee in am. Soda during day. 1 c of coffee int he evening. Has never tried stopping these to see if they help. Drinks beer and notices increase in frequency following day.   On tadalafil 5mg daily for ED and bph.  pvr by scan: 32cc  Voiding every 30 minutes a day recently more pronounced at home (has been on vacation).  Takes hctz in am. When he's working he only voids about 2x-4x a day. Drinks caffeine while working including energy drinks. No nocturia.   psa 1/31/20 2.2 (up from 1.2 year prior). Repeat psa 0.85 7/29/20  WINSOME today: 40g + no nodules.  Uroflow 7/30/20: peak flow 12.8mL/s,  avg flow 5.4mL/s, voided vol: 191cc, pvr by scan:0      Cysto trus 8/17/20: normal bladder, no stricture. Membranous and distal prostatic urethra with papillary tissue suspicious for prostatitis.trus volume 42.6g. bc of his urgency on tadalafil, flomax, vesicare found rx cipro for 14d to see if it helped w urgency. D/c'd vesicare. cont'd flomax and tadalafil. rtc to see idalia in 2-3 weeks and me in 2 months to discuss bph procedures     Interval history by idalia 9/9/20:   Pt states he is still having the urinary urgency even since finishing the Cipro antibiotics at this time.  He denies gross hematuria, dysuria.  UA today shows normal findings.  PVR by bladder scan today:  9 mL  Plan: rec'd restarting vesicare     Interval history 10/1/20:    Taking flomax 0.4mg nightly and says flow is intermittent on this.   Also on the vesicare and says it doesn't help with the urgency. Voiding 2-3x a day and none at night. 3-4x a week c/o urge incontinence if he tries  to hold it too long, mostly in the am when he wakes up. Denies any difficulty with walking. No weakness or numbness.   Changed to decaf coffee 1x a day. Changed to sprite and caffeine free coke.   He does not want to take any more meds. He is not happy with the retrograde ejaculation.   Sees dr. leger for Mitral Valve leakage, last saw him a week ago, was told he should f/u in March 2021. Tadalafil helping with erections     Interval history 11/19/20:   Underwent rezum on 10/21/20. continue flomax, vesicare and tadafil daily for 3 more months.   Returned for fill and pull on 10/26/20. Filled with 210, voided 210  For 2 weeks postop would have blood when he had a BM, improving.   Today (1 month later) he states he has some increased urgency. UUI 2-3x a day (increased from 3-4x a week).  Still taking vesicare  freq q1 hour (worse in cold weather). Without cold weather freq q 3 hour.   ua today with small wbc and mod blood - some dysuria  No significant change in stream yet  pvr by scan today: 134  AUA ssx:(2 incomplete emptying, 3 frequency, 3 intermittency, 4 urgency, 2 weak stream, 2 straining, 0 sleeping). 16. QOL: 4    Interval history by me in clinic on 3/9/21:  Returns today and states states he ran out of flomax and vesicare a month ago. No increased frequency, urge incontinence or slower stream off the flomax  Still having UUI 1-2x a day with a few drops when he hears water.   Voids 8x/24 hours and 4x/8 hours while at work. Nocturia occ (same). No longer drinking caffeine.   Still on tadalafil 5mg daily.  No longer having retrograde ejaculation and constipation improved. Hctz in am.  Frequency mostly in  the pm. Bothers him but able to stop at bathrooms. Sometimes loses erection with tadalafil 5mg daily. Rare. Not more frequent. Still getting morning erections.   AUA ssx today:(1 incomplete emptying, 2 frequency, 1 intermittency, 3 urgency, 3 weak stream, 0 straining, 0 sleeping). 10. QOL: mixed  Bladder  scan PVR today was 35mL.    I reviewed his previous PSAs and his most recent psa within the last year was 2.3, %free 20.87, last year was 2.2      Interval history with idalia on 4/9/21:  UA today showed normal findings.  Pt states today even though he tried and changed his HCTZ med to evenings as discussed last ov, his LUTS have not improved.  Pt as of today's visit is still c/o urinary urgency and frequency during the daytime.  Has not improved since last ov.  Pt states today he has tried Vesicare and Oxybutynin in the past with no improvement in symptoms.  Pt currently denies gross hematuria and dysuria.  She put him on myrbetriq again       Interval history by me 6/15/21:  Uroflow results (date: 03/18/2021): Voiding time: 24.3s, Flow time: 23.1s, TTP flow: 8.0s, Peak flowrate: 19.2 mL/s, Average flowrate: 10.6mL/s, Intervals: 2, Voided volume: 244 mL, Pvr by bladder scan 33. Pattern of curve: see uploaded image, reviewed by   At last visit was c/o freq/urgency. Frequency 8-10x/day. Tried myrbetriq before rezum, ditropan, vesicare and changing hctz to pm but no improvement. Then in April started myrbetriq again , after about 4 weeks saw improvement, now down to 5-6x a day. avg urg: 3. No nocturia. No incontinence.  Also on tadalafil 5mg daily.   AUA ssx today:(1 incomplete emptying, 1 frequency, 1 intermittency, 2 urgency, 3 weak stream, 0 straining, 0 sleeping). 8. QOL:   Bladder scan PVR today was 0mL.      HPI/CC today 6/7/22:   Had him Continue myrbetriq 50mg daily, has enough until 4/2021, Continue tadalafil 5mg daily, refilled today for a year.  Says myrbetriq not helping anymore. He held it for a few days and noticed no difference. On cialis 5mg daily.   Does not have constipation  Voids about 9-10x during waking hours. occ urge incontinence 3x a week. Few drops. Started drinking caffeine this week.   Nocturia 1-2x a night. Urine flow varies.    psa 5/31/22 1.3  ua today neg, pvr by scan: 5  AUA  ssx:(2 incomplete emptying, 3 frequency, 2 intermittency, 4 urgency, 3 weak stream, 1 straining, 1 sleeping). 16. QOL: mixed  Appendix removed in January complicated by post-op ileus.    Interval history 8/23/22:  Continue myrbetriq 50mg daily, has enough until 4/2021  Continue tadalafil 5mg daily, refilled today for a year  After adding vesicare to myrbetriq - went from voiding 9-10x day, 1-2x a night with occ UUI 3x a week to 5-6x/day, 0 night and no daily UUI with drops. Says he has dry mouth but about the same. However does have some heart burn that is new.   UA today: neg. pvr by scan: 115 (this is the highest it's been, feels empty).  AUA ssx:(1 incomplete emptying, 2 frequency, 0 intermittency, 2 urgency, 2 weak stream, 1 straining, 0 sleeping). 8. QOL: pleased    Interval history 2/28/23:  Says he is doing well on VESIcare and myrbetriq.  However it is expensive.  About 120 dollars every 3 months.  Also having dry mouth with it.  On tadalafil 5 mg daily for erections, obtaining from [employer].  This dose wish for him sometimes.  He tried Viagra 100 mg and it seemed to work.  Aua ssx: 12, pvr: 25    Interval history 4/21/23:  I had him discontinue both myrbetriq and VESIcare at the last visit to see if his incontinence would get worse.  Followed up with Teena on 03/29/2023.  His PVR was 0.  His AUA symptom score was 14, 5.  He was still taking tadalafil 5 mg daily.  He said with stopping VESIcare and myrbetriq he had 2 episodes of incontinence that were large volume, increased postvoid dribbling and he feels like his urinary frequency increased.  Was scheduled with me to discuss InterStim because he wanted to hold off on restarting any medications due to cost and side effects  He returns today with a voiding diary.  He kept a voiding diary for me days and it looks like he urinates on average about 9-10 times a day.  That is up from 6 times a day when he was taking medications.  He did not document on his  voiding diary but he leaks urine 2 to 3 times a day on the way to the bathroom.  Not wearing any pads.  Does not have to change his underwear.  If he could rate his urgency he would say on average his urgency is 4/5.  He is interested in proceeding with InterStim.  He ended up using tadalafil 10 mg once and said this dose worked good for him for his erectile dysfunction  Voided urine today is negative.  Confirmed again that currently he urinates 7-10 times a day, average urgency is 4 and 1-2 leaks a day.        Interval history 5/25/23:  I did a peripheral nerve evaluation on him on 5/21/23, 4 days ago.  Since then he has gone from urinating 6-10 times a day down to about 6 times a day.  Never had issues at night.  He went from leaking 2 to 3 times a day incontinence down to 1x over the last 3 days. Avg urgency went from 4 to 2. He says >50% improvement and wants to proceed. Not on any meds.   He previously saw  for stress tests bc of SOB. His last w/u was 11/2022 and had a neg stress test. On asa 81mg. No stents. They could never figure out why he has SOB.  He sees pulmonology regularly for nodules in the lungs.  Wires removed today    Interval history 7/11/23:  Here today for interstim stage 1 and 2 7/11/23. Preop appt revealed a1c 6.2, glucose 124. Ua with tr protein/tr bld on 6/28. Started on metformin.   Urinating every 1-2 hours. Uui with drops 2x a day. No pads or underwear changes.  Held asa 81mg for 7d.     Interval history 8/8/23 telephone visit :  Pt states that initially said had issues with urgency and frequency after his procedure but since talking to clarita and making adjustments he has gone from urinating every 1-2 hours to 3-4x a day. Avg urgency remains around 4 and no UUI.   He is happy with his symptoms.     Interval history by ME in CLINIC on 11/13/23 for postop interstim fu and oab fu:  He says that his frequency has returned despite changing his settings. Says changing the setting  works for a bit. Then no longer works. Nos on 3.2.   He says after turning off his interstim for us in August for us abd for his liver he had an increase in his frequency and had to increase the amplitude   He changed the settings on 10/31 and he went from 8x a day down to 5x a day. He stopped hctz a month ago by his cardiologist but then he restarted the hctz in the morning last week and his frequency increased to 9x a day.  Currently on program 2, 3.2.    He says urinating about 8x a day and 1x a night. Leaking with UUI and drops 3-4x a day and 2 accidents last week. No pads.   Urgency 5/5.  1 cup of caffeine in the morning.   Voiding diary: 10/30/23- 8x, changed on 10/31. On 10/31 -5x, 11/1- 5x, 11/3- 6x, 11/11-9x. 11/12-7x. This morning 4x/ 6 hours.   AUA ssx:(2 incomplete emptying, 3 frequency, 2 intermittency, 3 urgency, 3 weak stream, 0 straining, 1 sleeping). 14. QOL: mostly satisfied  Pvr today: 40. Ua today: neg    Interval history by ME in CLINIC on 1/22/24 for oab:  Plan was to do uds bc of his oab but when joe tried to place ureteral catheter it did not go easily. Then she tried 16fr coude and stopped mid way and saw blood. Decided to hold off on and plan for cysto. He adjusted his interstim and he said no change. Feels the flutter in his right perineum. . He says flow is fast sometimes and slow sometimes.  Frequency:Still urinates bw 6 to 12x a day. Has more bad days than good. Nocturia 1x a night.  Incontinence:  Has drops/leaks twice a day.  No pads  Drinks 1 soda in am and decaf cup in evening. No tea.     Interval history/H&P Update by me/ prior to cysto trus on 2/19/24:  Uroflow results (date: 01/30/2024): Voiding time: 39.4s, Flow time: 39.3s, TTP flow: 10.6s, Peak flowrate: 16.7 mL/s, Average flowrate: 8.8mL/s, Intervals: 1, Voided volume: 346 mL, Pvr by bladder scan: 45mL . Pattern of curve: see 's addendum   Schedule cystoscopy and TRUS to rule out stricture as  cause of oab      Cysto/TRUS Findings 2/19/24:   Cystoscopic findings: rezum defect seen in median lobe closer to veru. Minimal b lobe hypertrophy. + high riding bladder neck with small intravesical protrusion.    TRUS volume: 43.38. WINSOME: just to right of midline small ridge <0.5cm felt near base. 40g prostate.           Assessment: Onesimo Paula is a 73 y.o. male with h/o oab and bph sp rezum 10/21/20, interstim 5/22/23. Small ridge on rectal exam today. Difficult catheterization during attempted uds recently. No urethral stricture seen but does have high riding bladder neck.      Plan:  Continue flomax 0.8mg nightly for now to help with high riding bladder neck.  Since no urethral stricture seen fu for urodynamics to eval if he has detrussor overactivity vs bph obstruction. Botox vs tuip (transurethral incision or prostate for high riding bladder neck) vs both at the same time and seeing how he responds.     Interval history by me/ prior to urodynamics on 3/4/24:  Primary complaint is frequency and urgency with UUI and slow flow.  Underwent uds today which showed he had delayed sensation with slow flow. Bladder mostly emptied after procedure. Catheter placement was difficult due to high riding bladder neck but able to perform procedure. Recommended proceeding with tuip/turp.  Ua was neg.   However he returned in the afternoon bc unable to urinate.   Eryn placed coude jean and 475 dark urine drained.   She irrigated him and removed some clots    Interval history/H&P Update by me/ prior to tuip/turp on 3/12/24:  He came back for voiding trial and passed. He ended up staying off flomax.   Takes amlodipine and flomax 7-8-9 in the evening could be reason  Tadalafil in the mornign  Hctz in the morning  Recent cr 1.5 -sees , saw last year, fu in may   Psa screen today 3.8- but had catheter and uds 3 weeks ago.   Ua today with tr protein    Current Urinary  symptoms:  Frequency:Still urinates bw 8-9x a day. Has more bad days than good. Nocturia 1x a night.  Incontinence:  Has drops/leaks 2-3 a day.  No pads.  Avg urgency: 4  Interstim on currently       Bladder/prostate history:  Cysto trus 8/17/20: 42.6g.   Rezum 10/21/20  Last peak flow was 19.2 in 3/18/21.  Last catheter was at time of cholecystectomy in jan 2022.   PNE 5/21/23  Interstim 7/11/23  Cysto trus 2/19/24 45g. High riding bladder neck.   UDS 3/4/24    PVR History:  3/13/24 Turp/tuip  3/8/24  Filled with 140. Voided 250  3/4/24  Uds: peak flow: 8.5, avg flow: 4.7, voided vol: 352, pvr by scan: 27 but then came back and pvr by io: 475  1/30/24            Peak flow: 16.7, voided vol: 346, pvr: 45  11/13/23 40  2/28/23 25, aua ssx: 12  8/23/22 115  6/15/21 pvr by scan: 0  3/18/21 UF: pF: 19.2, avg: 10.6, voided vol: 244, pvr: 33  3/2/21               pvr: 35  11/19/20          pvr by scan: 134 (couldn't start after he stopped)  10/26/20          Fill and pull: 0cc pvr  11/19/20          rezum  9/9/20              pvr by scan: 9cc  7/27/20            pvr by scan: 32cc  8/17/20            Cysto trus: 42.6g.   7/30/20           UF: peak flow 12.8mL/s,  avg flow 5.4mL/s, voided vol: 191cc, pvr by scan:0   1/6/20              pvr by scan: 11cc   9/3/19              pvr by scan: 15cc     PSA history: no family hx of prostate cancer  3/13/24 3.8  (jean just removed 3/8)  2/19/24 TRUS volume: 43.38. WINSOME: just to right of midline small ridge <0.5cm felt near base. 40g prostate.   4/5/23  1.5  1/22/24 WINSOME: 40g  5/31/22 1.3  3/2/21              2.3, %free 20.87, pvr: 35 (psa screen 4.6)  11/19/20          rezum  7/29/20            0.85, %free 54.12  1/31/20            2.2   2/21/19            1.2  3/14/18            1.0    Urine history: 1 ppd x 26 years. Quit 1997. Anticoag: asa 81mg  3/13/24 Tr prot  3/4/24  neg  2/19/24            30 protein  1/30/54            neg  1/22/24 Not able to provide- do at the  lab  11/13/23 1 rbc  6/28/23 Tr bld  5/22/23 neg  4/21/23 Tr prot  6/7/22  neg  11/19/20          Small wbc/mod blood-send for ua patricio and culture pvr by scan: 134  10/14/20          Neg  9/16/20            neg  9/9/20              neg  3/31/20            Neg  1/6/20              Neg  11/18/19          neg  8/8/19              No cx, void: n/a 0 rbc  2/21/19            Ng, void: neg, 0 rbc  4/9/18              No cx, void: neg  9/2017             No blood       Current REVIEW OF SYSTEMS:  neg    Objective:     Vitals:    03/13/24 0915   BP: (!) 169/82   Pulse: 64   Resp: (!) 24   Temp: 97.7 °F (36.5 °C)         General:wdwn  in NAD  Neurologic: CN grossly normal. Normal sensation.   Psychiatric: awake, alert and oriented x 3. Mood and affect Normal. Cooperative.  Eyes: PERRLA, normal conjunctiva  Respiratory: no increased work on breathing. No wheezing.   Cardiovascular: No obvious extremity edema. Warm and well perfused.  GI: no obvious stomach distension  Musculoskeletal: normal  range of motion of bilateral upper extremities. Normal muscle strength and tone.  Skin: no obvious rashes or lesions. No tightening of skin noted.    Pertinent  exam in HPI      Focused  exam  Neg    Recent Labs   Lab 10/04/23  1224 11/13/23  0714 03/06/24  0853   WBC 4.88 7.32 7.95   Hemoglobin 12.4 L 13.2 L 11.6 L   Hematocrit 37.2 L 40.6 35.5 L   Platelets 222 250 202   ]  Recent Labs   Lab 01/13/22  0644 04/05/22  0544 10/03/22  0701 10/24/22  1518 11/30/22  0626 04/05/23  0707 06/28/23  0734 10/04/23  1224 11/13/23  0714 01/02/24  0754 03/06/24  0853   Sodium 138 141 138   < > 137 140   < > 140 142  --  140   Potassium 3.9 3.7 3.7   < > 4.0 3.5   < > 4.1 4.2  --  4.0   Chloride 106 104 102   < > 102 105   < > 107 107  --  107   CO2 24 25 27   < > 27 27   < > 25 29  --  25   BUN 20 33 H 29 H   < > 37 H 34 H   < > 31 H 36 H 27 H 27 H   Creatinine 1.1 1.0 1.1   < > 1.3 1.2   < > 1.2 1.3 1.2 1.5 H   Glucose 101 106 113 H   < > 105 109    < > 114 H 102  --  202 H   Calcium 8.2 L 9.2 9.0   < > 9.3 9.2   < > 9.3 9.8  --  9.0   Magnesium 2.1  --   --   --  2.4  --   --  2.0  --   --   --    Phosphorus 2.8 3.6 3.3  --   --   --   --   --  3.3  --   --    Alkaline Phosphatase 52 L  --   --    < > 59 54 L  --  52 L  --   --   --    Total Protein 6.2  --   --    < > 7.7 7.3  --  7.3  --   --   --    Albumin 2.6 L 4.0 4.1   < > 4.3 4.2  --  4.4 4.4  --   --    Total Bilirubin 0.8  --   --    < > 0.7 0.6  --  0.5  --   --   --    AST 19  --   --    < > 25 30  --  24  --   --   --    ALT 24  --   --    < > 26 37  --  21  --   --   --     < > = values in this interval not displayed.   ]    Lab Results   Component Value Date    HGBA1C 6.2 04/05/2023         Assessment:     Onesimo Paula is a 73 y.o. male with     1. Benign prostatic hypertrophy without urinary obstruction    2. OAB (overactive bladder)        Uds showed that he had a rise in detrussor pressure to 73 and slow flow-avg flow of 4.6. we also had a difficult time placing catheters bc of his high riding bladder neck and defect from his rezum. No obvious detrussor overactivity.     Still obstructed after rezum. Could be causing oab.     Understands after tuip may have stricture requiring CIC to keep open or persistent oab that may need botox if it persists.     Plan:     Psa screen pre procedure, ridge felt on 2/12.  Last psa 4/2023 low.   Just had cahteters and uds about a week ago    Ua reflex     Plan post procedure:  Change amlodipine to morning. Ok to take with hctz in am. Ok to take cialis in am.  Keep the flomax at night before bedtime. 1 a night for a week. Then 2 a night 3 weeks. Then discontinue. Flomax making his n ose stuffy.   Screening psa (WINSOME 2/19 with right ridge, last psa 4/2023)  Turn interstim on tojnight  Oab med for a month starting in a week    The patient is scheduled for primarily transurethral incision of his prostate and possible  transurethral resection of any remaining  tissue next Wednesday march 13th.  Sees  for htn and dizziness. Just seen in January with echo. On no asa.  Has been on abx    Consent was obtained.  The risks include but are not limited to urethral stricture, bladder neck contracture, incomplete emptying or retention requiring a catheter, urethral meatus stricture, burning with urination, overactive bladder, bleeding, infection, pain, need for further procedures, injury to the kidney, ureter, bladder, bladder perforation, prostate capsular penetration, need for open surgery, need for continued use of prostate medications and chance of finding prostate cancer in the specimen chips.  The patient was informed that they may require a ureteral stent and that stents can cause irritative voiding symptoms.  They also understand that ureteral stents are temporary and must be removed or exchanged in a timely fashion or they can calcify and make more stones and become difficult to remove. The patient understands they may will need a catheter post op and observation in the hospital overnight on continuous bladder irrigation.     Patient understands these risks and has agreed to proceed with surgery.      La Nena James MD

## 2024-03-13 ENCOUNTER — TELEPHONE (OUTPATIENT)
Dept: UROLOGY | Facility: CLINIC | Age: 74
End: 2024-03-13
Payer: COMMERCIAL

## 2024-03-13 ENCOUNTER — ANESTHESIA (OUTPATIENT)
Dept: SURGERY | Facility: HOSPITAL | Age: 74
End: 2024-03-13
Payer: COMMERCIAL

## 2024-03-13 ENCOUNTER — HOSPITAL ENCOUNTER (OUTPATIENT)
Facility: HOSPITAL | Age: 74
Discharge: HOME | End: 2024-03-13
Attending: UROLOGY | Admitting: UROLOGY
Payer: COMMERCIAL

## 2024-03-13 VITALS
OXYGEN SATURATION: 99 % | RESPIRATION RATE: 20 BRPM | HEART RATE: 61 BPM | HEIGHT: 70 IN | WEIGHT: 165 LBS | BODY MASS INDEX: 23.62 KG/M2 | TEMPERATURE: 98 F | DIASTOLIC BLOOD PRESSURE: 67 MMHG | SYSTOLIC BLOOD PRESSURE: 136 MMHG

## 2024-03-13 DIAGNOSIS — I10 ESSENTIAL HYPERTENSION: ICD-10-CM

## 2024-03-13 DIAGNOSIS — N40.0 BENIGN PROSTATIC HYPERPLASIA WITHOUT URINARY OBSTRUCTION: Primary | ICD-10-CM

## 2024-03-13 DIAGNOSIS — N32.81 OAB (OVERACTIVE BLADDER): ICD-10-CM

## 2024-03-13 LAB
BILIRUB UR QL STRIP: NEGATIVE
CLARITY UR: CLEAR
COLOR UR: YELLOW
COMPLEXED PSA SERPL-MCNC: 3.8 NG/ML (ref 0–4)
GLUCOSE UR QL STRIP: NEGATIVE
HGB UR QL STRIP: NEGATIVE
KETONES UR QL STRIP: NEGATIVE
LEUKOCYTE ESTERASE UR QL STRIP: NEGATIVE
NITRITE UR QL STRIP: NEGATIVE
PH UR STRIP: 8 [PH] (ref 5–8)
PROT UR QL STRIP: ABNORMAL
SP GR UR STRIP: 1.01 (ref 1–1.03)
URN SPEC COLLECT METH UR: ABNORMAL
UROBILINOGEN UR STRIP-ACNC: NEGATIVE EU/DL

## 2024-03-13 PROCEDURE — 99900031 HC PATIENT EDUCATION (STAT)

## 2024-03-13 PROCEDURE — 25000003 PHARM REV CODE 250: Performed by: UROLOGY

## 2024-03-13 PROCEDURE — 25000003 PHARM REV CODE 250: Performed by: NURSE ANESTHETIST, CERTIFIED REGISTERED

## 2024-03-13 PROCEDURE — 63600175 PHARM REV CODE 636 W HCPCS: Performed by: NURSE ANESTHETIST, CERTIFIED REGISTERED

## 2024-03-13 PROCEDURE — 25000003 PHARM REV CODE 250: Performed by: ANESTHESIOLOGY

## 2024-03-13 PROCEDURE — 81003 URINALYSIS AUTO W/O SCOPE: CPT | Performed by: UROLOGY

## 2024-03-13 PROCEDURE — 27201423 OPTIME MED/SURG SUP & DEVICES STERILE SUPPLY: Performed by: UROLOGY

## 2024-03-13 PROCEDURE — 71000039 HC RECOVERY, EACH ADD'L HOUR: Performed by: UROLOGY

## 2024-03-13 PROCEDURE — 37000009 HC ANESTHESIA EA ADD 15 MINS: Performed by: UROLOGY

## 2024-03-13 PROCEDURE — 52601 PROSTATECTOMY (TURP): CPT | Mod: ,,, | Performed by: UROLOGY

## 2024-03-13 PROCEDURE — 36000707: Performed by: UROLOGY

## 2024-03-13 PROCEDURE — 99900035 HC TECH TIME PER 15 MIN (STAT)

## 2024-03-13 PROCEDURE — 71000015 HC POSTOP RECOV 1ST HR: Performed by: UROLOGY

## 2024-03-13 PROCEDURE — 36000706: Performed by: UROLOGY

## 2024-03-13 PROCEDURE — 71000033 HC RECOVERY, INTIAL HOUR: Performed by: UROLOGY

## 2024-03-13 PROCEDURE — 37000008 HC ANESTHESIA 1ST 15 MINUTES: Performed by: UROLOGY

## 2024-03-13 PROCEDURE — 63600175 PHARM REV CODE 636 W HCPCS: Performed by: UROLOGY

## 2024-03-13 PROCEDURE — 84153 ASSAY OF PSA TOTAL: CPT | Performed by: UROLOGY

## 2024-03-13 PROCEDURE — 36415 COLL VENOUS BLD VENIPUNCTURE: CPT | Performed by: UROLOGY

## 2024-03-13 RX ORDER — HYDROCHLOROTHIAZIDE 25 MG/1
25 TABLET ORAL DAILY
Start: 2024-03-13

## 2024-03-13 RX ORDER — TADALAFIL 5 MG/1
5 TABLET ORAL DAILY
Qty: 90 TABLET | Refills: 3
Start: 2024-03-13 | End: 2024-06-02

## 2024-03-13 RX ORDER — AMLODIPINE BESYLATE 10 MG/1
10 TABLET ORAL DAILY
Qty: 90 TABLET | Refills: 1
Start: 2024-03-13

## 2024-03-13 RX ORDER — ONDANSETRON HYDROCHLORIDE 2 MG/ML
4 INJECTION, SOLUTION INTRAVENOUS ONCE AS NEEDED
Status: ACTIVE | OUTPATIENT
Start: 2024-03-13 | End: 2035-08-10

## 2024-03-13 RX ORDER — OXYCODONE HYDROCHLORIDE 5 MG/1
5 TABLET ORAL ONCE AS NEEDED
Status: ACTIVE | OUTPATIENT
Start: 2024-03-13 | End: 2035-08-10

## 2024-03-13 RX ORDER — PROPOFOL 10 MG/ML
VIAL (ML) INTRAVENOUS
Status: DISCONTINUED | OUTPATIENT
Start: 2024-03-13 | End: 2024-03-13

## 2024-03-13 RX ORDER — DEXAMETHASONE SODIUM PHOSPHATE 4 MG/ML
INJECTION, SOLUTION INTRA-ARTICULAR; INTRALESIONAL; INTRAMUSCULAR; INTRAVENOUS; SOFT TISSUE
Status: DISCONTINUED | OUTPATIENT
Start: 2024-03-13 | End: 2024-03-13

## 2024-03-13 RX ORDER — PHENYLEPHRINE HYDROCHLORIDE 10 MG/ML
INJECTION INTRAVENOUS
Status: DISCONTINUED | OUTPATIENT
Start: 2024-03-13 | End: 2024-03-13

## 2024-03-13 RX ORDER — FENTANYL CITRATE 50 UG/ML
INJECTION, SOLUTION INTRAMUSCULAR; INTRAVENOUS
Status: DISCONTINUED | OUTPATIENT
Start: 2024-03-13 | End: 2024-03-13

## 2024-03-13 RX ORDER — EPHEDRINE SULFATE 50 MG/ML
INJECTION, SOLUTION INTRAVENOUS
Status: DISCONTINUED | OUTPATIENT
Start: 2024-03-13 | End: 2024-03-13

## 2024-03-13 RX ORDER — ONDANSETRON HYDROCHLORIDE 2 MG/ML
INJECTION, SOLUTION INTRAVENOUS
Status: DISCONTINUED | OUTPATIENT
Start: 2024-03-13 | End: 2024-03-13

## 2024-03-13 RX ORDER — VASOPRESSIN 20 [USP'U]/ML
INJECTION, SOLUTION INTRAMUSCULAR; SUBCUTANEOUS
Status: DISCONTINUED | OUTPATIENT
Start: 2024-03-13 | End: 2024-03-13

## 2024-03-13 RX ORDER — LIDOCAINE HYDROCHLORIDE 20 MG/ML
INJECTION, SOLUTION EPIDURAL; INFILTRATION; INTRACAUDAL; PERINEURAL
Status: DISCONTINUED | OUTPATIENT
Start: 2024-03-13 | End: 2024-03-13

## 2024-03-13 RX ORDER — TRAMADOL HYDROCHLORIDE 50 MG/1
50 TABLET ORAL EVERY 6 HOURS PRN
Qty: 10 TABLET | Refills: 0 | Status: SHIPPED | OUTPATIENT
Start: 2024-03-13 | End: 2024-04-11

## 2024-03-13 RX ORDER — LIDOCAINE HYDROCHLORIDE 10 MG/ML
1 INJECTION, SOLUTION EPIDURAL; INFILTRATION; INTRACAUDAL; PERINEURAL ONCE
Status: ACTIVE | OUTPATIENT
Start: 2024-03-13

## 2024-03-13 RX ORDER — FENTANYL CITRATE 50 UG/ML
25 INJECTION, SOLUTION INTRAMUSCULAR; INTRAVENOUS EVERY 5 MIN PRN
Status: ACTIVE | OUTPATIENT
Start: 2024-03-13

## 2024-03-13 RX ORDER — OXYBUTYNIN CHLORIDE 10 MG/1
10 TABLET, EXTENDED RELEASE ORAL DAILY PRN
Qty: 30 TABLET | Refills: 0 | Status: SHIPPED | OUTPATIENT
Start: 2024-03-13 | End: 2024-04-11

## 2024-03-13 RX ORDER — ACETAMINOPHEN 10 MG/ML
INJECTION, SOLUTION INTRAVENOUS
Status: DISCONTINUED | OUTPATIENT
Start: 2024-03-13 | End: 2024-03-13

## 2024-03-13 RX ADMIN — EPHEDRINE SULFATE 10 MG: 50 INJECTION, SOLUTION INTRAMUSCULAR; INTRAVENOUS; SUBCUTANEOUS at 01:03

## 2024-03-13 RX ADMIN — SODIUM CHLORIDE, SODIUM GLUCONATE, SODIUM ACETATE, POTASSIUM CHLORIDE AND MAGNESIUM CHLORIDE: 526; 502; 368; 37; 30 INJECTION, SOLUTION INTRAVENOUS at 01:03

## 2024-03-13 RX ADMIN — PHENYLEPHRINE HYDROCHLORIDE 200 MCG: 10 INJECTION INTRAVENOUS at 01:03

## 2024-03-13 RX ADMIN — EPHEDRINE SULFATE 5 MG: 50 INJECTION, SOLUTION INTRAMUSCULAR; INTRAVENOUS; SUBCUTANEOUS at 12:03

## 2024-03-13 RX ADMIN — Medication 150 MG: at 12:03

## 2024-03-13 RX ADMIN — LIDOCAINE HYDROCHLORIDE 100 MG: 20 INJECTION, SOLUTION EPIDURAL; INFILTRATION; INTRACAUDAL at 12:03

## 2024-03-13 RX ADMIN — PHENYLEPHRINE HYDROCHLORIDE 100 MCG: 10 INJECTION INTRAVENOUS at 01:03

## 2024-03-13 RX ADMIN — VASOPRESSIN 0.5 UNITS: 20 INJECTION, SOLUTION INTRAMUSCULAR; SUBCUTANEOUS at 01:03

## 2024-03-13 RX ADMIN — FENTANYL CITRATE 50 MCG: 50 INJECTION, SOLUTION INTRAMUSCULAR; INTRAVENOUS at 12:03

## 2024-03-13 RX ADMIN — FENTANYL CITRATE 25 MCG: 50 INJECTION, SOLUTION INTRAMUSCULAR; INTRAVENOUS at 01:03

## 2024-03-13 RX ADMIN — GLYCOPYRROLATE 0.2 MG: 0.2 INJECTION, SOLUTION INTRAMUSCULAR; INTRAVITREAL at 12:03

## 2024-03-13 RX ADMIN — ACETAMINOPHEN 1000 MG: 10 INJECTION, SOLUTION INTRAVENOUS at 12:03

## 2024-03-13 RX ADMIN — EPHEDRINE SULFATE 15 MG: 50 INJECTION, SOLUTION INTRAMUSCULAR; INTRAVENOUS; SUBCUTANEOUS at 01:03

## 2024-03-13 RX ADMIN — ONDANSETRON 4 MG: 2 INJECTION INTRAMUSCULAR; INTRAVENOUS at 12:03

## 2024-03-13 RX ADMIN — CEFTRIAXONE SODIUM 1 G: 1 INJECTION, POWDER, FOR SOLUTION INTRAMUSCULAR; INTRAVENOUS at 12:03

## 2024-03-13 RX ADMIN — SODIUM CHLORIDE, SODIUM GLUCONATE, SODIUM ACETATE, POTASSIUM CHLORIDE AND MAGNESIUM CHLORIDE: 526; 502; 368; 37; 30 INJECTION, SOLUTION INTRAVENOUS at 09:03

## 2024-03-13 RX ADMIN — Medication 50 MG: at 12:03

## 2024-03-13 RX ADMIN — DEXAMETHASONE SODIUM PHOSPHATE 4 MG: 4 INJECTION, SOLUTION INTRA-ARTICULAR; INTRALESIONAL; INTRAMUSCULAR; INTRAVENOUS; SOFT TISSUE at 12:03

## 2024-03-13 NOTE — OP NOTE
[facility]  Operative Note    Date: 03/13/2024    Pre-Op Diagnosis: BPH with bladder outlet obstruction    Post-Op Diagnosis: same    Procedure(s) Performed:   TURP, bipolar     Specimen(s): prostate chips    Staff Surgeon: La Nena James MD    Anesthesia: General endotracheal anesthesia    Indications: Onesimo Paula is a 73 y.o. male with bph, high riding bladder neck and oab    Findings:   Ureteral orifices close to resection. Part of trigone resected.   WINSOME revealed NO NODULES or firm areas today (psa prior to procedure 3.8 but had catheter and difficult catheter placement during uds less than a week ago)  Median lobe removed was likely the biggest obstructing component.   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     Estimated Blood Loss: 100cc    Drains: 22 Fr coude hematuria jean catheter    Procedure in detail: After the risks and benefits of the procedure were explained, consent was obtained, and the patient was taken to the operating suite and placed in the supine position.  General anesthesia was administered.  When adequately sedated, the patient was placed in dorsal lithotomy position and prepped and draped in normal sterile fashion.  Timeout was performed and preoperative ABx were confirmed.       Meatal dilation performed using blue meatal dilator bc 26french was a little tight.      A 26-Mosotho sheath resectoscope was placed into the bladder using a visual obturator. The visual obturator was exchanged for the resecting mechanism. The ureteral orifices were identified. Incision was made using the coag knife from bladder neck to veru at the 5 and 7 o clock locations down to the capsule making sure to keep well away from the UOs.    This then exposed the median lobe which  was first resected with care to avoid the ureteral orifices. Once the median lobe was resected, I then turned our attention to the left lateral lobe of the prostate.The left lateral lobe was then  resected in a stepwise fashion and sent for specimen with care to leave the verumontanum and sphincter intact. Once this was performed I then directed our attention to the right lobe of the prostate and atnerior prostate and again the lateral lobe was resected making care to leave the veru and sphincter intact. Hemostasis was then achieved.     The ureteral orifices, verumontanum and sphincter were intact. The Kreix evacuator was used to remove all prostate chips and again hemostasis was obtained.      Once the bladder was drained, I could see that he had an open channel and the scope was removed.  A 22 Fr 3-way coude hematuria catheter was placed in the bladder with 50 mL of water in the balloon. I then irrigated with normal saline to clear. This irrigated easily and quickly. The patient was placed on traction and on CBI. The patient was transferred to recovery in stable condition.    .   Disposition:   Short-term plan:  Placed in outpatient extended recovery on Medicine Service  Titrate continuous bladder irrigation to light pink  At 6:00 a.m.,  as long as urine is light pink, stop the continuous bladder irrigation and plug the import and then remove 20 cc from the balloon leaving 30 cc in the balloon  Then have patient ambulate  Then secure chat Dr. James with urine color/characteristic  As long as urine remains light pink patient is okay to be discharged home    Discharge with the following medications:  Change amlodipine to morning.   Ok to take with HYDROCHLOROTHIAZIDE in am.   Ok to take cialis in am.  Keep the flomax/TAMSULOSIN at night before bedtime.   TAKE 1 a night for a week starting Sunday night (night before jean removal.   Then increase to 2 a night 3 weeks.   Then discontinue altogether  Repeat Screening psa in 4 months (WINSOME 2/19 with right ridge, psa 3.8 today but just had uds and catheters which can irritate the prostate and make psa high, also  WINSOME today: NEGATIVE- NO NODULES)  Turn  interstim on tonight  Overactive bladder medication for a month starting today- oxybutynin 10mg once daily in the morning  Keep track of BP in am so he can follow up with pcp for hypotension (he says not related to his flomax)  Tramadol 50mg as needed every 8hours for pain not controlled with Tylenol  Discontinue antibiotics. Keep at house.     Long-term plan:  Nurse visit- Return on Monday to have catheter removed in the clinic in the morning.  The nurse will fill the bladder remove the catheter and checked to ensure that he is emptying. He will need to drink all day and return that afternoon to make sure he is emptying with another bladder scan.  Visit with  -Inés in 3-4 weeks for short postop visit (double book a 1pm slot), AUA symptom score survey, bladder scan check, urethra check, in and out catheter to ensure no stricture. Review pathology from prostate chips.  Visit with  - Inés in 4 months WITH SCREENING PSA PRIOR  for short postop visit, AUA symptom score survey, bladder scan check, urethra check with in and out catheter to ensure no stricture/scar tissue.     La Nena James MD

## 2024-03-13 NOTE — TELEPHONE ENCOUNTER
----- Message from La Nena James MD sent at 3/13/2024  2:44 PM CDT -----  Long-term plan: hey some of this  is mckinley romano  ? Nurse visit- Return on Monday to have catheter removed in the clinic in the morning.  The nurse will fill the bladder remove the catheter and checked to ensure that he is emptying. He will need to drink all day and return that afternoon to make sure he is emptying with another bladder scan.  ? Visit with  -Return in 3-4 weeks for short postop visit (double book a 1pm slot), AUA symptom score survey, bladder scan check, urethra check, in and out catheter to ensure no stricture. Review pathology from prostate chips.  ? Visit with  - Return in 4 months WITH SCREENING PSA PRIOR  for short postop visit, AUA symptom score survey, bladder scan check, urethra check with in and out catheter to ensure no stricture/scar tissue.

## 2024-03-13 NOTE — DISCHARGE INSTRUCTIONS
Your urologist is: Dr.Jennifer James  Office number: [phone]  Address: [address] [place] [address], [address], [facility] [address]      PLEASE READ the following directions and contact our office with any questions via phone or the portal. If you were told that you need an appointment and no appointment was made, call the office the day after surgery and ask to speak with 's nurse to make an appointment.      Discharge with the following medications:  Change amlodipine to morning.   Ok to take with HYDROCHLOROTHIAZIDE in am.   Ok to take cialis in am.  Keep the flomax/TAMSULOSIN at night before bedtime.   TAKE 1 a night for a week starting Sunday night (night before jean removal.   Then increase to 2 a night 3 weeks.   Then discontinue altogether  Repeat Screening psa in 4 months (WINSOME 2/19 with right ridge, psa 3.8 today but just had uds and catheters which can irritate the prostate and make psa high, also  WINSOME today: NEGATIVE- NO NODULES)  Turn interstim on tonight  Overactive bladder medication for a month starting today- oxybutynin 10mg once daily in the morning  Keep track of BP in am so he can follow up with pcp for hypotension (he says not related to his flomax)  Tramadol 50mg as needed every 8hours for pain not controlled with Tylenol  Discontinue antibiotics. Keep at house.     Long-term plan:  Nurse visit- Return on Monday to have catheter removed in the clinic in the morning.  The nurse will fill the bladder remove the catheter and checked to ensure that he is emptying. He will need to drink all day and return that afternoon to make sure he is emptying with another bladder scan.  Visit with  -Inés in 3-4 weeks for short postop visit (double book a 1pm slot), AUA symptom score survey, bladder scan check, urethra check, in and out catheter to ensure no stricture. Review pathology from prostate chips.  Visit with  - Inés in 4 months WITH SCREENING PSA PRIOR   for short postop visit, AUA symptom score survey, bladder scan check, urethra check with in and out catheter to ensure no stricture/scar tissue.       's Post-op Instructions for TURP/TRANSURETHRAL RESECTION OF PROSTATE    See above for details    Post Op Catheter Care:   While the catheter is in clean the tip of the penis and the catheter, where it comes out of the penis, at least once daily and up to 2-3x a day and apply lidocaine jelly (to help numb the area- call our office and ask for a prescription if you did not receive one) or Ky Jelly up to 4x a day to where the catheter exits the penis.   It is ok to disconnect the jean from the tubing prior to taking a shower but before reconnecting wipe each end with an alcohol wipe.   Expect blood in urine with the catheter, especially with movement or if you are straining to have a bowel movement.  Try not to let the catheter pull or tug.     Following a TURP:  What to expect with a catheter  If your urine becomes red or bloody increase your fluid intake to try to thin it out.  If your urine becomes very bloody with thick clots call us for further directions and let us know if you are taking a blood thinner.  If your catheter stops draining (it should be draining continuously) and you feel full, it is ok to manipulate the catheter some by turning it or gently pushing it in.  If your are leaking from your penis around the catheter but nothing is coming through the catheter it may be clogged.  It is ok to manipulate the catheter some by turning it or gently pushing it in. If nothing drains still, then you can call the office during office hours or go to the ER.   If your are leaking from your penis around the catheter but still have urine draining through the catheter you likely are having bladder spasms. Call the office and we can write you for some spasm medicine.     Urinating after TURP  You'll be unable to urinate normally at first because your  urethra will be swollen. The catheter used to flush out your bladder during the operation will be left in place for a while to allow you to pass urine until the swelling goes down.  When the catheter is removed,  t may burn in the urethra for many days, to weeks after the surgery. You can try over the counter AZO up to 2x a day to help with the burning.  You may also have some pain in the perineum or seated area for a few days.     You may also have some overactive bladder (frequent urination with significant urge to urinate and feeling like you cannot hold your urine) or leakage of urine (before or after urinating) for a few weeks after surgery.  We can write for temporary overactive bladder medication (ditropan/oxybutynin) as long as we know that you have a history of emptying her bladder completely.     You may continue to see blood in the urine intermittently after the catheter was removed.  Sometimes her urine may be clear and sometimes it may all of a sudden turn red.  If you are in blood thinners and it continues to become redder than give us a call for further instructions on whether not to hold the blood thinner.  The most important thing is to drink plenty of water to help dilute the urine or thinn it out so you do not get thick clots.  If you start to feel like he cannot urinate or you have very thick clots call us and or go to the ER.  Otherwise if you just have bloody urine and it becomes clear with increased fluid intake you do not need to call us.     Recovering at home  It's common to feel tired and under the weather for a week or two after having a TURP. Most men are up and about after this time, but you'll need to take things easy for up to 2 months.    For the first few weeks, you shouldn't lift or move any heavy objects (including shopping) or do any strenuous exercise. If possible, ask friends or family members if they can help around the house.    You have no limitations in terms of walking up  stairs or walking.  However yeast avoid any straddle activity, like riding a bike, for a few weeks.    It is very important not to developed constipation or if you already have constipation to try to control this with stool softeners.  If you strain to have a bowel movement you could start bleeding again.  You can take Dulcolax pills over-the-counter once daily, MiraLax 1/2 cap once daily or 2-3 fiber gummy today.  You should take all the medication of choice daily and lower the dose as necessary but trying not to skip days or this will just create diarrhea and constipation episodes.     Drinking plenty of water while you're recovering may help reduce the risk of getting a urinary tract infection (UTI) and can help clear any blood from your urine. You may also be advised to do some pelvic floor exercises to help improve your bladder control.    Any pain can usually be treated by taking over-the-counter painkillers, such as paracetamol or ibuprofen.    Returning to your normal activities  It usually takes between 3 and 6 weeks to fully recover from a TURP. Your surgeon or GP will advise you about when it's safe to return to your normal activities.    Work  When you can return to work will largely depend on your job. For example, someone who works in an office may be able to return to work sooner than someone who does heavy manual work.    In most cases, you'll be advised to take around 2 to 4 weeks off work.    Driving  Your surgeon or GP will tell you when they feel it's safe for you to drive again, which will usually be when you can comfortably carry out an emergency stop and do not require any pain medicine.    Some people reach this stage after about a week, while others may not be able to drive for a month or more.    Having sex  It will probably be around 3 or 4 weeks after your operation before you feel comfortable enough to have sex.    Blood in your urine  After having a TURP, it's normal to occasionally  notice some blood in your urine. Around a week or two after the operation, the amount of blood may increase as the scab on your prostate falls off.    Drinking plenty of fluids will help flush any blood or small blood clots out of your bladder. This will also help you avoid constipation. Straining to have a bowel movement can also result in bleeding from the prostate. To avoid this take stool softeners daily while recovering.     If the increased blood in your urine continues for longer than 48 hours, you should contact the hospital.    When to seek medical advice  While you're recovering, you should contact the hospital clinic or your GP if you develop:    a high temperature (fever) of 38C (100.4F) or above  severe pain while urinating  an inability to urinate  persistently severe or worsening blood in your urine  These symptoms can be a sign of a problem such as internal bleeding or a urinary infection that needs to be treated.        General Information:    1.  Do not drink alcoholic beverages including beer for 24 hours or as long as you are on pain medication..  2.  Do not drive a motor vehicle, operate machinery or power tools, or signs legal papers for 24 hours or as long as you are on pain medication.   3.  You may experience light-headedness, dizziness, and sleepiness following surgery. Please do not stay alone. A responsible adult should be with you for this 24 hour period.  4.  Go home and rest.    5. Progress slowly to a normal diet unless instructed.  Otherwise, begin with liquids such as soft drinks, then soup and crackers working up to solid foods. Drink plenty of nonalcoholic fluids.  6.  Certain anesthetics and pain medications produce nausea and vomiting in certain       individuals. If nausea becomes a problem at home, call you doctor.    7. A nurse will be calling you sometime after surgery. Do not be alarmed. This is our way of finding out how you are doing.    8. Several times every hour while  you are awake, take 2-3 deep breaths and cough. If you had stomach surgery hold a pillow or rolled towel firmly against your stomach before you cough. This will help with any pain the cough might cause.  9. Several times every hour while you are awake, pump and flex your feet 5-6 times and do foot circles. This will help prevent blood clots.    10.Call your doctor for severe pain, bleeding, fever, or signs or symptoms of infection (pain, swelling, redness, foul odor, drainage).      Post op instructions for prevention of DVT  What is deep vein thrombosis?  Deep vein thrombosis (DVT) is the medical term for blood clots in the deep veins of the leg.  These blood clots can be dangerous.  A DVT can block a blood vessel and keep blood from getting where it needs to go.  Another problem is that the clot can travel to other parts of the body such as the lungs.  A clot that travels to the lungs is called a pulmonary embolus (PE) and can cause serious problems with breathing which can lead to death.  Am I at risk for DVT/PE?  If you are not very active, you are at risk of DVT.  Anyone confined to bed, sitting for long periods of time, recovering from surgery, etc. increases the risk of DVT.  Other risk factors are cancer diagnosis, certain medications, estrogen replacement in any form,older age, obesity, pregnancy, smoking, history of clotting disorders, and dehydration.  How will I know if I have a DVT?  Swelling in the lower leg  Pain  Warmth, redness, hardness or bulging of the vein  If you have any of these symptoms, call your doctors office right away.  Some people will not have any symptoms until the clot moves to the lungs.  What are the symptoms of a PE?  Panting, shortness of breath, or trouble breathing  Sharp, knife-like chest pain when you breathe  Coughing or coughing up blood  Rapid heartbeat  If you have any of these symptoms or get worse quickly, call 911 for emergency treatment.  How can I prevent a  DVT?  Avoid long periods of inactivity and dont cross your legs--get up and walk around every hour or so.  Stay active--walking after surgery is highly encouraged.  This means you should get out of the house and walk in the neighborhood.  Going up and down stairs will not impair healing (unless advised against such activity by your doctor).    Drink plenty of noncaffeinated, nonalcoholic fluids each day to prevent dehydration.  Wear special support stockings, if they have been advised by your doctor.  If you travel, stop at least once an hour and walk around.  Avoid smoking (assistance with stopping is available through your healthcare provider)  Always notify your doctor if you are not able to follow the post operative instructions that are given to you at the time of discharge.  It may be necessary to prescribe one of the medications available to prevent DVT.    Using an Incentive Spirometer    An incentive spirometer is a device that helps you do deep breathing exercises. These exercises expand your lungs, aid in circulation, and help prevent pneumonia. Deep breathing exercises also help you breathe better and improve the function of your lungs by:  Keeping your lungs clear  Strengthening your breathing muscles  Helping prevent respiratory complications or problems  The incentive spirometer gives you a way to take an active part in recover. A nurse or therapist will teach you breathing exercises. To do these exercises, you will breathe in through your mouth and not your nose. The incentive spirometer only works correctly if you breathe in through your mouth.  Steps to clear lungs  Step 1. Exhale normally. Then, inhale normally.  Relax and breathe out.  Step 2. Place your lips tightly around the mouthpiece.  Make sure the device is upright and not tilted.  Step 3. Inhale as much air as you can through the mouthpiece (don't breath through your nose).  Inhale slowly and deeply.  Hold your breath long enough to keep  the balls or disk raised for at least 3 to 5 seconds, or as instructed by your healthcare provider.  Some spirometers have an indicator to let you know that you are breathing in too fast. If the indicator goes off, breathe in more slowly.  Step 4. Repeat the exercise regularly.  Do this exercise every hour while you're awake, or as instructed by your healthcare provider.  If you were taught deep breathing and coughing exercises, do them regularly as instructed by your healthcare provider.     We hope your stay was comfortable as you heal now, mend and rest.    For we have enjoyed taking care of you by giving your our best.    And as you get better, by regaining your health and strength;   We count it as a privilege to have served you and hope your time at [facility] was well spent.      Thank  You!!!

## 2024-03-13 NOTE — PROGRESS NOTES
Criteria per anesthesia for transfer to post op . Tolerating po fluids Denies pain and nausea  Transferred per stretcher to phase 2 Report given to La Nena DAWSON

## 2024-03-13 NOTE — TRANSFER OF CARE
"Anesthesia Transfer of Care Note    Patient: Onesimo Paula    Procedure(s) Performed: Procedure(s) (LRB):  TURP (TRANSURETHRAL RESECTION OF PROSTATE)- BIPOLAR (N/A)  INCISION, PROSTATE, TRANSURETHRAL (N/A)    Patient location: PACU    Anesthesia Type: general    Transport from OR: Transported from OR on 2-3 L/min O2 by NC with adequate spontaneous ventilation    Post pain: adequate analgesia    Post assessment: no apparent anesthetic complications and tolerated procedure well    Post vital signs: stable    Level of consciousness: awake, alert and oriented    Nausea/Vomiting: no nausea/vomiting    Complications: none    Transfer of care protocol was followed      Last vitals: Visit Vitals  BP (!) 169/82 (BP Location: Right arm, Patient Position: Lying)   Pulse 64   Temp 36.5 °C (97.7 °F) (Temporal)   Resp (!) 24   Ht 5' 10" (1.778 m)   Wt 74.8 kg (165 lb)   SpO2 100%   BMI 23.68 kg/m²     "

## 2024-03-13 NOTE — ANESTHESIA PROCEDURE NOTES
Intubation    Date/Time: 3/13/2024 12:49 PM    Performed by: Valentina Drummond CRNA  Authorized by: Eduar Saavedra MD    Intubation:     Induction:  Intravenous    Intubated:  Postinduction    Mask Ventilation:  Easy mask    Attempts:  1    Attempted By:  CRNA    Difficult Airway Encountered?: No      Complications:  None    Airway Device:  Supraglottic airway/LMA    Airway Device Size:  4.0    Style/Cuff Inflation:  Cuffed (inflated to minimal occlusive pressure)    Placement Verified By:  Capnometry    Complicating Factors:  None    Findings Post-Intubation:  BS equal bilateral and atraumatic/condition of teeth unchanged

## 2024-03-13 NOTE — PLAN OF CARE
Pt prepared for surgery. Pt resting in bed, with family/friend at bedside. Family signed up for text messaging. Belongings brought over to post op cabinet. IS teaching performed. Fall risk agreement reviewed and armband placed.  SCDs on.

## 2024-03-13 NOTE — PLAN OF CARE
Pt stated he knows how to change jean bag to leg bag. Pt stated he wanted to keep oley bag on to go home. Leg bag, alcohol wipes, urinal and ky jelly given to pt to take home.Discharge instructions given to pt and family/friend, verbalized understanding and questions answered. Handouts provided. Belongings given back to pt. IV removed- catheter intact. Discharge via wheelchair.

## 2024-03-13 NOTE — ANESTHESIA PREPROCEDURE EVALUATION
03/13/2024  Onesimo Paula is a 73 y.o., male.      Pre-op Assessment    I have reviewed the Patient Summary Reports.     I have reviewed the Nursing Notes. I have reviewed the NPO Status.   I have reviewed the Medications.     Review of Systems  Anesthesia Hx:             Denies Family Hx of Anesthesia complications.    Denies Personal Hx of Anesthesia complications.                    Cardiovascular:     Hypertension Valvular problems/Murmurs, MR             ECG has been reviewed. Stress ECHO negative                         Pulmonary:       Denies Shortness of breath.                  Renal/:  Chronic Renal Disease, CKD  BPH              Hepatic/GI:     GERD             Musculoskeletal:  Arthritis                   Physical Exam  General: Well nourished, Cooperative, Alert and Oriented    Airway:  Mallampati: III / II  Mouth Opening: Normal  TM Distance: Normal  Tongue: Normal  Neck ROM: Extension Decreased    Dental:  Dentures    Chest/Lungs:  Normal Respiratory Rate    Heart:  Rate: Normal  Rhythm: Regular Rhythm        Anesthesia Plan  Type of Anesthesia, risks & benefits discussed:    Anesthesia Type: Gen Supraglottic Airway, Gen ETT  Intra-op Monitoring Plan: Standard ASA Monitors  Post Op Pain Control Plan: multimodal analgesia  Induction:  IV  Airway Plan: , Post-Induction  Informed Consent: Informed consent signed with the Patient and all parties understand the risks and agree with anesthesia plan.  All questions answered.   ASA Score: 3    Ready For Surgery From Anesthesia Perspective.     .

## 2024-03-14 NOTE — ANESTHESIA POSTPROCEDURE EVALUATION
Anesthesia Post Evaluation    Patient: Onesimo Paula    Procedure(s) Performed: Procedure(s) (LRB):  TURP (TRANSURETHRAL RESECTION OF PROSTATE)- BIPOLAR (N/A)  INCISION, PROSTATE, TRANSURETHRAL (N/A)    Final Anesthesia Type: general      Patient location during evaluation: PACU  Patient participation: Yes- Able to Participate  Level of consciousness: awake and alert  Post-procedure vital signs: reviewed and stable  Pain management: adequate  Airway patency: patent    PONV status at discharge: No PONV  Anesthetic complications: no      Cardiovascular status: blood pressure returned to baseline  Respiratory status: unassisted  Hydration status: euvolemic  Follow-up not needed.              Vitals Value Taken Time   /67 03/13/24 1605   Temp 36.4 °C (97.5 °F) 03/13/24 1535   Pulse 61 03/13/24 1605   Resp 20 03/13/24 1605   SpO2 99 % 03/13/24 1605         Event Time   Out of Recovery 15:45:00         Pain/Violette Score: Violette Score: 9 (3/13/2024  2:30 PM)  Modified Violette Score: 20 (3/13/2024  4:05 PM)

## 2024-03-18 ENCOUNTER — CLINICAL SUPPORT (OUTPATIENT)
Dept: UROLOGY | Facility: CLINIC | Age: 74
End: 2024-03-18
Payer: COMMERCIAL

## 2024-03-18 ENCOUNTER — PATIENT MESSAGE (OUTPATIENT)
Dept: UROLOGY | Facility: CLINIC | Age: 74
End: 2024-03-18

## 2024-03-18 DIAGNOSIS — N40.0 BENIGN PROSTATIC HYPERPLASIA WITHOUT URINARY OBSTRUCTION: Primary | ICD-10-CM

## 2024-03-18 LAB — POC RESIDUAL URINE VOLUME: 5 ML (ref 0–100)

## 2024-03-18 PROCEDURE — 99499 UNLISTED E&M SERVICE: CPT | Mod: S$GLB,,, | Performed by: UROLOGY

## 2024-03-18 PROCEDURE — 51798 US URINE CAPACITY MEASURE: CPT | Mod: S$GLB,,, | Performed by: UROLOGY

## 2024-03-18 NOTE — PROGRESS NOTES
Patient returned to office for pvr by bladder scan.  Patient ambulated to restroom successfully emptied bladder.  PVR 5ml  Patient left office in satisfactory condition.

## 2024-03-18 NOTE — PROGRESS NOTES
Patient here today for fill and pull per   ?  Bladder filled with 180ml of sterile water via catheter tip syringe until patient felt full.  35ml removed from catheter balloon.  22 jean catheter removed with no difficulty.   ?  Patient voided 200 of blood tinged urine in urine .   Patient educated to drink plenty of water.  Instructed to return to office by 2:30/3:00pm for pvr  Patient voiced understanding of instructions given  Left office in satisfactory condition.

## 2024-04-11 ENCOUNTER — OFFICE VISIT (OUTPATIENT)
Dept: UROLOGY | Facility: CLINIC | Age: 74
End: 2024-04-11
Payer: COMMERCIAL

## 2024-04-11 VITALS
HEART RATE: 67 BPM | BODY MASS INDEX: 23.6 KG/M2 | WEIGHT: 164.88 LBS | DIASTOLIC BLOOD PRESSURE: 72 MMHG | HEIGHT: 70 IN | SYSTOLIC BLOOD PRESSURE: 122 MMHG

## 2024-04-11 DIAGNOSIS — C61 PROSTATE CANCER: ICD-10-CM

## 2024-04-11 DIAGNOSIS — C61 PROSTATE CANCER MANAGED WITH ACTIVE SURVEILLANCE: ICD-10-CM

## 2024-04-11 DIAGNOSIS — N40.0 BENIGN PROSTATIC HYPERPLASIA WITHOUT URINARY OBSTRUCTION: Primary | ICD-10-CM

## 2024-04-11 LAB
BACTERIA #/AREA URNS AUTO: ABNORMAL /HPF
BILIRUBIN, UA POC OHS: NEGATIVE
BLOOD, UA POC OHS: ABNORMAL
CLARITY, UA POC OHS: CLEAR
COLOR, UA POC OHS: ABNORMAL
GLUCOSE, UA POC OHS: NEGATIVE
HYALINE CASTS UR QL AUTO: 4 /LPF
KETONES, UA POC OHS: NEGATIVE
LEUKOCYTES, UA POC OHS: ABNORMAL
MICROSCOPIC COMMENT: ABNORMAL
NITRITE, UA POC OHS: NEGATIVE
PH, UA POC OHS: 6
POC RESIDUAL URINE VOLUME: 0 ML (ref 0–100)
PROTEIN, UA POC OHS: 100
RBC #/AREA URNS AUTO: >100 /HPF (ref 0–4)
SPECIFIC GRAVITY, UA POC OHS: 1.02
UROBILINOGEN, UA POC OHS: 0.2
WBC #/AREA URNS AUTO: >100 /HPF (ref 0–5)

## 2024-04-11 PROCEDURE — 1126F AMNT PAIN NOTED NONE PRSNT: CPT | Mod: CPTII,S$GLB,, | Performed by: UROLOGY

## 2024-04-11 PROCEDURE — 99024 POSTOP FOLLOW-UP VISIT: CPT | Mod: S$GLB,,, | Performed by: UROLOGY

## 2024-04-11 PROCEDURE — 3078F DIAST BP <80 MM HG: CPT | Mod: CPTII,S$GLB,, | Performed by: UROLOGY

## 2024-04-11 PROCEDURE — 4010F ACE/ARB THERAPY RXD/TAKEN: CPT | Mod: CPTII,S$GLB,, | Performed by: UROLOGY

## 2024-04-11 PROCEDURE — 99999 PR PBB SHADOW E&M-EST. PATIENT-LVL IV: CPT | Mod: PBBFAC,,, | Performed by: UROLOGY

## 2024-04-11 PROCEDURE — 1160F RVW MEDS BY RX/DR IN RCRD: CPT | Mod: CPTII,S$GLB,, | Performed by: UROLOGY

## 2024-04-11 PROCEDURE — 3074F SYST BP LT 130 MM HG: CPT | Mod: CPTII,S$GLB,, | Performed by: UROLOGY

## 2024-04-11 PROCEDURE — 81001 URINALYSIS AUTO W/SCOPE: CPT | Performed by: UROLOGY

## 2024-04-11 PROCEDURE — 1101F PT FALLS ASSESS-DOCD LE1/YR: CPT | Mod: CPTII,S$GLB,, | Performed by: UROLOGY

## 2024-04-11 PROCEDURE — 81003 URINALYSIS AUTO W/O SCOPE: CPT | Mod: QW,S$GLB,, | Performed by: UROLOGY

## 2024-04-11 PROCEDURE — 3288F FALL RISK ASSESSMENT DOCD: CPT | Mod: CPTII,S$GLB,, | Performed by: UROLOGY

## 2024-04-11 PROCEDURE — 87086 URINE CULTURE/COLONY COUNT: CPT | Performed by: UROLOGY

## 2024-04-11 PROCEDURE — 51798 US URINE CAPACITY MEASURE: CPT | Mod: S$GLB,,, | Performed by: UROLOGY

## 2024-04-11 PROCEDURE — 1159F MED LIST DOCD IN RCRD: CPT | Mod: CPTII,S$GLB,, | Performed by: UROLOGY

## 2024-04-11 NOTE — PROGRESS NOTES
[facility] Note - [place]  Staff: MD Erika  PCP: Reynaldo Rahman, APRN,FNP-C  Date of Service: 04/11/2024      Subjective:        HPI: Onesimo Paula is a 73 y.o. male     Seen by me 9/30/19  Patient had MRI for right hip pain and was found have a thick-walled bladder with enlarged prostate.  He states that he saw urologist over 40 years ago for burning with urination.  He had a renal bladder ultrasound in 2016 which showed enlarged prostate and bilateral renal cyst done for renal insufficiency whom he sees  for.  Denies any gross hematuria.  Review of for previous urine show no microscopic hematuria.  Twenty-five pack-year history of smoking but quit in 1997.      He also has AUA symptom score 6 and occasional weak stream and urgency but voids every 3-5 hours when he is working and a little more frequent when he is not.  Denies any urge incontinence.  Okay stream with occasional weak stream.  PVR today 09/03/2019 is 15 cc.  He may have tried Flomax a couple years ago but does not recall it helping her changing any was symptoms.  Overall really not that bothered by his urinary symptoms.      Also states he has ED.  Previously tried sildenafil unsure dose and it helped but wife does not want him to take, exam revealed peyronie's     Plan: started tadalafil 5mg due to thick walled bladder although essentially asx. No further f/u fr b renal cysts.      Interval history 11/18/19:   Pt states today Cialis has NOT improved any of his lower urinary tract symptoms since starting the medication.  He currently takes this medication before his bedtime.  Pt still c/o urinary urgency, especially during the day.  Nocturia is 1-2x nightly and not bothersome at this time.  He is a , therefore the daytime urgency really bothers him.  Plan: started ditropan 10mg XL and cont'd tadalafil     Interval history by idalia 1/6/20:   TODAY, pt states the oxybutynin only improved  his LUTS by over 10% at this time.  PVR by bladder scan performed in office today:  11 mL  Rec'd: d/c oxybutynin, start myrbetriq, cont tadalafil 5mg daily.     Interval history 7/27/20:   Tried myrbetriq but didn't help. Having urgency, hesistancy and intermittent slow stream but urgency most bothersome and having some UUI with a few drop, but not that bothersome. Drinks 1 c of coffee in am. Soda during day. 1 c of coffee int he evening. Has never tried stopping these to see if they help. Drinks beer and notices increase in frequency following day.   On tadalafil 5mg daily for ED and bph.  pvr by scan: 32cc  Voiding every 30 minutes a day recently more pronounced at home (has been on vacation).  Takes hctz in am. When he's working he only voids about 2x-4x a day. Drinks caffeine while working including energy drinks. No nocturia.   psa 1/31/20 2.2 (up from 1.2 year prior). Repeat psa 0.85 7/29/20  WINSOME today: 40g + no nodules.  Uroflow 7/30/20: peak flow 12.8mL/s,  avg flow 5.4mL/s, voided vol: 191cc, pvr by scan:0      Cysto trus 8/17/20: normal bladder, no stricture. Membranous and distal prostatic urethra with papillary tissue suspicious for prostatitis.trus volume 42.6g. bc of his urgency on tadalafil, flomax, vesicare found rx cipro for 14d to see if it helped w urgency. D/c'd vesicare. cont'd flomax and tadalafil. rtc to see idalia in 2-3 weeks and me in 2 months to discuss bph procedures     Interval history by idalia 9/9/20:   Pt states he is still having the urinary urgency even since finishing the Cipro antibiotics at this time.  He denies gross hematuria, dysuria.  UA today shows normal findings.  PVR by bladder scan today:  9 mL  Plan: rec'd restarting vesicare     Interval history 10/1/20:    Taking flomax 0.4mg nightly and says flow is intermittent on this.   Also on the vesicare and says it doesn't help with the urgency. Voiding 2-3x a day and none at night. 3-4x a week c/o urge incontinence if he  tries to hold it too long, mostly in the am when he wakes up. Denies any difficulty with walking. No weakness or numbness.   Changed to decaf coffee 1x a day. Changed to sprite and caffeine free coke.   He does not want to take any more meds. He is not happy with the retrograde ejaculation.   Sees dr. leger for Mitral Valve leakage, last saw him a week ago, was told he should f/u in March 2021. Tadalafil helping with erections     Interval history 11/19/20:   Underwent rezum on 10/21/20. continue flomax, vesicare and tadafil daily for 3 more months.   Returned for fill and pull on 10/26/20. Filled with 210, voided 210  For 2 weeks postop would have blood when he had a BM, improving.   Today (1 month later) he states he has some increased urgency. UUI 2-3x a day (increased from 3-4x a week).  Still taking vesicare  freq q1 hour (worse in cold weather). Without cold weather freq q 3 hour.   ua today with small wbc and mod blood - some dysuria  No significant change in stream yet  pvr by scan today: 134  AUA ssx:(2 incomplete emptying, 3 frequency, 3 intermittency, 4 urgency, 2 weak stream, 2 straining, 0 sleeping). 16. QOL: 4    Interval history by me in clinic on 3/9/21:  Returns today and states states he ran out of flomax and vesicare a month ago. No increased frequency, urge incontinence or slower stream off the flomax  Still having UUI 1-2x a day with a few drops when he hears water.   Voids 8x/24 hours and 4x/8 hours while at work. Nocturia occ (same). No longer drinking caffeine.   Still on tadalafil 5mg daily.  No longer having retrograde ejaculation and constipation improved. Hctz in am.  Frequency mostly in  the pm. Bothers him but able to stop at bathrooms. Sometimes loses erection with tadalafil 5mg daily. Rare. Not more frequent. Still getting morning erections.   AUA ssx today:(1 incomplete emptying, 2 frequency, 1 intermittency, 3 urgency, 3 weak stream, 0 straining, 0 sleeping). 10. QOL:  mixed  Bladder scan PVR today was 35mL.    I reviewed his previous PSAs and his most recent psa within the last year was 2.3, %free 20.87, last year was 2.2      Interval history with idalia on 4/9/21:  UA today showed normal findings.  Pt states today even though he tried and changed his HCTZ med to evenings as discussed last ov, his LUTS have not improved.  Pt as of today's visit is still c/o urinary urgency and frequency during the daytime.  Has not improved since last ov.  Pt states today he has tried Vesicare and Oxybutynin in the past with no improvement in symptoms.  Pt currently denies gross hematuria and dysuria.  She put him on myrbetriq again       Interval history by me 6/15/21:  Uroflow results (date: 03/18/2021): Voiding time: 24.3s, Flow time: 23.1s, TTP flow: 8.0s, Peak flowrate: 19.2 mL/s, Average flowrate: 10.6mL/s, Intervals: 2, Voided volume: 244 mL, Pvr by bladder scan 33. Pattern of curve: see uploaded image, reviewed by   At last visit was c/o freq/urgency. Frequency 8-10x/day. Tried myrbetriq before rezum, ditropan, vesicare and changing hctz to pm but no improvement. Then in April started myrbetriq again , after about 4 weeks saw improvement, now down to 5-6x a day. avg urg: 3. No nocturia. No incontinence.  Also on tadalafil 5mg daily.   AUA ssx today:(1 incomplete emptying, 1 frequency, 1 intermittency, 2 urgency, 3 weak stream, 0 straining, 0 sleeping). 8. QOL:   Bladder scan PVR today was 0mL.      HPI/CC today 6/7/22:   Had him Continue myrbetriq 50mg daily, has enough until 4/2021, Continue tadalafil 5mg daily, refilled today for a year.  Says myrbetriq not helping anymore. He held it for a few days and noticed no difference. On cialis 5mg daily.   Does not have constipation  Voids about 9-10x during waking hours. occ urge incontinence 3x a week. Few drops. Started drinking caffeine this week.   Nocturia 1-2x a night. Urine flow varies.    psa 5/31/22 1.3  ua today neg, pvr by  scan: 5  AUA ssx:(2 incomplete emptying, 3 frequency, 2 intermittency, 4 urgency, 3 weak stream, 1 straining, 1 sleeping). 16. QOL: mixed  Appendix removed in January complicated by post-op ileus.    Interval history 8/23/22:  Continue myrbetriq 50mg daily, has enough until 4/2021  Continue tadalafil 5mg daily, refilled today for a year  After adding vesicare to myrbetriq - went from voiding 9-10x day, 1-2x a night with occ UUI 3x a week to 5-6x/day, 0 night and no daily UUI with drops. Says he has dry mouth but about the same. However does have some heart burn that is new.   UA today: neg. pvr by scan: 115 (this is the highest it's been, feels empty).  AUA ssx:(1 incomplete emptying, 2 frequency, 0 intermittency, 2 urgency, 2 weak stream, 1 straining, 0 sleeping). 8. QOL: pleased    Interval history 2/28/23:  Says he is doing well on VESIcare and myrbetriq.  However it is expensive.  About 120 dollars every 3 months.  Also having dry mouth with it.  On tadalafil 5 mg daily for erections, obtaining from [employer].  This dose wish for him sometimes.  He tried Viagra 100 mg and it seemed to work.  Aua ssx: 12, pvr: 25    Interval history 4/21/23:  I had him discontinue both myrbetriq and VESIcare at the last visit to see if his incontinence would get worse.  Followed up with Teena on 03/29/2023.  His PVR was 0.  His AUA symptom score was 14, 5.  He was still taking tadalafil 5 mg daily.  He said with stopping VESIcare and myrbetriq he had 2 episodes of incontinence that were large volume, increased postvoid dribbling and he feels like his urinary frequency increased.  Was scheduled with me to discuss InterStim because he wanted to hold off on restarting any medications due to cost and side effects  He returns today with a voiding diary.  He kept a voiding diary for me days and it looks like he urinates on average about 9-10 times a day.  That is up from 6 times a day when he was taking medications.  He did not  documented on his voiding diary but he leaks urine 2 to 3 times a day on the way to the bathroom.  Not wearing any pads.  Does not have to change his underwear.  If he could rate his urgency he would say on average his urgency is 4/5.  He is interested in proceeding with InterStim.  He ended up using tadalafil 10 mg once and said this dose worked good for him for his erectile dysfunction  Voided urine today is negative.  Confirmed again that currently he urinates 7-10 times a day, average urgency is 4 and 1-2 leaks a day.        Interval history 5/25/23:  I did a peripheral nerve evaluation on him on Monday, 4 days ago.  Since then he has gone from urinating 6-10 times a day down to about 6 times a day.  Never had issues at night.  He went from leaking 2 to 3 times a day incontinence down to 1x over the last 3 days. Avg urgency went from 4 to 2. He says >50% improvement and wants to proceed. Not on any meds.   He previously saw  for stress tests bc of SOB. His last w/u was 11/2022 and had a neg stress test. On asa 81mg. No stents. They could never figure out why he has SOB.  He sees pulmonology regularly for nodules in the lungs.  Wires removed today    Interval history 7/11/23:  Here today for interstim stage 1 and 2. Preop appt revealed a1c 6.2, glucose 124. Ua with tr protein/tr bld on 6/28. Started on metformin.   Urinating every 1-2 hours. Uui with drops 2x a day. No pads or underwear changes.  Held asa 81mg for 7d.     Interval history 8/8/23 telephone visit :  Pt states that initially he had issues with urgency and frequency after his procedure but since talking to clarita and making adjustments he has gone from urinating every 1-2 hours to 3-4x a day. Avg urgency remains around 4 and no UUI.   He is happy with his symptoms.     Interval history by ME in CLINIC on 11/13/23 for postop interstim fu and oab fu:  He says that his frequency has returned despite changing his settings. Says changing the  setting works for a bit. Then no longer works. Nos on 3.2.   He says after turning off his interstim for us in August for us abd for his liver he had an increase in his frequency and had to increase the amplitude   He changed the settings on 10/31 and he went from 8x a day down to 5x a day. He stopped hctz a month ago by his cardiologist but then he restarted the hctz in the morning last week and his frequency increased to 9x a day.  Currently on program 2, 3.2.    He says urinating about 8x a day and 1x a night. Leaking with UUI and drops 3-4x a day and 2 accidents last week. No pads.   Urgency 5/5.  1 cup of caffeine in the morning.   Voiding diary: 10/30/23- 8x, changed on 10/31. On 10/31 -5x, 11/1- 5x, 11/3- 6x, 11/11-9x. 11/12-7x. This morning 4x/ 6 hours.   AUA ssx:(2 incomplete emptying, 3 frequency, 2 intermittency, 3 urgency, 3 weak stream, 0 straining, 1 sleeping). 14. QOL: mostly satisfied  Pvr today: 40. Ua today: neg    Interval history by ME in CLINIC on 1/22/24 for oab:  Plan was to do uds bc of his oab but when joe tried to place ureteral catheter it did not go easily. Then she tried 16fr coude and stopped mid way and saw blood. Decided to hold off on and plan for cysto. He adjusted his interstim and he said no change. Feels the flutter in his right perineum. . He says flow is fast sometimes and slow sometimes.  Cysto trus 8/17/20: 42.6g.   Rezum 10/21/20  Last peak flow was 19.2 in 3/18/21.  Last catheter was at time of cholecystectomy in jan 2022.   Interstim 7/11/23  Frequency:Still urinates bw 6 to 12x a day. Has more bad days than good. Nocturia 1x a night.  Incontinence:  Has drops/leaks twice a day.  No pads  Drinks 1 soda in am and decaf cup in evening. No tea.     Interval history/H&P Update by me/ prior to cysto trus on 2/19/24:  urine at the lab- ua 1/30/4:neg(would need to returnwith smoking hx although quit in 1997  Ua today 2/19/24 30 protien  Uroflow and pvr soon. Has  oab. Will need to drink fluid here right before the test.   Uroflow results (date: 01/30/2024): Voiding time: 39.4s, Flow time: 39.3s, TTP flow: 10.6s, Peak flowrate: 16.7 mL/s, Average flowrate: 8.8mL/s, Intervals: 1, Voided volume: 346 mL, Pvr by bladder scan: 45mL . Pattern of curve: see 's addendum   Schedule cystoscopy and TRUS to rule out stricture as cause of oab on Monday feb 19th. See how much prostate has grown back. Previously had rezum in 2020 and trus vol 46g prior to this.   If he does have a urethral stricture- then can explain sx and would need to treat stricture to that improves symptoms.then would need to do in and out cath intermittently forr a while to keep stricture from scarring down again.   If he doesn't have urethral stricture will proceed with uds.   (Will go ahead and schedule to follow cysto/trus and can cancel If we don't need)  Recommend uds since he already has had rezum and interstim and want to evaluate if he has detrussor overactivity/bladder muscle kenn too much.   If so would offer botox since he has already has interstim. But has to be repeated.      Cysto/TRUS Findings 2/19/24:   Cystoscopic findings: rezum defect seen in median lobe closer to veru. Minimal b lobe hypertrophy. + high riding bladder neck with small intravesical protrusion.    TRUS volume: 43.38. WINSOME: just to right of midline small ridge <0.5cm felt near base. 40g prostate.           Assessment: Onesimo Paula is a 73 y.o. male with h/o oab and bph sp rezum 10/21/20, interstim 5/22/23. Small ridge on rectal exam today. Difficult catheterization during attempted uds recently. No urethral stricture seen but does have high riding bladder neck.      Plan:  Continue flomax 0.8mg nightly for now to help with high riding bladder neck.  Since no urethral stricture seen fu for urodynamics to eval if he has detrussor overactivity vs bph obstruction. Botox vs tuip (transurethral incision or prostate for  high riding bladder neck) vs both at the same time and seeing how he responds.   At urodynamics I will place urethral catheter. Resitance likely at prostate-high riding and small defect seen    nterval history by me/ prior to urodynamics on 3/4/24:  Primary complaint is frequency and urgency with UUI and slow flow.  Underwent uds today which showed he had delayed sensation with slow flow. Bladder mostly emptied after procedure. Catheter placement was difficult due to high riding bladder neck but able to perform procedure. Recommended proceeding with tuip/turp.  Ua was neg.   However he returned in the afternoon bc unable to urinate.   Eryn placed coude jean and 475 dark urine drained.   She irrigated him and removed some clots     Interval history/H&P Update by me/ prior to tuip/turp on 3/12/24:  He came back for voiding trial and passed. He ended up staying off flomax.   Takes amlodipine and flomax 7-8-9 in the evening could be reason  Tadalafil in the mornign  Hctz in the morning  Recent cr 1.5 -sees , saw last year, fu in may   Psa screen today 3.8- but had catheter and uds 3 weeks ago.   Ua today with tr protein     Current Urinary symptoms 3/12/24:  Frequency:Still urinates bw 8-9x a day. Has more bad days than good. Nocturia 1x a night.  Incontinence:  Has drops/leaks 2-3 a day.  No pads.  Avg urgency: 4  Interstim on currently       Bipolar turp 3/13/24: path 3+3 from right side- 4/77 chips  Ureteral orifices close to resection. Part of trigone resected.   WINSOME revealed NO NODULES or firm areas today (psa prior to procedure 3.8 but had catheter and difficult catheter placement during uds less than a week ago)  Median lobe removed was likely the biggest obstructing component.   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              Interval history by ME/ in CLINIC on 4/11/24 for postop turp and prostate cancer findings:   Current Urinary symptoms  3/12/24:  Frequency:down to 5x a day from 8-9x a day. Nocturia 2x a week.    Incontinence:  leaks 5 to 6x a day up from 2-3x a day. Improving. Occurs with and without activity. Previously no pads, now 1 pad a day. Not wet when he changes.   Avg urgency: 4 previously, 2 now   AUA ssx:(2 incomplete emptying, 1 frequency, 1 intermittency, 2 urgency, 1 weak stream, 0 straining, 1 sleeping). 10. QOL: 8    Interstim on currently   Pvr by scan: 0  Ua today with small leuk and mod blood. Some dysuria.   Still has some blood in first void and with straining  Overall he says better.      Bladder/prostate history:  Cysto trus 8/17/20: 42.6g.   Rezum 10/21/20  Last peak flow was 19.2 in 3/18/21.  Last catheter was at time of cholecystectomy in jan 2022.   PNE 5/21/23  Interstim 7/11/23  Cysto trus 2/19/24 45g. High riding bladder neck.   UDS 3/4/24  Turp 3/12/24. Gl 3+3 in 4/77 chips.      PVR History:  4/11/24 Pvr: 0  3/13/24            Turp/tuip  3/8/24              Filled with 140. Voided 250  3/4/24              Uds: peak flow: 8.5, avg flow: 4.7, voided vol: 352, pvr by scan: 27 but then came back and pvr by io: 475  1/30/24            Peak flow: 16.7, voided vol: 346, pvr: 45  11/13/23          40  2/28/23            25, aua ssx: 12  8/23/22            115  6/15/21            pvr by scan: 0  3/18/21            UF: pF: 19.2, avg: 10.6, voided vol: 244, pvr: 33  3/2/21               pvr: 35  11/19/20          pvr by scan: 134 (couldn't start after he stopped)  10/26/20          Fill and pull: 0cc pvr  11/19/20          rezum  9/9/20              pvr by scan: 9cc  7/27/20            pvr by scan: 32cc  8/17/20            Cysto trus: 42.6g.   7/30/20           UF: peak flow 12.8mL/s,  avg flow 5.4mL/s, voided vol: 191cc, pvr by scan:0   1/6/20              pvr by scan: 11cc   9/3/19              pvr by scan: 15cc        PSA history: no family hx of prostate cancer  3/13/24 Turp chips: 4/77 right lobe   3/13/24 3.8, WINSOME:  nodules.   4/5/23  1.5  1/22/24 WINSOME: 40g  5/31/22 1.3  3/2/21              2.3, %free 20.87, pvr: 35 (psa screen 4.6)  11/19/20          rezum  7/29/20            0.85, %free 54.12  1/31/20            2.2   2/21/19            1.2  3/14/18            1.0    Urine history: 1 ppd x 26 years. Quit 1997. Anticoag: asa 81mg  4/11/24  Mod bld/small leuk,  2/19/24            30 protein  1/30/54            neg  1/22/24 Not able to provide- do at the lab  11/13/23 1 rbc  6/28/23 Tr bld  5/22/23 neg  4/21/23 Tr prot  6/7/22  neg  11/19/20          Small wbc/mod blood-send for ua patricio and culture pvr by scan: 134  10/14/20          Neg  9/16/20            neg  9/9/20              neg  3/31/20            Neg  1/6/20              Neg  11/18/19          neg  8/8/19              No cx, void: n/a 0 rbc  2/21/19            Ng, void: neg, 0 rbc  4/9/18              No cx, void: neg  9/2017             No blood       Current REVIEW OF SYSTEMS:  neg    Objective:     Vitals:    04/11/24 1340   BP: 122/72   Pulse: 67         Focused  exam  neg    Assessment:     Onesimo Paula is a 73 y.o. male with     1. Benign prostatic hyperplasia without urinary obstruction    2. Prostate cancer    3. Prostate cancer managed with active surveillance      Has a history of BPH status post Rezum and now TURP.  Done for overactive bladder and his frequency has improved as well as his flow however he does have some stress incontinence that is improving and we will continue to monitor.    his TURP chips did reveal that he had some low-grade prostate cancer, only 4/77 chips from the right lobe with [place] 3 + 3.  We can go ahead and put him on active surveillance for this.  Because he was diagnosed with a TURP will go ahead and get a baseline PSA in 3 months, expect this to be low    He did have some dysuria recently we are only 4 from his procedure.  Urine today with moderate blood and small leukocytes and will send this for urine in urine culture and  treat if positive    Plan:     Send voided urine which has small leukocytes moderate blood for urinalysis and culture and will treat if positive  Discontinue Flomax 0.8, oxybutynin 10 mg daily  Will continue to have retrograde ejaculation even off flomax bc of turp (says affecting feeling)   PSA diagnostic in 3 months and follow up after for AUA symptom and PVR.  If he has any decrease in flow at all will do in and out catheterization to confirm no scar tissue  Start Kegel's for stress incontinence after his TURP in expect incontinence to slowly improve but may take a few months    Active surveillance plan for prostate cancer  psa every 6 months  Digital rectal exam once a year  Mri likely once a year (may need valium)  Biopsy for rising psa, abnormal prostate exam or at 12 to 18 months after first biopsy   Treat for increasing volume or stage of prostate cancer      La Nena James MD

## 2024-04-11 NOTE — PATIENT INSTRUCTIONS
Has a history of BPH status post Rezum and now TURP.  Done for overactive bladder and his frequency has improved as well as his flow however he does have some stress incontinence that is improving and we will continue to monitor.    his TURP chips did reveal that he had some low-grade prostate cancer, only 4/77 chips from the right lobe with [place] 3 + 3.  We can go ahead and put him on active surveillance for this.  Because he was diagnosed with a TURP will go ahead and get a baseline PSA in 3 months, expect this to be low    He did have some dysuria recently we are only 4 from his procedure.  Urine today with moderate blood and small leukocytes and will send this for urine in urine culture and treat if positive    Plan:     Send voided urine which has small leukocytes moderate blood for urinalysis and culture and will treat if positive  Discontinue Flomax 0.8, oxybutynin 10 mg daily  PSA diagnostic in 3 months and follow up after for AUA symptom and PVR.  If he has any decrease in flow at all will do in and out catheterization to confirm no scar tissue  Start Kegel's for stress incontinence after his TURP in expect incontinence to slowly improve but may take a few months    Active surveillance plan for prostate cancer  psa every 6 months  Digital rectal exam once a year  Mri likely once a year (may need valium)  Biopsy for rising psa, abnormal prostate exam or at 12 to 18 months after first biopsy   Treat for increasing volume or stage of prostate cancer

## 2024-04-12 NOTE — ASSESSMENT & PLAN NOTE
Continued Stay SW/CM Assessment/Plan of Care Note       Active Substitute Decision Maker (SDM)    There are no active Substitute Decision Maker (SDM) on file.           Progress note:  LOBO left a msg for [facility] liaison for an update on the bed assignment. LOBO also spoke with Josefina, [phone], made aware still pending and will update her as soon as lobo hears from \[facility]\"". BLS on will call for today.    UPDATE 1015  Lobo spoke with liaison, pt has a bed at \[facility]\"" and can dc at 1130 today. Josefina, family friend made aware and she will contact the family to notify them of dc. LOBO confirmed the imaging disk is with the chart and will be sent with the chart. RN has number for report. [facility] [place] arranged for 1130      TRANSPORTATION NOTE    Transportation mode: Ambulance  Name of transportation service: [facility]  Phone number:     Transportation scheduled for (date/time): 04/12/1130  Transportation confirmed with: nahomi   LDL 2021 -107  Repeat Lipid Panel  Continue Crestor 10 mg daily

## 2024-04-13 LAB — BACTERIA UR CULT: NO GROWTH

## 2024-04-15 ENCOUNTER — PATIENT MESSAGE (OUTPATIENT)
Dept: UROLOGY | Facility: CLINIC | Age: 74
End: 2024-04-15
Payer: COMMERCIAL

## 2024-05-03 DIAGNOSIS — N18.32 STAGE 3B CHRONIC KIDNEY DISEASE (HCC): ICD-10-CM

## 2024-05-03 DIAGNOSIS — N28.1 RENAL CYST: ICD-10-CM

## 2024-05-03 DIAGNOSIS — R80.9 PROTEINURIA, UNSPECIFIED TYPE: ICD-10-CM

## 2024-05-03 DIAGNOSIS — E11.22 TYPE 2 DIABETES MELLITUS WITH CHRONIC KIDNEY DISEASE, WITHOUT LONG-TERM CURRENT USE OF INSULIN, UNSPECIFIED CKD STAGE (HCC): ICD-10-CM

## 2024-05-03 DIAGNOSIS — I1A.0 RESISTANT HYPERTENSION: ICD-10-CM

## 2024-05-03 DIAGNOSIS — I12.9 HYPERTENSIVE KIDNEY DISEASE: ICD-10-CM

## 2024-05-03 LAB
ALBUMIN SERPL-MCNC: 4.1 G/DL (ref 3.4–5)
ANION GAP SERPL CALCULATED.3IONS-SCNC: 13 MMOL/L (ref 3–16)
BUN SERPL-MCNC: 29 MG/DL (ref 7–20)
CALCIUM SERPL-MCNC: 9.3 MG/DL (ref 8.3–10.6)
CHLORIDE SERPL-SCNC: 103 MMOL/L (ref 99–110)
CO2 SERPL-SCNC: 22 MMOL/L (ref 21–32)
CREAT SERPL-MCNC: 1.8 MG/DL (ref 0.8–1.3)
CREAT UR-MCNC: 180.7 MG/DL (ref 39–259)
DEPRECATED RDW RBC AUTO: 13.5 % (ref 12.4–15.4)
GFR SERPLBLD CREATININE-BSD FMLA CKD-EPI: 39 ML/MIN/{1.73_M2}
GLUCOSE SERPL-MCNC: 101 MG/DL (ref 70–99)
HCT VFR BLD AUTO: 34.5 % (ref 40.5–52.5)
HGB BLD-MCNC: 11.9 G/DL (ref 13.5–17.5)
MCH RBC QN AUTO: 31.8 PG (ref 26–34)
MCHC RBC AUTO-ENTMCNC: 34.5 G/DL (ref 31–36)
MCV RBC AUTO: 92.2 FL (ref 80–100)
MICROALBUMIN UR DL<=1MG/L-MCNC: 59.2 MG/DL
MICROALBUMIN/CREAT UR: 327.6 MG/G (ref 0–30)
PHOSPHATE SERPL-MCNC: 3.5 MG/DL (ref 2.5–4.9)
PLATELET # BLD AUTO: 160 K/UL (ref 135–450)
PMV BLD AUTO: 9.7 FL (ref 5–10.5)
POTASSIUM SERPL-SCNC: 4.4 MMOL/L (ref 3.5–5.1)
RBC # BLD AUTO: 3.74 M/UL (ref 4.2–5.9)
SODIUM SERPL-SCNC: 138 MMOL/L (ref 136–145)
WBC # BLD AUTO: 5.8 K/UL (ref 4–11)

## 2024-05-13 ENCOUNTER — LAB VISIT (OUTPATIENT)
Dept: LAB | Facility: HOSPITAL | Age: 74
End: 2024-05-13
Attending: INTERNAL MEDICINE
Payer: COMMERCIAL

## 2024-05-13 DIAGNOSIS — D64.9 ANEMIA, UNSPECIFIED: ICD-10-CM

## 2024-05-13 DIAGNOSIS — N18.2 CHRONIC KIDNEY DISEASE, STAGE II (MILD): Primary | ICD-10-CM

## 2024-05-13 LAB
25(OH)D3+25(OH)D2 SERPL-MCNC: 46 NG/ML (ref 30–96)
ALBUMIN SERPL BCP-MCNC: 4.3 G/DL (ref 3.5–5.2)
ANION GAP SERPL CALC-SCNC: 6 MMOL/L (ref 8–16)
BASOPHILS # BLD AUTO: 0.05 K/UL (ref 0–0.2)
BASOPHILS NFR BLD: 0.8 % (ref 0–1.9)
BUN SERPL-MCNC: 29 MG/DL (ref 8–23)
CALCIUM SERPL-MCNC: 8.9 MG/DL (ref 8.7–10.5)
CHLORIDE SERPL-SCNC: 106 MMOL/L (ref 95–110)
CO2 SERPL-SCNC: 27 MMOL/L (ref 23–29)
CREAT SERPL-MCNC: 1.1 MG/DL (ref 0.5–1.4)
CREAT UR-MCNC: 99.5 MG/DL (ref 23–375)
DIFFERENTIAL METHOD BLD: ABNORMAL
EOSINOPHIL # BLD AUTO: 0.2 K/UL (ref 0–0.5)
EOSINOPHIL NFR BLD: 2.5 % (ref 0–8)
ERYTHROCYTE [DISTWIDTH] IN BLOOD BY AUTOMATED COUNT: 13.6 % (ref 11.5–14.5)
EST. GFR  (NO RACE VARIABLE): >60 ML/MIN/1.73 M^2
FERRITIN SERPL-MCNC: 133.8 NG/ML (ref 20–300)
GLUCOSE SERPL-MCNC: 107 MG/DL (ref 70–110)
HCT VFR BLD AUTO: 38.2 % (ref 40–54)
HGB BLD-MCNC: 12.6 G/DL (ref 14–18)
IMM GRANULOCYTES # BLD AUTO: 0.02 K/UL (ref 0–0.04)
IMM GRANULOCYTES NFR BLD AUTO: 0.3 % (ref 0–0.5)
IRON SERPL-MCNC: 91 UG/DL (ref 45–160)
LYMPHOCYTES # BLD AUTO: 1.5 K/UL (ref 1–4.8)
LYMPHOCYTES NFR BLD: 23.8 % (ref 18–48)
MCH RBC QN AUTO: 31 PG (ref 27–31)
MCHC RBC AUTO-ENTMCNC: 33 G/DL (ref 32–36)
MCV RBC AUTO: 94 FL (ref 82–98)
MICROSCOPIC COMMENT: ABNORMAL
MONOCYTES # BLD AUTO: 0.6 K/UL (ref 0.3–1)
MONOCYTES NFR BLD: 9.6 % (ref 4–15)
NEUTROPHILS # BLD AUTO: 4.1 K/UL (ref 1.8–7.7)
NEUTROPHILS NFR BLD: 63 % (ref 38–73)
NRBC BLD-RTO: 0 /100 WBC
PHOSPHATE SERPL-MCNC: 2.8 MG/DL (ref 2.7–4.5)
PLATELET # BLD AUTO: 222 K/UL (ref 150–450)
PMV BLD AUTO: 9.8 FL (ref 9.2–12.9)
POTASSIUM SERPL-SCNC: 4 MMOL/L (ref 3.5–5.1)
PROT UR-MCNC: 29 MG/DL (ref 6–15)
PTH-INTACT SERPL-MCNC: 80.6 PG/ML (ref 9–77)
RBC # BLD AUTO: 4.07 M/UL (ref 4.6–6.2)
RBC #/AREA URNS HPF: 2 /HPF (ref 0–4)
SATURATED IRON: 30 % (ref 20–50)
SODIUM SERPL-SCNC: 139 MMOL/L (ref 136–145)
TOTAL IRON BINDING CAPACITY: 308 UG/DL (ref 250–450)
TRANSFERRIN SERPL-MCNC: 220 MG/DL (ref 200–375)
WBC # BLD AUTO: 6.46 K/UL (ref 3.9–12.7)
WBC #/AREA URNS HPF: 14 /HPF (ref 0–5)

## 2024-05-13 PROCEDURE — 82306 VITAMIN D 25 HYDROXY: CPT | Performed by: INTERNAL MEDICINE

## 2024-05-13 PROCEDURE — 81001 URINALYSIS AUTO W/SCOPE: CPT | Performed by: INTERNAL MEDICINE

## 2024-05-13 PROCEDURE — 82728 ASSAY OF FERRITIN: CPT | Performed by: INTERNAL MEDICINE

## 2024-05-13 PROCEDURE — 80069 RENAL FUNCTION PANEL: CPT | Performed by: INTERNAL MEDICINE

## 2024-05-13 PROCEDURE — 84156 ASSAY OF PROTEIN URINE: CPT | Performed by: INTERNAL MEDICINE

## 2024-05-13 PROCEDURE — 82570 ASSAY OF URINE CREATININE: CPT | Performed by: INTERNAL MEDICINE

## 2024-05-13 PROCEDURE — 36415 COLL VENOUS BLD VENIPUNCTURE: CPT | Performed by: INTERNAL MEDICINE

## 2024-05-13 PROCEDURE — 83540 ASSAY OF IRON: CPT | Performed by: INTERNAL MEDICINE

## 2024-05-13 PROCEDURE — 83970 ASSAY OF PARATHORMONE: CPT | Performed by: INTERNAL MEDICINE

## 2024-05-13 PROCEDURE — 85025 COMPLETE CBC W/AUTO DIFF WBC: CPT | Performed by: INTERNAL MEDICINE

## 2024-05-14 ENCOUNTER — CLINICAL SUPPORT (OUTPATIENT)
Dept: UROLOGY | Facility: CLINIC | Age: 74
End: 2024-05-14
Payer: COMMERCIAL

## 2024-05-14 DIAGNOSIS — N40.0 BENIGN PROSTATIC HYPERPLASIA WITHOUT URINARY OBSTRUCTION: Primary | ICD-10-CM

## 2024-05-14 LAB — POC RESIDUAL URINE VOLUME: 3 ML (ref 0–100)

## 2024-05-14 PROCEDURE — 99499 UNLISTED E&M SERVICE: CPT | Mod: S$GLB,,, | Performed by: UROLOGY

## 2024-05-14 PROCEDURE — 51741 ELECTRO-UROFLOWMETRY FIRST: CPT | Mod: S$GLB,,, | Performed by: UROLOGY

## 2024-05-14 PROCEDURE — 51798 US URINE CAPACITY MEASURE: CPT | Mod: S$GLB,,, | Performed by: UROLOGY

## 2024-05-14 NOTE — PROGRESS NOTES
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  Since no urethral stricture seen fu for urodynamics to eval if he has detrussor overactivity vs bph obstruction. Botox vs tuip (transurethral incision or prostate for high riding bladder neck) vs both at the same time and seeing how he responds.   At urodynamics I will place urethral catheter. Resitance likely at prostate-high riding and small defect seen     nterval history by me/ prior to urodynamics on 3/4/24:  Primary complaint is frequency and urgency with UUI and slow flow.  Underwent uds today which showed he had delayed sensation with slow flow. Bladder mostly emptied after procedure. Catheter placement was difficult due to high riding bladder neck but able to perform procedure. Recommended proceeding with tuip/turp.  Ua was neg.   However he returned in the afternoon bc unable to urinate.   Eryn placed coude jean and 475 dark urine drained.   She irrigated him and removed some clots    Uroflow results (date: 05/14/2024): Voiding time: 21.4s, Flow time: 16.1s, TTP flow: 3.4s, Peak flowrate: 28.6 mL/s, Average flowrate: 15.3mL/s, Intervals: 2, Voided volume: 246 mL, Pvr by bladder scan: 3mL . Pattern of curve: see 's addendum.     On flomax 0.4mg nightly: No  On flomax 0.8mg nightly: Yes        Uroflow curve: fast curve, flow much faster since tuip  Recommendations: fu as scheduled    Uroflow images uploaded and reviewed by me 05/14/2024       Follow up scheduled for: 5/31/2024

## 2024-05-31 ENCOUNTER — OFFICE VISIT (OUTPATIENT)
Dept: UROLOGY | Facility: CLINIC | Age: 74
End: 2024-05-31
Payer: COMMERCIAL

## 2024-05-31 VITALS — WEIGHT: 164.88 LBS | HEIGHT: 70 IN | BODY MASS INDEX: 23.6 KG/M2

## 2024-05-31 DIAGNOSIS — N40.1 BPH WITH OBSTRUCTION/LOWER URINARY TRACT SYMPTOMS: Primary | ICD-10-CM

## 2024-05-31 DIAGNOSIS — N13.8 BPH WITH OBSTRUCTION/LOWER URINARY TRACT SYMPTOMS: Primary | ICD-10-CM

## 2024-05-31 LAB — POC RESIDUAL URINE VOLUME: 0 ML (ref 0–100)

## 2024-05-31 PROCEDURE — 3288F FALL RISK ASSESSMENT DOCD: CPT | Mod: CPTII,S$GLB,, | Performed by: NURSE PRACTITIONER

## 2024-05-31 PROCEDURE — 1101F PT FALLS ASSESS-DOCD LE1/YR: CPT | Mod: CPTII,S$GLB,, | Performed by: NURSE PRACTITIONER

## 2024-05-31 PROCEDURE — 1160F RVW MEDS BY RX/DR IN RCRD: CPT | Mod: CPTII,S$GLB,, | Performed by: NURSE PRACTITIONER

## 2024-05-31 PROCEDURE — 99999 PR PBB SHADOW E&M-EST. PATIENT-LVL V: CPT | Mod: PBBFAC,,, | Performed by: NURSE PRACTITIONER

## 2024-05-31 PROCEDURE — 1159F MED LIST DOCD IN RCRD: CPT | Mod: CPTII,S$GLB,, | Performed by: NURSE PRACTITIONER

## 2024-05-31 PROCEDURE — 51798 US URINE CAPACITY MEASURE: CPT | Mod: S$GLB,,, | Performed by: NURSE PRACTITIONER

## 2024-05-31 PROCEDURE — 4010F ACE/ARB THERAPY RXD/TAKEN: CPT | Mod: CPTII,S$GLB,, | Performed by: NURSE PRACTITIONER

## 2024-05-31 PROCEDURE — 99024 POSTOP FOLLOW-UP VISIT: CPT | Mod: S$GLB,,, | Performed by: NURSE PRACTITIONER

## 2024-05-31 PROCEDURE — 1126F AMNT PAIN NOTED NONE PRSNT: CPT | Mod: CPTII,S$GLB,, | Performed by: NURSE PRACTITIONER

## 2024-05-31 RX ORDER — TAMSULOSIN HYDROCHLORIDE 0.4 MG/1
2 CAPSULE ORAL NIGHTLY
COMMUNITY
Start: 2024-05-16

## 2024-05-31 NOTE — PROGRESS NOTES
[facility]/Formerly [facility], later [facility]  Group: Erika/Scott/Denise/Jose  NPs: Brittany Westbrook/Teena Nunez    Note today written by:Teena Nunez  Date of Service: 05/31/2024    CHIEF COMPLAINT: F/u BPH, LUTS    PRESENTING ILLNESS:    Onesimo Paula is a 73 y.o. male who presents for f/u BPH, LUTS. Last clinic visit was 4/11/24 with Dr James    Seen by me (Dr James) 9/30/19  Patient had MRI for right hip pain and was found have a thick-walled bladder with enlarged prostate.  He states that he saw urologist over 40 years ago for burning with urination.  He had a renal bladder ultrasound in 2016 which showed enlarged prostate and bilateral renal cyst done for renal insufficiency whom he sees  for.  Denies any gross hematuria.  Review of for previous urine show no microscopic hematuria.  Twenty-five pack-year history of smoking but quit in 1997.      He also has AUA symptom score 6 and occasional weak stream and urgency but voids every 3-5 hours when he is working and a little more frequent when he is not.  Denies any urge incontinence.  Okay stream with occasional weak stream.  PVR today 09/03/2019 is 15 cc.  He may have tried Flomax a couple years ago but does not recall it helping her changing any was symptoms.  Overall really not that bothered by his urinary symptoms.      Also states he has ED.  Previously tried sildenafil unsure dose and it helped but wife does not want him to take, exam revealed peyronie's     Plan: started tadalafil 5mg due to thick walled bladder although essentially asx. No further f/u fr b renal cysts.      Interval history 11/18/19:   Pt states today Cialis has NOT improved any of his lower urinary tract symptoms since starting the medication.  He currently takes this medication before his bedtime.  Pt still c/o urinary urgency, especially during the day.  Nocturia is 1-2x nightly and not bothersome at this time.  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  Plan: started ditropan 10mg XL and cont'd tadalafil     Interval history by idalia 1/6/20:   TODAY, pt states the oxybutynin only improved his LUTS by over 10% at this time.  PVR by bladder scan performed in office today:  11 mL  Rec'd: d/c oxybutynin, start myrbetriq, cont tadalafil 5mg daily.      Interval history 7/27/20:   Tried myrbetriq but didn't help. Having urgency, hesistancy and intermittent slow stream but urgency most bothersome and having some UUI with a few drop, but not that bothersome. Drinks 1 c of coffee in am. Soda during day. 1 c of coffee int he evening. Has never tried stopping these to see if they help. Drinks beer and notices increase in frequency following day.   On tadalafil 5mg daily for ED and bph.  pvr by scan: 32cc  Voiding every 30 minutes a day recently more pronounced at home (has been on vacation).  Takes hctz in am. When he's working he only voids about 2x-4x a day. Drinks caffeine while working including energy drinks. No nocturia.   psa 1/31/20 2.2 (up from 1.2 year prior). Repeat psa 0.85 7/29/20  WINSOME today: 40g + no nodules.  Uroflow 7/30/20: peak flow 12.8mL/s,  avg flow 5.4mL/s, voided vol: 191cc, pvr by scan:0      Cysto trus 8/17/20: normal bladder, no stricture. Membranous and distal prostatic urethra with papillary tissue suspicious for prostatitis.trus volume 42.6g. bc of his urgency on tadalafil, flomax, vesicare found rx cipro for 14d to see if it helped w urgency. D/c'd vesicare. cont'd flomax and tadalafil. rtc to see idalia in 2-3 weeks and me in 2 months to discuss bph procedures     Interval history by idalia 9/9/20:   Pt states he is still having the urinary urgency even since finishing the Cipro antibiotics at this time.  He denies gross hematuria, dysuria.  UA today shows normal findings.  PVR by bladder scan today:  9 mL  Plan: rec'd restarting vesicare     Interval history 10/1/20:    Taking  flomax 0.4mg nightly and says flow is intermittent on this.   Also on the vesicare and says it doesn't help with the urgency. Voiding 2-3x a day and none at night. 3-4x a week c/o urge incontinence if he tries to hold it too long, mostly in the am when he wakes up. Denies any difficulty with walking. No weakness or numbness.   Changed to decaf coffee 1x a day. Changed to sprite and caffeine free coke.   He does not want to take any more meds. He is not happy with the retrograde ejaculation.   Sees dr. leger for Mitral Valve leakage, last saw him a week ago, was told he should f/u in March 2021. Tadalafil helping with erections      Interval history 11/19/20:   Underwent rezum on 10/21/20. continue flomax, vesicare and tadafil daily for 3 more months.   Returned for fill and pull on 10/26/20. Filled with 210, voided 210  For 2 weeks postop would have blood when he had a BM, improving.   Today (1 month later) he states he has some increased urgency. UUI 2-3x a day (increased from 3-4x a week).  Still taking vesicare  freq q1 hour (worse in cold weather). Without cold weather freq q 3 hour.   ua today with small wbc and mod blood - some dysuria  No significant change in stream yet  pvr by scan today: 134  AUA ssx:(2 incomplete emptying, 3 frequency, 3 intermittency, 4 urgency, 2 weak stream, 2 straining, 0 sleeping). 16. QOL: 4     Interval history by me in clinic on 3/9/21:  Returns today and states states he ran out of flomax and vesicare a month ago. No increased frequency, urge incontinence or slower stream off the flomax  Still having UUI 1-2x a day with a few drops when he hears water.   Voids 8x/24 hours and 4x/8 hours while at work. Nocturia occ (same). No longer drinking caffeine.   Still on tadalafil 5mg daily.  No longer having retrograde ejaculation and constipation improved. Hctz in am.  Frequency mostly in  the pm. Bothers him but able to stop at bathrooms. Sometimes loses erection with tadalafil 5mg  daily. Rare. Not more frequent. Still getting morning erections.   AUA ssx today:(1 incomplete emptying, 2 frequency, 1 intermittency, 3 urgency, 3 weak stream, 0 straining, 0 sleeping). 10. QOL: mixed  Bladder scan PVR today was 35mL.    I reviewed his previous PSAs and his most recent psa within the last year was 2.3, %free 20.87, last year was 2.2        Interval history with idalia on 4/9/21:  UA today showed normal findings.  Pt states today even though he tried and changed his HCTZ med to evenings as discussed last ov, his LUTS have not improved.  Pt as of today's visit is still c/o urinary urgency and frequency during the daytime.  Has not improved since last ov.  Pt states today he has tried Vesicare and Oxybutynin in the past with no improvement in symptoms.  Pt currently denies gross hematuria and dysuria.  She put him on myrbetriq again        Interval history by me 6/15/21:  Uroflow results (date: 03/18/2021): Voiding time: 24.3s, Flow time: 23.1s, TTP flow: 8.0s, Peak flowrate: 19.2 mL/s, Average flowrate: 10.6mL/s, Intervals: 2, Voided volume: 244 mL, Pvr by bladder scan 33. Pattern of curve: see uploaded image, reviewed by   At last visit was c/o freq/urgency. Frequency 8-10x/day. Tried myrbetriq before rezum, ditropan, vesicare and changing hctz to pm but no improvement. Then in April started myrbetriq again , after about 4 weeks saw improvement, now down to 5-6x a day. avg urg: 3. No nocturia. No incontinence.  Also on tadalafil 5mg daily.   AUA ssx today:(1 incomplete emptying, 1 frequency, 1 intermittency, 2 urgency, 3 weak stream, 0 straining, 0 sleeping). 8. QOL:   Bladder scan PVR today was 0mL.       HPI/CC today 6/7/22:   Had him Continue myrbetriq 50mg daily, has enough until 4/2021, Continue tadalafil 5mg daily, refilled today for a year.  Says myrbetriq not helping anymore. He held it for a few days and noticed no difference. On cialis 5mg daily.   Does not have constipation  Voids  about 9-10x during waking hours. occ urge incontinence 3x a week. Few drops. Started drinking caffeine this week.   Nocturia 1-2x a night. Urine flow varies.    psa 5/31/22 1.3  ua today neg, pvr by scan: 5  AUA ssx:(2 incomplete emptying, 3 frequency, 2 intermittency, 4 urgency, 3 weak stream, 1 straining, 1 sleeping). 16. QOL: mixed  Appendix removed in January complicated by post-op ileus.     Interval history 8/23/22:  Continue myrbetriq 50mg daily, has enough until 4/2021  Continue tadalafil 5mg daily, refilled today for a year  After adding vesicare to myrbetriq - went from voiding 9-10x day, 1-2x a night with occ UUI 3x a week to 5-6x/day, 0 night and no daily UUI with drops. Says he has dry mouth but about the same. However does have some heart burn that is new.   UA today: neg. pvr by scan: 115 (this is the highest it's been, feels empty).  AUA ssx:(1 incomplete emptying, 2 frequency, 0 intermittency, 2 urgency, 2 weak stream, 1 straining, 0 sleeping). 8. QOL: pleased     Interval history 2/28/23:  Says he is doing well on VESIcare and myrbetriq.  However it is expensive.  About 120 dollars every 3 months.  Also having dry mouth with it.  On tadalafil 5 mg daily for erections, obtaining from [employer].  This dose wish for him sometimes.  He tried Viagra 100 mg and it seemed to work.  Aua ssx: 12, pvr: 25     Interval history 4/21/23:  I had him discontinue both myrbetriq and VESIcare at the last visit to see if his incontinence would get worse.  Followed up with Teena on 03/29/2023.  His PVR was 0.  His AUA symptom score was 14, 5.  He was still taking tadalafil 5 mg daily.  He said with stopping VESIcare and myrbetriq he had 2 episodes of incontinence that were large volume, increased postvoid dribbling and he feels like his urinary frequency increased.  Was scheduled with me to discuss InterStim because he wanted to hold off on restarting any medications due to cost and side effects  He returns today with a  voiding diary.  He kept a voiding diary for me days and it looks like he urinates on average about 9-10 times a day.  That is up from 6 times a day when he was taking medications.  He did not documented on his voiding diary but he leaks urine 2 to 3 times a day on the way to the bathroom.  Not wearing any pads.  Does not have to change his underwear.  If he could rate his urgency he would say on average his urgency is 4/5.  He is interested in proceeding with InterStim.  He ended up using tadalafil 10 mg once and said this dose worked good for him for his erectile dysfunction  Voided urine today is negative.  Confirmed again that currently he urinates 7-10 times a day, average urgency is 4 and 1-2 leaks a day.          Interval history 5/25/23:  I did a peripheral nerve evaluation on him on Monday, 4 days ago.  Since then he has gone from urinating 6-10 times a day down to about 6 times a day.  Never had issues at night.  He went from leaking 2 to 3 times a day incontinence down to 1x over the last 3 days. Avg urgency went from 4 to 2. He says >50% improvement and wants to proceed. Not on any meds.   He previously saw  for stress tests bc of SOB. His last w/u was 11/2022 and had a neg stress test. On asa 81mg. No stents. They could never figure out why he has SOB.  He sees pulmonology regularly for nodules in the lungs.  Wires removed today     Interval history 7/11/23:  Here today for interstim stage 1 and 2. Preop appt revealed a1c 6.2, glucose 124. Ua with tr protein/tr bld on 6/28. Started on metformin.   Urinating every 1-2 hours. Uui with drops 2x a day. No pads or underwear changes.  Held asa 81mg for 7d.      Interval history 8/8/23 telephone visit :  Pt states that initially he had issues with urgency and frequency after his procedure but since talking to clarita and making adjustments he has gone from urinating every 1-2 hours to 3-4x a day. Avg urgency remains around 4 and no UUI.   He is happy  with his symptoms.      Interval history by ME in CLINIC on 11/13/23 for postop interstim fu and oab fu:  He says that his frequency has returned despite changing his settings. Says changing the setting works for a bit. Then no longer works. Nos on 3.2.   He says after turning off his interstim for us in August for us abd for his liver he had an increase in his frequency and had to increase the amplitude   He changed the settings on 10/31 and he went from 8x a day down to 5x a day. He stopped hctz a month ago by his cardiologist but then he restarted the hctz in the morning last week and his frequency increased to 9x a day.  Currently on program 2, 3.2.    He says urinating about 8x a day and 1x a night. Leaking with UUI and drops 3-4x a day and 2 accidents last week. No pads.   Urgency 5/5.  1 cup of caffeine in the morning.   Voiding diary: 10/30/23- 8x, changed on 10/31. On 10/31 -5x, 11/1- 5x, 11/3- 6x, 11/11-9x. 11/12-7x. This morning 4x/ 6 hours.   AUA ssx:(2 incomplete emptying, 3 frequency, 2 intermittency, 3 urgency, 3 weak stream, 0 straining, 1 sleeping). 14. QOL: mostly satisfied  Pvr today: 40. Ua today: neg     Interval history by ME in CLINIC on 1/22/24 for oab:  Plan was to do uds bc of his oab but when joe tried to place ureteral catheter it did not go easily. Then she tried 16fr coude and stopped mid way and saw blood. Decided to hold off on and plan for cysto. He adjusted his interstim and he said no change. Feels the flutter in his right perineum. . He says flow is fast sometimes and slow sometimes.  Cysto trus 8/17/20: 42.6g.   Rezum 10/21/20  Last peak flow was 19.2 in 3/18/21.  Last catheter was at time of cholecystectomy in jan 2022.   Interstim 7/11/23  Frequency:Still urinates bw 6 to 12x a day. Has more bad days than good. Nocturia 1x a night.  Incontinence:  Has drops/leaks twice a day.  No pads  Drinks 1 soda in am and decaf cup in evening. No tea.      Interval history/H&P Update by  me/ prior to cysto trus on 2/19/24:  urine at the lab- ua 1/30/4:neg(would need to returnwith smoking hx although quit in 1997  Ua today 2/19/24 30 protien  Uroflow and pvr soon. Has oab. Will need to drink fluid here right before the test.   Uroflow results (date: 01/30/2024): Voiding time: 39.4s, Flow time: 39.3s, TTP flow: 10.6s, Peak flowrate: 16.7 mL/s, Average flowrate: 8.8mL/s, Intervals: 1, Voided volume: 346 mL, Pvr by bladder scan: 45mL . Pattern of curve: see 's addendum   Schedule cystoscopy and TRUS to rule out stricture as cause of oab on Monday feb 19th. See how much prostate has grown back. Previously had rezum in 2020 and trus vol 46g prior to this.   If he does have a urethral stricture- then can explain sx and would need to treat stricture to that improves symptoms.then would need to do in and out cath intermittently forr a while to keep stricture from scarring down again.   If he doesn't have urethral stricture will proceed with uds.   (Will go ahead and schedule to follow cysto/trus and can cancel If we don't need)  Recommend uds since he already has had rezum and interstim and want to evaluate if he has detrussor overactivity/bladder muscle kenn too much.   If so would offer botox since he has already has interstim. But has to be repeated.      Cysto/TRUS Findings 2/19/24:   Cystoscopic findings: rezum defect seen in median lobe closer to veru. Minimal b lobe hypertrophy. + high riding bladder neck with small intravesical protrusion.    TRUS volume: 43.38. WINSOME: just to right of midline small ridge <0.5cm felt near base. 40g prostate.            Assessment: Onesimo Paula is a 73 y.o. male with h/o oab and bph sp rezum 10/21/20, interstim 5/22/23. Small ridge on rectal exam today. Difficult catheterization during attempted uds recently. No urethral stricture seen but does have high riding bladder neck.      Plan:  Continue flomax 0.8mg nightly for now to help  with high riding bladder neck.  Since no urethral stricture seen fu for urodynamics to eval if he has detrussor overactivity vs bph obstruction. Botox vs tuip (transurethral incision or prostate for high riding bladder neck) vs both at the same time and seeing how he responds.   At urodynamics I will place urethral catheter. Resitance likely at prostate-high riding and small defect seen     nterval history by me/ prior to urodynamics on 3/4/24:  Primary complaint is frequency and urgency with UUI and slow flow.  Underwent uds today which showed he had delayed sensation with slow flow. Bladder mostly emptied after procedure. Catheter placement was difficult due to high riding bladder neck but able to perform procedure. Recommended proceeding with tuip/turp.  Ua was neg.   However he returned in the afternoon bc unable to urinate.   Eryn placed coude jean and 475 dark urine drained.   She irrigated him and removed some clots     Interval history/H&P Update by me/ prior to tuip/turp on 3/12/24:  He came back for voiding trial and passed. He ended up staying off flomax.   Takes amlodipine and flomax 7-8-9 in the evening could be reason  Tadalafil in the mornign  Hctz in the morning  Recent cr 1.5 -sees , saw last year, fu in may   Psa screen today 3.8- but had catheter and uds 3 weeks ago.   Ua today with tr protein     Current Urinary symptoms 3/12/24:  Frequency:Still urinates bw 8-9x a day. Has more bad days than good. Nocturia 1x a night.  Incontinence:  Has drops/leaks 2-3 a day.  No pads.  Avg urgency: 4  Interstim on currently         Bipolar turp 3/13/24: path 3+3 from right side- 4/77 chips  Ureteral orifices close to resection. Part of trigone resected.   WINSOME revealed NO NODULES or firm areas today (psa prior to procedure 3.8 but had catheter and difficult catheter placement during uds less than a week ago)  Median lobe removed was likely the biggest obstructing  component.   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                 Interval history by ME/ in CLINIC on 4/11/24 for postop turp and prostate cancer findings:   Current Urinary symptoms 3/12/24:  Frequency:down to 5x a day from 8-9x a day. Nocturia 2x a week.    Incontinence:  leaks 5 to 6x a day up from 2-3x a day. Improving. Occurs with and without activity. Previously no pads, now 1 pad a day. Not wet when he changes.   Avg urgency: 4 previously, 2 now   AUA ssx:(2 incomplete emptying, 1 frequency, 1 intermittency, 2 urgency, 1 weak stream, 0 straining, 1 sleeping). 10. QOL: 8     Interstim on currently   Pvr by scan: 0  Ua today with small leuk and mod blood. Some dysuria.   Still has some blood in first void and with straining  Overall he says better.     Interval history 5/31/24 (Mayra MORAES)  Stopped flomax and oxybutynin after last visit  PVR 0 ml  Pt is voiding 8-9 x per day. Occasional nocturia x 1. Only has leakage if tries to hold after urge occurs. Wearing 1 pad per day.  Unsure of fluid intake daily  Denies dysuria, gross hematuria, flank pain, fever, chills, nausea or vomiting     Bladder/prostate history:  Cysto trus 8/17/20: 42.6g.   Rezum 10/21/20  Last peak flow was 19.2 in 3/18/21.  Last catheter was at time of cholecystectomy in jan 2022.   PNE 5/21/23  Interstim 7/11/23  Cysto trus 2/19/24 45g. High riding bladder neck.   UDS 3/4/24  Turp 3/12/24. Gl 3+3 in 4/77 chips.      PVR History:  4/11/24Pvr: 0  3/13/24            Turp/tuip  3/8/24              Filled with 140. Voided 250  3/4/24              Uds: peak flow: 8.5, avg flow: 4.7, voided vol: 352, pvr by scan: 27 but then came back and pvr by io: 475  1/30/24            Peak flow: 16.7, voided vol: 346, pvr: 45  11/13/23          40  2/28/23            25, aua ssx: 12  8/23/22            115  6/15/21            pvr by scan: 0  3/18/21            UF: pF: 19.2, avg: 10.6, voided vol: 244, pvr: 33  3/2/21               pvr:  35  11/19/20          pvr by scan: 134 (couldn't start after he stopped)  10/26/20          Fill and pull: 0cc pvr  11/19/20          rezum  9/9/20              pvr by scan: 9cc  7/27/20            pvr by scan: 32cc  8/17/20            Cysto trus: 42.6g.   7/30/20           UF: peak flow 12.8mL/s,  avg flow 5.4mL/s, voided vol: 191cc, pvr by scan:0   1/6/20              pvr by scan: 11cc   9/3/19              pvr by scan: 15cc         PSA history: no family hx of prostate cancer  3/13/24            Turp chips: 4/77 right lobe   3/13/24            3.8, WINSOME: nodules.   4/5/23              1.5  1/22/24DRE: 40g  5/31/221.3  3/2/21              2.3, %free 20.87, pvr: 35 (psa screen 4.6)  11/19/20          rezum  7/29/20            0.85, %free 54.12  1/31/20            2.2   2/21/19            1.2  3/14/18            1.0     Urine history: 1 ppd x 26 years. Quit 1997. Anticoag: asa 81mg  4/11/24                        Mod bld/small leuk,  2/19/24            30 protein  1/30/54            neg  1/22/24Not able to provide- do at the lab  11/13/231 rbc  6/28/23Tr bld  5/22/23neg  4/21/23Tr prot  6/7/22              neg  11/19/20          Small wbc/mod blood-send for ua patricio and culture pvr by scan: 134  10/14/20          Neg  9/16/20            neg  9/9/20              neg  3/31/20            Neg  1/6/20              Neg  11/18/19          neg  8/8/19              No cx, void: n/a 0 rbc  2/21/19            Ng, void: neg, 0 rbc  4/9/18              No cx, void: neg  9/2017             No blood    REVIEW OF SYSTEMS: as stated above in hpi    PATIENT HISTORY:    Past Medical History:   Diagnosis Date    Acid reflux     Acquired tricuspid valve insufficiency     Anemia     Borderline diabetes     BPH (benign prostatic hyperplasia)     Cataract     1/16/20 rt eye, 1/30/20- Lt eye    Coronary artery disease     Hypertension     Liver cyst 08/24/2022    Pulmonary nodules     Skin cancer        Past Surgical History:   Procedure  Laterality Date    CYSTOSCOPY N/A 2/19/2024    Procedure: CYSTOSCOPY;  Surgeon: La Nena James MD;  Location: [facility] OR;  Service: Urology;  Laterality: N/A;    CYSTOSCOPY WITH TRANSURETHRAL DESTRUCTION OF PROSTATE,RFA N/A 10/21/2020    Procedure: CYSTOSCOPY WITH TRANSURETHRAL DESTRUCTION OF PROSTATE,RFA;  Surgeon: La Nena James MD;  Location: [facility] OR;  Service: Urology;  Laterality: N/A;  please make sure [facility] rep available [phone]    EYE SURGERY      for cataracts, rt eye 1/16/20, left eye 1/30/20    HERNIA REPAIR  12/1999    HERNIA REPAIR  05/2004    INSERTION OF SACRAL NEUROSTIMULATOR, ELECTRODES, AND IMPLANTABLE PULSE GENERATOR (IPG) N/A 7/12/2023    Procedure: INSERTION, ELECTRODE LEADS AND PULSE GENERATOR, NEUROSTIMULATOR, SACRAL;  Surgeon: La Nena James MD;  Location: [facility] OR;  Service: Urology;  Laterality: N/A;    KNEE ARTHROSCOPY Left 04/12/2018    LAPAROSCOPIC APPENDECTOMY N/A 1/2/2022    Procedure: APPENDECTOMY, LAPAROSCOPIC;  Surgeon: Sam Goss MD;  Location: [facility] OR;  Service: General;  Laterality: N/A;    PERCUTANEOUS INSERTION OF SACRAL NERVE NEUROSTIMULATOR ELECTRODE LEAD N/A 5/22/2023    Procedure: INSERTION, ELECTRODE LEAD, NEUROSTIMULATOR, SACRAL, PERCUTANEOUS Keep first;  Surgeon: La Nena James MD;  Location: [facility] OR;  Service: Urology;  Laterality: N/A;  patient needs to apply EMLA cream to entirety of lower back 20 minutes prior to procedure, also need 1% lidocaine available    PERCUTANEOUS INSERTION OF SACRAL NERVE NEUROSTIMULATOR ELECTRODE LEAD N/A 7/12/2023    Procedure: INSERTION, ELECTRODE LEAD, NEUROSTIMULATOR, SACRAL, PERCUTANEOUS;  Surgeon: La Nena James MD;  Location: [facility];  Service: Urology;  Laterality: N/A;    SKIN LESION EXCISION  10/2009    Neck    SKIN LESION EXCISION  2015    STAPEDECTOMY Right 03/2001    TRANSRECTAL ULTRASOUND EXAMINATION N/A 8/17/2020    Procedure: TRANSRECTAL ULTRASOUND;  Surgeon:  La Nena James MD;  Location: [facility] OR;  Service: Urology;  Laterality: N/A;    TRANSRECTAL ULTRASOUND EXAMINATION N/A 2/19/2024    Procedure: ULTRASOUND, RECTAL APPROACH;  Surgeon: La Nena James MD;  Location: [facility] OR;  Service: Urology;  Laterality: N/A;    TRANSURETHRAL INCISION OF PROSTATE N/A 3/13/2024    Procedure: INCISION, PROSTATE, TRANSURETHRAL;  Surgeon: La Nena James MD;  Location: [facility] OR;  Service: Urology;  Laterality: N/A;    TRANSURETHRAL RESECTION OF PROSTATE N/A 3/13/2024    Procedure: TURP (TRANSURETHRAL RESECTION OF PROSTATE)- BIPOLAR;  Surgeon: La Nena James MD;  Location: [facility] OR;  Service: Urology;  Laterality: N/A;  WITH PARALYSIS         PHYSICAL EXAMINATION:  Constitutional: He is oriented to person, place, and time. He appears well-developed and well-nourished.  He is in no apparent distress.  Abdominal:  He exhibits no distension.  There is no CVA tenderness.   Neurological: He is alert and oriented to person, place, and time.   Psych: Cooperative with normal affect.      Physical Exam      LABS:      Lab Results   Component Value Date    PSA 3.8 03/13/2024    PSA 1.5 04/05/2023    PSA 1.3 05/31/2022    PSATOTAL 2.3 03/02/2021    PSATOTAL 0.85 07/29/2020    PSAFREE 0.48 03/02/2021    PSAFREE 0.46 07/29/2020    PSAFREEPCT 20.87 03/02/2021    PSAFREEPCT 54.12 07/29/2020     Lab Results   Component Value Date    CREATININE 1.1 05/13/2024         IMPRESSION:    Encounter Diagnoses   Name Primary?    BPH with obstruction/lower urinary tract symptoms Yes         PLAN:  -Reassured pt he is emptying his bladder  -Conservative measures to control urgency and frequency including   1. Avoiding/minimizing bladder irritants (see below), especially in afternoon and evening hours  Discussed bladder irritants include coffee (even decaf), tea, alcohol, soda, spicy foods, acidic juices (orange, tomato), vinegar, and artificial sweeteners/sugary  beverages.  2. timed voiding - empty on a schedule (approx ~2-3 hours) in spite of need to urinate, to get ahead of urge  3. dont postponing voiding - dont hold it on purpose   4. bowel regimen as distended bowel has extrinsic compressive effect on bladder.   - any or all of the following in any combination, titrate to soft daily bowel movement without pushing or straining  - colace/stool softener capsule - once to twice daily  - miralax - 1 capful daily to start, can increase to 2x daily (or decrease to 1/2 cap daily)  - increase dietary fibery  - fiber supplements, such as metamucil  - prunes, prune juice  5. INCREASE water intake  6. Stop fluids 2 hours before bed, and urinate just before bed    -PSA scheduled prior to f/u with Dr James    -RTC as scheduled with Dr James    I encouraged him or any of his family members to call or email me with questions and/or concerns.      30 minutes of total time spent on the encounter, which includes face to face time and non-face to face time preparing to see the patient (eg, review of tests), Obtaining and/or reviewing separately obtained history, Documenting clinical information in the electronic or other health record, Independently interpreting results (not separately reported) and communicating results to the patient/family/caregiver, or Care coordination (not separately reported).

## 2024-06-02 RX ORDER — TADALAFIL 5 MG/1
5 TABLET ORAL
Qty: 30 TABLET | Refills: 0 | Status: SHIPPED | OUTPATIENT
Start: 2024-06-02

## 2024-07-10 ENCOUNTER — LAB VISIT (OUTPATIENT)
Dept: LAB | Facility: HOSPITAL | Age: 74
End: 2024-07-10
Attending: UROLOGY
Payer: COMMERCIAL

## 2024-07-10 DIAGNOSIS — N40.0 BENIGN PROSTATIC HYPERPLASIA WITHOUT URINARY OBSTRUCTION: ICD-10-CM

## 2024-07-10 DIAGNOSIS — C61 PROSTATE CANCER: ICD-10-CM

## 2024-07-10 LAB — COMPLEXED PSA SERPL-MCNC: 0.44 NG/ML (ref 0–4)

## 2024-07-10 PROCEDURE — 36415 COLL VENOUS BLD VENIPUNCTURE: CPT | Performed by: UROLOGY

## 2024-07-10 PROCEDURE — 84153 ASSAY OF PSA TOTAL: CPT | Performed by: UROLOGY

## 2024-07-16 ENCOUNTER — OFFICE VISIT (OUTPATIENT)
Dept: UROLOGY | Facility: CLINIC | Age: 74
End: 2024-07-16
Payer: COMMERCIAL

## 2024-07-16 ENCOUNTER — PATIENT MESSAGE (OUTPATIENT)
Dept: UROLOGY | Facility: CLINIC | Age: 74
End: 2024-07-16

## 2024-07-16 VITALS
HEART RATE: 60 BPM | SYSTOLIC BLOOD PRESSURE: 133 MMHG | BODY MASS INDEX: 23.48 KG/M2 | DIASTOLIC BLOOD PRESSURE: 72 MMHG | HEIGHT: 70 IN | WEIGHT: 164 LBS

## 2024-07-16 DIAGNOSIS — N52.9 ERECTILE DYSFUNCTION, UNSPECIFIED ERECTILE DYSFUNCTION TYPE: ICD-10-CM

## 2024-07-16 DIAGNOSIS — N13.8 BPH WITH OBSTRUCTION/LOWER URINARY TRACT SYMPTOMS: Primary | ICD-10-CM

## 2024-07-16 DIAGNOSIS — N32.81 OAB (OVERACTIVE BLADDER): ICD-10-CM

## 2024-07-16 DIAGNOSIS — C61 PROSTATE CANCER: ICD-10-CM

## 2024-07-16 DIAGNOSIS — N40.1 BPH WITH OBSTRUCTION/LOWER URINARY TRACT SYMPTOMS: Primary | ICD-10-CM

## 2024-07-16 LAB
BILIRUBIN, UA POC OHS: NEGATIVE
BLOOD, UA POC OHS: NEGATIVE
CLARITY, UA POC OHS: CLEAR
COLOR, UA POC OHS: YELLOW
GLUCOSE, UA POC OHS: NEGATIVE
KETONES, UA POC OHS: ABNORMAL
LEUKOCYTES, UA POC OHS: NEGATIVE
NITRITE, UA POC OHS: NEGATIVE
PH, UA POC OHS: 5.5
POC RESIDUAL URINE VOLUME: 15 ML (ref 0–100)
PROTEIN, UA POC OHS: ABNORMAL
SPECIFIC GRAVITY, UA POC OHS: 1.02
UROBILINOGEN, UA POC OHS: 0.2

## 2024-07-16 PROCEDURE — 99214 OFFICE O/P EST MOD 30 MIN: CPT | Mod: S$GLB,,, | Performed by: UROLOGY

## 2024-07-16 PROCEDURE — 3008F BODY MASS INDEX DOCD: CPT | Mod: CPTII,S$GLB,, | Performed by: UROLOGY

## 2024-07-16 PROCEDURE — 3075F SYST BP GE 130 - 139MM HG: CPT | Mod: CPTII,S$GLB,, | Performed by: UROLOGY

## 2024-07-16 PROCEDURE — 1160F RVW MEDS BY RX/DR IN RCRD: CPT | Mod: CPTII,S$GLB,, | Performed by: UROLOGY

## 2024-07-16 PROCEDURE — 3078F DIAST BP <80 MM HG: CPT | Mod: CPTII,S$GLB,, | Performed by: UROLOGY

## 2024-07-16 PROCEDURE — 51798 US URINE CAPACITY MEASURE: CPT | Mod: S$GLB,,, | Performed by: UROLOGY

## 2024-07-16 PROCEDURE — 1159F MED LIST DOCD IN RCRD: CPT | Mod: CPTII,S$GLB,, | Performed by: UROLOGY

## 2024-07-16 PROCEDURE — 4010F ACE/ARB THERAPY RXD/TAKEN: CPT | Mod: CPTII,S$GLB,, | Performed by: UROLOGY

## 2024-07-16 PROCEDURE — 99999 PR PBB SHADOW E&M-EST. PATIENT-LVL III: CPT | Mod: PBBFAC,,, | Performed by: UROLOGY

## 2024-07-16 PROCEDURE — 1126F AMNT PAIN NOTED NONE PRSNT: CPT | Mod: CPTII,S$GLB,, | Performed by: UROLOGY

## 2024-07-16 PROCEDURE — G2211 COMPLEX E/M VISIT ADD ON: HCPCS | Mod: S$GLB,,, | Performed by: UROLOGY

## 2024-07-16 PROCEDURE — 81003 URINALYSIS AUTO W/O SCOPE: CPT | Mod: QW,S$GLB,, | Performed by: UROLOGY

## 2024-07-16 PROCEDURE — 1101F PT FALLS ASSESS-DOCD LE1/YR: CPT | Mod: CPTII,S$GLB,, | Performed by: UROLOGY

## 2024-07-16 PROCEDURE — 3288F FALL RISK ASSESSMENT DOCD: CPT | Mod: CPTII,S$GLB,, | Performed by: UROLOGY

## 2024-07-16 RX ORDER — TADALAFIL 5 MG/1
5 TABLET ORAL EVERY MORNING
Qty: 90 TABLET | Refills: 3 | Status: SHIPPED | OUTPATIENT
Start: 2024-07-16 | End: 2025-07-16

## 2024-07-16 RX ORDER — CALCITRIOL 0.25 UG/1
0.25 CAPSULE ORAL
COMMUNITY
Start: 2024-05-22

## 2024-07-16 NOTE — PATIENT INSTRUCTIONS
Onesimo Paula is a 73 y.o. 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 Prostate cancer    3. OAB (overactive bladder)    4. Erectile dysfunction, unspecified erectile dysfunction type        Plan:     Continue cialis 5mg daily for Ed- sent to [facility]. He will need to call. Can use up to 20mg if needed 1-2 days a week.   Diagnostic psa in October (3 months from now)- then change to psa every 6 months  Fu in 3 months with diagnostic psa prior for aua ssx. Will change to q6 month psa then. Likely ordering mri prostate for march 2025 at that visit.     Active surveillance plan for prostate cancer  psa every 6 months  Digital rectal exam once a year  Mri likely once a year (may need valium)  Biopsy for rising psa, abnormal prostate exam or at 12 to 18 months after first biopsy   Treat for increasing volume or stage of prostate cancer

## 2024-07-16 NOTE — PROGRESS NOTES
[facility] Note - [place]  Staff: MD Erika  PCP: Reynaldo Rahman, APRN,FNP-C  Date of Service: 07/16/2024      Subjective:        HPI: Onesimo Paula is a 73 y.o. male     Seen by me 9/30/19  Patient had MRI for right hip pain and was found have a thick-walled bladder with enlarged prostate.  He states that he saw urologist over 40 years ago for burning with urination.  He had a renal bladder ultrasound in 2016 which showed enlarged prostate and bilateral renal cyst done for renal insufficiency whom he sees  for.  Denies any gross hematuria.  Review of for previous urine show no microscopic hematuria.  Twenty-five pack-year history of smoking but quit in 1997.      He also has AUA symptom score 6 and occasional weak stream and urgency but voids every 3-5 hours when he is working and a little more frequent when he is not.  Denies any urge incontinence.  Okay stream with occasional weak stream.  PVR today 09/03/2019 is 15 cc.  He may have tried Flomax a couple years ago but does not recall it helping her changing any was symptoms.  Overall really not that bothered by his urinary symptoms.      Also states he has ED.  Previously tried sildenafil unsure dose and it helped but wife does not want him to take, exam revealed peyronie's     Plan: started tadalafil 5mg due to thick walled bladder although essentially asx. No further f/u fr b renal cysts.      Interval history 11/18/19:   Pt states today Cialis has NOT improved any of his lower urinary tract symptoms since starting the medication.  He currently takes this medication before his bedtime.  Pt still c/o urinary urgency, especially during the day.  Nocturia is 1-2x nightly and not bothersome at this time.  He is a , therefore the daytime urgency really bothers him.  Plan: started ditropan 10mg XL and cont'd tadalafil     Interval history by idalia 1/6/20:   TODAY, pt states the oxybutynin only improved  his LUTS by over 10% at this time.  PVR by bladder scan performed in office today:  11 mL  Rec'd: d/c oxybutynin, start myrbetriq, cont tadalafil 5mg daily.     Interval history 7/27/20:   Tried myrbetriq but didn't help. Having urgency, hesistancy and intermittent slow stream but urgency most bothersome and having some UUI with a few drop, but not that bothersome. Drinks 1 c of coffee in am. Soda during day. 1 c of coffee int he evening. Has never tried stopping these to see if they help. Drinks beer and notices increase in frequency following day.   On tadalafil 5mg daily for ED and bph.  pvr by scan: 32cc  Voiding every 30 minutes a day recently more pronounced at home (has been on vacation).  Takes hctz in am. When he's working he only voids about 2x-4x a day. Drinks caffeine while working including energy drinks. No nocturia.   psa 1/31/20 2.2 (up from 1.2 year prior). Repeat psa 0.85 7/29/20  WINSOME today: 40g + no nodules.  Uroflow 7/30/20: peak flow 12.8mL/s,  avg flow 5.4mL/s, voided vol: 191cc, pvr by scan:0      Cysto trus 8/17/20: normal bladder, no stricture. Membranous and distal prostatic urethra with papillary tissue suspicious for prostatitis.trus volume 42.6g. bc of his urgency on tadalafil, flomax, vesicare found rx cipro for 14d to see if it helped w urgency. D/c'd vesicare. cont'd flomax and tadalafil. rtc to see idalia in 2-3 weeks and me in 2 months to discuss bph procedures     Interval history by idalia 9/9/20:   Pt states he is still having the urinary urgency even since finishing the Cipro antibiotics at this time.  He denies gross hematuria, dysuria.  UA today shows normal findings.  PVR by bladder scan today:  9 mL  Plan: rec'd restarting vesicare     Interval history 10/1/20:    Taking flomax 0.4mg nightly and says flow is intermittent on this.   Also on the vesicare and says it doesn't help with the urgency. Voiding 2-3x a day and none at night. 3-4x a week c/o urge incontinence if he  tries to hold it too long, mostly in the am when he wakes up. Denies any difficulty with walking. No weakness or numbness.   Changed to decaf coffee 1x a day. Changed to sprite and caffeine free coke.   He does not want to take any more meds. He is not happy with the retrograde ejaculation.   Sees dr. leger for Mitral Valve leakage, last saw him a week ago, was told he should f/u in March 2021. Tadalafil helping with erections     Interval history 11/19/20:   Underwent rezum on 10/21/20. continue flomax, vesicare and tadafil daily for 3 more months.   Returned for fill and pull on 10/26/20. Filled with 210, voided 210  For 2 weeks postop would have blood when he had a BM, improving.   Today (1 month later) he states he has some increased urgency. UUI 2-3x a day (increased from 3-4x a week).  Still taking vesicare  freq q1 hour (worse in cold weather). Without cold weather freq q 3 hour.   ua today with small wbc and mod blood - some dysuria  No significant change in stream yet  pvr by scan today: 134  AUA ssx:(2 incomplete emptying, 3 frequency, 3 intermittency, 4 urgency, 2 weak stream, 2 straining, 0 sleeping). 16. QOL: 4    Interval history by me in clinic on 3/9/21:  Returns today and states states he ran out of flomax and vesicare a month ago. No increased frequency, urge incontinence or slower stream off the flomax  Still having UUI 1-2x a day with a few drops when he hears water.   Voids 8x/24 hours and 4x/8 hours while at work. Nocturia occ (same). No longer drinking caffeine.   Still on tadalafil 5mg daily.  No longer having retrograde ejaculation and constipation improved. Hctz in am.  Frequency mostly in  the pm. Bothers him but able to stop at bathrooms. Sometimes loses erection with tadalafil 5mg daily. Rare. Not more frequent. Still getting morning erections.   AUA ssx today:(1 incomplete emptying, 2 frequency, 1 intermittency, 3 urgency, 3 weak stream, 0 straining, 0 sleeping). 10. QOL:  mixed  Bladder scan PVR today was 35mL.    I reviewed his previous PSAs and his most recent psa within the last year was 2.3, %free 20.87, last year was 2.2      Interval history with idalia on 4/9/21:  UA today showed normal findings.  Pt states today even though he tried and changed his HCTZ med to evenings as discussed last ov, his LUTS have not improved.  Pt as of today's visit is still c/o urinary urgency and frequency during the daytime.  Has not improved since last ov.  Pt states today he has tried Vesicare and Oxybutynin in the past with no improvement in symptoms.  Pt currently denies gross hematuria and dysuria.  She put him on myrbetriq again       Interval history by me 6/15/21:  Uroflow results (date: 03/18/2021): Voiding time: 24.3s, Flow time: 23.1s, TTP flow: 8.0s, Peak flowrate: 19.2 mL/s, Average flowrate: 10.6mL/s, Intervals: 2, Voided volume: 244 mL, Pvr by bladder scan 33. Pattern of curve: see uploaded image, reviewed by   At last visit was c/o freq/urgency. Frequency 8-10x/day. Tried myrbetriq before rezum, ditropan, vesicare and changing hctz to pm but no improvement. Then in April started myrbetriq again , after about 4 weeks saw improvement, now down to 5-6x a day. avg urg: 3. No nocturia. No incontinence.  Also on tadalafil 5mg daily.   AUA ssx today:(1 incomplete emptying, 1 frequency, 1 intermittency, 2 urgency, 3 weak stream, 0 straining, 0 sleeping). 8. QOL:   Bladder scan PVR today was 0mL.      HPI/CC today 6/7/22:   Had him Continue myrbetriq 50mg daily, has enough until 4/2021, Continue tadalafil 5mg daily, refilled today for a year.  Says myrbetriq not helping anymore. He held it for a few days and noticed no difference. On cialis 5mg daily.   Does not have constipation  Voids about 9-10x during waking hours. occ urge incontinence 3x a week. Few drops. Started drinking caffeine this week.   Nocturia 1-2x a night. Urine flow varies.    psa 5/31/22 1.3  ua today neg, pvr by  scan: 5  AUA ssx:(2 incomplete emptying, 3 frequency, 2 intermittency, 4 urgency, 3 weak stream, 1 straining, 1 sleeping). 16. QOL: mixed  Appendix removed in January complicated by post-op ileus.    Interval history 8/23/22:  Continue myrbetriq 50mg daily, has enough until 4/2021  Continue tadalafil 5mg daily, refilled today for a year  After adding vesicare to myrbetriq - went from voiding 9-10x day, 1-2x a night with occ UUI 3x a week to 5-6x/day, 0 night and no daily UUI with drops. Says he has dry mouth but about the same. However does have some heart burn that is new.   UA today: neg. pvr by scan: 115 (this is the highest it's been, feels empty).  AUA ssx:(1 incomplete emptying, 2 frequency, 0 intermittency, 2 urgency, 2 weak stream, 1 straining, 0 sleeping). 8. QOL: pleased    Interval history 2/28/23:  Says he is doing well on VESIcare and myrbetriq.  However it is expensive.  About 120 dollars every 3 months.  Also having dry mouth with it.  On tadalafil 5 mg daily for erections, obtaining from [employer].  This dose wish for him sometimes.  He tried Viagra 100 mg and it seemed to work.  Aua ssx: 12, pvr: 25    Interval history 4/21/23:  I had him discontinue both myrbetriq and VESIcare at the last visit to see if his incontinence would get worse.  Followed up with Teena on 03/29/2023.  His PVR was 0.  His AUA symptom score was 14, 5.  He was still taking tadalafil 5 mg daily.  He said with stopping VESIcare and myrbetriq he had 2 episodes of incontinence that were large volume, increased postvoid dribbling and he feels like his urinary frequency increased.  Was scheduled with me to discuss InterStim because he wanted to hold off on restarting any medications due to cost and side effects  He returns today with a voiding diary.  He kept a voiding diary for me days and it looks like he urinates on average about 9-10 times a day.  That is up from 6 times a day when he was taking medications.  He did not  documented on his voiding diary but he leaks urine 2 to 3 times a day on the way to the bathroom.  Not wearing any pads.  Does not have to change his underwear.  If he could rate his urgency he would say on average his urgency is 4/5.  He is interested in proceeding with InterStim.  He ended up using tadalafil 10 mg once and said this dose worked good for him for his erectile dysfunction  Voided urine today is negative.  Confirmed again that currently he urinates 7-10 times a day, average urgency is 4 and 1-2 leaks a day.        Interval history 5/25/23:  I did a peripheral nerve evaluation on him on Monday, 4 days ago.  Since then he has gone from urinating 6-10 times a day down to about 6 times a day.  Never had issues at night.  He went from leaking 2 to 3 times a day incontinence down to 1x over the last 3 days. Avg urgency went from 4 to 2. He says >50% improvement and wants to proceed. Not on any meds.   He previously saw  for stress tests bc of SOB. His last w/u was 11/2022 and had a neg stress test. On asa 81mg. No stents. They could never figure out why he has SOB.  He sees pulmonology regularly for nodules in the lungs.  Wires removed today    Interval history 7/11/23:  Here today for interstim stage 1 and 2. Preop appt revealed a1c 6.2, glucose 124. Ua with tr protein/tr bld on 6/28. Started on metformin.   Urinating every 1-2 hours. Uui with drops 2x a day. No pads or underwear changes.  Held asa 81mg for 7d.     Interval history 8/8/23 telephone visit :  Pt states that initially he had issues with urgency and frequency after his procedure but since talking to clarita and making adjustments he has gone from urinating every 1-2 hours to 3-4x a day. Avg urgency remains around 4 and no UUI.   He is happy with his symptoms.     Interval history by ME in CLINIC on 11/13/23 for postop interstim fu and oab fu:  He says that his frequency has returned despite changing his settings. Says changing the  setting works for a bit. Then no longer works. Nos on 3.2.   He says after turning off his interstim for us in August for us abd for his liver he had an increase in his frequency and had to increase the amplitude   He changed the settings on 10/31 and he went from 8x a day down to 5x a day. He stopped hctz a month ago by his cardiologist but then he restarted the hctz in the morning last week and his frequency increased to 9x a day.  Currently on program 2, 3.2.    He says urinating about 8x a day and 1x a night. Leaking with UUI and drops 3-4x a day and 2 accidents last week. No pads.   Urgency 5/5.  1 cup of caffeine in the morning.   Voiding diary: 10/30/23- 8x, changed on 10/31. On 10/31 -5x, 11/1- 5x, 11/3- 6x, 11/11-9x. 11/12-7x. This morning 4x/ 6 hours.   AUA ssx:(2 incomplete emptying, 3 frequency, 2 intermittency, 3 urgency, 3 weak stream, 0 straining, 1 sleeping). 14. QOL: mostly satisfied  Pvr today: 40. Ua today: neg    Interval history by ME in CLINIC on 1/22/24 for oab:  Plan was to do uds bc of his oab but when joe tried to place ureteral catheter it did not go easily. Then she tried 16fr coude and stopped mid way and saw blood. Decided to hold off on and plan for cysto. He adjusted his interstim and he said no change. Feels the flutter in his right perineum. . He says flow is fast sometimes and slow sometimes.  Cysto trus 8/17/20: 42.6g.   Rezum 10/21/20  Last peak flow was 19.2 in 3/18/21.  Last catheter was at time of cholecystectomy in jan 2022.   Interstim 7/11/23  Frequency:Still urinates bw 6 to 12x a day. Has more bad days than good. Nocturia 1x a night.  Incontinence:  Has drops/leaks twice a day.  No pads  Drinks 1 soda in am and decaf cup in evening. No tea.     Interval history/H&P Update by me/ prior to cysto trus on 2/19/24:  urine at the lab- ua 1/30/4:neg(would need to returnwith smoking hx although quit in 1997  Ua today 2/19/24 30 protien  Uroflow and pvr soon. Has  oab. Will need to drink fluid here right before the test.   Uroflow results (date: 01/30/2024): Voiding time: 39.4s, Flow time: 39.3s, TTP flow: 10.6s, Peak flowrate: 16.7 mL/s, Average flowrate: 8.8mL/s, Intervals: 1, Voided volume: 346 mL, Pvr by bladder scan: 45mL . Pattern of curve: see 's addendum   Schedule cystoscopy and TRUS to rule out stricture as cause of oab on Monday feb 19th. See how much prostate has grown back. Previously had rezum in 2020 and trus vol 46g prior to this.   If he does have a urethral stricture- then can explain sx and would need to treat stricture to that improves symptoms.then would need to do in and out cath intermittently forr a while to keep stricture from scarring down again.   If he doesn't have urethral stricture will proceed with uds.   (Will go ahead and schedule to follow cysto/trus and can cancel If we don't need)  Recommend uds since he already has had rezum and interstim and want to evaluate if he has detrussor overactivity/bladder muscle kenn too much.   If so would offer botox since he has already has interstim. But has to be repeated.      Cysto/TRUS Findings 2/19/24:   Cystoscopic findings: rezum defect seen in median lobe closer to veru. Minimal b lobe hypertrophy. + high riding bladder neck with small intravesical protrusion.    TRUS volume: 43.38. WINSOME: just to right of midline small ridge <0.5cm felt near base. 40g prostate.           Assessment: Onesimo Paula is a 73 y.o. male with h/o oab and bph sp rezum 10/21/20, interstim 5/22/23. Small ridge on rectal exam today. Difficult catheterization during attempted uds recently. No urethral stricture seen but does have high riding bladder neck.      Plan:  Continue flomax 0.8mg nightly for now to help with high riding bladder neck.  Since no urethral stricture seen fu for urodynamics to eval if he has detrussor overactivity vs bph obstruction. Botox vs tuip (transurethral incision or prostate for  high riding bladder neck) vs both at the same time and seeing how he responds.   At urodynamics I will place urethral catheter. Resitance likely at prostate-high riding and small defect seen    nterval history by me/ prior to urodynamics on 3/4/24:  Primary complaint is frequency and urgency with UUI and slow flow.  Underwent uds today which showed he had delayed sensation with slow flow. Bladder mostly emptied after procedure. Catheter placement was difficult due to high riding bladder neck but able to perform procedure. Recommended proceeding with tuip/turp.  Ua was neg.   However he returned in the afternoon bc unable to urinate.   Eryn placed coude jean and 475 dark urine drained.   She irrigated him and removed some clots     Interval history/H&P Update by me/ prior to tuip/turp on 3/12/24:  He came back for voiding trial and passed. He ended up staying off flomax.   Takes amlodipine and flomax 7-8-9 in the evening could be reason  Tadalafil in the mornign  Hctz in the morning  Recent cr 1.5 -sees , saw last year, fu in may   Psa screen today 3.8- but had catheter and uds 3 weeks ago.   Ua today with tr protein     Current Urinary symptoms 3/12/24:  Frequency:Still urinates bw 8-9x a day. Has more bad days than good. Nocturia 1x a night.  Incontinence:  Has drops/leaks 2-3 a day.  No pads.  Avg urgency: 4  Interstim on currently       Bipolar turp 3/13/24: path 3+3 from right side- 4/77 chips  Ureteral orifices close to resection. Part of trigone resected.   WINSOME revealed NO NODULES or firm areas today (psa prior to procedure 3.8 but had catheter and difficult catheter placement during uds less than a week ago)  Median lobe removed was likely the biggest obstructing component.   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              Interval history by ME/ in CLINIC on 4/11/24 for postop turp and prostate cancer findings:   Current Urinary symptoms  3/12/24:  Frequency:down to 5x a day from 8-9x a day. Nocturia 2x a week.    Incontinence:  leaks 5 to 6x a day up from 2-3x a day. Improving. Occurs with and without activity. Previously no pads, now 1 pad a day. Not wet when he changes.   Avg urgency: 4 previously, 2 now   AUA ssx:(2 incomplete emptying, 1 frequency, 1 intermittency, 2 urgency, 1 weak stream, 0 straining, 1 sleeping). 10. QOL: 8    Interstim on currently   Pvr by scan: 0  Ua today with small leuk and mod blood. Some dysuria.   Still has some blood in first void and with straining  Overall he says better.   .  Interval history by ME/ in CLINIC on 7/16/24 for bph/prostate cancer fu:  At his last visit his urine had leukocytes and blood.  However he had just had a TURP 1 month prior.  His culture was negative.  Currently his urine only shows trace protein today  I had him discontinue Flomax and oxybutynin at his last visit. Says no noticeable change in flow or frequency/urgency. I also asked him to do some Kegel's for his stress incontinence he was experiencing, he says he tried but didn't continue. However having minimal SANJANA.   Current Urinary symptoms 7/16/24  Frequency 5-8x a day down from 6-9x day. Still has more bad days than good. Nocturia 0-1x a night. 3-4x a week waking up 1x a night.   Incontinence:  Has drops 3x a day(previously 2-3x a day). Urge and unaware. Occ/rare SANJANA. Wearing thin pads (1 a day)- not when he changes.   Avg urgency: 3 (previously 4)  Interstim on currently and feels flutter in perinem (centered)  For his low-grade prostate cancer found on TURP chips I had him do a PSA and it came down to 0.44 after his TURP  Pvr by scan: 15.  AUA ssx:(1 incomplete emptying, 2 frequency, 1 intermittency, 2 urgency, 2 weak stream, 0 straining, 1 sleeping). 9. QOL: mixed  Using cialis 5mg daily for Ed. Working mostly. Sometimes doubles up. Will sometimes lose erection 1/2 way through.      Bladder/prostate history:  Cysto  trus 8/17/20: 42.6g.   Rezum 10/21/20  Last peak flow was 19.2 in 3/18/21.  Last catheter was at time of cholecystectomy in jan 2022.   PNE 5/21/23  Interstim 7/11/23  Cysto trus 2/19/24 45g. High riding bladder neck.   UDS 3/4/24  Turp 3/12/24. Gl 3+3 in 4/77 chips.     PSA history: no family hx of prostate cancer  7/10/24 0.44  3/13/24 Turp chips: 4/77 right lobe Gl 3+3  3/13/24 3.8, WINSOME: nodules.   4/5/23  1.5 already she  1/22/24 WINSOME: 40g  5/31/22 1.3  3/2/21              2.3, %free 20.87, pvr: 35 (psa screen 4.6)  11/19/20          rezum  7/29/20            0.85, %free 54.12  1/31/20            2.2   2/21/19            1.2  3/14/18            1.0       PVR History:  7/16/24 15  4/11/24  Pvr: 0  3/13/24            Turp/tuip  3/8/24              Filled with 140. Voided 250  3/4/24              Uds: peak flow: 8.5, avg flow: 4.7, voided vol: 352, pvr by scan: 27 but then came back and pvr by io: 475  1/30/24            Peak flow: 16.7, voided vol: 346, pvr: 45  11/13/23          40  2/28/23            25, aua ssx: 12  8/23/22            115  6/15/21            pvr by scan: 0  3/18/21            UF: pF: 19.2, avg: 10.6, voided vol: 244, pvr: 33  3/2/21               pvr: 35  11/19/20          pvr by scan: 134 (couldn't start after he stopped)  10/26/20          Fill and pull: 0cc pvr  11/19/20          rezum  9/9/20              pvr by scan: 9cc  7/27/20            pvr by scan: 32cc  8/17/20            Cysto trus: 42.6g.   7/30/20           UF: peak flow 12.8mL/s,  avg flow 5.4mL/s, voided vol: 191cc, pvr by scan:0   1/6/20              pvr by scan: 11cc   9/3/19              pvr by scan: 15cc      Urine history: 1 ppd x 26 years. Quit 1997. Anticoag: asa 81mg  7/16/24 Tr prot  4/11/24  Ng, void: Mod bld/small leuk,  2/19/24            30 protein  1/30/54            neg  1/22/24 Not able to provide- do at the lab  11/13/23 1 rbc  6/28/23 Tr bld  5/22/23 neg  4/21/23 Tr prot  6/7/22  neg  11/19/20          Small  wbc/mod blood-send for ua patricio and culture pvr by scan: 134  10/14/20          Neg  9/16/20            neg  9/9/20              neg  3/31/20            Neg  1/6/20              Neg  11/18/19          neg  8/8/19              No cx, void: n/a 0 rbc  2/21/19            Ng, void: neg, 0 rbc  4/9/18              No cx, void: neg  9/2017             No blood       Current REVIEW OF SYSTEMS:  neg    Objective:     Vitals:    07/16/24 1310   BP: 133/72   Pulse: 60         Focused  exam  neg    Assessment:     Onesimo Paula is a 73 y.o. 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 Prostate cancer    3. OAB (overactive bladder)    4. Erectile dysfunction, unspecified erectile dysfunction type        Plan:     Continue cialis 5mg daily for Ed- sent to [facility]. He will need to call. Can use up to 20mg if needed 1-2 days a week.   Diagnostic psa in October (3 months from now)- then change to psa every 6 months  Fu in 3 months with diagnostic psa prior for aua ssx. Will change to q6 month psa then. Likely ordering mri prostate for march 2025 at that visit.     Active surveillance plan for prostate cancer  psa every 6 months  Digital rectal exam once a year  Mri likely once a year (may need valium)  Biopsy for rising psa, abnormal prostate exam or at 12 to 18 months after first biopsy   Treat for increasing volume or stage of prostate cancer      La Nena James MD

## 2024-07-24 ENCOUNTER — PATIENT MESSAGE (OUTPATIENT)
Dept: FAMILY MEDICINE | Facility: CLINIC | Age: 74
End: 2024-07-24
Payer: COMMERCIAL

## 2024-08-05 ENCOUNTER — OFFICE VISIT (OUTPATIENT)
Dept: FAMILY MEDICINE | Facility: CLINIC | Age: 74
End: 2024-08-05
Payer: COMMERCIAL

## 2024-08-05 VITALS
HEIGHT: 70 IN | SYSTOLIC BLOOD PRESSURE: 124 MMHG | DIASTOLIC BLOOD PRESSURE: 72 MMHG | BODY MASS INDEX: 23.95 KG/M2 | OXYGEN SATURATION: 97 % | WEIGHT: 167.31 LBS | HEART RATE: 65 BPM

## 2024-08-05 DIAGNOSIS — R73.03 PREDIABETES: ICD-10-CM

## 2024-08-05 DIAGNOSIS — K21.9 GASTROESOPHAGEAL REFLUX DISEASE WITHOUT ESOPHAGITIS: ICD-10-CM

## 2024-08-05 DIAGNOSIS — E78.01 FAMILIAL HYPERCHOLESTEROLEMIA: ICD-10-CM

## 2024-08-05 DIAGNOSIS — D64.9 MILD ANEMIA: ICD-10-CM

## 2024-08-05 DIAGNOSIS — I10 ESSENTIAL HYPERTENSION: Primary | ICD-10-CM

## 2024-08-05 DIAGNOSIS — F41.8 SITUATIONAL ANXIETY: ICD-10-CM

## 2024-08-05 PROCEDURE — 1101F PT FALLS ASSESS-DOCD LE1/YR: CPT | Mod: CPTII,S$GLB,, | Performed by: NURSE PRACTITIONER

## 2024-08-05 PROCEDURE — 3078F DIAST BP <80 MM HG: CPT | Mod: CPTII,S$GLB,, | Performed by: NURSE PRACTITIONER

## 2024-08-05 PROCEDURE — 1126F AMNT PAIN NOTED NONE PRSNT: CPT | Mod: CPTII,S$GLB,, | Performed by: NURSE PRACTITIONER

## 2024-08-05 PROCEDURE — 1159F MED LIST DOCD IN RCRD: CPT | Mod: CPTII,S$GLB,, | Performed by: NURSE PRACTITIONER

## 2024-08-05 PROCEDURE — 4010F ACE/ARB THERAPY RXD/TAKEN: CPT | Mod: CPTII,S$GLB,, | Performed by: NURSE PRACTITIONER

## 2024-08-05 PROCEDURE — 3288F FALL RISK ASSESSMENT DOCD: CPT | Mod: CPTII,S$GLB,, | Performed by: NURSE PRACTITIONER

## 2024-08-05 PROCEDURE — 3008F BODY MASS INDEX DOCD: CPT | Mod: CPTII,S$GLB,, | Performed by: NURSE PRACTITIONER

## 2024-08-05 PROCEDURE — 99214 OFFICE O/P EST MOD 30 MIN: CPT | Mod: S$GLB,,, | Performed by: NURSE PRACTITIONER

## 2024-08-05 PROCEDURE — 3074F SYST BP LT 130 MM HG: CPT | Mod: CPTII,S$GLB,, | Performed by: NURSE PRACTITIONER

## 2024-08-05 PROCEDURE — 1160F RVW MEDS BY RX/DR IN RCRD: CPT | Mod: CPTII,S$GLB,, | Performed by: NURSE PRACTITIONER

## 2024-08-05 PROCEDURE — 99999 PR PBB SHADOW E&M-EST. PATIENT-LVL IV: CPT | Mod: PBBFAC,,, | Performed by: NURSE PRACTITIONER

## 2024-08-05 RX ORDER — CYANOCOBALAMIN 1000 UG/ML
1000 INJECTION, SOLUTION INTRAMUSCULAR; SUBCUTANEOUS
Qty: 1 ML | Refills: 5 | Status: SHIPPED | OUTPATIENT
Start: 2024-08-05

## 2024-08-05 RX ORDER — ROSUVASTATIN CALCIUM 10 MG/1
10 TABLET, COATED ORAL DAILY
Qty: 90 TABLET | Refills: 1 | Status: SHIPPED | OUTPATIENT
Start: 2024-08-05

## 2024-08-05 RX ORDER — BUSPIRONE HYDROCHLORIDE 5 MG/1
5 TABLET ORAL DAILY
Qty: 30 TABLET | Refills: 5 | Status: SHIPPED | OUTPATIENT
Start: 2024-08-05

## 2024-08-05 RX ORDER — OMEPRAZOLE 20 MG/1
20 CAPSULE, DELAYED RELEASE ORAL DAILY
Qty: 90 CAPSULE | Refills: 1 | Status: SHIPPED | OUTPATIENT
Start: 2024-08-05

## 2024-08-05 RX ORDER — METFORMIN HYDROCHLORIDE 500 MG/1
500 TABLET ORAL
Qty: 90 TABLET | Refills: 1 | Status: SHIPPED | OUTPATIENT
Start: 2024-08-05

## 2024-08-13 ENCOUNTER — LAB VISIT (OUTPATIENT)
Dept: LAB | Facility: HOSPITAL | Age: 74
End: 2024-08-13
Attending: NURSE PRACTITIONER
Payer: COMMERCIAL

## 2024-08-13 DIAGNOSIS — E78.01 FAMILIAL HYPERCHOLESTEROLEMIA: ICD-10-CM

## 2024-08-13 DIAGNOSIS — R73.03 PREDIABETES: ICD-10-CM

## 2024-08-13 DIAGNOSIS — D64.9 MILD ANEMIA: ICD-10-CM

## 2024-08-13 LAB
ALBUMIN SERPL BCP-MCNC: 4.2 G/DL (ref 3.5–5.2)
ALP SERPL-CCNC: 54 U/L (ref 55–135)
ALT SERPL W/O P-5'-P-CCNC: 15 U/L (ref 10–44)
ANION GAP SERPL CALC-SCNC: 9 MMOL/L (ref 8–16)
AST SERPL-CCNC: 17 U/L (ref 10–40)
BASOPHILS # BLD AUTO: 0.05 K/UL (ref 0–0.2)
BASOPHILS NFR BLD: 0.7 % (ref 0–1.9)
BILIRUB SERPL-MCNC: 0.5 MG/DL (ref 0.1–1)
BUN SERPL-MCNC: 44 MG/DL (ref 8–23)
CALCIUM SERPL-MCNC: 9.3 MG/DL (ref 8.7–10.5)
CHLORIDE SERPL-SCNC: 105 MMOL/L (ref 95–110)
CHOLEST SERPL-MCNC: 118 MG/DL (ref 120–199)
CHOLEST/HDLC SERPL: 3.3 {RATIO} (ref 2–5)
CO2 SERPL-SCNC: 26 MMOL/L (ref 23–29)
CREAT SERPL-MCNC: 1.8 MG/DL (ref 0.5–1.4)
DIFFERENTIAL METHOD BLD: ABNORMAL
EOSINOPHIL # BLD AUTO: 0.2 K/UL (ref 0–0.5)
EOSINOPHIL NFR BLD: 2.4 % (ref 0–8)
ERYTHROCYTE [DISTWIDTH] IN BLOOD BY AUTOMATED COUNT: 13.2 % (ref 11.5–14.5)
EST. GFR  (NO RACE VARIABLE): 39 ML/MIN/1.73 M^2
ESTIMATED AVG GLUCOSE: 126 MG/DL (ref 68–131)
FERRITIN SERPL-MCNC: 192.9 NG/ML (ref 20–300)
GLUCOSE SERPL-MCNC: 115 MG/DL (ref 70–110)
HBA1C MFR BLD: 6 % (ref 4.5–6.2)
HCT VFR BLD AUTO: 36.5 % (ref 40–54)
HDLC SERPL-MCNC: 36 MG/DL (ref 40–75)
HDLC SERPL: 30.5 % (ref 20–50)
HGB BLD-MCNC: 11.9 G/DL (ref 14–18)
IMM GRANULOCYTES # BLD AUTO: 0.05 K/UL (ref 0–0.04)
IMM GRANULOCYTES NFR BLD AUTO: 0.7 % (ref 0–0.5)
IRON SERPL-MCNC: 67 UG/DL (ref 45–160)
LDLC SERPL CALC-MCNC: 68.8 MG/DL (ref 63–159)
LYMPHOCYTES # BLD AUTO: 2.1 K/UL (ref 1–4.8)
LYMPHOCYTES NFR BLD: 27.9 % (ref 18–48)
MCH RBC QN AUTO: 31.2 PG (ref 27–31)
MCHC RBC AUTO-ENTMCNC: 32.6 G/DL (ref 32–36)
MCV RBC AUTO: 96 FL (ref 82–98)
MONOCYTES # BLD AUTO: 0.8 K/UL (ref 0.3–1)
MONOCYTES NFR BLD: 10.5 % (ref 4–15)
NEUTROPHILS # BLD AUTO: 4.4 K/UL (ref 1.8–7.7)
NEUTROPHILS NFR BLD: 57.8 % (ref 38–73)
NONHDLC SERPL-MCNC: 82 MG/DL
NRBC BLD-RTO: 0 /100 WBC
PLATELET # BLD AUTO: 342 K/UL (ref 150–450)
PMV BLD AUTO: 9.5 FL (ref 9.2–12.9)
POTASSIUM SERPL-SCNC: 4.5 MMOL/L (ref 3.5–5.1)
PROT SERPL-MCNC: 7.5 G/DL (ref 6–8.4)
RBC # BLD AUTO: 3.81 M/UL (ref 4.6–6.2)
SATURATED IRON: 23 % (ref 20–50)
SODIUM SERPL-SCNC: 140 MMOL/L (ref 136–145)
TOTAL IRON BINDING CAPACITY: 288 UG/DL (ref 250–450)
TRANSFERRIN SERPL-MCNC: 206 MG/DL (ref 200–375)
TRIGL SERPL-MCNC: 66 MG/DL (ref 30–150)
VIT B12 SERPL-MCNC: >1500 PG/ML (ref 210–950)
WBC # BLD AUTO: 7.54 K/UL (ref 3.9–12.7)

## 2024-08-13 PROCEDURE — 82728 ASSAY OF FERRITIN: CPT | Performed by: NURSE PRACTITIONER

## 2024-08-13 PROCEDURE — 83540 ASSAY OF IRON: CPT | Performed by: NURSE PRACTITIONER

## 2024-08-13 PROCEDURE — 80061 LIPID PANEL: CPT | Performed by: NURSE PRACTITIONER

## 2024-08-13 PROCEDURE — 83036 HEMOGLOBIN GLYCOSYLATED A1C: CPT | Performed by: NURSE PRACTITIONER

## 2024-08-13 PROCEDURE — 82607 VITAMIN B-12: CPT | Performed by: NURSE PRACTITIONER

## 2024-08-13 PROCEDURE — 80053 COMPREHEN METABOLIC PANEL: CPT | Performed by: NURSE PRACTITIONER

## 2024-08-13 PROCEDURE — 85025 COMPLETE CBC W/AUTO DIFF WBC: CPT | Performed by: NURSE PRACTITIONER

## 2024-08-13 PROCEDURE — 36415 COLL VENOUS BLD VENIPUNCTURE: CPT | Performed by: NURSE PRACTITIONER

## 2024-08-20 ENCOUNTER — OFFICE VISIT (OUTPATIENT)
Dept: PULMONOLOGY | Facility: CLINIC | Age: 74
End: 2024-08-20
Payer: COMMERCIAL

## 2024-08-20 VITALS
DIASTOLIC BLOOD PRESSURE: 78 MMHG | HEART RATE: 60 BPM | OXYGEN SATURATION: 99 % | WEIGHT: 165.13 LBS | BODY MASS INDEX: 23.69 KG/M2 | SYSTOLIC BLOOD PRESSURE: 123 MMHG

## 2024-08-20 DIAGNOSIS — R06.09 DYSPNEA ON EXERTION: Primary | ICD-10-CM

## 2024-08-20 DIAGNOSIS — R91.8 MULTIPLE PULMONARY NODULES: ICD-10-CM

## 2024-08-20 PROCEDURE — 1160F RVW MEDS BY RX/DR IN RCRD: CPT | Mod: CPTII,S$GLB,, | Performed by: INTERNAL MEDICINE

## 2024-08-20 PROCEDURE — 3078F DIAST BP <80 MM HG: CPT | Mod: CPTII,S$GLB,, | Performed by: INTERNAL MEDICINE

## 2024-08-20 PROCEDURE — 3044F HG A1C LEVEL LT 7.0%: CPT | Mod: CPTII,S$GLB,, | Performed by: INTERNAL MEDICINE

## 2024-08-20 PROCEDURE — 99214 OFFICE O/P EST MOD 30 MIN: CPT | Mod: S$GLB,,, | Performed by: INTERNAL MEDICINE

## 2024-08-20 PROCEDURE — 4010F ACE/ARB THERAPY RXD/TAKEN: CPT | Mod: CPTII,S$GLB,, | Performed by: INTERNAL MEDICINE

## 2024-08-20 PROCEDURE — 3008F BODY MASS INDEX DOCD: CPT | Mod: CPTII,S$GLB,, | Performed by: INTERNAL MEDICINE

## 2024-08-20 PROCEDURE — 3074F SYST BP LT 130 MM HG: CPT | Mod: CPTII,S$GLB,, | Performed by: INTERNAL MEDICINE

## 2024-08-20 PROCEDURE — 1159F MED LIST DOCD IN RCRD: CPT | Mod: CPTII,S$GLB,, | Performed by: INTERNAL MEDICINE

## 2024-08-20 PROCEDURE — 3288F FALL RISK ASSESSMENT DOCD: CPT | Mod: CPTII,S$GLB,, | Performed by: INTERNAL MEDICINE

## 2024-08-20 PROCEDURE — 1101F PT FALLS ASSESS-DOCD LE1/YR: CPT | Mod: CPTII,S$GLB,, | Performed by: INTERNAL MEDICINE

## 2024-08-20 NOTE — PROGRESS NOTES
HPI:    7/13/2017 - Here for follow up, had repeat CT chest showing stable nodules (2-4 mm) since 1/2017, will repeat CT in 1 year.  Only complaint is some mild BURT (PFT 9/2016 - mild decrease DLCO o/w ok, methacholine challenge 12/2016 +, will try prn beta agonist.  No other new complaints.    1/23/2018 - Here for follow up, felt breathing didn't respond to inhalers.  He has stopped all of his BP meds and reports SBP to 190 at times (d/w him). No new respiratory symptoms.    7/26/2018 - Here for follow up, no new issues still with some dyspnea with exertion but not any worse (has not really responded to inhalers ( had previous PFT and methacholine challenge were ok).  BP remains an issues (has anywhere from elevated to low BP).  No chest pain or CHF symptoms.    7/23/2019 - Here for follow up, doing well, still with intermittent dyspnea.  BP remains variable and he feels better when it is lower.  Has some blurred vision at times (to see Ophthalmologist for cataracts)    7/28/2020 -  Here for follow up, doing well still with mild intermittent dyspnea.  Had his cataract surgery done.  No known corona virus exposure and has been practicing social distancing.  Did have skin cancers removed (no melanoma).  Reports that he has had some ankle swelling at times but has resolved.    8/24/2021 - Here for follow up, no new respiratory issues.  Having knee issues and may be getting PRP (platelet rich plasma).  Patient has no known corona virus exposures and has been practicing social distancing.  We have discussed the virus and precautions and all questions have been answered.  Has had Covid vaccine (Moderna)    8/23/2022 - Here for follow up, dyspnea is about the same.  He did have appendicitis and had surgery in  1/2022 (11 day hospital stay primarily for slow bowel function return).  Patient has no known corona virus exposures and has been practicing social distancing.  We have discussed the virus and precautions and all  questions have been answered.  He has had Moderna vaccine and booster x 2.  The dyspnea is not predictable and is not consistent.  He denies any chest discomfort with the symptoms.    8/22/2023 - Here for follow up, breathing about the same.  No new respiratory issues reported.  He has had some dizziness and reports some BP < 100 systolic (he is to see cardiology about possible med adjustment).  He has only been hospitalized for same day surgery.  Still working and planning on retiring in the next year or so.  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  He has noted some symptoms with change in position.  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  He quit smoking 1997 - 1 PPD for about 25 years.  He was in the service and may have had some indirect exposure to agent orange.    8/20/2024 - Here for follow up, feels about the same but still with exertional dyspnea, dose note some SOB when he dends over.  Also has issues with dizziness.  He has mild anemia and this is being addressed.  No hospitalizations ion the last year.  Has had some chest discomfort - he has not seen cardiologist in a while (everything was OK in the past).        Abnormal Chest CT scan    Date of last scan - 7/2019    Findings - stable multiple nodules    Followup - stable since 2017 - no CT follow up needed      Medications    Current Outpatient Medications:     amLODIPine (NORVASC) 10 MG tablet, Take 1 tablet (10 mg total) by mouth once daily., Disp: 90 tablet, Rfl: 1    aspirin (ECOTRIN) 81 MG EC tablet, Take 81 mg by mouth once daily., Disp: , Rfl:     busPIRone (BUSPAR) 5 MG Tab, Take 1 tablet (5 mg total) by mouth once daily., Disp: 30 tablet, Rfl: 5    calcitRIOL (ROCALTROL) 0.25 MCG Cap, Take 0.25 mcg by mouth every 7 days., Disp: , Rfl:     co-enzyme Q-10 30 mg capsule, Take 30 mg by mouth 3 (three) times daily., Disp: , Rfl:     cyanocobalamin 1,000 mcg/mL injection, Inject 1 mL (1,000 mcg total) into the muscle  every 30 days., Disp: 1 mL, Rfl: 5    hydroCHLOROthiazide (HYDRODIURIL) 25 MG tablet, Take 1 tablet (25 mg total) by mouth once daily., Disp: , Rfl:     metFORMIN (GLUCOPHAGE) 500 MG tablet, Take 1 tablet (500 mg total) by mouth daily with breakfast., Disp: 90 tablet, Rfl: 1    multivit with minerals/lutein (MULTIVITAMIN 50 PLUS ORAL), Take 1 tablet by mouth once daily., Disp: , Rfl:     omega 3-dha-epa-fish oil (FISH OIL) 100-160-1,000 mg Cap, Take 1 capsule by mouth once daily., Disp: , Rfl:     omeprazole (PRILOSEC) 20 MG capsule, Take 1 capsule (20 mg total) by mouth once daily., Disp: 90 capsule, Rfl: 1    ranolazine (RANEXA) 500 MG Tb12, Take 2 tablets (1,000 mg total) by mouth 2 (two) times daily., Disp: 120 tablet, Rfl: 11    rosuvastatin (CRESTOR) 10 MG tablet, Take 1 tablet (10 mg total) by mouth once daily., Disp: 90 tablet, Rfl: 1    tadalafiL (CIALIS) 5 MG tablet, Take 1 tablet (5 mg total) by mouth every morning., Disp: 90 tablet, Rfl: 3    telmisartan (MICARDIS) 80 MG Tab, Take 1 tablet (80 mg total) by mouth once daily., Disp: 90 tablet, Rfl: 1  No current facility-administered medications for this visit.    Facility-Administered Medications Ordered in Other Visits:     electrolyte-S (ISOLYTE), , Intravenous, Continuous, Loyd Chakraborty MD, Stopped at 07/12/23 1141    electrolyte-S (ISOLYTE), , Intravenous, Continuous, Bennett Javier MD    electrolyte-S (ISOLYTE), , Intravenous, Continuous, Bennett Javier MD, Last Rate: 10 mL/hr at 03/13/24 1336, New Bag at 03/13/24 1336    fentaNYL 50 mcg/mL injection 25 mcg, 25 mcg, Intravenous, Q5 Min PRN, Loyd Chakraborty MD    fentaNYL 50 mcg/mL injection 25 mcg, 25 mcg, Intravenous, Q5 Min PRN, Bennett Javier MD    lactated ringers infusion, , Intravenous, Continuous, Loyd Chakraborty MD    LIDOcaine (PF) 10 mg/ml (1%) injection 10 mg, 1 mL, Intradermal, Once, Loyd Chakraborty MD    LIDOcaine (PF) 10 mg/ml (1%) injection 10 mg, 1 mL,  Intradermal, Once, Bennett Javier MD    metoclopramide HCl injection 10 mg, 10 mg, Intravenous, Q10 Min PRN, Loyd Chakraborty MD    ondansetron injection 4 mg, 4 mg, Intravenous, Once PRN, Bennett Javier MD    oxyCODONE immediate release tablet 5 mg, 5 mg, Oral, Once PRN, Loyd Chakraborty MD    oxyCODONE immediate release tablet 5 mg, 5 mg, Oral, Once PRN, Bennett Javier MD    Allergies  Review of patient's allergies indicates:  No Known Allergies    Social History    Social History     Tobacco Use   Smoking Status Former    Current packs/day: 0.00    Types: Cigarettes    Quit date:     Years since quittin.6   Smokeless Tobacco Never     ETOH - 3 drinks per week.  Social History     Substance and Sexual Activity   Drug Use No     Occupation - works as a     Family History  Family History   Problem Relation Name Age of Onset    Cancer Mother          skin    Stroke Mother      Heart failure Father      Kidney disease Father         ROS  Review of Systems   Constitutional:  Negative for chills, diaphoresis, fever, malaise/fatigue and weight loss.   HENT:  Negative for congestion.    Eyes:  Negative for pain.   Respiratory:  Positive for shortness of breath. Negative for cough, hemoptysis, sputum production, wheezing and stridor.    Cardiovascular:  Negative for chest pain, palpitations, orthopnea, claudication, leg swelling and PND.   Gastrointestinal:  Negative for abdominal pain, constipation, diarrhea, heartburn, nausea and vomiting.   Genitourinary:  Negative for dysuria, frequency and urgency.   Musculoskeletal:  Negative for falls and myalgias.   Neurological:  Negative for sensory change, focal weakness and weakness.   Psychiatric/Behavioral:  Negative for depression. The patient is not nervous/anxious.        Physical Exam    Vitals:    24 0852   BP: 123/78   BP Location: Left arm   Patient Position: Sitting   BP Method: Medium (Manual)   Pulse: 60   SpO2: 99%    Weight: 74.9 kg (165 lb 1.6 oz)       Physical Exam  Vitals and nursing note reviewed.   Constitutional:       General: He is not in acute distress.     Appearance: He is well-developed. He is not diaphoretic.   HENT:      Head: Normocephalic and atraumatic.      Right Ear: External ear normal.      Left Ear: External ear normal.      Nose: Nose normal.   Eyes:      Pupils: Pupils are equal, round, and reactive to light.   Neck:      Thyroid: No thyromegaly.      Vascular: No JVD.      Trachea: No tracheal deviation.   Cardiovascular:      Rate and Rhythm: Normal rate and regular rhythm.      Heart sounds: Normal heart sounds. No murmur heard.     No friction rub. No gallop.   Pulmonary:      Effort: Pulmonary effort is normal. No respiratory distress.      Breath sounds: Normal breath sounds. No stridor. No wheezing or rales.   Chest:      Chest wall: No tenderness.   Abdominal:      General: Bowel sounds are normal. There is no distension.      Palpations: Abdomen is soft.   Musculoskeletal:         General: No tenderness. Normal range of motion.      Cervical back: Normal range of motion and neck supple.   Lymphadenopathy:      Cervical: No cervical adenopathy.   Neurological:      Mental Status: He is alert and oriented to person, place, and time.      Cranial Nerves: No cranial nerve deficit.      Deep Tendon Reflexes: Reflexes are normal and symmetric.   Psychiatric:         Behavior: Behavior normal.         Lab        Xray     CT CHEST (7/31/2019)  Stable subcentimeter pulmonary nodules within both lungs.          Impression/Plan  Problem List Items Addressed This Visit          Pulmonary    Multiple pulmonary nodules  - low risk pt  - stable 2017  - no further scans needed at this time      Dyspnea on exertion - Primary  - will repeat studies and CXR  - not sure of etiology of symptoms, nothing to suggest diaphragm paralysis or paresis  - if all stable will see back in 1 year    HTN  - reports episodes of  decreased BP  - to discuss with cardiology                  Other Visit Diagnoses    None.         Micah Curtis MD

## 2024-08-26 ENCOUNTER — HOSPITAL ENCOUNTER (OUTPATIENT)
Dept: PULMONOLOGY | Facility: HOSPITAL | Age: 74
Discharge: HOME | End: 2024-08-26
Attending: INTERNAL MEDICINE
Payer: COMMERCIAL

## 2024-08-26 ENCOUNTER — HOSPITAL ENCOUNTER (OUTPATIENT)
Dept: RADIOLOGY | Facility: HOSPITAL | Age: 74
Discharge: HOME | End: 2024-08-26
Attending: INTERNAL MEDICINE
Payer: COMMERCIAL

## 2024-08-26 ENCOUNTER — TELEPHONE (OUTPATIENT)
Dept: HEPATOLOGY | Facility: CLINIC | Age: 74
End: 2024-08-26
Payer: COMMERCIAL

## 2024-08-26 DIAGNOSIS — R06.09 DYSPNEA ON EXERTION: ICD-10-CM

## 2024-08-26 DIAGNOSIS — K76.89 LIVER CYST: ICD-10-CM

## 2024-08-26 DIAGNOSIS — K76.89 LIVER CYST: Primary | ICD-10-CM

## 2024-08-26 PROCEDURE — 71046 X-RAY EXAM CHEST 2 VIEWS: CPT | Mod: 26,,, | Performed by: RADIOLOGY

## 2024-08-26 PROCEDURE — 76705 ECHO EXAM OF ABDOMEN: CPT | Mod: 26,,, | Performed by: RADIOLOGY

## 2024-08-26 PROCEDURE — 71046 X-RAY EXAM CHEST 2 VIEWS: CPT | Mod: TC

## 2024-08-26 PROCEDURE — 76705 ECHO EXAM OF ABDOMEN: CPT | Mod: TC,PO

## 2024-08-26 PROCEDURE — 94618 PULMONARY STRESS TESTING: CPT

## 2024-08-26 PROCEDURE — 94060 EVALUATION OF WHEEZING: CPT

## 2024-08-26 PROCEDURE — 94729 DIFFUSING CAPACITY: CPT

## 2024-08-26 PROCEDURE — 94727 GAS DIL/WSHOT DETER LNG VOL: CPT

## 2024-08-26 NOTE — TELEPHONE ENCOUNTER
----- Message from Rachel Soni MD sent at 8/26/2024  8:24 AM CDT -----  Complex liver cyst is larger than before. Will order an MRI to follow up

## 2024-08-27 DIAGNOSIS — J98.4 RESTRICTIVE LUNG DISEASE: Primary | ICD-10-CM

## 2024-08-27 LAB
DLCO SINGLE BREATH LLN: 19.47
DLCO SINGLE BREATH PRE REF: 47.8 %
DLCO SINGLE BREATH REF: 26.39
DLCOC SBVA LLN: 2.56
DLCOC SBVA REF: 3.7
DLCOC SINGLE BREATH LLN: 19.47
DLCOC SINGLE BREATH REF: 26.39
DLCOVA LLN: 2.56
DLCOVA PRE REF: 73.3 %
DLCOVA PRE: 2.71 ML/(MIN*MMHG*L) (ref 2.56–4.85)
DLCOVA REF: 3.7
ERVN2 LLN: -16448.99
ERVN2 PRE REF: 43.6 %
ERVN2 PRE: 0.44 L (ref -16448.99–16451.01)
ERVN2 REF: 1.01
FEF 25 75 CHG: 15.7 %
FEF 25 75 LLN: 1.26
FEF 25 75 POST REF: 83.9 %
FEF 25 75 PRE REF: 72.5 %
FEF 25 75 REF: 2.97
FET100 CHG: 1.2 %
FEV1 CHG: -0.7 %
FEV1 FVC CHG: 2.7 %
FEV1 FVC LLN: 61
FEV1 FVC POST REF: 106.9 %
FEV1 FVC PRE REF: 104.1 %
FEV1 FVC REF: 75
FEV1 LLN: 2.16
FEV1 POST REF: 77.3 %
FEV1 PRE REF: 77.9 %
FEV1 REF: 3.05
FRCN2 LLN: 2.75
FRCN2 PRE REF: 57.2 %
FRCN2 REF: 3.74
FVC CHG: -3.4 %
FVC LLN: 2.98
FVC POST REF: 71.9 %
FVC PRE REF: 74.4 %
FVC REF: 4.07
IVC PRE: 3.07 L (ref 2.98–5.18)
IVC SINGLE BREATH LLN: 2.98
IVC SINGLE BREATH PRE REF: 75.3 %
IVC SINGLE BREATH REF: 4.07
PEF CHG: -7.3 %
PEF LLN: 5.62
PEF POST REF: 83.3 %
PEF PRE REF: 89.9 %
PEF REF: 7.94
POST FEF 25 75: 2.49 L/S (ref 1.26–4.68)
POST FET 100: 6.95 SEC
POST FEV1 FVC: 80.54 % (ref 61.27–87.87)
POST FEV1: 2.36 L (ref 2.16–3.88)
POST FVC: 2.93 L (ref 2.98–5.18)
POST PEF: 6.62 L/S (ref 5.62–10.26)
PRE DLCO: 12.63 ML/(MIN*MMHG) (ref 19.47–33.32)
PRE FEF 25 75: 2.15 L/S (ref 1.26–4.68)
PRE FET 100: 6.87 SEC
PRE FEV1 FVC: 78.39 % (ref 61.27–87.87)
PRE FEV1: 2.38 L (ref 2.16–3.88)
PRE FRC N2: 2.14 L (ref 2.75–4.72)
PRE FVC: 3.03 L (ref 2.98–5.18)
PRE PEF: 7.14 L/S (ref 5.62–10.26)
RVN2 LLN: 2.05
RVN2 PRE REF: 62.2 %
RVN2 PRE: 1.7 L (ref 2.05–3.4)
RVN2 REF: 2.73
RVN2TLCN2 LLN: 33.84
RVN2TLCN2 PRE REF: 83.8 %
RVN2TLCN2 PRE: 35.89 % (ref 33.84–51.8)
RVN2TLCN2 REF: 42.82
TLCN2 LLN: 5.97
TLCN2 PRE REF: 66.3 %
TLCN2 PRE: 4.73 L (ref 5.97–8.28)
TLCN2 REF: 7.13
VA PRE: 4.65 L (ref 6.98–6.98)
VA SINGLE BREATH LLN: 6.98
VA SINGLE BREATH PRE REF: 66.7 %
VA SINGLE BREATH REF: 6.98
VCMAXN2 LLN: 2.98
VCMAXN2 PRE REF: 74.4 %
VCMAXN2 PRE: 3.03 L (ref 2.98–5.18)
VCMAXN2 REF: 4.07

## 2024-08-27 PROCEDURE — 94727 GAS DIL/WSHOT DETER LNG VOL: CPT | Mod: 26,,, | Performed by: INTERNAL MEDICINE

## 2024-08-27 PROCEDURE — 94729 DIFFUSING CAPACITY: CPT | Mod: 26,,, | Performed by: INTERNAL MEDICINE

## 2024-08-27 PROCEDURE — 94618 PULMONARY STRESS TESTING: CPT | Mod: 26,,, | Performed by: INTERNAL MEDICINE

## 2024-08-27 PROCEDURE — 94060 EVALUATION OF WHEEZING: CPT | Mod: 26,59,, | Performed by: INTERNAL MEDICINE

## 2024-08-29 RX ORDER — RANOLAZINE 500 MG/1
1000 TABLET, EXTENDED RELEASE ORAL 2 TIMES DAILY
Qty: 180 TABLET | Refills: 3 | Status: SHIPPED | OUTPATIENT
Start: 2024-08-29

## 2024-09-11 ENCOUNTER — HOSPITAL ENCOUNTER (OUTPATIENT)
Dept: RADIOLOGY | Facility: HOSPITAL | Age: 74
Discharge: HOME | End: 2024-09-11
Attending: INTERNAL MEDICINE
Payer: COMMERCIAL

## 2024-09-11 DIAGNOSIS — K76.89 LIVER CYST: ICD-10-CM

## 2024-09-11 LAB
CREAT SERPL-MCNC: 1.3 MG/DL (ref 0.5–1.4)
SAMPLE: NORMAL

## 2024-09-11 PROCEDURE — 74183 MRI ABD W/O CNTR FLWD CNTR: CPT | Mod: 26,,, | Performed by: RADIOLOGY

## 2024-09-11 PROCEDURE — 82565 ASSAY OF CREATININE: CPT | Mod: PO

## 2024-09-11 PROCEDURE — 74183 MRI ABD W/O CNTR FLWD CNTR: CPT | Mod: TC,PO

## 2024-09-11 PROCEDURE — A9585 GADOBUTROL INJECTION: HCPCS | Mod: PO | Performed by: INTERNAL MEDICINE

## 2024-09-11 PROCEDURE — 25500020 PHARM REV CODE 255: Mod: PO | Performed by: INTERNAL MEDICINE

## 2024-09-11 RX ORDER — GADOBUTROL 604.72 MG/ML
7.5 INJECTION INTRAVENOUS
Status: COMPLETED | OUTPATIENT
Start: 2024-09-11 | End: 2024-09-11

## 2024-09-11 RX ADMIN — GADOBUTROL 7.5 ML: 604.72 INJECTION INTRAVENOUS at 09:09

## 2024-09-23 RX ORDER — HYDROCHLOROTHIAZIDE 25 MG/1
12.5 TABLET ORAL
Qty: 45 TABLET | Refills: 3 | Status: SHIPPED | OUTPATIENT
Start: 2024-09-23

## 2024-09-30 ENCOUNTER — PATIENT MESSAGE (OUTPATIENT)
Dept: FAMILY MEDICINE | Facility: CLINIC | Age: 74
End: 2024-09-30
Payer: COMMERCIAL

## 2024-09-30 DIAGNOSIS — D64.9 MILD ANEMIA: ICD-10-CM

## 2024-09-30 RX ORDER — CYANOCOBALAMIN 1000 UG/ML
1000 INJECTION, SOLUTION INTRAMUSCULAR; SUBCUTANEOUS
Qty: 1 ML | Refills: 5 | Status: SHIPPED | OUTPATIENT
Start: 2024-09-30

## 2024-10-03 ENCOUNTER — TELEPHONE (OUTPATIENT)
Dept: PULMONOLOGY | Facility: CLINIC | Age: 74
End: 2024-10-03
Payer: COMMERCIAL

## 2024-10-03 DIAGNOSIS — R06.09 DYSPNEA ON EXERTION: Primary | ICD-10-CM

## 2024-10-03 NOTE — TELEPHONE ENCOUNTER
----- Message from Kimmy sent at 10/3/2024 11:43 AM CDT -----  Regarding: Akanksha CAT scan  PT says he was suppose to have a Cat scan done last month but has not be contacted to schedule it.  Callback# [phone]

## 2024-10-03 NOTE — TELEPHONE ENCOUNTER
Patient says suppose to have a ct an I do not see the order ius this something you want him to get ?

## 2024-10-06 DIAGNOSIS — I10 ESSENTIAL HYPERTENSION: ICD-10-CM

## 2024-10-07 NOTE — TELEPHONE ENCOUNTER
Patient last seen: 08/05/24  Scheduled to be seen: 02/05/25  Medication last filled: 03/13/24 - [facility]    Medication pended

## 2024-10-09 RX ORDER — AMLODIPINE BESYLATE 10 MG/1
10 TABLET ORAL
Qty: 90 TABLET | Refills: 0 | Status: SHIPPED | OUTPATIENT
Start: 2024-10-09

## 2024-10-13 ENCOUNTER — LAB VISIT (OUTPATIENT)
Dept: LAB | Facility: HOSPITAL | Age: 74
End: 2024-10-13
Attending: UROLOGY
Payer: COMMERCIAL

## 2024-10-13 DIAGNOSIS — N40.0 BENIGN PROSTATIC HYPERPLASIA WITHOUT URINARY OBSTRUCTION: ICD-10-CM

## 2024-10-13 DIAGNOSIS — I10 ESSENTIAL HYPERTENSION: Chronic | ICD-10-CM

## 2024-10-13 DIAGNOSIS — C61 PROSTATE CANCER: ICD-10-CM

## 2024-10-13 LAB — COMPLEXED PSA SERPL-MCNC: 0.58 NG/ML (ref 0–4)

## 2024-10-13 PROCEDURE — 84153 ASSAY OF PSA TOTAL: CPT | Performed by: UROLOGY

## 2024-10-13 PROCEDURE — 36415 COLL VENOUS BLD VENIPUNCTURE: CPT | Performed by: UROLOGY

## 2024-10-14 RX ORDER — DOXAZOSIN 4 MG/1
4 TABLET ORAL DAILY
Qty: 90 TABLET | Refills: 0 | Status: SHIPPED | OUTPATIENT
Start: 2024-10-14

## 2024-10-19 ENCOUNTER — HOSPITAL ENCOUNTER (OUTPATIENT)
Dept: RADIOLOGY | Facility: HOSPITAL | Age: 74
Discharge: HOME | End: 2024-10-19
Attending: INTERNAL MEDICINE
Payer: COMMERCIAL

## 2024-10-19 DIAGNOSIS — R06.09 DYSPNEA ON EXERTION: ICD-10-CM

## 2024-10-19 PROCEDURE — 71250 CT THORAX DX C-: CPT | Mod: 26,,, | Performed by: RADIOLOGY

## 2024-10-19 PROCEDURE — 71250 CT THORAX DX C-: CPT | Mod: TC

## 2024-10-24 ENCOUNTER — PATIENT MESSAGE (OUTPATIENT)
Dept: UROLOGY | Facility: CLINIC | Age: 74
End: 2024-10-24

## 2024-10-24 ENCOUNTER — OFFICE VISIT (OUTPATIENT)
Dept: UROLOGY | Facility: CLINIC | Age: 74
End: 2024-10-24
Payer: COMMERCIAL

## 2024-10-24 ENCOUNTER — E-CONSULT (OUTPATIENT)
Dept: GASTROENTEROLOGY | Facility: HOSPITAL | Age: 74
End: 2024-10-24
Payer: COMMERCIAL

## 2024-10-24 ENCOUNTER — OFFICE VISIT (OUTPATIENT)
Dept: HEPATOLOGY | Facility: CLINIC | Age: 74
End: 2024-10-24
Payer: COMMERCIAL

## 2024-10-24 VITALS
HEIGHT: 70 IN | HEART RATE: 59 BPM | WEIGHT: 169.31 LBS | TEMPERATURE: 98 F | BODY MASS INDEX: 24.24 KG/M2 | OXYGEN SATURATION: 97 % | SYSTOLIC BLOOD PRESSURE: 128 MMHG | DIASTOLIC BLOOD PRESSURE: 73 MMHG

## 2024-10-24 VITALS — BODY MASS INDEX: 24.24 KG/M2 | WEIGHT: 169.31 LBS | HEIGHT: 70 IN

## 2024-10-24 DIAGNOSIS — N13.8 BPH WITH OBSTRUCTION/LOWER URINARY TRACT SYMPTOMS: Primary | ICD-10-CM

## 2024-10-24 DIAGNOSIS — R93.5 ABNORMAL ABDOMINAL CT SCAN: Primary | ICD-10-CM

## 2024-10-24 DIAGNOSIS — C61 PROSTATE CANCER: ICD-10-CM

## 2024-10-24 DIAGNOSIS — K76.89 LIVER CYST: Primary | ICD-10-CM

## 2024-10-24 DIAGNOSIS — N52.9 ERECTILE DYSFUNCTION, UNSPECIFIED ERECTILE DYSFUNCTION TYPE: ICD-10-CM

## 2024-10-24 DIAGNOSIS — N32.81 OAB (OVERACTIVE BLADDER): ICD-10-CM

## 2024-10-24 DIAGNOSIS — K86.89 FATTY PANCREAS: ICD-10-CM

## 2024-10-24 DIAGNOSIS — N40.1 BPH WITH OBSTRUCTION/LOWER URINARY TRACT SYMPTOMS: Primary | ICD-10-CM

## 2024-10-24 LAB
BILIRUBIN, UA POC OHS: NEGATIVE
BLOOD, UA POC OHS: NEGATIVE
CLARITY, UA POC OHS: CLEAR
COLOR, UA POC OHS: YELLOW
GLUCOSE, UA POC OHS: NEGATIVE
KETONES, UA POC OHS: NEGATIVE
LEUKOCYTES, UA POC OHS: NEGATIVE
NITRITE, UA POC OHS: NEGATIVE
PH, UA POC OHS: 6
PROTEIN, UA POC OHS: 30
SPECIFIC GRAVITY, UA POC OHS: 1.02
UROBILINOGEN, UA POC OHS: 0.2

## 2024-10-24 PROCEDURE — 4010F ACE/ARB THERAPY RXD/TAKEN: CPT | Mod: CPTII,S$GLB,, | Performed by: INTERNAL MEDICINE

## 2024-10-24 PROCEDURE — 99499 UNLISTED E&M SERVICE: CPT | Mod: ,,, | Performed by: INTERNAL MEDICINE

## 2024-10-24 PROCEDURE — 1126F AMNT PAIN NOTED NONE PRSNT: CPT | Mod: CPTII,S$GLB,, | Performed by: INTERNAL MEDICINE

## 2024-10-24 PROCEDURE — 3074F SYST BP LT 130 MM HG: CPT | Mod: CPTII,S$GLB,, | Performed by: INTERNAL MEDICINE

## 2024-10-24 PROCEDURE — 1159F MED LIST DOCD IN RCRD: CPT | Mod: CPTII,S$GLB,, | Performed by: INTERNAL MEDICINE

## 2024-10-24 PROCEDURE — 99999 PR PBB SHADOW E&M-EST. PATIENT-LVL IV: CPT | Mod: PBBFAC,,, | Performed by: UROLOGY

## 2024-10-24 PROCEDURE — 99999 PR PBB SHADOW E&M-EST. PATIENT-LVL V: CPT | Mod: PBBFAC,,, | Performed by: INTERNAL MEDICINE

## 2024-10-24 PROCEDURE — 3008F BODY MASS INDEX DOCD: CPT | Mod: CPTII,S$GLB,, | Performed by: INTERNAL MEDICINE

## 2024-10-24 PROCEDURE — 1101F PT FALLS ASSESS-DOCD LE1/YR: CPT | Mod: CPTII,S$GLB,, | Performed by: INTERNAL MEDICINE

## 2024-10-24 PROCEDURE — 1160F RVW MEDS BY RX/DR IN RCRD: CPT | Mod: CPTII,S$GLB,, | Performed by: INTERNAL MEDICINE

## 2024-10-24 PROCEDURE — 99214 OFFICE O/P EST MOD 30 MIN: CPT | Mod: S$GLB,,, | Performed by: INTERNAL MEDICINE

## 2024-10-24 PROCEDURE — 3288F FALL RISK ASSESSMENT DOCD: CPT | Mod: CPTII,S$GLB,, | Performed by: INTERNAL MEDICINE

## 2024-10-24 PROCEDURE — 3044F HG A1C LEVEL LT 7.0%: CPT | Mod: CPTII,S$GLB,, | Performed by: INTERNAL MEDICINE

## 2024-10-24 PROCEDURE — 3078F DIAST BP <80 MM HG: CPT | Mod: CPTII,S$GLB,, | Performed by: INTERNAL MEDICINE

## 2024-10-24 NOTE — PATIENT INSTRUCTIONS
For fatigue consider sleep study/discuss sleep hygiene  For liver cyst- repeat abdo US 09/25 and then return to see me  Econsult to GI for fatty pancreas

## 2024-10-24 NOTE — CONSULTS
[facility] GASTROENTEROLOG[facility]  Response for E-Consult     Patient Name: Onesimo Paula  MRN: [id number]  Primary Care Provider: Reynaldo Rahman APRN,FNP-C   Requesting Provider: Rachel Soni MD  E-Consult to Gastroenterology  Consult performed by: Magan Charles MD  Consult ordered by: Rachel Soni MD  Reason for consult: Fatty pancreas  Assessment/Recommendations: Fatty pancreas does not harbor any significant issues and doesn't need any further evaluation or follow up.          Recommendation: as aboce    Contingency that warrants a repeat eConsult or referral: n/a    Total time of Consultation: 5 minute    I did not speak to the requesting provider verbally about this.     *This eConsult is based on the clinical data available to me and is furnished without benefit of a physical examination. The eConsult will need to be interpreted in light of any clinical issues or changes in patient status not available to me at the time of filing this eConsults. Significant changes in patient condition or level of acuity should result in immediate formal consultation and reevaluation. Please alert me if you have further questions.    Thank you for this eConsult referral.     Magan Charles MD  [facility] GASTROENTEROLOG[facility]

## 2024-10-24 NOTE — PROGRESS NOTES
[facility] Note - [place]  Staff: MD Erika  PCP: Reynaldo Rahman, APRN,FNP-C  Date of Service: 10/24/2024      Subjective:        HPI: Onesimo Paula is a 73 y.o. male     Seen by me 9/30/19  Patient had MRI for right hip pain and was found have a thick-walled bladder with enlarged prostate.  He states that he saw urologist over 40 years ago for burning with urination.  He had a renal bladder ultrasound in 2016 which showed enlarged prostate and bilateral renal cyst done for renal insufficiency whom he sees  for.  Denies any gross hematuria.  Review of for previous urine show no microscopic hematuria.  Twenty-five pack-year history of smoking but quit in 1997.      He also has AUA symptom score 6 and occasional weak stream and urgency but voids every 3-5 hours when he is working and a little more frequent when he is not.  Denies any urge incontinence.  Okay stream with occasional weak stream.  PVR today 09/03/2019 is 15 cc.  He may have tried Flomax a couple years ago but does not recall it helping her changing any was symptoms.  Overall really not that bothered by his urinary symptoms.      Also states he has ED.  Previously tried sildenafil unsure dose and it helped but wife does not want him to take, exam revealed peyronie's     Plan: started tadalafil 5mg due to thick walled bladder although essentially asx. No further f/u fr b renal cysts.      Interval history 11/18/19:   Pt states today Cialis has NOT improved any of his lower urinary tract symptoms since starting the medication.  He currently takes this medication before his bedtime.  Pt still c/o urinary urgency, especially during the day.  Nocturia is 1-2x nightly and not bothersome at this time.  He is a , therefore the daytime urgency really bothers him.  Plan: started ditropan 10mg XL and cont'd tadalafil     Interval history by idalia 1/6/20:   TODAY, pt states the oxybutynin only improved  his LUTS by over 10% at this time.  PVR by bladder scan performed in office today:  11 mL  Rec'd: d/c oxybutynin, start myrbetriq, cont tadalafil 5mg daily.     Interval history 7/27/20:   Tried myrbetriq but didn't help. Having urgency, hesistancy and intermittent slow stream but urgency most bothersome and having some UUI with a few drop, but not that bothersome. Drinks 1 c of coffee in am. Soda during day. 1 c of coffee int he evening. Has never tried stopping these to see if they help. Drinks beer and notices increase in frequency following day.   On tadalafil 5mg daily for ED and bph.  pvr by scan: 32cc  Voiding every 30 minutes a day recently more pronounced at home (has been on vacation).  Takes hctz in am. When he's working he only voids about 2x-4x a day. Drinks caffeine while working including energy drinks. No nocturia.   psa 1/31/20 2.2 (up from 1.2 year prior). Repeat psa 0.85 7/29/20  WINSOME today: 40g + no nodules.  Uroflow 7/30/20: peak flow 12.8mL/s,  avg flow 5.4mL/s, voided vol: 191cc, pvr by scan:0      Cysto trus 8/17/20: normal bladder, no stricture. Membranous and distal prostatic urethra with papillary tissue suspicious for prostatitis.trus volume 42.6g. bc of his urgency on tadalafil, flomax, vesicare found rx cipro for 14d to see if it helped w urgency. D/c'd vesicare. cont'd flomax and tadalafil. rtc to see idalia in 2-3 weeks and me in 2 months to discuss bph procedures     Interval history by idalia 9/9/20:   Pt states he is still having the urinary urgency even since finishing the Cipro antibiotics at this time.  He denies gross hematuria, dysuria.  UA today shows normal findings.  PVR by bladder scan today:  9 mL  Plan: rec'd restarting vesicare     Interval history 10/1/20:    Taking flomax 0.4mg nightly and says flow is intermittent on this.   Also on the vesicare and says it doesn't help with the urgency. Voiding 2-3x a day and none at night. 3-4x a week c/o urge incontinence if he  tries to hold it too long, mostly in the am when he wakes up. Denies any difficulty with walking. No weakness or numbness.   Changed to decaf coffee 1x a day. Changed to sprite and caffeine free coke.   He does not want to take any more meds. He is not happy with the retrograde ejaculation.   Sees dr. leger for Mitral Valve leakage, last saw him a week ago, was told he should f/u in March 2021. Tadalafil helping with erections     Interval history 11/19/20:   Underwent rezum on 10/21/20. continue flomax, vesicare and tadafil daily for 3 more months.   Returned for fill and pull on 10/26/20. Filled with 210, voided 210  For 2 weeks postop would have blood when he had a BM, improving.   Today (1 month later) he states he has some increased urgency. UUI 2-3x a day (increased from 3-4x a week).  Still taking vesicare  freq q1 hour (worse in cold weather). Without cold weather freq q 3 hour.   ua today with small wbc and mod blood - some dysuria  No significant change in stream yet  pvr by scan today: 134  AUA ssx:(2 incomplete emptying, 3 frequency, 3 intermittency, 4 urgency, 2 weak stream, 2 straining, 0 sleeping). 16. QOL: 4    Interval history by me in clinic on 3/9/21:  Returns today and states states he ran out of flomax and vesicare a month ago. No increased frequency, urge incontinence or slower stream off the flomax  Still having UUI 1-2x a day with a few drops when he hears water.   Voids 8x/24 hours and 4x/8 hours while at work. Nocturia occ (same). No longer drinking caffeine.   Still on tadalafil 5mg daily.  No longer having retrograde ejaculation and constipation improved. Hctz in am.  Frequency mostly in  the pm. Bothers him but able to stop at bathrooms. Sometimes loses erection with tadalafil 5mg daily. Rare. Not more frequent. Still getting morning erections.   AUA ssx today:(1 incomplete emptying, 2 frequency, 1 intermittency, 3 urgency, 3 weak stream, 0 straining, 0 sleeping). 10. QOL:  mixed  Bladder scan PVR today was 35mL.    I reviewed his previous PSAs and his most recent psa within the last year was 2.3, %free 20.87, last year was 2.2      Interval history with idalia on 4/9/21:  UA today showed normal findings.  Pt states today even though he tried and changed his HCTZ med to evenings as discussed last ov, his LUTS have not improved.  Pt as of today's visit is still c/o urinary urgency and frequency during the daytime.  Has not improved since last ov.  Pt states today he has tried Vesicare and Oxybutynin in the past with no improvement in symptoms.  Pt currently denies gross hematuria and dysuria.  She put him on myrbetriq again       Interval history by me 6/15/21:  Uroflow results (date: 03/18/2021): Voiding time: 24.3s, Flow time: 23.1s, TTP flow: 8.0s, Peak flowrate: 19.2 mL/s, Average flowrate: 10.6mL/s, Intervals: 2, Voided volume: 244 mL, Pvr by bladder scan 33. Pattern of curve: see uploaded image, reviewed by   At last visit was c/o freq/urgency. Frequency 8-10x/day. Tried myrbetriq before rezum, ditropan, vesicare and changing hctz to pm but no improvement. Then in April started myrbetriq again , after about 4 weeks saw improvement, now down to 5-6x a day. avg urg: 3. No nocturia. No incontinence.  Also on tadalafil 5mg daily.   AUA ssx today:(1 incomplete emptying, 1 frequency, 1 intermittency, 2 urgency, 3 weak stream, 0 straining, 0 sleeping). 8. QOL:   Bladder scan PVR today was 0mL.      HPI/CC today 6/7/22:   Had him Continue myrbetriq 50mg daily, has enough until 4/2021, Continue tadalafil 5mg daily, refilled today for a year.  Says myrbetriq not helping anymore. He held it for a few days and noticed no difference. On cialis 5mg daily.   Does not have constipation  Voids about 9-10x during waking hours. occ urge incontinence 3x a week. Few drops. Started drinking caffeine this week.   Nocturia 1-2x a night. Urine flow varies.    psa 5/31/22 1.3  ua today neg, pvr by  scan: 5  AUA ssx:(2 incomplete emptying, 3 frequency, 2 intermittency, 4 urgency, 3 weak stream, 1 straining, 1 sleeping). 16. QOL: mixed  Appendix removed in January complicated by post-op ileus.    Interval history 8/23/22:  Continue myrbetriq 50mg daily, has enough until 4/2021  Continue tadalafil 5mg daily, refilled today for a year  After adding vesicare to myrbetriq - went from voiding 9-10x day, 1-2x a night with occ UUI 3x a week to 5-6x/day, 0 night and no daily UUI with drops. Says he has dry mouth but about the same. However does have some heart burn that is new.   UA today: neg. pvr by scan: 115 (this is the highest it's been, feels empty).  AUA ssx:(1 incomplete emptying, 2 frequency, 0 intermittency, 2 urgency, 2 weak stream, 1 straining, 0 sleeping). 8. QOL: pleased    Interval history 2/28/23:  Says he is doing well on VESIcare and myrbetriq.  However it is expensive.  About 120 dollars every 3 months.  Also having dry mouth with it.  On tadalafil 5 mg daily for erections, obtaining from [employer].  This dose wish for him sometimes.  He tried Viagra 100 mg and it seemed to work.  Aua ssx: 12, pvr: 25    Interval history 4/21/23:  I had him discontinue both myrbetriq and VESIcare at the last visit to see if his incontinence would get worse.  Followed up with Teena on 03/29/2023.  His PVR was 0.  His AUA symptom score was 14, 5.  He was still taking tadalafil 5 mg daily.  He said with stopping VESIcare and myrbetriq he had 2 episodes of incontinence that were large volume, increased postvoid dribbling and he feels like his urinary frequency increased.  Was scheduled with me to discuss InterStim because he wanted to hold off on restarting any medications due to cost and side effects  He returns today with a voiding diary.  He kept a voiding diary for me days and it looks like he urinates on average about 9-10 times a day.  That is up from 6 times a day when he was taking medications.  He did not  documented on his voiding diary but he leaks urine 2 to 3 times a day on the way to the bathroom.  Not wearing any pads.  Does not have to change his underwear.  If he could rate his urgency he would say on average his urgency is 4/5.  He is interested in proceeding with InterStim.  He ended up using tadalafil 10 mg once and said this dose worked good for him for his erectile dysfunction  Voided urine today is negative.  Confirmed again that currently he urinates 7-10 times a day, average urgency is 4 and 1-2 leaks a day.        Interval history 5/25/23:  I did a peripheral nerve evaluation on him on Monday, 4 days ago.  Since then he has gone from urinating 6-10 times a day down to about 6 times a day.  Never had issues at night.  He went from leaking 2 to 3 times a day incontinence down to 1x over the last 3 days. Avg urgency went from 4 to 2. He says >50% improvement and wants to proceed. Not on any meds.   He previously saw  for stress tests bc of SOB. His last w/u was 11/2022 and had a neg stress test. On asa 81mg. No stents. They could never figure out why he has SOB.  He sees pulmonology regularly for nodules in the lungs.  Wires removed today    Interval history 7/11/23:  Here today for interstim stage 1 and 2. Preop appt revealed a1c 6.2, glucose 124. Ua with tr protein/tr bld on 6/28. Started on metformin.   Urinating every 1-2 hours. Uui with drops 2x a day. No pads or underwear changes.  Held asa 81mg for 7d.     Interval history 8/8/23 telephone visit :  Pt states that initially he had issues with urgency and frequency after his procedure but since talking to clarita and making adjustments he has gone from urinating every 1-2 hours to 3-4x a day. Avg urgency remains around 4 and no UUI.   He is happy with his symptoms.     Interval history by ME in CLINIC on 11/13/23 for postop interstim fu and oab fu:  He says that his frequency has returned despite changing his settings. Says changing the  setting works for a bit. Then no longer works. Nos on 3.2.   He says after turning off his interstim for us in August for us abd for his liver he had an increase in his frequency and had to increase the amplitude   He changed the settings on 10/31 and he went from 8x a day down to 5x a day. He stopped hctz a month ago by his cardiologist but then he restarted the hctz in the morning last week and his frequency increased to 9x a day.  Currently on program 2, 3.2.    He says urinating about 8x a day and 1x a night. Leaking with UUI and drops 3-4x a day and 2 accidents last week. No pads.   Urgency 5/5.  1 cup of caffeine in the morning.   Voiding diary: 10/30/23- 8x, changed on 10/31. On 10/31 -5x, 11/1- 5x, 11/3- 6x, 11/11-9x. 11/12-7x. This morning 4x/ 6 hours.   AUA ssx:(2 incomplete emptying, 3 frequency, 2 intermittency, 3 urgency, 3 weak stream, 0 straining, 1 sleeping). 14. QOL: mostly satisfied  Pvr today: 40. Ua today: neg    Interval history by ME in CLINIC on 1/22/24 for oab:  Plan was to do uds bc of his oab but when joe tried to place ureteral catheter it did not go easily. Then she tried 16fr coude and stopped mid way and saw blood. Decided to hold off on and plan for cysto. He adjusted his interstim and he said no change. Feels the flutter in his right perineum. . He says flow is fast sometimes and slow sometimes.  Cysto trus 8/17/20: 42.6g.   Rezum 10/21/20  Last peak flow was 19.2 in 3/18/21.  Last catheter was at time of cholecystectomy in jan 2022.   Interstim 7/11/23  Frequency:Still urinates bw 6 to 12x a day. Has more bad days than good. Nocturia 1x a night.  Incontinence:  Has drops/leaks twice a day.  No pads  Drinks 1 soda in am and decaf cup in evening. No tea.     Interval history/H&P Update by me/ prior to cysto trus on 2/19/24:  urine at the lab- ua 1/30/4:neg(would need to returnwith smoking hx although quit in 1997  Ua today 2/19/24 30 protien  Uroflow and pvr soon. Has  oab. Will need to drink fluid here right before the test.   Uroflow results (date: 01/30/2024): Voiding time: 39.4s, Flow time: 39.3s, TTP flow: 10.6s, Peak flowrate: 16.7 mL/s, Average flowrate: 8.8mL/s, Intervals: 1, Voided volume: 346 mL, Pvr by bladder scan: 45mL . Pattern of curve: see 's addendum   Schedule cystoscopy and TRUS to rule out stricture as cause of oab on Monday feb 19th. See how much prostate has grown back. Previously had rezum in 2020 and trus vol 46g prior to this.   If he does have a urethral stricture- then can explain sx and would need to treat stricture to that improves symptoms.then would need to do in and out cath intermittently forr a while to keep stricture from scarring down again.   If he doesn't have urethral stricture will proceed with uds.   (Will go ahead and schedule to follow cysto/trus and can cancel If we don't need)  Recommend uds since he already has had rezum and interstim and want to evaluate if he has detrussor overactivity/bladder muscle kenn too much.   If so would offer botox since he has already has interstim. But has to be repeated.      Cysto/TRUS Findings 2/19/24:   Cystoscopic findings: rezum defect seen in median lobe closer to veru. Minimal b lobe hypertrophy. + high riding bladder neck with small intravesical protrusion.    TRUS volume: 43.38. WINSOME: just to right of midline small ridge <0.5cm felt near base. 40g prostate.           Assessment: Onesimo Paula is a 73 y.o. male with h/o oab and bph sp rezum 10/21/20, interstim 5/22/23. Small ridge on rectal exam today. Difficult catheterization during attempted uds recently. No urethral stricture seen but does have high riding bladder neck.      Plan:  Continue flomax 0.8mg nightly for now to help with high riding bladder neck.  Since no urethral stricture seen fu for urodynamics to eval if he has detrussor overactivity vs bph obstruction. Botox vs tuip (transurethral incision or prostate for  high riding bladder neck) vs both at the same time and seeing how he responds.   At urodynamics I will place urethral catheter. Resitance likely at prostate-high riding and small defect seen    nterval history by me/ prior to urodynamics on 3/4/24:  Primary complaint is frequency and urgency with UUI and slow flow.  Underwent uds today which showed he had delayed sensation with slow flow. Bladder mostly emptied after procedure. Catheter placement was difficult due to high riding bladder neck but able to perform procedure. Recommended proceeding with tuip/turp.  Ua was neg.   However he returned in the afternoon bc unable to urinate.   Eryn placed coude jean and 475 dark urine drained.   She irrigated him and removed some clots     Interval history/H&P Update by me/ prior to tuip/turp on 3/12/24:  He came back for voiding trial and passed. He ended up staying off flomax.   Takes amlodipine and flomax 7-8-9 in the evening could be reason  Tadalafil in the mornign  Hctz in the morning  Recent cr 1.5 -sees , saw last year, fu in may   Psa screen today 3.8- but had catheter and uds 3 weeks ago.   Ua today with tr protein     Current Urinary symptoms 3/12/24:  Frequency:Still urinates bw 8-9x a day. Has more bad days than good. Nocturia 1x a night.  Incontinence:  Has drops/leaks 2-3 a day.  No pads.  Avg urgency: 4  Interstim on currently       Bipolar turp 3/13/24: path 3+3 from right side- 4/77 chips  Ureteral orifices close to resection. Part of trigone resected.   WINSOME revealed NO NODULES or firm areas today (psa prior to procedure 3.8 but had catheter and difficult catheter placement during uds less than a week ago)  Median lobe removed was likely the biggest obstructing component.   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              Interval history by ME/ in CLINIC on 4/11/24 for postop turp and prostate cancer findings:   Current Urinary symptoms  3/12/24:  Frequency:down to 5x a day from 8-9x a day. Nocturia 2x a week.    Incontinence:  leaks 5 to 6x a day up from 2-3x a day. Improving. Occurs with and without activity. Previously no pads, now 1 pad a day. Not wet when he changes.   Avg urgency: 4 previously, 2 now   AUA ssx:(2 incomplete emptying, 1 frequency, 1 intermittency, 2 urgency, 1 weak stream, 0 straining, 1 sleeping). 10. QOL: 8    Interstim on currently   Pvr by scan: 0  Ua today with small leuk and mod blood. Some dysuria.   Still has some blood in first void and with straining  Overall he says better.   .  Interval history by ME/ in CLINIC on 7/16/24 for bph/prostate cancer fu:  At his last visit his urine had leukocytes and blood.  However he had just had a TURP 1 month prior.  His culture was negative.  Currently his urine only shows trace protein today  I had him discontinue Flomax and oxybutynin at his last visit. Says no noticeable change in flow or frequency/urgency. I also asked him to do some Kegel's for his stress incontinence he was experiencing, he says he tried but didn't continue. However having minimal SANJANA.   Current Urinary symptoms 7/16/24  Frequency 5-8x a day down from 6-9x day. Still has more bad days than good. Nocturia 0-1x a night. 3-4x a week waking up 1x a night.   Incontinence:  Has drops 3x a day(previously 2-3x a day). Urge and unaware. Occ/rare SANJANA. Wearing thin pads (1 a day)- not when he changes.   Avg urgency: 3 (previously 4)  Interstim on currently and feels flutter in perinem (centered)  For his low-grade prostate cancer found on TURP chips I had him do a PSA and it came down to 0.44 after his TURP  Pvr by scan: 15.  AUA ssx:(1 incomplete emptying, 2 frequency, 1 intermittency, 2 urgency, 2 weak stream, 0 straining, 1 sleeping). 9. QOL: mixed  Using cialis 5mg daily for Ed. Working mostly. Sometimes doubles up. Will sometimes lose erection 1/2 way through. '    Interval history by  ME/ in CLINIC on 10/24/24:  History of Present Illness    CHIEF COMPLAINT:  Onesimo presents today for follow-up of enlarged prostate, prostate symptoms, and overactive bladder symptoms.    URINARY SYMPTOMS:  He underwent TURP in March 2024 and had an InterStim implant placed in July 2023 for overactive bladder and urge incontinence. He also reports a liver cyst that has shown some growth, prompting an MRI  He reports urinary frequency of 5-6 times per day, improved from previous 5-8 times daily. Nocturia occurs once per night. He denies urge incontinence but reports occasional stress incontinence improved since last visit with small leaks, wearing one pad daily for protection. He experiences urgency and weak urinary stream, both rated as 1 on the AUA symptom score. He describes a light sensation with slow urinary flow. AUA SSX:6    PROSTATE CANCER:  He was diagnosed with low-grade prostate cancer ([place] 3+3) from turp chips from TURP procedure in March 2024. Recent PSA test result is 0.58, slightly elevated from previous 0.44.    INTERSTIM DEVICE:  He reports intermittent effectiveness of the InterStim device.he turned it off a month ago and he actually thinks his urgency improved.     MEDICATIONS:  He continues Cialis 5 mg daily for erectile dysfunction, reporting it is effective most of the time. He has discontinued Flomax and oxybutynin following the TURP procedure.    SEXUAL HEALTH:  He reports erectile dysfunction managed with Cialis. He experienced post-ejaculatory bleeding on one occasion following sexual intercourse, but denies any recurrence of this symptom.    Only pertinent history related to their urologic issues were discussed above.         Bladder/prostate history:  Cysto trus 8/17/20: 42.6g.   Rezum 10/21/20  Last peak flow was 19.2 in 3/18/21.  Last catheter was at time of cholecystectomy in jan 2022.   PNE 5/21/23  Interstim 7/11/23  Cysto trus 2/19/24 45g. High riding bladder neck.    UDS 3/4/24- peak flow 8.2, voided vol: 352, pvr by scan: 23  Turp 3/12/24. Gl 3+3 in 4/77 chips.     PSA history: no family hx of prostate cancer  10/24/24 0.58  7/10/24 0.44  3/13/24 Turp chips: 4/77 right lobe Gl 3+3  3/13/24 3.8, WINSOME: no nodules.   4/5/23  1.5 already she  1/22/24 WINSOME: 40g  5/31/22 1.3  3/2/21              2.3, %free 20.87, pvr: 35 (psa screen 4.6)  11/19/20          rezum  7/29/20            0.85, %free 54.12  1/31/20            2.2   2/21/19            1.2  3/14/18            1.0       PVR History:  10/24/24 0, aua ssx: 6  7/16/24 15  4/11/24  Pvr: 0  3/13/24            Turp/tuip  3/8/24              Filled with 140. Voided 250  3/4/24              Uds: peak flow: 8.5, avg flow: 4.7, voided vol: 352, pvr by scan: 27 but then came back and pvr by io: 475  1/30/24            Peak flow: 16.7, voided vol: 346, pvr: 45  11/13/23          40  2/28/23            25, aua ssx: 12  8/23/22            115  6/15/21            pvr by scan: 0  3/18/21            UF: pF: 19.2, avg: 10.6, voided vol: 244, pvr: 33  3/2/21               pvr: 35  11/19/20          pvr by scan: 134 (couldn't start after he stopped)  10/26/20          Fill and pull: 0cc pvr  11/19/20          rezum  9/9/20              pvr by scan: 9cc  7/27/20            pvr by scan: 32cc  8/17/20            Cysto trus: 42.6g.   7/30/20           UF: peak flow 12.8mL/s,  avg flow 5.4mL/s, voided vol: 191cc, pvr by scan:0   1/6/20              pvr by scan: 11cc   9/3/19              pvr by scan: 15cc      Urine history: 1 ppd x 26 years. Quit 1997. Anticoag: asa 81mg  7/16/24 Tr prot  4/11/24  Ng, void: Mod bld/small leuk,  2/19/24            30 protein  1/30/54            neg  1/22/24 Not able to provide- do at the lab  11/13/23 1 rbc  6/28/23 Tr bld  5/22/23 neg  4/21/23 Tr prot  6/7/22  neg  11/19/20          Small wbc/mod blood-send for ua patricio and culture pvr by scan: 134  10/14/20          Neg  9/16/20            neg  9/9/20               neg  3/31/20            Neg  1/6/20              Neg  11/18/19          neg  8/8/19              No cx, void: n/a 0 rbc  2/21/19            Ng, void: neg, 0 rbc  4/9/18              No cx, void: neg  9/2017             No blood       Current REVIEW OF SYSTEMS:  neg    Objective:     There were no vitals filed for this visit.        Focused  exam  neg    Assessment:     Onesimo Paula is a 73 y.o. 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 Prostate cancer    3. OAB (overactive bladder)    4. Erectile dysfunction, unspecified erectile dysfunction type        Plan:     Abbreviated/Short Plan:  Continue cialis 5mg daily for Ed. Ok to increase or take additional 15mg but add'l 5mg  Diagnostic psa in 3 months and if psa higher order prostate mri and plan for formal biopsy.  Uroflow and pvr soon (post turp)   Turn on interstim if frequency or urgency recurs worsen  Fu in 3 months with diagnostic psa prior for aua ssx. Will change to q6 month psa then.  (Delia due in march 2025)    Active surveillance plan for prostate cancer  psa every 6 months  Digital rectal exam once a year  Mri likely once a year (may need valium)  Biopsy for rising psa, abnormal prostate exam or at 12 to 18 months after first biopsy   Treat for increasing volume or stage of prostate cancer    The following assessment plan was created by [employer] via ambient listening:  Assessment & Plan    IMPRESSION:  - Assessed PSA trend for active surveillance of low-grade prostate cancer ([place] 3+3)  - Noted slight PSA increase from 0.44 to 0.58, potentially due to inflammation, prostate tissue regrowth, or minimal cancer progression  - Will continue active surveillance with PSA monitoring rather than immediate MRI or biopsy  - Evaluated urinary symptoms post-TURP and InterStim therapy, noting overall improvement despite InterStim being off since September  - Considered ED management, determining current Cialis regimen is  generally effective but may benefit from dose adjustment as needed    HEMATOSPERMIA:  - Explained that post-coital blood in semen is likely normal, possibly due to TURP or prostate stones, and not indicative of prostate cancer.    URINARY SYMPTOMS:  - Onesimo to monitor for recurrence of urinary frequency or urgency symptoms.  - Onesimo to turn InterStim back on if urinary symptoms worsen.  - Contact the office if urinary symptoms worsen after turning InterStim back on.  - Uroflow and bladder scan study ordered in the next few weeks.    ERECTILE DYSFUNCTION:  - Continued Cialis 5mg daily for ED.  - Increased Cialis: OK to take additional 5mg (total 10mg) before sexual activity if needed.  - Increased Cialis: Can take up to 20mg total in 36 hours if 10mg is ineffective, but not more than 2 days in a row at higher dose.    PROSTATE CANCER ON ACTIVE SURVEILLANCE  - Diagnostic PSA ordered in 3 months.- Follow up in 3 months with diagnostic PSA results.  - If PSA is elevated at 3-month follow-up, prostate MRI will be ordered and formal biopsy planned.  - If PSA is stable or lower at 3-month follow-up, will continue PSA every 6 months and annual rectal exam.           La Nena James MD

## 2024-10-24 NOTE — PATIENT INSTRUCTIONS
Onesimo Paula is a 73 y.o. 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 Prostate cancer    3. OAB (overactive bladder)    4. Erectile dysfunction, unspecified erectile dysfunction type        Plan:     Abbreviated/Short Plan:  Continue cialis 5mg daily for Ed. Ok to increase or take additional 15mg but add'l 5mg  Diagnostic psa in 3 months and if psa higher order prostate mri and plan for formal biopsy.  Uroflow and pvr soon (post turp)   Turn on interstim if frequency or urgency recurs worsen  Fu in 3 months with diagnostic psa prior for aua ssx. Will change to q6 month psa then.  (Delia due in march 2025)    Active surveillance plan for prostate cancer  psa every 6 months  Digital rectal exam once a year  Mri likely once a year (may need valium)  Biopsy for rising psa, abnormal prostate exam or at 12 to 18 months after first biopsy   Treat for increasing volume or stage of prostate cancer    The following assessment plan was created by [employer] via ambient listening:  Assessment & Plan    IMPRESSION:  - Assessed PSA trend for active surveillance of low-grade prostate cancer ([place] 3+3)  - Noted slight PSA increase from 0.44 to 0.58, potentially due to inflammation, prostate tissue regrowth, or minimal cancer progression  - Will continue active surveillance with PSA monitoring rather than immediate MRI or biopsy  - Evaluated urinary symptoms post-TURP and InterStim therapy, noting overall improvement despite InterStim being off since September  - Considered ED management, determining current Cialis regimen is generally effective but may benefit from dose adjustment as needed    HEMATOSPERMIA:  - Explained that post-coital blood in semen is likely normal, possibly due to TURP or prostate stones, and not indicative of prostate cancer.    URINARY SYMPTOMS:  - Oneismo to monitor for recurrence of urinary frequency or urgency symptoms.  - Onesimo to turn InterStim back on if urinary  symptoms worsen.  - Contact the office if urinary symptoms worsen after turning InterStim back on.  - Uroflow and bladder scan study ordered in the next few weeks.    ERECTILE DYSFUNCTION:  - Continued Cialis 5mg daily for ED.  - Increased Cialis: OK to take additional 5mg (total 10mg) before sexual activity if needed.  - Increased Cialis: Can take up to 20mg total in 36 hours if 10mg is ineffective, but not more than 2 days in a row at higher dose.    PROSTATE CANCER ON ACTIVE SURVEILLANCE  - Diagnostic PSA ordered in 3 months.- Follow up in 3 months with diagnostic PSA results.  - If PSA is elevated at 3-month follow-up, prostate MRI will be ordered and formal biopsy planned.  - If PSA is stable or lower at 3-month follow-up, will continue PSA every 6 months and annual rectal exam.

## 2024-10-24 NOTE — PROGRESS NOTES
HEPATOLOGY FOLLOW UP    Referring Physician: JAMES Rose,KELLY  Current Corresponding Physician: JAMES Rose,KELLY    Onesimo Paula is here for follow up of Liver cyst      HPI  Onesimo Paula is a 73 y.o. male, , who had an MRI of the abdomen that revealed a mildly complex liver cyst. I saw him in consultation 2/3/22:     MRI abdo w wo contrast 12/7/21:  segment 5 there is a 4 cm lobular T1 hypointense T2 hyperintense lesion     CT abdo pelvis 1/2/22: The liver is hypodense compatible with fatty infiltration. There is a 3.5 cm cyst within the right lobe of the liver. There is no biliary duct dilatation.  CT abdo pelvis w contrast 1/9/22: Liver is normal size. Hepatic cyst again noted noted and is unchanged. No new liver lesions  CTA abdomen 1/12/22: Simple fluid attenuation      He denied abdo pain, N/V. He did recently have intraabdominal abscesses after an appendectomy but those had resolved and he did not symptoms when I saw him in consultation.     Labs 1/13/22: ALT 24, AST 19, ALKP 52, Tbil 0.8     The patient also denied any symptoms of decompensated cirrhosis, including no ascites or edema, cognitive problems that would suggest hepatic encephalopathy, or GI bleeding from varices.    Interval History  Since Onesimo Paula's last few visits:  --c/o fatigue and dizziness- having trouble working as a - considering snf; ahs had a pulm and cardiac w/u - no obvious etiology; not sleeping well- up every hour.    My impression at the time of consultation was that he had a simple cyst in the liver. I recommended an abdo US. It actually has septations and has grown:    Abdo US 2/16/22: The liver is normal in overall size. There is a 4.2 cm thinly septated cyst within the right hepatic lobe as has been demonstrated on previous imaging. There are no additional focal hepatic parenchymal abnormalities demonstrated.  Abdo US 8/16/22: Liver maintains normal  echogenicity without focal mass or intrahepatic bile duct dilation. There is redemonstration of right hepatic lobe cyst measuring 3.3 x 3.7 x 4.1 cm, with septation and is not significant changed when compared with previous exam.  Abdo US 8/24/23: Liver measures 15.8 cm in length.  Right hepatic lobe cyst measures 4.4 x 4.3 x 3.8 cm, minimally increased in size compared to prior.  Abdo US 8/26/24: A minimally complex, septated cyst within the right lobe measures up to 4.8 cm in size, slightly larger than on the previous examination (4.4 cm. No additional focal hepatic parenchymal abnormality is identified.   MRI abdo w wo contrast 9/11/24: There is a circumscribed 48 x 45 x 40 mm uniformly T2 hyperintense simple cyst in the right hepatic lobe laterally, having thin nonenhancing internal septations. Hepatic parenchymal signal intensity and enhancement are otherwise normal, with no enhancing hepatic mass.     He continues to not have any symptoms in the RUQ that could be attributed to the cyst.    Outpatient Encounter Medications as of 10/24/2024   Medication Sig Dispense Refill    amLODIPine (NORVASC) 10 MG tablet Take 1 tablet by mouth once daily 90 tablet 0    aspirin (ECOTRIN) 81 MG EC tablet Take 81 mg by mouth once daily.      busPIRone (BUSPAR) 5 MG Tab Take 1 tablet (5 mg total) by mouth once daily. 30 tablet 5    calcitRIOL (ROCALTROL) 0.25 MCG Cap Take 0.25 mcg by mouth every 7 days.      co-enzyme Q-10 30 mg capsule Take 30 mg by mouth 3 (three) times daily.      cyanocobalamin 1,000 mcg/mL injection Inject 1 mL (1,000 mcg total) into the muscle every 14 (fourteen) days. 1 mL 5    hydroCHLOROthiazide (HYDRODIURIL) 25 MG tablet Take 1/2 (one-half) tablet by mouth once daily 45 tablet 3    metFORMIN (GLUCOPHAGE) 500 MG tablet Take 1 tablet (500 mg total) by mouth daily with breakfast. 90 tablet 1    multivit with minerals/lutein (MULTIVITAMIN 50 PLUS ORAL) Take 1 tablet by mouth once daily.      omega  3-dha-epa-fish oil (FISH OIL) 100-160-1,000 mg Cap Take 1 capsule by mouth once daily.      omeprazole (PRILOSEC) 20 MG capsule Take 1 capsule (20 mg total) by mouth once daily. 90 capsule 1    ranolazine (RANEXA) 500 MG Tb12 Take 2 tablets by mouth twice daily 180 tablet 3    rosuvastatin (CRESTOR) 10 MG tablet Take 1 tablet (10 mg total) by mouth once daily. 90 tablet 1    tadalafiL (CIALIS) 5 MG tablet Take 1 tablet (5 mg total) by mouth every morning. 90 tablet 3    telmisartan (MICARDIS) 80 MG Tab Take 1 tablet (80 mg total) by mouth once daily. 90 tablet 1    [DISCONTINUED] amLODIPine (NORVASC) 10 MG tablet Take 1 tablet (10 mg total) by mouth once daily. 90 tablet 1    [DISCONTINUED] cyanocobalamin 1,000 mcg/mL injection Inject 1 mL (1,000 mcg total) into the muscle every 30 days. 1 mL 5     Facility-Administered Encounter Medications as of 10/24/2024   Medication Dose Route Frequency Provider Last Rate Last Admin    electrolyte-S (ISOLYTE)   Intravenous Continuous Loyd Chakraborty MD   Stopped at 07/12/23 1141    electrolyte-S (ISOLYTE)   Intravenous Continuous Bennett Javier MD        electrolyte-S (ISOLYTE)   Intravenous Continuous Bennett Javier MD 10 mL/hr at 03/13/24 1336 New Bag at 03/13/24 1336    fentaNYL 50 mcg/mL injection 25 mcg  25 mcg Intravenous Q5 Min PRN Loyd Chakraborty MD        fentaNYL 50 mcg/mL injection 25 mcg  25 mcg Intravenous Q5 Min PRN Bennett Javier MD        lactated ringers infusion   Intravenous Continuous Loyd Chakraborty MD        LIDOcaine (PF) 10 mg/ml (1%) injection 10 mg  1 mL Intradermal Once Loyd Chakraborty MD        LIDOcaine (PF) 10 mg/ml (1%) injection 10 mg  1 mL Intradermal Once Bennett Javier MD        metoclopramide HCl injection 10 mg  10 mg Intravenous Q10 Min PRN Loyd Chakraborty MD        ondansetron injection 4 mg  4 mg Intravenous Once PRN Bennett Javier MD        oxyCODONE immediate release tablet 5 mg  5 mg Oral Once PRN  Loyd Chakraborty MD        oxyCODONE immediate release tablet 5 mg  5 mg Oral Once PRN Cousin, Bennett CARRION MD         Review of patient's allergies indicates:  No Known Allergies  Past Medical History:   Diagnosis Date    Acid reflux     Acquired tricuspid valve insufficiency     Anemia     Borderline diabetes     BPH (benign prostatic hyperplasia)     Cataract     1/16/20 rt eye, 1/30/20- Lt eye    Coronary artery disease     Hypertension     Liver cyst 08/24/2022    Pulmonary nodules     Skin cancer        Review of Systems   Constitutional: Negative.    HENT: Negative.     Eyes: Negative.    Respiratory: Negative.     Cardiovascular: Negative.    Gastrointestinal: Negative.    Genitourinary: Negative.    Musculoskeletal: Negative.    Skin: Negative.    Neurological: Negative.    Psychiatric/Behavioral: Negative.       Vitals:    10/24/24 0910   BP: 128/73   Pulse: (!) 59   Temp: 98 °F (36.7 °C)       Physical Exam  Vitals reviewed.   Constitutional:       Appearance: He is well-developed.   HENT:      Head: Normocephalic and atraumatic.   Eyes:      General: No scleral icterus.     Conjunctiva/sclera: Conjunctivae normal.      Pupils: Pupils are equal, round, and reactive to light.   Neck:      Thyroid: No thyromegaly.   Cardiovascular:      Rate and Rhythm: Normal rate and regular rhythm.      Heart sounds: Normal heart sounds.   Pulmonary:      Effort: Pulmonary effort is normal.      Breath sounds: Normal breath sounds. No rales.   Abdominal:      General: Bowel sounds are normal. There is no distension.      Palpations: Abdomen is soft. There is no mass.      Tenderness: There is no abdominal tenderness.   Musculoskeletal:         General: Normal range of motion.      Cervical back: Normal range of motion and neck supple.   Skin:     General: Skin is warm and dry.      Findings: No rash.   Neurological:      Mental Status: He is alert and oriented to person, place, and time.         Computed MELD 3.0  unavailable. One or more values for this score either were not found within the given timeframe or did not fit some other criterion.  Computed MELD-Na unavailable. One or more values for this score either were not found within the given timeframe or did not fit some other criterion.      Lab Results   Component Value Date     (H) 08/13/2024     (H) 02/21/2019    BUN 44 (H) 08/13/2024    CREATININE 1.8 (H) 08/13/2024    CREATININE 1.11 02/21/2019    CALCIUM 9.3 08/13/2024     08/13/2024     02/21/2019    K 4.5 08/13/2024     08/13/2024     02/21/2019    PROT 7.5 08/13/2024    CO2 26 08/13/2024    ANIONGAP 9 08/13/2024    WBC 7.54 08/13/2024    RBC 3.81 (L) 08/13/2024    HGB 11.9 (L) 08/13/2024    HCT 36.5 (L) 08/13/2024    MCV 96 08/13/2024    MCH 31.2 (H) 08/13/2024    MCHC 32.6 08/13/2024     Lab Results   Component Value Date    RDW 13.2 08/13/2024     08/13/2024    MPV 9.5 08/13/2024    GRAN 4.4 08/13/2024    GRAN 57.8 08/13/2024    LYMPH 2.1 08/13/2024    LYMPH 27.9 08/13/2024    MONO 0.8 08/13/2024    MONO 10.5 08/13/2024    EOSINOPHIL 2.4 08/13/2024    BASOPHIL 0.7 08/13/2024    EOS 0.2 08/13/2024    BASO 0.05 08/13/2024    APTT 28.3 01/11/2022    GROUPTRH A NEG 10/24/2022    CHOL 118 (L) 08/13/2024    TRIG 66 08/13/2024    HDL 36 (L) 08/13/2024    CHOLHDL 30.5 08/13/2024    TOTALCHOLEST 3.3 08/13/2024    ALBUMIN 4.2 08/13/2024    ALBUMIN 4.2 02/21/2019    AST 17 08/13/2024    ALT 15 08/13/2024    ALKPHOS 54 (L) 08/13/2024    MG 2.0 10/04/2023    INR 1.0 10/24/2022    PSA 3.8 03/13/2024       Assessment and Plan:  Onesimo Paula is a 73 y.o. male with a complex liver cyst. It is increasing in size but very slowly. There are no concerning features on MRI 2024.  He remains asymptomatic. I am recommending continued surveillance- repeat abdo US in 1 year.    Re fatigue - consider sleep study/discussion of sleep hygiene with pcp.    Return 1 year.  A total of 35 minutes  was spent reviewing the patient's chart, examining the patient, reviewing labs and imaging and coordinating care with the patient's care team.

## 2024-10-29 ENCOUNTER — CLINICAL SUPPORT (OUTPATIENT)
Dept: UROLOGY | Facility: CLINIC | Age: 74
End: 2024-10-29
Payer: COMMERCIAL

## 2024-10-29 DIAGNOSIS — N13.8 BPH WITH OBSTRUCTION/LOWER URINARY TRACT SYMPTOMS: Primary | ICD-10-CM

## 2024-10-29 DIAGNOSIS — N40.1 BPH WITH OBSTRUCTION/LOWER URINARY TRACT SYMPTOMS: Primary | ICD-10-CM

## 2024-10-29 PROCEDURE — 99499 UNLISTED E&M SERVICE: CPT | Mod: S$GLB,,, | Performed by: UROLOGY

## 2024-10-29 PROCEDURE — 51741 ELECTRO-UROFLOWMETRY FIRST: CPT | Mod: S$GLB,,, | Performed by: UROLOGY

## 2024-11-01 ENCOUNTER — PATIENT MESSAGE (OUTPATIENT)
Dept: HEPATOLOGY | Facility: CLINIC | Age: 74
End: 2024-11-01
Payer: COMMERCIAL

## 2024-11-01 ENCOUNTER — TELEPHONE (OUTPATIENT)
Dept: TRANSPLANT | Facility: HOSPITAL | Age: 74
End: 2024-11-01
Payer: COMMERCIAL

## 2024-11-03 ENCOUNTER — PATIENT MESSAGE (OUTPATIENT)
Dept: FAMILY MEDICINE | Facility: CLINIC | Age: 74
End: 2024-11-03
Payer: COMMERCIAL

## 2024-11-14 ENCOUNTER — LAB VISIT (OUTPATIENT)
Dept: LAB | Facility: HOSPITAL | Age: 74
End: 2024-11-14
Attending: INTERNAL MEDICINE
Payer: COMMERCIAL

## 2024-11-14 DIAGNOSIS — N25.81 SECONDARY HYPERPARATHYROIDISM OF RENAL ORIGIN: ICD-10-CM

## 2024-11-14 DIAGNOSIS — N18.2 CHRONIC KIDNEY DISEASE, STAGE II (MILD): Primary | ICD-10-CM

## 2024-11-14 LAB
25(OH)D3+25(OH)D2 SERPL-MCNC: 50 NG/ML (ref 30–96)
ALBUMIN SERPL BCP-MCNC: 4.3 G/DL (ref 3.5–5.2)
ANION GAP SERPL CALC-SCNC: 7 MMOL/L (ref 8–16)
BASOPHILS # BLD AUTO: 0.06 K/UL (ref 0–0.2)
BASOPHILS NFR BLD: 0.8 % (ref 0–1.9)
BUN SERPL-MCNC: 28 MG/DL (ref 8–23)
CALCIUM SERPL-MCNC: 9.7 MG/DL (ref 8.7–10.5)
CHLORIDE SERPL-SCNC: 105 MMOL/L (ref 95–110)
CO2 SERPL-SCNC: 30 MMOL/L (ref 23–29)
CREAT SERPL-MCNC: 1.2 MG/DL (ref 0.5–1.4)
CREAT UR-MCNC: 64.9 MG/DL (ref 23–375)
DIFFERENTIAL METHOD BLD: ABNORMAL
EOSINOPHIL # BLD AUTO: 0.2 K/UL (ref 0–0.5)
EOSINOPHIL NFR BLD: 2.8 % (ref 0–8)
ERYTHROCYTE [DISTWIDTH] IN BLOOD BY AUTOMATED COUNT: 13.9 % (ref 11.5–14.5)
EST. GFR  (NO RACE VARIABLE): >60 ML/MIN/1.73 M^2
GLUCOSE SERPL-MCNC: 86 MG/DL (ref 70–110)
HCT VFR BLD AUTO: 38 % (ref 40–54)
HGB BLD-MCNC: 12.5 G/DL (ref 14–18)
IMM GRANULOCYTES # BLD AUTO: 0.02 K/UL (ref 0–0.04)
IMM GRANULOCYTES NFR BLD AUTO: 0.3 % (ref 0–0.5)
LYMPHOCYTES # BLD AUTO: 2.5 K/UL (ref 1–4.8)
LYMPHOCYTES NFR BLD: 31.3 % (ref 18–48)
MCH RBC QN AUTO: 31.8 PG (ref 27–31)
MCHC RBC AUTO-ENTMCNC: 32.9 G/DL (ref 32–36)
MCV RBC AUTO: 97 FL (ref 82–98)
MICROSCOPIC COMMENT: NORMAL
MONOCYTES # BLD AUTO: 0.8 K/UL (ref 0.3–1)
MONOCYTES NFR BLD: 10 % (ref 4–15)
NEUTROPHILS # BLD AUTO: 4.3 K/UL (ref 1.8–7.7)
NEUTROPHILS NFR BLD: 54.8 % (ref 38–73)
NRBC BLD-RTO: 0 /100 WBC
PHOSPHATE SERPL-MCNC: 3.3 MG/DL (ref 2.7–4.5)
PLATELET # BLD AUTO: 238 K/UL (ref 150–450)
PMV BLD AUTO: 9.9 FL (ref 9.2–12.9)
POTASSIUM SERPL-SCNC: 4.2 MMOL/L (ref 3.5–5.1)
PROT UR-MCNC: 30 MG/DL (ref 6–15)
PTH-INTACT SERPL-MCNC: 37 PG/ML (ref 9–77)
RBC # BLD AUTO: 3.93 M/UL (ref 4.6–6.2)
RBC #/AREA URNS HPF: 1 /HPF (ref 0–4)
SODIUM SERPL-SCNC: 142 MMOL/L (ref 136–145)
WBC # BLD AUTO: 7.82 K/UL (ref 3.9–12.7)
WBC #/AREA URNS HPF: 0 /HPF (ref 0–5)

## 2024-11-14 PROCEDURE — 82306 VITAMIN D 25 HYDROXY: CPT | Performed by: INTERNAL MEDICINE

## 2024-11-14 PROCEDURE — 85025 COMPLETE CBC W/AUTO DIFF WBC: CPT | Performed by: INTERNAL MEDICINE

## 2024-11-14 PROCEDURE — 81001 URINALYSIS AUTO W/SCOPE: CPT | Performed by: INTERNAL MEDICINE

## 2024-11-14 PROCEDURE — 82570 ASSAY OF URINE CREATININE: CPT | Performed by: INTERNAL MEDICINE

## 2024-11-14 PROCEDURE — 80069 RENAL FUNCTION PANEL: CPT | Performed by: INTERNAL MEDICINE

## 2024-11-14 PROCEDURE — 83970 ASSAY OF PARATHORMONE: CPT | Performed by: INTERNAL MEDICINE

## 2024-11-14 PROCEDURE — 84156 ASSAY OF PROTEIN URINE: CPT | Performed by: INTERNAL MEDICINE

## 2024-11-14 PROCEDURE — 36415 COLL VENOUS BLD VENIPUNCTURE: CPT | Performed by: INTERNAL MEDICINE

## 2024-11-22 ENCOUNTER — PATIENT MESSAGE (OUTPATIENT)
Dept: FAMILY MEDICINE | Facility: CLINIC | Age: 74
End: 2024-11-22
Payer: COMMERCIAL

## 2024-12-15 DIAGNOSIS — I10 ESSENTIAL HYPERTENSION: Chronic | ICD-10-CM

## 2024-12-15 RX ORDER — DOXAZOSIN 4 MG/1
4 TABLET ORAL DAILY
Qty: 90 TABLET | Refills: 0 | OUTPATIENT
Start: 2024-12-15

## 2024-12-15 NOTE — TELEPHONE ENCOUNTER
Recent Visits  No visits were found meeting these conditions.  Showing recent visits within past 540 days with a meds authorizing provider and meeting all other requirements  Future Appointments  No visits were found meeting these conditions.  Showing future appointments within next 150 days with a meds authorizing provider and meeting all other requirements     Visit date not found

## 2024-12-16 RX ORDER — DOXAZOSIN 4 MG/1
4 TABLET ORAL DAILY
Qty: 90 TABLET | Refills: 0 | Status: SHIPPED | OUTPATIENT
Start: 2024-12-16

## 2024-12-18 ENCOUNTER — PATIENT MESSAGE (OUTPATIENT)
Dept: FAMILY MEDICINE | Facility: CLINIC | Age: 74
End: 2024-12-18
Payer: COMMERCIAL

## 2024-12-18 DIAGNOSIS — I10 ESSENTIAL HYPERTENSION: ICD-10-CM

## 2024-12-18 RX ORDER — AMLODIPINE BESYLATE 10 MG/1
10 TABLET ORAL DAILY
Qty: 90 TABLET | Refills: 0 | Status: SHIPPED | OUTPATIENT
Start: 2024-12-18

## 2025-01-13 ENCOUNTER — OFFICE VISIT (OUTPATIENT)
Dept: FAMILY MEDICINE | Facility: CLINIC | Age: 75
End: 2025-01-13
Payer: COMMERCIAL

## 2025-01-13 VITALS
DIASTOLIC BLOOD PRESSURE: 70 MMHG | OXYGEN SATURATION: 98 % | HEIGHT: 70 IN | WEIGHT: 179.69 LBS | SYSTOLIC BLOOD PRESSURE: 126 MMHG | BODY MASS INDEX: 25.73 KG/M2 | HEART RATE: 70 BPM

## 2025-01-13 DIAGNOSIS — E78.01 FAMILIAL HYPERCHOLESTEROLEMIA: ICD-10-CM

## 2025-01-13 DIAGNOSIS — I10 ESSENTIAL HYPERTENSION: Primary | ICD-10-CM

## 2025-01-13 DIAGNOSIS — I07.1 ACQUIRED TRICUSPID VALVE INSUFFICIENCY: ICD-10-CM

## 2025-01-13 DIAGNOSIS — D64.9 MILD ANEMIA: ICD-10-CM

## 2025-01-13 DIAGNOSIS — R06.09 DYSPNEA ON EXERTION: ICD-10-CM

## 2025-01-13 DIAGNOSIS — R41.89 BRAIN FOG: ICD-10-CM

## 2025-01-13 DIAGNOSIS — F41.8 SITUATIONAL ANXIETY: ICD-10-CM

## 2025-01-13 DIAGNOSIS — K21.9 GASTROESOPHAGEAL REFLUX DISEASE WITHOUT ESOPHAGITIS: ICD-10-CM

## 2025-01-13 DIAGNOSIS — R73.03 PREDIABETES: ICD-10-CM

## 2025-01-13 DIAGNOSIS — R25.1 TREMOR OF BOTH HANDS: ICD-10-CM

## 2025-01-13 PROCEDURE — 3078F DIAST BP <80 MM HG: CPT | Mod: CPTII,S$GLB,, | Performed by: NURSE PRACTITIONER

## 2025-01-13 PROCEDURE — 99214 OFFICE O/P EST MOD 30 MIN: CPT | Mod: S$GLB,,, | Performed by: NURSE PRACTITIONER

## 2025-01-13 PROCEDURE — 3074F SYST BP LT 130 MM HG: CPT | Mod: CPTII,S$GLB,, | Performed by: NURSE PRACTITIONER

## 2025-01-13 PROCEDURE — 1101F PT FALLS ASSESS-DOCD LE1/YR: CPT | Mod: CPTII,S$GLB,, | Performed by: NURSE PRACTITIONER

## 2025-01-13 PROCEDURE — 3288F FALL RISK ASSESSMENT DOCD: CPT | Mod: CPTII,S$GLB,, | Performed by: NURSE PRACTITIONER

## 2025-01-13 PROCEDURE — 99999 PR PBB SHADOW E&M-EST. PATIENT-LVL V: CPT | Mod: PBBFAC,,, | Performed by: NURSE PRACTITIONER

## 2025-01-13 PROCEDURE — 1159F MED LIST DOCD IN RCRD: CPT | Mod: CPTII,S$GLB,, | Performed by: NURSE PRACTITIONER

## 2025-01-13 PROCEDURE — 1160F RVW MEDS BY RX/DR IN RCRD: CPT | Mod: CPTII,S$GLB,, | Performed by: NURSE PRACTITIONER

## 2025-01-13 RX ORDER — OMEPRAZOLE 20 MG/1
20 CAPSULE, DELAYED RELEASE ORAL DAILY
Qty: 90 CAPSULE | Refills: 1 | Status: SHIPPED | OUTPATIENT
Start: 2025-01-13

## 2025-01-13 RX ORDER — BUSPIRONE HYDROCHLORIDE 5 MG/1
5 TABLET ORAL DAILY
Qty: 30 TABLET | Refills: 5 | Status: SHIPPED | OUTPATIENT
Start: 2025-01-13

## 2025-01-13 RX ORDER — PROPRANOLOL HYDROCHLORIDE 60 MG/1
60 CAPSULE, EXTENDED RELEASE ORAL DAILY
Qty: 90 CAPSULE | Refills: 1 | Status: SHIPPED | OUTPATIENT
Start: 2025-01-13

## 2025-01-13 RX ORDER — AMLODIPINE BESYLATE 10 MG/1
10 TABLET ORAL DAILY
Qty: 90 TABLET | Refills: 1 | Status: SHIPPED | OUTPATIENT
Start: 2025-01-13

## 2025-01-13 RX ORDER — ROSUVASTATIN CALCIUM 10 MG/1
10 TABLET, COATED ORAL DAILY
Qty: 90 TABLET | Refills: 1 | Status: SHIPPED | OUTPATIENT
Start: 2025-01-13

## 2025-01-13 RX ORDER — IBUPROFEN 800 MG/1
TABLET ORAL
COMMUNITY

## 2025-01-13 RX ORDER — DOXYCYCLINE 100 MG/1
TABLET ORAL
COMMUNITY

## 2025-01-13 RX ORDER — TELMISARTAN 40 MG/1
40 TABLET ORAL
COMMUNITY
Start: 2024-11-11

## 2025-01-13 RX ORDER — METFORMIN HYDROCHLORIDE 500 MG/1
500 TABLET ORAL
Qty: 90 TABLET | Refills: 1 | Status: SHIPPED | OUTPATIENT
Start: 2025-01-13

## 2025-01-13 NOTE — PROGRESS NOTES
SUBJECTIVE:      Patient ID: Onesimo Paula is a 74 y.o. male.    Chief Complaint: Follow-up ([facility] Paperwork)      Presents for chronic med conditions, [facility] paperwork & lab review - mild stable anemia, decreased kidney function    Discussed outstanding health maintenance     Hypertension  This is a chronic problem. The current episode started more than 1 year ago. The problem has been resolved since onset. The problem is controlled. Associated symptoms include anxiety, palpitations, peripheral edema (intermittent) and shortness of breath. Pertinent negatives include no blurred vision, chest pain, headaches, malaise/fatigue, neck pain, orthopnea, PND or sweats. Agents associated with hypertension include NSAIDs. Risk factors for coronary artery disease include dyslipidemia, male gender, sedentary lifestyle, family history and stress. Past treatments include angiotensin blockers, calcium channel blockers and diuretics. The current treatment provides moderate improvement. Compliance problems include exercise.  Hypertensive end-organ damage includes kidney disease and CAD/MI. Identifiable causes of hypertension include chronic renal disease and renovascular disease.   Hyperlipidemia  This is a chronic problem. The current episode started more than 1 year ago. The problem is controlled. Recent lipid tests were reviewed and are normal. Exacerbating diseases include chronic renal disease. He has no history of obesity. Factors aggravating his hyperlipidemia include thiazides. Associated symptoms include myalgias and shortness of breath. Pertinent negatives include no chest pain. Current antihyperlipidemic treatment includes statins (fish oil). The current treatment provides significant improvement of lipids. Compliance problems include adherence to exercise and adherence to diet.  Risk factors for coronary artery disease include dyslipidemia, male sex, hypertension, a sedentary lifestyle, family history and stress.    Gastroesophageal Reflux  He complains of heartburn. He reports no abdominal pain, no chest pain, no coughing, no nausea, no sore throat or no wheezing. BUE tremors. This is a chronic problem. The current episode started more than 1 month ago. The problem occurs occasionally. The problem has been waxing and waning. The heartburn is located in the substernum. The heartburn is of mild intensity. The heartburn does not wake him from sleep. The heartburn does not limit his activity. The symptoms are aggravated by certain foods. Associated symptoms include fatigue. Risk factors include lack of exercise. He has tried a PPI for the symptoms. The treatment provided moderate relief. Past procedures include an EGD.   Anxiety  Presents for follow-up visit. Onset was 6 to 12 months ago. The problem has been waxing and waning. Symptoms include decreased concentration (brain fog at times), dyspnea, malaise, muscle tension, nervous/anxious behavior, palpitations and shortness of breath. Patient reports no chest pain, confusion, dizziness, dysphagia, excessive worry or nausea. Primary symptoms comment: BUE tremors. Symptoms occur most days. The severity of symptoms is mild. Exacerbated by: work stress. The quality of sleep is good.     Past treatments include nothing.   Neurologic Problem  The patient's pertinent negatives include no weakness. Primary symptoms comment: BUE tremors. This is a recurrent problem. The current episode started more than 1 month ago. The neurological problem developed insidiously. The problem has been gradually worsening since onset. There was upper extremity focality noted. Associated symptoms include back pain, fatigue, light-headedness, palpitations and shortness of breath. Pertinent negatives include no abdominal pain, chest pain, confusion, dizziness, fever, headaches, nausea, neck pain or vomiting. Past treatments include nothing. There is no history of head trauma.       Past Surgical History:    Procedure Laterality Date    CYSTOSCOPY N/A 2/19/2024    Procedure: CYSTOSCOPY;  Surgeon: La Nena James MD;  [facility] OR;  Service: Urology;  Laterality: N/A;    CYSTOSCOPY WITH TRANSURETHRAL DESTRUCTION OF PROSTATE,RFA N/A 10/21/2020    Procedure: CYSTOSCOPY WITH TRANSURETHRAL DESTRUCTION OF PROSTATE,RFA;  Surgeon: La Nena James MD;  Location: [facility] OR;  Service: Urology;  Laterality: N/A;  please make sure [facility] available [phone]    EYE SURGERY      for cataracts, rt eye 1/16/20, left eye 1/30/20    HERNIA REPAIR  12/1999    HERNIA REPAIR  05/2004    INSERTION OF SACRAL NEUROSTIMULATOR, ELECTRODES, AND IMPLANTABLE PULSE GENERATOR (IPG) N/A 7/12/2023    Procedure: INSERTION, ELECTRODE LEADS AND PULSE GENERATOR, NEUROSTIMULATOR, SACRAL;  Surgeon: La Nena James MD;  Location: [facility] OR;  Service: Urology;  Laterality: N/A;    KNEE ARTHROSCOPY Left 04/12/2018    LAPAROSCOPIC APPENDECTOMY N/A 1/2/2022    Procedure: APPENDECTOMY, LAPAROSCOPIC;  Surgeon: Sam Goss MD;  Location: [facility] OR;  Service: General;  Laterality: N/A;    PERCUTANEOUS INSERTION OF SACRAL NERVE NEUROSTIMULATOR ELECTRODE LEAD N/A 5/22/2023    Procedure: INSERTION, ELECTRODE LEAD, NEUROSTIMULATOR, SACRAL, PERCUTANEOUS Keep first;  Surgeon: La Nena James MD;  Location: [facility] OR;  Service: Urology;  Laterality: N/A;  patient needs to apply EMLA cream to entirety of lower back 20 minutes prior to procedure, also need 1% lidocaine available    PERCUTANEOUS INSERTION OF SACRAL NERVE NEUROSTIMULATOR ELECTRODE LEAD N/A 7/12/2023    Procedure: INSERTION, ELECTRODE LEAD, NEUROSTIMULATOR, SACRAL, PERCUTANEOUS;  Surgeon: La Nena James MD;  Location: [facility];  Service: Urology;  Laterality: N/A;    SKIN LESION EXCISION  10/2009    Neck    SKIN LESION EXCISION  2015    STAPEDECTOMY Right 03/2001    TRANSRECTAL ULTRASOUND EXAMINATION N/A 8/17/2020    Procedure: TRANSRECTAL ULTRASOUND;   Surgeon: La Nena James MD;  [facility]  Service: Urology;  Laterality: N/A;    TRANSRECTAL ULTRASOUND EXAMINATION N/A 2024    Procedure: ULTRASOUND, RECTAL APPROACH;  Surgeon: La Nena James MD;  Location: [facility] OR;  Service: Urology;  Laterality: N/A;    TRANSURETHRAL INCISION OF PROSTATE N/A 3/13/2024    Procedure: INCISION, PROSTATE, TRANSURETHRAL;  Surgeon: La Nena James MD;  [facility] [place];  Service: Urology;  Laterality: N/A;    TRANSURETHRAL RESECTION OF PROSTATE N/A 3/13/2024    Procedure: TURP (TRANSURETHRAL RESECTION OF PROSTATE)- BIPOLAR;  Surgeon: La Nena James MD;  Location: [facility] OR;  Service: Urology;  Laterality: N/A;  WITH PARALYSIS     Family History   Problem Relation Name Age of Onset    Cancer Mother          skin    Stroke Mother      Heart failure Father      Kidney disease Father        Social History     Socioeconomic History    Marital status:    Tobacco Use    Smoking status: Former     Current packs/day: 0.00     Types: Cigarettes     Quit date:      Years since quittin.0    Smokeless tobacco: Never   Substance and Sexual Activity    Alcohol use: Yes     Comment: occasional    Drug use: No    Sexual activity: Yes     Partners: Female     Social Drivers of Health     Financial Resource Strain: Low Risk  (2025)    Overall Financial Resource Strain (CARDIA)     Difficulty of Paying Living Expenses: Not hard at all   Food Insecurity: No Food Insecurity (2025)    Hunger Vital Sign     Worried About Running Out of Food in the Last Year: Never true     Ran Out of Food in the Last Year: Never true   Transportation Needs: No Transportation Needs (2023)    PRAPARE - Transportation     Lack of Transportation (Medical): No     Lack of Transportation (Non-Medical): No   Physical Activity: Inactive (2025)    Exercise Vital Sign     Days of Exercise per Week: 0 days     Minutes of Exercise per Session: 0  min   Stress: Stress Concern Present (1/8/2025)    [employer] - Occupational Stress Questionnaire     Feeling of Stress : To some extent   Housing Stability: Low Risk  (11/11/2023)    Housing Stability Vital Sign     Unable to Pay for Housing in the Last Year: No     Number of Places Lived in the Last Year: 1     Unstable Housing in the Last Year: No     Current Outpatient Medications   Medication Sig Dispense Refill    aspirin (ECOTRIN) 81 MG EC tablet Take 81 mg by mouth once daily.      calcitRIOL (ROCALTROL) 0.25 MCG Cap Take 0.25 mcg by mouth every 7 days.      co-enzyme Q-10 30 mg capsule Take 30 mg by mouth 3 (three) times daily.      cyanocobalamin 1,000 mcg/mL injection Inject 1 mL (1,000 mcg total) into the muscle every 14 (fourteen) days. 1 mL 5    doxycycline monohydrate 100 mg Tab 1 tab(s) orally bid for 10 days      hydroCHLOROthiazide (HYDRODIURIL) 25 MG tablet Take 1/2 (one-half) tablet by mouth once daily 45 tablet 3    ibuprofen (ADVIL,MOTRIN) 800 MG tablet 1 tab(s) orally 3 times a day for 10 days      multivit with minerals/lutein (MULTIVITAMIN 50 PLUS ORAL) Take 1 tablet by mouth once daily.      omega 3-dha-epa-fish oil (FISH OIL) 100-160-1,000 mg Cap Take 1 capsule by mouth once daily.      ranolazine (RANEXA) 500 MG Tb12 Take 2 tablets by mouth twice daily 180 tablet 3    tadalafiL (CIALIS) 5 MG tablet Take 1 tablet (5 mg total) by mouth every morning. 90 tablet 3    telmisartan (MICARDIS) 40 MG Tab Take 40 mg by mouth.      amLODIPine (NORVASC) 10 MG tablet Take 1 tablet (10 mg total) by mouth once daily. 90 tablet 1    busPIRone (BUSPAR) 5 MG Tab Take 1 tablet (5 mg total) by mouth once daily. 30 tablet 5    metFORMIN (GLUCOPHAGE) 500 MG tablet Take 1 tablet (500 mg total) by mouth daily with breakfast. 90 tablet 1    omeprazole (PRILOSEC) 20 MG capsule Take 1 capsule (20 mg total) by mouth once daily. 90 capsule 1    propranoloL (INDERAL LA) 60 MG 24 hr  capsule Take 1 capsule (60 mg total) by mouth once daily. 90 capsule 1    rosuvastatin (CRESTOR) 10 MG tablet Take 1 tablet (10 mg total) by mouth once daily. 90 tablet 1     No current facility-administered medications for this visit.     Facility-Administered Medications Ordered in Other Visits   Medication Dose Route Frequency Provider Last Rate Last Admin    electrolyte-S (ISOLYTE)   Intravenous Continuous Loyd Chakraborty MD   Stopped at 07/12/23 1141    electrolyte-S (ISOLYTE)   Intravenous Continuous Bennett Javier MD        electrolyte-S (ISOLYTE)   Intravenous Continuous Bennett Javier MD 10 mL/hr at 03/13/24 1336 New Bag at 03/13/24 1336    fentaNYL 50 mcg/mL injection 25 mcg  25 mcg Intravenous Q5 Min PRN Loyd Chakraborty MD        fentaNYL 50 mcg/mL injection 25 mcg  25 mcg Intravenous Q5 Min PRN Bennett Javier MD        lactated ringers infusion   Intravenous Continuous Loyd Chakraborty MD        LIDOcaine (PF) 10 mg/ml (1%) injection 10 mg  1 mL Intradermal Once Loyd Chakraborty MD        LIDOcaine (PF) 10 mg/ml (1%) injection 10 mg  1 mL Intradermal Once Bennett Javier MD        metoclopramide HCl injection 10 mg  10 mg Intravenous Q10 Min PRN Loyd Chakraborty MD        ondansetron injection 4 mg  4 mg Intravenous Once PRN Bennett Javier MD        oxyCODONE immediate release tablet 5 mg  5 mg Oral Once PRN Loyd Chakraborty MD        oxyCODONE immediate release tablet 5 mg  5 mg Oral Once PRN Bennett Javier MD         Review of patient's allergies indicates:  No Known Allergies   Past Medical History:   Diagnosis Date    Acid reflux     Acquired tricuspid valve insufficiency     Anemia     Borderline diabetes     BPH (benign prostatic hyperplasia)     Cataract     1/16/20 rt eye, 1/30/20- Lt eye    Coronary artery disease     Hypertension     Liver cyst 08/24/2022    Pulmonary nodules     Skin cancer      Past Surgical History:   Procedure Laterality Date     CYSTOSCOPY N/A 2/19/2024    Procedure: CYSTOSCOPY;  Surgeon: La Nena James MD;  Location: [facility] OR;  Service: Urology;  Laterality: N/A;    CYSTOSCOPY WITH TRANSURETHRAL DESTRUCTION OF PROSTATE,RFA N/A 10/21/2020    Procedure: CYSTOSCOPY WITH TRANSURETHRAL DESTRUCTION OF PROSTATE,RFA;  Surgeon: La Nena James MD;  Location: [facility] OR;  Service: Urology;  Laterality: N/A;  please make sure [facility] available [phone]    EYE SURGERY      for cataracts, rt eye 1/16/20, left eye 1/30/20    HERNIA REPAIR  12/1999    HERNIA REPAIR  05/2004    INSERTION OF SACRAL NEUROSTIMULATOR, ELECTRODES, AND IMPLANTABLE PULSE GENERATOR (IPG) N/A 7/12/2023    Procedure: INSERTION, ELECTRODE LEADS AND PULSE GENERATOR, NEUROSTIMULATOR, SACRAL;  Surgeon: La Nena James MD;  Location: [facility] OR;  Service: Urology;  Laterality: N/A;    KNEE ARTHROSCOPY Left 04/12/2018    LAPAROSCOPIC APPENDECTOMY N/A 1/2/2022    Procedure: APPENDECTOMY, LAPAROSCOPIC;  Surgeon: Sam Goss MD;  Location: [facility] OR;  Service: General;  Laterality: N/A;    PERCUTANEOUS INSERTION OF SACRAL NERVE NEUROSTIMULATOR ELECTRODE LEAD N/A 5/22/2023    Procedure: INSERTION, ELECTRODE LEAD, NEUROSTIMULATOR, SACRAL, PERCUTANEOUS Keep first;  Surgeon: La Nena James MD;  Location: [facility] OR;  Service: Urology;  Laterality: N/A;  patient needs to apply EMLA cream to entirety of lower back 20 minutes prior to procedure, also need 1% lidocaine available    PERCUTANEOUS INSERTION OF SACRAL NERVE NEUROSTIMULATOR ELECTRODE LEAD N/A 7/12/2023    Procedure: INSERTION, ELECTRODE LEAD, NEUROSTIMULATOR, SACRAL, PERCUTANEOUS;  Surgeon: La Nena James MD;  Location: [facility] OR;  Service: Urology;  Laterality: N/A;    SKIN LESION EXCISION  10/2009    Neck    SKIN LESION EXCISION  2015    STAPEDECTOMY Right 03/2001    TRANSRECTAL ULTRASOUND EXAMINATION N/A 8/17/2020    Procedure: TRANSRECTAL ULTRASOUND;  Surgeon: La Nena BRICENO  MD Erika;  Location: [facility] OR;  Service: Urology;  Laterality: N/A;    TRANSRECTAL ULTRASOUND EXAMINATION N/A 2/19/2024    Procedure: ULTRASOUND, RECTAL APPROACH;  Surgeon: La Nena James MD;  Location: [facility] OR;  Service: Urology;  Laterality: N/A;    TRANSURETHRAL INCISION OF PROSTATE N/A 3/13/2024    Procedure: INCISION, PROSTATE, TRANSURETHRAL;  Surgeon: La Nena James MD;  Location: [facility] OR;  Service: Urology;  Laterality: N/A;    TRANSURETHRAL RESECTION OF PROSTATE N/A 3/13/2024    Procedure: TURP (TRANSURETHRAL RESECTION OF PROSTATE)- BIPOLAR;  Surgeon: La Nena James MD;  Location: [facility] OR;  Service: Urology;  Laterality: N/A;  WITH PARALYSIS       Review of Systems   Constitutional:  Positive for fatigue. Negative for activity change, appetite change, fever, malaise/fatigue and unexpected weight change.   HENT:  Negative for congestion, ear pain, hearing loss, postnasal drip, rhinorrhea, sinus pressure, sinus pain, sneezing, sore throat and trouble swallowing.    Eyes:  Negative for blurred vision, photophobia, pain, discharge and visual disturbance.   Respiratory:  Positive for chest tightness and shortness of breath. Negative for cough and wheezing.    Cardiovascular:  Positive for palpitations. Negative for chest pain, orthopnea, leg swelling and PND.        Follows with cardiology but states it has been quite some time since an actual visit   Gastrointestinal:  Positive for heartburn. Negative for abdominal distention, abdominal pain, blood in stool, constipation, diarrhea, nausea and vomiting.   Endocrine: Negative for cold intolerance, heat intolerance, polydipsia and polyuria.   Genitourinary:  Negative for difficulty urinating, dysuria, flank pain, frequency, hematuria and urgency.        Following with uro & nephro   Musculoskeletal:  Positive for arthralgias, back pain and myalgias. Negative for joint swelling and neck pain.   Skin:  Negative for pallor  "and rash.   Allergic/Immunologic: Negative for environmental allergies and food allergies.   Neurological:  Positive for tremors and light-headedness. Negative for dizziness, weakness and headaches.   Hematological:  Does not bruise/bleed easily.   Psychiatric/Behavioral:  Positive for decreased concentration (brain fog at times) and dysphoric mood. Negative for confusion and sleep disturbance. The patient is nervous/anxious.       OBJECTIVE:      Vitals:    01/13/25 1020   BP: 126/70   BP Location: Right arm   Patient Position: Sitting   Pulse: 70   SpO2: 98%   Weight: 81.5 kg (179 lb 11.2 oz)   Height: 5' 10" (1.778 m)     Physical Exam  Vitals and nursing note reviewed.   Constitutional:       General: He is not in acute distress.     Appearance: Normal appearance. He is well-developed and normal weight.      Comments: 12# gain since August visit   HENT:      Head: Normocephalic and atraumatic.      Right Ear: Hearing normal.      Left Ear: Hearing normal.      Nose: Nose normal. No rhinorrhea.   Eyes:      General: Lids are normal.         Right eye: No discharge.         Left eye: No discharge.      Conjunctiva/sclera: Conjunctivae normal.      Right eye: Right conjunctiva is not injected.      Left eye: Left conjunctiva is not injected.      Pupils: Pupils are equal, round, and reactive to light. Pupils are equal.      Right eye: Pupil is round and reactive.      Left eye: Pupil is round and reactive.   Neck:      Thyroid: No thyromegaly.      Vascular: No JVD.      Trachea: Trachea normal. No tracheal deviation.   Cardiovascular:      Rate and Rhythm: Normal rate and regular rhythm.      Pulses:           Radial pulses are 2+ on the right side and 2+ on the left side.      Heart sounds: Normal heart sounds. No murmur heard.     No friction rub. No gallop.   Pulmonary:      Effort: Pulmonary effort is normal. No respiratory distress.      Breath sounds: Normal breath sounds. No stridor. No decreased breath " sounds, wheezing, rhonchi or rales.   Abdominal:      General: Bowel sounds are normal. There is no distension.      Palpations: Abdomen is soft. Abdomen is not rigid.      Tenderness: There is no abdominal tenderness. There is no guarding.   Musculoskeletal:         General: No swelling. Normal range of motion.      Cervical back: Normal range of motion and neck supple.      Lumbar back: Tenderness present.   Lymphadenopathy:      Cervical: No cervical adenopathy.   Skin:     General: Skin is warm and dry.      Capillary Refill: Capillary refill takes less than 2 seconds.      Coloration: Skin is not pale.      Findings: No lesion or rash.   Neurological:      Mental Status: He is alert and oriented to person, place, and time.      GCS: GCS eye subscore is 4. GCS verbal subscore is 5. GCS motor subscore is 6.      Cranial Nerves: Cranial nerves 2-12 are intact.      Sensory: Sensation is intact.      Motor: Tremor (BUE - fine) present. No atrophy.      Coordination: Coordination is intact. Coordination normal.      Gait: Gait is intact. Gait normal.   Psychiatric:         Attention and Perception: Attention and perception normal. He is attentive.         Mood and Affect: Mood and affect normal.         Speech: Speech normal.         Behavior: Behavior normal.         Thought Content: Thought content normal.         Cognition and Memory: Cognition and memory normal.         Judgment: Judgment normal.        Assessment:       1. Essential hypertension    2. Tremor of both hands    3. Situational anxiety    4. Prediabetes    5. Familial hypercholesterolemia    6. Gastroesophageal reflux disease without esophagitis    7. Acquired tricuspid valve insufficiency    8. Dyspnea on exertion    9. Brain fog        Plan:       Essential hypertension  -     Ambulatory referral/consult to Cardiology; Future; Expected date: 01/20/2025  -     amLODIPine (NORVASC) 10 MG tablet; Take 1 tablet (10 mg total) by mouth once daily.   Dispense: 90 tablet; Refill: 1    Tremor of both hands  -     propranoloL (INDERAL LA) 60 MG 24 hr capsule; Take 1 capsule (60 mg total) by mouth once daily.  Dispense: 90 capsule; Refill: 1    Situational anxiety  -     busPIRone (BUSPAR) 5 MG Tab; Take 1 tablet (5 mg total) by mouth once daily.  Dispense: 30 tablet; Refill: 5    Prediabetes  -     metFORMIN (GLUCOPHAGE) 500 MG tablet; Take 1 tablet (500 mg total) by mouth daily with breakfast.  Dispense: 90 tablet; Refill: 1  -     Comprehensive Metabolic Panel; Future; Expected date: 01/13/2025  -     Hemoglobin A1C; Future; Expected date: 01/13/2025    Familial hypercholesterolemia  -     rosuvastatin (CRESTOR) 10 MG tablet; Take 1 tablet (10 mg total) by mouth once daily.  Dispense: 90 tablet; Refill: 1    Gastroesophageal reflux disease without esophagitis  -     omeprazole (PRILOSEC) 20 MG capsule; Take 1 capsule (20 mg total) by mouth once daily.  Dispense: 90 capsule; Refill: 1    Acquired tricuspid valve insufficiency  -     Ambulatory referral/consult to Cardiology; Future; Expected date: 01/20/2025    Dyspnea on exertion  -     Ambulatory referral/consult to Cardiology; Future; Expected date: 01/20/2025    Brain fog  -     Ambulatory referral/consult to Neurology; Future; Expected date: 01/20/2025        FMLA paperwork completed at today's visit & given back to patient.        Follow up if symptoms worsen or fail to improve. Already scheduled for follow-up visit next month.      1/13/2025 JAMES Rose, FNP-C

## 2025-01-13 NOTE — TELEPHONE ENCOUNTER
Patient last seen: 01/13/25  Scheduled to be seen: 02/05/25  Medication last filled: 09/30/24    Medication pended

## 2025-01-14 RX ORDER — CYANOCOBALAMIN 1000 UG/ML
INJECTION, SOLUTION INTRAMUSCULAR; SUBCUTANEOUS
Qty: 1 ML | Refills: 0 | Status: SHIPPED | OUTPATIENT
Start: 2025-01-14

## 2025-01-24 ENCOUNTER — LAB VISIT (OUTPATIENT)
Dept: LAB | Facility: HOSPITAL | Age: 75
End: 2025-01-24
Attending: UROLOGY
Payer: COMMERCIAL

## 2025-01-24 ENCOUNTER — LAB VISIT (OUTPATIENT)
Dept: LAB | Facility: HOSPITAL | Age: 75
End: 2025-01-24
Attending: NURSE PRACTITIONER
Payer: COMMERCIAL

## 2025-01-24 DIAGNOSIS — C61 PROSTATE CANCER: ICD-10-CM

## 2025-01-24 DIAGNOSIS — N40.0 BENIGN PROSTATIC HYPERPLASIA WITHOUT URINARY OBSTRUCTION: ICD-10-CM

## 2025-01-24 DIAGNOSIS — R73.03 PREDIABETES: ICD-10-CM

## 2025-01-24 LAB
ALBUMIN SERPL BCP-MCNC: 3.8 G/DL (ref 3.5–5.2)
ALP SERPL-CCNC: 55 U/L (ref 40–150)
ALT SERPL W/O P-5'-P-CCNC: 23 U/L (ref 10–44)
ANION GAP SERPL CALC-SCNC: 8 MMOL/L (ref 8–16)
AST SERPL-CCNC: 19 U/L (ref 10–40)
BILIRUB SERPL-MCNC: 0.5 MG/DL (ref 0.1–1)
BUN SERPL-MCNC: 30 MG/DL (ref 8–23)
CALCIUM SERPL-MCNC: 9.5 MG/DL (ref 8.7–10.5)
CHLORIDE SERPL-SCNC: 107 MMOL/L (ref 95–110)
CO2 SERPL-SCNC: 26 MMOL/L (ref 23–29)
COMPLEXED PSA SERPL-MCNC: 0.67 NG/ML (ref 0–4)
CREAT SERPL-MCNC: 1.3 MG/DL (ref 0.5–1.4)
EST. GFR  (NO RACE VARIABLE): 57 ML/MIN/1.73 M^2
ESTIMATED AVG GLUCOSE: 111 MG/DL (ref 68–131)
GLUCOSE SERPL-MCNC: 81 MG/DL (ref 70–110)
HBA1C MFR BLD: 5.5 % (ref 4.5–6.2)
POTASSIUM SERPL-SCNC: 4.2 MMOL/L (ref 3.5–5.1)
PROT SERPL-MCNC: 7.5 G/DL (ref 6–8.4)
SODIUM SERPL-SCNC: 141 MMOL/L (ref 136–145)

## 2025-01-24 PROCEDURE — 80053 COMPREHEN METABOLIC PANEL: CPT | Performed by: NURSE PRACTITIONER

## 2025-01-24 PROCEDURE — 36415 COLL VENOUS BLD VENIPUNCTURE: CPT | Performed by: UROLOGY

## 2025-01-24 PROCEDURE — 84153 ASSAY OF PSA TOTAL: CPT | Performed by: UROLOGY

## 2025-01-24 PROCEDURE — 83036 HEMOGLOBIN GLYCOSYLATED A1C: CPT | Performed by: NURSE PRACTITIONER

## 2025-01-30 ENCOUNTER — OFFICE VISIT (OUTPATIENT)
Dept: UROLOGY | Facility: CLINIC | Age: 75
End: 2025-01-30
Payer: COMMERCIAL

## 2025-01-30 ENCOUNTER — PATIENT MESSAGE (OUTPATIENT)
Dept: UROLOGY | Facility: CLINIC | Age: 75
End: 2025-01-30

## 2025-01-30 VITALS — BODY MASS INDEX: 25.73 KG/M2 | WEIGHT: 179.69 LBS | HEIGHT: 70 IN

## 2025-01-30 DIAGNOSIS — N52.9 ERECTILE DYSFUNCTION, UNSPECIFIED ERECTILE DYSFUNCTION TYPE: ICD-10-CM

## 2025-01-30 DIAGNOSIS — N40.1 BPH WITH OBSTRUCTION/LOWER URINARY TRACT SYMPTOMS: Primary | ICD-10-CM

## 2025-01-30 DIAGNOSIS — N13.8 BPH WITH OBSTRUCTION/LOWER URINARY TRACT SYMPTOMS: Primary | ICD-10-CM

## 2025-01-30 DIAGNOSIS — C61 PROSTATE CANCER: ICD-10-CM

## 2025-01-30 LAB
BILIRUBIN, UA POC OHS: NEGATIVE
BLOOD, UA POC OHS: NEGATIVE
CLARITY, UA POC OHS: CLEAR
COLOR, UA POC OHS: YELLOW
GLUCOSE, UA POC OHS: NEGATIVE
KETONES, UA POC OHS: NEGATIVE
LEUKOCYTES, UA POC OHS: NEGATIVE
NITRITE, UA POC OHS: NEGATIVE
PH, UA POC OHS: 7
POC RESIDUAL URINE VOLUME: 24 ML (ref 0–100)
PROTEIN, UA POC OHS: NEGATIVE
SPECIFIC GRAVITY, UA POC OHS: 1.01
UROBILINOGEN, UA POC OHS: 0.2

## 2025-01-30 PROCEDURE — 1126F AMNT PAIN NOTED NONE PRSNT: CPT | Mod: CPTII,S$GLB,, | Performed by: UROLOGY

## 2025-01-30 PROCEDURE — 99214 OFFICE O/P EST MOD 30 MIN: CPT | Mod: S$GLB,,, | Performed by: UROLOGY

## 2025-01-30 PROCEDURE — 1159F MED LIST DOCD IN RCRD: CPT | Mod: CPTII,S$GLB,, | Performed by: UROLOGY

## 2025-01-30 PROCEDURE — 51798 US URINE CAPACITY MEASURE: CPT | Mod: S$GLB,,, | Performed by: UROLOGY

## 2025-01-30 PROCEDURE — 1101F PT FALLS ASSESS-DOCD LE1/YR: CPT | Mod: CPTII,S$GLB,, | Performed by: UROLOGY

## 2025-01-30 PROCEDURE — 99999 PR PBB SHADOW E&M-EST. PATIENT-LVL IV: CPT | Mod: PBBFAC,,, | Performed by: UROLOGY

## 2025-01-30 PROCEDURE — G2211 COMPLEX E/M VISIT ADD ON: HCPCS | Mod: S$GLB,,, | Performed by: UROLOGY

## 2025-01-30 PROCEDURE — 81003 URINALYSIS AUTO W/O SCOPE: CPT | Mod: QW,S$GLB,, | Performed by: UROLOGY

## 2025-01-30 PROCEDURE — 1160F RVW MEDS BY RX/DR IN RCRD: CPT | Mod: CPTII,S$GLB,, | Performed by: UROLOGY

## 2025-01-30 PROCEDURE — 3044F HG A1C LEVEL LT 7.0%: CPT | Mod: CPTII,S$GLB,, | Performed by: UROLOGY

## 2025-01-30 PROCEDURE — 3288F FALL RISK ASSESSMENT DOCD: CPT | Mod: CPTII,S$GLB,, | Performed by: UROLOGY

## 2025-01-30 NOTE — PROGRESS NOTES
[facility] Note - [place]  Staff: MD Erika  PCP: Reynaldo Rahman, APRN,FNP-C  Date of Service: 01/30/2025      Subjective:        HPI: Onesimo Paula is a 74 y.o. male     Seen by me 9/30/19  Patient had MRI for right hip pain and was found have a thick-walled bladder with enlarged prostate.  He states that he saw urologist over 40 years ago for burning with urination.  He had a renal bladder ultrasound in 2016 which showed enlarged prostate and bilateral renal cyst done for renal insufficiency whom he sees  for.  Denies any gross hematuria.  Review of for previous urine show no microscopic hematuria.  Twenty-five pack-year history of smoking but quit in 1997.      He also has AUA symptom score 6 and occasional weak stream and urgency but voids every 3-5 hours when he is working and a little more frequent when he is not.  Denies any urge incontinence.  Okay stream with occasional weak stream.  PVR today 09/03/2019 is 15 cc.  He may have tried Flomax a couple years ago but does not recall it helping her changing any was symptoms.  Overall really not that bothered by his urinary symptoms.      Also states he has ED.  Previously tried sildenafil unsure dose and it helped but wife does not want him to take, exam revealed peyronie's     Plan: started tadalafil 5mg due to thick walled bladder although essentially asx. No further f/u fr b renal cysts.      Interval history 11/18/19:   Pt states today Cialis has NOT improved any of his lower urinary tract symptoms since starting the medication.  He currently takes this medication before his bedtime.  Pt still c/o urinary urgency, especially during the day.  Nocturia is 1-2x nightly and not bothersome at this time.  He is a , therefore the daytime urgency really bothers him.  Plan: started ditropan 10mg XL and cont'd tadalafil     Interval history by idalia 1/6/20:   TODAY, pt states the oxybutynin only improved  his LUTS by over 10% at this time.  PVR by bladder scan performed in office today:  11 mL  Rec'd: d/c oxybutynin, start myrbetriq, cont tadalafil 5mg daily.     Interval history 7/27/20:   Tried myrbetriq but didn't help. Having urgency, hesistancy and intermittent slow stream but urgency most bothersome and having some UUI with a few drop, but not that bothersome. Drinks 1 c of coffee in am. Soda during day. 1 c of coffee int he evening. Has never tried stopping these to see if they help. Drinks beer and notices increase in frequency following day.   On tadalafil 5mg daily for ED and bph.  pvr by scan: 32cc  Voiding every 30 minutes a day recently more pronounced at home (has been on vacation).  Takes hctz in am. When he's working he only voids about 2x-4x a day. Drinks caffeine while working including energy drinks. No nocturia.   psa 1/31/20 2.2 (up from 1.2 year prior). Repeat psa 0.85 7/29/20  WINSOME today: 40g + no nodules.  Uroflow 7/30/20: peak flow 12.8mL/s,  avg flow 5.4mL/s, voided vol: 191cc, pvr by scan:0      Cysto trus 8/17/20: normal bladder, no stricture. Membranous and distal prostatic urethra with papillary tissue suspicious for prostatitis.trus volume 42.6g. bc of his urgency on tadalafil, flomax, vesicare found rx cipro for 14d to see if it helped w urgency. D/c'd vesicare. cont'd flomax and tadalafil. rtc to see idalia in 2-3 weeks and me in 2 months to discuss bph procedures     Interval history by idalia 9/9/20:   Pt states he is still having the urinary urgency even since finishing the Cipro antibiotics at this time.  He denies gross hematuria, dysuria.  UA today shows normal findings.  PVR by bladder scan today:  9 mL  Plan: rec'd restarting vesicare     Interval history 10/1/20:    Taking flomax 0.4mg nightly and says flow is intermittent on this.   Also on the vesicare and says it doesn't help with the urgency. Voiding 2-3x a day and none at night. 3-4x a week c/o urge incontinence if he  tries to hold it too long, mostly in the am when he wakes up. Denies any difficulty with walking. No weakness or numbness.   Changed to decaf coffee 1x a day. Changed to sprite and caffeine free coke.   He does not want to take any more meds. He is not happy with the retrograde ejaculation.   Sees dr. leger for Mitral Valve leakage, last saw him a week ago, was told he should f/u in March 2021. Tadalafil helping with erections     Interval history 11/19/20:   Underwent rezum on 10/21/20. continue flomax, vesicare and tadafil daily for 3 more months.   Returned for fill and pull on 10/26/20. Filled with 210, voided 210  For 2 weeks postop would have blood when he had a BM, improving.   Today (1 month later) he states he has some increased urgency. UUI 2-3x a day (increased from 3-4x a week).  Still taking vesicare  freq q1 hour (worse in cold weather). Without cold weather freq q 3 hour.   ua today with small wbc and mod blood - some dysuria  No significant change in stream yet  pvr by scan today: 134  AUA ssx:(2 incomplete emptying, 3 frequency, 3 intermittency, 4 urgency, 2 weak stream, 2 straining, 0 sleeping). 16. QOL: 4    Interval history by me in clinic on 3/9/21:  Returns today and states states he ran out of flomax and vesicare a month ago. No increased frequency, urge incontinence or slower stream off the flomax  Still having UUI 1-2x a day with a few drops when he hears water.   Voids 8x/24 hours and 4x/8 hours while at work. Nocturia occ (same). No longer drinking caffeine.   Still on tadalafil 5mg daily.  No longer having retrograde ejaculation and constipation improved. Hctz in am.  Frequency mostly in  the pm. Bothers him but able to stop at bathrooms. Sometimes loses erection with tadalafil 5mg daily. Rare. Not more frequent. Still getting morning erections.   AUA ssx today:(1 incomplete emptying, 2 frequency, 1 intermittency, 3 urgency, 3 weak stream, 0 straining, 0 sleeping). 10. QOL:  mixed  Bladder scan PVR today was 35mL.    I reviewed his previous PSAs and his most recent psa within the last year was 2.3, %free 20.87, last year was 2.2      Interval history with idalia on 4/9/21:  UA today showed normal findings.  Pt states today even though he tried and changed his HCTZ med to evenings as discussed last ov, his LUTS have not improved.  Pt as of today's visit is still c/o urinary urgency and frequency during the daytime.  Has not improved since last ov.  Pt states today he has tried Vesicare and Oxybutynin in the past with no improvement in symptoms.  Pt currently denies gross hematuria and dysuria.  She put him on myrbetriq again       Interval history by me 6/15/21:  Uroflow results (date: 03/18/2021): Voiding time: 24.3s, Flow time: 23.1s, TTP flow: 8.0s, Peak flowrate: 19.2 mL/s, Average flowrate: 10.6mL/s, Intervals: 2, Voided volume: 244 mL, Pvr by bladder scan 33. Pattern of curve: see uploaded image, reviewed by   At last visit was c/o freq/urgency. Frequency 8-10x/day. Tried myrbetriq before rezum, ditropan, vesicare and changing hctz to pm but no improvement. Then in April started myrbetriq again , after about 4 weeks saw improvement, now down to 5-6x a day. avg urg: 3. No nocturia. No incontinence.  Also on tadalafil 5mg daily.   AUA ssx today:(1 incomplete emptying, 1 frequency, 1 intermittency, 2 urgency, 3 weak stream, 0 straining, 0 sleeping). 8. QOL:   Bladder scan PVR today was 0mL.      HPI/CC today 6/7/22:   Had him Continue myrbetriq 50mg daily, has enough until 4/2021, Continue tadalafil 5mg daily, refilled today for a year.  Says myrbetriq not helping anymore. He held it for a few days and noticed no difference. On cialis 5mg daily.   Does not have constipation  Voids about 9-10x during waking hours. occ urge incontinence 3x a week. Few drops. Started drinking caffeine this week.   Nocturia 1-2x a night. Urine flow varies.    psa 5/31/22 1.3  ua today neg, pvr by  scan: 5  AUA ssx:(2 incomplete emptying, 3 frequency, 2 intermittency, 4 urgency, 3 weak stream, 1 straining, 1 sleeping). 16. QOL: mixed  Appendix removed in January complicated by post-op ileus.    Interval history 8/23/22:  Continue myrbetriq 50mg daily, has enough until 4/2021  Continue tadalafil 5mg daily, refilled today for a year  After adding vesicare to myrbetriq - went from voiding 9-10x day, 1-2x a night with occ UUI 3x a week to 5-6x/day, 0 night and no daily UUI with drops. Says he has dry mouth but about the same. However does have some heart burn that is new.   UA today: neg. pvr by scan: 115 (this is the highest it's been, feels empty).  AUA ssx:(1 incomplete emptying, 2 frequency, 0 intermittency, 2 urgency, 2 weak stream, 1 straining, 0 sleeping). 8. QOL: pleased    Interval history 2/28/23:  Says he is doing well on VESIcare and myrbetriq.  However it is expensive.  About 120 dollars every 3 months.  Also having dry mouth with it.  On tadalafil 5 mg daily for erections, obtaining from [employer].  This dose wish for him sometimes.  He tried Viagra 100 mg and it seemed to work.  Aua ssx: 12, pvr: 25    Interval history 4/21/23:  I had him discontinue both myrbetriq and VESIcare at the last visit to see if his incontinence would get worse.  Followed up with Teena on 03/29/2023.  His PVR was 0.  His AUA symptom score was 14, 5.  He was still taking tadalafil 5 mg daily.  He said with stopping VESIcare and myrbetriq he had 2 episodes of incontinence that were large volume, increased postvoid dribbling and he feels like his urinary frequency increased.  Was scheduled with me to discuss InterStim because he wanted to hold off on restarting any medications due to cost and side effects  He returns today with a voiding diary.  He kept a voiding diary for me days and it looks like he urinates on average about 9-10 times a day.  That is up from 6 times a day when he was taking medications.  He did not  documented on his voiding diary but he leaks urine 2 to 3 times a day on the way to the bathroom.  Not wearing any pads.  Does not have to change his underwear.  If he could rate his urgency he would say on average his urgency is 4/5.  He is interested in proceeding with InterStim.  He ended up using tadalafil 10 mg once and said this dose worked good for him for his erectile dysfunction  Voided urine today is negative.  Confirmed again that currently he urinates 7-10 times a day, average urgency is 4 and 1-2 leaks a day.        Interval history 5/25/23:  I did a peripheral nerve evaluation on him on Monday, 4 days ago.  Since then he has gone from urinating 6-10 times a day down to about 6 times a day.  Never had issues at night.  He went from leaking 2 to 3 times a day incontinence down to 1x over the last 3 days. Avg urgency went from 4 to 2. He says >50% improvement and wants to proceed. Not on any meds.   He previously saw  for stress tests bc of SOB. His last w/u was 11/2022 and had a neg stress test. On asa 81mg. No stents. They could never figure out why he has SOB.  He sees pulmonology regularly for nodules in the lungs.  Wires removed today    Interval history 7/11/23:  Here today for interstim stage 1 and 2. Preop appt revealed a1c 6.2, glucose 124. Ua with tr protein/tr bld on 6/28. Started on metformin.   Urinating every 1-2 hours. Uui with drops 2x a day. No pads or underwear changes.  Held asa 81mg for 7d.     Interval history 8/8/23 telephone visit :  Pt states that initially he had issues with urgency and frequency after his procedure but since talking to clarita and making adjustments he has gone from urinating every 1-2 hours to 3-4x a day. Avg urgency remains around 4 and no UUI.   He is happy with his symptoms.     Interval history by ME in CLINIC on 11/13/23 for postop interstim fu and oab fu:  He says that his frequency has returned despite changing his settings. Says changing the  setting works for a bit. Then no longer works. Nos on 3.2.   He says after turning off his interstim for us in August for us abd for his liver he had an increase in his frequency and had to increase the amplitude   He changed the settings on 10/31 and he went from 8x a day down to 5x a day. He stopped hctz a month ago by his cardiologist but then he restarted the hctz in the morning last week and his frequency increased to 9x a day.  Currently on program 2, 3.2.    He says urinating about 8x a day and 1x a night. Leaking with UUI and drops 3-4x a day and 2 accidents last week. No pads.   Urgency 5/5.  1 cup of caffeine in the morning.   Voiding diary: 10/30/23- 8x, changed on 10/31. On 10/31 -5x, 11/1- 5x, 11/3- 6x, 11/11-9x. 11/12-7x. This morning 4x/ 6 hours.   AUA ssx:(2 incomplete emptying, 3 frequency, 2 intermittency, 3 urgency, 3 weak stream, 0 straining, 1 sleeping). 14. QOL: mostly satisfied  Pvr today: 40. Ua today: neg    Interval history by ME in CLINIC on 1/22/24 for oab:  Plan was to do uds bc of his oab but when joe tried to place ureteral catheter it did not go easily. Then she tried 16fr coude and stopped mid way and saw blood. Decided to hold off on and plan for cysto. He adjusted his interstim and he said no change. Feels the flutter in his right perineum. . He says flow is fast sometimes and slow sometimes.  Cysto trus 8/17/20: 42.6g.   Rezum 10/21/20  Last peak flow was 19.2 in 3/18/21.  Last catheter was at time of cholecystectomy in jan 2022.   Interstim 7/11/23  Frequency:Still urinates bw 6 to 12x a day. Has more bad days than good. Nocturia 1x a night.  Incontinence:  Has drops/leaks twice a day.  No pads  Drinks 1 soda in am and decaf cup in evening. No tea.     Interval history/H&P Update by me/ prior to cysto trus on 2/19/24:  urine at the lab- ua 1/30/4:neg(would need to returnwith smoking hx although quit in 1997  Ua today 2/19/24 30 protien  Uroflow and pvr soon. Has  oab. Will need to drink fluid here right before the test.   Uroflow results (date: 01/30/2024): Voiding time: 39.4s, Flow time: 39.3s, TTP flow: 10.6s, Peak flowrate: 16.7 mL/s, Average flowrate: 8.8mL/s, Intervals: 1, Voided volume: 346 mL, Pvr by bladder scan: 45mL . Pattern of curve: see 's addendum   Schedule cystoscopy and TRUS to rule out stricture as cause of oab on Monday feb 19th. See how much prostate has grown back. Previously had rezum in 2020 and trus vol 46g prior to this.   If he does have a urethral stricture- then can explain sx and would need to treat stricture to that improves symptoms.then would need to do in and out cath intermittently forr a while to keep stricture from scarring down again.   If he doesn't have urethral stricture will proceed with uds.   (Will go ahead and schedule to follow cysto/trus and can cancel If we don't need)  Recommend uds since he already has had rezum and interstim and want to evaluate if he has detrussor overactivity/bladder muscle kenn too much.   If so would offer botox since he has already has interstim. But has to be repeated.      Cysto/TRUS Findings 2/19/24:   Cystoscopic findings: rezum defect seen in median lobe closer to veru. Minimal b lobe hypertrophy. + high riding bladder neck with small intravesical protrusion.    TRUS volume: 43.38. WINSOME: just to right of midline small ridge <0.5cm felt near base. 40g prostate.           Assessment: Onesimo Paula is a 73 y.o. male with h/o oab and bph sp rezum 10/21/20, interstim 5/22/23. Small ridge on rectal exam today. Difficult catheterization during attempted uds recently. No urethral stricture seen but does have high riding bladder neck.      Plan:  Continue flomax 0.8mg nightly for now to help with high riding bladder neck.  Since no urethral stricture seen fu for urodynamics to eval if he has detrussor overactivity vs bph obstruction. Botox vs tuip (transurethral incision or prostate for  high riding bladder neck) vs both at the same time and seeing how he responds.   At urodynamics I will place urethral catheter. Resitance likely at prostate-high riding and small defect seen    nterval history by me/ prior to urodynamics on 3/4/24:  Primary complaint is frequency and urgency with UUI and slow flow.  Underwent uds today which showed he had delayed sensation with slow flow. Bladder mostly emptied after procedure. Catheter placement was difficult due to high riding bladder neck but able to perform procedure. Recommended proceeding with tuip/turp.  Ua was neg.   However he returned in the afternoon bc unable to urinate.   Eryn placed coude jean and 475 dark urine drained.   She irrigated him and removed some clots     Interval history/H&P Update by me/ prior to tuip/turp on 3/12/24:  He came back for voiding trial and passed. He ended up staying off flomax.   Takes amlodipine and flomax 7-8-9 in the evening could be reason  Tadalafil in the mornign  Hctz in the morning  Recent cr 1.5 -sees , saw last year, fu in may   Psa screen today 3.8- but had catheter and uds 3 weeks ago.   Ua today with tr protein     Current Urinary symptoms 3/12/24:  Frequency:Still urinates bw 8-9x a day. Has more bad days than good. Nocturia 1x a night.  Incontinence:  Has drops/leaks 2-3 a day.  No pads.  Avg urgency: 4  Interstim on currently       Bipolar turp 3/13/24: path 3+3 from right side- 4/77 chips  Ureteral orifices close to resection. Part of trigone resected.   WINSOME revealed NO NODULES or firm areas today (psa prior to procedure 3.8 but had catheter and difficult catheter placement during uds less than a week ago)  Median lobe removed was likely the biggest obstructing component.   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              Interval history by ME/ in CLINIC on 4/11/24 for postop turp and prostate cancer findings:   Current Urinary symptoms  3/12/24:  Frequency:down to 5x a day from 8-9x a day. Nocturia 2x a week.    Incontinence:  leaks 5 to 6x a day up from 2-3x a day. Improving. Occurs with and without activity. Previously no pads, now 1 pad a day. Not wet when he changes.   Avg urgency: 4 previously, 2 now   AUA ssx:(2 incomplete emptying, 1 frequency, 1 intermittency, 2 urgency, 1 weak stream, 0 straining, 1 sleeping). 10. QOL: 8    Interstim on currently   Pvr by scan: 0  Ua today with small leuk and mod blood. Some dysuria.   Still has some blood in first void and with straining  Overall he says better.   .  Interval history by ME/ in CLINIC on 7/16/24 for bph/prostate cancer fu:  At his last visit his urine had leukocytes and blood.  However he had just had a TURP 1 month prior.  His culture was negative.  Currently his urine only shows trace protein today  I had him discontinue Flomax and oxybutynin at his last visit. Says no noticeable change in flow or frequency/urgency. I also asked him to do some Kegel's for his stress incontinence he was experiencing, he says he tried but didn't continue. However having minimal SANJANA.   Current Urinary symptoms 7/16/24  Frequency 5-8x a day down from 6-9x day. Still has more bad days than good. Nocturia 0-1x a night. 3-4x a week waking up 1x a night.   Incontinence:  Has drops 3x a day(previously 2-3x a day). Urge and unaware. Occ/rare SANJANA. Wearing thin pads (1 a day)- not when he changes.   Avg urgency: 3 (previously 4)  Interstim on currently and feels flutter in perinem (centered)  For his low-grade prostate cancer found on TURP chips I had him do a PSA and it came down to 0.44 after his TURP  Pvr by scan: 15.  AUA ssx:(1 incomplete emptying, 2 frequency, 1 intermittency, 2 urgency, 2 weak stream, 0 straining, 1 sleeping). 9. QOL: mixed  Using cialis 5mg daily for Ed. Working mostly. Sometimes doubles up. Will sometimes lose erection 1/2 way through. '    Interval history by  ME/ in CLINIC on 10/24/24:  He underwent TURP in March 2024 and had an InterStim implant placed in July 2023 for overactive bladder and urge incontinence. He also reports a liver cyst that has shown some growth, prompting an MRI  He reports urinary frequency of 5-6 times per day, improved from previous 5-8 times daily. Nocturia occurs once per night. He denies urge incontinence but reports occasional stress incontinence improved since last visit with small leaks, wearing one pad daily for protection. He experiences urgency and weak urinary stream, both rated as 1 on the AUA symptom score. He describes a light sensation with slow urinary flow. AUA SSX:6    PROSTATE CANCER:He was diagnosed with low-grade prostate cancer (Casi 3+3) from turp chips from TURP procedure in March 2024. Recent PSA test result is 0.58, slightly elevated from previous 0.44.    INTERSTIM DEVICE: He reports intermittent effectiveness of the InterStim device.he turned it off a month ago and he actually thinks his urgency improved.     MEDICATIONS: He continues Cialis 5 mg daily for erectile dysfunction, reporting it is effective most of the time. He has discontinued Flomax and oxybutynin following the TURP procedure.    SEXUAL HEALTH:He reports erectile dysfunction managed with Cialis. He experienced post-ejaculatory bleeding on one occasion following sexual intercourse, but denies any recurrence of this symptom.    Interval history by ME/ in CLINIC on 1/30/25:  History of Present Illness    CHIEF COMPLAINT:  Mr. Paula presents for a follow-up visit to discuss prostate cancer surveillance, urinary symptoms, and erectile function.    HPI:  Mr. Paula was diagnosed with prostate cancer in March 2024 and is currently under active surveillance. He has an interstim device for overactive bladder, which is functioning intermittently. Mr. Paula takes Cialis 5 mg daily for erectile dysfunction.    Mr. Paula reports  improved urinary frequency, now urinating every 3-4 hours during the day, with the first two voids occurring within an hour of each other. He denies urgency but has occasional leakage about once every 3-4 days, typically when bending over or delaying urination. The leakage is minimal and does not require changing underwear or wearing pads. Nocturia has improved, and he no longer needs to urinate at night.    Urinary flow has shown improvement. A recent Uroflow test on October 29th showed a peak flow of 12.6 ml/s, an improvement from a previous test of 8.5 ml/s. Mr. Paula confirms his flow is adequate most of the time, though it can be slow if attempting to empty his bladder quickly.    On January 13th, the patient had an episode of slight hematuria and passed a small dark mass in his urine, possibly indicating scar tissue passage. He did not feel anything come out at the time.    Regarding erectile function, the patient has morning erections 3-5 times per week, sometimes up to 3 times daily. At the beginning of the year, he noticed a period without morning erections, but this has since improved. During sexual activity, he has recently lost his erection, even while taking Cialis.    Mr. Paula underwent a TURP procedure at some point in the past.    Mr. Paula denies urinary urgency, straining to urinate, and significant blood in urine. He also denies any increase in urinary leakage.      He feels like he has good flow now after turp/tuip.   Urinating 2x an hour the first time and now urinating every 3 to 4 hours. No urgency. But had some SANJANA when he bent to put socks on once. And had UUI with a few drops if he waited to leak. Incontinence once every 3 to 4 days. Doesn't have to change underwear or pads. Tiny volume. About the same as last time. No longer has to get up at night anymore.   Psa now 0.67 on 1/24, it was 0.58 3 months ago. WINSOME today: 30 g no nodules  10/29/24 Peak flowrate: 12.6 mL/s, Average flowrate:  7.3mL/s, Intervals: 3, Voided volume: 240 mL, Pvr by bladder scan: 0mL .  (Prior to turp it was 8.6)  In january urinated a small piece and some blood but hasn't had happen since  Taking cialis 5mg daily. Ran out Monday. Waiting for new rx. In the begginning of year he said wasn't getting morning erections like normal but coming back to normal for him. Now has 3x a day sometimes. Hasn't had to use more than 5mg daily. Said lost an erection the last few times.   Ua today: no blood. Pvr: 25  AUA ssx:(2 incomplete emptying, 2 frequency, 1 intermittency, 1 urgency, 1 weak stream, 0 straining, 0 sleeping). 4. QOL: pleased    PERTINENT MEDICATIONS:  Cialis 5 mg, daily, for erections/erectile dysfunction.  Interstim (implanted device for overactive bladder), currently turned off.    PERTINENT MEDICAL HISTORY:  Prostate cancer.  Overactive bladder.  Erectile dysfunction.  Enlarged prostate.    PERTINENT SURGICAL HISTORY:  TURP (Transurethral resection of the prostate): Date not specified, for enlarged prostate.  Interstim implant: Date not specified, for overactive bladder.    PERTINENT TEST RESULTS:  PSA: January 24, 2025, 0.67  PSA: ~November 2024 (2 months prior to January 2025), 0.58 Uroflow: October 29, 2024, peak flow 12.6, voided volume 240 mL, post-void residual 0 mL.  Previous Uroflow: Date not specified, peak flow 16.7 (with full bladder)  Another previous Uroflow: Date not specified, peak flow 8.5            Bladder/prostate history:  Cysto trus 8/17/20: 42.6g.   Rezum 10/21/20  Last peak flow was 19.2 in 3/18/21.  Last catheter was at time of cholecystectomy in jan 2022.   PNE 5/21/23  Interstim 7/11/23  Cysto trus 2/19/24 45g. High riding bladder neck.   UDS 3/4/24- peak flow 8.2, voided vol: 352, pvr by scan: 23  Turp 3/12/24. Gl 3+3 in 4/77 chips.   Peak flow 10/2025 12.5    PSA history: no family hx of prostate cancer  1/30/25 Padilla: 30g no nodules  1/24/25 0.67  10/24/24 0.58  7/10/24 0.44  3/13/24 Turp  chips: 4/77 right lobe Gl 3+3  3/13/24 3.8, WINSOME: no nodules.   4/5/23  1.5 already she  1/22/24 WINSOME: 40g  5/31/22 1.3  3/2/21              2.3, %free 20.87, pvr: 35 (psa screen 4.6)  11/19/20          rezum  7/29/20            0.85, %free 54.12  1/31/20            2.2   2/21/19            1.2  3/14/18            1.0       PVR History:  1/30/25 24  10/29/24 Peak flowrate: 12.6 mL/s, Average flowrate: 7.3mL/s, Intervals: 3, Voided volume: 240 mL, Pvr by bladder scan: 0mL .  10/24/24 0, aua ssx: 6  7/16/24 15  4/11/24  Pvr: 0  3/13/24            Turp/tuip  3/8/24              Filled with 140. Voided 250  3/4/24              Uds: peak flow: 8.5, avg flow: 4.7, voided vol: 352, pvr by scan: 27 but then came back and pvr by io: 475  1/30/24            Peak flow: 16.7, voided vol: 346, pvr: 45  11/13/23          40  2/28/23            25, aua ssx: 12  8/23/22            115  6/15/21            pvr by scan: 0  3/18/21            UF: pF: 19.2, avg: 10.6, voided vol: 244, pvr: 33  3/2/21               pvr: 35  11/19/20          pvr by scan: 134 (couldn't start after he stopped)  10/26/20          Fill and pull: 0cc pvr  11/19/20          rezum  9/9/20              pvr by scan: 9cc  7/27/20            pvr by scan: 32cc  8/17/20            Cysto trus: 42.6g.   7/30/20           UF: peak flow 12.8mL/s,  avg flow 5.4mL/s, voided vol: 191cc, pvr by scan:0   1/6/20              pvr by scan: 11cc   9/3/19              pvr by scan: 15cc      Urine history: 1 ppd x 26 years. Quit 1997. Anticoag: asa 81mg  7/16/24 Tr prot  4/11/24  Ng, void: Mod bld/small leuk,  2/19/24            30 protein  1/30/54            neg  1/22/24 Not able to provide- do at the lab  11/13/23 1 rbc  6/28/23 Tr bld  5/22/23 neg  4/21/23 Tr prot  6/7/22  neg  11/19/20          Small wbc/mod blood-send for ua patricio and culture pvr by scan: 134  10/14/20          Neg  9/16/20            neg  9/9/20              neg  3/31/20            Neg  1/6/20               Neg  11/18/19          neg  8/8/19              No cx, void: n/a 0 rbc  2/21/19            Ng, void: neg, 0 rbc  4/9/18              No cx, void: neg  9/2017             No blood       Current REVIEW OF SYSTEMS:  neg    Objective:     There were no vitals filed for this visit.        Focused  exam  neg    Assessment:     Onesimo Paula is a 74 y.o. 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 Prostate cancer    3. Erectile dysfunction, unspecified erectile dysfunction type        Plan:     's typed/written- Abbreviated/Short Plan:  Continue cialis 5mg daily for Ed. Ok to increase or take additional 15mg but add'l 5mg  Diagnostic psa in  months and if psa higher order prostate mri and plan for formal biopsy.  Fu in 6 months with diagnostic psa and prostate mri and fu after for aua ssx and pvr. Hasn't had mri prostate yet    Active surveillance plan for prostate cancer  psa every 6 months  Digital rectal exam once a year  Mri likely once a year (may need valium)  Biopsy for rising psa, abnormal prostate exam or at 12 to 18 months after first biopsy   Treat for increasing volume or stage of prostate cancer      The following assessment plan was created by [employer] via ambient listening:  Assessment & Plan    C61 Prostate cancer  N40.1, N13.8 BPH with obstruction/lower urinary tract symptoms  N52.9 Erectile dysfunction, unspecified erectile dysfunction type  IMPRESSION:   Reassessed prostate cancer on active surveillance diagnosed in March 2024   Evaluated PSA trend: slight increase from 0.58 to 0.67, not clinically significant at this time   Performed digital rectal exam: prostate 30 g, no nodules detected   Reviewed recent Uroflow results: peak flow 12.6 ml/s, improved from previous 8.5 ml/s   Assessed urinary symptoms: improved frequency, minimal urgency, occasional stress incontinence   Evaluated erectile function: spontaneous erections returning   Will defer prostate  MRI, opting for PSA and MRI in 6 months to reassess cancer status    Please review the short plan as above for concise and accurate plan. The dictated/AI generated plan may have some inaccuracies .    PLAN SUMMARY:   Order PSA test and prostate MRI for 6-month follow-up   Continue Cialis 5 mg daily for erectile dysfunction   Activate interstim device at home for urinary symptoms   Follow-up appointment in 6 months after PSA test and MRI   Potential increase in medication dosage if needed for erections    PROSTATE CANCER:   Ordered PSA test and prostate MRI for 6 months from now.   Scheduled follow-up visit in 6 months after PSA test and prostate MRI.    BPH WITH OBSTRUCTION/LOWER URINARY TRACT SYMPTOMS:   Instructed the patient to activate the interstim device at home to evaluate its effect on urinary urgency and frequency.    ERECTILE DYSFUNCTION, UNSPECIFIED ERECTILE DYSFUNCTION TYPE:   Continued prescription of Cialis 5 mg daily for erectile dysfunction.   Advised the patient that dosage may be increased if needed for maintaining erections during intercourse.             Portions of this note was generated with the assistance of ambient listening technology. Verbal consent was obtained by the patient and accompanying visitor(s) for the recording of patient appointment to facilitate this note. I attest to having reviewed and edited the generated note for accuracy, though some syntax or spelling errors may persist. Please contact the author of this note for any clarification.  .       Visit today included increased complexity associated with the care of the episodic problem addressed and managing the longitudinal care of the patient due to the serious and/or complex managed problem(s) .      La Nena James MD

## 2025-01-31 NOTE — PATIENT INSTRUCTIONS
's typed/written- Abbreviated/Short Plan:  Continue cialis 5mg daily for Ed. Ok to increase or take additional 15mg but add'l 5mg  Diagnostic psa in  months and if psa higher order prostate mri and plan for formal biopsy.  Fu in 6 months with diagnostic psa and prostate mri and fu after for aua ssx and pvr. Hasn't had mri prostate yet    Active surveillance plan for prostate cancer  psa every 6 months  Digital rectal exam once a year  Mri likely once a year (may need valium)  Biopsy for rising psa, abnormal prostate exam or at 12 to 18 months after first biopsy   Treat for increasing volume or stage of prostate cancer      The following assessment plan was created by [employer] via ambient listening:  Assessment & Plan    C61 Prostate cancer  N40.1, N13.8 BPH with obstruction/lower urinary tract symptoms  N52.9 Erectile dysfunction, unspecified erectile dysfunction type  IMPRESSION:   Reassessed prostate cancer on active surveillance diagnosed in March 2024   Evaluated PSA trend: slight increase from 0.58 to 0.67, not clinically significant at this time   Performed digital rectal exam: prostate 30 g, no nodules detected   Reviewed recent Uroflow results: peak flow 12.6 ml/s, improved from previous 8.5 ml/s   Assessed urinary symptoms: improved frequency, minimal urgency, occasional stress incontinence   Evaluated erectile function: spontaneous erections returning   Will defer prostate MRI, opting for PSA and MRI in 6 months to reassess cancer status    Please review the short plan as above for concise and accurate plan. The dictated/AI generated plan may have some inaccuracies .    PLAN SUMMARY:   Order PSA test and prostate MRI for 6-month follow-up   Continue Cialis 5 mg daily for erectile dysfunction   Activate interstim device at home for urinary symptoms   Follow-up appointment in 6 months after PSA test and MRI   Potential increase in medication dosage if needed for erections    PROSTATE  CANCER:   Ordered PSA test and prostate MRI for 6 months from now.   Scheduled follow-up visit in 6 months after PSA test and prostate MRI.    BPH WITH OBSTRUCTION/LOWER URINARY TRACT SYMPTOMS:   Instructed the patient to activate the interstim device at home to evaluate its effect on urinary urgency and frequency.    ERECTILE DYSFUNCTION, UNSPECIFIED ERECTILE DYSFUNCTION TYPE:   Continued prescription of Cialis 5 mg daily for erectile dysfunction.   Advised the patient that dosage may be increased if needed for maintaining erections during intercourse.

## 2025-02-05 ENCOUNTER — OFFICE VISIT (OUTPATIENT)
Dept: FAMILY MEDICINE | Facility: CLINIC | Age: 75
End: 2025-02-05
Payer: COMMERCIAL

## 2025-02-05 VITALS
SYSTOLIC BLOOD PRESSURE: 126 MMHG | OXYGEN SATURATION: 97 % | HEIGHT: 70 IN | DIASTOLIC BLOOD PRESSURE: 70 MMHG | HEART RATE: 62 BPM | BODY MASS INDEX: 25.51 KG/M2 | WEIGHT: 178.19 LBS

## 2025-02-05 DIAGNOSIS — R06.02 SHORT OF BREATH ON EXERTION: ICD-10-CM

## 2025-02-05 DIAGNOSIS — I10 ESSENTIAL HYPERTENSION: Primary | ICD-10-CM

## 2025-02-05 PROCEDURE — 3288F FALL RISK ASSESSMENT DOCD: CPT | Mod: CPTII,S$GLB,, | Performed by: NURSE PRACTITIONER

## 2025-02-05 PROCEDURE — 1160F RVW MEDS BY RX/DR IN RCRD: CPT | Mod: CPTII,S$GLB,, | Performed by: NURSE PRACTITIONER

## 2025-02-05 PROCEDURE — 3078F DIAST BP <80 MM HG: CPT | Mod: CPTII,S$GLB,, | Performed by: NURSE PRACTITIONER

## 2025-02-05 PROCEDURE — 1159F MED LIST DOCD IN RCRD: CPT | Mod: CPTII,S$GLB,, | Performed by: NURSE PRACTITIONER

## 2025-02-05 PROCEDURE — 99999 PR PBB SHADOW E&M-EST. PATIENT-LVL V: CPT | Mod: PBBFAC,,, | Performed by: NURSE PRACTITIONER

## 2025-02-05 PROCEDURE — 3044F HG A1C LEVEL LT 7.0%: CPT | Mod: CPTII,S$GLB,, | Performed by: NURSE PRACTITIONER

## 2025-02-05 PROCEDURE — 3074F SYST BP LT 130 MM HG: CPT | Mod: CPTII,S$GLB,, | Performed by: NURSE PRACTITIONER

## 2025-02-05 PROCEDURE — 1126F AMNT PAIN NOTED NONE PRSNT: CPT | Mod: CPTII,S$GLB,, | Performed by: NURSE PRACTITIONER

## 2025-02-05 PROCEDURE — 99214 OFFICE O/P EST MOD 30 MIN: CPT | Mod: S$GLB,,, | Performed by: NURSE PRACTITIONER

## 2025-02-05 PROCEDURE — 1101F PT FALLS ASSESS-DOCD LE1/YR: CPT | Mod: CPTII,S$GLB,, | Performed by: NURSE PRACTITIONER

## 2025-02-05 RX ORDER — ALBUTEROL SULFATE 90 UG/1
2 INHALANT RESPIRATORY (INHALATION) EVERY 6 HOURS PRN
Qty: 18 G | Refills: 2 | Status: SHIPPED | OUTPATIENT
Start: 2025-02-05

## 2025-02-05 RX ORDER — TELMISARTAN 40 MG/1
40 TABLET ORAL DAILY
Qty: 90 TABLET | Refills: 1 | Status: SHIPPED | OUTPATIENT
Start: 2025-02-05

## 2025-02-11 NOTE — PROGRESS NOTES
"    SUBJECTIVE:      Patient ID: Onesimo Paula is a 74 y.o. male.    Chief Complaint: Follow-up (6 month f/u Lab Review)      Presents for HTN & BURT    Follows with pulm, uro, nephro & cardiology    Discussed outstanding health maintenance     Hypertension  This is a chronic problem. The current episode started more than 1 year ago. The problem has been resolved since onset. The problem is controlled. Associated symptoms include anxiety, palpitations ("fluttering" at times), peripheral edema (intermittent) and shortness of breath. Pertinent negatives include no blurred vision, chest pain, headaches, malaise/fatigue, neck pain, orthopnea, PND or sweats. Agents associated with hypertension include NSAIDs. Risk factors for coronary artery disease include dyslipidemia, male gender, sedentary lifestyle and family history. Past treatments include angiotensin blockers, calcium channel blockers and diuretics. The current treatment provides moderate improvement. Compliance problems include exercise.  Hypertensive end-organ damage includes kidney disease and CAD/MI. There is no history of heart failure. Identifiable causes of hypertension include chronic renal disease and renovascular disease.   Shortness of Breath  This is a chronic problem. The current episode started more than 1 year ago. The problem occurs daily. The problem has been gradually worsening. Pertinent negatives include no abdominal pain, chest pain, claudication, coryza, ear pain, fever, headaches, hemoptysis, leg pain, leg swelling, neck pain, orthopnea, PND, rash, rhinorrhea, sore throat, sputum production, swollen glands, syncope, vomiting or wheezing. The symptoms are aggravated by exercise, any activity, lying flat, weather changes and URIs. The patient has no known risk factors for DVT/PE. He has tried rest for the symptoms. The treatment provided no relief. There is no history of allergies, aspirin allergies, asthma, bronchiolitis, CAD, chronic lung " disease, COPD, DVT, a heart failure, PE, pneumonia or a recent surgery.       Past Surgical History:   Procedure Laterality Date    CYSTOSCOPY N/A 2/19/2024    Procedure: CYSTOSCOPY;  Surgeon: La Nena James MD;  Location: [facility] OR;  Service: Urology;  Laterality: N/A;    CYSTOSCOPY WITH TRANSURETHRAL DESTRUCTION OF PROSTATE,RFA N/A 10/21/2020    Procedure: CYSTOSCOPY WITH TRANSURETHRAL DESTRUCTION OF PROSTATE,RFA;  Surgeon: La Nena James MD;  Location: [facility] OR;  Service: Urology;  Laterality: N/A;  please make sure [facility] rep available [phone]    EYE SURGERY      for cataracts, rt eye 1/16/20, left eye 1/30/20    HERNIA REPAIR  12/1999    HERNIA REPAIR  05/2004    INSERTION OF SACRAL NEUROSTIMULATOR, ELECTRODES, AND IMPLANTABLE PULSE GENERATOR (IPG) N/A 7/12/2023    Procedure: INSERTION, ELECTRODE LEADS AND PULSE GENERATOR, NEUROSTIMULATOR, SACRAL;  Surgeon: La Nena James MD;  Location: [facility] OR;  Service: Urology;  Laterality: N/A;    KNEE ARTHROSCOPY Left 04/12/2018    LAPAROSCOPIC APPENDECTOMY N/A 1/2/2022    Procedure: APPENDECTOMY, LAPAROSCOPIC;  Surgeon: Sam Goss MD;  Location: [facility] OR;  Service: General;  Laterality: N/A;    PERCUTANEOUS INSERTION OF SACRAL NERVE NEUROSTIMULATOR ELECTRODE LEAD N/A 5/22/2023    Procedure: INSERTION, ELECTRODE LEAD, NEUROSTIMULATOR, SACRAL, PERCUTANEOUS Keep first;  Surgeon: La Nena James MD;  Location: [facility] OR;  Service: Urology;  Laterality: N/A;  patient needs to apply EMLA cream to entirety of lower back 20 minutes prior to procedure, also need 1% lidocaine available    PERCUTANEOUS INSERTION OF SACRAL NERVE NEUROSTIMULATOR ELECTRODE LEAD N/A 7/12/2023    Procedure: INSERTION, ELECTRODE LEAD, NEUROSTIMULATOR, SACRAL, PERCUTANEOUS;  Surgeon: La Nena James MD;  Location: [facility] OR;  Service: Urology;  Laterality: N/A;    SKIN LESION EXCISION  10/2009    Neck    SKIN LESION EXCISION  2015    STAPEDECTOMY  Right 2001    TRANSRECTAL ULTRASOUND EXAMINATION N/A 2020    Procedure: TRANSRECTAL ULTRASOUND;  Surgeon: La Nena James MD;  Location: [facility] OR;  Service: Urology;  Laterality: N/A;    TRANSRECTAL ULTRASOUND EXAMINATION N/A 2024    Procedure: ULTRASOUND, RECTAL APPROACH;  Surgeon: La Nena James MD;  Location: [facility] OR;  Service: Urology;  Laterality: N/A;    TRANSURETHRAL INCISION OF PROSTATE N/A 3/13/2024    Procedure: INCISION, PROSTATE, TRANSURETHRAL;  Surgeon: La Nena James MD;  Location: [facility] OR;  Service: Urology;  Laterality: N/A;    TRANSURETHRAL RESECTION OF PROSTATE N/A 3/13/2024    Procedure: TURP (TRANSURETHRAL RESECTION OF PROSTATE)- BIPOLAR;  Surgeon: La Nena James MD;  Location: [facility] OR;  Service: Urology;  Laterality: N/A;  WITH PARALYSIS     Family History   Problem Relation Name Age of Onset    Cancer Mother          skin    Stroke Mother      Heart failure Father      Kidney disease Father        Social History     Socioeconomic History    Marital status:    Tobacco Use    Smoking status: Former     Current packs/day: 0.00     Types: Cigarettes     Quit date:      Years since quittin.    Smokeless tobacco: Never   Substance and Sexual Activity    Alcohol use: Yes     Comment: occasional    Drug use: No    Sexual activity: Yes     Partners: Female     Social Drivers of Health     Financial Resource Strain: Low Risk  (2025)    Overall Financial Resource Strain (CARDIA)     Difficulty of Paying Living Expenses: Not hard at all   Food Insecurity: No Food Insecurity (2025)    Hunger Vital Sign     Worried About Running Out of Food in the Last Year: Never true     Ran Out of Food in the Last Year: Never true   Transportation Needs: No Transportation Needs (2023)    PRAPARE - Transportation     Lack of Transportation (Medical): No     Lack of Transportation (Non-Medical): No   Physical Activity: Inactive  (1/8/2025)    Exercise Vital Sign     Days of Exercise per Week: 0 days     Minutes of Exercise per Session: 0 min   Stress: Stress Concern Present (1/8/2025)    [employer] - Occupational Stress Questionnaire     Feeling of Stress : To some extent   Housing Stability: Low Risk  (11/11/2023)    Housing Stability Vital Sign     Unable to Pay for Housing in the Last Year: No     Number of Places Lived in the Last Year: 1     Unstable Housing in the Last Year: No     Current Outpatient Medications   Medication Sig Dispense Refill    amLODIPine (NORVASC) 10 MG tablet Take 1 tablet (10 mg total) by mouth once daily. 90 tablet 1    aspirin (ECOTRIN) 81 MG EC tablet Take 81 mg by mouth once daily.      busPIRone (BUSPAR) 5 MG Tab Take 1 tablet (5 mg total) by mouth once daily. 30 tablet 5    calcitRIOL (ROCALTROL) 0.25 MCG Cap Take 0.25 mcg by mouth every 7 days.      co-enzyme Q-10 30 mg capsule Take 30 mg by mouth 3 (three) times daily.      cyanocobalamin 1,000 mcg/mL injection INJECT 1 ML (CC) INTRAMUSCULARLY ONCE EVERY MONTH (EVERY  30  DAYS) 1 mL 0    doxycycline monohydrate 100 mg Tab 1 tab(s) orally bid for 10 days      hydroCHLOROthiazide (HYDRODIURIL) 25 MG tablet Take 1/2 (one-half) tablet by mouth once daily 45 tablet 3    ibuprofen (ADVIL,MOTRIN) 800 MG tablet 1 tab(s) orally 3 times a day for 10 days      metFORMIN (GLUCOPHAGE) 500 MG tablet Take 1 tablet (500 mg total) by mouth daily with breakfast. 90 tablet 1    multivit with minerals/lutein (MULTIVITAMIN 50 PLUS ORAL) Take 1 tablet by mouth once daily.      omega 3-dha-epa-fish oil (FISH OIL) 100-160-1,000 mg Cap Take 1 capsule by mouth once daily.      omeprazole (PRILOSEC) 20 MG capsule Take 1 capsule (20 mg total) by mouth once daily. 90 capsule 1    propranoloL (INDERAL LA) 60 MG 24 hr capsule Take 1 capsule (60 mg total) by mouth once daily. 90 capsule 1    ranolazine (RANEXA) 500 MG Tb12 Take 2 tablets by mouth twice  daily 180 tablet 3    rosuvastatin (CRESTOR) 10 MG tablet Take 1 tablet (10 mg total) by mouth once daily. 90 tablet 1    tadalafiL (CIALIS) 5 MG tablet Take 1 tablet (5 mg total) by mouth every morning. 90 tablet 3    albuterol (VENTOLIN HFA) 90 mcg/actuation inhaler Inhale 2 puffs into the lungs every 6 (six) hours as needed for Shortness of Breath. Rescue 18 g 2    telmisartan (MICARDIS) 40 MG Tab Take 1 tablet (40 mg total) by mouth once daily. 90 tablet 1     No current facility-administered medications for this visit.     Facility-Administered Medications Ordered in Other Visits   Medication Dose Route Frequency Provider Last Rate Last Admin    electrolyte-S (ISOLYTE)   Intravenous Continuous Loyd Chakraborty MD   Stopped at 07/12/23 1141    electrolyte-S (ISOLYTE)   Intravenous Continuous Bennett Javier MD        electrolyte-S (ISOLYTE)   Intravenous Continuous Bennett Javier MD 10 mL/hr at 03/13/24 1336 New Bag at 03/13/24 1336    fentaNYL 50 mcg/mL injection 25 mcg  25 mcg Intravenous Q5 Min PRN Loyd Chakraborty MD        fentaNYL 50 mcg/mL injection 25 mcg  25 mcg Intravenous Q5 Min PRN Bennett Javier MD        lactated ringers infusion   Intravenous Continuous Loyd Chakraborty MD        LIDOcaine (PF) 10 mg/ml (1%) injection 10 mg  1 mL Intradermal Once Loyd Chakraborty MD        LIDOcaine (PF) 10 mg/ml (1%) injection 10 mg  1 mL Intradermal Once Bennett Javier MD        metoclopramide HCl injection 10 mg  10 mg Intravenous Q10 Min PRN Loyd Chakraborty MD        ondansetron injection 4 mg  4 mg Intravenous Once PRN Bennett Javier MD        oxyCODONE immediate release tablet 5 mg  5 mg Oral Once PRN Loyd Chakraborty MD        oxyCODONE immediate release tablet 5 mg  5 mg Oral Once PRN Bennett Javier MD         Review of patient's allergies indicates:  No Known Allergies   Past Medical History:   Diagnosis Date    Acid reflux     Acquired tricuspid valve insufficiency      Anemia     Borderline diabetes     BPH (benign prostatic hyperplasia)     Cataract     1/16/20 rt eye, 1/30/20- Lt eye    Coronary artery disease     Hypertension     Liver cyst 08/24/2022    Pulmonary nodules     Skin cancer      Past Surgical History:   Procedure Laterality Date    CYSTOSCOPY N/A 2/19/2024    Procedure: CYSTOSCOPY;  Surgeon: La Nena James MD;  Location: [facility] OR;  Service: Urology;  Laterality: N/A;    CYSTOSCOPY WITH TRANSURETHRAL DESTRUCTION OF PROSTATE,RFA N/A 10/21/2020    Procedure: CYSTOSCOPY WITH TRANSURETHRAL DESTRUCTION OF PROSTATE,RFA;  Surgeon: La Nena James MD;  Location: [facility] OR;  Service: Urology;  Laterality: N/A;  please make sure [facility] available [phone]    EYE SURGERY      for cataracts, rt eye 1/16/20, left eye 1/30/20    HERNIA REPAIR  12/1999    HERNIA REPAIR  05/2004    INSERTION OF SACRAL NEUROSTIMULATOR, ELECTRODES, AND IMPLANTABLE PULSE GENERATOR (IPG) N/A 7/12/2023    Procedure: INSERTION, ELECTRODE LEADS AND PULSE GENERATOR, NEUROSTIMULATOR, SACRAL;  Surgeon: La Nena James MD;  Location: [facility] OR;  Service: Urology;  Laterality: N/A;    KNEE ARTHROSCOPY Left 04/12/2018    LAPAROSCOPIC APPENDECTOMY N/A 1/2/2022    Procedure: APPENDECTOMY, LAPAROSCOPIC;  Surgeon: Sam Goss MD;  Location: [facility] OR;  Service: General;  Laterality: N/A;    PERCUTANEOUS INSERTION OF SACRAL NERVE NEUROSTIMULATOR ELECTRODE LEAD N/A 5/22/2023    Procedure: INSERTION, ELECTRODE LEAD, NEUROSTIMULATOR, SACRAL, PERCUTANEOUS Keep first;  Surgeon: La Nena James MD;  Location: [facility] OR;  Service: Urology;  Laterality: N/A;  patient needs to apply EMLA cream to entirety of lower back 20 minutes prior to procedure, also need 1% lidocaine available    PERCUTANEOUS INSERTION OF SACRAL NERVE NEUROSTIMULATOR ELECTRODE LEAD N/A 7/12/2023    Procedure: INSERTION, ELECTRODE LEAD, NEUROSTIMULATOR, SACRAL, PERCUTANEOUS;  Surgeon: La Nena BRICENO  "MD Erika;  Location: [facility] OR;  Service: Urology;  Laterality: N/A;    SKIN LESION EXCISION  10/2009    Neck    SKIN LESION EXCISION  2015    STAPEDECTOMY Right 03/2001    TRANSRECTAL ULTRASOUND EXAMINATION N/A 8/17/2020    Procedure: TRANSRECTAL ULTRASOUND;  Surgeon: La Nena James MD;  Location: [facility] OR;  Service: Urology;  Laterality: N/A;    TRANSRECTAL ULTRASOUND EXAMINATION N/A 2/19/2024    Procedure: ULTRASOUND, RECTAL APPROACH;  Surgeon: La Nena James MD;  Location: [facility] OR;  Service: Urology;  Laterality: N/A;    TRANSURETHRAL INCISION OF PROSTATE N/A 3/13/2024    Procedure: INCISION, PROSTATE, TRANSURETHRAL;  Surgeon: La Nena James MD;  Location: [facility] OR;  Service: Urology;  Laterality: N/A;    TRANSURETHRAL RESECTION OF PROSTATE N/A 3/13/2024    Procedure: TURP (TRANSURETHRAL RESECTION OF PROSTATE)- BIPOLAR;  Surgeon: La Nena James MD;  Location: [facility] OR;  Service: Urology;  Laterality: N/A;  WITH PARALYSIS       Review of Systems   Constitutional:  Positive for fatigue. Negative for activity change, appetite change, fever, malaise/fatigue and unexpected weight change.   HENT:  Negative for congestion, ear pain, hearing loss, postnasal drip, rhinorrhea, sinus pressure, sinus pain, sneezing, sore throat and trouble swallowing.    Eyes:  Negative for blurred vision, photophobia, pain, discharge and visual disturbance.   Respiratory:  Positive for chest tightness and shortness of breath. Negative for cough, hemoptysis, sputum production and wheezing.    Cardiovascular:  Positive for palpitations ("fluttering" at times). Negative for chest pain, orthopnea, claudication, leg swelling, syncope and PND.   Gastrointestinal:  Negative for abdominal distention, abdominal pain, blood in stool, constipation, diarrhea, nausea and vomiting.   Endocrine: Negative for cold intolerance, heat intolerance, polydipsia and polyuria.   Genitourinary:  Negative for " "difficulty urinating, dysuria, flank pain, frequency, hematuria and urgency.   Musculoskeletal:  Positive for arthralgias, back pain and myalgias. Negative for joint swelling and neck pain.   Skin:  Negative for pallor and rash.   Allergic/Immunologic: Negative for environmental allergies and food allergies.   Neurological:  Positive for tremors and light-headedness. Negative for dizziness, weakness and headaches.   Hematological:  Does not bruise/bleed easily.   Psychiatric/Behavioral:  Positive for dysphoric mood. Negative for confusion, decreased concentration and sleep disturbance. The patient is nervous/anxious.       OBJECTIVE:      Vitals:    02/05/25 1135   BP: 126/70   BP Location: Right arm   Patient Position: Sitting   Pulse: 62   SpO2: 97%   Weight: 80.8 kg (178 lb 3.2 oz)   Height: 5' 10" (1.778 m)     Physical Exam  Vitals and nursing note reviewed.   Constitutional:       General: He is not in acute distress.     Appearance: Normal appearance. He is well-developed, well-groomed and normal weight.   HENT:      Head: Normocephalic and atraumatic.      Right Ear: Hearing normal.      Left Ear: Hearing normal.      Nose: Nose normal. No rhinorrhea.   Eyes:      General: Lids are normal.         Right eye: No discharge.         Left eye: No discharge.      Conjunctiva/sclera: Conjunctivae normal.      Right eye: Right conjunctiva is not injected.      Left eye: Left conjunctiva is not injected.      Pupils: Pupils are equal, round, and reactive to light. Pupils are equal.      Right eye: Pupil is round and reactive.      Left eye: Pupil is round and reactive.   Neck:      Thyroid: No thyromegaly.      Vascular: No JVD.      Trachea: Trachea normal. No tracheal deviation.   Cardiovascular:      Rate and Rhythm: Normal rate and regular rhythm.      Pulses:           Radial pulses are 2+ on the right side and 2+ on the left side.      Heart sounds: Normal heart sounds. No murmur heard.     No friction rub. No " gallop.   Pulmonary:      Effort: Pulmonary effort is normal. No respiratory distress.      Breath sounds: Normal breath sounds. No stridor. No decreased breath sounds, wheezing, rhonchi or rales.   Abdominal:      General: Bowel sounds are normal. There is no distension.      Palpations: Abdomen is soft. Abdomen is not rigid.      Tenderness: There is no abdominal tenderness. There is no guarding.   Musculoskeletal:         General: No swelling. Normal range of motion.      Cervical back: Normal range of motion and neck supple.   Lymphadenopathy:      Cervical: No cervical adenopathy.   Skin:     General: Skin is warm and dry.      Capillary Refill: Capillary refill takes less than 2 seconds.      Coloration: Skin is not pale.      Findings: No lesion or rash.   Neurological:      Mental Status: He is alert and oriented to person, place, and time.      GCS: GCS eye subscore is 4. GCS verbal subscore is 5. GCS motor subscore is 6.      Cranial Nerves: Cranial nerves 2-12 are intact.      Sensory: Sensation is intact.      Motor: Tremor (BUE - fine) present. No atrophy.      Coordination: Coordination is intact. Coordination normal.      Gait: Gait is intact. Gait normal.   Psychiatric:         Attention and Perception: Attention and perception normal. He is attentive.         Mood and Affect: Mood and affect normal.         Speech: Speech normal.         Behavior: Behavior normal.         Thought Content: Thought content normal.         Cognition and Memory: Cognition and memory normal.         Judgment: Judgment normal.        Assessment:       1. Essential hypertension    2. Short of breath on exertion        Plan:       Essential hypertension  -     telmisartan (MICARDIS) 40 MG Tab; Take 1 tablet (40 mg total) by mouth once daily.  Dispense: 90 tablet; Refill: 1    Short of breath on exertion  -     albuterol (VENTOLIN HFA) 90 mcg/actuation inhaler; Inhale 2 puffs into the lungs every 6 (six) hours as needed for  Shortness of Breath. Rescue  Dispense: 18 g; Refill: 2          Follow up in about 3 months (around 5/5/2025) for DM, breathing.       2/10/2025 JAMES Rose, FNP-C

## 2025-02-21 ENCOUNTER — TELEPHONE (OUTPATIENT)
Dept: NEUROLOGY | Facility: CLINIC | Age: 75
End: 2025-02-21
Payer: COMMERCIAL

## 2025-02-21 NOTE — TELEPHONE ENCOUNTER
Called patient to schedule appointment from referral for tremor. Patient declined scheduling at this time but will call back if he decides to schedule at a later time.

## 2025-02-21 NOTE — TELEPHONE ENCOUNTER
Patient called back to schedule appointment from referral for tremor. Held 2 day change for 8 am on 3/24/25 with Susana Medina. Explained appointment will be scheduled the Friday before the appointment. Patient voiced understanding. Time/date/location provided.

## 2025-02-26 NOTE — ANESTHESIA PREPROCEDURE EVALUATION
07/12/2023  Onesimo Paula is a 72 y.o., male.      Pre-op Assessment    I have reviewed the Patient Summary Reports.     I have reviewed the Nursing Notes.       Review of Systems  Anesthesia Hx:  No problems with previous Anesthesia    Cardiovascular:   Hypertension CAD      Pulmonary:   Shortness of breath    Renal/:   Chronic Renal Disease    Hepatic/GI:   GERD Liver Disease,    Musculoskeletal:   Arthritis         Physical Exam  General: Well nourished        Anesthesia Plan  Type of Anesthesia, risks & benefits discussed:    Anesthesia Type: Gen Natural Airway, MAC, Gen Supraglottic Airway, Gen ETT  Intra-op Monitoring Plan: Standard ASA Monitors  Post Op Pain Control Plan: multimodal analgesia and IV/PO Opioids PRN  Induction:  IV  Informed Consent: Informed consent signed with the Patient and all parties understand the risks and agree with anesthesia plan.  All questions answered.   ASA Score: 3  Day of Surgery Review of History & Physical: H&P Update referred to the surgeon/provider.    Ready For Surgery From Anesthesia Perspective.     .       FAMILY HISTORY:  No pertinent family history in first degree relatives

## 2025-03-24 ENCOUNTER — LAB VISIT (OUTPATIENT)
Dept: LAB | Facility: HOSPITAL | Age: 75
End: 2025-03-24
Attending: NURSE PRACTITIONER
Payer: COMMERCIAL

## 2025-03-24 ENCOUNTER — OFFICE VISIT (OUTPATIENT)
Dept: NEUROLOGY | Facility: CLINIC | Age: 75
End: 2025-03-24
Payer: COMMERCIAL

## 2025-03-24 VITALS
HEIGHT: 70 IN | BODY MASS INDEX: 25.91 KG/M2 | SYSTOLIC BLOOD PRESSURE: 126 MMHG | DIASTOLIC BLOOD PRESSURE: 68 MMHG | WEIGHT: 181 LBS | RESPIRATION RATE: 14 BRPM | HEART RATE: 61 BPM

## 2025-03-24 DIAGNOSIS — R25.1 TREMOR: ICD-10-CM

## 2025-03-24 DIAGNOSIS — R06.09 DYSPNEA ON EXERTION: ICD-10-CM

## 2025-03-24 DIAGNOSIS — R25.1 TREMOR: Primary | ICD-10-CM

## 2025-03-24 LAB — TSH SERPL-ACNC: 3.54 UIU/ML (ref 0.4–4)

## 2025-03-24 PROCEDURE — 99204 OFFICE O/P NEW MOD 45 MIN: CPT | Mod: S$GLB,,, | Performed by: NURSE PRACTITIONER

## 2025-03-24 PROCEDURE — 3288F FALL RISK ASSESSMENT DOCD: CPT | Mod: CPTII,S$GLB,, | Performed by: NURSE PRACTITIONER

## 2025-03-24 PROCEDURE — 3044F HG A1C LEVEL LT 7.0%: CPT | Mod: CPTII,S$GLB,, | Performed by: NURSE PRACTITIONER

## 2025-03-24 PROCEDURE — 99999 PR PBB SHADOW E&M-EST. PATIENT-LVL III: CPT | Mod: PBBFAC,,, | Performed by: NURSE PRACTITIONER

## 2025-03-24 PROCEDURE — 1160F RVW MEDS BY RX/DR IN RCRD: CPT | Mod: CPTII,S$GLB,, | Performed by: NURSE PRACTITIONER

## 2025-03-24 PROCEDURE — 3074F SYST BP LT 130 MM HG: CPT | Mod: CPTII,S$GLB,, | Performed by: NURSE PRACTITIONER

## 2025-03-24 PROCEDURE — 84443 ASSAY THYROID STIM HORMONE: CPT

## 2025-03-24 PROCEDURE — 36415 COLL VENOUS BLD VENIPUNCTURE: CPT | Mod: PO

## 2025-03-24 PROCEDURE — 1126F AMNT PAIN NOTED NONE PRSNT: CPT | Mod: CPTII,S$GLB,, | Performed by: NURSE PRACTITIONER

## 2025-03-24 PROCEDURE — 3078F DIAST BP <80 MM HG: CPT | Mod: CPTII,S$GLB,, | Performed by: NURSE PRACTITIONER

## 2025-03-24 PROCEDURE — 1159F MED LIST DOCD IN RCRD: CPT | Mod: CPTII,S$GLB,, | Performed by: NURSE PRACTITIONER

## 2025-03-24 PROCEDURE — 1101F PT FALLS ASSESS-DOCD LE1/YR: CPT | Mod: CPTII,S$GLB,, | Performed by: NURSE PRACTITIONER

## 2025-03-24 NOTE — ASSESSMENT & PLAN NOTE
Reports of bilateral hand tremor that occurs when preforming an action   - onset ~2 years ago  Tremor does not impact ADLs but he does report SOB on exertion as well as associated lightheadedness that is chronic  Neuro exam noted with bilateral ET. No PD features  Etiology unclear   - no family h/o tremor   - suspect other medical issues are exacerbating his tremors  Pt was prescribed Propranolol in January but has not noticed a difference in his tremor. I will not be increasing this medication today due to reports of lightheadedness and tremors not being impactful.   If tremors do progress could consider trial of Primidone but this could also worsen dizziness.   Obtain TSH level   Encouraged pt to track tremors for now  Will monitor over time

## 2025-03-25 ENCOUNTER — RESULTS FOLLOW-UP (OUTPATIENT)
Dept: NEUROLOGY | Facility: CLINIC | Age: 75
End: 2025-03-25

## 2025-04-10 ENCOUNTER — OFFICE VISIT (OUTPATIENT)
Dept: CARDIOLOGY | Facility: CLINIC | Age: 75
End: 2025-04-10
Payer: COMMERCIAL

## 2025-04-10 VITALS
HEART RATE: 58 BPM | HEIGHT: 70 IN | BODY MASS INDEX: 25.85 KG/M2 | DIASTOLIC BLOOD PRESSURE: 77 MMHG | SYSTOLIC BLOOD PRESSURE: 131 MMHG | WEIGHT: 180.56 LBS

## 2025-04-10 DIAGNOSIS — R07.2 CHEST PAIN, PRECORDIAL: Primary | ICD-10-CM

## 2025-04-10 DIAGNOSIS — I10 ESSENTIAL HYPERTENSION: ICD-10-CM

## 2025-04-10 DIAGNOSIS — R06.09 DYSPNEA ON EXERTION: ICD-10-CM

## 2025-04-10 DIAGNOSIS — E78.2 MIXED HYPERLIPIDEMIA: ICD-10-CM

## 2025-04-10 DIAGNOSIS — I34.0 NONRHEUMATIC MITRAL VALVE REGURGITATION: ICD-10-CM

## 2025-04-10 PROCEDURE — 93005 ELECTROCARDIOGRAM TRACING: CPT | Mod: PO

## 2025-04-10 PROCEDURE — 3288F FALL RISK ASSESSMENT DOCD: CPT | Mod: CPTII,S$GLB,, | Performed by: INTERNAL MEDICINE

## 2025-04-10 PROCEDURE — 99999 PR PBB SHADOW E&M-EST. PATIENT-LVL IV: CPT | Mod: PBBFAC,,, | Performed by: INTERNAL MEDICINE

## 2025-04-10 PROCEDURE — 3078F DIAST BP <80 MM HG: CPT | Mod: CPTII,S$GLB,, | Performed by: INTERNAL MEDICINE

## 2025-04-10 PROCEDURE — 3075F SYST BP GE 130 - 139MM HG: CPT | Mod: CPTII,S$GLB,, | Performed by: INTERNAL MEDICINE

## 2025-04-10 PROCEDURE — 3044F HG A1C LEVEL LT 7.0%: CPT | Mod: CPTII,S$GLB,, | Performed by: INTERNAL MEDICINE

## 2025-04-10 PROCEDURE — 1160F RVW MEDS BY RX/DR IN RCRD: CPT | Mod: CPTII,S$GLB,, | Performed by: INTERNAL MEDICINE

## 2025-04-10 PROCEDURE — 99215 OFFICE O/P EST HI 40 MIN: CPT | Mod: S$GLB,,, | Performed by: INTERNAL MEDICINE

## 2025-04-10 PROCEDURE — 1125F AMNT PAIN NOTED PAIN PRSNT: CPT | Mod: CPTII,S$GLB,, | Performed by: INTERNAL MEDICINE

## 2025-04-10 PROCEDURE — 1101F PT FALLS ASSESS-DOCD LE1/YR: CPT | Mod: CPTII,S$GLB,, | Performed by: INTERNAL MEDICINE

## 2025-04-10 PROCEDURE — 1159F MED LIST DOCD IN RCRD: CPT | Mod: CPTII,S$GLB,, | Performed by: INTERNAL MEDICINE

## 2025-04-10 NOTE — PROGRESS NOTES
Subjective:    Patient ID:  Onesimo Paula is a 74 y.o. male patient here for evaluation Establish Care, Shortness of Breath, Dizziness, and Chest Pain      History of Present Illness:  Follow-up.  Seen previously in [place].  Intermittent acute on chronic complaints of chest pain dizziness dyspnea.  Noninvasive cardiac assessment past negative, the studies included nuclear perfusion imaging, stress echo, CTA coronaries.  Ultrasound carotid arteries unremarkable.  CTA of the chest with pulmonary nodules, COPD.  With no sustained complex supra or ventricular arrhythmias.  No high-grade SA AV omayra block.      Mildly progressive symptoms since 2023    Remote past tobacco use, prediabetes mellitus, hypertension, dyslipidemia.  Review of patient's allergies indicates:  No Known Allergies    Past Medical History:   Diagnosis Date    Acid reflux     Acquired tricuspid valve insufficiency     Anemia     Borderline diabetes     BPH (benign prostatic hyperplasia)     Cataract     1/16/20 rt eye, 1/30/20- Lt eye    Coronary artery disease     Hypertension     Liver cyst 08/24/2022    Pulmonary nodules     Skin cancer      Past Surgical History:   Procedure Laterality Date    CYSTOSCOPY N/A 2/19/2024    Procedure: CYSTOSCOPY;  Surgeon: La Nena James MD;  Location: [facility] OR;  Service: Urology;  Laterality: N/A;    CYSTOSCOPY WITH TRANSURETHRAL DESTRUCTION OF PROSTATE,RFA N/A 10/21/2020    Procedure: CYSTOSCOPY WITH TRANSURETHRAL DESTRUCTION OF PROSTATE,RFA;  Surgeon: La Nena James MD;  Location: [facility] OR;  Service: Urology;  Laterality: N/A;  please make sure [facility] rep available [phone]    EYE SURGERY      for cataracts, rt eye 1/16/20, left eye 1/30/20    HERNIA REPAIR  12/1999    HERNIA REPAIR  05/2004    INSERTION OF SACRAL NEUROSTIMULATOR, ELECTRODES, AND IMPLANTABLE PULSE GENERATOR (IPG) N/A 7/12/2023    Procedure: INSERTION, ELECTRODE LEADS AND PULSE GENERATOR, NEUROSTIMULATOR, SACRAL;   Surgeon: La Nena James MD;  Location: [facility] OR;  Service: Urology;  Laterality: N/A;    KNEE ARTHROSCOPY Left 04/12/2018    LAPAROSCOPIC APPENDECTOMY N/A 1/2/2022    Procedure: APPENDECTOMY, LAPAROSCOPIC;  Surgeon: Sam Goss MD;  Location: [facility] OR;  Service: General;  Laterality: N/A;    PERCUTANEOUS INSERTION OF SACRAL NERVE NEUROSTIMULATOR ELECTRODE LEAD N/A 5/22/2023    Procedure: INSERTION, ELECTRODE LEAD, NEUROSTIMULATOR, SACRAL, PERCUTANEOUS Keep first;  Surgeon: La Nena James MD;  Location: [facility] OR;  Service: Urology;  Laterality: N/A;  patient needs to apply EMLA cream to entirety of lower back 20 minutes prior to procedure, also need 1% lidocaine available    PERCUTANEOUS INSERTION OF SACRAL NERVE NEUROSTIMULATOR ELECTRODE LEAD N/A 7/12/2023    Procedure: INSERTION, ELECTRODE LEAD, NEUROSTIMULATOR, SACRAL, PERCUTANEOUS;  Surgeon: La Nena James MD;  Location: [facility];  Service: Urology;  Laterality: N/A;    SKIN LESION EXCISION  10/2009    Neck    SKIN LESION EXCISION  2015    STAPEDECTOMY Right 03/2001    TRANSRECTAL ULTRASOUND EXAMINATION N/A 8/17/2020    Procedure: TRANSRECTAL ULTRASOUND;  Surgeon: La Nena James MD;  Location: [facility] OR;  Service: Urology;  Laterality: N/A;    TRANSRECTAL ULTRASOUND EXAMINATION N/A 2/19/2024    Procedure: ULTRASOUND, RECTAL APPROACH;  Surgeon: La Nena James MD;  Location: [facility] OR;  Service: Urology;  Laterality: N/A;    TRANSURETHRAL INCISION OF PROSTATE N/A 3/13/2024    Procedure: INCISION, PROSTATE, TRANSURETHRAL;  Surgeon: La Nena James MD;  Location: [facility] OR;  Service: Urology;  Laterality: N/A;    TRANSURETHRAL RESECTION OF PROSTATE N/A 3/13/2024    Procedure: TURP (TRANSURETHRAL RESECTION OF PROSTATE)- BIPOLAR;  Surgeon: La Nena James MD;  Location: [facility] OR;  Service: Urology;  Laterality: N/A;  WITH PARALYSIS     Social History[1]     Review of Systems:    As noted in HPI in  addition      REVIEW OF SYSTEMS  Review of Systems   Constitutional: Negative for decreased appetite, diaphoresis, night sweats, weight gain and weight loss.   HENT:  Negative for nosebleeds and odynophagia.    Eyes:  Negative for double vision and photophobia.   Cardiovascular:  Positive for dyspnea on exertion. Negative for chest pain, claudication, cyanosis, irregular heartbeat, leg swelling, near-syncope, orthopnea, palpitations, paroxysmal nocturnal dyspnea and syncope.   Respiratory:  Positive for shortness of breath. Negative for cough, hemoptysis and wheezing.    Hematologic/Lymphatic: Negative for adenopathy.   Skin:  Negative for flushing, skin cancer and suspicious lesions.   Musculoskeletal:  Negative for gout, myalgias and neck pain.   Gastrointestinal:  Negative for abdominal pain, heartburn, hematemesis and hematochezia.   Genitourinary:  Negative for bladder incontinence, hesitancy and nocturia.   Neurological:  Negative for focal weakness, headaches, light-headedness and paresthesias.   Psychiatric/Behavioral:  Negative for memory loss and substance abuse.               Objective:        Vitals:    04/10/25 1325   BP: 131/77   Pulse: (!) 58       Lab Results   Component Value Date    WBC 7.82 11/14/2024    HGB 12.5 (L) 11/14/2024    HCT 38.0 (L) 11/14/2024     11/14/2024    CHOL 118 (L) 08/13/2024    TRIG 66 08/13/2024    HDL 36 (L) 08/13/2024    ALT 23 01/24/2025    AST 19 01/24/2025     01/24/2025    K 4.2 01/24/2025     01/24/2025    CREATININE 1.3 01/24/2025    BUN 30 (H) 01/24/2025    CO2 26 01/24/2025    TSH 3.536 03/24/2025    PSA 3.8 03/13/2024    INR 1.0 10/24/2022    HGBA1C 5.5 01/24/2025        ECHOCARDIOGRAM RESULTS  Results for orders placed in visit on 11/28/23    Echo Saline Bubble? No    Interpretation Summary    Left Ventricle: The left ventricle is normal in size. Normal wall thickness. Normal wall motion. There is normal systolic function. Biplane (2D) method of  discs ejection fraction is 66%. There is normal diastolic function.    Right Ventricle: Normal right ventricular cavity size. Wall thickness is normal. Right ventricle wall motion  is normal. Systolic function is normal.    Mitral Valve: There is mild regurgitation with a centrally directed jet.    Tricuspid Valve: There is mild to moderate regurgitation with a centrally directed jet.    Pulmonary Artery: The estimated pulmonary artery systolic pressure is 29 mmHg.    No results found for this or any previous visit.          CURRENT/PREVIOUS VISIT EKG  Results for orders placed or performed in visit on 11/17/23   IN OFFICE EKG 12-LEAD (to [employer])    Collection Time: 11/17/23  9:10 AM    Narrative    Test Reason : R07.9,    Vent. Rate : 058 BPM     Atrial Rate : 058 BPM     P-R Int : 182 ms          QRS Dur : 106 ms      QT Int : 432 ms       P-R-T Axes : 066 008 047 degrees     QTc Int : 424 ms    Sinus bradycardia  Otherwise normal ECG  When compared with ECG of 24-AUG-2023 15:36,  No significant change was found  Confirmed by Alireza STEWART, Guero BAUER ([id number]) on 12/2/2023 12:37:29 PM    Referred By: [email or web address]   SELF           Confirmed By:Guero Lay MD     No valid procedures specified.   Results for orders placed during the hospital encounter of 11/30/22    Nuclear Stress - Cardiology Interpreted    Interpretation Summary    Normal myocardial perfusion scan. There is no evidence of myocardial ischemia or infarction.    The gated perfusion images showed an ejection fraction of 79% post stress. Normal ejection fraction is greater than 53%.    There is normal wall motion post stress.    LV cavity size is  and normal at stress.    The EKG portion of this study is negative for ischemia.    The patient reported no chest pain during the stress test.    There were no arrhythmias during stress.    No valid procedures specified.    PHYSICAL EXAM  GENERAL: well built, well nourished, well-developed in no apparent distress  alert and oriented.   HEENT: Normocephalic. Pupils normal and conjunctivae normal.  Mucous membranes normal, no cyanosis or icterus, trachea central,no pallor or icterus is noted..   NECK: No JVD. No bruit..   THYROID: Thyroid not enlarged. No nodules present..   CARDIAC:  Normal S1-S2.  No murmur rub click or gallop.  PMI nondisplaced.    LUNGS:  Mildly decreased breath sounds bilaterally.. No wheezing or rhonchi..   ABDOMEN: Soft no masses or organomegaly.  No abdomen pulsations or bruits.  Normal bowel sounds. No pulsations and no masses felt, No guarding or rebound.   URINARY: No jean catheter   EXTREMITIES: No cyanosis, clubbing or edema noted at this time., no calf tenderness bilaterally.   PERIPHERAL VASCULAR SYSTEM: Good palpable distal pulses.  2+ femoral, popliteal and pedal pulses.  No bruits    CENTRAL NERVOUS SYSTEM: No focal motor or sensory deficits noted.   SKIN: Skin without lesions, moist, well perfused.   MUSCLE STRENGTH & TONE: No noteable weakness, atrophy or abnormal movement    I HAVE REVIEWED :    The vital signs, nurses notes, and all the pertinent radiology and labs.         Current Outpatient Medications   Medication Instructions    albuterol (VENTOLIN HFA) 90 mcg/actuation inhaler 2 puffs, Inhalation, Every 6 hours PRN, Rescue    amLODIPine (NORVASC) 10 mg, Oral, Daily    aspirin (ECOTRIN) 81 mg, Daily    busPIRone (BUSPAR) 5 mg, Oral, Daily    calcitRIOL (ROCALTROL) 0.25 mcg, Every 7 days    co-enzyme Q-10 30 mg, 3 times daily    doxycycline monohydrate 100 mg Tab 1 tab(s) orally bid for 10 days    hydroCHLOROthiazide (HYDRODIURIL) 12.5 mg, Oral    metFORMIN (GLUCOPHAGE) 500 mg, Oral, With breakfast    multivit with minerals/lutein (MULTIVITAMIN 50 PLUS ORAL) 1 tablet, Daily    omega 3-dha-epa-fish oil (FISH OIL) 100-160-1,000 mg Cap 1 capsule, Daily    omeprazole (PRILOSEC) 20 mg, Oral, Daily    propranoloL (INDERAL LA) 60 mg, Oral, Daily    ranolazine (RANEXA) 1,000 mg, Oral, 2 times  daily    rosuvastatin (CRESTOR) 10 mg, Oral, Daily    tadalafiL (CIALIS) 5 mg, Oral, Every morning    telmisartan (MICARDIS) 40 mg, Oral, Daily          Assessment:   Acute on chronic complaints of atypical chest pain, dizziness dyspnea palpitations.  Extensive past negative noninvasive cardiac assessment dating back to  and including stress echo, nuclear perfusion imaging, CTA coronaries, chest CT, carotid ultrasound.        Plan:   GXT Cardiolite.  Update echo.  Results        No follow-ups on file.            [1]   Social History  Tobacco Use    Smoking status: Former     Current packs/day: 0.00     Types: Cigarettes     Quit date:      Years since quittin.2    Smokeless tobacco: Never   Substance Use Topics    Alcohol use: Yes     Comment: occasional    Drug use: No

## 2025-04-11 LAB
OHS QRS DURATION: 100 MS
OHS QTC CALCULATION: 401 MS

## 2025-04-17 LAB — CRC RECOMMENDATION EXT: NORMAL

## 2025-04-24 ENCOUNTER — PATIENT MESSAGE (OUTPATIENT)
Dept: CARDIOLOGY | Facility: HOSPITAL | Age: 75
End: 2025-04-24
Payer: COMMERCIAL

## 2025-04-25 ENCOUNTER — PATIENT OUTREACH (OUTPATIENT)
Dept: ADMINISTRATIVE | Facility: HOSPITAL | Age: 75
End: 2025-04-25
Payer: COMMERCIAL

## 2025-04-28 ENCOUNTER — HOSPITAL ENCOUNTER (OUTPATIENT)
Dept: RADIOLOGY | Facility: HOSPITAL | Age: 75
Discharge: HOME | End: 2025-04-28
Attending: INTERNAL MEDICINE
Payer: COMMERCIAL

## 2025-04-28 ENCOUNTER — HOSPITAL ENCOUNTER (OUTPATIENT)
Dept: CARDIOLOGY | Facility: HOSPITAL | Age: 75
Discharge: HOME | End: 2025-04-28
Attending: INTERNAL MEDICINE
Payer: COMMERCIAL

## 2025-04-28 VITALS — WEIGHT: 180 LBS | HEIGHT: 70 IN | BODY MASS INDEX: 25.77 KG/M2

## 2025-04-28 DIAGNOSIS — E78.2 MIXED HYPERLIPIDEMIA: ICD-10-CM

## 2025-04-28 DIAGNOSIS — R07.2 CHEST PAIN, PRECORDIAL: ICD-10-CM

## 2025-04-28 DIAGNOSIS — R06.09 DYSPNEA ON EXERTION: ICD-10-CM

## 2025-04-28 DIAGNOSIS — I10 ESSENTIAL HYPERTENSION: ICD-10-CM

## 2025-04-28 LAB
ASCENDING AORTA: 3.3 CM
AV INDEX (PROSTH): 0.73
AV MEAN GRADIENT: 6 MMHG
AV PEAK GRADIENT: 12 MMHG
AV VALVE AREA BY VELOCITY RATIO: 2.4 CM²
AV VALVE AREA: 2.3 CM²
AV VELOCITY RATIO: 0.76
BSA FOR ECHO PROCEDURE: 2.01 M2
CV ECHO LV RWT: 0.45 CM
CV STRESS BASE HR: 60 BPM
DIASTOLIC BLOOD PRESSURE: 93 MMHG
DOP CALC AO PEAK VEL: 1.7 M/S
DOP CALC AO VTI: 40.8 CM
DOP CALC LVOT AREA: 3.1 CM2
DOP CALC LVOT DIAMETER: 2 CM
DOP CALC LVOT PEAK VEL: 1.3 M/S
DOP CALC LVOT STROKE VOLUME: 93.6 CM3
DOP CALCLVOT PEAK VEL VTI: 29.8 CM
ECHO LV POSTERIOR WALL: 1 CM (ref 0.6–1.1)
FRACTIONAL SHORTENING: 34.1 % (ref 28–44)
INTERVENTRICULAR SEPTUM: 1.1 CM (ref 0.6–1.1)
IVRT: 111 MSEC
LEFT ATRIUM AREA SYSTOLIC (APICAL 2 CHAMBER): 21.6 CM2
LEFT ATRIUM AREA SYSTOLIC (APICAL 4 CHAMBER): 15.76 CM2
LEFT ATRIUM SIZE: 3.6 CM
LEFT ATRIUM VOLUME INDEX MOD: 30 ML/M2
LEFT ATRIUM VOLUME MOD: 59 ML
LEFT INTERNAL DIMENSION IN SYSTOLE: 2.9 CM (ref 2.1–4)
LEFT VENTRICLE DIASTOLIC VOLUME INDEX: 45 ML/M2
LEFT VENTRICLE DIASTOLIC VOLUME: 90 ML
LEFT VENTRICLE END SYSTOLIC VOLUME APICAL 2 CHAMBER: 72.09 ML
LEFT VENTRICLE END SYSTOLIC VOLUME APICAL 4 CHAMBER: 43.45 ML
LEFT VENTRICLE MASS INDEX: 79.1 G/M2
LEFT VENTRICLE SYSTOLIC VOLUME INDEX: 16.5 ML/M2
LEFT VENTRICLE SYSTOLIC VOLUME: 33 ML
LEFT VENTRICULAR INTERNAL DIMENSION IN DIASTOLE: 4.4 CM (ref 3.5–6)
LEFT VENTRICULAR MASS: 158.2 G
LVED V (TEICH): 89.71 ML
LVES V (TEICH): 32.89 ML
LVOT MG: 3.18 MMHG
LVOT MV: 0.82 CM/S
MR PISA EROA: 0.13 CM2
NUC STRESS EJECTION FRACTION: 69 %
OHS CV CPX 1 MINUTE RECOVERY HEART RATE: 101 BPM
OHS CV CPX 85 PERCENT MAX PREDICTED HEART RATE MALE: 123
OHS CV CPX ESTIMATED METS: 8
OHS CV CPX MAX PREDICTED HEART RATE: 145
OHS CV CPX PATIENT IS FEMALE: 0
OHS CV CPX PATIENT IS MALE: 1
OHS CV CPX PEAK DIASTOLIC BLOOD PRESSURE: 78 MMHG
OHS CV CPX PEAK HEAR RATE: 125 BPM
OHS CV CPX PEAK RATE PRESSURE PRODUCT: NORMAL
OHS CV CPX PEAK SYSTOLIC BLOOD PRESSURE: 173 MMHG
OHS CV CPX PERCENT MAX PREDICTED HEART RATE ACHIEVED: 86
OHS CV CPX RATE PRESSURE PRODUCT PRESENTING: 8460
OHS CV INITIAL DOSE: 11 MCG/KG/MIN
OHS CV PEAK DOSE: 33 MCG/KG/MIN
OHS CV RV/LV RATIO: 0.93 CM
PISA MRMAX VEL: 6.22 M/S
PISA RADIUS: 0.71 CM
PISA TR MAX VEL: 2.9 M/S
PISA VN NYQUIST MS: 0.25 M/S
PISA VN NYQUIST: 0.25 M/S
PULM VEIN S/D RATIO: 1
PV PEAK D VEL: 0.62 M/S
PV PEAK S VEL: 0.62 M/S
RA PRESSURE ESTIMATED: 3 MMHG
RA VOL SYS: 27.4 ML
RIGHT ATRIAL AREA: 11.7 CM2
RIGHT ATRIUM VOLUME AREA LENGTH APICAL 4 CHAMBER: 26.02 ML
RIGHT VENTRICLE DIASTOLIC BASEL DIMENSION: 4.1 CM
RIGHT VENTRICLE DIASTOLIC LENGTH: 7.6 CM
RIGHT VENTRICLE DIASTOLIC MID DIMENSION: 3.1 CM
RIGHT VENTRICULAR END-DIASTOLIC DIMENSION: 4.14 CM
RIGHT VENTRICULAR LENGTH IN DIASTOLE (APICAL 4-CHAMBER VIEW): 7.61 CM
RV MID DIAMA: 3.05 CM
RV TB RVSP: 6 MMHG
RV TISSUE DOPPLER FREE WALL SYSTOLIC VELOCITY 1 (APICAL 4 CHAMBER VIEW): 11.53 CM/S
SINUS: 3.49 CM
STJ: 3 CM
STRESS ECHO POST EXERCISE DUR MIN: 5 MINUTES
STRESS ECHO POST EXERCISE DUR SEC: 4 SECONDS
SYSTOLIC BLOOD PRESSURE: 141 MMHG
TDI LATERAL: 0.12 M/S
TDI SEPTAL: 0.08 M/S
TDI: 0.1 M/S
TR MAX PG: 33 MMHG
TRICUSPID ANNULAR PLANE SYSTOLIC EXCURSION: 2.1 CM
TV REST PULMONARY ARTERY PRESSURE: 37 MMHG
Z-SCORE OF LEFT VENTRICULAR DIMENSION IN END DIASTOLE: -2.81
Z-SCORE OF LEFT VENTRICULAR DIMENSION IN END SYSTOLE: -1.66

## 2025-04-28 PROCEDURE — 93018 CV STRESS TEST I&R ONLY: CPT | Mod: ,,, | Performed by: INTERNAL MEDICINE

## 2025-04-28 PROCEDURE — 78452 HT MUSCLE IMAGE SPECT MULT: CPT | Mod: 26,,, | Performed by: INTERNAL MEDICINE

## 2025-04-28 PROCEDURE — 93016 CV STRESS TEST SUPVJ ONLY: CPT | Mod: ,,, | Performed by: INTERNAL MEDICINE

## 2025-04-28 PROCEDURE — 93306 TTE W/DOPPLER COMPLETE: CPT | Mod: PO

## 2025-04-28 PROCEDURE — 93306 TTE W/DOPPLER COMPLETE: CPT | Mod: 26,,, | Performed by: INTERNAL MEDICINE

## 2025-04-28 PROCEDURE — 78452 HT MUSCLE IMAGE SPECT MULT: CPT | Mod: PO

## 2025-04-28 PROCEDURE — A9502 TC99M TETROFOSMIN: HCPCS | Mod: PO | Performed by: INTERNAL MEDICINE

## 2025-04-28 PROCEDURE — 93017 CV STRESS TEST TRACING ONLY: CPT | Mod: PO

## 2025-04-28 RX ADMIN — TETROFOSMIN 33 MILLICURIE: 1.38 INJECTION, POWDER, LYOPHILIZED, FOR SOLUTION INTRAVENOUS at 09:04

## 2025-04-28 RX ADMIN — TETROFOSMIN 11 MILLICURIE: 1.38 INJECTION, POWDER, LYOPHILIZED, FOR SOLUTION INTRAVENOUS at 09:04

## 2025-04-29 ENCOUNTER — PATIENT MESSAGE (OUTPATIENT)
Dept: FAMILY MEDICINE | Facility: CLINIC | Age: 75
End: 2025-04-29
Payer: COMMERCIAL

## 2025-05-06 ENCOUNTER — OFFICE VISIT (OUTPATIENT)
Dept: FAMILY MEDICINE | Facility: CLINIC | Age: 75
End: 2025-05-06
Payer: COMMERCIAL

## 2025-05-06 VITALS
BODY MASS INDEX: 26.55 KG/M2 | WEIGHT: 185.44 LBS | HEART RATE: 95 BPM | SYSTOLIC BLOOD PRESSURE: 124 MMHG | HEIGHT: 70 IN | OXYGEN SATURATION: 96 % | DIASTOLIC BLOOD PRESSURE: 76 MMHG

## 2025-05-06 DIAGNOSIS — R06.02 SHORT OF BREATH ON EXERTION: ICD-10-CM

## 2025-05-06 DIAGNOSIS — R73.03 PREDIABETES: Primary | ICD-10-CM

## 2025-05-06 DIAGNOSIS — G89.29 CHRONIC RIGHT SHOULDER PAIN: ICD-10-CM

## 2025-05-06 DIAGNOSIS — K21.9 GASTROESOPHAGEAL REFLUX DISEASE WITHOUT ESOPHAGITIS: ICD-10-CM

## 2025-05-06 DIAGNOSIS — E78.01 FAMILIAL HYPERCHOLESTEROLEMIA: ICD-10-CM

## 2025-05-06 DIAGNOSIS — I10 ESSENTIAL HYPERTENSION: ICD-10-CM

## 2025-05-06 DIAGNOSIS — M25.511 CHRONIC RIGHT SHOULDER PAIN: ICD-10-CM

## 2025-05-06 DIAGNOSIS — R25.1 TREMOR OF BOTH HANDS: ICD-10-CM

## 2025-05-06 PROCEDURE — 3078F DIAST BP <80 MM HG: CPT | Mod: CPTII,S$GLB,, | Performed by: NURSE PRACTITIONER

## 2025-05-06 PROCEDURE — 99999 PR PBB SHADOW E&M-EST. PATIENT-LVL IV: CPT | Mod: PBBFAC,,, | Performed by: NURSE PRACTITIONER

## 2025-05-06 PROCEDURE — 1101F PT FALLS ASSESS-DOCD LE1/YR: CPT | Mod: CPTII,S$GLB,, | Performed by: NURSE PRACTITIONER

## 2025-05-06 PROCEDURE — 3288F FALL RISK ASSESSMENT DOCD: CPT | Mod: CPTII,S$GLB,, | Performed by: NURSE PRACTITIONER

## 2025-05-06 PROCEDURE — 3074F SYST BP LT 130 MM HG: CPT | Mod: CPTII,S$GLB,, | Performed by: NURSE PRACTITIONER

## 2025-05-06 PROCEDURE — 3044F HG A1C LEVEL LT 7.0%: CPT | Mod: CPTII,S$GLB,, | Performed by: NURSE PRACTITIONER

## 2025-05-06 PROCEDURE — 1159F MED LIST DOCD IN RCRD: CPT | Mod: CPTII,S$GLB,, | Performed by: NURSE PRACTITIONER

## 2025-05-06 PROCEDURE — 99214 OFFICE O/P EST MOD 30 MIN: CPT | Mod: S$GLB,,, | Performed by: NURSE PRACTITIONER

## 2025-05-06 PROCEDURE — 1160F RVW MEDS BY RX/DR IN RCRD: CPT | Mod: CPTII,S$GLB,, | Performed by: NURSE PRACTITIONER

## 2025-05-06 RX ORDER — OMEPRAZOLE 20 MG/1
20 CAPSULE, DELAYED RELEASE ORAL DAILY
Qty: 90 CAPSULE | Refills: 1 | Status: SHIPPED | OUTPATIENT
Start: 2025-05-06

## 2025-05-06 RX ORDER — PANTOPRAZOLE SODIUM 40 MG/1
90 TABLET, DELAYED RELEASE ORAL
COMMUNITY
Start: 2025-04-22

## 2025-05-06 RX ORDER — METFORMIN HYDROCHLORIDE 500 MG/1
500 TABLET ORAL
Qty: 90 TABLET | Refills: 1 | Status: SHIPPED | OUTPATIENT
Start: 2025-05-06

## 2025-05-06 RX ORDER — ALBUTEROL SULFATE 90 UG/1
2 INHALANT RESPIRATORY (INHALATION) EVERY 6 HOURS PRN
Qty: 18 G | Refills: 2 | Status: SHIPPED | OUTPATIENT
Start: 2025-05-06

## 2025-05-06 RX ORDER — ROSUVASTATIN CALCIUM 10 MG/1
10 TABLET, COATED ORAL DAILY
Qty: 90 TABLET | Refills: 1 | Status: SHIPPED | OUTPATIENT
Start: 2025-05-06

## 2025-05-06 RX ORDER — PROPRANOLOL HYDROCHLORIDE 60 MG/1
60 CAPSULE, EXTENDED RELEASE ORAL DAILY
Qty: 90 CAPSULE | Refills: 1 | Status: SHIPPED | OUTPATIENT
Start: 2025-05-06

## 2025-05-06 RX ORDER — LISINOPRIL 40 MG/1
TABLET ORAL
COMMUNITY

## 2025-05-07 ENCOUNTER — OFFICE VISIT (OUTPATIENT)
Dept: ORTHOPEDICS | Facility: CLINIC | Age: 75
End: 2025-05-07
Payer: COMMERCIAL

## 2025-05-07 ENCOUNTER — HOSPITAL ENCOUNTER (OUTPATIENT)
Dept: RADIOLOGY | Facility: HOSPITAL | Age: 75
Discharge: HOME | End: 2025-05-07
Attending: PHYSICIAN ASSISTANT
Payer: COMMERCIAL

## 2025-05-07 VITALS
SYSTOLIC BLOOD PRESSURE: 124 MMHG | WEIGHT: 185.44 LBS | BODY MASS INDEX: 26.55 KG/M2 | HEIGHT: 70 IN | DIASTOLIC BLOOD PRESSURE: 84 MMHG

## 2025-05-07 DIAGNOSIS — M75.51 SUBACROMIAL BURSITIS OF RIGHT SHOULDER JOINT: ICD-10-CM

## 2025-05-07 DIAGNOSIS — M75.101 TEAR OF RIGHT ROTATOR CUFF, UNSPECIFIED TEAR EXTENT, UNSPECIFIED WHETHER TRAUMATIC: Primary | ICD-10-CM

## 2025-05-07 DIAGNOSIS — M19.011 GLENOHUMERAL ARTHRITIS, RIGHT: ICD-10-CM

## 2025-05-07 PROCEDURE — 99999 PR PBB SHADOW E&M-EST. PATIENT-LVL IV: CPT | Mod: PBBFAC,,,

## 2025-05-07 PROCEDURE — 73030 X-RAY EXAM OF SHOULDER: CPT | Mod: TC,PN,RT

## 2025-05-07 PROCEDURE — 73030 X-RAY EXAM OF SHOULDER: CPT | Mod: 26,RT,, | Performed by: RADIOLOGY

## 2025-05-07 RX ORDER — TRIAMCINOLONE ACETONIDE 40 MG/ML
40 INJECTION, SUSPENSION INTRA-ARTICULAR; INTRAMUSCULAR
Status: DISCONTINUED | OUTPATIENT
Start: 2025-05-07 | End: 2025-05-07 | Stop reason: HOSPADM

## 2025-05-07 RX ADMIN — TRIAMCINOLONE ACETONIDE 40 MG: 40 INJECTION, SUSPENSION INTRA-ARTICULAR; INTRAMUSCULAR at 10:05

## 2025-05-07 NOTE — PROCEDURES
Large Joint Aspiration/Injection: R subacromial bursa    Date/Time: 5/7/2025 10:00 AM    Performed by: Alvarado Dominguez PA-C  Authorized by: Alvarado Dominguez PA-C    Consent Done?:  Yes (Verbal)  Indications:  Pain    Local anesthesia used?: Yes    Local anesthetic:  Lidocaine 1% without epinephrine    Details:  Needle Size:  25 G  Location:  Shoulder  Site:  R subacromial bursa  Medications:  40 mg triamcinolone acetonide 40 mg/mL

## 2025-05-07 NOTE — PROCEDURES
Large Joint Aspiration/Injection: R subacromial bursa    Date/Time: 5/7/2025 10:00 AM    Performed by: Alvarado Dominguez PA-C  Authorized by: Alvarado Dominguez PA-C    Consent Done?:  Yes (Verbal)  Indications:  Pain  Site marked: the procedure site was marked    Timeout: prior to procedure the correct patient, procedure, and site was verified    Prep: patient was prepped and draped in usual sterile fashion      Local anesthesia used?: Yes    Local anesthetic:  Lidocaine 1% without epinephrine    Details:  Needle Size:  22 G  Location:  Shoulder  Site:  R subacromial bursa  Medications:  40 mg triamcinolone acetonide 40 mg/mL  Patient tolerance:  Patient tolerated the procedure well with no immediate complications

## 2025-05-12 NOTE — PROGRESS NOTES
SUBJECTIVE:      Patient ID: Onesimo Paula is a 74 y.o. male.    Chief Complaint: Follow-up (3 month f/u Lung Recheck)      Presents for chronic health issues & problem visit    Follows with pulm, uro, nephro & cardiology    Discussed outstanding health maintenance     Hypertension  This is a chronic problem. The current episode started more than 1 year ago. The problem has been resolved since onset. The problem is controlled. Associated symptoms include anxiety, peripheral edema (rare) and shortness of breath. Pertinent negatives include no blurred vision, chest pain, headaches, malaise/fatigue, neck pain, orthopnea, PND or sweats. Agents associated with hypertension include NSAIDs. Risk factors for coronary artery disease include dyslipidemia, male gender, sedentary lifestyle and family history. Past treatments include angiotensin blockers, calcium channel blockers and diuretics. The current treatment provides moderate improvement. Compliance problems include exercise and diet.  Hypertensive end-organ damage includes kidney disease and CAD/MI. There is no history of heart failure. Identifiable causes of hypertension include chronic renal disease and renovascular disease.   Shortness of Breath  This is a chronic problem. The current episode started more than 1 year ago. The problem occurs daily. The problem has been unchanged. Pertinent negatives include no abdominal pain, chest pain, claudication, coryza, ear pain, fever, headaches, hemoptysis, leg pain, leg swelling, neck pain, orthopnea, PND, rash, rhinorrhea, sore throat, sputum production, swollen glands, syncope, vomiting or wheezing. The symptoms are aggravated by exercise, any activity, lying flat, weather changes and URIs. The patient has no known risk factors for DVT/PE. He has tried rest and beta agonist inhalers for the symptoms. The treatment provided mild relief. There is no history of allergies, aspirin allergies, asthma, bronchiolitis, CAD,  DVT, a heart failure, PE, pneumonia or a recent surgery.   Gastroesophageal Reflux  He complains of heartburn. He reports no abdominal pain, no chest pain, no coughing, no nausea, no sore throat or no wheezing. BUE tremor. This is a recurrent problem. The current episode started more than 1 year ago. The problem occurs occasionally. The problem has been waxing and waning. The heartburn is located in the substernum. The heartburn is of mild intensity. The heartburn does not wake him from sleep. The heartburn does not limit his activity. The symptoms are aggravated by certain foods and lying down. Associated symptoms include fatigue. Risk factors include lack of exercise. He has tried a PPI for the symptoms. The treatment provided moderate relief.   Neurologic Problem  The patient's primary symptoms include weakness. Primary symptoms comment: BUE tremor. This is a recurrent problem. The current episode started more than 1 month ago. The neurological problem developed insidiously. The problem has been gradually worsening since onset. Associated symptoms include back pain, fatigue and shortness of breath. Pertinent negatives include no abdominal pain, chest pain, confusion, dizziness, fever, headaches, light-headedness, nausea, neck pain or vomiting. Past treatments include nothing. There is no history of a bleeding disorder, a CVA, head trauma or seizures.   Shoulder Pain   The pain is present in the right shoulder. This is a chronic problem. The current episode started more than 1 month ago. There has been a history of trauma. The problem occurs constantly. The problem has been gradually worsening. The quality of the pain is described as aching. The pain is moderate. Pertinent negatives include no fever or headaches. The symptoms are aggravated by activity. He has tried rest for the symptoms. The treatment provided mild relief.       Past Surgical History:   Procedure Laterality Date    CYSTOSCOPY N/A 2/19/2024     Procedure: CYSTOSCOPY;  Surgeon: La Nena James MD;  Location: [facility] OR;  Service: Urology;  Laterality: N/A;    CYSTOSCOPY WITH TRANSURETHRAL DESTRUCTION OF PROSTATE,RFA N/A 10/21/2020    Procedure: CYSTOSCOPY WITH TRANSURETHRAL DESTRUCTION OF PROSTATE,RFA;  Surgeon: La Nena James MD;  Location: [facility] OR;  Service: Urology;  Laterality: N/A;  [phone]    EYE SURGERY      for cataracts, rt eye 1/16/20, left eye 1/30/20    HERNIA REPAIR  12/1999    HERNIA REPAIR  05/2004    INSERTION OF SACRAL NEUROSTIMULATOR, ELECTRODES, AND IMPLANTABLE PULSE GENERATOR (IPG) N/A 7/12/2023    Procedure: INSERTION, ELECTRODE LEADS AND PULSE GENERATOR, NEUROSTIMULATOR, SACRAL;  Surgeon: La Nena James MD;  Location: [facility];  Service: Urology;  Laterality: N/A;    KNEE ARTHROSCOPY Left 04/12/2018    LAPAROSCOPIC APPENDECTOMY N/A 1/2/2022    Procedure: APPENDECTOMY, LAPAROSCOPIC;  Surgeon: Sam Goss MD;  Location: [place] OR;  Service: General;  Laterality: N/A;    PERCUTANEOUS INSERTION OF SACRAL NERVE NEUROSTIMULATOR ELECTRODE LEAD N/A 5/22/2023    Procedure: INSERTION, ELECTRODE LEAD, NEUROSTIMULATOR, SACRAL, PERCUTANEOUS Keep first;  Surgeon: La Nena James MD;  Location: [facility] OR;  Service: Urology;  Laterality: N/A;  patient needs to apply EMLA cream to entirety of lower back 20 minutes prior to procedure, also need 1% lidocaine available    PERCUTANEOUS INSERTION OF SACRAL NERVE NEUROSTIMULATOR ELECTRODE LEAD N/A 7/12/2023    Procedure: INSERTION, ELECTRODE LEAD, NEUROSTIMULATOR, SACRAL, PERCUTANEOUS;  Surgeon: La Nena James MD;  Location: [facility];  Service: Urology;  Laterality: N/A;    SKIN LESION EXCISION  10/2009    Neck    SKIN LESION EXCISION  2015    STAPEDECTOMY Right 03/2001    TRANSRECTAL ULTRASOUND EXAMINATION N/A 8/17/2020    Procedure: TRANSRECTAL ULTRASOUND;  Surgeon: La Nena James MD;  Location: [facility];   Service: Urology;  Laterality: N/A;    TRANSRECTAL ULTRASOUND EXAMINATION N/A 2024    Procedure: ULTRASOUND, RECTAL APPROACH;  Surgeon: La Nena James MD;  [facility];  Service: Urology;  Laterality: N/A;    TRANSURETHRAL INCISION OF PROSTATE N/A 3/13/2024    Procedure: INCISION, PROSTATE, TRANSURETHRAL;  Surgeon: La Nena James MD;  Location: [facility] OR;  Service: Urology;  Laterality: N/A;    TRANSURETHRAL RESECTION OF PROSTATE N/A 3/13/2024    Procedure: TURP (TRANSURETHRAL RESECTION OF PROSTATE)- BIPOLAR;  Surgeon: La Nena James MD;  Location: [facility] OR;  Service: Urology;  Laterality: N/A;  WITH PARALYSIS     Family History   Problem Relation Name Age of Onset    Cancer Mother          skin    Stroke Mother      Heart failure Father      Kidney disease Father        Social History     Socioeconomic History    Marital status:    Tobacco Use    Smoking status: Former     Current packs/day: 0.00     Types: Cigarettes     Quit date:      Years since quittin.3    Smokeless tobacco: Never   Substance and Sexual Activity    Alcohol use: Yes     Comment: occasional    Drug use: No    Sexual activity: Yes     Partners: Female     Social Drivers of Health     Financial Resource Strain: Low Risk  (2025)    Overall Financial Resource Strain (CARDIA)     Difficulty of Paying Living Expenses: Not hard at all   Food Insecurity: No Food Insecurity (2025)    Hunger Vital Sign     Worried About Running Out of Food in the Last Year: Never true     Ran Out of Food in the Last Year: Never true   Transportation Needs: No Transportation Needs (2023)    PRAPARE - Transportation     Lack of Transportation (Medical): No     Lack of Transportation (Non-Medical): No   Physical Activity: Inactive (2025)    Exercise Vital Sign     Days of Exercise per Week: 0 days     Minutes of Exercise per Session: 0 min   Stress: Stress Concern Present (2025)    [employer] - Occupational Stress Questionnaire     Feeling of Stress : To some extent   Housing Stability: Low Risk  (11/11/2023)    Housing Stability Vital Sign     Unable to Pay for Housing in the Last Year: No     Number of Places Lived in the Last Year: 1     Unstable Housing in the Last Year: No     Current Outpatient Medications   Medication Sig Dispense Refill    amLODIPine (NORVASC) 10 MG tablet Take 1 tablet (10 mg total) by mouth once daily. 90 tablet 1    aspirin (ECOTRIN) 81 MG EC tablet Take 81 mg by mouth once daily.      busPIRone (BUSPAR) 5 MG Tab Take 1 tablet (5 mg total) by mouth once daily. 30 tablet 5    calcitRIOL (ROCALTROL) 0.25 MCG Cap Take 0.25 mcg by mouth every 7 days.      co-enzyme Q-10 30 mg capsule Take 30 mg by mouth 3 (three) times daily.      doxycycline monohydrate 100 mg Tab       hydroCHLOROthiazide (HYDRODIURIL) 25 MG tablet Take 1/2 (one-half) tablet by mouth once daily 45 tablet 3    lisinopriL (PRINIVIL,ZESTRIL) 40 MG tablet 0      multivit with minerals/lutein (MULTIVITAMIN 50 PLUS ORAL) Take 1 tablet by mouth once daily.      omega 3-dha-epa-fish oil (FISH OIL) 100-160-1,000 mg Cap Take 1 capsule by mouth once daily.      pantoprazole (PROTONIX) 40 MG tablet 90 tablets.      ranolazine (RANEXA) 500 MG Tb12 Take 2 tablets by mouth twice daily 180 tablet 3    tadalafiL (CIALIS) 5 MG tablet Take 1 tablet (5 mg total) by mouth every morning. 90 tablet 3    telmisartan (MICARDIS) 40 MG Tab Take 1 tablet (40 mg total) by mouth once daily. 90 tablet 1    vismodegib (ERIVEDGE) 150 mg Cap 0      albuterol (VENTOLIN HFA) 90 mcg/actuation inhaler Inhale 2 puffs into the lungs every 6 (six) hours as needed for Shortness of Breath. Rescue 18 g 2    metFORMIN (GLUCOPHAGE) 500 MG tablet Take 1 tablet (500 mg total) by mouth daily with breakfast. 90 tablet 1    omeprazole (PRILOSEC) 20 MG capsule Take 1 capsule (20 mg total) by mouth once daily. 90 capsule 1     propranoloL (INDERAL LA) 60 MG 24 hr capsule Take 1 capsule (60 mg total) by mouth once daily. 90 capsule 1    rosuvastatin (CRESTOR) 10 MG tablet Take 1 tablet (10 mg total) by mouth once daily. 90 tablet 1     No current facility-administered medications for this visit.     Facility-Administered Medications Ordered in Other Visits   Medication Dose Route Frequency Provider Last Rate Last Admin    electrolyte-S (ISOLYTE)   Intravenous Continuous Loyd Chakraborty MD   Stopped at 07/12/23 1141    electrolyte-S (ISOLYTE)   Intravenous Continuous Bennett Javier MD        electrolyte-S (ISOLYTE)   Intravenous Continuous Bennett Javier MD 10 mL/hr at 03/13/24 1336 New Bag at 03/13/24 1336    fentaNYL 50 mcg/mL injection 25 mcg  25 mcg Intravenous Q5 Min PRN Loyd Chakraborty MD        fentaNYL 50 mcg/mL injection 25 mcg  25 mcg Intravenous Q5 Min PRN Bennett Javier MD        lactated ringers infusion   Intravenous Continuous Loyd Chakraborty MD        LIDOcaine (PF) 10 mg/ml (1%) injection 10 mg  1 mL Intradermal Once Loyd Chakraborty MD        LIDOcaine (PF) 10 mg/ml (1%) injection 10 mg  1 mL Intradermal Once Bennett Javier MD        metoclopramide HCl injection 10 mg  10 mg Intravenous Q10 Min PRN Loyd Chakraborty MD        ondansetron injection 4 mg  4 mg Intravenous Once PRN Bennett Javier MD        oxyCODONE immediate release tablet 5 mg  5 mg Oral Once PRN Loyd Chakraborty MD        oxyCODONE immediate release tablet 5 mg  5 mg Oral Once PRN Bennett Javier MD         Review of patient's allergies indicates:  No Known Allergies   Past Medical History:   Diagnosis Date    Acid reflux     Acquired tricuspid valve insufficiency     Anemia     Borderline diabetes     BPH (benign prostatic hyperplasia)     Cataract     1/16/20 rt eye, 1/30/20- Lt eye    Coronary artery disease     History of colonic polyps 04/17/2025    Hypertension     Liver cyst 08/24/2022    Pulmonary nodules      Skin cancer      Past Surgical History:   Procedure Laterality Date    CYSTOSCOPY N/A 2/19/2024    Procedure: CYSTOSCOPY;  Surgeon: La Nena James MD;  Location: [facility] OR;  Service: Urology;  Laterality: N/A;    CYSTOSCOPY WITH TRANSURETHRAL DESTRUCTION OF PROSTATE,RFA N/A 10/21/2020    Procedure: CYSTOSCOPY WITH TRANSURETHRAL DESTRUCTION OF PROSTATE,RFA;  Surgeon: La Nena James MD;  Location: [facility] OR;  Service: Urology;  Laterality: N/A;  please make sure [facility] available [phone]    EYE SURGERY      for cataracts, rt eye 1/16/20, left eye 1/30/20    HERNIA REPAIR  12/1999    HERNIA REPAIR  05/2004    INSERTION OF SACRAL NEUROSTIMULATOR, ELECTRODES, AND IMPLANTABLE PULSE GENERATOR (IPG) N/A 7/12/2023    Procedure: INSERTION, ELECTRODE LEADS AND PULSE GENERATOR, NEUROSTIMULATOR, SACRAL;  Surgeon: La Nena James MD;  Location: [facility];  Service: Urology;  Laterality: N/A;    KNEE ARTHROSCOPY Left 04/12/2018    LAPAROSCOPIC APPENDECTOMY N/A 1/2/2022    Procedure: APPENDECTOMY, LAPAROSCOPIC;  Surgeon: Sam Goss MD;  Location: [facility] OR;  Service: General;  Laterality: N/A;    PERCUTANEOUS INSERTION OF SACRAL NERVE NEUROSTIMULATOR ELECTRODE LEAD N/A 5/22/2023    Procedure: INSERTION, ELECTRODE LEAD, NEUROSTIMULATOR, SACRAL, PERCUTANEOUS Keep first;  Surgeon: La Nena James MD;  Location: [facility] OR;  Service: Urology;  Laterality: N/A;  patient needs to apply EMLA cream to entirety of lower back 20 minutes prior to procedure, also need 1% lidocaine available    PERCUTANEOUS INSERTION OF SACRAL NERVE NEUROSTIMULATOR ELECTRODE LEAD N/A 7/12/2023    Procedure: INSERTION, ELECTRODE LEAD, NEUROSTIMULATOR, SACRAL, PERCUTANEOUS;  Surgeon: La Nena James MD;  Location: [facility];  Service: Urology;  Laterality: N/A;    SKIN LESION EXCISION  10/2009    Neck    SKIN LESION EXCISION  2015    STAPEDECTOMY Right 03/2001    TRANSRECTAL ULTRASOUND EXAMINATION N/A  8/17/2020    Procedure: TRANSRECTAL ULTRASOUND;  Surgeon: La Nena James MD;  Location: [facility] OR;  Service: Urology;  Laterality: N/A;    TRANSRECTAL ULTRASOUND EXAMINATION N/A 2/19/2024    Procedure: ULTRASOUND, RECTAL APPROACH;  Surgeon: La Nena James MD;  Location: [facility] OR;  Service: Urology;  Laterality: N/A;    TRANSURETHRAL INCISION OF PROSTATE N/A 3/13/2024    Procedure: INCISION, PROSTATE, TRANSURETHRAL;  Surgeon: La Nena James MD;  Location: [facility] OR;  Service: Urology;  Laterality: N/A;    TRANSURETHRAL RESECTION OF PROSTATE N/A 3/13/2024    Procedure: TURP (TRANSURETHRAL RESECTION OF PROSTATE)- BIPOLAR;  Surgeon: La Nena James MD;  Location: [facility] OR;  Service: Urology;  Laterality: N/A;  WITH PARALYSIS       Review of Systems   Constitutional:  Positive for activity change (decreased since retiring) and fatigue. Negative for appetite change, fever, malaise/fatigue and unexpected weight change.   HENT:  Negative for congestion, ear pain, hearing loss, postnasal drip, rhinorrhea, sinus pressure, sinus pain, sneezing, sore throat and trouble swallowing.    Eyes:  Negative for blurred vision, photophobia, pain, discharge and visual disturbance.   Respiratory:  Positive for shortness of breath. Negative for cough, hemoptysis, sputum production, chest tightness and wheezing.    Cardiovascular:  Negative for chest pain, orthopnea, claudication, leg swelling, syncope and PND.   Gastrointestinal:  Positive for heartburn. Negative for abdominal distention, abdominal pain, blood in stool, constipation, diarrhea, nausea and vomiting.   Endocrine: Negative for cold intolerance, heat intolerance, polydipsia and polyuria.   Genitourinary:  Negative for difficulty urinating, dysuria, flank pain, frequency, hematuria and urgency.   Musculoskeletal:  Positive for arthralgias, back pain and myalgias. Negative for joint swelling and neck pain.   Skin:  Negative for pallor and  "rash.   Allergic/Immunologic: Negative for environmental allergies and food allergies.   Neurological:  Positive for tremors and weakness. Negative for dizziness, light-headedness and headaches.   Hematological:  Does not bruise/bleed easily.   Psychiatric/Behavioral:  Negative for confusion, decreased concentration, dysphoric mood and sleep disturbance. The patient is not nervous/anxious.       OBJECTIVE:      Vitals:    05/06/25 0954   BP: 124/76   BP Location: Right arm   Patient Position: Sitting   Pulse: 95   SpO2: 96%   Weight: 84.1 kg (185 lb 6.5 oz)   Height: 5' 10" (1.778 m)     Physical Exam  Vitals and nursing note reviewed.   Constitutional:       General: He is not in acute distress.     Appearance: Normal appearance. He is well-developed, well-groomed and overweight.   HENT:      Head: Normocephalic and atraumatic.      Right Ear: Hearing normal.      Left Ear: Hearing normal.      Nose: Nose normal. No rhinorrhea.   Eyes:      General: Lids are normal.         Right eye: No discharge.         Left eye: No discharge.      Conjunctiva/sclera: Conjunctivae normal.      Right eye: Right conjunctiva is not injected.      Left eye: Left conjunctiva is not injected.      Pupils: Pupils are equal, round, and reactive to light. Pupils are equal.      Right eye: Pupil is round and reactive.      Left eye: Pupil is round and reactive.   Neck:      Thyroid: No thyromegaly.      Vascular: No JVD.      Trachea: Trachea normal. No tracheal deviation.   Cardiovascular:      Rate and Rhythm: Normal rate and regular rhythm.      Pulses:           Radial pulses are 2+ on the right side and 2+ on the left side.      Heart sounds: Normal heart sounds. No murmur heard.     No friction rub. No gallop.   Pulmonary:      Effort: Pulmonary effort is normal. No respiratory distress.      Breath sounds: Normal breath sounds. No stridor. No decreased breath sounds, wheezing, rhonchi or rales.   Abdominal:      General: Bowel " sounds are normal. There is no distension.      Palpations: Abdomen is soft. Abdomen is not rigid.      Tenderness: There is no abdominal tenderness. There is no guarding.   Musculoskeletal:         General: No swelling. Normal range of motion.      Cervical back: Normal range of motion and neck supple.   Lymphadenopathy:      Cervical: No cervical adenopathy.   Skin:     General: Skin is warm and dry.      Capillary Refill: Capillary refill takes less than 2 seconds.      Coloration: Skin is not pale.      Findings: No lesion or rash.   Neurological:      Mental Status: He is alert and oriented to person, place, and time.      GCS: GCS eye subscore is 4. GCS verbal subscore is 5. GCS motor subscore is 6.      Cranial Nerves: Cranial nerves 2-12 are intact.      Sensory: Sensation is intact.      Motor: Tremor (BUE - fine) present. No atrophy.      Coordination: Coordination is intact. Coordination normal.      Gait: Gait is intact. Gait normal.   Psychiatric:         Attention and Perception: Attention and perception normal. He is attentive.         Mood and Affect: Mood and affect normal.         Speech: Speech normal.         Behavior: Behavior normal.         Thought Content: Thought content normal.         Cognition and Memory: Cognition and memory normal.         Judgment: Judgment normal.        Assessment:       1. Prediabetes    2. Chronic right shoulder pain    3. Short of breath on exertion    4. Gastroesophageal reflux disease without esophagitis    5. Tremor of both hands    6. Familial hypercholesterolemia    7. Essential hypertension        Plan:       Prediabetes  -     metFORMIN (GLUCOPHAGE) 500 MG tablet; Take 1 tablet (500 mg total) by mouth daily with breakfast.  Dispense: 90 tablet; Refill: 1    Chronic right shoulder pain  -     Ambulatory referral/consult to Orthopedics; Future; Expected date: 05/13/2025    Short of breath on exertion  -     albuterol (VENTOLIN HFA) 90 mcg/actuation inhaler;  Inhale 2 puffs into the lungs every 6 (six) hours as needed for Shortness of Breath. Rescue  Dispense: 18 g; Refill: 2  - follows with pulm    Gastroesophageal reflux disease without esophagitis  -     omeprazole (PRILOSEC) 20 MG capsule; Take 1 capsule (20 mg total) by mouth once daily.  Dispense: 90 capsule; Refill: 1    Tremor of both hands  -     propranoloL (INDERAL LA) 60 MG 24 hr capsule; Take 1 capsule (60 mg total) by mouth once daily.  Dispense: 90 capsule; Refill: 1    Familial hypercholesterolemia  -     rosuvastatin (CRESTOR) 10 MG tablet; Take 1 tablet (10 mg total) by mouth once daily.  Dispense: 90 tablet; Refill: 1    Essential hypertension        - appears stable on current med regimen        - follows with cardiology              Follow up in about 3 months (around 8/6/2025) for prediabetes.       5/12/2025 Reynaldo Rahman, JAMES, FNP-C

## 2025-05-15 NOTE — PLAN OF CARE
Important Message from Medicare was sign, explained and given to patient/caregiver on 01/10/2022 at 2:38pm     addressed any questions or concerns.    Important Message from Medicare document will be scanned into patient's medical record   Report to CCU

## 2025-05-20 ENCOUNTER — LAB VISIT (OUTPATIENT)
Dept: LAB | Facility: HOSPITAL | Age: 75
End: 2025-05-20
Attending: INTERNAL MEDICINE
Payer: COMMERCIAL

## 2025-05-20 DIAGNOSIS — N18.2 CHRONIC KIDNEY DISEASE, STAGE II (MILD): Primary | ICD-10-CM

## 2025-05-20 DIAGNOSIS — N25.81 SECONDARY HYPERPARATHYROIDISM OF RENAL ORIGIN: ICD-10-CM

## 2025-05-20 LAB
25(OH)D3+25(OH)D2 SERPL-MCNC: 72 NG/ML (ref 30–96)
ABSOLUTE EOSINOPHIL (SMH): 0.2 K/UL
ABSOLUTE MONOCYTE (SMH): 0.74 K/UL (ref 0.3–1)
ABSOLUTE NEUTROPHIL COUNT (SMH): 4 K/UL (ref 1.8–7.7)
ALBUMIN SERPL-MCNC: 4.4 G/DL (ref 3.5–5.2)
ANION GAP (SMH): 6 MMOL/L (ref 8–16)
BASOPHILS # BLD AUTO: 0.07 K/UL
BASOPHILS NFR BLD AUTO: 0.9 %
BUN SERPL-MCNC: 35 MG/DL (ref 8–23)
CALCIUM SERPL-MCNC: 9.9 MG/DL (ref 8.7–10.5)
CHLORIDE SERPL-SCNC: 107 MMOL/L (ref 95–110)
CO2 SERPL-SCNC: 28 MMOL/L (ref 23–29)
CREAT SERPL-MCNC: 1.4 MG/DL (ref 0.5–1.4)
CREAT UR-MCNC: 112.3 MG/DL (ref 23–375)
ERYTHROCYTE [DISTWIDTH] IN BLOOD BY AUTOMATED COUNT: 13.2 % (ref 11.5–14.5)
GFR SERPLBLD CREATININE-BSD FMLA CKD-EPI: 52 ML/MIN/1.73/M2
GLUCOSE SERPL-MCNC: 110 MG/DL (ref 70–110)
HCT VFR BLD AUTO: 40.3 % (ref 40–54)
HGB BLD-MCNC: 13.3 GM/DL (ref 14–18)
HYALINE CASTS UR QL AUTO: 1 /LPF (ref 0–1)
IMM GRANULOCYTES # BLD AUTO: 0.03 K/UL (ref 0–0.04)
IMM GRANULOCYTES NFR BLD AUTO: 0.4 % (ref 0–0.5)
LYMPHOCYTES # BLD AUTO: 2.66 K/UL (ref 1–4.8)
MCH RBC QN AUTO: 31.2 PG (ref 27–31)
MCHC RBC AUTO-ENTMCNC: 33 G/DL (ref 32–36)
MCV RBC AUTO: 95 FL (ref 82–98)
MICROSCOPIC COMMENT: NORMAL
NUCLEATED RBC (/100WBC) (SMH): 0 /100 WBC
PHOSPHATE SERPL-MCNC: 3.8 MG/DL (ref 2.7–4.5)
PLATELET # BLD AUTO: 217 K/UL (ref 150–450)
PMV BLD AUTO: 9.8 FL (ref 9.2–12.9)
POTASSIUM SERPL-SCNC: 4.2 MMOL/L (ref 3.5–5.1)
PROT UR-MCNC: 25 MG/DL
PROT/CREAT UR: 0.22 MG/G{CREAT}
PTH-INTACT SERPL-MCNC: 61.9 PG/ML (ref 9–77)
RBC # BLD AUTO: 4.26 M/UL (ref 4.6–6.2)
RBC #/AREA URNS AUTO: 1 /HPF
RELATIVE EOSINOPHIL (SMH): 2.6 % (ref 0–8)
RELATIVE LYMPHOCYTE (SMH): 34.5 % (ref 18–48)
RELATIVE MONOCYTE (SMH): 9.6 % (ref 4–15)
RELATIVE NEUTROPHIL (SMH): 52 % (ref 38–73)
SODIUM SERPL-SCNC: 141 MMOL/L (ref 136–145)
WBC # BLD AUTO: 7.7 K/UL (ref 3.9–12.7)

## 2025-05-20 PROCEDURE — 36415 COLL VENOUS BLD VENIPUNCTURE: CPT

## 2025-05-20 PROCEDURE — 81001 URINALYSIS AUTO W/SCOPE: CPT

## 2025-05-20 PROCEDURE — 84156 ASSAY OF PROTEIN URINE: CPT

## 2025-05-20 PROCEDURE — 80069 RENAL FUNCTION PANEL: CPT

## 2025-05-20 PROCEDURE — 85025 COMPLETE CBC W/AUTO DIFF WBC: CPT

## 2025-05-20 PROCEDURE — 83970 ASSAY OF PARATHORMONE: CPT

## 2025-05-20 PROCEDURE — 82306 VITAMIN D 25 HYDROXY: CPT

## 2025-06-18 ENCOUNTER — OFFICE VISIT (OUTPATIENT)
Dept: ORTHOPEDICS | Facility: CLINIC | Age: 75
End: 2025-06-18
Payer: COMMERCIAL

## 2025-06-18 VITALS
DIASTOLIC BLOOD PRESSURE: 100 MMHG | SYSTOLIC BLOOD PRESSURE: 160 MMHG | BODY MASS INDEX: 25.77 KG/M2 | HEIGHT: 70 IN | WEIGHT: 180 LBS

## 2025-06-18 DIAGNOSIS — M75.101 ROTATOR CUFF TEAR ARTHROPATHY OF RIGHT SHOULDER: Primary | ICD-10-CM

## 2025-06-18 DIAGNOSIS — M12.811 ROTATOR CUFF TEAR ARTHROPATHY OF RIGHT SHOULDER: Primary | ICD-10-CM

## 2025-06-18 PROCEDURE — 99999 PR PBB SHADOW E&M-EST. PATIENT-LVL IV: CPT | Mod: PBBFAC,,, | Performed by: PHYSICIAN ASSISTANT

## 2025-06-18 PROCEDURE — 3077F SYST BP >= 140 MM HG: CPT | Mod: CPTII,S$GLB,, | Performed by: PHYSICIAN ASSISTANT

## 2025-06-18 PROCEDURE — 3080F DIAST BP >= 90 MM HG: CPT | Mod: CPTII,S$GLB,, | Performed by: PHYSICIAN ASSISTANT

## 2025-06-18 PROCEDURE — 1159F MED LIST DOCD IN RCRD: CPT | Mod: CPTII,S$GLB,, | Performed by: PHYSICIAN ASSISTANT

## 2025-06-18 PROCEDURE — 99213 OFFICE O/P EST LOW 20 MIN: CPT | Mod: S$GLB,,, | Performed by: PHYSICIAN ASSISTANT

## 2025-06-18 PROCEDURE — 1126F AMNT PAIN NOTED NONE PRSNT: CPT | Mod: CPTII,S$GLB,, | Performed by: PHYSICIAN ASSISTANT

## 2025-06-18 PROCEDURE — 3288F FALL RISK ASSESSMENT DOCD: CPT | Mod: CPTII,S$GLB,, | Performed by: PHYSICIAN ASSISTANT

## 2025-06-18 PROCEDURE — 3044F HG A1C LEVEL LT 7.0%: CPT | Mod: CPTII,S$GLB,, | Performed by: PHYSICIAN ASSISTANT

## 2025-06-18 PROCEDURE — 1101F PT FALLS ASSESS-DOCD LE1/YR: CPT | Mod: CPTII,S$GLB,, | Performed by: PHYSICIAN ASSISTANT

## 2025-06-18 PROCEDURE — 1160F RVW MEDS BY RX/DR IN RCRD: CPT | Mod: CPTII,S$GLB,, | Performed by: PHYSICIAN ASSISTANT

## 2025-06-18 NOTE — PROGRESS NOTES
[facility]  [address]  [place] [address]  Phone: [phone]   [phone]    Patient's PCP: Reynaldo Rahman APRN,FNP-C  Referring Provider: No ref. provider found    Subjective:      Chief Complaint:   Chief Complaint   Patient presents with    Right Shoulder - Pain     RIGHT SHOULDER injected 5/7/25, the injection did help a little, ROM is better, the strength is better, he doesn't have any pain is shoulder       Past Medical History:   Diagnosis Date    Acid reflux     Acquired tricuspid valve insufficiency     Anemia     Borderline diabetes     BPH (benign prostatic hyperplasia)     Cataract     1/16/20 rt eye, 1/30/20- Lt eye    Coronary artery disease     History of colonic polyps 04/17/2025    Hypertension     Liver cyst 08/24/2022    Pulmonary nodules     Skin cancer        Past Surgical History:   Procedure Laterality Date    CYSTOSCOPY N/A 2/19/2024    Procedure: CYSTOSCOPY;  Surgeon: La Nena James MD;  Location: [facility] OR;  Service: Urology;  Laterality: N/A;    CYSTOSCOPY WITH TRANSURETHRAL DESTRUCTION OF PROSTATE,RFA N/A 10/21/2020    Procedure: CYSTOSCOPY WITH TRANSURETHRAL DESTRUCTION OF PROSTATE,RFA;  Surgeon: La Nena James MD;  Location: [facility] OR;  Service: Urology;  Laterality: N/A;  please make sure [facility] [phone]    EYE SURGERY      for cataracts, rt eye 1/16/20, left eye 1/30/20    HERNIA REPAIR  12/1999    HERNIA REPAIR  05/2004    INSERTION OF SACRAL NEUROSTIMULATOR, ELECTRODES, AND IMPLANTABLE PULSE GENERATOR (IPG) N/A 7/12/2023    Procedure: INSERTION, ELECTRODE LEADS AND PULSE GENERATOR, NEUROSTIMULATOR, SACRAL;  Surgeon: La Nena James MD;  Location: [facility] OR;  Service: Urology;  Laterality: N/A;    KNEE ARTHROSCOPY Left 04/12/2018    LAPAROSCOPIC APPENDECTOMY N/A 1/2/2022    Procedure: APPENDECTOMY, LAPAROSCOPIC;  Surgeon: Sam Goss MD;  Location: [facility] OR;  Service: General;  Laterality:  N/A;    PERCUTANEOUS INSERTION OF SACRAL NERVE NEUROSTIMULATOR ELECTRODE LEAD N/A 5/22/2023    Procedure: INSERTION, ELECTRODE LEAD, NEUROSTIMULATOR, SACRAL, PERCUTANEOUS Keep first;  Surgeon: La Nena James MD;  [facility];  Service: Urology;  Laterality: N/A;  patient needs to apply EMLA cream to entirety of lower back 20 minutes prior to procedure, also need 1% lidocaine available    PERCUTANEOUS INSERTION OF SACRAL NERVE NEUROSTIMULATOR ELECTRODE LEAD N/A 7/12/2023    Procedure: INSERTION, ELECTRODE LEAD, NEUROSTIMULATOR, SACRAL, PERCUTANEOUS;  Surgeon: La Nena James MD;  Location: [facility] OR;  Service: Urology;  Laterality: N/A;    SKIN LESION EXCISION  10/2009    Neck    SKIN LESION EXCISION  2015    STAPEDECTOMY Right 03/2001    TRANSRECTAL ULTRASOUND EXAMINATION N/A 8/17/2020    Procedure: TRANSRECTAL ULTRASOUND;  Surgeon: La Nena James MD;  [facility];  Service: Urology;  Laterality: N/A;    TRANSRECTAL ULTRASOUND EXAMINATION N/A 2/19/2024    Procedure: ULTRASOUND, RECTAL APPROACH;  Surgeon: La Nena James MD;  Location: [facility] OR;  Service: Urology;  Laterality: N/A;    TRANSURETHRAL INCISION OF PROSTATE N/A 3/13/2024    Procedure: INCISION, PROSTATE, TRANSURETHRAL;  Surgeon: La Nena James MD;  Location: [facility] OR;  Service: Urology;  Laterality: N/A;    TRANSURETHRAL RESECTION OF PROSTATE N/A 3/13/2024    Procedure: TURP (TRANSURETHRAL RESECTION OF PROSTATE)- BIPOLAR;  Surgeon: La Nena James MD;  Location: [facility] OR;  Service: Urology;  Laterality: N/A;  WITH PARALYSIS       Current Outpatient Medications   Medication Sig    albuterol (VENTOLIN HFA) 90 mcg/actuation inhaler Inhale 2 puffs into the lungs every 6 (six) hours as needed for Shortness of Breath. Rescue    amLODIPine (NORVASC) 10 MG tablet Take 1 tablet (10 mg total) by mouth once daily.    aspirin (ECOTRIN) 81 MG EC tablet Take 81 mg by mouth once daily.    busPIRone  (BUSPAR) 5 MG Tab Take 1 tablet (5 mg total) by mouth once daily.    calcitRIOL (ROCALTROL) 0.25 MCG Cap Take 0.25 mcg by mouth every 7 days.    co-enzyme Q-10 30 mg capsule Take 30 mg by mouth 3 (three) times daily.    doxycycline monohydrate 100 mg Tab     hydroCHLOROthiazide (HYDRODIURIL) 25 MG tablet Take 1/2 (one-half) tablet by mouth once daily    metFORMIN (GLUCOPHAGE) 500 MG tablet Take 1 tablet (500 mg total) by mouth daily with breakfast.    multivit with minerals/lutein (MULTIVITAMIN 50 PLUS ORAL) Take 1 tablet by mouth once daily.    omega 3-dha-epa-fish oil (FISH OIL) 100-160-1,000 mg Cap Take 1 capsule by mouth once daily.    pantoprazole (PROTONIX) 40 MG tablet 90 tablets.    propranoloL (INDERAL [place]) 60 MG 24 hr capsule Take 1 capsule (60 mg total) by mouth once daily.    rosuvastatin (CRESTOR) 10 MG tablet Take 1 tablet (10 mg total) by mouth once daily.    tadalafiL (CIALIS) 5 MG tablet Take 1 tablet (5 mg total) by mouth every morning.    telmisartan (MICARDIS) 40 MG Tab Take 1 tablet (40 mg total) by mouth once daily.    lisinopriL (PRINIVIL,ZESTRIL) 40 MG tablet 0    omeprazole (PRILOSEC) 20 MG capsule Take 1 capsule (20 mg total) by mouth once daily.    ranolazine (RANEXA) 500 MG Tb12 Take 2 tablets by mouth twice daily    vismodegib (ERIVEDGE) 150 mg Cap 0     No current facility-administered medications for this visit.     Facility-Administered Medications Ordered in Other Visits   Medication    electrolyte-S (ISOLYTE)    electrolyte-S (ISOLYTE)    electrolyte-S (ISOLYTE)    fentaNYL 50 mcg/mL injection 25 mcg    fentaNYL 50 mcg/mL injection 25 mcg    lactated ringers infusion    LIDOcaine (PF) 10 mg/ml (1%) injection 10 mg    LIDOcaine (PF) 10 mg/ml (1%) injection 10 mg    metoclopramide HCl injection 10 mg    ondansetron injection 4 mg    oxyCODONE immediate release tablet 5 mg    oxyCODONE immediate release tablet 5 mg       Review of patient's allergies indicates:  No Known  Allergies    Family History   Problem Relation Name Age of Onset    Cancer Mother          skin    Stroke Mother      Heart failure Father      Kidney disease Father         Social History[1]    Prior to meeting with the patient I reviewed the medical chart in [facility]. This included reviewing the previous progress notes from our office, review of the patient's last appointment with their primary care provider, review of any visits to the emergency room, and review of any pain management appointments or procedures.    History of present illness: 74 y.o. male,  returns to clinic today for repeat evaluation chronic right shoulder pain for the last couple of years.  History of arthroscopy about 10 years ago with rotator cuff repair.  Pain decreased range motion and weakness in the right shoulder.  He was injected at his last visit May 7th.       Review of Systems:    Constitutional: Negative for chills, fever and weight loss.   HENT: Negative for congestion.    Eyes: Negative for discharge and redness.   Respiratory: Negative for cough and shortness of breath.    Cardiovascular: Negative for chest pain.   Gastrointestinal: Negative for nausea and vomiting.   Musculoskeletal: See HPI.   Skin: Negative for rash.   Neurological: Negative for headaches.   Endo/Heme/Allergies: Does not bruise/bleed easily.   Psychiatric/Behavioral: The patient is not nervous/anxious.    All other systems reviewed and are negative.       Objective:      Physical Examination:    Vital Signs:    Vitals:    06/18/25 0807   BP: (!) 160/100       Body mass index is 25.83 kg/m².    This a well-developed, well nourished patient in no acute distress.  They are alert and oriented and cooperative to examination.     Examination of the right shoulder, skin is dry and intact, no erythema or ecchymosis, no signs symptoms of infection.  Range of motion, active range motion forward flexion 170°, external rotation 80°, internal rotation to the posterior right  hip.  He has some mild weakness with resisted testing of the rotator cuff but no pain.      Pertinent New Results:        XRAY Report / Interpretation:   X-rays of the right shoulder reviewed from May 7th, the widened appearance of the acromioclavicular joint I think is postoperative from a subacromial decompression distal clavicle resection doing years ago.  However he does have findings of narrowing of the interval between the acromion and the humeral head as well as evidence of high-riding humeral head within the glenoid suggestive of rotator cuff tear arthropathy.    Procedure/s:            Assessment:       1. Rotator cuff tear arthropathy of right shoulder      Plan:     Rotator cuff tear arthropathy of right shoulder        Follow up if symptoms worsen or fail to improve.    Based on history and physical exam review of the patient's x-rays I do think he has had a failed rotator cuff repair in his developed some rotator cuff arthropathy and glenohumeral arthrosis.  In terms of surgical intervention the patient is not interested in surgical intervention at this time, his pain and symptoms have improved with the injection.  At this time he will continue to follow up with us on an as-needed basis for repeat injections or at such time that the injections become less efficacious and he would consider surgical intervention recommendations we would be for shoulder arthroplasty more than likely a reverse total shoulder arthroplasty but we would get an MRI of the right shoulder.    The patient and I had a thorough discussion today.  We discussed the working diagnosis as well as several other potential alternative diagnoses.  We discussed treatment options, both conservative and surgical.  Conservative treatment options would include things such as activity modifications, workplace modifications, a period of rest, oral versus topical over the counter and oral versus topical prescription anti-inflammatory medications,  physical therapy / occupational therapy, splinting / bracing, immobilization, corticosteroid injections, and others.        NELIDA Reynoso PA-C        EMR Statement:  Please note that portions of this patient encounter record were imported from the patients electronic medical record and that others were dictated using voice recognition software. For these reasons grammatical errors, nonsensical language, and apparently contradictory statements may be present.  These should be disregarded or interpreted with respect to the context of the document.         [1]   Social History  Socioeconomic History    Marital status:    Tobacco Use    Smoking status: Former     Current packs/day: 0.00     Types: Cigarettes     Quit date:      Years since quittin.4    Smokeless tobacco: Never   Substance and Sexual Activity    Alcohol use: Yes     Comment: occasional    Drug use: No    Sexual activity: Yes     Partners: Female     Social Drivers of Health     Financial Resource Strain: Low Risk  (2025)    Overall Financial Resource Strain (CARDIA)     Difficulty of Paying Living Expenses: Not hard at all   Food Insecurity: No Food Insecurity (2025)    Hunger Vital Sign     Worried About Running Out of Food in the Last Year: Never true     Ran Out of Food in the Last Year: Never true   Transportation Needs: No Transportation Needs (2023)    PRAPARE - Transportation     Lack of Transportation (Medical): No     Lack of Transportation (Non-Medical): No   Physical Activity: Inactive (2025)    Exercise Vital Sign     Days of Exercise per Week: 0 days     Minutes of Exercise per Session: 0 min   Stress: Stress Concern Present (2025)    [employer] - Occupational Stress Questionnaire     Feeling of Stress : To some extent   Housing Stability: Low Risk  (2023)    Housing Stability Vital Sign     Unable to Pay for Housing in the Last Year: No     Number of Places  Lived in the Last Year: 1     Unstable Housing in the Last Year: No

## 2025-07-11 DIAGNOSIS — R06.02 SHORT OF BREATH ON EXERTION: ICD-10-CM

## 2025-07-14 RX ORDER — TADALAFIL 5 MG/1
5 TABLET ORAL EVERY MORNING
Qty: 90 TABLET | Refills: 3 | Status: SHIPPED | OUTPATIENT
Start: 2025-07-14 | End: 2026-07-14

## 2025-07-15 RX ORDER — ALBUTEROL SULFATE 90 UG/1
INHALANT RESPIRATORY (INHALATION)
Qty: 18 G | Refills: 2 | Status: SHIPPED | OUTPATIENT
Start: 2025-07-15

## 2025-07-23 ENCOUNTER — OFFICE VISIT (OUTPATIENT)
Dept: CARDIOLOGY | Facility: CLINIC | Age: 75
End: 2025-07-23
Payer: COMMERCIAL

## 2025-07-23 VITALS
HEART RATE: 58 BPM | DIASTOLIC BLOOD PRESSURE: 70 MMHG | HEIGHT: 70 IN | SYSTOLIC BLOOD PRESSURE: 121 MMHG | BODY MASS INDEX: 26.1 KG/M2 | WEIGHT: 182.31 LBS

## 2025-07-23 DIAGNOSIS — I34.0 NONRHEUMATIC MITRAL VALVE REGURGITATION: ICD-10-CM

## 2025-07-23 DIAGNOSIS — N40.0 BENIGN PROSTATIC HYPERPLASIA WITHOUT URINARY OBSTRUCTION: ICD-10-CM

## 2025-07-23 DIAGNOSIS — R06.09 DYSPNEA ON EXERTION: ICD-10-CM

## 2025-07-23 DIAGNOSIS — R55 POSTURAL DIZZINESS WITH PRESYNCOPE: ICD-10-CM

## 2025-07-23 DIAGNOSIS — I10 ESSENTIAL HYPERTENSION: ICD-10-CM

## 2025-07-23 DIAGNOSIS — R07.2 CHEST PAIN, PRECORDIAL: Primary | ICD-10-CM

## 2025-07-23 DIAGNOSIS — E78.2 MIXED HYPERLIPIDEMIA: ICD-10-CM

## 2025-07-23 DIAGNOSIS — R42 POSTURAL DIZZINESS WITH PRESYNCOPE: ICD-10-CM

## 2025-07-23 PROCEDURE — 99214 OFFICE O/P EST MOD 30 MIN: CPT | Mod: S$GLB,,, | Performed by: INTERNAL MEDICINE

## 2025-07-23 PROCEDURE — 1160F RVW MEDS BY RX/DR IN RCRD: CPT | Mod: CPTII,S$GLB,, | Performed by: INTERNAL MEDICINE

## 2025-07-23 PROCEDURE — 1159F MED LIST DOCD IN RCRD: CPT | Mod: CPTII,S$GLB,, | Performed by: INTERNAL MEDICINE

## 2025-07-23 PROCEDURE — 3074F SYST BP LT 130 MM HG: CPT | Mod: CPTII,S$GLB,, | Performed by: INTERNAL MEDICINE

## 2025-07-23 PROCEDURE — 1126F AMNT PAIN NOTED NONE PRSNT: CPT | Mod: CPTII,S$GLB,, | Performed by: INTERNAL MEDICINE

## 2025-07-23 PROCEDURE — 3288F FALL RISK ASSESSMENT DOCD: CPT | Mod: CPTII,S$GLB,, | Performed by: INTERNAL MEDICINE

## 2025-07-23 PROCEDURE — 99999 PR PBB SHADOW E&M-EST. PATIENT-LVL IV: CPT | Mod: PBBFAC,,, | Performed by: INTERNAL MEDICINE

## 2025-07-23 PROCEDURE — 3044F HG A1C LEVEL LT 7.0%: CPT | Mod: CPTII,S$GLB,, | Performed by: INTERNAL MEDICINE

## 2025-07-23 PROCEDURE — 3078F DIAST BP <80 MM HG: CPT | Mod: CPTII,S$GLB,, | Performed by: INTERNAL MEDICINE

## 2025-07-23 PROCEDURE — 1101F PT FALLS ASSESS-DOCD LE1/YR: CPT | Mod: CPTII,S$GLB,, | Performed by: INTERNAL MEDICINE

## 2025-07-23 NOTE — PROGRESS NOTES
Subjective:    Patient ID:  Onesimo Paula is a 74 y.o. male patient here for evaluation Follow-up (3 month)      History of Present Illness:  Follow-up visit.  Dyspnea.  Mildly progressive.  Noninvasive cardiac assessment in the past unremarkable including nuclear perfusion imaging, stress echo, coronary CTA.  PND assessment via carotid ultrasound unremarkable.  CTA of the chest in the past with pulmonary nodules, COPD.  No past documented arrhythmia.     Occasional left lower extremity edema.  No definite angina.  Follow-up nuclear perfusion imaging and echo 4/25 unremarkable.      Review of patient's allergies indicates:  No Known Allergies    Past Medical History:   Diagnosis Date    Acid reflux     Acquired tricuspid valve insufficiency     Anemia     Borderline diabetes     BPH (benign prostatic hyperplasia)     Cataract     1/16/20 rt eye, 1/30/20- Lt eye    Coronary artery disease     History of colonic polyps 04/17/2025    Hypertension     Liver cyst 08/24/2022    Pulmonary nodules     Skin cancer      Past Surgical History:   Procedure Laterality Date    CYSTOSCOPY N/A 2/19/2024    Procedure: CYSTOSCOPY;  Surgeon: La Nena James MD;  Location: [facility] OR;  Service: Urology;  Laterality: N/A;    CYSTOSCOPY WITH TRANSURETHRAL DESTRUCTION OF PROSTATE,RFA N/A 10/21/2020    Procedure: CYSTOSCOPY WITH TRANSURETHRAL DESTRUCTION OF PROSTATE,RFA;  Surgeon: La Nena James MD;  Location: [facility] OR;  Service: Urology;  Laterality: N/A;  please make sure [facility] available [phone]    EYE SURGERY      for cataracts, rt eye 1/16/20, left eye 1/30/20    HERNIA REPAIR  12/1999    HERNIA REPAIR  05/2004    INSERTION OF SACRAL NEUROSTIMULATOR, ELECTRODES, AND IMPLANTABLE PULSE GENERATOR (IPG) N/A 7/12/2023    Procedure: INSERTION, ELECTRODE LEADS AND PULSE GENERATOR, NEUROSTIMULATOR, SACRAL;  Surgeon: La Nena James MD;  Location: [facility] OR;  Service: Urology;  Laterality: N/A;    KNEE  ARTHROSCOPY Left 04/12/2018    LAPAROSCOPIC APPENDECTOMY N/A 1/2/2022    Procedure: APPENDECTOMY, LAPAROSCOPIC;  Surgeon: Sam Goss MD;  Location: [facility] OR;  Service: General;  Laterality: N/A;    PERCUTANEOUS INSERTION OF SACRAL NERVE NEUROSTIMULATOR ELECTRODE LEAD N/A 5/22/2023    Procedure: INSERTION, ELECTRODE LEAD, NEUROSTIMULATOR, SACRAL, PERCUTANEOUS Keep first;  Surgeon: La Nena James MD;  Location: [facility] OR;  Service: Urology;  Laterality: N/A;  patient needs to apply EMLA cream to entirety of lower back 20 minutes prior to procedure, also need 1% lidocaine available    PERCUTANEOUS INSERTION OF SACRAL NERVE NEUROSTIMULATOR ELECTRODE LEAD N/A 7/12/2023    Procedure: INSERTION, ELECTRODE LEAD, NEUROSTIMULATOR, SACRAL, PERCUTANEOUS;  Surgeon: La Nena James MD;  Location: [facility];  Service: Urology;  Laterality: N/A;    SKIN LESION EXCISION  10/2009    Neck    SKIN LESION EXCISION  2015    STAPEDECTOMY Right 03/2001    TRANSRECTAL ULTRASOUND EXAMINATION N/A 8/17/2020    Procedure: TRANSRECTAL ULTRASOUND;  Surgeon: La Nena James MD;  Location: [facility] OR;  Service: Urology;  Laterality: N/A;    TRANSRECTAL ULTRASOUND EXAMINATION N/A 2/19/2024    Procedure: ULTRASOUND, RECTAL APPROACH;  Surgeon: La Nena James MD;  Location: [facility] OR;  Service: Urology;  Laterality: N/A;    TRANSURETHRAL INCISION OF PROSTATE N/A 3/13/2024    Procedure: INCISION, PROSTATE, TRANSURETHRAL;  Surgeon: La Nena James MD;  Location: [facility] OR;  Service: Urology;  Laterality: N/A;    TRANSURETHRAL RESECTION OF PROSTATE N/A 3/13/2024    Procedure: TURP (TRANSURETHRAL RESECTION OF PROSTATE)- BIPOLAR;  Surgeon: La Nena James MD;  Location: [facility] OR;  Service: Urology;  Laterality: N/A;  WITH PARALYSIS     Social History[1]     Review of Systems:    As noted in HPI in addition      REVIEW OF SYSTEMS  Review of Systems   Constitutional: Positive for malaise/fatigue.  Negative for decreased appetite, diaphoresis, night sweats, weight gain and weight loss.   HENT:  Negative for nosebleeds and odynophagia.    Eyes:  Negative for double vision and photophobia.   Cardiovascular:  Positive for near-syncope. Negative for chest pain, claudication, cyanosis, dyspnea on exertion, irregular heartbeat, leg swelling, orthopnea, palpitations, paroxysmal nocturnal dyspnea and syncope.   Respiratory:  Positive for shortness of breath. Negative for cough, hemoptysis and wheezing.    Hematologic/Lymphatic: Negative for adenopathy.   Skin:  Negative for flushing, skin cancer and suspicious lesions.   Musculoskeletal:  Negative for gout, myalgias and neck pain.   Gastrointestinal:  Negative for abdominal pain, heartburn, hematemesis and hematochezia.   Genitourinary:  Negative for bladder incontinence, hesitancy and nocturia.   Neurological:  Negative for focal weakness, headaches, light-headedness and paresthesias.   Psychiatric/Behavioral:  Negative for memory loss and substance abuse.               Objective:        Vitals:    07/23/25 1019   BP: 121/70   Pulse: (!) 58       Lab Results   Component Value Date    WBC 7.70 05/20/2025    HGB 13.3 (L) 05/20/2025    HCT 40.3 05/20/2025     05/20/2025    CHOL 118 (L) 08/13/2024    TRIG 66 08/13/2024    HDL 36 (L) 08/13/2024    ALT 23 01/24/2025    AST 19 01/24/2025     05/20/2025    K 4.2 05/20/2025     05/20/2025    CREATININE 1.4 05/20/2025    BUN 35 (H) 05/20/2025    CO2 28 05/20/2025    TSH 3.536 03/24/2025    PSA 3.8 03/13/2024    INR 1.0 10/24/2022    HGBA1C 5.5 01/24/2025        ECHOCARDIOGRAM RESULTS  Results for orders placed during the hospital encounter of 04/28/25    Echo    Interpretation Summary    Left Ventricle: The left ventricle is normal in size. Normal wall thickness. There is normal systolic function with a visually estimated ejection fraction of 60 - 65%.    Right Ventricle: The right ventricle is normal in  size. Wall thickness is normal. Systolic function is normal.    Mitral Valve: There is mild to moderate regurgitation.    Pulmonary Artery: There is mild pulmonary hypertension. The estimated pulmonary artery systolic pressure is 37 mmHg.    IVC/SVC: Normal venous pressure at 3 mmHg.    No results found for this or any previous visit.          CURRENT/PREVIOUS VISIT EKG  Results for orders placed or performed in visit on 04/10/25   IN OFFICE EKG 12-LEAD (to [place])    Collection Time: 04/10/25  1:18 PM   Result Value Ref Range    QRS Duration 100 ms    [facility] QTC Calculation 401 ms    Narrative    Test Reason : R06.09,    Vent. Rate :  59 BPM     Atrial Rate :  59 BPM     P-R Int : 180 ms          QRS Dur : 100 ms      QT Int : 406 ms       P-R-T Axes :  44 -31   6 degrees    QTcB Int : 401 ms    Sinus bradycardia  Left axis deviation  Minimal voltage criteria for LVH, may be normal variant ( R in aVL )  Abnormal ECG  When compared with ECG of 17-Nov-2023 09:10,  No significant change was found    Confirmed by Ned Novoa ([id number]) on 4/11/2025 8:13:30 AM    Referred By: SUMMER HINTON           Confirmed By: Ned Novoa     No valid procedures specified.   Results for orders placed during the hospital encounter of 04/28/25    Nuclear Stress - Cardiology Interpreted    Interpretation Summary    Normal myocardial perfusion scan. There is no evidence of myocardial ischemia or infarction.    The gated perfusion images showed an ejection fraction of 69% post stress.    There is normal wall motion at post-stress.    LV cavity size is normal at rest and normal at post-stress.    The ECG portion of the study is negative for ischemia.    The patient reported chest pain during the stress test.    The exercise capacity was average.    No valid procedures specified.    PHYSICAL EXAM  GENERAL: well built, well nourished, well-developed in no apparent distress alert and oriented.   HEENT: Normocephalic. Pupils normal and  conjunctivae normal.  Mucous membranes normal, no cyanosis or icterus, trachea central,no pallor or icterus is noted..   NECK: No JVD. No bruit..   THYROID: Thyroid not enlarged. No nodules present..   CARDIAC:  Normal S1-S2.  No murmur rub click or gallop.  PMI nondisplaced.    LUNGS:  Decreased breath sounds bilaterally.  Scattered rhonchi    ABDOMEN: Soft no masses or organomegaly.  No abdomen pulsations or bruits.  Normal bowel sounds. No pulsations and no masses felt, No guarding or rebound.   URINARY: No jean catheter   EXTREMITIES: No cyanosis, clubbing or edema noted at this time., no calf tenderness bilaterally.   PERIPHERAL VASCULAR SYSTEM:  Pulsatile left popliteal artery.  No bruits    CENTRAL NERVOUS SYSTEM: No focal motor or sensory deficits noted.   SKIN: Skin without lesions, moist, well perfused.   MUSCLE STRENGTH & TONE: No noteable weakness, atrophy or abnormal movement    I HAVE REVIEWED :    The vital signs, nurses notes, and all the pertinent radiology and labs.         Current Outpatient Medications   Medication Instructions    albuterol (PROVENTIL/VENTOLIN HFA) 90 mcg/actuation inhaler INHALE 2 PUFFS BY MOUTH INTO LUNGS EVERY 6 HOURS AS NEEDED FOR SHORTNESS OF BREATH (RESCUE)    amLODIPine (NORVASC) 10 mg, Oral, Daily    aspirin (ECOTRIN) 81 mg, Daily    busPIRone (BUSPAR) 5 mg, Oral, Daily    calcitRIOL (ROCALTROL) 0.25 mcg, Every 7 days    co-enzyme Q-10 30 mg, 3 times daily    doxycycline monohydrate 100 mg Tab     hydroCHLOROthiazide (HYDRODIURIL) 12.5 mg, Oral    metFORMIN (GLUCOPHAGE) 500 mg, Oral, With breakfast    multivit with minerals/lutein (MULTIVITAMIN 50 PLUS ORAL) 1 tablet, Daily    omega 3-dha-epa-fish oil (FISH OIL) 100-160-1,000 mg Cap 1 capsule, Daily    pantoprazole (PROTONIX) 40 MG tablet 90 tablets    propranoloL (INDERAL LA) 60 mg, Oral, Daily    rosuvastatin (CRESTOR) 10 mg, Oral, Daily    tadalafiL (CIALIS) 5 mg, Oral, Every morning    telmisartan (MICARDIS) 40 mg,  Oral, Daily          Assessment:   Dyspnea most likely second-degree to COPD.  Negative noninvasive cardiac assessment for ischemic structural heart issues.    Hypertension well controlled with amlodipine, Micardis.    Dyslipidemia remains on Crestor 10.    Abnormal left popliteal artery examination, rule out aneurysm.      Plan:   Lower extremity arterial ultrasound.  Call results.  If normal, see me in six-month        No follow-ups on file.            [1]   Social History  Tobacco Use    Smoking status: Former     Current packs/day: 0.00     Types: Cigarettes     Quit date:      Years since quittin.5    Smokeless tobacco: Never   Substance Use Topics    Alcohol use: Yes     Comment: occasional    Drug use: No

## 2025-07-24 ENCOUNTER — HOSPITAL ENCOUNTER (OUTPATIENT)
Dept: RADIOLOGY | Facility: HOSPITAL | Age: 75
Discharge: HOME | End: 2025-07-24
Attending: INTERNAL MEDICINE
Payer: COMMERCIAL

## 2025-07-24 DIAGNOSIS — I34.0 NONRHEUMATIC MITRAL VALVE REGURGITATION: ICD-10-CM

## 2025-07-24 DIAGNOSIS — E78.2 MIXED HYPERLIPIDEMIA: ICD-10-CM

## 2025-07-24 DIAGNOSIS — R07.2 CHEST PAIN, PRECORDIAL: ICD-10-CM

## 2025-07-24 DIAGNOSIS — I10 ESSENTIAL HYPERTENSION: ICD-10-CM

## 2025-07-24 PROCEDURE — 93922 UPR/L XTREMITY ART 2 LEVELS: CPT | Mod: TC,PO

## 2025-07-24 PROCEDURE — 93922 UPR/L XTREMITY ART 2 LEVELS: CPT | Mod: 26,,, | Performed by: STUDENT IN AN ORGANIZED HEALTH CARE EDUCATION/TRAINING PROGRAM

## 2025-07-24 PROCEDURE — 93925 LOWER EXTREMITY STUDY: CPT | Mod: 26,,, | Performed by: STUDENT IN AN ORGANIZED HEALTH CARE EDUCATION/TRAINING PROGRAM

## 2025-07-25 ENCOUNTER — TELEPHONE (OUTPATIENT)
Dept: CARDIOLOGY | Facility: CLINIC | Age: 75
End: 2025-07-25
Payer: COMMERCIAL

## 2025-07-25 DIAGNOSIS — I72.4 POPLITEAL ANEURYSM: Primary | ICD-10-CM

## 2025-07-25 NOTE — TELEPHONE ENCOUNTER
----- Message from Pako Cochran MD sent at 7/25/2025 12:49 PM CDT -----  Please refer patient to vascular surgery for popliteal aneurysm evaluation  ----- Message -----  From: Interface, Rad Results In  Sent: 7/24/2025   2:54 PM CDT  To: Pako Cochran MD

## 2025-07-27 ENCOUNTER — PATIENT MESSAGE (OUTPATIENT)
Dept: FAMILY MEDICINE | Facility: CLINIC | Age: 75
End: 2025-07-27
Payer: COMMERCIAL

## 2025-07-27 DIAGNOSIS — I10 ESSENTIAL HYPERTENSION: ICD-10-CM

## 2025-07-29 ENCOUNTER — HOSPITAL ENCOUNTER (OUTPATIENT)
Dept: RADIOLOGY | Facility: HOSPITAL | Age: 75
Discharge: HOME | End: 2025-07-29
Attending: UROLOGY
Payer: COMMERCIAL

## 2025-07-29 DIAGNOSIS — C61 PROSTATE CANCER: ICD-10-CM

## 2025-07-29 LAB
CREAT SERPL-MCNC: 1.5 MG/DL (ref 0.5–1.4)
SAMPLE: ABNORMAL

## 2025-07-29 PROCEDURE — A9585 GADOBUTROL INJECTION: HCPCS

## 2025-07-29 PROCEDURE — 25500020 PHARM REV CODE 255

## 2025-07-29 PROCEDURE — 72197 MRI PELVIS W/O & W/DYE: CPT | Mod: 26,,, | Performed by: RADIOLOGY

## 2025-07-29 PROCEDURE — 72197 MRI PELVIS W/O & W/DYE: CPT | Mod: TC

## 2025-07-29 RX ORDER — GADOBUTROL 604.72 MG/ML
INJECTION INTRAVENOUS
Status: COMPLETED
Start: 2025-07-29 | End: 2025-07-29

## 2025-07-29 RX ORDER — TELMISARTAN 40 MG/1
40 TABLET ORAL
Qty: 90 TABLET | Refills: 0 | Status: SHIPPED | OUTPATIENT
Start: 2025-07-29

## 2025-07-29 RX ADMIN — GADOBUTROL 8 ML: 604.72 INJECTION INTRAVENOUS at 11:07

## 2025-08-05 ENCOUNTER — OFFICE VISIT (OUTPATIENT)
Dept: VASCULAR SURGERY | Facility: CLINIC | Age: 75
End: 2025-08-05
Payer: COMMERCIAL

## 2025-08-05 ENCOUNTER — OFFICE VISIT (OUTPATIENT)
Dept: UROLOGY | Facility: CLINIC | Age: 75
End: 2025-08-05
Payer: COMMERCIAL

## 2025-08-05 VITALS
DIASTOLIC BLOOD PRESSURE: 77 MMHG | WEIGHT: 182.31 LBS | SYSTOLIC BLOOD PRESSURE: 137 MMHG | HEART RATE: 63 BPM | BODY MASS INDEX: 26.1 KG/M2 | HEIGHT: 70 IN

## 2025-08-05 DIAGNOSIS — C61 PROSTATE CANCER: ICD-10-CM

## 2025-08-05 DIAGNOSIS — I73.9 PERIPHERAL VASCULAR DISEASE, UNSPECIFIED: Primary | ICD-10-CM

## 2025-08-05 DIAGNOSIS — N13.8 BPH WITH OBSTRUCTION/LOWER URINARY TRACT SYMPTOMS: Primary | ICD-10-CM

## 2025-08-05 DIAGNOSIS — N40.1 BPH WITH OBSTRUCTION/LOWER URINARY TRACT SYMPTOMS: Primary | ICD-10-CM

## 2025-08-05 DIAGNOSIS — I72.4 POPLITEAL ANEURYSM: ICD-10-CM

## 2025-08-05 LAB
BILIRUBIN, UA POC OHS: NEGATIVE
BLOOD, UA POC OHS: NEGATIVE
CLARITY, UA POC OHS: CLEAR
COLOR, UA POC OHS: YELLOW
GLUCOSE, UA POC OHS: NEGATIVE
KETONES, UA POC OHS: NEGATIVE
LEUKOCYTES, UA POC OHS: NEGATIVE
NITRITE, UA POC OHS: NEGATIVE
PH, UA POC OHS: 5.5
POC RESIDUAL URINE VOLUME: 0 ML (ref 0–100)
PROTEIN, UA POC OHS: ABNORMAL
SPECIFIC GRAVITY, UA POC OHS: 1.02
UROBILINOGEN, UA POC OHS: 0.2

## 2025-08-05 PROCEDURE — 3288F FALL RISK ASSESSMENT DOCD: CPT | Mod: CPTII,S$GLB,, | Performed by: UROLOGY

## 2025-08-05 PROCEDURE — 99214 OFFICE O/P EST MOD 30 MIN: CPT | Mod: S$GLB,,, | Performed by: UROLOGY

## 2025-08-05 PROCEDURE — 1126F AMNT PAIN NOTED NONE PRSNT: CPT | Mod: CPTII,S$GLB,, | Performed by: UROLOGY

## 2025-08-05 PROCEDURE — 51798 US URINE CAPACITY MEASURE: CPT | Mod: S$GLB,,, | Performed by: UROLOGY

## 2025-08-05 PROCEDURE — 1160F RVW MEDS BY RX/DR IN RCRD: CPT | Mod: CPTII,S$GLB,, | Performed by: UROLOGY

## 2025-08-05 PROCEDURE — 3044F HG A1C LEVEL LT 7.0%: CPT | Mod: CPTII,S$GLB,, | Performed by: UROLOGY

## 2025-08-05 PROCEDURE — 81003 URINALYSIS AUTO W/O SCOPE: CPT | Mod: QW,S$GLB,, | Performed by: UROLOGY

## 2025-08-05 PROCEDURE — 3288F FALL RISK ASSESSMENT DOCD: CPT | Mod: CPTII,S$GLB,, | Performed by: STUDENT IN AN ORGANIZED HEALTH CARE EDUCATION/TRAINING PROGRAM

## 2025-08-05 PROCEDURE — 1101F PT FALLS ASSESS-DOCD LE1/YR: CPT | Mod: CPTII,S$GLB,, | Performed by: STUDENT IN AN ORGANIZED HEALTH CARE EDUCATION/TRAINING PROGRAM

## 2025-08-05 PROCEDURE — 3044F HG A1C LEVEL LT 7.0%: CPT | Mod: CPTII,S$GLB,, | Performed by: STUDENT IN AN ORGANIZED HEALTH CARE EDUCATION/TRAINING PROGRAM

## 2025-08-05 PROCEDURE — 1101F PT FALLS ASSESS-DOCD LE1/YR: CPT | Mod: CPTII,S$GLB,, | Performed by: UROLOGY

## 2025-08-05 PROCEDURE — 1159F MED LIST DOCD IN RCRD: CPT | Mod: CPTII,S$GLB,, | Performed by: UROLOGY

## 2025-08-05 PROCEDURE — 3078F DIAST BP <80 MM HG: CPT | Mod: CPTII,S$GLB,, | Performed by: STUDENT IN AN ORGANIZED HEALTH CARE EDUCATION/TRAINING PROGRAM

## 2025-08-05 PROCEDURE — 99999 PR PBB SHADOW E&M-EST. PATIENT-LVL III: CPT | Mod: PBBFAC,,, | Performed by: UROLOGY

## 2025-08-05 PROCEDURE — 1125F AMNT PAIN NOTED PAIN PRSNT: CPT | Mod: CPTII,S$GLB,, | Performed by: STUDENT IN AN ORGANIZED HEALTH CARE EDUCATION/TRAINING PROGRAM

## 2025-08-05 PROCEDURE — 3075F SYST BP GE 130 - 139MM HG: CPT | Mod: CPTII,S$GLB,, | Performed by: STUDENT IN AN ORGANIZED HEALTH CARE EDUCATION/TRAINING PROGRAM

## 2025-08-05 PROCEDURE — 99999 PR PBB SHADOW E&M-EST. PATIENT-LVL IV: CPT | Mod: PBBFAC,,, | Performed by: STUDENT IN AN ORGANIZED HEALTH CARE EDUCATION/TRAINING PROGRAM

## 2025-08-05 PROCEDURE — 99205 OFFICE O/P NEW HI 60 MIN: CPT | Mod: S$GLB,,, | Performed by: STUDENT IN AN ORGANIZED HEALTH CARE EDUCATION/TRAINING PROGRAM

## 2025-08-05 NOTE — PROGRESS NOTES
[facility] Note - [place]  Staff: MD Erika  PCP: Reynaldo Rahman, APRN,FNP-C  Date of Service: 08/05/2025      Subjective:        HPI: Onesimo Paula is a 74 y.o. male     Seen by me 9/30/19  Patient had MRI for right hip pain and was found have a thick-walled bladder with enlarged prostate.  He states that he saw urologist over 40 years ago for burning with urination.  He had a renal bladder ultrasound in 2016 which showed enlarged prostate and bilateral renal cyst done for renal insufficiency whom he sees  for.  Denies any gross hematuria.  Review of for previous urine show no microscopic hematuria.  Twenty-five pack-year history of smoking but quit in 1997.      He also has AUA symptom score 6 and occasional weak stream and urgency but voids every 3-5 hours when he is working and a little more frequent when he is not.  Denies any urge incontinence.  Okay stream with occasional weak stream.  PVR today 09/03/2019 is 15 cc.  He may have tried Flomax a couple years ago but does not recall it helping her changing any was symptoms.  Overall really not that bothered by his urinary symptoms.      Also states he has ED.  Previously tried sildenafil unsure dose and it helped but wife does not want him to take, exam revealed peyronie's     Plan: started tadalafil 5mg due to thick walled bladder although essentially asx. No further f/u fr b renal cysts.      Interval history 11/18/19:   Pt states today Cialis has NOT improved any of his lower urinary tract symptoms since starting the medication.  He currently takes this medication before his bedtime.  Pt still c/o urinary urgency, especially during the day.  Nocturia is 1-2x nightly and not bothersome at this time.  He is a , therefore the daytime urgency really bothers him.  Plan: started ditropan 10mg XL and cont'd tadalafil     Interval history by idalia 1/6/20:   TODAY, pt states the oxybutynin only improved  his LUTS by over 10% at this time.  PVR by bladder scan performed in office today:  11 mL  Rec'd: d/c oxybutynin, start myrbetriq, cont tadalafil 5mg daily.     Interval history 7/27/20:   Tried myrbetriq but didn't help. Having urgency, hesistancy and intermittent slow stream but urgency most bothersome and having some UUI with a few drop, but not that bothersome. Drinks 1 c of coffee in am. Soda during day. 1 c of coffee int he evening. Has never tried stopping these to see if they help. Drinks beer and notices increase in frequency following day.   On tadalafil 5mg daily for ED and bph.  pvr by scan: 32cc  Voiding every 30 minutes a day recently more pronounced at home (has been on vacation).  Takes hctz in am. When he's working he only voids about 2x-4x a day. Drinks caffeine while working including energy drinks. No nocturia.   psa 1/31/20 2.2 (up from 1.2 year prior). Repeat psa 0.85 7/29/20  WINSOME today: 40g + no nodules.  Uroflow 7/30/20: peak flow 12.8mL/s,  avg flow 5.4mL/s, voided vol: 191cc, pvr by scan:0      Cysto trus 8/17/20: normal bladder, no stricture. Membranous and distal prostatic urethra with papillary tissue suspicious for prostatitis.trus volume 42.6g. bc of his urgency on tadalafil, flomax, vesicare found rx cipro for 14d to see if it helped w urgency. D/c'd vesicare. cont'd flomax and tadalafil. rtc to see idalia in 2-3 weeks and me in 2 months to discuss bph procedures     Interval history by idalia 9/9/20:   Pt states he is still having the urinary urgency even since finishing the Cipro antibiotics at this time.  He denies gross hematuria, dysuria.  UA today shows normal findings.  PVR by bladder scan today:  9 mL  Plan: rec'd restarting vesicare     Interval history 10/1/20:    Taking flomax 0.4mg nightly and says flow is intermittent on this.   Also on the vesicare and says it doesn't help with the urgency. Voiding 2-3x a day and none at night. 3-4x a week c/o urge incontinence if he  tries to hold it too long, mostly in the am when he wakes up. Denies any difficulty with walking. No weakness or numbness.   Changed to decaf coffee 1x a day. Changed to sprite and caffeine free coke.   He does not want to take any more meds. He is not happy with the retrograde ejaculation.   Sees dr. leger for Mitral Valve leakage, last saw him a week ago, was told he should f/u in March 2021. Tadalafil helping with erections     Interval history 11/19/20:   Underwent rezum on 10/21/20. continue flomax, vesicare and tadafil daily for 3 more months.   Returned for fill and pull on 10/26/20. Filled with 210, voided 210  For 2 weeks postop would have blood when he had a BM, improving.   Today (1 month later) he states he has some increased urgency. UUI 2-3x a day (increased from 3-4x a week).  Still taking vesicare  freq q1 hour (worse in cold weather). Without cold weather freq q 3 hour.   ua today with small wbc and mod blood - some dysuria  No significant change in stream yet  pvr by scan today: 134  AUA ssx:(2 incomplete emptying, 3 frequency, 3 intermittency, 4 urgency, 2 weak stream, 2 straining, 0 sleeping). 16. QOL: 4    Interval history by me in clinic on 3/9/21:  Returns today and states states he ran out of flomax and vesicare a month ago. No increased frequency, urge incontinence or slower stream off the flomax  Still having UUI 1-2x a day with a few drops when he hears water.   Voids 8x/24 hours and 4x/8 hours while at work. Nocturia occ (same). No longer drinking caffeine.   Still on tadalafil 5mg daily.  No longer having retrograde ejaculation and constipation improved. Hctz in am.  Frequency mostly in  the pm. Bothers him but able to stop at bathrooms. Sometimes loses erection with tadalafil 5mg daily. Rare. Not more frequent. Still getting morning erections.   AUA ssx today:(1 incomplete emptying, 2 frequency, 1 intermittency, 3 urgency, 3 weak stream, 0 straining, 0 sleeping). 10. QOL:  mixed  Bladder scan PVR today was 35mL.    I reviewed his previous PSAs and his most recent psa within the last year was 2.3, %free 20.87, last year was 2.2      Interval history with idalia on 4/9/21:  UA today showed normal findings.  Pt states today even though he tried and changed his HCTZ med to evenings as discussed last ov, his LUTS have not improved.  Pt as of today's visit is still c/o urinary urgency and frequency during the daytime.  Has not improved since last ov.  Pt states today he has tried Vesicare and Oxybutynin in the past with no improvement in symptoms.  Pt currently denies gross hematuria and dysuria.  She put him on myrbetriq again       Interval history by me 6/15/21:  Uroflow results (date: 03/18/2021): Voiding time: 24.3s, Flow time: 23.1s, TTP flow: 8.0s, Peak flowrate: 19.2 mL/s, Average flowrate: 10.6mL/s, Intervals: 2, Voided volume: 244 mL, Pvr by bladder scan 33. Pattern of curve: see uploaded image, reviewed by   At last visit was c/o freq/urgency. Frequency 8-10x/day. Tried myrbetriq before rezum, ditropan, vesicare and changing hctz to pm but no improvement. Then in April started myrbetriq again , after about 4 weeks saw improvement, now down to 5-6x a day. avg urg: 3. No nocturia. No incontinence.  Also on tadalafil 5mg daily.   AUA ssx today:(1 incomplete emptying, 1 frequency, 1 intermittency, 2 urgency, 3 weak stream, 0 straining, 0 sleeping). 8. QOL:   Bladder scan PVR today was 0mL.      HPI/CC today 6/7/22:   Had him Continue myrbetriq 50mg daily, has enough until 4/2021, Continue tadalafil 5mg daily, refilled today for a year.  Says myrbetriq not helping anymore. He held it for a few days and noticed no difference. On cialis 5mg daily.   Does not have constipation  Voids about 9-10x during waking hours. occ urge incontinence 3x a week. Few drops. Started drinking caffeine this week.   Nocturia 1-2x a night. Urine flow varies.    psa 5/31/22 1.3  ua today neg, pvr by  scan: 5  AUA ssx:(2 incomplete emptying, 3 frequency, 2 intermittency, 4 urgency, 3 weak stream, 1 straining, 1 sleeping). 16. QOL: mixed  Appendix removed in January complicated by post-op ileus.    Interval history 8/23/22:  Continue myrbetriq 50mg daily, has enough until 4/2021  Continue tadalafil 5mg daily, refilled today for a year  After adding vesicare to myrbetriq - went from voiding 9-10x day, 1-2x a night with occ UUI 3x a week to 5-6x/day, 0 night and no daily UUI with drops. Says he has dry mouth but about the same. However does have some heart burn that is new.   UA today: neg. pvr by scan: 115 (this is the highest it's been, feels empty).  AUA ssx:(1 incomplete emptying, 2 frequency, 0 intermittency, 2 urgency, 2 weak stream, 1 straining, 0 sleeping). 8. QOL: pleased    Interval history 2/28/23:  Says he is doing well on VESIcare and myrbetriq.  However it is expensive.  About 120 dollars every 3 months.  Also having dry mouth with it.  On tadalafil 5 mg daily for erections, obtaining from [facility].  This dose wish for him sometimes.  He tried Viagra 100 mg and it seemed to work.  Aua ssx: 12, pvr: 25    Interval history 4/21/23:  I had him discontinue both myrbetriq and VESIcare at the last visit to see if his incontinence would get worse.  Followed up with Teena on 03/29/2023.  His PVR was 0.  His AUA symptom score was 14, 5.  He was still taking tadalafil 5 mg daily.  He said with stopping VESIcare and myrbetriq he had 2 episodes of incontinence that were large volume, increased postvoid dribbling and he feels like his urinary frequency increased.  Was scheduled with me to discuss InterStim because he wanted to hold off on restarting any medications due to cost and side effects  He returns today with a voiding diary.  He kept a voiding diary for me days and it looks like he urinates on average about 9-10 times a day.  That is up from 6 times a day when he was taking medications.  He did not  documented on his voiding diary but he leaks urine 2 to 3 times a day on the way to the bathroom.  Not wearing any pads.  Does not have to change his underwear.  If he could rate his urgency he would say on average his urgency is 4/5.  He is interested in proceeding with InterStim.  He ended up using tadalafil 10 mg once and said this dose worked good for him for his erectile dysfunction  Voided urine today is negative.  Confirmed again that currently he urinates 7-10 times a day, average urgency is 4 and 1-2 leaks a day.        Interval history 5/25/23:  I did a peripheral nerve evaluation on him on Monday, 4 days ago.  Since then he has gone from urinating 6-10 times a day down to about 6 times a day.  Never had issues at night.  He went from leaking 2 to 3 times a day incontinence down to 1x over the last 3 days. Avg urgency went from 4 to 2. He says >50% improvement and wants to proceed. Not on any meds.   He previously saw  for stress tests bc of SOB. His last w/u was 11/2022 and had a neg stress test. On asa 81mg. No stents. They could never figure out why he has SOB.  He sees pulmonology regularly for nodules in the lungs.  Wires removed today    Interval history 7/11/23:  Here today for interstim stage 1 and 2. Preop appt revealed a1c 6.2, glucose 124. Ua with tr protein/tr bld on 6/28. Started on metformin.   Urinating every 1-2 hours. Uui with drops 2x a day. No pads or underwear changes.  Held asa 81mg for 7d.     Interval history 8/8/23 telephone visit :  Pt states that initially he had issues with urgency and frequency after his procedure but since talking to clarita and making adjustments he has gone from urinating every 1-2 hours to 3-4x a day. Avg urgency remains around 4 and no UUI.   He is happy with his symptoms.     Interval history by ME in CLINIC on 11/13/23 for postop interstim fu and oab fu:  He says that his frequency has returned despite changing his settings. Says changing the  setting works for a bit. Then no longer works. Nos on 3.2.   He says after turning off his interstim for us in August for us abd for his liver he had an increase in his frequency and had to increase the amplitude   He changed the settings on 10/31 and he went from 8x a day down to 5x a day. He stopped hctz a month ago by his cardiologist but then he restarted the hctz in the morning last week and his frequency increased to 9x a day.  Currently on program 2, 3.2.    He says urinating about 8x a day and 1x a night. Leaking with UUI and drops 3-4x a day and 2 accidents last week. No pads.   Urgency 5/5.  1 cup of caffeine in the morning.   Voiding diary: 10/30/23- 8x, changed on 10/31. On 10/31 -5x, 11/1- 5x, 11/3- 6x, 11/11-9x. 11/12-7x. This morning 4x/ 6 hours.   AUA ssx:(2 incomplete emptying, 3 frequency, 2 intermittency, 3 urgency, 3 weak stream, 0 straining, 1 sleeping). 14. QOL: mostly satisfied  Pvr today: 40. Ua today: neg    Interval history by ME in CLINIC on 1/22/24 for oab:  Plan was to do uds bc of his oab but when joe tried to place ureteral catheter it did not go easily. Then she tried 16fr coude and stopped mid way and saw blood. Decided to hold off on and plan for cysto. He adjusted his interstim and he said no change. Feels the flutter in his right perineum. . He says flow is fast sometimes and slow sometimes.  Cysto trus 8/17/20: 42.6g.   Rezum 10/21/20  Last peak flow was 19.2 in 3/18/21.  Last catheter was at time of cholecystectomy in jan 2022.   Interstim 7/11/23  Frequency:Still urinates bw 6 to 12x a day. Has more bad days than good. Nocturia 1x a night.  Incontinence:  Has drops/leaks twice a day.  No pads  Drinks 1 soda in am and decaf cup in evening. No tea.     Interval history/H&P Update by me/ prior to cysto trus on 2/19/24:  urine at the lab- ua 1/30/4:neg(would need to returnwith smoking hx although quit in 1997  Ua today 2/19/24 30 protien  Uroflow and pvr soon. Has  oab. Will need to drink fluid here right before the test.   Uroflow results (date: 01/30/2024): Voiding time: 39.4s, Flow time: 39.3s, TTP flow: 10.6s, Peak flowrate: 16.7 mL/s, Average flowrate: 8.8mL/s, Intervals: 1, Voided volume: 346 mL, Pvr by bladder scan: 45mL . Pattern of curve: see 's addendum   Schedule cystoscopy and TRUS to rule out stricture as cause of oab on Monday feb 19th. See how much prostate has grown back. Previously had rezum in 2020 and trus vol 46g prior to this.   If he does have a urethral stricture- then can explain sx and would need to treat stricture to that improves symptoms.then would need to do in and out cath intermittently forr a while to keep stricture from scarring down again.   If he doesn't have urethral stricture will proceed with uds.   (Will go ahead and schedule to follow cysto/trus and can cancel If we don't need)  Recommend uds since he already has had rezum and interstim and want to evaluate if he has detrussor overactivity/bladder muscle kenn too much.   If so would offer botox since he has already has interstim. But has to be repeated.      Cysto/TRUS Findings 2/19/24:   Cystoscopic findings: rezum defect seen in median lobe closer to veru. Minimal b lobe hypertrophy. + high riding bladder neck with small intravesical protrusion.    TRUS volume: 43.38. WINSOME: just to right of midline small ridge <0.5cm felt near base. 40g prostate.           Assessment: Onesimo Paula is a 73 y.o. male with h/o oab and bph sp rezum 10/21/20, interstim 5/22/23. Small ridge on rectal exam today. Difficult catheterization during attempted uds recently. No urethral stricture seen but does have high riding bladder neck.      Plan:  Continue flomax 0.8mg nightly for now to help with high riding bladder neck.  Since no urethral stricture seen fu for urodynamics to eval if he has detrussor overactivity vs bph obstruction. Botox vs tuip (transurethral incision or prostate for  high riding bladder neck) vs both at the same time and seeing how he responds.   At urodynamics I will place urethral catheter. Resitance likely at prostate-high riding and small defect seen    nterval history by me/ prior to urodynamics on 3/4/24:  Primary complaint is frequency and urgency with UUI and slow flow.  Underwent uds today which showed he had delayed sensation with slow flow. Bladder mostly emptied after procedure. Catheter placement was difficult due to high riding bladder neck but able to perform procedure. Recommended proceeding with tuip/turp.  Ua was neg.   However he returned in the afternoon bc unable to urinate.   Eryn placed coude jean and 475 dark urine drained.   She irrigated him and removed some clots     Interval history/H&P Update by me/ prior to tuip/turp on 3/12/24:  He came back for voiding trial and passed. He ended up staying off flomax.   Takes amlodipine and flomax 7-8-9 in the evening could be reason  Tadalafil in the mornign  Hctz in the morning  Recent cr 1.5 -sees , saw last year, fu in may   Psa screen today 3.8- but had catheter and uds 3 weeks ago.   Ua today with tr protein     Current Urinary symptoms 3/12/24:  Frequency:Still urinates bw 8-9x a day. Has more bad days than good. Nocturia 1x a night.  Incontinence:  Has drops/leaks 2-3 a day.  No pads.  Avg urgency: 4  Interstim on currently       Bipolar turp 3/13/24: path 3+3 from right side- 4/77 chips  Ureteral orifices close to resection. Part of trigone resected.   WINSOME revealed NO NODULES or firm areas today (psa prior to procedure 3.8 but had catheter and difficult catheter placement during uds less than a week ago)  Median lobe removed was likely the biggest obstructing component.   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              Interval history by ME/ in CLINIC on 4/11/24 for postop turp and prostate cancer findings:   Current Urinary symptoms  3/12/24:  Frequency:down to 5x a day from 8-9x a day. Nocturia 2x a week.    Incontinence:  leaks 5 to 6x a day up from 2-3x a day. Improving. Occurs with and without activity. Previously no pads, now 1 pad a day. Not wet when he changes.   Avg urgency: 4 previously, 2 now   AUA ssx:(2 incomplete emptying, 1 frequency, 1 intermittency, 2 urgency, 1 weak stream, 0 straining, 1 sleeping). 10. QOL: 8    Interstim on currently   Pvr by scan: 0  Ua today with small leuk and mod blood. Some dysuria.   Still has some blood in first void and with straining  Overall he says better.   .  Interval history by ME/ in CLINIC on 7/16/24 for bph/prostate cancer fu:  At his last visit his urine had leukocytes and blood.  However he had just had a TURP 1 month prior.  His culture was negative.  Currently his urine only shows trace protein today  I had him discontinue Flomax and oxybutynin at his last visit. Says no noticeable change in flow or frequency/urgency. I also asked him to do some Kegel's for his stress incontinence he was experiencing, he says he tried but didn't continue. However having minimal SANJANA.   Current Urinary symptoms 7/16/24  Frequency 5-8x a day down from 6-9x day. Still has more bad days than good. Nocturia 0-1x a night. 3-4x a week waking up 1x a night.   Incontinence:  Has drops 3x a day(previously 2-3x a day). Urge and unaware. Occ/rare SANJANA. Wearing thin pads (1 a day)- not when he changes.   Avg urgency: 3 (previously 4)  Interstim on currently and feels flutter in perinem (centered)  For his low-grade prostate cancer found on TURP chips I had him do a PSA and it came down to 0.44 after his TURP  Pvr by scan: 15.  AUA ssx:(1 incomplete emptying, 2 frequency, 1 intermittency, 2 urgency, 2 weak stream, 0 straining, 1 sleeping). 9. QOL: mixed  Using cialis 5mg daily for Ed. Working mostly. Sometimes doubles up. Will sometimes lose erection 1/2 way through. '    Interval history by  ME/ in CLINIC on 10/24/24:  He underwent TURP in March 2024 and had an InterStim implant placed in July 2023 for overactive bladder and urge incontinence. He also reports a liver cyst that has shown some growth, prompting an MRI  He reports urinary frequency of 5-6 times per day, improved from previous 5-8 times daily. Nocturia occurs once per night. He denies urge incontinence but reports occasional stress incontinence improved since last visit with small leaks, wearing one pad daily for protection. He experiences urgency and weak urinary stream, both rated as 1 on the AUA symptom score. He describes a light sensation with slow urinary flow. AUA SSX:6    PROSTATE CANCER:He was diagnosed with low-grade prostate cancer (Casi 3+3) from turp chips from TURP procedure in March 2024. Recent PSA test result is 0.58, slightly elevated from previous 0.44.    INTERSTIM DEVICE: He reports intermittent effectiveness of the InterStim device.he turned it off a month ago and he actually thinks his urgency improved.     MEDICATIONS: He continues Cialis 5 mg daily for erectile dysfunction, reporting it is effective most of the time. He has discontinued Flomax and oxybutynin following the TURP procedure.    SEXUAL HEALTH:He reports erectile dysfunction managed with Cialis. He experienced post-ejaculatory bleeding on one occasion following sexual intercourse, but denies any recurrence of this symptom.    Interval history by ME/ in CLINIC on 1/30/25:  Mr. Paula was diagnosed with prostate cancer in March 2024 and is currently under active surveillance. He has an interstim device for overactive bladder, which is functioning intermittently. Mr. Paula takes Cialis 5 mg daily for erectile dysfunction.  Mr. Paula reports improved urinary frequency, now urinating every 3-4 hours during the day, with the first two voids occurring within an hour of each other. He denies urgency but has occasional leakage  about once every 3-4 days, typically when bending over or delaying urination. The leakage is minimal and does not require changing underwear or wearing pads. Nocturia has improved, and he no longer needs to urinate at night.  Urinary flow has shown improvement. A recent Uroflow test on October 29th showed a peak flow of 12.6 ml/s, an improvement from a previous test of 8.5 ml/s. Mr. Paula confirms his flow is adequate most of the time, though it can be slow if attempting to empty his bladder quickly.  On January 13th, the patient had an episode of slight hematuria and passed a small dark mass in his urine, possibly indicating scar tissue passage. He did not feel anything come out at the time.  Regarding erectile function, the patient has morning erections 3-5 times per week, sometimes up to 3 times daily. At the beginning of the year, he noticed a period without morning erections, but this has since improved. During sexual activity, he has recently lost his erection, even while taking Cialis.  Mr. Paula underwent a TURP procedure at some point in the past.  Mr. Paula denies urinary urgency, straining to urinate, and significant blood in urine. He also denies any increase in urinary leakage.    My notes:  He feels like he has good flow now after turp/tuip.   Urinating 2x an hour the first time and now urinating every 3 to 4 hours. No urgency. But had some SANJANA when he bent to put socks on once. And had UUI with a few drops if he waited to leak. Incontinence once every 3 to 4 days. Doesn't have to change underwear or pads. Tiny volume. About the same as last time. No longer has to get up at night anymore.   Psa now 0.67 on 1/24, it was 0.58 3 months ago. WINSOME today: 30 g no nodules  10/29/24 Peak flowrate: 12.6 mL/s, Average flowrate: 7.3mL/s, Intervals: 3, Voided volume: 240 mL, Pvr by bladder scan: 0mL .  (Prior to turp it was 8.6)  In january urinated a small piece and some blood but hasn't had happen  since  Taking cialis 5mg daily. Ran out Monday. Waiting for new rx. In the begginning of year he said wasn't getting morning erections like normal but coming back to normal for him. Now has 3x a day sometimes. Hasn't had to use more than 5mg daily. Said lost an erection the last few times.   Ua today: no blood. Pvr: 25  AUA ssx:(2 incomplete emptying, 2 frequency, 1 intermittency, 1 urgency, 1 weak stream, 0 straining, 0 sleeping). 4. QOL: pleased      Interval history by ME/ in CLINIC (In-person) on 8/5/25:  History of Present Illness    CHIEF COMPLAINT:  Mr. Paula presents for follow-up of prostate cancer on active surveillance.    HPI:  Mr. Paula was diagnosed with prostate cancer in 2024, characterized as Casi 3 + 3, T1c, found on TURP chips. He is currently on active surveillance. His most recent PSA level is 0.7, which is slightly elevated from previous measurements. An MRI showed PIRADS 3 on the left side. He has a history of a liver cyst, monitored with annual ultrasounds. The liver cyst grew slightly last year, prompting an MRI for further evaluation. He has an upcoming ultrasound scheduled for September 2nd to continue monitoring the liver cyst.    PERTINENT MEDICAL HISTORY:  Prostate cancer: Diagnosed 2024, [place] 3 + 3, T1c, found on TURP chips  Liver cyst: Being monitored with yearly ultrasounds    PERTINENT SURGICAL HISTORY:  TURP (Transurethral resection of the prostate): Date not specified, indication was likely for prostate cancer diagnosis    PERTINENT TEST RESULTS:  PSA: Current, 0.7, slightly up TURP (Transurethral Resection of the Prostate): 2024, found prostate cancer, [place] 3 + 3, T1C MRI Prostate: Recent, PIRADS 3 left side  CT Chest: 2022, no kidney stones visible in the visible portion of kidneys  CT Abdomen: 2022, no kidney stones  Liver Imaging (type not specified): Past, showed hepatic cyst  MRI Liver: Last year, showed slight growth of hepatic cyst  Abdominal US:  Yearly, for monitoring hepatic cyst         My notes:  On AS for prostate cancer found in turp chips from right side  Mri shows 7/29/25 pirads 3 on left  Psa down to 0.7 (after turp) 7/29/25  Also has cr of 1.5, up from 1.3  H/o L renal stone seen on ctap from 2022 but I reviewed ct and don't see a stone. Has some other comorbidities which could account for elevated cr. Can ask if he has nephrology. No abdominal imging of kd since.    checked pvr (low last time -<5) and it's 0 today.   Sees  for renal insuf. Saw him in may. Ct chest 10/19/24 showed top half of kd. No stones seen.   AUA ssx:(0 incomplete emptying, 2 frequency, 0 intermittency, 2 urgency, 1 weak stream, 0 straining, 0 sleeping). 5. QOL: mostly satisfied      Bladder/prostate history:  Cysto trus 8/17/20: 42.6g.   Rezum 10/21/20  Last peak flow was 19.2 in 3/18/21.  Last catheter was at time of cholecystectomy in jan 2022.   PNE 5/21/23  Interstim 7/11/23  Cysto trus 2/19/24 45g. High riding bladder neck.   UDS 3/4/24- peak flow 8.2, voided vol: 352, pvr by scan: 23  Turp 3/12/24. Gl 3+3 in 4/77 chips.   Peak flow 10/2025 12.5    PSA history: no family hx of prostate cancer  7/29/25 Mri pros: 29g. Pirads 3 in the left TZ.   7/29/25 0.7  1/30/25 Padilla: 30g no nodules  1/24/25 0.67  10/24/24 0.58  7/10/24 0.44  3/13/24 Turp chips: 4/77 right lobe Gl 3+3  3/13/24 3.8, PADILLA: no nodules.   4/5/23  1.5   1/22/24 PADILLA: 40g  5/31/22 1.3  3/2/21              2.3, %free 20.87, pvr: 35 (psa screen 4.6)  11/19/20          rezum  7/29/20            0.85, %free 54.12  1/31/20            2.2   2/21/19            1.2  3/14/18            1.0       PVR History:  1/30/25 24  10/29/24 Peak flowrate: 12.6 mL/s, Average flowrate: 7.3mL/s, Intervals: 3, Voided volume: 240 mL, Pvr by bladder scan: 0mL .  10/24/24 0, aua ssx: 6  7/16/24 15  4/11/24  Pvr: 0  3/13/24            Turp/tuip  3/8/24              Filled with 140. Voided 250  3/4/24              Uds: peak flow:  8.5, avg flow: 4.7, voided vol: 352, pvr by scan: 27 but then came back and pvr by io: 475  1/30/24            Peak flow: 16.7, voided vol: 346, pvr: 45  11/13/23          40  2/28/23            25, aua ssx: 12  8/23/22            115  6/15/21            pvr by scan: 0  3/18/21            UF: pF: 19.2, avg: 10.6, voided vol: 244, pvr: 33  3/2/21               pvr: 35  11/19/20          pvr by scan: 134 (couldn't start after he stopped)  10/26/20          Fill and pull: 0cc pvr  11/19/20          rezum  9/9/20              pvr by scan: 9cc  7/27/20            pvr by scan: 32cc  8/17/20            Cysto trus: 42.6g.   7/30/20           UF: peak flow 12.8mL/s,  avg flow 5.4mL/s, voided vol: 191cc, pvr by scan:0   1/6/20              pvr by scan: 11cc   9/3/19              pvr by scan: 15cc      Urine history: 1 ppd x 26 years. Quit 1997. Anticoag: asa 81mg  7/16/24 Tr prot  4/11/24  Ng, void: Mod bld/small leuk,  2/19/24            30 protein  1/30/54            neg  1/22/24 Not able to provide- do at the lab  11/13/23 1 rbc  6/28/23 Tr bld  5/22/23 neg  4/21/23 Tr prot  6/7/22  neg  11/19/20          Small wbc/mod blood-send for ua patricio and culture pvr by scan: 134  10/14/20          Neg  9/16/20            neg  9/9/20              neg  3/31/20            Neg  1/6/20              Neg  11/18/19          neg  8/8/19              No cx, void: n/a 0 rbc  2/21/19            Ng, void: neg, 0 rbc  4/9/18              No cx, void: neg  9/2017             No blood       Current REVIEW OF SYSTEMS:  neg    Objective:     There were no vitals filed for this visit.        Focused  exam  neg    Assessment:     Onesimo Paula is a 74 y.o. 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 Prostate cancer        Plan:     's typed/written- Abbreviated/Short Plan:  Prostate cancer Gl 3+3 T1c on AS dx 2024. Psa now 0.7, slightly up. Mri pirads 3 left side. Recommend continuing monitoring  Psa dx  and CLAIRE in 6months and fu after. If psa higher consider uronav biopsy .    Fu in 6 months with dx psa prior for claire- aua ssx      The following assessment plan was created by [facility] via ambient listening:  Assessment & Plan    C61 Prostate cancer  N40.1, N13.8 BPH with obstruction/lower urinary tract symptoms    IMPRESSION:   Prostate cancer ([place] 3+3, T1C) on active surveillance, diagnosed 2024.   PSA slightly elevated at 0.7.   MRI shows PIRADS 3 lesion on left side.   Continue monitoring with PSA and follow-up.   If PSA increases, consider repeat biopsy.   CT Chest from 2022 shows no evidence of kidney stones or hydronephrosis.   Hepatic cyst (hemangioma) being followed with annual ultrasounds.   MRI Liver performed last year due to slight growth of cyst.    Please review the short plan as above for concise and accurate plan. The dictated/AI generated plan may have some inaccuracies .    PLAN SUMMARY:   Order diagnostic PSA test in 6 months   Follow-up in 6 months after PSA draw    PROSTATE CANCER:   Ordered diagnostic PSA test in 6 months.   Follow up in 6 months after PSA draw.             This note was generated with the assistance of ambient listening technology. Verbal consent was obtained by the patient and accompanying visitor(s) for the recording of patient appointment to facilitate this note. I attest to having reviewed and edited the generated note for accuracy, though some syntax or spelling errors may persist. Please contact the author of this note for any clarification.              La Nena James MD           Next Month's Dosage: Continue Current Dosage

## 2025-08-06 ENCOUNTER — OFFICE VISIT (OUTPATIENT)
Dept: FAMILY MEDICINE | Facility: CLINIC | Age: 75
End: 2025-08-06
Payer: COMMERCIAL

## 2025-08-06 VITALS
WEIGHT: 183.19 LBS | BODY MASS INDEX: 26.22 KG/M2 | HEIGHT: 70 IN | HEART RATE: 54 BPM | DIASTOLIC BLOOD PRESSURE: 76 MMHG | SYSTOLIC BLOOD PRESSURE: 130 MMHG | OXYGEN SATURATION: 97 %

## 2025-08-06 DIAGNOSIS — I10 ESSENTIAL HYPERTENSION: Primary | ICD-10-CM

## 2025-08-06 DIAGNOSIS — F41.8 SITUATIONAL ANXIETY: ICD-10-CM

## 2025-08-06 DIAGNOSIS — D64.9 SYMPTOMATIC ANEMIA: ICD-10-CM

## 2025-08-06 PROCEDURE — 99999 PR PBB SHADOW E&M-EST. PATIENT-LVL V: CPT | Mod: PBBFAC,,, | Performed by: NURSE PRACTITIONER

## 2025-08-06 PROCEDURE — 3044F HG A1C LEVEL LT 7.0%: CPT | Mod: CPTII,S$GLB,, | Performed by: NURSE PRACTITIONER

## 2025-08-06 PROCEDURE — 1101F PT FALLS ASSESS-DOCD LE1/YR: CPT | Mod: CPTII,S$GLB,, | Performed by: NURSE PRACTITIONER

## 2025-08-06 PROCEDURE — 3078F DIAST BP <80 MM HG: CPT | Mod: CPTII,S$GLB,, | Performed by: NURSE PRACTITIONER

## 2025-08-06 PROCEDURE — 3075F SYST BP GE 130 - 139MM HG: CPT | Mod: CPTII,S$GLB,, | Performed by: NURSE PRACTITIONER

## 2025-08-06 PROCEDURE — 3288F FALL RISK ASSESSMENT DOCD: CPT | Mod: CPTII,S$GLB,, | Performed by: NURSE PRACTITIONER

## 2025-08-06 PROCEDURE — 1159F MED LIST DOCD IN RCRD: CPT | Mod: CPTII,S$GLB,, | Performed by: NURSE PRACTITIONER

## 2025-08-06 PROCEDURE — 1160F RVW MEDS BY RX/DR IN RCRD: CPT | Mod: CPTII,S$GLB,, | Performed by: NURSE PRACTITIONER

## 2025-08-06 PROCEDURE — 99214 OFFICE O/P EST MOD 30 MIN: CPT | Mod: S$GLB,,, | Performed by: NURSE PRACTITIONER

## 2025-08-06 RX ORDER — BUSPIRONE HYDROCHLORIDE 5 MG/1
5 TABLET ORAL DAILY
Qty: 30 TABLET | Refills: 5 | Status: SHIPPED | OUTPATIENT
Start: 2025-08-06

## 2025-08-06 RX ORDER — IBUPROFEN 800 MG/1
TABLET, FILM COATED ORAL
COMMUNITY

## 2025-08-06 RX ORDER — AMLODIPINE BESYLATE 10 MG/1
10 TABLET ORAL DAILY
Qty: 90 TABLET | Refills: 1 | Status: SHIPPED | OUTPATIENT
Start: 2025-08-06

## 2025-08-06 RX ORDER — TELMISARTAN 40 MG/1
40 TABLET ORAL DAILY
Qty: 90 TABLET | Refills: 0 | Status: SHIPPED | OUTPATIENT
Start: 2025-08-06

## 2025-08-07 ENCOUNTER — HOSPITAL ENCOUNTER (OUTPATIENT)
Dept: RADIOLOGY | Facility: HOSPITAL | Age: 75
Discharge: HOME | End: 2025-08-07
Attending: STUDENT IN AN ORGANIZED HEALTH CARE EDUCATION/TRAINING PROGRAM
Payer: COMMERCIAL

## 2025-08-07 DIAGNOSIS — I73.9 PERIPHERAL VASCULAR DISEASE, UNSPECIFIED: ICD-10-CM

## 2025-08-07 LAB
CREAT SERPL-MCNC: 1.3 MG/DL (ref 0.5–1.4)
SAMPLE: NORMAL

## 2025-08-07 PROCEDURE — 25500020 PHARM REV CODE 255

## 2025-08-07 PROCEDURE — 75635 CT ANGIO ABDOMINAL ARTERIES: CPT | Mod: 26,,, | Performed by: RADIOLOGY

## 2025-08-07 PROCEDURE — 75635 CT ANGIO ABDOMINAL ARTERIES: CPT | Mod: TC

## 2025-08-07 RX ADMIN — IOHEXOL 125 ML: 350 INJECTION, SOLUTION INTRAVENOUS at 08:08

## 2025-08-12 ENCOUNTER — TELEPHONE (OUTPATIENT)
Facility: CLINIC | Age: 75
End: 2025-08-12
Payer: COMMERCIAL

## 2025-08-12 DIAGNOSIS — I72.4 POPLITEAL ANEURYSM: Primary | ICD-10-CM

## 2025-08-18 ENCOUNTER — HOSPITAL ENCOUNTER (OUTPATIENT)
Dept: CARDIOLOGY | Facility: HOSPITAL | Age: 75
Discharge: HOME | End: 2025-08-18
Attending: STUDENT IN AN ORGANIZED HEALTH CARE EDUCATION/TRAINING PROGRAM
Payer: COMMERCIAL

## 2025-08-18 DIAGNOSIS — I72.4 POPLITEAL ANEURYSM: ICD-10-CM

## 2025-08-18 LAB
LEFT GS AP CALF PROX: 0.51 CM
LEFT GS AP KNEE: 0.19 CM
LEFT GS AP THIGH DIST: 0.07 CM
LEFT GS AP THIGH MID: 0.74 CM
LEFT GS AP THIGH PROX: 1.19 CM
LEFT GS TRANS CALF PROX: 0.17 CM
LEFT GS TRANS KNEE: 0.52 CM
LEFT GS TRANS THIGH DIST: 0.46 CM
LEFT GS TRANS THIGH MID: 0.63 CM
LEFT GS TRANS THIGH PROX: 0.6 CM
RIGHT GS AP CALF PROX: 0.29 CM
RIGHT GS AP KNEE: 0.26 CM
RIGHT GS AP THIGH DIST: 0.41 CM
RIGHT GS AP THIGH MID: 0.66 CM
RIGHT GS AP THIGH PROX: 7.97 CM
RIGHT GS DEPTH ANKLE: 0.27 CM
RIGHT GS DIA ANKLE: 0.37 CM
RIGHT GS TRANS CALF PROX: 0.32 CM
RIGHT GS TRANS KNEE: 0.51 CM
RIGHT GS TRANS THIGH DIST: 0.39 CM
RIGHT GS TRANS THIGH MID: 0.45 CM
RIGHT GS TRANS THIGH PROX: 0.51 CM

## 2025-08-18 PROCEDURE — 93985 DUP-SCAN HEMO COMPL BI STD: CPT | Mod: 26,,, | Performed by: INTERNAL MEDICINE

## 2025-08-18 PROCEDURE — 93985 DUP-SCAN HEMO COMPL BI STD: CPT | Mod: PO

## 2025-08-19 ENCOUNTER — OFFICE VISIT (OUTPATIENT)
Dept: VASCULAR SURGERY | Facility: CLINIC | Age: 75
End: 2025-08-19
Payer: COMMERCIAL

## 2025-08-19 ENCOUNTER — OFFICE VISIT (OUTPATIENT)
Dept: PULMONOLOGY | Facility: CLINIC | Age: 75
End: 2025-08-19
Payer: COMMERCIAL

## 2025-08-19 VITALS
HEIGHT: 70 IN | BODY MASS INDEX: 26.26 KG/M2 | WEIGHT: 183.44 LBS | SYSTOLIC BLOOD PRESSURE: 131 MMHG | DIASTOLIC BLOOD PRESSURE: 81 MMHG | HEART RATE: 51 BPM

## 2025-08-19 VITALS
WEIGHT: 183.31 LBS | HEIGHT: 70 IN | BODY MASS INDEX: 26.24 KG/M2 | HEART RATE: 68 BPM | OXYGEN SATURATION: 98 % | DIASTOLIC BLOOD PRESSURE: 72 MMHG | SYSTOLIC BLOOD PRESSURE: 128 MMHG

## 2025-08-19 DIAGNOSIS — G47.33 OBSTRUCTIVE SLEEP APNEA SYNDROME: ICD-10-CM

## 2025-08-19 DIAGNOSIS — J98.11 ATELECTASIS: ICD-10-CM

## 2025-08-19 DIAGNOSIS — I73.9 PERIPHERAL VASCULAR DISEASE, UNSPECIFIED: ICD-10-CM

## 2025-08-19 DIAGNOSIS — R91.8 MULTIPLE PULMONARY NODULES: Primary | ICD-10-CM

## 2025-08-19 DIAGNOSIS — I72.4 POPLITEAL ANEURYSM: Primary | ICD-10-CM

## 2025-08-19 DIAGNOSIS — R06.09 DYSPNEA ON EXERTION: ICD-10-CM

## 2025-08-19 PROCEDURE — 1160F RVW MEDS BY RX/DR IN RCRD: CPT | Mod: CPTII,S$GLB,, | Performed by: INTERNAL MEDICINE

## 2025-08-19 PROCEDURE — 3288F FALL RISK ASSESSMENT DOCD: CPT | Mod: CPTII,S$GLB,, | Performed by: STUDENT IN AN ORGANIZED HEALTH CARE EDUCATION/TRAINING PROGRAM

## 2025-08-19 PROCEDURE — 1159F MED LIST DOCD IN RCRD: CPT | Mod: CPTII,S$GLB,, | Performed by: INTERNAL MEDICINE

## 2025-08-19 PROCEDURE — 99214 OFFICE O/P EST MOD 30 MIN: CPT | Mod: S$GLB,,, | Performed by: INTERNAL MEDICINE

## 2025-08-19 PROCEDURE — 3074F SYST BP LT 130 MM HG: CPT | Mod: CPTII,S$GLB,, | Performed by: INTERNAL MEDICINE

## 2025-08-19 PROCEDURE — 3079F DIAST BP 80-89 MM HG: CPT | Mod: CPTII,S$GLB,, | Performed by: STUDENT IN AN ORGANIZED HEALTH CARE EDUCATION/TRAINING PROGRAM

## 2025-08-19 PROCEDURE — 3288F FALL RISK ASSESSMENT DOCD: CPT | Mod: CPTII,S$GLB,, | Performed by: INTERNAL MEDICINE

## 2025-08-19 PROCEDURE — 3044F HG A1C LEVEL LT 7.0%: CPT | Mod: CPTII,S$GLB,, | Performed by: STUDENT IN AN ORGANIZED HEALTH CARE EDUCATION/TRAINING PROGRAM

## 2025-08-19 PROCEDURE — 1126F AMNT PAIN NOTED NONE PRSNT: CPT | Mod: CPTII,S$GLB,, | Performed by: INTERNAL MEDICINE

## 2025-08-19 PROCEDURE — 99215 OFFICE O/P EST HI 40 MIN: CPT | Mod: S$GLB,,, | Performed by: STUDENT IN AN ORGANIZED HEALTH CARE EDUCATION/TRAINING PROGRAM

## 2025-08-19 PROCEDURE — 1101F PT FALLS ASSESS-DOCD LE1/YR: CPT | Mod: CPTII,S$GLB,, | Performed by: STUDENT IN AN ORGANIZED HEALTH CARE EDUCATION/TRAINING PROGRAM

## 2025-08-19 PROCEDURE — 3044F HG A1C LEVEL LT 7.0%: CPT | Mod: CPTII,S$GLB,, | Performed by: INTERNAL MEDICINE

## 2025-08-19 PROCEDURE — 99999 PR PBB SHADOW E&M-EST. PATIENT-LVL III: CPT | Mod: PBBFAC,,, | Performed by: STUDENT IN AN ORGANIZED HEALTH CARE EDUCATION/TRAINING PROGRAM

## 2025-08-19 PROCEDURE — 3078F DIAST BP <80 MM HG: CPT | Mod: CPTII,S$GLB,, | Performed by: INTERNAL MEDICINE

## 2025-08-19 PROCEDURE — 3075F SYST BP GE 130 - 139MM HG: CPT | Mod: CPTII,S$GLB,, | Performed by: STUDENT IN AN ORGANIZED HEALTH CARE EDUCATION/TRAINING PROGRAM

## 2025-08-19 PROCEDURE — 1159F MED LIST DOCD IN RCRD: CPT | Mod: CPTII,S$GLB,, | Performed by: STUDENT IN AN ORGANIZED HEALTH CARE EDUCATION/TRAINING PROGRAM

## 2025-08-19 PROCEDURE — 1125F AMNT PAIN NOTED PAIN PRSNT: CPT | Mod: CPTII,S$GLB,, | Performed by: STUDENT IN AN ORGANIZED HEALTH CARE EDUCATION/TRAINING PROGRAM

## 2025-08-19 PROCEDURE — 1101F PT FALLS ASSESS-DOCD LE1/YR: CPT | Mod: CPTII,S$GLB,, | Performed by: INTERNAL MEDICINE

## 2025-08-20 DIAGNOSIS — I72.4 POPLITEAL ANEURYSM: Primary | ICD-10-CM

## 2025-08-20 DIAGNOSIS — I72.4 POPLITEAL ARTERY ANEURYSM: ICD-10-CM

## 2025-08-20 RX ORDER — LIDOCAINE HYDROCHLORIDE 10 MG/ML
1 INJECTION, SOLUTION EPIDURAL; INFILTRATION; INTRACAUDAL; PERINEURAL ONCE
OUTPATIENT
Start: 2025-08-20 | End: 2025-08-20

## 2025-08-20 RX ORDER — CEFAZOLIN SODIUM 2 G/50ML
2 SOLUTION INTRAVENOUS
OUTPATIENT
Start: 2025-08-20

## 2025-08-21 ENCOUNTER — TELEPHONE (OUTPATIENT)
Dept: VASCULAR SURGERY | Facility: CLINIC | Age: 75
End: 2025-08-21
Payer: COMMERCIAL

## 2025-09-02 ENCOUNTER — HOSPITAL ENCOUNTER (OUTPATIENT)
Dept: RADIOLOGY | Facility: HOSPITAL | Age: 75
Discharge: HOME | End: 2025-09-02
Attending: INTERNAL MEDICINE
Payer: COMMERCIAL

## 2025-09-02 DIAGNOSIS — K76.89 LIVER CYST: ICD-10-CM

## 2025-09-02 PROCEDURE — 76705 ECHO EXAM OF ABDOMEN: CPT | Mod: TC

## 2025-09-02 PROCEDURE — 76705 ECHO EXAM OF ABDOMEN: CPT | Mod: 26,,, | Performed by: RADIOLOGY

## 2025-09-04 PROBLEM — I72.4 POPLITEAL ARTERY ANEURYSM, BILATERAL: Status: ACTIVE | Noted: 2025-09-04

## 2025-09-05 PROBLEM — Z73.6 LIMITATION OF ACTIVITY DUE TO DISABILITY: Status: ACTIVE | Noted: 2025-09-05

## (undated) DEVICE — BLADE SURG CARBON STEEL SZ11

## (undated) DEVICE — TUBING ARTHRO IRR 4-LEAD

## (undated) DEVICE — GLOVE SURG ULTRA TOUCH 7

## (undated) DEVICE — BAG LINGEMAN DRAIN UROLOGY

## (undated) DEVICE — DRESSING TRANS 2X2 TEGADERM

## (undated) DEVICE — SLEEVE SCD EXPRESS CALF MEDIUM

## (undated) DEVICE — SUTURE ENDOLOOP VICRYL 0 18 EJ10G

## (undated) DEVICE — GLOVE SURG ULTRA TOUCH 6

## (undated) DEVICE — SOL NACL IRR 3000ML

## (undated) DEVICE — SET IRR URLGY 2LINE UNIV SPIKE

## (undated) DEVICE — Device

## (undated) DEVICE — RELOAD LINEAR 6R45B

## (undated) DEVICE — SOLUTION IRRI NS BOTTLE 1000ML R5200-01

## (undated) DEVICE — SOLUTION NACL 0.9% 3000ML

## (undated) DEVICE — ELECTRODE REM PLYHSV RETURN 9

## (undated) DEVICE — GLOVE BIOGEL PIMICRO INDIC 6.5

## (undated) DEVICE — CONTAINER SPECIMEN OR STER 4OZ

## (undated) DEVICE — SET CYSTO IRR DRP CHMBR 84IN

## (undated) DEVICE — PLUG CATH DRAIN TUBE PROTECTOR

## (undated) DEVICE — PACK SIRUS BASIC V SURG STRL

## (undated) DEVICE — SYR LUER LOCK STERILE 10ML

## (undated) DEVICE — GOWN POLY REINF BRTH SLV LG

## (undated) DEVICE — NDL INTERSTIM FORAMN 18.5G 5IN

## (undated) DEVICE — SKIN MARKER STER DUAL TIP

## (undated) DEVICE — STRAP OR TABLE 5IN X 72IN

## (undated) DEVICE — SPONGE IV DRAIN 4X4 STERILE

## (undated) DEVICE — SPONGE SUPER KERLIX 6X6.75IN

## (undated) DEVICE — COVER TRANSDUCER LATEX N/STERI

## (undated) DEVICE — RETRIEVER ENDOPOUCH SPEC BAG

## (undated) DEVICE — LOOP CUTTING RESECT 12 D 24F

## (undated) DEVICE — SCRUB HIBICLENS 4% CHG 4OZ

## (undated) DEVICE — TRAY GENERAL LAPAROSCOPY

## (undated) DEVICE — SOL IRRI STRL WATER 1000ML

## (undated) DEVICE — ELECTRODE RESECTION BUTTON LRG

## (undated) DEVICE — ADHESIVE DERMABOND ADVANCED

## (undated) DEVICE — PACK CUSTOM UNIV BASIN SLI

## (undated) DEVICE — SUT 2-0 VICRYL / SH (J417)

## (undated) DEVICE — SLEEVE SCD EXPRESS KNEE MEDIUM

## (undated) DEVICE — CUTTER LINEAR FLEX ARTICNG 45MM ATS45

## (undated) DEVICE — TUBING SUCTION SET ENDOMAT

## (undated) DEVICE — SWABSTICK BENZOIN S42450

## (undated) DEVICE — SEE MEDLINE ITEM 152487

## (undated) DEVICE — JELLY KY LUBRICATING 5G PACKET

## (undated) DEVICE — SUT CHROMIC 3-0 SH 27IN GUT

## (undated) DEVICE — SUTURE VICRYL 0 UR-5 27 VCP376H

## (undated) DEVICE — DRAPE THREE-QTR REINF 53X77IN

## (undated) DEVICE — LUBRICANT SURGILUBE 2 OZ

## (undated) DEVICE — LEGGING CLEAR POLY 2/PACK

## (undated) DEVICE — DRAPE MINOR PROCEDURE

## (undated) DEVICE — STERISTRIP 1/2 R1547

## (undated) DEVICE — DRAPE STERI-DRAPE 1000 17X11IN

## (undated) DEVICE — DRAPE C-ARM ELAS CLIP 42X120IN

## (undated) DEVICE — SUT 2-0 12-18IN SILK

## (undated) DEVICE — DRAPE MINOR FEN 98X77X121IN

## (undated) DEVICE — SUT MONOCRYL 4-0 PS-2

## (undated) DEVICE — TOWEL OR DISP STRL BLUE 4/PK

## (undated) DEVICE — DRESSING AQUACEL AG 3.5X10IN

## (undated) DEVICE — UNDERGLOVE BIOGEL PI SZ 6.5 LF

## (undated) DEVICE — ALCOHOL 70% ANTISEPTIC ISO 4OZ

## (undated) DEVICE — GOWN POLY REINF SET SLV XL

## (undated) DEVICE — SYR 50ML CATH TIP

## (undated) DEVICE — ARMCRADLE LAMINECTOMY

## (undated) DEVICE — GLOVE BIOGEL PI MICRO INDIC 7

## (undated) DEVICE — DRAPE C-ARMOR EQUIPMENT COVER

## (undated) DEVICE — TUBING SUC UNIV W/CONN 12FT

## (undated) DEVICE — COVER TABLE 44X90 STERILE

## (undated) DEVICE — SYS DELIVERY REZUM

## (undated) DEVICE — SWABSTICK PVP-1 SINGLE 10%

## (undated) DEVICE — SUTURE MONOCRYL 4-0 PS-2 27 MCP426H

## (undated) DEVICE — GLOVE SENSICARE PI ALOE 6

## (undated) DEVICE — HANDSET INTERSTIM X COMM

## (undated) DEVICE — BAG DRAIN ANTI REFLUX 2000ML

## (undated) DEVICE — APPLICATOR CHLORAPREP ORN 26ML

## (undated) DEVICE — TUBING INSUFFLATION 10FT

## (undated) DEVICE — SUTURE MONOCRYL 4-0 27 SH MCP415H

## (undated) DEVICE — DRAPE C ARM 42 X 120 10/BX

## (undated) DEVICE — TROCAR BLADED ZTHREAD 5MMX100MM CTB03

## (undated) DEVICE — NDL BLUNT W/O FILTER 18GX1.5IN

## (undated) DEVICE — BOWL STERILE LARGE 32OZ

## (undated) DEVICE — FLUTE FULL ROUND 19FR 072190

## (undated) DEVICE — COVER PROXIMA MAYO STAND

## (undated) DEVICE — SEE MEDLINE ITEM 157185

## (undated) DEVICE — SOLUTION IRRI H2O BOTTLE 1000ML

## (undated) DEVICE — BULB DRAIN 100CC 70740

## (undated) DEVICE — SPONGE BULKEE II ABSRB 6X6.75

## (undated) DEVICE — SOL IRR NACL .9% 3000ML

## (undated) DEVICE — COVER EQUIP 36X36 W/ELSTC BAND

## (undated) DEVICE — GUIDE BIOPSY BIPLANAR 18G

## (undated) DEVICE — EVACUATOR BLADDER UROVAC ADPT

## (undated) DEVICE — SOL WATER STRL IRR 1000ML

## (undated) DEVICE — GLOVE SENSICARE PI ALOE 7

## (undated) DEVICE — TROCAR OPTICAL ZTHREAD 12MMX100MM CTF73

## (undated) DEVICE — SEE MEDLINE ITEM 157116

## (undated) DEVICE — SUTURE VICRYL 2-0 27 SH VCP417H

## (undated) DEVICE — DRAPE CYSTOSCOPY LARGE

## (undated) DEVICE — PAD BOVIE ADULT

## (undated) DEVICE — CATH URETH FOL 3W 22FR 30ML

## (undated) DEVICE — SUCTION/IRRIGATOR W/TIP

## (undated) DEVICE — DEVICE SNAPSECURE FOL CATH

## (undated) DEVICE — SOL NACL IRR 1000ML BTL

## (undated) DEVICE — NDL HYPODERMIC SAF 25G 1.5IN

## (undated) DEVICE — COVER TRNSDUC CIV-FLX 8.9X91.5

## (undated) DEVICE — DRESSING MEPORE 2.5 X 3   670800

## (undated) DEVICE — DRESSING ANTIMICROBIAL 1 INCH

## (undated) DEVICE — KIT INTERSTIM REVISION ACC

## (undated) DEVICE — SCRUB DYNA-HEX LIQ 4% CHG 4OZ

## (undated) DEVICE — SPONGE DRAIN 4X4 441407

## (undated) DEVICE — GLOVE BIOGEL PI ULTRA TOUCH GRAY SZ7.5

## (undated) DEVICE — SLEEVE TROCAR 5MMX100MM  CTS02